# Patient Record
Sex: MALE | Race: WHITE | NOT HISPANIC OR LATINO | Employment: OTHER | URBAN - METROPOLITAN AREA
[De-identification: names, ages, dates, MRNs, and addresses within clinical notes are randomized per-mention and may not be internally consistent; named-entity substitution may affect disease eponyms.]

---

## 2017-12-18 ENCOUNTER — HOSPITAL ENCOUNTER (OUTPATIENT)
Dept: LAB | Facility: HOSPITAL | Age: 67
Discharge: HOME | End: 2017-12-18
Attending: INTERNAL MEDICINE | Admitting: INTERNAL MEDICINE

## 2017-12-18 LAB
ALBUMIN SERPL-MCNC: 3.9 G/DL (ref 3.5–4.8)
ALBUMIN/GLOB SERPL: 1.2 {RATIO} (ref 1–1.7)
ALP SERPL-CCNC: 63 IU/L (ref 32–91)
ALT SERPL-CCNC: 23 IU/L (ref 17–63)
ANION GAP SERPL CALC-SCNC: 10 MMOL/L (ref 10–20)
AST SERPL-CCNC: 20 IU/L (ref 15–41)
BASOPHILS # BLD AUTO: 0.1 10*3/UL (ref 0–0.2)
BASOPHILS NFR BLD AUTO: 1 % (ref 0–2)
BILIRUB SERPL-MCNC: 0.9 MG/DL (ref 0.3–1.2)
BUN SERPL-MCNC: 31 MG/DL (ref 8–20)
BUN/CREAT SERPL: 17.2 (ref 6.2–20.3)
CALCIUM SERPL-MCNC: 9.7 MG/DL (ref 8.9–10.3)
CHLORIDE SERPL-SCNC: 104 MMOL/L (ref 101–111)
CONV CO2: 29 MMOL/L (ref 22–32)
CONV TOTAL PROTEIN: 7.1 G/DL (ref 6.1–7.9)
CREAT UR-MCNC: 1.8 MG/DL (ref 0.7–1.2)
DIFFERENTIAL METHOD BLD: (no result)
EOSINOPHIL # BLD AUTO: 0.1 10*3/UL (ref 0–0.3)
EOSINOPHIL # BLD AUTO: 2 % (ref 0–3)
ERYTHROCYTE [DISTWIDTH] IN BLOOD BY AUTOMATED COUNT: 13.6 % (ref 11.5–14.5)
GLOBULIN UR ELPH-MCNC: 3.2 G/DL (ref 2.5–3.8)
GLUCOSE SERPL-MCNC: 161 MG/DL (ref 65–99)
HCT VFR BLD AUTO: 39.5 % (ref 40–54)
HGB BLD-MCNC: 13.6 G/DL (ref 14–18)
LYMPHOCYTES # BLD AUTO: 1.9 10*3/UL (ref 0.8–4.8)
LYMPHOCYTES NFR BLD AUTO: 31 % (ref 18–42)
MAGNESIUM SERPL-MCNC: 2.1 MG/DL (ref 1.8–2.5)
MCH RBC QN AUTO: 30.7 PG (ref 26–32)
MCHC RBC AUTO-ENTMCNC: 34.3 G/DL (ref 32–36)
MCV RBC AUTO: 89.4 FL (ref 80–94)
MONOCYTES # BLD AUTO: 0.5 10*3/UL (ref 0.1–1.3)
MONOCYTES NFR BLD AUTO: 8 % (ref 2–11)
NEUTROPHILS # BLD AUTO: 3.4 10*3/UL (ref 2.3–8.6)
NEUTROPHILS NFR BLD AUTO: 58 % (ref 50–75)
NRBC BLD AUTO-RTO: 0 /100{WBCS}
NRBC/RBC NFR BLD MANUAL: 0 10*3/UL
PLATELET # BLD AUTO: 156 10*3/UL (ref 150–450)
PMV BLD AUTO: 8 FL (ref 7.4–10.4)
POTASSIUM SERPL-SCNC: 4 MMOL/L (ref 3.6–5.1)
RBC # BLD AUTO: 4.42 10*6/UL (ref 4.6–6)
SODIUM SERPL-SCNC: 139 MMOL/L (ref 136–144)
WBC # BLD AUTO: 6 10*3/UL (ref 4.5–11.5)

## 2019-10-09 ENCOUNTER — TRANSCRIBE ORDERS (OUTPATIENT)
Dept: ADMINISTRATIVE | Facility: HOSPITAL | Age: 69
End: 2019-10-09

## 2019-10-09 DIAGNOSIS — R10.33 PERIUMBILICAL PAIN: Primary | ICD-10-CM

## 2019-10-15 ENCOUNTER — HOSPITAL ENCOUNTER (OUTPATIENT)
Dept: ULTRASOUND IMAGING | Facility: HOSPITAL | Age: 69
Discharge: HOME | End: 2019-10-15

## 2019-10-15 ENCOUNTER — HOSPITAL ENCOUNTER (OUTPATIENT)
Dept: ULTRASOUND IMAGING | Facility: HOSPITAL | Age: 69
Discharge: HOME | End: 2019-10-15
Admitting: INTERNAL MEDICINE

## 2019-10-15 DIAGNOSIS — N28.9 RENAL INSUFFICIENCY: ICD-10-CM

## 2019-10-15 DIAGNOSIS — R10.33 PERIUMBILICAL PAIN: ICD-10-CM

## 2019-10-15 PROCEDURE — 76775 US EXAM ABDO BACK WALL LIM: CPT

## 2019-10-15 PROCEDURE — 76705 ECHO EXAM OF ABDOMEN: CPT

## 2020-07-05 ENCOUNTER — APPOINTMENT (OUTPATIENT)
Dept: GENERAL RADIOLOGY | Facility: HOSPITAL | Age: 70
End: 2020-07-05

## 2020-07-05 ENCOUNTER — HOSPITAL ENCOUNTER (EMERGENCY)
Facility: HOSPITAL | Age: 70
Discharge: HOME | End: 2020-07-05
Attending: EMERGENCY MEDICINE | Admitting: EMERGENCY MEDICINE

## 2020-07-05 VITALS
TEMPERATURE: 98 F | HEART RATE: 79 BPM | RESPIRATION RATE: 18 BRPM | SYSTOLIC BLOOD PRESSURE: 154 MMHG | BODY MASS INDEX: 26.51 KG/M2 | WEIGHT: 185.19 LBS | DIASTOLIC BLOOD PRESSURE: 88 MMHG | HEIGHT: 70 IN | OXYGEN SATURATION: 99 %

## 2020-07-05 DIAGNOSIS — S92.501A CLOSED FRACTURE OF PHALANX OF RIGHT FIFTH TOE, INITIAL ENCOUNTER: Primary | ICD-10-CM

## 2020-07-05 PROCEDURE — 73660 X-RAY EXAM OF TOE(S): CPT

## 2020-07-05 PROCEDURE — 99283 EMERGENCY DEPT VISIT LOW MDM: CPT

## 2020-07-05 RX ORDER — LISINOPRIL 40 MG/1
40 TABLET ORAL DAILY
COMMUNITY

## 2020-07-05 RX ORDER — LANOLIN ALCOHOL/MO/W.PET/CERES
1000 CREAM (GRAM) TOPICAL DAILY
Status: ON HOLD | COMMUNITY
End: 2022-06-16

## 2020-07-05 RX ORDER — CLOPIDOGREL BISULFATE 75 MG/1
75 TABLET ORAL DAILY
COMMUNITY

## 2020-07-05 RX ORDER — INSULIN GLARGINE 100 [IU]/ML
30 INJECTION, SOLUTION SUBCUTANEOUS DAILY
COMMUNITY
End: 2022-06-25 | Stop reason: HOSPADM

## 2020-07-05 NOTE — ED PROVIDER NOTES
Subjective   Patient is a 69-year-old male who fell and injured his fifth toe on his right foot.  He states it was deformed initially but is straight at this time.  He complains of moderate pain.          Review of Systems  For numbness tingling or other complaint of injury  Past Medical History:   Diagnosis Date   • Diabetes mellitus (CMS/HCC)    • Hyperlipidemia    • Hypertension        Allergies   Allergen Reactions   • Contrast Dye Anaphylaxis   • Iodinated Diagnostic Agents Shortness Of Breath   • Iodine Shortness Of Breath   • Aspirin Nausea And Vomiting       Past Surgical History:   Procedure Laterality Date   • ADENOIDECTOMY     • TONSILLECTOMY         History reviewed. No pertinent family history.    Social History     Socioeconomic History   • Marital status:      Spouse name: Not on file   • Number of children: Not on file   • Years of education: Not on file   • Highest education level: Not on file   Tobacco Use   • Smoking status: Never Smoker   • Smokeless tobacco: Never Used   Substance and Sexual Activity   • Alcohol use: Never     Frequency: Never   • Drug use: Never   • Sexual activity: Defer           Objective   Physical Exam  Strohman exam shows pain to palpation of his right fifth toe.  There is no deformity noted.  Patient is neurovascularly intact with 2+ pulses.  Procedures           ED Course      Xr Toe 2+ View Right    Result Date: 7/5/2020  Nondisplaced fracture of the fifth toe proximal phalanx.  Electronically Signed By-Yahaira Arroyo On:7/5/2020 3:17 PM This report was finalized on [id number] by  Yahaira Arroyo, .                                         MDM  Number of Diagnoses or Management Options  Diagnosis management comments: Patient is findings consistent with fracture proximal phalanx right fifth toe.  Patient had the fourth and fifth toe magui taped and the patient was given a postop shoe.       Amount and/or Complexity of Data Reviewed  Tests in the radiology section  of CPT®: reviewed    Risk of Complications, Morbidity, and/or Mortality  Presenting problems: moderate  Diagnostic procedures: moderate  Management options: moderate    Patient Progress  Patient progress: stable      Final diagnoses:   Closed fracture of phalanx of right fifth toe, initial encounter            Vazqeuz Willson MD  07/05/20 0545

## 2021-03-25 ENCOUNTER — APPOINTMENT (OUTPATIENT)
Dept: CARDIOLOGY | Facility: HOSPITAL | Age: 71
End: 2021-03-25

## 2021-03-25 ENCOUNTER — HOSPITAL ENCOUNTER (EMERGENCY)
Facility: HOSPITAL | Age: 71
Discharge: HOME | End: 2021-03-25
Attending: EMERGENCY MEDICINE | Admitting: EMERGENCY MEDICINE

## 2021-03-25 VITALS
HEIGHT: 70 IN | OXYGEN SATURATION: 99 % | TEMPERATURE: 98 F | WEIGHT: 180 LBS | RESPIRATION RATE: 17 BRPM | HEART RATE: 99 BPM | DIASTOLIC BLOOD PRESSURE: 78 MMHG | SYSTOLIC BLOOD PRESSURE: 134 MMHG | BODY MASS INDEX: 25.77 KG/M2

## 2021-03-25 DIAGNOSIS — M79.604 RIGHT LEG PAIN: Primary | ICD-10-CM

## 2021-03-25 LAB
ANION GAP SERPL CALCULATED.3IONS-SCNC: 10 MMOL/L (ref 5–15)
BH CV LOWER VASCULAR LEFT COMMON FEMORAL AUGMENT: NORMAL
BH CV LOWER VASCULAR LEFT COMMON FEMORAL COMPETENT: NORMAL
BH CV LOWER VASCULAR LEFT COMMON FEMORAL COMPRESS: NORMAL
BH CV LOWER VASCULAR LEFT COMMON FEMORAL PHASIC: NORMAL
BH CV LOWER VASCULAR LEFT COMMON FEMORAL SPONT: NORMAL
BH CV LOWER VASCULAR RIGHT COMMON FEMORAL AUGMENT: NORMAL
BH CV LOWER VASCULAR RIGHT COMMON FEMORAL COMPETENT: NORMAL
BH CV LOWER VASCULAR RIGHT COMMON FEMORAL COMPRESS: NORMAL
BH CV LOWER VASCULAR RIGHT COMMON FEMORAL PHASIC: NORMAL
BH CV LOWER VASCULAR RIGHT COMMON FEMORAL SPONT: NORMAL
BH CV LOWER VASCULAR RIGHT DISTAL FEMORAL COMPRESS: NORMAL
BH CV LOWER VASCULAR RIGHT GASTRONEMIUS COMPRESS: NORMAL
BH CV LOWER VASCULAR RIGHT GREATER SAPH AK COMPRESS: NORMAL
BH CV LOWER VASCULAR RIGHT GREATER SAPH BK COMPRESS: NORMAL
BH CV LOWER VASCULAR RIGHT LESSER SAPH COMPRESS: NORMAL
BH CV LOWER VASCULAR RIGHT MID FEMORAL AUGMENT: NORMAL
BH CV LOWER VASCULAR RIGHT MID FEMORAL COMPETENT: NORMAL
BH CV LOWER VASCULAR RIGHT MID FEMORAL COMPRESS: NORMAL
BH CV LOWER VASCULAR RIGHT MID FEMORAL PHASIC: NORMAL
BH CV LOWER VASCULAR RIGHT MID FEMORAL SPONT: NORMAL
BH CV LOWER VASCULAR RIGHT PERONEAL COMPRESS: NORMAL
BH CV LOWER VASCULAR RIGHT POPLITEAL AUGMENT: NORMAL
BH CV LOWER VASCULAR RIGHT POPLITEAL COMPETENT: NORMAL
BH CV LOWER VASCULAR RIGHT POPLITEAL COMPRESS: NORMAL
BH CV LOWER VASCULAR RIGHT POPLITEAL PHASIC: NORMAL
BH CV LOWER VASCULAR RIGHT POPLITEAL SPONT: NORMAL
BH CV LOWER VASCULAR RIGHT POSTERIOR TIBIAL COMPRESS: NORMAL
BH CV LOWER VASCULAR RIGHT PROXIMAL FEMORAL COMPRESS: NORMAL
BH CV LOWER VASCULAR RIGHT SAPHENOFEMORAL JUNCTION COMPRESS: NORMAL
BUN SERPL-MCNC: 31 MG/DL (ref 8–23)
BUN/CREAT SERPL: 16.9 (ref 7–25)
CALCIUM SPEC-SCNC: 9.6 MG/DL (ref 8.6–10.5)
CHLORIDE SERPL-SCNC: 99 MMOL/L (ref 98–107)
CO2 SERPL-SCNC: 29 MMOL/L (ref 22–29)
CREAT SERPL-MCNC: 1.83 MG/DL (ref 0.76–1.27)
DEPRECATED RDW RBC AUTO: 38.5 FL (ref 37–54)
EOSINOPHIL # BLD MANUAL: 0.08 10*3/MM3 (ref 0–0.4)
EOSINOPHIL NFR BLD MANUAL: 1 % (ref 0.3–6.2)
ERYTHROCYTE [DISTWIDTH] IN BLOOD BY AUTOMATED COUNT: 12.7 % (ref 12.3–15.4)
GFR SERPL CREATININE-BSD FRML MDRD: 37 ML/MIN/1.73
GLUCOSE SERPL-MCNC: 288 MG/DL (ref 65–99)
HCT VFR BLD AUTO: 44.1 % (ref 37.5–51)
HGB BLD-MCNC: 15.5 G/DL (ref 13–17.7)
HOLD SPECIMEN: NORMAL
LYMPHOCYTES # BLD MANUAL: 1.07 10*3/MM3 (ref 0.7–3.1)
LYMPHOCYTES NFR BLD MANUAL: 13 % (ref 19.6–45.3)
LYMPHOCYTES NFR BLD MANUAL: 9 % (ref 5–12)
MCH RBC QN AUTO: 30.6 PG (ref 26.6–33)
MCHC RBC AUTO-ENTMCNC: 35.3 G/DL (ref 31.5–35.7)
MCV RBC AUTO: 86.9 FL (ref 79–97)
MONOCYTES # BLD AUTO: 0.74 10*3/MM3 (ref 0.1–0.9)
NEUTROPHILS # BLD AUTO: 5.66 10*3/MM3 (ref 1.7–7)
NEUTROPHILS NFR BLD MANUAL: 69 % (ref 42.7–76)
PLAT MORPH BLD: NORMAL
PLATELET # BLD AUTO: 189 10*3/MM3 (ref 140–450)
PMV BLD AUTO: 8.2 FL (ref 6–12)
POTASSIUM SERPL-SCNC: 3.5 MMOL/L (ref 3.5–5.2)
RBC # BLD AUTO: 5.07 10*6/MM3 (ref 4.14–5.8)
RBC MORPH BLD: NORMAL
SCAN SLIDE: NORMAL
SODIUM SERPL-SCNC: 138 MMOL/L (ref 136–145)
VARIANT LYMPHS NFR BLD MANUAL: 8 % (ref 0–5)
WBC # BLD AUTO: 8.2 10*3/MM3 (ref 3.4–10.8)
WBC MORPH BLD: NORMAL
WHOLE BLOOD HOLD SPECIMEN: NORMAL

## 2021-03-25 PROCEDURE — 99283 EMERGENCY DEPT VISIT LOW MDM: CPT

## 2021-03-25 PROCEDURE — 85007 BL SMEAR W/DIFF WBC COUNT: CPT | Performed by: EMERGENCY MEDICINE

## 2021-03-25 PROCEDURE — 85025 COMPLETE CBC W/AUTO DIFF WBC: CPT | Performed by: EMERGENCY MEDICINE

## 2021-03-25 PROCEDURE — 93971 EXTREMITY STUDY: CPT

## 2021-03-25 PROCEDURE — 80048 BASIC METABOLIC PNL TOTAL CA: CPT | Performed by: EMERGENCY MEDICINE

## 2021-03-25 RX ORDER — ACETAMINOPHEN AND CODEINE PHOSPHATE 300; 30 MG/1; MG/1
1 TABLET ORAL EVERY 6 HOURS PRN
Qty: 12 TABLET | Refills: 0 | Status: SHIPPED | OUTPATIENT
Start: 2021-03-25 | End: 2021-11-23

## 2021-03-25 RX ORDER — SODIUM CHLORIDE 0.9 % (FLUSH) 0.9 %
10 SYRINGE (ML) INJECTION AS NEEDED
Status: DISCONTINUED | OUTPATIENT
Start: 2021-03-25 | End: 2021-03-25 | Stop reason: HOSPADM

## 2021-03-25 NOTE — ED NOTES
Pt A&Ox 4 with lungs CTA bases. Pt ambulatory at this time.   Discharge instruction given to pt with verbal understanding received.   Pt discharged with education, where to  RX, and personal belongings.        Arnold Fine, RN  03/25/21 7722

## 2021-03-25 NOTE — ED PROVIDER NOTES
Subjective   Patient is a 70-year-old male complaining of right leg pain for the past several days.  He states the pain is mild in his thigh and his pretibial region.  He also planes of a rash that developed over the past few days.  Denies itching numbness tingling weakness or other complaint          Review of Systems  Negative for chest pain shortness of breath recent trauma numbness tingling weakness or other complaint  Past Medical History:   Diagnosis Date   • Diabetes mellitus (CMS/HCC)    • Hyperlipidemia    • Hypertension        Allergies   Allergen Reactions   • Contrast Dye Anaphylaxis   • Iodinated Diagnostic Agents Shortness Of Breath   • Iodine Shortness Of Breath   • Aspirin Nausea And Vomiting       Past Surgical History:   Procedure Laterality Date   • ADENOIDECTOMY     • TONSILLECTOMY         No family history on file.    Social History     Socioeconomic History   • Marital status:      Spouse name: Not on file   • Number of children: Not on file   • Years of education: Not on file   • Highest education level: Not on file   Tobacco Use   • Smoking status: Never Smoker   • Smokeless tobacco: Never Used   Substance and Sexual Activity   • Alcohol use: Never   • Drug use: Never   • Sexual activity: Defer           Objective   Physical Exam  Lungs are clear.  Heart is regular rate rhythm without murmur.  Extremity exam shows the patient mild pain to palpation over his right medial thigh as well as his right pretibial region.  He does have a small excoriated rash over his right pretibial region as well.  Has 2+ pulses and is neurovascularly intact.  Procedures           ED Course      Results for orders placed or performed during the hospital encounter of 03/25/21   Basic Metabolic Panel    Specimen: Arm, Left; Blood   Result Value Ref Range    Glucose 288 (H) 65 - 99 mg/dL    BUN 31 (H) 8 - 23 mg/dL    Creatinine 1.83 (H) 0.76 - 1.27 mg/dL    Sodium 138 136 - 145 mmol/L    Potassium 3.5 3.5 - 5.2  mmol/L    Chloride 99 98 - 107 mmol/L    CO2 29.0 22.0 - 29.0 mmol/L    Calcium 9.6 8.6 - 10.5 mg/dL    eGFR Non African Amer 37 (L) >60 mL/min/1.73    BUN/Creatinine Ratio 16.9 7.0 - 25.0    Anion Gap 10.0 5.0 - 15.0 mmol/L   CBC Auto Differential    Specimen: Arm, Left; Blood   Result Value Ref Range    WBC 8.20 3.40 - 10.80 10*3/mm3    RBC 5.07 4.14 - 5.80 10*6/mm3    Hemoglobin 15.5 13.0 - 17.7 g/dL    Hematocrit 44.1 37.5 - 51.0 %    MCV 86.9 79.0 - 97.0 fL    MCH 30.6 26.6 - 33.0 pg    MCHC 35.3 31.5 - 35.7 g/dL    RDW 12.7 12.3 - 15.4 %    RDW-SD 38.5 37.0 - 54.0 fl    MPV 8.2 6.0 - 12.0 fL    Platelets 189 140 - 450 10*3/mm3    nRBC 0.0 0.0 - 0.2 /100 WBC   Duplex Venous Lower Extremity - Right   Result Value Ref Range    Right Common Femoral Spont Y     Right Common Femoral Phasic Y     Right Common Femoral Augment Y     Right Common Femoral Competent Y     Right Common Femoral Compress C     Right Saphenofemoral Junction Compress C     Right Proximal Femoral Compress C     Right Mid Femoral Spont Y     Right Mid Femoral Phasic Y     Right Mid Femoral Augment Y     Right Mid Femoral Competent Y     Right Mid Femoral Compress C     Right Distal Femoral Compress C     Right Popliteal Spont Y     Right Popliteal Phasic Y     Right Popliteal Augment Y     Right Popliteal Competent Y     Right Popliteal Compress C     Right Posterior Tibial Compress C     Right Peroneal Compress C     Right GastronemiusSoleal Compress C     Right Greater Saph AK Compress C     Right Greater Saph BK Compress C     Right Lesser Saph Compress C     Left Common Femoral Spont Y     Left Common Femoral Phasic Y     Left Common Femoral Augment Y     Left Common Femoral Competent Y     Left Common Femoral Compress C                                           MDM  Number of Diagnoses or Management Options  Diagnosis management comments: Patient is findings consistent with right leg strain.  There is no evidence of DVT.  There is no  evidence of infectious process or metabolic abnormality.  Patient will be discharged.  Will be placed on Tylenol 3 and will see his MD for recheck.       Amount and/or Complexity of Data Reviewed  Clinical lab tests: reviewed    Risk of Complications, Morbidity, and/or Mortality  Presenting problems: moderate  Diagnostic procedures: moderate  Management options: moderate    Patient Progress  Patient progress: stable      Final diagnoses:   Right leg pain       ED Disposition  ED Disposition     ED Disposition Condition Comment    Discharge Stable           No follow-up provider specified.       Medication List      New Prescriptions    acetaminophen-codeine 300-30 MG per tablet  Commonly known as: TYLENOL #3  Take 1 tablet by mouth Every 6 (Six) Hours As Needed for Moderate Pain .           Where to Get Your Medications      These medications were sent to [address] [place] - [address] [facility] - [phone]  - [phone] FX  [address] [place] [address] [place] [address]    Phone: [phone]   · acetaminophen-codeine 300-30 MG per tablet          Vazquez Willson MD  03/25/21 6334

## 2021-11-01 ENCOUNTER — TRANSCRIBE ORDERS (OUTPATIENT)
Dept: ADMINISTRATIVE | Facility: HOSPITAL | Age: 71
End: 2021-11-01

## 2021-11-01 DIAGNOSIS — I69.30 SEQUELAE OF CEREBRAL INFARCTION: Primary | ICD-10-CM

## 2021-11-08 ENCOUNTER — HOSPITAL ENCOUNTER (OUTPATIENT)
Dept: CT IMAGING | Facility: HOSPITAL | Age: 71
Discharge: HOME | End: 2021-11-08
Admitting: INTERNAL MEDICINE

## 2021-11-08 DIAGNOSIS — I69.30 SEQUELAE OF CEREBRAL INFARCTION: ICD-10-CM

## 2021-11-08 PROCEDURE — 70450 CT HEAD/BRAIN W/O DYE: CPT

## 2021-11-17 ENCOUNTER — TRANSCRIBE ORDERS (OUTPATIENT)
Dept: ADMINISTRATIVE | Facility: HOSPITAL | Age: 71
End: 2021-11-17

## 2021-11-17 ENCOUNTER — TRANSCRIBE ORDERS (OUTPATIENT)
Dept: PHYSICAL THERAPY | Facility: CLINIC | Age: 71
End: 2021-11-17

## 2021-11-17 DIAGNOSIS — Z74.09 IMPAIRED FUNCTIONAL MOBILITY, BALANCE, GAIT, AND ENDURANCE: Primary | ICD-10-CM

## 2021-11-17 DIAGNOSIS — I69.30 UNSPECIFIED SEQUELAE OF CEREBRAL INFARCTION: ICD-10-CM

## 2021-11-22 ENCOUNTER — APPOINTMENT (OUTPATIENT)
Dept: GENERAL RADIOLOGY | Facility: HOSPITAL | Age: 71
End: 2021-11-22

## 2021-11-22 ENCOUNTER — HOSPITAL ENCOUNTER (OUTPATIENT)
Facility: HOSPITAL | Age: 71
Setting detail: OBSERVATION
Discharge: NURSING, REHAB OR LONG-TERM CARE | End: 2021-11-25
Attending: INTERNAL MEDICINE | Admitting: INTERNAL MEDICINE

## 2021-11-22 ENCOUNTER — APPOINTMENT (OUTPATIENT)
Dept: MRI IMAGING | Facility: HOSPITAL | Age: 71
End: 2021-11-22

## 2021-11-22 DIAGNOSIS — M25.462 EFFUSION OF LEFT KNEE: ICD-10-CM

## 2021-11-22 DIAGNOSIS — R53.1 WEAKNESS: ICD-10-CM

## 2021-11-22 DIAGNOSIS — M25.562 ACUTE PAIN OF LEFT KNEE: Primary | ICD-10-CM

## 2021-11-22 PROBLEM — Z85.828 HISTORY OF BASAL CELL CARCINOMA (BCC): Status: ACTIVE | Noted: 2021-11-22

## 2021-11-22 PROBLEM — N18.30 STAGE 3 CHRONIC KIDNEY DISEASE (HCC): Chronic | Status: ACTIVE | Noted: 2020-12-29

## 2021-11-22 PROBLEM — E78.5 DYSLIPIDEMIA: Chronic | Status: ACTIVE | Noted: 2021-11-22

## 2021-11-22 PROBLEM — G45.9 TIA (TRANSIENT ISCHEMIC ATTACK): Chronic | Status: ACTIVE | Noted: 2018-08-22

## 2021-11-22 PROBLEM — N18.30 STAGE 3 CHRONIC KIDNEY DISEASE: Status: ACTIVE | Noted: 2020-12-29

## 2021-11-22 PROBLEM — E87.6 HYPOKALEMIA: Status: ACTIVE | Noted: 2021-11-22

## 2021-11-22 PROBLEM — M19.90 OSTEOARTHRITIS: Chronic | Status: ACTIVE | Noted: 2021-11-22

## 2021-11-22 PROBLEM — I48.0 PAROXYSMAL ATRIAL FIBRILLATION: Chronic | Status: ACTIVE | Noted: 2018-01-08

## 2021-11-22 PROBLEM — E78.5 DYSLIPIDEMIA: Status: ACTIVE | Noted: 2021-11-22

## 2021-11-22 PROBLEM — G45.9 TIA (TRANSIENT ISCHEMIC ATTACK): Status: ACTIVE | Noted: 2018-08-22

## 2021-11-22 PROBLEM — M19.90 OSTEOARTHRITIS: Status: ACTIVE | Noted: 2021-11-22

## 2021-11-22 PROBLEM — I48.0 PAROXYSMAL ATRIAL FIBRILLATION (HCC): Status: ACTIVE | Noted: 2018-01-08

## 2021-11-22 LAB
ALBUMIN SERPL-MCNC: 4 G/DL (ref 3.5–5.2)
ALBUMIN/GLOB SERPL: 1.4 G/DL
ALP SERPL-CCNC: 89 U/L (ref 39–117)
ALT SERPL W P-5'-P-CCNC: 17 U/L (ref 1–41)
ANION GAP SERPL CALCULATED.3IONS-SCNC: 15 MMOL/L (ref 5–15)
AST SERPL-CCNC: 16 U/L (ref 1–40)
BASOPHILS # BLD AUTO: 0.1 10*3/MM3 (ref 0–0.2)
BASOPHILS NFR BLD AUTO: 1.1 % (ref 0–1.5)
BILIRUB SERPL-MCNC: 0.9 MG/DL (ref 0–1.2)
BILIRUB UR QL STRIP: NEGATIVE
BUN SERPL-MCNC: 23 MG/DL (ref 8–23)
BUN/CREAT SERPL: 23.7 (ref 7–25)
CALCIUM SPEC-SCNC: 9.5 MG/DL (ref 8.6–10.5)
CHLORIDE SERPL-SCNC: 101 MMOL/L (ref 98–107)
CHOLEST SERPL-MCNC: 216 MG/DL (ref 0–200)
CLARITY UR: CLEAR
CO2 SERPL-SCNC: 26 MMOL/L (ref 22–29)
COLOR UR: YELLOW
CREAT SERPL-MCNC: 0.97 MG/DL (ref 0.76–1.27)
DEPRECATED RDW RBC AUTO: 38.9 FL (ref 37–54)
EOSINOPHIL # BLD AUTO: 0 10*3/MM3 (ref 0–0.4)
EOSINOPHIL NFR BLD AUTO: 0.4 % (ref 0.3–6.2)
ERYTHROCYTE [DISTWIDTH] IN BLOOD BY AUTOMATED COUNT: 13 % (ref 12.3–15.4)
GFR SERPL CREATININE-BSD FRML MDRD: 76 ML/MIN/1.73
GLOBULIN UR ELPH-MCNC: 2.8 GM/DL
GLUCOSE BLDC GLUCOMTR-MCNC: 303 MG/DL (ref 70–105)
GLUCOSE BLDC GLUCOMTR-MCNC: 48 MG/DL (ref 70–105)
GLUCOSE BLDC GLUCOMTR-MCNC: 88 MG/DL (ref 70–105)
GLUCOSE BLDC GLUCOMTR-MCNC: 97 MG/DL (ref 70–105)
GLUCOSE SERPL-MCNC: 82 MG/DL (ref 65–99)
GLUCOSE UR STRIP-MCNC: ABNORMAL MG/DL
HBA1C MFR BLD: 7.6 % (ref 3.5–5.6)
HCT VFR BLD AUTO: 43.2 % (ref 37.5–51)
HDLC SERPL-MCNC: 41 MG/DL (ref 40–60)
HGB BLD-MCNC: 15.2 G/DL (ref 13–17.7)
HGB UR QL STRIP.AUTO: NEGATIVE
KETONES UR QL STRIP: NEGATIVE
LDLC SERPL CALC-MCNC: 153 MG/DL (ref 0–100)
LDLC/HDLC SERPL: 3.67 {RATIO}
LEUKOCYTE ESTERASE UR QL STRIP.AUTO: NEGATIVE
LYMPHOCYTES # BLD AUTO: 1.9 10*3/MM3 (ref 0.7–3.1)
LYMPHOCYTES NFR BLD AUTO: 21.1 % (ref 19.6–45.3)
MAGNESIUM SERPL-MCNC: 1.9 MG/DL (ref 1.6–2.4)
MAGNESIUM SERPL-MCNC: 2 MG/DL (ref 1.6–2.4)
MCH RBC QN AUTO: 30.3 PG (ref 26.6–33)
MCHC RBC AUTO-ENTMCNC: 35.2 G/DL (ref 31.5–35.7)
MCV RBC AUTO: 86 FL (ref 79–97)
MONOCYTES # BLD AUTO: 0.5 10*3/MM3 (ref 0.1–0.9)
MONOCYTES NFR BLD AUTO: 5.9 % (ref 5–12)
NEUTROPHILS NFR BLD AUTO: 6.4 10*3/MM3 (ref 1.7–7)
NEUTROPHILS NFR BLD AUTO: 71.5 % (ref 42.7–76)
NITRITE UR QL STRIP: NEGATIVE
NRBC BLD AUTO-RTO: 0.1 /100 WBC (ref 0–0.2)
PH UR STRIP.AUTO: <=5 [PH] (ref 5–8)
PLATELET # BLD AUTO: 176 10*3/MM3 (ref 140–450)
PMV BLD AUTO: 7.8 FL (ref 6–12)
POTASSIUM SERPL-SCNC: 3.1 MMOL/L (ref 3.5–5.2)
POTASSIUM SERPL-SCNC: 3.3 MMOL/L (ref 3.5–5.2)
PROT SERPL-MCNC: 6.8 G/DL (ref 6–8.5)
PROT UR QL STRIP: NEGATIVE
QT INTERVAL: 339 MS
RBC # BLD AUTO: 5.02 10*6/MM3 (ref 4.14–5.8)
SARS-COV-2 RNA PNL SPEC NAA+PROBE: NOT DETECTED
SODIUM SERPL-SCNC: 142 MMOL/L (ref 136–145)
SP GR UR STRIP: 1.02 (ref 1–1.03)
TRIGL SERPL-MCNC: 123 MG/DL (ref 0–150)
TROPONIN T SERPL-MCNC: <0.01 NG/ML (ref 0–0.03)
TSH SERPL DL<=0.05 MIU/L-ACNC: 2.84 UIU/ML (ref 0.27–4.2)
UROBILINOGEN UR QL STRIP: ABNORMAL
VLDLC SERPL-MCNC: 22 MG/DL (ref 5–40)
WBC NRBC COR # BLD: 9 10*3/MM3 (ref 3.4–10.8)

## 2021-11-22 PROCEDURE — 96372 THER/PROPH/DIAG INJ SC/IM: CPT

## 2021-11-22 PROCEDURE — 70551 MRI BRAIN STEM W/O DYE: CPT

## 2021-11-22 PROCEDURE — 84443 ASSAY THYROID STIM HORMONE: CPT | Performed by: PHYSICIAN ASSISTANT

## 2021-11-22 PROCEDURE — 83036 HEMOGLOBIN GLYCOSYLATED A1C: CPT | Performed by: NURSE PRACTITIONER

## 2021-11-22 PROCEDURE — 36415 COLL VENOUS BLD VENIPUNCTURE: CPT | Performed by: NURSE PRACTITIONER

## 2021-11-22 PROCEDURE — 83735 ASSAY OF MAGNESIUM: CPT | Performed by: NURSE PRACTITIONER

## 2021-11-22 PROCEDURE — 85025 COMPLETE CBC W/AUTO DIFF WBC: CPT | Performed by: PHYSICIAN ASSISTANT

## 2021-11-22 PROCEDURE — 25010000002 ENOXAPARIN PER 10 MG: Performed by: NURSE PRACTITIONER

## 2021-11-22 PROCEDURE — 73562 X-RAY EXAM OF KNEE 3: CPT

## 2021-11-22 PROCEDURE — 82962 GLUCOSE BLOOD TEST: CPT

## 2021-11-22 PROCEDURE — 71045 X-RAY EXAM CHEST 1 VIEW: CPT

## 2021-11-22 PROCEDURE — 83735 ASSAY OF MAGNESIUM: CPT | Performed by: PHYSICIAN ASSISTANT

## 2021-11-22 PROCEDURE — C9803 HOPD COVID-19 SPEC COLLECT: HCPCS

## 2021-11-22 PROCEDURE — 93005 ELECTROCARDIOGRAM TRACING: CPT | Performed by: PHYSICIAN ASSISTANT

## 2021-11-22 PROCEDURE — 80061 LIPID PANEL: CPT | Performed by: NURSE PRACTITIONER

## 2021-11-22 PROCEDURE — 84132 ASSAY OF SERUM POTASSIUM: CPT | Performed by: NURSE PRACTITIONER

## 2021-11-22 PROCEDURE — 99285 EMERGENCY DEPT VISIT HI MDM: CPT

## 2021-11-22 PROCEDURE — 80053 COMPREHEN METABOLIC PANEL: CPT | Performed by: PHYSICIAN ASSISTANT

## 2021-11-22 PROCEDURE — 87635 SARS-COV-2 COVID-19 AMP PRB: CPT | Performed by: INTERNAL MEDICINE

## 2021-11-22 PROCEDURE — G0378 HOSPITAL OBSERVATION PER HR: HCPCS

## 2021-11-22 PROCEDURE — 81003 URINALYSIS AUTO W/O SCOPE: CPT | Performed by: PHYSICIAN ASSISTANT

## 2021-11-22 PROCEDURE — 99219 PR INITIAL OBSERVATION CARE/DAY 50 MINUTES: CPT | Performed by: NURSE PRACTITIONER

## 2021-11-22 PROCEDURE — 84484 ASSAY OF TROPONIN QUANT: CPT | Performed by: PHYSICIAN ASSISTANT

## 2021-11-22 RX ORDER — ACETAMINOPHEN 325 MG/1
650 TABLET ORAL EVERY 4 HOURS PRN
Status: DISCONTINUED | OUTPATIENT
Start: 2021-11-22 | End: 2021-11-25 | Stop reason: HOSPADM

## 2021-11-22 RX ORDER — MAGNESIUM SULFATE HEPTAHYDRATE 40 MG/ML
2 INJECTION, SOLUTION INTRAVENOUS AS NEEDED
Status: DISCONTINUED | OUTPATIENT
Start: 2021-11-22 | End: 2021-11-25 | Stop reason: HOSPADM

## 2021-11-22 RX ORDER — DEXTROSE MONOHYDRATE 25 G/50ML
25 INJECTION, SOLUTION INTRAVENOUS
Status: DISCONTINUED | OUTPATIENT
Start: 2021-11-22 | End: 2021-11-25 | Stop reason: HOSPADM

## 2021-11-22 RX ORDER — POTASSIUM CHLORIDE 1.5 G/1.77G
40 POWDER, FOR SOLUTION ORAL AS NEEDED
Status: DISCONTINUED | OUTPATIENT
Start: 2021-11-22 | End: 2021-11-25 | Stop reason: HOSPADM

## 2021-11-22 RX ORDER — CHOLECALCIFEROL (VITAMIN D3) 125 MCG
5 CAPSULE ORAL NIGHTLY PRN
Status: DISCONTINUED | OUTPATIENT
Start: 2021-11-22 | End: 2021-11-25 | Stop reason: HOSPADM

## 2021-11-22 RX ORDER — SODIUM CHLORIDE 0.9 % (FLUSH) 0.9 %
10 SYRINGE (ML) INJECTION EVERY 12 HOURS SCHEDULED
Status: DISCONTINUED | OUTPATIENT
Start: 2021-11-22 | End: 2021-11-25 | Stop reason: HOSPADM

## 2021-11-22 RX ORDER — SODIUM CHLORIDE 0.9 % (FLUSH) 0.9 %
10 SYRINGE (ML) INJECTION AS NEEDED
Status: DISCONTINUED | OUTPATIENT
Start: 2021-11-22 | End: 2021-11-25 | Stop reason: HOSPADM

## 2021-11-22 RX ORDER — TRAMADOL HYDROCHLORIDE 50 MG/1
50 TABLET ORAL EVERY 6 HOURS PRN
Status: DISCONTINUED | OUTPATIENT
Start: 2021-11-22 | End: 2021-11-25 | Stop reason: HOSPADM

## 2021-11-22 RX ORDER — LABETALOL HYDROCHLORIDE 5 MG/ML
20 INJECTION, SOLUTION INTRAVENOUS EVERY 6 HOURS PRN
Status: DISCONTINUED | OUTPATIENT
Start: 2021-11-22 | End: 2021-11-25 | Stop reason: HOSPADM

## 2021-11-22 RX ORDER — NICOTINE POLACRILEX 4 MG
15 LOZENGE BUCCAL
Status: DISCONTINUED | OUTPATIENT
Start: 2021-11-22 | End: 2021-11-25 | Stop reason: HOSPADM

## 2021-11-22 RX ORDER — OLANZAPINE 10 MG/2ML
1 INJECTION, POWDER, LYOPHILIZED, FOR SOLUTION INTRAMUSCULAR AS NEEDED
Status: DISCONTINUED | OUTPATIENT
Start: 2021-11-22 | End: 2021-11-25 | Stop reason: HOSPADM

## 2021-11-22 RX ORDER — POTASSIUM CHLORIDE 20 MEQ/1
40 TABLET, EXTENDED RELEASE ORAL AS NEEDED
Status: DISCONTINUED | OUTPATIENT
Start: 2021-11-22 | End: 2021-11-25 | Stop reason: HOSPADM

## 2021-11-22 RX ORDER — ALUMINA, MAGNESIA, AND SIMETHICONE 2400; 2400; 240 MG/30ML; MG/30ML; MG/30ML
15 SUSPENSION ORAL EVERY 6 HOURS PRN
Status: DISCONTINUED | OUTPATIENT
Start: 2021-11-22 | End: 2021-11-25 | Stop reason: HOSPADM

## 2021-11-22 RX ORDER — INSULIN LISPRO 100 [IU]/ML
0-7 INJECTION, SOLUTION INTRAVENOUS; SUBCUTANEOUS
Status: DISCONTINUED | OUTPATIENT
Start: 2021-11-22 | End: 2021-11-25 | Stop reason: HOSPADM

## 2021-11-22 RX ORDER — MAGNESIUM SULFATE HEPTAHYDRATE 40 MG/ML
4 INJECTION, SOLUTION INTRAVENOUS AS NEEDED
Status: DISCONTINUED | OUTPATIENT
Start: 2021-11-22 | End: 2021-11-25 | Stop reason: HOSPADM

## 2021-11-22 RX ORDER — ACETAMINOPHEN 650 MG/1
650 SUPPOSITORY RECTAL EVERY 4 HOURS PRN
Status: DISCONTINUED | OUTPATIENT
Start: 2021-11-22 | End: 2021-11-25 | Stop reason: HOSPADM

## 2021-11-22 RX ORDER — ONDANSETRON 2 MG/ML
4 INJECTION INTRAMUSCULAR; INTRAVENOUS EVERY 6 HOURS PRN
Status: DISCONTINUED | OUTPATIENT
Start: 2021-11-22 | End: 2021-11-25 | Stop reason: HOSPADM

## 2021-11-22 RX ORDER — ACETAMINOPHEN 160 MG/5ML
650 SOLUTION ORAL EVERY 4 HOURS PRN
Status: DISCONTINUED | OUTPATIENT
Start: 2021-11-22 | End: 2021-11-25 | Stop reason: HOSPADM

## 2021-11-22 RX ORDER — POTASSIUM CHLORIDE 7.45 MG/ML
10 INJECTION INTRAVENOUS
Status: DISCONTINUED | OUTPATIENT
Start: 2021-11-22 | End: 2021-11-25 | Stop reason: HOSPADM

## 2021-11-22 RX ORDER — ONDANSETRON 4 MG/1
4 TABLET, FILM COATED ORAL EVERY 6 HOURS PRN
Status: DISCONTINUED | OUTPATIENT
Start: 2021-11-22 | End: 2021-11-25 | Stop reason: HOSPADM

## 2021-11-22 RX ORDER — POTASSIUM CHLORIDE 20 MEQ/1
20 TABLET, EXTENDED RELEASE ORAL DAILY
Status: DISCONTINUED | OUTPATIENT
Start: 2021-11-22 | End: 2021-11-25 | Stop reason: HOSPADM

## 2021-11-22 RX ORDER — INSULIN LISPRO 100 [IU]/ML
0-7 INJECTION, SOLUTION INTRAVENOUS; SUBCUTANEOUS AS NEEDED
Status: DISCONTINUED | OUTPATIENT
Start: 2021-11-22 | End: 2021-11-25 | Stop reason: HOSPADM

## 2021-11-22 RX ADMIN — ENOXAPARIN SODIUM 40 MG: 40 INJECTION SUBCUTANEOUS at 20:32

## 2021-11-22 RX ADMIN — SODIUM CHLORIDE, PRESERVATIVE FREE 10 ML: 5 INJECTION INTRAVENOUS at 20:32

## 2021-11-22 RX ADMIN — POTASSIUM CHLORIDE 20 MEQ: 20 TABLET, EXTENDED RELEASE ORAL at 12:55

## 2021-11-23 ENCOUNTER — APPOINTMENT (OUTPATIENT)
Dept: NEUROLOGY | Facility: HOSPITAL | Age: 71
End: 2021-11-23

## 2021-11-23 ENCOUNTER — APPOINTMENT (OUTPATIENT)
Dept: MRI IMAGING | Facility: HOSPITAL | Age: 71
End: 2021-11-23

## 2021-11-23 LAB
25(OH)D3 SERPL-MCNC: 29.4 NG/ML
ALBUMIN SERPL-MCNC: 3.5 G/DL (ref 3.5–5.2)
ALBUMIN/GLOB SERPL: 1.2 G/DL
ALP SERPL-CCNC: 85 U/L (ref 39–117)
ALT SERPL W P-5'-P-CCNC: 16 U/L (ref 1–41)
ANION GAP SERPL CALCULATED.3IONS-SCNC: 12 MMOL/L (ref 5–15)
AST SERPL-CCNC: 15 U/L (ref 1–40)
BASOPHILS # BLD AUTO: 0 10*3/MM3 (ref 0–0.2)
BASOPHILS NFR BLD AUTO: 0.6 % (ref 0–1.5)
BILIRUB SERPL-MCNC: 0.9 MG/DL (ref 0–1.2)
BUN SERPL-MCNC: 23 MG/DL (ref 8–23)
BUN/CREAT SERPL: 21.3 (ref 7–25)
CALCIUM SPEC-SCNC: 9 MG/DL (ref 8.6–10.5)
CHLORIDE SERPL-SCNC: 101 MMOL/L (ref 98–107)
CO2 SERPL-SCNC: 25 MMOL/L (ref 22–29)
CREAT SERPL-MCNC: 1.08 MG/DL (ref 0.76–1.27)
DEPRECATED RDW RBC AUTO: 39.8 FL (ref 37–54)
EOSINOPHIL # BLD AUTO: 0.1 10*3/MM3 (ref 0–0.4)
EOSINOPHIL NFR BLD AUTO: 0.7 % (ref 0.3–6.2)
ERYTHROCYTE [DISTWIDTH] IN BLOOD BY AUTOMATED COUNT: 13.1 % (ref 12.3–15.4)
FOLATE SERPL-MCNC: 12.7 NG/ML (ref 4.78–24.2)
GFR SERPL CREATININE-BSD FRML MDRD: 67 ML/MIN/1.73
GLOBULIN UR ELPH-MCNC: 3 GM/DL
GLUCOSE BLDC GLUCOMTR-MCNC: 249 MG/DL (ref 70–105)
GLUCOSE BLDC GLUCOMTR-MCNC: 290 MG/DL (ref 70–105)
GLUCOSE BLDC GLUCOMTR-MCNC: 352 MG/DL (ref 70–105)
GLUCOSE BLDC GLUCOMTR-MCNC: 360 MG/DL (ref 70–105)
GLUCOSE SERPL-MCNC: 254 MG/DL (ref 65–99)
HCT VFR BLD AUTO: 39.7 % (ref 37.5–51)
HGB BLD-MCNC: 14.1 G/DL (ref 13–17.7)
LYMPHOCYTES # BLD AUTO: 1.7 10*3/MM3 (ref 0.7–3.1)
LYMPHOCYTES NFR BLD AUTO: 22.3 % (ref 19.6–45.3)
MAGNESIUM SERPL-MCNC: 1.9 MG/DL (ref 1.6–2.4)
MCH RBC QN AUTO: 30.6 PG (ref 26.6–33)
MCHC RBC AUTO-ENTMCNC: 35.4 G/DL (ref 31.5–35.7)
MCV RBC AUTO: 86.5 FL (ref 79–97)
MONOCYTES # BLD AUTO: 0.8 10*3/MM3 (ref 0.1–0.9)
MONOCYTES NFR BLD AUTO: 10.1 % (ref 5–12)
NEUTROPHILS NFR BLD AUTO: 5.1 10*3/MM3 (ref 1.7–7)
NEUTROPHILS NFR BLD AUTO: 66.3 % (ref 42.7–76)
NRBC BLD AUTO-RTO: 0.1 /100 WBC (ref 0–0.2)
PLATELET # BLD AUTO: 163 10*3/MM3 (ref 140–450)
PMV BLD AUTO: 8.2 FL (ref 6–12)
POTASSIUM SERPL-SCNC: 3.5 MMOL/L (ref 3.5–5.2)
PROT SERPL-MCNC: 6.5 G/DL (ref 6–8.5)
RBC # BLD AUTO: 4.59 10*6/MM3 (ref 4.14–5.8)
SODIUM SERPL-SCNC: 138 MMOL/L (ref 136–145)
VIT B12 BLD-MCNC: 497 PG/ML (ref 211–946)
WBC NRBC COR # BLD: 7.7 10*3/MM3 (ref 3.4–10.8)

## 2021-11-23 PROCEDURE — 83735 ASSAY OF MAGNESIUM: CPT | Performed by: NURSE PRACTITIONER

## 2021-11-23 PROCEDURE — 25010000002 ENOXAPARIN PER 10 MG: Performed by: NURSE PRACTITIONER

## 2021-11-23 PROCEDURE — 84590 ASSAY OF VITAMIN A: CPT | Performed by: NURSE PRACTITIONER

## 2021-11-23 PROCEDURE — G0378 HOSPITAL OBSERVATION PER HR: HCPCS

## 2021-11-23 PROCEDURE — 82962 GLUCOSE BLOOD TEST: CPT

## 2021-11-23 PROCEDURE — 97162 PT EVAL MOD COMPLEX 30 MIN: CPT

## 2021-11-23 PROCEDURE — 72141 MRI NECK SPINE W/O DYE: CPT

## 2021-11-23 PROCEDURE — 99214 OFFICE O/P EST MOD 30 MIN: CPT | Performed by: NURSE PRACTITIONER

## 2021-11-23 PROCEDURE — 96372 THER/PROPH/DIAG INJ SC/IM: CPT

## 2021-11-23 PROCEDURE — 87102 FUNGUS ISOLATION CULTURE: CPT | Performed by: INTERNAL MEDICINE

## 2021-11-23 PROCEDURE — 72146 MRI CHEST SPINE W/O DYE: CPT

## 2021-11-23 PROCEDURE — 63710000001 INSULIN GLARGINE PER 5 UNITS: Performed by: INTERNAL MEDICINE

## 2021-11-23 PROCEDURE — 80053 COMPREHEN METABOLIC PANEL: CPT | Performed by: INTERNAL MEDICINE

## 2021-11-23 PROCEDURE — 84446 ASSAY OF VITAMIN E: CPT | Performed by: NURSE PRACTITIONER

## 2021-11-23 PROCEDURE — 82607 VITAMIN B-12: CPT | Performed by: NURSE PRACTITIONER

## 2021-11-23 PROCEDURE — 85025 COMPLETE CBC W/AUTO DIFF WBC: CPT | Performed by: INTERNAL MEDICINE

## 2021-11-23 PROCEDURE — 63710000001 INSULIN LISPRO (HUMAN) PER 5 UNITS: Performed by: NURSE PRACTITIONER

## 2021-11-23 PROCEDURE — 99225 PR SBSQ OBSERVATION CARE/DAY 25 MINUTES: CPT | Performed by: INTERNAL MEDICINE

## 2021-11-23 PROCEDURE — 82746 ASSAY OF FOLIC ACID SERUM: CPT | Performed by: NURSE PRACTITIONER

## 2021-11-23 PROCEDURE — 82306 VITAMIN D 25 HYDROXY: CPT | Performed by: NURSE PRACTITIONER

## 2021-11-23 RX ORDER — CLOPIDOGREL BISULFATE 75 MG/1
75 TABLET ORAL DAILY
Status: DISCONTINUED | OUTPATIENT
Start: 2021-11-23 | End: 2021-11-25 | Stop reason: HOSPADM

## 2021-11-23 RX ORDER — LISINOPRIL 20 MG/1
40 TABLET ORAL EVERY OTHER DAY
Status: DISCONTINUED | OUTPATIENT
Start: 2021-11-23 | End: 2021-11-25 | Stop reason: HOSPADM

## 2021-11-23 RX ORDER — TRIAMTERENE AND HYDROCHLOROTHIAZIDE 75; 50 MG/1; MG/1
1 TABLET ORAL DAILY
COMMUNITY
End: 2022-06-25 | Stop reason: HOSPADM

## 2021-11-23 RX ORDER — TRIAMTERENE AND HYDROCHLOROTHIAZIDE 75; 50 MG/1; MG/1
1 TABLET ORAL EVERY OTHER DAY
Status: DISCONTINUED | OUTPATIENT
Start: 2021-11-23 | End: 2021-11-25 | Stop reason: HOSPADM

## 2021-11-23 RX ORDER — LANOLIN ALCOHOL/MO/W.PET/CERES
1000 CREAM (GRAM) TOPICAL DAILY
Status: DISCONTINUED | OUTPATIENT
Start: 2021-11-23 | End: 2021-11-25 | Stop reason: HOSPADM

## 2021-11-23 RX ORDER — INSULIN GLARGINE 100 [IU]/ML
32 INJECTION, SOLUTION SUBCUTANEOUS DAILY
Status: DISCONTINUED | OUTPATIENT
Start: 2021-11-23 | End: 2021-11-25 | Stop reason: HOSPADM

## 2021-11-23 RX ADMIN — CYANOCOBALAMIN TAB 1000 MCG 1000 MCG: 1000 TAB at 09:44

## 2021-11-23 RX ADMIN — TRIAMTERENE AND HYDROCHLOROTHIAZIDE 1 TABLET: 75; 50 TABLET ORAL at 09:44

## 2021-11-23 RX ADMIN — INSULIN LISPRO 6 UNITS: 100 INJECTION, SOLUTION INTRAVENOUS; SUBCUTANEOUS at 17:50

## 2021-11-23 RX ADMIN — POTASSIUM CHLORIDE 20 MEQ: 20 TABLET, EXTENDED RELEASE ORAL at 08:45

## 2021-11-23 RX ADMIN — SODIUM CHLORIDE, PRESERVATIVE FREE 10 ML: 5 INJECTION INTRAVENOUS at 08:45

## 2021-11-23 RX ADMIN — INSULIN GLARGINE 32 UNITS: 100 INJECTION, SOLUTION SUBCUTANEOUS at 21:04

## 2021-11-23 RX ADMIN — SODIUM CHLORIDE, PRESERVATIVE FREE 10 ML: 5 INJECTION INTRAVENOUS at 21:34

## 2021-11-23 RX ADMIN — TRAMADOL HYDROCHLORIDE 50 MG: 50 TABLET, COATED ORAL at 04:36

## 2021-11-23 RX ADMIN — INSULIN LISPRO 6 UNITS: 100 INJECTION, SOLUTION INTRAVENOUS; SUBCUTANEOUS at 11:20

## 2021-11-23 RX ADMIN — ENOXAPARIN SODIUM 40 MG: 40 INJECTION SUBCUTANEOUS at 17:50

## 2021-11-23 RX ADMIN — CLOPIDOGREL BISULFATE 75 MG: 75 TABLET ORAL at 09:44

## 2021-11-24 ENCOUNTER — APPOINTMENT (OUTPATIENT)
Dept: MRI IMAGING | Facility: HOSPITAL | Age: 71
End: 2021-11-24

## 2021-11-24 LAB
ALBUMIN SERPL-MCNC: 3.5 G/DL (ref 3.5–5.2)
ALBUMIN/GLOB SERPL: 1.2 G/DL
ALP SERPL-CCNC: 83 U/L (ref 39–117)
ALT SERPL W P-5'-P-CCNC: 25 U/L (ref 1–41)
ANION GAP SERPL CALCULATED.3IONS-SCNC: 13 MMOL/L (ref 5–15)
AST SERPL-CCNC: 22 U/L (ref 1–40)
BASOPHILS # BLD AUTO: 0 10*3/MM3 (ref 0–0.2)
BASOPHILS NFR BLD AUTO: 0.6 % (ref 0–1.5)
BILIRUB SERPL-MCNC: 1.1 MG/DL (ref 0–1.2)
BUN SERPL-MCNC: 26 MG/DL (ref 8–23)
BUN/CREAT SERPL: 17 (ref 7–25)
CALCIUM SPEC-SCNC: 9.2 MG/DL (ref 8.6–10.5)
CHLORIDE SERPL-SCNC: 100 MMOL/L (ref 98–107)
CO2 SERPL-SCNC: 24 MMOL/L (ref 22–29)
CREAT SERPL-MCNC: 1.53 MG/DL (ref 0.76–1.27)
DEPRECATED RDW RBC AUTO: 38.9 FL (ref 37–54)
EOSINOPHIL # BLD AUTO: 0.1 10*3/MM3 (ref 0–0.4)
EOSINOPHIL NFR BLD AUTO: 1.3 % (ref 0.3–6.2)
ERYTHROCYTE [DISTWIDTH] IN BLOOD BY AUTOMATED COUNT: 12.8 % (ref 12.3–15.4)
GFR SERPL CREATININE-BSD FRML MDRD: 45 ML/MIN/1.73
GLOBULIN UR ELPH-MCNC: 2.9 GM/DL
GLUCOSE BLDC GLUCOMTR-MCNC: 207 MG/DL (ref 70–105)
GLUCOSE BLDC GLUCOMTR-MCNC: 238 MG/DL (ref 70–105)
GLUCOSE BLDC GLUCOMTR-MCNC: 337 MG/DL (ref 70–105)
GLUCOSE BLDC GLUCOMTR-MCNC: 356 MG/DL (ref 70–105)
GLUCOSE SERPL-MCNC: 246 MG/DL (ref 65–99)
HCT VFR BLD AUTO: 40.1 % (ref 37.5–51)
HGB BLD-MCNC: 14.1 G/DL (ref 13–17.7)
LYMPHOCYTES # BLD AUTO: 1.8 10*3/MM3 (ref 0.7–3.1)
LYMPHOCYTES NFR BLD AUTO: 26.1 % (ref 19.6–45.3)
MAGNESIUM SERPL-MCNC: 2 MG/DL (ref 1.6–2.4)
MCH RBC QN AUTO: 30.5 PG (ref 26.6–33)
MCHC RBC AUTO-ENTMCNC: 35.2 G/DL (ref 31.5–35.7)
MCV RBC AUTO: 86.7 FL (ref 79–97)
MONOCYTES # BLD AUTO: 0.6 10*3/MM3 (ref 0.1–0.9)
MONOCYTES NFR BLD AUTO: 8.6 % (ref 5–12)
NEUTROPHILS NFR BLD AUTO: 4.4 10*3/MM3 (ref 1.7–7)
NEUTROPHILS NFR BLD AUTO: 63.4 % (ref 42.7–76)
NRBC BLD AUTO-RTO: 0.1 /100 WBC (ref 0–0.2)
PLATELET # BLD AUTO: 141 10*3/MM3 (ref 140–450)
PMV BLD AUTO: 8.5 FL (ref 6–12)
POTASSIUM SERPL-SCNC: 3.6 MMOL/L (ref 3.5–5.2)
PROT SERPL-MCNC: 6.4 G/DL (ref 6–8.5)
RBC # BLD AUTO: 4.62 10*6/MM3 (ref 4.14–5.8)
SODIUM SERPL-SCNC: 137 MMOL/L (ref 136–145)
WBC NRBC COR # BLD: 7 10*3/MM3 (ref 3.4–10.8)

## 2021-11-24 PROCEDURE — 63710000001 INSULIN GLARGINE PER 5 UNITS: Performed by: INTERNAL MEDICINE

## 2021-11-24 PROCEDURE — G0378 HOSPITAL OBSERVATION PER HR: HCPCS

## 2021-11-24 PROCEDURE — 97165 OT EVAL LOW COMPLEX 30 MIN: CPT

## 2021-11-24 PROCEDURE — 25010000002 ENOXAPARIN PER 10 MG: Performed by: NURSE PRACTITIONER

## 2021-11-24 PROCEDURE — 83735 ASSAY OF MAGNESIUM: CPT | Performed by: NURSE PRACTITIONER

## 2021-11-24 PROCEDURE — 82962 GLUCOSE BLOOD TEST: CPT

## 2021-11-24 PROCEDURE — 99213 OFFICE O/P EST LOW 20 MIN: CPT | Performed by: NURSE PRACTITIONER

## 2021-11-24 PROCEDURE — 36415 COLL VENOUS BLD VENIPUNCTURE: CPT | Performed by: INTERNAL MEDICINE

## 2021-11-24 PROCEDURE — 80053 COMPREHEN METABOLIC PANEL: CPT | Performed by: INTERNAL MEDICINE

## 2021-11-24 PROCEDURE — 97530 THERAPEUTIC ACTIVITIES: CPT

## 2021-11-24 PROCEDURE — 99217 PR OBSERVATION CARE DISCHARGE MANAGEMENT: CPT | Performed by: INTERNAL MEDICINE

## 2021-11-24 PROCEDURE — 63710000001 INSULIN LISPRO (HUMAN) PER 5 UNITS: Performed by: NURSE PRACTITIONER

## 2021-11-24 PROCEDURE — 85025 COMPLETE CBC W/AUTO DIFF WBC: CPT | Performed by: INTERNAL MEDICINE

## 2021-11-24 PROCEDURE — 96372 THER/PROPH/DIAG INJ SC/IM: CPT

## 2021-11-24 PROCEDURE — 73721 MRI JNT OF LWR EXTRE W/O DYE: CPT

## 2021-11-24 PROCEDURE — 0 LIDOCAINE 1 % SOLUTION: Performed by: ORTHOPAEDIC SURGERY

## 2021-11-24 RX ORDER — HYDRALAZINE HYDROCHLORIDE 25 MG/1
25 TABLET, FILM COATED ORAL EVERY 8 HOURS SCHEDULED
Qty: 90 TABLET | Refills: 0 | Status: SHIPPED | OUTPATIENT
Start: 2021-11-24 | End: 2021-11-25 | Stop reason: HOSPADM

## 2021-11-24 RX ORDER — LIDOCAINE HYDROCHLORIDE 10 MG/ML
5 INJECTION, SOLUTION INFILTRATION; PERINEURAL ONCE
Status: COMPLETED | OUTPATIENT
Start: 2021-11-24 | End: 2021-11-24

## 2021-11-24 RX ORDER — HYDRALAZINE HYDROCHLORIDE 25 MG/1
25 TABLET, FILM COATED ORAL EVERY 8 HOURS SCHEDULED
Status: DISCONTINUED | OUTPATIENT
Start: 2021-11-24 | End: 2021-11-25 | Stop reason: HOSPADM

## 2021-11-24 RX ORDER — POTASSIUM CHLORIDE 20 MEQ/1
20 TABLET, EXTENDED RELEASE ORAL DAILY
Qty: 30 TABLET | Refills: 0 | Status: ON HOLD | OUTPATIENT
Start: 2021-11-25 | End: 2022-06-16

## 2021-11-24 RX ADMIN — SODIUM CHLORIDE, PRESERVATIVE FREE 10 ML: 5 INJECTION INTRAVENOUS at 23:07

## 2021-11-24 RX ADMIN — CYANOCOBALAMIN TAB 1000 MCG 1000 MCG: 1000 TAB at 08:27

## 2021-11-24 RX ADMIN — INSULIN GLARGINE 32 UNITS: 100 INJECTION, SOLUTION SUBCUTANEOUS at 21:30

## 2021-11-24 RX ADMIN — POTASSIUM CHLORIDE 20 MEQ: 20 TABLET, EXTENDED RELEASE ORAL at 08:25

## 2021-11-24 RX ADMIN — HYDRALAZINE HYDROCHLORIDE 25 MG: 25 TABLET, FILM COATED ORAL at 21:30

## 2021-11-24 RX ADMIN — INSULIN LISPRO 5 UNITS: 100 INJECTION, SOLUTION INTRAVENOUS; SUBCUTANEOUS at 11:55

## 2021-11-24 RX ADMIN — INSULIN LISPRO 4 UNITS: 100 INJECTION, SOLUTION INTRAVENOUS; SUBCUTANEOUS at 08:24

## 2021-11-24 RX ADMIN — SODIUM CHLORIDE, PRESERVATIVE FREE 10 ML: 5 INJECTION INTRAVENOUS at 08:24

## 2021-11-24 RX ADMIN — HYDRALAZINE HYDROCHLORIDE 25 MG: 25 TABLET, FILM COATED ORAL at 14:20

## 2021-11-24 RX ADMIN — INSULIN LISPRO 3 UNITS: 100 INJECTION, SOLUTION INTRAVENOUS; SUBCUTANEOUS at 17:21

## 2021-11-24 RX ADMIN — ENOXAPARIN SODIUM 40 MG: 40 INJECTION SUBCUTANEOUS at 15:08

## 2021-11-24 RX ADMIN — CLOPIDOGREL BISULFATE 75 MG: 75 TABLET ORAL at 08:25

## 2021-11-24 RX ADMIN — LIDOCAINE HYDROCHLORIDE 5 ML: 10 INJECTION, SOLUTION INFILTRATION; PERINEURAL at 16:01

## 2021-11-25 VITALS
RESPIRATION RATE: 16 BRPM | HEIGHT: 70 IN | DIASTOLIC BLOOD PRESSURE: 87 MMHG | OXYGEN SATURATION: 96 % | BODY MASS INDEX: 24.14 KG/M2 | WEIGHT: 168.65 LBS | TEMPERATURE: 97.9 F | HEART RATE: 88 BPM | SYSTOLIC BLOOD PRESSURE: 146 MMHG

## 2021-11-25 PROBLEM — E44.0 MODERATE MALNUTRITION (HCC): Status: ACTIVE | Noted: 2021-11-25

## 2021-11-25 LAB
ALBUMIN SERPL-MCNC: 3.5 G/DL (ref 3.5–5.2)
ALBUMIN/GLOB SERPL: 1.2 G/DL
ALP SERPL-CCNC: 87 U/L (ref 39–117)
ALT SERPL W P-5'-P-CCNC: 38 U/L (ref 1–41)
ANION GAP SERPL CALCULATED.3IONS-SCNC: 9 MMOL/L (ref 5–15)
AST SERPL-CCNC: 30 U/L (ref 1–40)
BASOPHILS # BLD AUTO: 0 10*3/MM3 (ref 0–0.2)
BASOPHILS NFR BLD AUTO: 0.5 % (ref 0–1.5)
BILIRUB SERPL-MCNC: 0.8 MG/DL (ref 0–1.2)
BUN SERPL-MCNC: 21 MG/DL (ref 8–23)
BUN/CREAT SERPL: 19.1 (ref 7–25)
CALCIUM SPEC-SCNC: 9.3 MG/DL (ref 8.6–10.5)
CHLORIDE SERPL-SCNC: 102 MMOL/L (ref 98–107)
CO2 SERPL-SCNC: 26 MMOL/L (ref 22–29)
CREAT SERPL-MCNC: 1.1 MG/DL (ref 0.76–1.27)
DEPRECATED RDW RBC AUTO: 39.4 FL (ref 37–54)
EOSINOPHIL # BLD AUTO: 0.1 10*3/MM3 (ref 0–0.4)
EOSINOPHIL NFR BLD AUTO: 2 % (ref 0.3–6.2)
ERYTHROCYTE [DISTWIDTH] IN BLOOD BY AUTOMATED COUNT: 13.1 % (ref 12.3–15.4)
GFR SERPL CREATININE-BSD FRML MDRD: 66 ML/MIN/1.73
GLOBULIN UR ELPH-MCNC: 3 GM/DL
GLUCOSE BLDC GLUCOMTR-MCNC: 243 MG/DL (ref 70–105)
GLUCOSE BLDC GLUCOMTR-MCNC: 272 MG/DL (ref 70–105)
GLUCOSE SERPL-MCNC: 315 MG/DL (ref 65–99)
HCT VFR BLD AUTO: 39 % (ref 37.5–51)
HGB BLD-MCNC: 13.8 G/DL (ref 13–17.7)
LYMPHOCYTES # BLD AUTO: 2.2 10*3/MM3 (ref 0.7–3.1)
LYMPHOCYTES NFR BLD AUTO: 31.6 % (ref 19.6–45.3)
MAGNESIUM SERPL-MCNC: 2 MG/DL (ref 1.6–2.4)
MCH RBC QN AUTO: 30.7 PG (ref 26.6–33)
MCHC RBC AUTO-ENTMCNC: 35.5 G/DL (ref 31.5–35.7)
MCV RBC AUTO: 86.5 FL (ref 79–97)
MONOCYTES # BLD AUTO: 0.8 10*3/MM3 (ref 0.1–0.9)
MONOCYTES NFR BLD AUTO: 11.7 % (ref 5–12)
NEUTROPHILS NFR BLD AUTO: 3.8 10*3/MM3 (ref 1.7–7)
NEUTROPHILS NFR BLD AUTO: 54.2 % (ref 42.7–76)
NRBC BLD AUTO-RTO: 0 /100 WBC (ref 0–0.2)
PLATELET # BLD AUTO: 178 10*3/MM3 (ref 140–450)
PMV BLD AUTO: 8.5 FL (ref 6–12)
POTASSIUM SERPL-SCNC: 4 MMOL/L (ref 3.5–5.2)
PROT SERPL-MCNC: 6.5 G/DL (ref 6–8.5)
RBC # BLD AUTO: 4.5 10*6/MM3 (ref 4.14–5.8)
SODIUM SERPL-SCNC: 137 MMOL/L (ref 136–145)
WBC NRBC COR # BLD: 6.9 10*3/MM3 (ref 3.4–10.8)

## 2021-11-25 PROCEDURE — G0378 HOSPITAL OBSERVATION PER HR: HCPCS

## 2021-11-25 PROCEDURE — 80053 COMPREHEN METABOLIC PANEL: CPT | Performed by: INTERNAL MEDICINE

## 2021-11-25 PROCEDURE — 85025 COMPLETE CBC W/AUTO DIFF WBC: CPT | Performed by: INTERNAL MEDICINE

## 2021-11-25 PROCEDURE — 36415 COLL VENOUS BLD VENIPUNCTURE: CPT | Performed by: INTERNAL MEDICINE

## 2021-11-25 PROCEDURE — 83735 ASSAY OF MAGNESIUM: CPT | Performed by: NURSE PRACTITIONER

## 2021-11-25 PROCEDURE — 63710000001 INSULIN LISPRO (HUMAN) PER 5 UNITS: Performed by: NURSE PRACTITIONER

## 2021-11-25 PROCEDURE — 82962 GLUCOSE BLOOD TEST: CPT

## 2021-11-25 RX ADMIN — INSULIN LISPRO 4 UNITS: 100 INJECTION, SOLUTION INTRAVENOUS; SUBCUTANEOUS at 12:18

## 2021-11-25 RX ADMIN — POTASSIUM CHLORIDE 20 MEQ: 20 TABLET, EXTENDED RELEASE ORAL at 07:41

## 2021-11-25 RX ADMIN — HYDRALAZINE HYDROCHLORIDE 25 MG: 25 TABLET, FILM COATED ORAL at 06:02

## 2021-11-25 RX ADMIN — CLOPIDOGREL BISULFATE 75 MG: 75 TABLET ORAL at 07:41

## 2021-11-25 RX ADMIN — TRIAMTERENE AND HYDROCHLOROTHIAZIDE 1 TABLET: 75; 50 TABLET ORAL at 07:41

## 2021-11-25 RX ADMIN — CYANOCOBALAMIN TAB 1000 MCG 1000 MCG: 1000 TAB at 07:41

## 2021-11-25 RX ADMIN — LISINOPRIL 40 MG: 20 TABLET ORAL at 07:41

## 2021-11-25 RX ADMIN — INSULIN LISPRO 3 UNITS: 100 INJECTION, SOLUTION INTRAVENOUS; SUBCUTANEOUS at 07:40

## 2021-11-25 RX ADMIN — SODIUM CHLORIDE, PRESERVATIVE FREE 10 ML: 5 INJECTION INTRAVENOUS at 07:41

## 2021-11-25 RX ADMIN — TRAMADOL HYDROCHLORIDE 50 MG: 50 TABLET, COATED ORAL at 02:09

## 2021-11-28 LAB — BACTERIA ISLT: NORMAL

## 2021-12-01 LAB
A-TOCOPHEROL VIT E SERPL-MCNC: 14.2 MG/L (ref 9–29)
GAMMA TOCOPHEROL SERPL-MCNC: 1.7 MG/L (ref 0.5–4.9)
VIT A SERPL-MCNC: 50.7 UG/DL (ref 22–69.5)

## 2021-12-10 ENCOUNTER — TELEPHONE (OUTPATIENT)
Dept: NEUROLOGY | Facility: OTHER | Age: 71
End: 2021-12-10

## 2021-12-10 NOTE — TELEPHONE ENCOUNTER
PT CALLING TO FIND OUT ABOUT GETTING APPT WITH NEURO. I WAS GOING THROUGH NOTES AND SAW  ERNESTINA GORMAN SAW PT AND MARLEN BUTLER SAW AND SAID F.U WITH [facility] OF CHOICE. TOLD THEM I CAN SEND ENCOUNTER TO OFFICE AND SEE WHO IF NEEDS A REF FIRST  WHILE TALKING THEY DECIDED TO CALL SOMEONE AT [facility] AND SEE IF THEY CAN SCHED TODAY?  IN BACKGROUND HEARD WIFE CALL SOMEONE AND THEY GAVE HER DR NAME AT [place]?

## 2022-02-28 ENCOUNTER — HOSPITAL ENCOUNTER (OUTPATIENT)
Facility: HOSPITAL | Age: 72
Setting detail: OBSERVATION
Discharge: HOME HEALTH | End: 2022-03-03
Attending: EMERGENCY MEDICINE | Admitting: HOSPITALIST

## 2022-02-28 DIAGNOSIS — E11.65 UNCONTROLLED TYPE 2 DIABETES MELLITUS WITH HYPERGLYCEMIA: ICD-10-CM

## 2022-02-28 DIAGNOSIS — R53.1 WEAKNESS: Primary | ICD-10-CM

## 2022-02-28 DIAGNOSIS — E44.0 MODERATE MALNUTRITION: ICD-10-CM

## 2022-02-28 DIAGNOSIS — R29.6 MULTIPLE FALLS: ICD-10-CM

## 2022-02-28 PROBLEM — E11.9 TYPE 2 DIABETES MELLITUS WITHOUT COMPLICATIONS: Status: ACTIVE | Noted: 2021-12-13

## 2022-02-28 PROBLEM — M19.91 PRIMARY OSTEOARTHRITIS: Status: ACTIVE | Noted: 2021-11-22

## 2022-02-28 LAB
ALBUMIN SERPL-MCNC: 3.9 G/DL (ref 3.5–5.2)
ALBUMIN/GLOB SERPL: 1.1 G/DL
ALP SERPL-CCNC: 112 U/L (ref 39–117)
ALT SERPL W P-5'-P-CCNC: 16 U/L (ref 1–41)
ANION GAP SERPL CALCULATED.3IONS-SCNC: 10 MMOL/L (ref 5–15)
AST SERPL-CCNC: 16 U/L (ref 1–40)
BASOPHILS # BLD AUTO: 0 10*3/MM3 (ref 0–0.2)
BASOPHILS NFR BLD AUTO: 0.7 % (ref 0–1.5)
BILIRUB SERPL-MCNC: 1 MG/DL (ref 0–1.2)
BILIRUB UR QL STRIP: NEGATIVE
BUN SERPL-MCNC: 23 MG/DL (ref 8–23)
BUN/CREAT SERPL: 20.5 (ref 7–25)
CALCIUM SPEC-SCNC: 10.3 MG/DL (ref 8.6–10.5)
CHLORIDE SERPL-SCNC: 97 MMOL/L (ref 98–107)
CLARITY UR: CLEAR
CO2 SERPL-SCNC: 30 MMOL/L (ref 22–29)
COLOR UR: YELLOW
CREAT SERPL-MCNC: 1.12 MG/DL (ref 0.76–1.27)
DEPRECATED RDW RBC AUTO: 40.7 FL (ref 37–54)
EGFRCR SERPLBLD CKD-EPI 2021: 69.8 ML/MIN/1.73
EOSINOPHIL # BLD AUTO: 0.1 10*3/MM3 (ref 0–0.4)
EOSINOPHIL NFR BLD AUTO: 1.5 % (ref 0.3–6.2)
ERYTHROCYTE [DISTWIDTH] IN BLOOD BY AUTOMATED COUNT: 13.4 % (ref 12.3–15.4)
ERYTHROCYTE [SEDIMENTATION RATE] IN BLOOD: 13 MM/HR (ref 0–20)
GLOBULIN UR ELPH-MCNC: 3.5 GM/DL
GLUCOSE BLDC GLUCOMTR-MCNC: 119 MG/DL (ref 70–105)
GLUCOSE BLDC GLUCOMTR-MCNC: 344 MG/DL (ref 70–105)
GLUCOSE BLDC GLUCOMTR-MCNC: 412 MG/DL (ref 70–105)
GLUCOSE SERPL-MCNC: 436 MG/DL (ref 65–99)
GLUCOSE UR STRIP-MCNC: ABNORMAL MG/DL
HCT VFR BLD AUTO: 43.5 % (ref 37.5–51)
HGB BLD-MCNC: 15.4 G/DL (ref 13–17.7)
HGB UR QL STRIP.AUTO: NEGATIVE
HOLD SPECIMEN: NORMAL
HOLD SPECIMEN: NORMAL
KETONES UR QL STRIP: ABNORMAL
LEUKOCYTE ESTERASE UR QL STRIP.AUTO: NEGATIVE
LYMPHOCYTES # BLD AUTO: 1.2 10*3/MM3 (ref 0.7–3.1)
LYMPHOCYTES NFR BLD AUTO: 21.8 % (ref 19.6–45.3)
MAGNESIUM SERPL-MCNC: 1.9 MG/DL (ref 1.6–2.4)
MCH RBC QN AUTO: 30.7 PG (ref 26.6–33)
MCHC RBC AUTO-ENTMCNC: 35.4 G/DL (ref 31.5–35.7)
MCV RBC AUTO: 86.7 FL (ref 79–97)
MONOCYTES # BLD AUTO: 0.3 10*3/MM3 (ref 0.1–0.9)
MONOCYTES NFR BLD AUTO: 6.2 % (ref 5–12)
NEUTROPHILS NFR BLD AUTO: 3.8 10*3/MM3 (ref 1.7–7)
NEUTROPHILS NFR BLD AUTO: 69.8 % (ref 42.7–76)
NITRITE UR QL STRIP: NEGATIVE
NRBC BLD AUTO-RTO: 0 /100 WBC (ref 0–0.2)
NT-PROBNP SERPL-MCNC: 188.5 PG/ML (ref 0–900)
PH UR STRIP.AUTO: 6.5 [PH] (ref 5–8)
PLATELET # BLD AUTO: 141 10*3/MM3 (ref 140–450)
PMV BLD AUTO: 8.3 FL (ref 6–12)
POTASSIUM SERPL-SCNC: 4.2 MMOL/L (ref 3.5–5.2)
PROT SERPL-MCNC: 7.4 G/DL (ref 6–8.5)
PROT UR QL STRIP: NEGATIVE
RBC # BLD AUTO: 5.02 10*6/MM3 (ref 4.14–5.8)
SARS-COV-2 RNA PNL SPEC NAA+PROBE: NOT DETECTED
SODIUM SERPL-SCNC: 137 MMOL/L (ref 136–145)
SP GR UR STRIP: 1.04 (ref 1–1.03)
TROPONIN T SERPL-MCNC: <0.01 NG/ML (ref 0–0.03)
UROBILINOGEN UR QL STRIP: ABNORMAL
WBC NRBC COR # BLD: 5.4 10*3/MM3 (ref 3.4–10.8)
WHOLE BLOOD HOLD SPECIMEN: NORMAL
WHOLE BLOOD HOLD SPECIMEN: NORMAL

## 2022-02-28 PROCEDURE — 81003 URINALYSIS AUTO W/O SCOPE: CPT | Performed by: EMERGENCY MEDICINE

## 2022-02-28 PROCEDURE — 63710000001 INSULIN LISPRO (HUMAN) PER 5 UNITS: Performed by: NURSE PRACTITIONER

## 2022-02-28 PROCEDURE — G0378 HOSPITAL OBSERVATION PER HR: HCPCS

## 2022-02-28 PROCEDURE — C9803 HOPD COVID-19 SPEC COLLECT: HCPCS

## 2022-02-28 PROCEDURE — 83880 ASSAY OF NATRIURETIC PEPTIDE: CPT | Performed by: NURSE PRACTITIONER

## 2022-02-28 PROCEDURE — 99219 PR INITIAL OBSERVATION CARE/DAY 50 MINUTES: CPT | Performed by: NURSE PRACTITIONER

## 2022-02-28 PROCEDURE — 85652 RBC SED RATE AUTOMATED: CPT | Performed by: EMERGENCY MEDICINE

## 2022-02-28 PROCEDURE — 82962 GLUCOSE BLOOD TEST: CPT

## 2022-02-28 PROCEDURE — 80053 COMPREHEN METABOLIC PANEL: CPT | Performed by: EMERGENCY MEDICINE

## 2022-02-28 PROCEDURE — 83735 ASSAY OF MAGNESIUM: CPT | Performed by: NURSE PRACTITIONER

## 2022-02-28 PROCEDURE — 63710000001 INSULIN LISPRO (HUMAN) PER 5 UNITS: Performed by: EMERGENCY MEDICINE

## 2022-02-28 PROCEDURE — 85025 COMPLETE CBC W/AUTO DIFF WBC: CPT | Performed by: EMERGENCY MEDICINE

## 2022-02-28 PROCEDURE — 83036 HEMOGLOBIN GLYCOSYLATED A1C: CPT | Performed by: EMERGENCY MEDICINE

## 2022-02-28 PROCEDURE — 87635 SARS-COV-2 COVID-19 AMP PRB: CPT | Performed by: EMERGENCY MEDICINE

## 2022-02-28 PROCEDURE — 99285 EMERGENCY DEPT VISIT HI MDM: CPT

## 2022-02-28 PROCEDURE — 84484 ASSAY OF TROPONIN QUANT: CPT | Performed by: NURSE PRACTITIONER

## 2022-02-28 RX ORDER — SODIUM CHLORIDE 0.9 % (FLUSH) 0.9 %
10 SYRINGE (ML) INJECTION AS NEEDED
Status: DISCONTINUED | OUTPATIENT
Start: 2022-02-28 | End: 2022-03-03 | Stop reason: HOSPADM

## 2022-02-28 RX ORDER — POTASSIUM CHLORIDE 20 MEQ/1
40 TABLET, EXTENDED RELEASE ORAL AS NEEDED
Status: DISCONTINUED | OUTPATIENT
Start: 2022-02-28 | End: 2022-03-03 | Stop reason: HOSPADM

## 2022-02-28 RX ORDER — DEXTROSE MONOHYDRATE 25 G/50ML
25 INJECTION, SOLUTION INTRAVENOUS
Status: DISCONTINUED | OUTPATIENT
Start: 2022-02-28 | End: 2022-03-03 | Stop reason: HOSPADM

## 2022-02-28 RX ORDER — CALCIUM CARBONATE 200(500)MG
2 TABLET,CHEWABLE ORAL 2 TIMES DAILY PRN
Status: DISCONTINUED | OUTPATIENT
Start: 2022-02-28 | End: 2022-03-03 | Stop reason: HOSPADM

## 2022-02-28 RX ORDER — OLANZAPINE 10 MG/2ML
1 INJECTION, POWDER, LYOPHILIZED, FOR SOLUTION INTRAMUSCULAR AS NEEDED
Status: DISCONTINUED | OUTPATIENT
Start: 2022-02-28 | End: 2022-03-03 | Stop reason: HOSPADM

## 2022-02-28 RX ORDER — ACETAMINOPHEN 650 MG/1
650 SUPPOSITORY RECTAL EVERY 4 HOURS PRN
Status: DISCONTINUED | OUTPATIENT
Start: 2022-02-28 | End: 2022-03-03 | Stop reason: HOSPADM

## 2022-02-28 RX ORDER — NITROGLYCERIN 0.4 MG/1
0.4 TABLET SUBLINGUAL
Status: DISCONTINUED | OUTPATIENT
Start: 2022-02-28 | End: 2022-03-03 | Stop reason: HOSPADM

## 2022-02-28 RX ORDER — MAGNESIUM SULFATE 1 G/100ML
1 INJECTION INTRAVENOUS AS NEEDED
Status: DISCONTINUED | OUTPATIENT
Start: 2022-02-28 | End: 2022-03-03 | Stop reason: HOSPADM

## 2022-02-28 RX ORDER — ACETAMINOPHEN 160 MG/5ML
650 SOLUTION ORAL EVERY 4 HOURS PRN
Status: DISCONTINUED | OUTPATIENT
Start: 2022-02-28 | End: 2022-03-03 | Stop reason: HOSPADM

## 2022-02-28 RX ORDER — MAGNESIUM SULFATE HEPTAHYDRATE 40 MG/ML
2 INJECTION, SOLUTION INTRAVENOUS AS NEEDED
Status: DISCONTINUED | OUTPATIENT
Start: 2022-02-28 | End: 2022-03-03 | Stop reason: HOSPADM

## 2022-02-28 RX ORDER — NICOTINE POLACRILEX 4 MG
15 LOZENGE BUCCAL
Status: DISCONTINUED | OUTPATIENT
Start: 2022-02-28 | End: 2022-03-03 | Stop reason: HOSPADM

## 2022-02-28 RX ORDER — SODIUM CHLORIDE 0.9 % (FLUSH) 0.9 %
10 SYRINGE (ML) INJECTION EVERY 12 HOURS SCHEDULED
Status: DISCONTINUED | OUTPATIENT
Start: 2022-02-28 | End: 2022-03-03 | Stop reason: HOSPADM

## 2022-02-28 RX ORDER — ACETAMINOPHEN 325 MG/1
650 TABLET ORAL EVERY 4 HOURS PRN
Status: DISCONTINUED | OUTPATIENT
Start: 2022-02-28 | End: 2022-03-03 | Stop reason: HOSPADM

## 2022-02-28 RX ORDER — INSULIN LISPRO 100 [IU]/ML
4 INJECTION, SOLUTION INTRAVENOUS; SUBCUTANEOUS ONCE
Status: COMPLETED | OUTPATIENT
Start: 2022-02-28 | End: 2022-02-28

## 2022-02-28 RX ORDER — ALUMINA, MAGNESIA, AND SIMETHICONE 2400; 2400; 240 MG/30ML; MG/30ML; MG/30ML
15 SUSPENSION ORAL EVERY 6 HOURS PRN
Status: DISCONTINUED | OUTPATIENT
Start: 2022-02-28 | End: 2022-03-03 | Stop reason: HOSPADM

## 2022-02-28 RX ORDER — INSULIN LISPRO 100 [IU]/ML
0-14 INJECTION, SOLUTION INTRAVENOUS; SUBCUTANEOUS AS NEEDED
Status: DISCONTINUED | OUTPATIENT
Start: 2022-02-28 | End: 2022-03-03 | Stop reason: HOSPADM

## 2022-02-28 RX ORDER — INSULIN LISPRO 100 [IU]/ML
0-14 INJECTION, SOLUTION INTRAVENOUS; SUBCUTANEOUS
Status: DISCONTINUED | OUTPATIENT
Start: 2022-03-01 | End: 2022-03-03 | Stop reason: HOSPADM

## 2022-02-28 RX ORDER — POTASSIUM CHLORIDE 1.5 G/1.77G
40 POWDER, FOR SOLUTION ORAL AS NEEDED
Status: DISCONTINUED | OUTPATIENT
Start: 2022-02-28 | End: 2022-03-03 | Stop reason: HOSPADM

## 2022-02-28 RX ORDER — CHOLECALCIFEROL (VITAMIN D3) 125 MCG
5 CAPSULE ORAL NIGHTLY PRN
Status: DISCONTINUED | OUTPATIENT
Start: 2022-02-28 | End: 2022-03-03 | Stop reason: HOSPADM

## 2022-02-28 RX ORDER — ONDANSETRON 4 MG/1
4 TABLET, FILM COATED ORAL EVERY 6 HOURS PRN
Status: DISCONTINUED | OUTPATIENT
Start: 2022-02-28 | End: 2022-03-03 | Stop reason: HOSPADM

## 2022-02-28 RX ORDER — ONDANSETRON 2 MG/ML
4 INJECTION INTRAMUSCULAR; INTRAVENOUS EVERY 6 HOURS PRN
Status: DISCONTINUED | OUTPATIENT
Start: 2022-02-28 | End: 2022-03-03 | Stop reason: HOSPADM

## 2022-02-28 RX ADMIN — INSULIN LISPRO 4 UNITS: 100 INJECTION, SOLUTION INTRAVENOUS; SUBCUTANEOUS at 19:07

## 2022-02-28 RX ADMIN — INSULIN LISPRO 10 UNITS: 100 INJECTION, SOLUTION INTRAVENOUS; SUBCUTANEOUS at 20:59

## 2022-02-28 RX ADMIN — SODIUM CHLORIDE, POTASSIUM CHLORIDE, SODIUM LACTATE AND CALCIUM CHLORIDE 500 ML: 600; 310; 30; 20 INJECTION, SOLUTION INTRAVENOUS at 17:19

## 2022-03-01 PROBLEM — I48.0 PAROXYSMAL ATRIAL FIBRILLATION: Chronic | Status: RESOLVED | Noted: 2018-01-08 | Resolved: 2022-03-01

## 2022-03-01 PROBLEM — R00.0 SINUS TACHYCARDIA: Status: ACTIVE | Noted: 2022-03-01

## 2022-03-01 LAB
ANION GAP SERPL CALCULATED.3IONS-SCNC: 9 MMOL/L (ref 5–15)
BASOPHILS # BLD AUTO: 0.1 10*3/MM3 (ref 0–0.2)
BASOPHILS NFR BLD AUTO: 0.9 % (ref 0–1.5)
BUN SERPL-MCNC: 18 MG/DL (ref 8–23)
BUN/CREAT SERPL: 18.8 (ref 7–25)
CALCIUM SPEC-SCNC: 9.9 MG/DL (ref 8.6–10.5)
CHLORIDE SERPL-SCNC: 102 MMOL/L (ref 98–107)
CO2 SERPL-SCNC: 28 MMOL/L (ref 22–29)
CREAT SERPL-MCNC: 0.96 MG/DL (ref 0.76–1.27)
DEPRECATED RDW RBC AUTO: 40.3 FL (ref 37–54)
EGFRCR SERPLBLD CKD-EPI 2021: 84 ML/MIN/1.73
EOSINOPHIL # BLD AUTO: 0.1 10*3/MM3 (ref 0–0.4)
EOSINOPHIL NFR BLD AUTO: 2.3 % (ref 0.3–6.2)
ERYTHROCYTE [DISTWIDTH] IN BLOOD BY AUTOMATED COUNT: 13.4 % (ref 12.3–15.4)
GLUCOSE BLDC GLUCOMTR-MCNC: 122 MG/DL (ref 70–105)
GLUCOSE BLDC GLUCOMTR-MCNC: 160 MG/DL (ref 70–105)
GLUCOSE BLDC GLUCOMTR-MCNC: 365 MG/DL (ref 70–105)
GLUCOSE SERPL-MCNC: 126 MG/DL (ref 65–99)
HBA1C MFR BLD: 9.2 % (ref 3.5–5.6)
HCT VFR BLD AUTO: 40.8 % (ref 37.5–51)
HGB BLD-MCNC: 14.4 G/DL (ref 13–17.7)
LYMPHOCYTES # BLD AUTO: 2 10*3/MM3 (ref 0.7–3.1)
LYMPHOCYTES NFR BLD AUTO: 32.5 % (ref 19.6–45.3)
MAGNESIUM SERPL-MCNC: 1.8 MG/DL (ref 1.6–2.4)
MCH RBC QN AUTO: 30.4 PG (ref 26.6–33)
MCHC RBC AUTO-ENTMCNC: 35.3 G/DL (ref 31.5–35.7)
MCV RBC AUTO: 86.2 FL (ref 79–97)
MONOCYTES # BLD AUTO: 0.5 10*3/MM3 (ref 0.1–0.9)
MONOCYTES NFR BLD AUTO: 7.7 % (ref 5–12)
NEUTROPHILS NFR BLD AUTO: 3.5 10*3/MM3 (ref 1.7–7)
NEUTROPHILS NFR BLD AUTO: 56.6 % (ref 42.7–76)
NRBC BLD AUTO-RTO: 0.1 /100 WBC (ref 0–0.2)
PLATELET # BLD AUTO: 136 10*3/MM3 (ref 140–450)
PMV BLD AUTO: 8.2 FL (ref 6–12)
POTASSIUM SERPL-SCNC: 3.5 MMOL/L (ref 3.5–5.2)
RBC # BLD AUTO: 4.73 10*6/MM3 (ref 4.14–5.8)
SODIUM SERPL-SCNC: 139 MMOL/L (ref 136–145)
TROPONIN T SERPL-MCNC: <0.01 NG/ML (ref 0–0.03)
WBC NRBC COR # BLD: 6.2 10*3/MM3 (ref 3.4–10.8)

## 2022-03-01 PROCEDURE — 84484 ASSAY OF TROPONIN QUANT: CPT | Performed by: NURSE PRACTITIONER

## 2022-03-01 PROCEDURE — 80048 BASIC METABOLIC PNL TOTAL CA: CPT | Performed by: NURSE PRACTITIONER

## 2022-03-01 PROCEDURE — 36415 COLL VENOUS BLD VENIPUNCTURE: CPT | Performed by: NURSE PRACTITIONER

## 2022-03-01 PROCEDURE — 99225 PR SBSQ OBSERVATION CARE/DAY 25 MINUTES: CPT | Performed by: HOSPITALIST

## 2022-03-01 PROCEDURE — 63710000001 INSULIN LISPRO (HUMAN) PER 5 UNITS: Performed by: NURSE PRACTITIONER

## 2022-03-01 PROCEDURE — 85025 COMPLETE CBC W/AUTO DIFF WBC: CPT | Performed by: NURSE PRACTITIONER

## 2022-03-01 PROCEDURE — 82962 GLUCOSE BLOOD TEST: CPT

## 2022-03-01 PROCEDURE — 83735 ASSAY OF MAGNESIUM: CPT | Performed by: NURSE PRACTITIONER

## 2022-03-01 PROCEDURE — 97116 GAIT TRAINING THERAPY: CPT

## 2022-03-01 PROCEDURE — 97162 PT EVAL MOD COMPLEX 30 MIN: CPT

## 2022-03-01 PROCEDURE — G0378 HOSPITAL OBSERVATION PER HR: HCPCS

## 2022-03-01 RX ADMIN — INSULIN LISPRO 12 UNITS: 100 INJECTION, SOLUTION INTRAVENOUS; SUBCUTANEOUS at 19:04

## 2022-03-01 NOTE — CASE MANAGEMENT/SOCIAL WORK
Discharge Planning Assessment  [facility]     Patient Name: Blas Mojica  MRN: [id number]  Today's Date: 3/1/2022    Admit Date: 2/28/2022     Discharge Needs Assessment     Row Name 03/01/22 1427       Living Environment    Lives With spouse    Name(s) of Who Lives With Patient Debra    Current Living Arrangements home/apartment/condo    Primary Care Provided by spouse/significant other    Provides Primary Care For no one, unable/limited ability to care for self    Family Caregiver if Needed spouse    Able to Return to Prior Arrangements yes    Living Arrangement Comments Lives in own home with spouse       Transition Planning    Patient/Family Anticipates Transition to home with family; other (see comments)  Current with [facility]    Patient/Family Anticipated Services at Transition home health care  Notifed of [facility] of admission    Transportation Anticipated family or friend will provide       Discharge Needs Assessment    Readmission Within the Last 30 Days no previous admission in last 30 days    Equipment Currently Used at Home walker, rolling; wheelchair    Concerns to be Addressed discharge planning    Provided Post Acute Provider List? N/A    N/A Provider List Comment Declines inpatient rehab, current with [facility]               Discharge Plan     Row Name 03/01/22 8236       Plan    Plan DC Plan:Return home with spouse and [facility].    Plan Comments Spoke with patient at bedside,discussed PT suggesting inpatient rehab. He has declined this option. Plans to return home with spouse and resume [facility]. Established PCP and Pharmacy.              Continued Care and Services - Admitted Since 2/28/2022     Home Medical Care     Service Provider Request Status Selected Services Address Phone Fax Patient Preferred    [facility]  Accepted N/A [address] [address] [phone] -- --                 Demographic Summary     Row Name 03/01/22 1426       General  Information    Admission Type observation    Arrived From emergency department    Referral Source emergency department    Reason for Consult discharge planning    Preferred Language English               Functional Status     Row Name 03/01/22 1426       Functional Status    Usual Activity Tolerance good    Current Activity Tolerance good       Functional Status, IADL    Medications independent    Meal Preparation assistive person    Housekeeping assistive person    Laundry assistive person    Shopping assistive person       Mental Status    General Appearance WDL WDL           Met with patient in room wearing PPE: mask, face shield/goggles, gloves, gown.      Maintained distance greater than six feet and spent less than 15 minutes in the room.        Natasha Barajas RN

## 2022-03-01 NOTE — DISCHARGE PLACEMENT REQUEST
"Blas Mojica (71 y.o. Male)             Date of Birth Social Security Number Address Home Phone MRN    1950  [address]  [place] [address] [phone] [id number]    Mandaen Marital Status             Holiness        Admission Date Admission Type Admitting Provider Attending Provider Department, Room/Bed    2/28/22 Emergency Linda Magaña MD Jaisinghani, Salgram, MD [facility] EMERGENCY DEPARTMENT, 09/09    Discharge Date Discharge Disposition Discharge Destination                         Attending Provider: Linda Magaña MD    Allergies: Contrast Dye, Iodinated Diagnostic Agents, Iodine, Aspirin    Isolation: None   Infection: None   Code Status: CPR   Advance Care Planning Activity    Ht: 177.8 cm (70\")   Wt: 72.6 kg (160 lb)    Admission Cmt: None   Principal Problem: Weakness [R53.1]                 Active Insurance as of 2/28/2022     Primary Coverage     Payor Plan Insurance Group Employer/Plan Group    MEDICARE MEDICARE A & B      Payor Plan Address Payor Plan Phone Number Payor Plan Fax Number Effective Dates    [address] [phone]  9/1/2014 - None Entered    [facility] [id number]       Subscriber Name Subscriber Birth Date Member ID       BLAS MOJICA 1950 [id number]           Secondary Coverage     Payor Plan Insurance Group Employer/Plan Group    HUMANA HUMANA [facility]              [id number]     Payor Plan Address Payor Plan Phone Number Payor Plan Fax Number Effective Dates    [address] [address]   9/1/2014 - None Entered    [facility] [id number]       Subscriber Name Subscriber Birth Date Member ID       BLAS MOJICA 1950 [id number]                 Emergency Contacts      (Rel.) Home Phone Work Phone Mobile Phone    NICK MOJICA (Spouse) [phone] -- --              "

## 2022-03-01 NOTE — THERAPY EVALUATION
Patient Name: Blas Mojica  : 1950    MRN: [id number]                              Today's Date: 3/1/2022       Admit Date: 2022    Visit Dx:     ICD-10-CM ICD-9-CM   1. Weakness  R53.1 780.79   2. Uncontrolled type 2 diabetes mellitus with hyperglycemia (HCC)  E11.65 250.02   3. Multiple falls  R29.6 V15.88     Patient Active Problem List   Diagnosis   • Essential (primary) hypertension   • TIA (transient ischemic attack)   • Stage 3 chronic kidney disease (HCC)   • Diabetes mellitus (HCC)   • Dyslipidemia   • History of basal cell carcinoma (BCC)   • Primary osteoarthritis   • Vitamin D deficiency   • Acute pain of left knee   • Generalized weakness   • Hypokalemia   • Moderate malnutrition (CMS/HCC)   • Weakness   • Type 2 diabetes mellitus without complications (HCC)   • Sinus tachycardia     Past Medical History:   Diagnosis Date   • Diabetes mellitus (HCC)    • Hyperlipidemia    • Hypertension      Past Surgical History:   Procedure Laterality Date   • ADENOIDECTOMY     • TONSILLECTOMY        General Information     Row Name 22 1155          Physical Therapy Time and Intention    Document Type evaluation  -CM     Mode of Treatment physical therapy  -CM     Row Name 22 0944          General Information    Patient Profile Reviewed yes  -CM     Prior Level of Function min assist:; all household mobility; gait  multiple falls at home on frequent basis; undergoing testing on OP basis for possible neuro d/o  -CM     Existing Precautions/Restrictions fall  -CM     Barriers to Rehab medically complex  -CM     Row Name 22 0592          Living Environment    Lives With spouse; other (see comments)  reports that wife does not walk well either; she has tried to assist w/ amb several times and he has still fallen  -CM     Row Name 22 1338          Home Main Entrance    Number of Stairs, Main Entrance none  -CM     Row Name 22 6242          Stairs Within Home, Primary    Number  of Stairs, Within Home, Primary none  -CM     Row Name 03/01/22 1334          Cognition    Orientation Status (Cognition) oriented x 4; other (see comments)  quite loquacious  -CM     Row Name 03/01/22 1334          Safety Issues, Functional Mobility    Safety Issues Affecting Function (Mobility) insight into deficits/self-awareness; awareness of need for assistance; safety precaution awareness; safety precautions follow-through/compliance  -CM     Impairments Affecting Function (Mobility) balance; endurance/activity tolerance; strength; postural/trunk control; range of motion (ROM)  -CM     Comment, Safety Issues/Impairments (Mobility) presence of significant thoracic scoliosis  -CM           User Key  (r) = Recorded By, (t) = Taken By, (c) = Cosigned By    Initials Name Provider Type    Edith Ramos, PT Physical Therapist               Mobility     Row Name 03/01/22 1337          Bed Mobility    Bed Mobility supine-sit; sit-supine  -CM     Supine-Sit [place] (Bed Mobility) moderate assist (50% patient effort); 1 person assist  -CM     Sit-Supine [place] (Bed Mobility) moderate assist (50% patient effort); 1 person assist  -CM     Assistive Device (Bed Mobility) bed rails; draw sheet; head of bed elevated; leg   -CM     Comment (Bed Mobility) in sitting at eob, unable to maintain balance w/o holding on to edge of stretcher w/ L hand and pushing w/ R hand to hold self up  -CM     Row Name 03/01/22 1337          Transfers    Comment (Transfers) very slow coming to stand at rw; posterior lean, but able to correct once in standing  -CM     Row Name 03/01/22 1337          Bed-Chair Transfer    Bed-Chair [place] (Transfers) not tested  -CM     Row Name 03/01/22 1337          Sit-Stand Transfer    Sit-Stand [place] (Transfers) moderate assist (50% patient effort); 1 person assist  -CM     Assistive Device (Sit-Stand Transfers) walker, front-wheeled  -CM     Row Name 03/01/22 5613           Gait/Stairs (Locomotion)    [place] Level (Gait) minimum assist (75% patient effort); contact guard  -CM     Assistive Device (Gait) walker, front-wheeled  -CM     Distance in Feet (Gait) 60 ft x 2; rested in sitting btwn these distances (amb to/from bathroom); poor knee extension at midstance; lateral sway; forward flexed posture; narrow base; festinating gait.  -CM           User Key  (r) = Recorded By, (t) = Taken By, (c) = Cosigned By    Initials Name Provider Type    Edith Ramos, PT Physical Therapist               Obj/Interventions     Row Name 03/01/22 1339          Range of Motion Comprehensive    General Range of Motion bilateral lower extremity ROM WFL; bilateral upper extremity ROM WFL  -CM     Row Name 03/01/22 1339          Strength Comprehensive (MMT)    General Manual Muscle Testing (MMT) Assessment upper extremity strength deficits identified; lower extremity strength deficits identified  -CM     Comment, General Manual Muscle Testing (MMT) Assessment BUEs 3+/5, BLEs 3/5  -CM     Row Name 03/01/22 1339          Balance    Balance Assessment sitting static balance; sitting dynamic balance; standing static balance; standing dynamic balance  -CM     Static Sitting Balance moderate impairment; severe impairment; supported; sitting, edge of bed  -CM     Dynamic Sitting Balance severe impairment; supported; sitting, edge of bed  -CM     Static Standing Balance moderate impairment; supported; standing; mild impairment  -CM     Dynamic Standing Balance moderate impairment; supported; standing  -CM     Row Name 03/01/22 1339          Sensory Assessment (Somatosensory)    Sensory Assessment (Somatosensory) sensation intact  -CM           User Key  (r) = Recorded By, (t) = Taken By, (c) = Cosigned By    Initials Name Provider Type    Edith Ramos, PT Physical Therapist               Goals/Plan     Row Name 03/01/22 1347          Bed Mobility Goal 1 (PT)    Activity/Assistive Device (Bed  Mobility Goal 1, PT) bed mobility activities, all  -CM     [place] Level/Cues Needed (Bed Mobility Goal 1, PT) contact guard assist; minimum assist (75% or more patient effort)  -CM     Time Frame (Bed Mobility Goal 1, PT) 2 weeks  -CM     Row Name 03/01/22 1347          Transfer Goal 1 (PT)    Activity/Assistive Device (Transfer Goal 1, PT) transfers, all  -CM     [place] Level/Cues Needed (Transfer Goal 1, PT) contact guard assist  -CM     Time Frame (Transfer Goal 1, PT) 2 weeks  -CM     Row Name 03/01/22 1347          Gait Training Goal 1 (PT)    Activity/Assistive Device (Gait Training Goal 1, PT) gait (walking locomotion)  -CM     [place] Level (Gait Training Goal 1, PT) modified independence  -CM     Distance (Gait Training Goal 1, PT) 75 ft w/ full knee extension at midstance; scores 19 or > on tinetti balance scale.  -CM     Time Frame (Gait Training Goal 1, PT) 2 weeks  -CM           User Key  (r) = Recorded By, (t) = Taken By, (c) = Cosigned By    Initials Name Provider Type    CM Edith Whittington, PT Physical Therapist               Clinical Impression     Row Name 03/01/22 1340          Pain    Additional Documentation Pain Scale: FACES Pre/Post-Treatment (Group)  -CM     Row Name 03/01/22 1340          Pain Scale: Numbers Pre/Post-Treatment    Pain Intervention(s) Repositioned; Emotional support; Ambulation/increased activity; Distraction; Therapeutic presence  -CM     Row Name 03/01/22 1340          Pain Scale: FACES Pre/Post-Treatment    Pain: FACES Scale, Pretreatment 2-->hurts little bit  -CM     Posttreatment Pain Rating 2-->hurts little bit  -CM     Pain Location - Orientation generalized  -CM     Row Name 03/01/22 1345 03/01/22 1340       Plan of Care Review    Plan of Care Reviewed With -- patient  -CM    Outcome Summary 72 yo male adm 2/28/22 for weakness and multiple falls at home. Pt's wife asked him to come to ER due to her inability to manage him at home. Pt has been amb w/  wife's help, whom he acknowledges does not walk well herself. Pt uses rw in home, uses rollator wx out of home. He does not drive. He does assist in grocery shopping at baseline. Pt has fallen > 4 times in past few weeks. He is falling both due to loss of balance and due to LEs giving out. Today, pt presents w/ weakness of 3/5 in BLEs, as well as UE weakness. He is unable to maintain sitting at eob w/o holding on w/ LUE and pushing to hold self up w/ RUE. He needs mod assist to safely come to standing. He was able to amb 60 ft x 2 (to/from bathroom) but needed min assist to ambulate w/ rw. Pt scores only 4 of 28 on the tinetti balance scale, indicating very high risk for falls at home. Pt needs IP rehab at d/c. Will follow.  -CM 70 yo male adm 2/28/22 for weakness and multiple falls at home. Pt's wife asked him to come to ER due to her inability to manage him at home. Pt has been amb w/ wife's help, whom he acknowledges does not walk well herself. Pt uses rw in home, uses rollator wx out of home. He does not drive. He does assist in grocery shopping at baseline. Pt has fallen > 4 times in past few weeks. He is falling both due to loss of balance and due to LEs giving out. Today, pt presents w/ weakness of 3/5 in BLEs, as well as UE weakness. He is unable to maintain sitting at eob w/o holding on w/ LUE and pushing to hold self up w/ RUE. He needs mod assist to safely come to standing. He was able to amb 60 ft x 2 (to/from bathroom) but needed  -CM    Row Name 03/01/22 1344          Therapy Assessment/Plan (PT)    Rehab Potential (PT) good, to achieve stated therapy goals  -CM     Criteria for Skilled Interventions Met (PT) yes; meets criteria; skilled treatment is necessary  -CM     Predicted Duration of Therapy Intervention (PT) until d/c  -CM     Row Name 03/01/22 1346          Positioning and Restraints    Pre-Treatment Position in bed  on stretcher in ER  -CM     Post Treatment Position bed  -CM     In Bed  "notified nsg; sitting; call light within reach; encouraged to call for assist; patient within staff view; with nsg  -CM           User Key  (r) = Recorded By, (t) = Taken By, (c) = Cosigned By    Initials Name Provider Type    Edith Ramos, PT Physical Therapist               Outcome Measures     Row Name 03/01/22 1348          How much help from another person do you currently need...    Turning from your back to your side while in flat bed without using bedrails? 3  -CM     Moving from lying on back to sitting on the side of a flat bed without bedrails? 2  -CM     Moving to and from a bed to a chair (including a wheelchair)? 2  -CM     Standing up from a chair using your arms (e.g., wheelchair, bedside chair)? 2  -CM     Climbing 3-5 steps with a railing? 1  -CM     To walk in hospital room? 3  -CM     AM-PAC 6 Clicks Score (PT) 13  -CM     Row Name 03/01/22 1344          Tinetti Assessment    Tinetti Assessment yes  -CM     Sitting Balance 0  -CM     Arises 0  -CM     Attempts to Rise 0  -CM     Immediate Standing Balance (first 5 sec) 0  -CM     Standing Balance 0  -CM     Sternal Nudge (feet close together) 0  -CM     Eyes Closed (feet close together) 0  -CM     Turning 360 Degrees- Steps 0  -CM     Turning 360 Degrees- Steadiness 0  -CM     Sitting Down 1  -CM     Tinetti Balance Score 1  -CM     Gait Initiation (immediate after told \"go\") 0  -CM     Step Length- Right Swing 0  -CM     Step Length- Left Swing 0  -CM     Foot Clearance- Right Foot 0  -CM     Foot Clearance- Left Foot 0  -CM     Step Symmetry 1  -CM     Step Continuity 0  -CM     Path (excursion) 1  -CM     Trunk 0  -CM     Base of Support 1  -CM     Gait Score 3  -CM     Tinetti Total Score 4  -CM     Row Name 03/01/22 1348 03/01/22 1344       Functional Assessment    Outcome Measure Options AM-PAC 6 Clicks Basic Mobility (PT)  -CM Tinetti  -CM          User Key  (r) = Recorded By, (t) = Taken By, (c) = Cosigned By    Initials Name " Provider Type    CM Edith Whittington, PT Physical Therapist                             Physical Therapy Education                 Title: PT OT SLP Therapies (Done)     Topic: Physical Therapy (Done)     Point: Mobility training (Done)     Learning Progress Summary           Patient Nonacceptance, E,TB, VU,NR by KRISTEL at 3/1/2022 1349                               User Key     Initials Effective Dates Name Provider Type Discipline    KRISTEL 06/16/21 -  Edith Whittington, PT Physical Therapist PT              PT Recommendation and Plan  Planned Therapy Interventions (PT): balance training, bed mobility training, gait training, patient/family education, neuromuscular re-education, motor coordination training, postural re-education, transfer training, ROM (range of motion), strengthening, stretching  Plan of Care Reviewed With: patient  Outcome Summary: 70 yo male adm 2/28/22 for weakness and multiple falls at home. Pt's wife asked him to come to ER due to her inability to manage him at home. Pt has been amb w/ wife's help, whom he acknowledges does not walk well herself. Pt uses rw in home, uses rollator wx out of home. He does not drive. He does assist in grocery shopping at baseline. Pt has fallen > 4 times in past few weeks. He is falling both due to loss of balance and due to LEs giving out. Today, pt presents w/ weakness of 3/5 in BLEs, as well as UE weakness. He is unable to maintain sitting at eob w/o holding on w/ LUE and pushing to hold self up w/ RUE. He needs mod assist to safely come to standing. He was able to amb 60 ft x 2 (to/from bathroom) but needed min assist to ambulate w/ rw. Pt scores only 4 of 28 on the tinetti balance scale, indicating very high risk for falls at home. Pt needs IP rehab at d/c. Will follow.     Time Calculation:    PT Charges     Row Name 03/01/22 1349             Time Calculation    Start Time 0927  -CM      Stop Time 1015  -CM      Time Calculation (min) 48 min  -CM      PT Received  On 03/01/22  -CM      PT - Next Appointment 03/02/22  -CM      PT Goal Re-Cert Due Date 03/15/22  -CM              Time Calculation- PT    Total Timed Code Minutes- PT 15 minute(s)  -CM            User Key  (r) = Recorded By, (t) = Taken By, (c) = Cosigned By    Initials Name Provider Type    CM Edith Whittington, PT Physical Therapist              Therapy Charges for Today     Code Description Service Date Service Provider Modifiers Qty    93227846828 HC PT EVAL MOD COMPLEXITY 4 3/1/2022 Edith Whittington, PT GP 1    [id number]  GAIT TRAINING EA 15 MIN 3/1/2022 Edith Whittington, PT GP 1          PT G-Codes  Outcome Measure Options: AM-PAC 6 Clicks Basic Mobility (PT)  AM-PAC 6 Clicks Score (PT): 13  Tinetti Total Score: 4    Edith Whittington PT  3/1/2022

## 2022-03-01 NOTE — PLAN OF CARE
Goal Outcome Evaluation:  Plan of Care Reviewed With: patient           Outcome Summary: 70 yo male adm 2/28/22 for weakness and multiple falls at home. Pt's wife asked him to come to ER due to her inability to manage him at home. Pt has been amb w/ wife's help, whom he acknowledges does not walk well herself. Pt uses rw in home, uses rollator wx out of home. He does not drive. He does assist in grocery shopping at baseline. Pt has fallen > 4 times in past few weeks. He is falling both due to loss of balance and due to LEs giving out. Today, pt presents w/ weakness of 3/5 in BLEs, as well as UE weakness. He is unable to maintain sitting at eob w/o holding on w/ LUE and pushing to hold self up w/ RUE. He needs mod assist to safely come to standing. He was able to amb 60 ft x 2 (to/from bathroom) but needed min assist to ambulate w/ rw. Pt scores only 4 of 28 on the tinetti balance scale, indicating very high risk for falls at home. Pt needs IP rehab at d/c. Will follow.

## 2022-03-01 NOTE — ED NOTES
Pt cleaned up and new brief applied.  Call light within reach.       Natalie Beth, PERCYN  03/01/22 1854

## 2022-03-01 NOTE — H&P
[facility] Medicine Services      Patient Name: Blas Mojica  : 1950  MRN: [id number]  Primary Care Physician:  Sahil Read MD  Date of admission: 2022      Subjective      Chief Complaint: Weakness    History of Present Illness: Blas Mojica is a 71 y.o. male with past medical history of diabetes mellitus, CKD, dyslipidemia, hypertension who presented to [facility] on 2022 complaining of weakness that started 2021.  He has had 3 falls last week.  Patient denies hitting his head or loss of consciousness. He had just recently gotten out of rehab 2 weeks ago.  He had had neurology evaluation for generalized weakness that has been gradual and progressive over the last several months.  He has had extensive evaluation including CT of head MRI of head cervical and thoracic spine.  No significant abnormality noted.  He was being worked up for peripheral neuropathy.  Wife reports he was not scheduled to be seen again until March.  He has physical therapy that is been working with him.  He has a wheelchair at home and ambulates with walker.   He complains of chills.  Patient denies chest pain, shortness of air, cough, nausea, fever.      In the ED, all labs unremarkable except glucose 436.  All vital signs unremarkable except /92.  Patient given insulin, IV fluid bolus in the ED.  Patient admitted to hospitalist service for further evaluation and treatment.    Review of Systems   Constitutional: Positive for chills. Negative for fever.   HENT: Negative.    Eyes: Negative.    Cardiovascular: Negative.  Negative for chest pain.   Respiratory: Negative.  Negative for cough and shortness of breath.    Endocrine: Negative.    Skin: Negative.    Musculoskeletal: Positive for falls.   Gastrointestinal: Negative.  Negative for nausea.   Genitourinary: Negative.    Neurological: Positive for weakness.   Psychiatric/Behavioral: Negative.     Allergic/Immunologic: Negative.         Personal History     Past Medical History:   Diagnosis Date   • Diabetes mellitus (HCC)    • Hyperlipidemia    • Hypertension        Past Surgical History:   Procedure Laterality Date   • ADENOIDECTOMY     • TONSILLECTOMY         Family History: family history includes No Known Problems in his father and mother. Otherwise pertinent FHx was reviewed and not pertinent to current issue.    Social History:  reports that he has never smoked. He has never used smokeless tobacco. He reports that he does not drink alcohol and does not use drugs.    Home Medications:  Prior to Admission Medications     Prescriptions Last Dose Informant Patient Reported? Taking?    clopidogrel (PLAVIX) 75 MG tablet   Yes No    Take 75 mg by mouth Daily.    insulin glargine (LANTUS) 100 UNIT/ML injection   Yes No    Inject 32 Units under the skin into the appropriate area as directed Every Night.    lisinopril (PRINIVIL,ZESTRIL) 10 MG tablet   Yes No    Take 40 mg by mouth Every Other Day.    potassium chloride (K-DUR,KLOR-CON) 20 MEQ CR tablet   No No    Take 1 tablet by mouth Daily.    triamterene-hydrochlorothiazide (MAXZIDE) 75-50 MG per tablet  Self Yes No    Take 1 tablet by mouth Every Other Day.    vitamin B-12 (CYANOCOBALAMIN) 1000 MCG tablet   Yes No    Take 1,000 mcg by mouth Daily.            Allergies:  Allergies   Allergen Reactions   • Contrast Dye Anaphylaxis   • Iodinated Diagnostic Agents Shortness Of Breath   • Iodine Shortness Of Breath   • Aspirin Nausea And Vomiting       Objective      Vitals:   Temp:  [98.1 °F (36.7 °C)] 98.1 °F (36.7 °C)  Heart Rate:  [] 93  Resp:  [18] 18  BP: (123-161)/(73-98) 126/73    Physical Exam  Vitals and nursing note reviewed.   Constitutional:       Appearance: Normal appearance.   HENT:      Head: Normocephalic.      Nose: Nose normal.      Mouth/Throat:      Pharynx: Oropharynx is clear.   Eyes:      Extraocular Movements: Extraocular  movements intact.   Cardiovascular:      Rate and Rhythm: Normal rate and regular rhythm.      Pulses: Normal pulses.      Heart sounds: Normal heart sounds.   Pulmonary:      Effort: Pulmonary effort is normal.      Breath sounds: Normal breath sounds.   Abdominal:      General: Bowel sounds are normal.      Palpations: Abdomen is soft.   Musculoskeletal:         General: Normal range of motion.      Cervical back: Normal range of motion.   Skin:     General: Skin is warm and dry.      Findings: Bruising present.      Comments: Small scattered bruising to LUE     Neurological:      Mental Status: He is alert and oriented to person, place, and time.   Psychiatric:         Mood and Affect: Mood normal.         Behavior: Behavior normal.          Result Review    Result Review:  I have personally reviewed the results from the time of this admission to 3/1/2022 01:10 EST and agree with these findings:  [x]  Laboratory  []  Microbiology  [x]  Radiology  []  EKG/Telemetry   []  Cardiology/Vascular   []  Pathology  [x]  Old records  []  Other:  Most notable findings include: As above    Assessment/Plan        Active Hospital Problems:  Active Hospital Problems    Diagnosis    • **Weakness    • Type 2 diabetes mellitus without complications (HCC)    • Dyslipidemia    • Stage 3 chronic kidney disease (HCC)    • Essential (primary) hypertension      Plan:     -----Home medication reconciliation not completed.  Will complete home med rec once nursing reviews.-----    Weakness  -Urinalysis reviewed  -IV fluid bolus given in the ED  -Fall precautions  -Neuro checks  -Serial troponins ordered  -BNP, magnesium ordered    Type 2 diabetes mellitus without complications   -Glucose 436  -Insulin given in the ED  -Hemoglobin A1c ordered  -Accu-Cheks before meals and at bedtime  -Sliding scale insulin ordered    Essential (primary) hypertension  Dyslipidemia  -/92  -Lipid profile on 11/22/2021 unremarkable except total  cholesterol 216,     Stage 3 chronic kidney disease  -Creatinine 1.12, baseline 1.97 -1.53  -GFR 66, baseline 45-76    DVT prophylaxis:  Mechanical DVT prophylaxis orders are present.    CODE STATUS:    Level Of Support Discussed With: Patient  Code Status (Patient has no pulse and is not breathing): CPR (Attempt to Resuscitate)  Medical Interventions (Patient has pulse or is breathing): Full Support    Admission Status:  I believe this patient meets observation status.    I discussed the patient's findings and my recommendations with patient.    This patient has been examined wearing appropriate Personal Protective Equipment 03/01/22      Signature: Electronically signed by PIPER Mora, 02/28/22, 10:14 PM EST.

## 2022-03-02 LAB
ANION GAP SERPL CALCULATED.3IONS-SCNC: 11 MMOL/L (ref 5–15)
BASOPHILS # BLD AUTO: 0.1 10*3/MM3 (ref 0–0.2)
BASOPHILS NFR BLD AUTO: 1.2 % (ref 0–1.5)
BUN SERPL-MCNC: 22 MG/DL (ref 8–23)
BUN/CREAT SERPL: 16.8 (ref 7–25)
CALCIUM SPEC-SCNC: 9.3 MG/DL (ref 8.6–10.5)
CHLORIDE SERPL-SCNC: 97 MMOL/L (ref 98–107)
CO2 SERPL-SCNC: 25 MMOL/L (ref 22–29)
CREAT SERPL-MCNC: 1.31 MG/DL (ref 0.76–1.27)
DEPRECATED RDW RBC AUTO: 40.3 FL (ref 37–54)
EGFRCR SERPLBLD CKD-EPI 2021: 57.8 ML/MIN/1.73
EOSINOPHIL # BLD AUTO: 0.1 10*3/MM3 (ref 0–0.4)
EOSINOPHIL NFR BLD AUTO: 2.1 % (ref 0.3–6.2)
ERYTHROCYTE [DISTWIDTH] IN BLOOD BY AUTOMATED COUNT: 13.4 % (ref 12.3–15.4)
GLUCOSE BLDC GLUCOMTR-MCNC: 230 MG/DL (ref 70–105)
GLUCOSE BLDC GLUCOMTR-MCNC: 232 MG/DL (ref 70–105)
GLUCOSE BLDC GLUCOMTR-MCNC: 236 MG/DL (ref 70–105)
GLUCOSE BLDC GLUCOMTR-MCNC: 273 MG/DL (ref 70–105)
GLUCOSE BLDC GLUCOMTR-MCNC: 285 MG/DL (ref 70–105)
GLUCOSE SERPL-MCNC: 258 MG/DL (ref 65–99)
HCT VFR BLD AUTO: 40.9 % (ref 37.5–51)
HGB BLD-MCNC: 14.3 G/DL (ref 13–17.7)
LYMPHOCYTES # BLD AUTO: 2.2 10*3/MM3 (ref 0.7–3.1)
LYMPHOCYTES NFR BLD AUTO: 35.2 % (ref 19.6–45.3)
MAGNESIUM SERPL-MCNC: 1.7 MG/DL (ref 1.6–2.4)
MCH RBC QN AUTO: 29.6 PG (ref 26.6–33)
MCHC RBC AUTO-ENTMCNC: 34.9 G/DL (ref 31.5–35.7)
MCV RBC AUTO: 85 FL (ref 79–97)
MONOCYTES # BLD AUTO: 0.5 10*3/MM3 (ref 0.1–0.9)
MONOCYTES NFR BLD AUTO: 7.8 % (ref 5–12)
NEUTROPHILS NFR BLD AUTO: 3.3 10*3/MM3 (ref 1.7–7)
NEUTROPHILS NFR BLD AUTO: 53.7 % (ref 42.7–76)
NRBC BLD AUTO-RTO: 0.2 /100 WBC (ref 0–0.2)
PLATELET # BLD AUTO: 128 10*3/MM3 (ref 140–450)
PMV BLD AUTO: 8.1 FL (ref 6–12)
POTASSIUM SERPL-SCNC: 3.6 MMOL/L (ref 3.5–5.2)
RBC # BLD AUTO: 4.81 10*6/MM3 (ref 4.14–5.8)
SODIUM SERPL-SCNC: 133 MMOL/L (ref 136–145)
WBC NRBC COR # BLD: 6.1 10*3/MM3 (ref 3.4–10.8)

## 2022-03-02 PROCEDURE — 25010000002 HYDRALAZINE PER 20 MG: Performed by: INTERNAL MEDICINE

## 2022-03-02 PROCEDURE — 83735 ASSAY OF MAGNESIUM: CPT | Performed by: NURSE PRACTITIONER

## 2022-03-02 PROCEDURE — 85025 COMPLETE CBC W/AUTO DIFF WBC: CPT | Performed by: NURSE PRACTITIONER

## 2022-03-02 PROCEDURE — 99225 PR SBSQ OBSERVATION CARE/DAY 25 MINUTES: CPT | Performed by: HOSPITALIST

## 2022-03-02 PROCEDURE — 80048 BASIC METABOLIC PNL TOTAL CA: CPT | Performed by: NURSE PRACTITIONER

## 2022-03-02 PROCEDURE — 36415 COLL VENOUS BLD VENIPUNCTURE: CPT | Performed by: NURSE PRACTITIONER

## 2022-03-02 PROCEDURE — 63710000001 INSULIN LISPRO (HUMAN) PER 5 UNITS: Performed by: NURSE PRACTITIONER

## 2022-03-02 PROCEDURE — 96374 THER/PROPH/DIAG INJ IV PUSH: CPT

## 2022-03-02 PROCEDURE — 63710000001 INSULIN GLARGINE PER 5 UNITS: Performed by: HOSPITALIST

## 2022-03-02 PROCEDURE — 97166 OT EVAL MOD COMPLEX 45 MIN: CPT

## 2022-03-02 PROCEDURE — 82962 GLUCOSE BLOOD TEST: CPT

## 2022-03-02 PROCEDURE — G0378 HOSPITAL OBSERVATION PER HR: HCPCS

## 2022-03-02 PROCEDURE — G0108 DIAB MANAGE TRN  PER INDIV: HCPCS

## 2022-03-02 PROCEDURE — 99214 OFFICE O/P EST MOD 30 MIN: CPT | Performed by: PSYCHIATRY & NEUROLOGY

## 2022-03-02 RX ORDER — LISINOPRIL 5 MG/1
10 TABLET ORAL EVERY OTHER DAY
Status: DISCONTINUED | OUTPATIENT
Start: 2022-03-03 | End: 2022-03-03 | Stop reason: HOSPADM

## 2022-03-02 RX ORDER — LANCETS
1 EACH MISCELLANEOUS
Qty: 100 EACH | Refills: 2 | Status: ON HOLD | OUTPATIENT
Start: 2022-03-02 | End: 2022-06-16

## 2022-03-02 RX ORDER — TRIAMTERENE AND HYDROCHLOROTHIAZIDE 75; 50 MG/1; MG/1
1 TABLET ORAL EVERY OTHER DAY
Status: DISCONTINUED | OUTPATIENT
Start: 2022-03-03 | End: 2022-03-02

## 2022-03-02 RX ORDER — POTASSIUM CHLORIDE 20 MEQ/1
20 TABLET, EXTENDED RELEASE ORAL DAILY
Status: DISCONTINUED | OUTPATIENT
Start: 2022-03-02 | End: 2022-03-03 | Stop reason: HOSPADM

## 2022-03-02 RX ORDER — TRIAMTERENE AND HYDROCHLOROTHIAZIDE 75; 50 MG/1; MG/1
1 TABLET ORAL EVERY OTHER DAY
Status: DISCONTINUED | OUTPATIENT
Start: 2022-03-02 | End: 2022-03-03 | Stop reason: HOSPADM

## 2022-03-02 RX ORDER — BLOOD SUGAR DIAGNOSTIC
1 STRIP MISCELLANEOUS
Qty: 100 EACH | Refills: 2 | Status: ON HOLD | OUTPATIENT
Start: 2022-03-02 | End: 2022-06-16

## 2022-03-02 RX ORDER — ISOPROPYL ALCOHOL 700 MG/ML
1 CLOTH TOPICAL
Qty: 100 EACH | Refills: 2 | Status: ON HOLD | OUTPATIENT
Start: 2022-03-02 | End: 2022-06-16

## 2022-03-02 RX ORDER — HYDRALAZINE HYDROCHLORIDE 20 MG/ML
10 INJECTION INTRAMUSCULAR; INTRAVENOUS EVERY 6 HOURS PRN
Status: DISCONTINUED | OUTPATIENT
Start: 2022-03-02 | End: 2022-03-03 | Stop reason: HOSPADM

## 2022-03-02 RX ORDER — MULTIPLE VITAMINS W/ MINERALS TAB 9MG-400MCG
1 TAB ORAL DAILY
COMMUNITY

## 2022-03-02 RX ORDER — LANOLIN ALCOHOL/MO/W.PET/CERES
1000 CREAM (GRAM) TOPICAL DAILY
Status: DISCONTINUED | OUTPATIENT
Start: 2022-03-02 | End: 2022-03-03 | Stop reason: HOSPADM

## 2022-03-02 RX ORDER — CLOPIDOGREL BISULFATE 75 MG/1
75 TABLET ORAL DAILY
Status: DISCONTINUED | OUTPATIENT
Start: 2022-03-02 | End: 2022-03-03 | Stop reason: HOSPADM

## 2022-03-02 RX ADMIN — Medication 10 ML: at 21:25

## 2022-03-02 RX ADMIN — INSULIN LISPRO 8 UNITS: 100 INJECTION, SOLUTION INTRAVENOUS; SUBCUTANEOUS at 17:51

## 2022-03-02 RX ADMIN — POTASSIUM CHLORIDE 20 MEQ: 1500 TABLET, EXTENDED RELEASE ORAL at 12:18

## 2022-03-02 RX ADMIN — HYDRALAZINE HYDROCHLORIDE 10 MG: 20 INJECTION INTRAMUSCULAR; INTRAVENOUS at 04:09

## 2022-03-02 RX ADMIN — INSULIN GLARGINE 16 UNITS: 100 INJECTION, SOLUTION SUBCUTANEOUS at 12:34

## 2022-03-02 RX ADMIN — Medication 10 ML: at 09:02

## 2022-03-02 RX ADMIN — INSULIN LISPRO 5 UNITS: 100 INJECTION, SOLUTION INTRAVENOUS; SUBCUTANEOUS at 12:18

## 2022-03-02 RX ADMIN — CYANOCOBALAMIN TAB 1000 MCG 1000 MCG: 1000 TAB at 12:18

## 2022-03-02 RX ADMIN — TRIAMTERENE AND HYDROCHLOROTHIAZIDE 1 TABLET: 75; 50 TABLET ORAL at 17:51

## 2022-03-02 RX ADMIN — INSULIN LISPRO 5 UNITS: 100 INJECTION, SOLUTION INTRAVENOUS; SUBCUTANEOUS at 08:52

## 2022-03-02 RX ADMIN — INSULIN GLARGINE 33 UNITS: 100 INJECTION, SOLUTION SUBCUTANEOUS at 20:41

## 2022-03-02 RX ADMIN — CLOPIDOGREL BISULFATE 75 MG: 75 TABLET ORAL at 12:18

## 2022-03-02 NOTE — THERAPY EVALUATION
Patient Name: Blas Mojica  : 1950    MRN: [id number]                              Today's Date: 3/2/2022       Admit Date: 2022    Visit Dx:     ICD-10-CM ICD-9-CM   1. Weakness  R53.1 780.79   2. Uncontrolled type 2 diabetes mellitus with hyperglycemia (HCC)  E11.65 250.02   3. Multiple falls  R29.6 V15.88   4. Moderate malnutrition (CMS/HCC)  E44.0 263.0     Patient Active Problem List   Diagnosis   • Essential (primary) hypertension   • TIA (transient ischemic attack)   • Stage 3 chronic kidney disease (HCC)   • Diabetes mellitus (HCC)   • Dyslipidemia   • History of basal cell carcinoma (BCC)   • Primary osteoarthritis   • Vitamin D deficiency   • Acute pain of left knee   • Generalized weakness   • Hypokalemia   • Moderate malnutrition (CMS/HCC)   • Weakness   • Type 2 diabetes mellitus without complications (HCC)   • Sinus tachycardia     Past Medical History:   Diagnosis Date   • Diabetes mellitus (HCC)    • Hyperlipidemia    • Hypertension      Past Surgical History:   Procedure Laterality Date   • ADENOIDECTOMY     • TONSILLECTOMY        General Information     Row Name 22 1434          General Information    Patient Profile Reviewed yes  -LS     Prior Level of Function independent:; feeding; grooming; dressing; bathing; bed mobility; min assist:; gait; all household mobility; home management; cooking; cleaning  -LS     Existing Precautions/Restrictions fall  -LS     Row Name 22 1434          Living Environment    Lives With spouse  -LS     Row Name 22 1434          Home Main Entrance    Number of Stairs, Main Entrance none  -LS     Row Name 22 1434          Stairs Within Home, Primary    Number of Stairs, Within Home, Primary none  -LS     Row Name 22 1434          Cognition    Orientation Status (Cognition) oriented x 4  -LS     Row Name 22 1434          Safety Issues, Functional Mobility    Impairments Affecting Function (Mobility) balance;  endurance/activity tolerance; strength  -           User Key  (r) = Recorded By, (t) = Taken By, (c) = Cosigned By    Initials Name Provider Type    Gilberto Carr OT Occupational Therapist                 Mobility/ADL's     Row Name 03/02/22 1534 03/02/22 1442       Bed Mobility    Bed Mobility -- supine-sit; sit-supine  -LS    Supine-Sit [place] (Bed Mobility) moderate assist (50% patient effort); 1 person assist; verbal cues  -LS moderate assist (50% patient effort); 1 person assist  -LS    Sit-Supine [place] (Bed Mobility) minimum assist (75% patient effort); 1 person assist; verbal cues  -LS minimum assist (75% patient effort); 1 person assist  -LS    Bed Mobility, Safety Issues impaired trunk control for bed mobility; decreased use of legs for bridging/pushing  - --    Row Name 03/02/22 1442          Activities of Daily Living    BADL Assessment/Intervention bathing; upper body dressing; lower body dressing; grooming; feeding; toileting  -     Row Name 03/02/22 1442          Bathing Assessment/Intervention    Comment (Bathing) Aniticipate patient to be moderate assist based on UE ROM and ability to sit EOB  -     Row Name 03/02/22 1534 03/02/22 1442       Upper Body Dressing Assessment/Training    [place] Level (Upper Body Dressing) -- doff; don; pajama/robe; modified independence  -LS    Position (Upper Body Dressing) sitting up in bed; supported sitting  - --    Row Name 03/02/22 1442          Lower Body Dressing Assessment/Training    [place] Level (Lower Body Dressing) doff; socks; independent  -LS     Position (Lower Body Dressing) sitting up in bed  -     Comment (Lower Body Dressing) Anticipate moderate assistance donning and doffing pants in bed due to LE weakness and lack of activity tolerance  -     Row Name 03/02/22 1442          Grooming Assessment/Training    [place] Level (Grooming) independent  -LS     Position (Grooming) sitting up in bed  -     Row  Name 03/02/22 1442          Self-Feeding Assessment/Training    [place] Level (Feeding) independent  -     Row Name 03/02/22 1442          Toileting Assessment/Training    Assistive Devices (Toileting) commode, bedside without drop arms  -     Comment (Toileting) Anticipate mod assist based on physical theapy reports of assistance with mobility and sit to stand  -           User Key  (r) = Recorded By, (t) = Taken By, (c) = Cosigned By    Initials Name Provider Type     Gilberto Lundberg OT Occupational Therapist               Obj/Interventions     Row Name 03/02/22 1452          Range of Motion Comprehensive    General Range of Motion bilateral upper extremity ROM WFL  -     Row Name 03/02/22 1452          Strength Comprehensive (MMT)    General Manual Muscle Testing (MMT) Assessment upper extremity strength deficits identified; lower extremity strength deficits identified  -     Comment, General Manual Muscle Testing (MMT) Assessment BUEs 3+/5  -     Row Name 03/02/22 1452          Upper Extremity (Manual Muscle Testing)    Upper Extremity: Manual Muscle Testing (MMT) left shoulder strength deficit; right shoulder strength deficit  -           User Key  (r) = Recorded By, (t) = Taken By, (c) = Cosigned By    Initials Name Provider Type     Gilberto Lundberg OT Occupational Therapist               Goals/Plan     Row Name 03/02/22 1513          Bed Mobility Goal 1 (OT)    Activity/Assistive Device (Bed Mobility Goal 1, OT) supine to sit; bed rails  -LS     [place] Level/Cues Needed (Bed Mobility Goal 1, OT) minimum assist (75% or more patient effort)  -LS     Time Frame (Bed Mobility Goal 1, OT) 2 weeks  -     Row Name 03/02/22 1513          Transfer Goal 1 (OT)    Activity/Assistive Device (Transfer Goal 1, OT) sit-to-stand/stand-to-sit; walker, rolling  -LS     [place] Level/Cues Needed (Transfer Goal 1, OT) minimum assist (75% or more patient effort)  -LS     Time Frame (Transfer Goal  1, OT) 2 weeks  -LS     Row Name 03/02/22 1513          Dressing Goal 1 (OT)    Activity/Device (Dressing Goal 1, OT) lower body dressing  -LS     [place]/Cues Needed (Dressing Goal 1, OT) minimum assist (75% or more patient effort)  -LS     Time Frame (Dressing Goal 1, OT) 2 weeks  -LS     Row Name 03/02/22 1519          Therapy Assessment/Plan (OT)    Planned Therapy Interventions (OT) activity tolerance training; BADL retraining; ROM/therapeutic exercise; strengthening exercise; transfer/mobility retraining  -LS           User Key  (r) = Recorded By, (t) = Taken By, (c) = Cosigned By    Initials Name Provider Type    LS Gilberto Lundberg OT Occupational Therapist               Clinical Impression     Row Name 03/02/22 3107          Pain Assessment    Additional Documentation Pain Scale: Numbers Pre/Post-Treatment (Group)  -     Row Name 03/02/22 3240          Pain Scale: Numbers Pre/Post-Treatment    Pretreatment Pain Rating 0/10 - no pain  -LS     Posttreatment Pain Rating 0/10 - no pain  -     Row Name 03/02/22 1503 03/02/22 0041       Plan of Care Review    Plan of Care Reviewed With -- patient  -LS    Outcome Summary 72 yo male adm 2/28/22 for weakness and multiple falls at home. Pt's wife asked him to come to ER due to her inability to manage him at home. Pt has been amb w/ wife's help, whom he acknowledges does not walk well herself. Pt uses rw in home, uses rollator wx out of home. He does not drive. He does assist in grocery shopping at baseline. Pt has fallen > 4 times in past few weeks. He is falling both due to loss of balance and due to LEs giving out. Today, pt presents w/ weakness of 3/5 in BLEs, as well as UE weakness. He is unable to maintain sitting at eob w/o holding on w/ LUE and pushing to hold self up w/ RUE. Patient required mod assist and excess time to transition from supine to sit EOB. He was able to remove UE support enough to don his hospital gown, then assisted back to supine.  Able to doff socks from seated position in bed, however unable to don. Overall he presents with general weakness and lack of activity tolerance to support himself and complete functional ADLs in the home environment. Recommend inpatient rehab, however patient refusing. He will require home health and 24/7 care in the home environment. Will follow fo OT.  -LS --    Row Name 03/02/22 150          Therapy Assessment/Plan (OT)    Criteria for Skilled Therapeutic Interventions Met (OT) skilled treatment is necessary; yes  -LS     Therapy Frequency (OT) 3 times/wk  -LS     Predicted Duration of Therapy Intervention (OT) 2 weeks  -LS     Row Name 03/02/22 1503          Therapy Plan Review/Discharge Plan (OT)    Anticipated Discharge Disposition (OT) inpatient rehabilitation facility  -     Row Name 03/02/22 1506          Positioning and Restraints    Pre-Treatment Position in bed  -LS     Post Treatment Position bed  -LS     In Bed notified nsg; fowlers; call light within reach; encouraged to call for assist; exit alarm on  -LS           User Key  (r) = Recorded By, (t) = Taken By, (c) = Cosigned By    Initials Name Provider Type    LS Gilberto Lundberg, JAS Occupational Therapist               Outcome Measures     Row Name 03/02/22 4387          How much help from another is currently needed...    Putting on and taking off regular lower body clothing? 2  -LS     Bathing (including washing, rinsing, and drying) 2  -LS     Toileting (which includes using toilet bed pan or urinal) 2  -LS     Putting on and taking off regular upper body clothing 2  -LS     Taking care of personal grooming (such as brushing teeth) 4  -LS     Eating meals 4  -LS     AM-PAC 6 Clicks Score (OT) 16  -LS     Row Name 03/02/22 151          How much help from another person do you currently need...    Turning from your back to your side while in flat bed without using bedrails? 3  -LS     Moving from lying on back to sitting on the side of a flat bed  without bedrails? 2  -LS     Moving to and from a bed to a chair (including a wheelchair)? 2  -LS     Standing up from a chair using your arms (e.g., wheelchair, bedside chair)? 2  -LS     Climbing 3-5 steps with a railing? 1  -LS     To walk in hospital room? 3  -LS     AM-PAC 6 Clicks Score (PT) 13  -LS     Row Name 03/02/22 1189          Functional Assessment    Outcome Measure Options AM-PAC 6 Clicks Basic Mobility (PT); AM-PAC 6 Clicks Daily Activity (OT)  -LS           User Key  (r) = Recorded By, (t) = Taken By, (c) = Cosigned By    Initials Name Provider Type    Gilberto Carr OT Occupational Therapist                  OT Recommendation and Plan  Planned Therapy Interventions (OT): activity tolerance training, BADL retraining, ROM/therapeutic exercise, strengthening exercise, transfer/mobility retraining  Therapy Frequency (OT): 3 times/wk  Plan of Care Review  Plan of Care Reviewed With: patient  Outcome Summary: 70 yo male adm 2/28/22 for weakness and multiple falls at home. Pt's wife asked him to come to ER due to her inability to manage him at home. Pt has been amb w/ wife's help, whom he acknowledges does not walk well herself. Pt uses rw in home, uses rollator wx out of home. He does not drive. He does assist in grocery shopping at baseline. Pt has fallen > 4 times in past few weeks. He is falling both due to loss of balance and due to LEs giving out. Today, pt presents w/ weakness of 3/5 in BLEs, as well as UE weakness. He is unable to maintain sitting at eob w/o holding on w/ LUE and pushing to hold self up w/ RUE. Patient required mod assist and excess time to transition from supine to sit EOB. He was able to remove UE support enough to don his hospital gown, then assisted back to supine. Able to doff socks from seated position in bed, however unable to don. Overall he presents with general weakness and lack of activity tolerance to support himself and complete functional ADLs in the home  environment. Recommend inpatient rehab, however patient refusing. He will require home health and 24/7 care in the home environment. Will follow fo OT.     Time Calculation:    Time Calculation- OT     Row Name 03/02/22 1541 03/02/22 1540 03/02/22 1528       Time Calculation- OT    OT Start Time -- -- 1246  -LS    OT Stop Time -- -- 1327  -LS    OT Time Calculation (min) -- -- 41 min  -    Total Timed Code Minutes- OT -- 0 minute(s)  - --    OT Received On -- 03/02/22 - --    OT - Next Appointment 03/03/22 - -- --    OT Goal Re-Cert Due Date 03/16/22 - -- --          User Key  (r) = Recorded By, (t) = Taken By, (c) = Cosigned By    Initials Name Provider Type    Rios Carr OT Occupational Therapist              Therapy Charges for Today     Code Description Service Date Service Provider Modifiers Qty    [id number] HC OT EVAL MOD COMPLEXITY 4 3/2/2022 Rios Lundberg OT GO 1               RIOS LUNDBERG OT  3/2/2022

## 2022-03-02 NOTE — PLAN OF CARE
Goal Outcome Evaluation:  Plan of Care Reviewed With: patient        Progress: no change  Outcome Summary: Patient came in for increasking weakness. PT saw patient and is recommending inpatient rehab. Patient was not agreeable in the ER. Needs placement.

## 2022-03-02 NOTE — PROGRESS NOTES
[facility] Daily Progress Note    Patient Name: Blas Mojica  : 1950  MRN: [id number]  Primary Care Physician:  Sahil Read MD  Date of admission: 2022      Subjective      Chief Complaint: Generalized weakness    Subjective   Patient denies for any new complaint, wife worried that he may be having slurred speech, wanted neuro to see the patient, she also requiring diabetic educator consult.     Review of Systems   Eyes: Positive for blurred vision.        Objective      Vitals:   Temp:  [97.9 °F (36.6 °C)-98.2 °F (36.8 °C)] 97.9 °F (36.6 °C)  Heart Rate:  [] 98  Resp:  [17-18] 17  BP: (145-178)/(88-95) 171/95    Physical Exam  Vitals and nursing note reviewed.   Constitutional:       General: He is not in acute distress.     Appearance: Normal appearance. He is well-developed. He is not ill-appearing, toxic-appearing or diaphoretic.   HENT:      Head: Normocephalic and atraumatic.      Right Ear: Ear canal and external ear normal.      Left Ear: Ear canal and external ear normal.      Nose: Nose normal. No congestion or rhinorrhea.      Mouth/Throat:      Mouth: Mucous membranes are moist.      Pharynx: No oropharyngeal exudate.   Eyes:      General: No scleral icterus.        Right eye: No discharge.         Left eye: No discharge.      Extraocular Movements: Extraocular movements intact.      Conjunctiva/sclera: Conjunctivae normal.      Pupils: Pupils are equal, round, and reactive to light.   Neck:      Thyroid: No thyromegaly.      Vascular: No carotid bruit or JVD.      Trachea: No tracheal deviation.   Cardiovascular:      Rate and Rhythm: Normal rate and regular rhythm.      Pulses: Normal pulses.      Heart sounds: Normal heart sounds. No murmur heard.  No friction rub. No gallop.    Pulmonary:      Effort: Pulmonary effort is normal. No respiratory distress.      Breath sounds: Normal breath sounds. No stridor. No wheezing, rhonchi or  rales.   Chest:      Chest wall: No tenderness.   Abdominal:      General: Bowel sounds are normal. There is no distension.      Palpations: Abdomen is soft. There is no mass.      Tenderness: There is no abdominal tenderness. There is no guarding or rebound.      Hernia: No hernia is present.   Musculoskeletal:         General: No swelling, tenderness, deformity or signs of injury. Normal range of motion.      Cervical back: Normal range of motion and neck supple. No rigidity. No muscular tenderness.      Right lower leg: No edema.      Left lower leg: No edema.   Lymphadenopathy:      Cervical: No cervical adenopathy.   Skin:     General: Skin is warm and dry.      Coloration: Skin is not jaundiced or pale.      Findings: No bruising, erythema or rash.   Neurological:      General: No focal deficit present.      Mental Status: He is alert and oriented to person, place, and time. Mental status is at baseline.      Cranial Nerves: No cranial nerve deficit.      Sensory: No sensory deficit.      Motor: No weakness or abnormal muscle tone.      Coordination: Coordination normal.   Psychiatric:         Mood and Affect: Mood normal.         Behavior: Behavior normal.         Thought Content: Thought content normal.         Judgment: Judgment normal.               Result Review    Result Review:  I have personally reviewed the results from the time of this admission to 3/2/2022 14:24 EST and agree with these findings:  [x]  Laboratory  [x]  Microbiology  [x]  Radiology  []  EKG/Telemetry   []  Cardiology/Vascular   []  Pathology  []  Old records  []  Other:  Most notable findings include:        Assessment/Plan      Brief Patient Summary:  Blas Mojica is a 71 y.o. male who     clopidogrel, 75 mg, Oral, Daily  insulin glargine, 33 Units, Subcutaneous, Nightly  insulin lispro, 0-14 Units, Subcutaneous, TID AC  [START ON 3/3/2022] lisinopril, 10 mg, Oral, Every Other Day  potassium chloride, 20 mEq, Oral, Daily  sodium  chloride, 10 mL, Intravenous, Q12H  triamterene-hydrochlorothiazide, 1 tablet, Oral, Every Other Day  vitamin B-12, 1,000 mcg, Oral, Daily             Active Hospital Problems:  Active Hospital Problems    Diagnosis    • **Weakness    • Type 2 diabetes mellitus without complications (HCC)    • Dyslipidemia    • Stage 3 chronic kidney disease (HCC)    • Essential (primary) hypertension      Plan:   Weakness / physical deconditioning. Slurred speech subjective as per wife, pt himself denies,   -Urinalysis reviewed  -IV fluid bolus given in the ED  -Fall precautions  PTOT evaluate and treat,  for safe discharge planning.  - consult neuro     Type 2 diabetes mellitus with hyperglycemia  -Glucose 436  -Insulin given in the ED  -Hemoglobin A1c 9.2  -Accu-Cheks before meals and at bedtime  -Sliding scale insulin ordered  - Diabetic educator consult.     Essential (primary) hypertension  Dyslipidemia  -/92  -Lipid profile on 11/22/2021 unremarkable except total cholesterol 216,      Stage 3 chronic kidney disease  -Creatinine 1.12, baseline 1.97 -1.53  -GFR 66, baseline 45-76     DVT prophylaxis:  Mechanical DVT prophylaxis orders are present.     CODE STATUS:    Level Of Support Discussed With: Patient  Code Status (Patient has no pulse and is not breathing): CPR (Attempt to Resuscitate)  Medical Interventions (Patient has pulse or is breathing): Full Support      DVT prophylaxis:  Mechanical DVT prophylaxis orders are present.    CODE STATUS:    Level Of Support Discussed With: Patient  Code Status (Patient has no pulse and is not breathing): CPR (Attempt to Resuscitate)  Medical Interventions (Patient has pulse or is breathing): Full Support      Disposition:  I expect patient to be discharged discharge        Electronically signed by Linda Magaña MD, 03/02/22, 14:24 EST.  [facility]

## 2022-03-02 NOTE — CONSULTS
Primary Care Provider: No ref. provider found     Consult requested by:  Dr Magaña  Reason for Consultation: Neurological evaluation/evaluation for acting slow off and on over the last 3 months    History taken from: patient chart family    Chief complaint: Weakness       SUBJECTIVE:    History of present illness: Background per H&P: Blas Mojica is a 71 y.o. year old male who was evaluated in room 361 at [facility]    Source of information is the patient and his wife who is essentially the one who is doing all the talking and giving all the information and she is at bedside    The patient was admitted for weakness and he was found to have significant hyperglycemia  Today he is hypertensive also  He has no active neurologic issues but his wife was concerned that he at times act slow.  Its not mental status changes rather slow in responses and slow in activities.  There is nothing suggesting seizures or passing out  Nothing suggesting focal weakness  He has been through rehab several times and he does improve and other times get worse    His wife acknowledges that his blood glucose is variable, sometimes very high and sometimes very low  She has not found any correlation with his symptoms though or at least not that has been reported    He was evaluated in this institution in November of last year and he improved and his work-up was otherwise unremarkable    He has been to movement disorder/Parkinson's disease specialist and was told that he does not have any such thing    He is now awake and alert and his wife is proud of his mentation  No speech swallowing or breathing problems    As per admitting, Blas Mojica is a 71 y.o. male with past medical history of diabetes mellitus, CKD, dyslipidemia, hypertension who presented to [facility] on 2/28/2022 complaining of weakness that started November 2021.  He has had 3 falls last week.  Patient denies hitting his head or loss of  consciousness. He had just recently gotten out of rehab 2 weeks ago.  He had had neurology evaluation for generalized weakness that has been gradual and progressive over the last several months.  He has had extensive evaluation including CT of head MRI of head cervical and thoracic spine.  No significant abnormality noted.  He was being worked up for peripheral neuropathy.  Wife reports he was not scheduled to be seen again until March.  He has physical therapy that is been working with him.  He has a wheelchair at home and ambulates with walker.   He complains of chills.  Patient denies chest pain, shortness of air, cough, nausea, fever.       In the ED, all labs unremarkable except glucose 436.  All vital signs unremarkable except /92.  Patient given insulin, IV fluid bolus in the ED.  Patient admitted to hospitalist service for further evaluation and treatment.  - Portions of the above HPI were copied from previous encounters and edited as appropriate. PMH as detailed below.     Review of Systems   No fever chills rigors or sweats  No weight issues  No sleep problems  HEENT:  No speech problem, vision changes, facial asymmetry or pain, or neck problem  Chest: No chest pain, clubbing, cyanosis, orthopnea palpitations  Pulmonary:  No shortness of air, cough or expectoration  Abdomen:  No swelling/tension, constipation,diarrhea or pain  No genitourinary symptoms  Extremity problems as discussed  No back problem  No psychotic issues  Neurologic issues as discussed  No hematologic, dermatologic or endocrine problems except he is diabetic        PATIENT HISTORY:  Past Medical History:   Diagnosis Date   • Diabetes mellitus (HCC)    • Hyperlipidemia    • Hypertension    ,   Past Surgical History:   Procedure Laterality Date   • ADENOIDECTOMY     • TONSILLECTOMY     ,   Family History   Problem Relation Age of Onset   • No Known Problems Mother    • No Known Problems Father    ,   Social History     Tobacco Use   •  Smoking status: Never Smoker   • Smokeless tobacco: Never Used   Vaping Use   • Vaping Use: Never used   Substance Use Topics   • Alcohol use: Never   • Drug use: Never   ,   Prior to Admission medications    Medication Sig Start Date End Date Taking? Authorizing Provider   clopidogrel (PLAVIX) 75 MG tablet Take 75 mg by mouth Daily.   Yes Isai Canales MD   insulin glargine (LANTUS) 100 UNIT/ML injection Inject 33 Units under the skin into the appropriate area as directed Every Night.   Yes Isai Canales MD   lisinopril (PRINIVIL,ZESTRIL) 10 MG tablet Take 50 mg by mouth Every Other Day.   Yes Isai Canales MD   multivitamin with minerals tablet tablet Take 1 tablet by mouth Daily.   Yes Isai Canales MD   triamterene-hydrochlorothiazide (MAXZIDE) 75-50 MG per tablet Take 1 tablet by mouth Every Other Day.   Yes Isai Canales MD   Accu-Chek Softclix Lancets lancets 1 each by Other route 3 (Three) Times a Day Before Meals. Use as instructed 3/2/22   Linda Magaña MD   glucose blood (Accu-Chek Guide) test strip 1 each by Other route 3 (Three) Times a Day Before Meals. Use as instructed 3/2/22   Linda Magaña MD   Isopropyl Alcohol (Alcohol Wipes) 70 % misc Apply 1 each topically 3 (Three) Times a Day Before Meals. 3/2/22   Linda Magaña MD   potassium chloride (K-DUR,KLOR-CON) 20 MEQ CR tablet Take 1 tablet by mouth Daily. 11/25/21   Carrie Cheek MD   vitamin B-12 (CYANOCOBALAMIN) 1000 MCG tablet Take 1,000 mcg by mouth Daily.    ProviderIsai MD    Allergies:  Contrast dye, Iodinated diagnostic agents, Iodine, and Aspirin    Current Facility-Administered Medications   Medication Dose Route Frequency Provider Last Rate Last Admin   • acetaminophen (TYLENOL) tablet 650 mg  650 mg Oral Q4H PRN Alsop, Agnieszka L, APRN        Or   • acetaminophen (TYLENOL) 160 MG/5ML solution 650 mg  650 mg Oral Q4H PRN Alsop, Agnieszka L, APRN        Or   •  acetaminophen (TYLENOL) suppository 650 mg  650 mg Rectal Q4H PRN AlsoAgneiszka santiago L, APRN       • aluminum-magnesium hydroxide-simethicone (MAALOX MAX) 400-400-40 MG/5ML suspension 15 mL  15 mL Oral Q6H PRN AlsopJosiei L, APRN       • calcium carbonate (TUMS) chewable tablet 500 mg (200 mg elemental)  2 tablet Oral BID PRN AlsoAgnieszka santiago L, APRN       • clopidogrel (PLAVIX) tablet 75 mg  75 mg Oral Daily Linda Magaña MD   75 mg at 03/02/22 1218   • dextrose (D50W) (25 g/50 mL) IV injection 25 g  25 g Intravenous Q15 Min PRN AlsoAgnieszka santiago APRN       • dextrose (GLUTOSE) oral gel 15 g  15 g Oral Q15 Min PRN AlsopAgnieszka L, APRN       • glucagon (human recombinant) (GLUCAGEN DIAGNOSTIC) 1 mg in sterile water (preservative free) 1 mL injection  1 mg Subcutaneous PRN AlsoAgnieszka santiago APRN       • hydrALAZINE (APRESOLINE) injection 10 mg  10 mg Intravenous Q6H PRN Pito Gray MD   10 mg at 03/02/22 0409   • insulin glargine (LANTUS, SEMGLEE) injection 33 Units  33 Units Subcutaneous Nightly Linda Magaña MD       • insulin lispro (ADMELOG) injection 0-14 Units  0-14 Units Subcutaneous TID AC AlsoAgnieszka santiago L, APRN   5 Units at 03/02/22 1218    And   • insulin lispro (ADMELOG) injection 0-14 Units  0-14 Units Subcutaneous PRN Agnieszka Neely APRN   10 Units at 02/28/22 2059   • [START ON 3/3/2022] lisinopril (PRINIVIL,ZESTRIL) tablet 10 mg  10 mg Oral Every Other Day Linda Magaña MD       • Magnesium Sulfate 2 gram infusion - Mg less than or equal to 1.5 mg/dL  2 g Intravenous PRN AlsoAgnieszka santiago APRN        Or   • Magnesium Sulfate 1 gram infusion - Mg 1.6-1.9 mg/dL  1 g Intravenous PRN Agnieszka Neely APROFELIA       • melatonin tablet 5 mg  5 mg Oral Nightly PRN Alsop, Agnieszka L, APRN       • nitroglycerin (NITROSTAT) SL tablet 0.4 mg  0.4 mg Sublingual Q5 Min PRN Alsop, Agnieszka L, APRN       • ondansetron (ZOFRAN) tablet 4 mg  4 mg Oral Q6H PRN Alsop, Agnieszka L, APRN        Or   • ondansetron (ZOFRAN)  injection 4 mg  4 mg Intravenous Q6H PRN Alsop, Agnieszka L, APRN       • potassium chloride (K-DUR,KLOR-CON) CR tablet 20 mEq  20 mEq Oral Daily Linda Magaña MD   20 mEq at 03/02/22 1218   • potassium chloride (K-DUR,KLOR-CON) CR tablet 40 mEq  40 mEq Oral PRN Alsop, Agnieszka L, APRN       • potassium chloride (KLOR-CON) packet 40 mEq  40 mEq Oral PRN Alsop, Agnieszka L, APRN       • sodium chloride 0.9 % flush 10 mL  10 mL Intravenous PRN Ray Roberson MD       • sodium chloride 0.9 % flush 10 mL  10 mL Intravenous Q12H Alsop, Agnieszka L, APRN   10 mL at 03/02/22 0902   • sodium chloride 0.9 % flush 10 mL  10 mL Intravenous PRN Alsop, Agnieszka L, APRN       • triamterene-hydrochlorothiazide (MAXZIDE) 75-50 MG per tablet 1 tablet  1 tablet Oral Every Other Day Linda Magaña MD       • vitamin B-12 (CYANOCOBALAMIN) tablet 1,000 mcg  1,000 mcg Oral Daily Linda Magñaa MD   1,000 mcg at 03/02/22 1218        ________________________________________________________        OBJECTIVE:  Upon today's exam, gentleman is resting comfortably in bed in no acute distress  Neurologic Exam    The patient is awake and alert and oriented x3     Cranial nerve examination demonstrate:  Full fields of vision to confrontation  Pupils are round, reactive to light and accommodation and size of about 3 mm  No ptosis or nystagmus  Funduscopic examination was not successful  Eye movements are conjugate     Sensation on the face and scalp are normal  Muscles of mastication are normal and symmetric  Muscles of  facial expression are normal and symmetric, somewhat slow though  Hearing is intact bilaterally  Head turning and shoulder shrugs were unremarkable  Tongue was midline  I could not visualize her oropharynx or uvula     Motor examination:  Normal bulk, tone and strength was 5-/5  No fasciculations     Sensory examination:  Intact for soft touch, pain and position sensation  Romberg was not evaluated     Reflexes:  0/4      Coordination:  Normal finger-to-nose to finger, rapid alternating movements and toe to finger, may be a bit slow     Gait:  Deferred     Toe signs:  M[place]    ________________________________________________________   RESULTS REVIEW:    VITAL SIGNS:   Temp:  [97.9 °F (36.6 °C)-98.2 °F (36.8 °C)] 97.9 °F (36.6 °C)  Heart Rate:  [] 98  Resp:  [17-18] 17  BP: (145-178)/(88-95) 171/95     LABS:      Lab 03/02/22  0355 03/01/22  0913 02/28/22  1651   WBC 6.10 6.20 5.40   HEMOGLOBIN 14.3 14.4 15.4   HEMATOCRIT 40.9 40.8 43.5   PLATELETS 128* 136* 141   NEUTROS ABS 3.30 3.50 3.80   LYMPHS ABS 2.20 2.00 1.20   MONOS ABS 0.50 0.50 0.30   EOS ABS 0.10 0.10 0.10   MCV 85.0 86.2 86.7   SED RATE  --   --  13         Lab 03/02/22  0355 03/01/22  0912 02/28/22  1842 02/28/22  1651   SODIUM 133* 139  --  137   POTASSIUM 3.6 3.5  --  4.2   CHLORIDE 97* 102  --  97*   CO2 25.0 28.0  --  30.0*   ANION GAP 11.0 9.0  --  10.0   BUN 22 18  --  23   CREATININE 1.31* 0.96  --  1.12   EGFR 57.8* 84.0  --  69.8   GLUCOSE 258* 126*  --  436*   CALCIUM 9.3 9.9  --  10.3   MAGNESIUM 1.7 1.8  --  1.9   HEMOGLOBIN A1C  --   --  9.2*  --          Lab 02/28/22  1651   TOTAL PROTEIN 7.4   ALBUMIN 3.90   GLOBULIN 3.5   ALT (SGPT) 16   AST (SGOT) 16   BILIRUBIN 1.0   ALK PHOS 112         Lab 03/01/22  0912 02/28/22  1651   PROBNP  --  188.5   TROPONIN T <0.010 <0.010                 UA    Urinalysis 11/22/21 2/28/22   Specific [place], UA 1.017 1.036 (A)   Ketones, UA Negative Trace (A)   Blood, UA Negative Negative   Leukocytes, UA Negative Negative   Nitrite, UA Negative Negative   (A) Abnormal value              Lab Results   Component Value Date    TSH 2.840 11/22/2021     (H) 11/22/2021    HGBA1C 9.2 (H) 02/28/2022    [id number] 497 11/23/2021       IMAGING STUDIES:  No radiology results for the last day    I reviewed the patient's new clinical results.    ________________________________________________________     PROBLEM LIST:     Weakness    Essential (primary) hypertension    Stage 3 chronic kidney disease (HCC)    Dyslipidemia    Type 2 diabetes mellitus without complications (HCC)          Assessment/Plan   ASSESSMENT/PLAN:  This is very interesting 71-year-old gentleman who is actually known to our service  His wife wanted to know if we can do anything for him for his episodic slow activities  Slow responses but no passing out or seizures  Nonfocal we do not know if they are associated with  His blood glucose being too high or too low    Nothing suggesting focal changes or seizures or migraines    I told her that his work-up has been done and he has seen neurologist elsewhere also    If he is having gait abnormalities amantadine 100 mg p.o. twice daily may be tried but there is no particular treatment for prevention of decline of his nature that I can tell    Nothing else to offer at this time, specifically inpatient    His prior work-up about 5 months ago did not show anything critical      I discussed the patient's findings and my recommendations with patient and family    Pastora Cotto MD  03/02/22  14:37 EST

## 2022-03-02 NOTE — PLAN OF CARE
Goal Outcome Evaluation:  Plan of Care Reviewed With: patient        Progress: no change  Outcome Summary: Patient refusing inpatient rehab; will d/c home today with Home Health.

## 2022-03-02 NOTE — PROGRESS NOTES
[facility] Medicine Services Daily Progress Note    Patient Name: Blas Mojica  : 1950  MRN: [id number]  Primary Care Physician:  Sahil Read MD  Date of admission: 2022      Subjective      Chief Complaint: Generalized weakness    Subjective   Patient feels better with weakness, denies for any nausea or vomiting, no chest pain.    Review of Systems   Eyes: Positive for blurred vision.        Objective      Vitals:   Heart Rate:  [] 97  BP: (123-172)/(73-95) 145/92    Physical Exam  Vitals and nursing note reviewed.   Constitutional:       General: He is not in acute distress.     Appearance: Normal appearance. He is well-developed. He is not ill-appearing, toxic-appearing or diaphoretic.   HENT:      Head: Normocephalic and atraumatic.      Right Ear: Ear canal and external ear normal.      Left Ear: Ear canal and external ear normal.      Nose: Nose normal. No congestion or rhinorrhea.      Mouth/Throat:      Mouth: Mucous membranes are moist.      Pharynx: No oropharyngeal exudate.   Eyes:      General: No scleral icterus.        Right eye: No discharge.         Left eye: No discharge.      Extraocular Movements: Extraocular movements intact.      Conjunctiva/sclera: Conjunctivae normal.      Pupils: Pupils are equal, round, and reactive to light.   Neck:      Thyroid: No thyromegaly.      Vascular: No carotid bruit or JVD.      Trachea: No tracheal deviation.   Cardiovascular:      Rate and Rhythm: Normal rate and regular rhythm.      Pulses: Normal pulses.      Heart sounds: Normal heart sounds. No murmur heard.  No friction rub. No gallop.    Pulmonary:      Effort: Pulmonary effort is normal. No respiratory distress.      Breath sounds: Normal breath sounds. No stridor. No wheezing, rhonchi or rales.   Chest:      Chest wall: No tenderness.   Abdominal:      General: Bowel sounds are normal. There is no distension.      Palpations: Abdomen is soft. There is  no mass.      Tenderness: There is no abdominal tenderness. There is no guarding or rebound.      Hernia: No hernia is present.   Musculoskeletal:         General: No swelling, tenderness, deformity or signs of injury. Normal range of motion.      Cervical back: Normal range of motion and neck supple. No rigidity. No muscular tenderness.      Right lower leg: No edema.      Left lower leg: No edema.   Lymphadenopathy:      Cervical: No cervical adenopathy.   Skin:     General: Skin is warm and dry.      Coloration: Skin is not jaundiced or pale.      Findings: No bruising, erythema or rash.   Neurological:      General: No focal deficit present.      Mental Status: He is alert and oriented to person, place, and time. Mental status is at baseline.      Cranial Nerves: No cranial nerve deficit.      Sensory: No sensory deficit.      Motor: No weakness or abnormal muscle tone.      Coordination: Coordination normal.   Psychiatric:         Mood and Affect: Mood normal.         Behavior: Behavior normal.         Thought Content: Thought content normal.         Judgment: Judgment normal.               Result Review    Result Review:  I have personally reviewed the results from the time of this admission to 3/1/2022 20:24 EST and agree with these findings:  [x]  Laboratory  [x]  Microbiology  [x]  Radiology  []  EKG/Telemetry   []  Cardiology/Vascular   []  Pathology  []  Old records  []  Other:  Most notable findings include:        Assessment/Plan      Brief Patient Summary:  Blas Mojica is a 71 y.o. male who     insulin lispro, 0-14 Units, Subcutaneous, TID AC  sodium chloride, 10 mL, Intravenous, Q12H             Active Hospital Problems:  Active Hospital Problems    Diagnosis    • **Weakness    • Type 2 diabetes mellitus without complications (HCC)    • Dyslipidemia    • Stage 3 chronic kidney disease (HCC)    • Essential (primary) hypertension      Plan:   Weakness / physical deconditioning.  -Urinalysis  reviewed  -IV fluid bolus given in the ED  -Fall precautions  PTOT evaluate and treat,  for safe discharge planning.     Type 2 diabetes mellitus with hyperglycemia  -Glucose 436  -Insulin given in the ED  -Hemoglobin A1c 9.2  -Accu-Cheks before meals and at bedtime  -Sliding scale insulin ordered     Essential (primary) hypertension  Dyslipidemia  -/92  -Lipid profile on 11/22/2021 unremarkable except total cholesterol 216,      Stage 3 chronic kidney disease  -Creatinine 1.12, baseline 1.97 -1.53  -GFR 66, baseline 45-76     DVT prophylaxis:  Mechanical DVT prophylaxis orders are present.     CODE STATUS:    Level Of Support Discussed With: Patient  Code Status (Patient has no pulse and is not breathing): CPR (Attempt to Resuscitate)  Medical Interventions (Patient has pulse or is breathing): Full Support      DVT prophylaxis:  Mechanical DVT prophylaxis orders are present.    CODE STATUS:    Level Of Support Discussed With: Patient  Code Status (Patient has no pulse and is not breathing): CPR (Attempt to Resuscitate)  Medical Interventions (Patient has pulse or is breathing): Full Support      Disposition:  I expect patient to be discharged discharge        Electronically signed by Linda Magñaa MD, 03/01/22, 20:24 EST.  [facility] Hospitalist Team

## 2022-03-02 NOTE — CONSULTS
"Diabetes Education  Assessment/Teaching    Patient Name:  Blas Mojica  YOB: 1950  MRN: [id number]  Admit Date:  2/28/2022      Assessment Date:  3/2/2022    Most Recent Value   General Information     Referral From: A1c,  Blood glucose  [On 2/28/2022 A1c was 9.2% and admission blood sugar was 412.]   Height 177.8 cm (70\")   Height Method Stated   Weight 75.6 kg (166 lb 10.7 oz)   Weight Method Bed scale   Pregnancy Assessment    Diabetes History    What type of diabetes do you have? Type 2   Length of Diabetes Diagnosis 10 + years  [Patient stated he was diagnosed over 30 years ago.]   Current DM knowledge fair   Do you test your blood sugar at home? yes   Frequency of checks every morning   Meter type Accu-chek 10-15 years old   Who performs the test? patient   Typical readings    Have you had low blood sugar? (<70mg/dl) no   Have you had high blood sugar? (>140mg/dl) yes   How often do you have high blood sugar? unknown   When was your last high blood sugar? Admission blood sugar 412.   Education Preferences    What areas of diabetes would you like to learn about? avoiding high blood sugar,  diabetes complications,  testing my blood sugar at home   Nutrition Information    Assessment Topics    Problem Solving - Assessment Needs education   Reducing Risk - Assessment Needs education   Monitoring - Assessment Needs education   DM Goals    Problem Solving - Goal Today   Reducing Risk - Goal Today   Monitoring - Goal Today            Most Recent Value   DM Education Needs    Meter Meter provided   Meter Type Accuchek  [Accu-chek Guide Me glucometer given to patient with patient doing return demonstration using the lancing device, inserting the test strip into the meter, and applying blood to the test strip. Blood sugar was 264.]   Frequency of Testing AC meals  [Log book given to patient with discussion on checking blood sugars 3X a day before meals.]   Medication Insulin,  Pen  [At home " patient stated that he takes Lantus 33 units at bedtime.]   Problem Solving Hyperglycemia,  Signs,  Symptoms,  Treatment   Reducing Risks A1C testing  [On 2/28/2022 A1c was 9.2%.]   Discharge Plan Home   Motivation Moderate   Teaching Method Explanation,  Discussion,  Demonstration,  Handouts,  Teach back   Patient Response Verbalized understanding,  Needs reinforcement            Other Comments:  A1c info sheet given with discussion on A1c target and healthy blood sugar range. Patient very weak and unable to remove cap from lancing device so educator removed cap for patient. Patient was unable to press button on lancing device to prick finger so educator had to help patient press button. Patient alert and oriented and stated he could have his wife help him at home.  Prescriptions started in discharge orders for lancets, test strips, and alcohol wipes. Patient stated he gives his insulin shots in his thighs and didn't know he could give them in his stomach. Handouts given with discussion on injection sites, rotation of sites, and how to use an insulin pen. Patient did not know about priming the insulin pen or holding the pen for 10 seconds after administration. Patient has no further questions or concerns related to diabetes at this time.          Electronically signed by:  Rosanna Abdi RN  03/02/22 11:56 EST

## 2022-03-02 NOTE — CASE MANAGEMENT/SOCIAL WORK
"Continued Stay Note   [facility]     Patient Name: Blas Mojica  MRN: [id number]  Today's Date: 3/2/2022    Admit Date: 2/28/2022     Discharge Plan     Row Name 03/02/22 1446       Plan    Plan [facility]    Plan Comments Met with patient at bedside to discuss inpatient rehab. Pt continues to refuse and says he will do \"rehab\" at home.              Met with patient in room wearing PPE: mask, face shield/goggles.      Maintained distance greater than six feet and spent less than 15 minutes in the room.      Megan Naegele, RN      Office Phone: [phone]  Office Cell: [phone]     "

## 2022-03-02 NOTE — PLAN OF CARE
Goal Outcome Evaluation:  Plan of Care Reviewed With: patient           Outcome Summary: 70 yo male adm 2/28/22 for weakness and multiple falls at home. Neuro consulted for poss seizures. Pt's wife asked him to come to ER due to her inability to manage him at home. Pt has been amb w/ wife's help, whom he acknowledges does not walk well herself. Pt uses rw in home, uses rollator wx out of home. He does not drive. He does assist in grocery shopping at baseline. Pt has fallen > 4 times in past few weeks. He is falling both due to loss of balance and due to LEs giving out. Today, pt presents w/ weakness of 3/5 in BLEs, as well as UE weakness. He is unable to maintain sitting at eob w/o holding on w/ LUE and pushing to hold self up w/ RUE. Patient required mod assist and excess time to transition from supine to sit EOB. He was able to remove UE support enough to don his hospital gown, then assisted back to supine. Able to doff socks from seated position in bed, however unable to don. Overall he presents with general weakness and lack of activity tolerance to support himself and complete functional ADLs in the home environment. pt will benefit from skilled OT during hospital stay to address above needs. Recommend inpatient rehab, however patient refusing. He will require home health and 24/7 care in the home environment. Will follow fo OT.

## 2022-03-02 NOTE — CONSULTS
Diabetes Education    Patient Name:  Blas Mojica  YOB: 1950  MRN: [id number]  Admit Date:  2/28/2022        MD consult to teach blood glucose monitoring and follow-up with wife on questions related to diabetes. Patient was seen this morning and given a new meter. Wife stated she would like patient to have a continuous glucose monitor. Explained to wife that the doctor that handles the patient's diabetes care would need to fill out the paper work to get a CGM. Wife stated the doctor is already working on that. Reviewed with wife how to use the Accu-chek glucometer and wife able to remove cap off lancing device. Wife asking for diet information. Handouts given with discussion on 1 serving of carbs being = 15 grams, being allowed 4 servings  of carbs per meal, counting carbs, reading food labels, low carbohydrate snacks, and meal planning. Wife concerned saying that patient has fallen 3X in the last week and is very weak. Neurologist came in while educator with patient. Wife stated patient has lost almost 90 pounds in the last few months and trying to find things for patient to eat to help maintain weight. Wife asking about Glucerna and discussed with wife options of any protein drink that is low in carbohydrates is acceptable and that [employer] has an Equate brand. Wife stated she didn't know that and that Glucerna was expensive. Patient and wife have no further questions or concerns related to diabetes at this time.      Electronically signed by:  Rosanna Abdi RN  03/02/22 15:35 EST

## 2022-03-03 ENCOUNTER — READMISSION MANAGEMENT (OUTPATIENT)
Dept: CALL CENTER | Facility: HOSPITAL | Age: 72
End: 2022-03-03

## 2022-03-03 VITALS
RESPIRATION RATE: 19 BRPM | HEART RATE: 90 BPM | WEIGHT: 164.24 LBS | DIASTOLIC BLOOD PRESSURE: 95 MMHG | HEIGHT: 70 IN | TEMPERATURE: 98.1 F | SYSTOLIC BLOOD PRESSURE: 174 MMHG | BODY MASS INDEX: 23.51 KG/M2 | OXYGEN SATURATION: 95 %

## 2022-03-03 LAB
ANION GAP SERPL CALCULATED.3IONS-SCNC: 10 MMOL/L (ref 5–15)
BASOPHILS # BLD AUTO: 0 10*3/MM3 (ref 0–0.2)
BASOPHILS NFR BLD AUTO: 0.4 % (ref 0–1.5)
BUN SERPL-MCNC: 14 MG/DL (ref 8–23)
BUN/CREAT SERPL: 13.3 (ref 7–25)
CALCIUM SPEC-SCNC: 9.6 MG/DL (ref 8.6–10.5)
CHLORIDE SERPL-SCNC: 100 MMOL/L (ref 98–107)
CO2 SERPL-SCNC: 26 MMOL/L (ref 22–29)
CREAT SERPL-MCNC: 1.05 MG/DL (ref 0.76–1.27)
DEPRECATED RDW RBC AUTO: 40.3 FL (ref 37–54)
EGFRCR SERPLBLD CKD-EPI 2021: 75.4 ML/MIN/1.73
EOSINOPHIL # BLD AUTO: 0.1 10*3/MM3 (ref 0–0.4)
EOSINOPHIL NFR BLD AUTO: 1.5 % (ref 0.3–6.2)
ERYTHROCYTE [DISTWIDTH] IN BLOOD BY AUTOMATED COUNT: 13.5 % (ref 12.3–15.4)
GLUCOSE BLDC GLUCOMTR-MCNC: 204 MG/DL (ref 70–105)
GLUCOSE SERPL-MCNC: 242 MG/DL (ref 65–99)
HCT VFR BLD AUTO: 42.7 % (ref 37.5–51)
HGB BLD-MCNC: 14.9 G/DL (ref 13–17.7)
LYMPHOCYTES # BLD AUTO: 1.9 10*3/MM3 (ref 0.7–3.1)
LYMPHOCYTES NFR BLD AUTO: 28.7 % (ref 19.6–45.3)
MAGNESIUM SERPL-MCNC: 1.8 MG/DL (ref 1.6–2.4)
MCH RBC QN AUTO: 29.6 PG (ref 26.6–33)
MCHC RBC AUTO-ENTMCNC: 34.8 G/DL (ref 31.5–35.7)
MCV RBC AUTO: 85 FL (ref 79–97)
MONOCYTES # BLD AUTO: 0.6 10*3/MM3 (ref 0.1–0.9)
MONOCYTES NFR BLD AUTO: 8.5 % (ref 5–12)
NEUTROPHILS NFR BLD AUTO: 4 10*3/MM3 (ref 1.7–7)
NEUTROPHILS NFR BLD AUTO: 60.9 % (ref 42.7–76)
NRBC BLD AUTO-RTO: 0 /100 WBC (ref 0–0.2)
PLATELET # BLD AUTO: 151 10*3/MM3 (ref 140–450)
PMV BLD AUTO: 8.2 FL (ref 6–12)
POTASSIUM SERPL-SCNC: 3.4 MMOL/L (ref 3.5–5.2)
RBC # BLD AUTO: 5.03 10*6/MM3 (ref 4.14–5.8)
SODIUM SERPL-SCNC: 136 MMOL/L (ref 136–145)
WBC NRBC COR # BLD: 6.6 10*3/MM3 (ref 3.4–10.8)

## 2022-03-03 PROCEDURE — 83735 ASSAY OF MAGNESIUM: CPT | Performed by: NURSE PRACTITIONER

## 2022-03-03 PROCEDURE — G0378 HOSPITAL OBSERVATION PER HR: HCPCS

## 2022-03-03 PROCEDURE — 85025 COMPLETE CBC W/AUTO DIFF WBC: CPT | Performed by: NURSE PRACTITIONER

## 2022-03-03 PROCEDURE — 99217 PR OBSERVATION CARE DISCHARGE MANAGEMENT: CPT | Performed by: HOSPITALIST

## 2022-03-03 PROCEDURE — 36415 COLL VENOUS BLD VENIPUNCTURE: CPT | Performed by: NURSE PRACTITIONER

## 2022-03-03 PROCEDURE — 80048 BASIC METABOLIC PNL TOTAL CA: CPT | Performed by: NURSE PRACTITIONER

## 2022-03-03 PROCEDURE — 82962 GLUCOSE BLOOD TEST: CPT

## 2022-03-03 PROCEDURE — 63710000001 INSULIN LISPRO (HUMAN) PER 5 UNITS: Performed by: NURSE PRACTITIONER

## 2022-03-03 RX ADMIN — CLOPIDOGREL BISULFATE 75 MG: 75 TABLET ORAL at 09:03

## 2022-03-03 RX ADMIN — INSULIN LISPRO 5 UNITS: 100 INJECTION, SOLUTION INTRAVENOUS; SUBCUTANEOUS at 08:52

## 2022-03-03 RX ADMIN — CYANOCOBALAMIN TAB 1000 MCG 1000 MCG: 1000 TAB at 09:03

## 2022-03-03 RX ADMIN — Medication 10 ML: at 10:11

## 2022-03-03 RX ADMIN — LISINOPRIL 10 MG: 5 TABLET ORAL at 09:03

## 2022-03-03 RX ADMIN — POTASSIUM CHLORIDE 40 MEQ: 1500 TABLET, EXTENDED RELEASE ORAL at 09:03

## 2022-03-03 NOTE — DISCHARGE SUMMARY
[facility] Medicine Services  DISCHARGE SUMMARY    Patient Name: Blas Mojica  : 1950  MRN: [id number]    Date of Admission: 2022  Discharge Diagnosis:   Date of Discharge:  3/3/2022  Primary Care Physician: Sahil Read MD      Presenting Problem:   Weakness [R53.1]  Multiple falls [R29.6]  Uncontrolled type 2 diabetes mellitus with hyperglycemia (HCC) [E11.65]    Active and Resolved Hospital Problems:  Active Hospital Problems    Diagnosis POA   • **Weakness [R53.1] Yes   • Type 2 diabetes mellitus without complications (HCC) [E11.9] Yes   • Dyslipidemia [E78.5] Yes   • Stage 3 chronic kidney disease (HCC) [N18.30] Yes   • Essential (primary) hypertension [I10] Yes      Resolved Hospital Problems   No resolved problems to display.         Hospital Course     Hospital Course by problem list.    Weakness / physical deconditioning.   -Urinalysis reviewed  -IV fluid bolus given in the ED  -Fall precautions  PTOT evaluate and treat,  for safe discharge planning.  - consult neuro and advised conservative management.      Type 2 diabetes mellitus with hyperglycemia  -Glucose 436  -Insulin given in the ED  -Hemoglobin A1c 9.2  -Accu-Cheks before meals and at bedtime  -Sliding scale insulin ordered  - Diabetic educator consult.     Essential (primary) hypertension  Dyslipidemia  -/92  -Lipid profile on 2021 unremarkable except total cholesterol 216,      Stage 3 chronic kidney disease  -Creatinine 1.12, baseline 1.97 -1.53  -GFR 66, baseline 45-76    Patient has refused to go for rehab, pt wanted to go home with home health care. Arrangement are in place.    DISCHARGE Follow Up with PCP in a week time.      Reasons For Change In Medications and Indications for New Medications:      Day of Discharge     Vital Signs:  Temp:  [96.5 °F (35.8 °C)-98.4 °F (36.9 °C)] 98.1 °F (36.7 °C)  Heart Rate:  [] 90  Resp:  [17-19] 19  BP:  (174-180)/() 174/95    Physical Exam:  Physical Exam  Vitals and nursing note reviewed.   Constitutional:       General: He is not in acute distress.     Appearance: Normal appearance. He is well-developed. He is not ill-appearing, toxic-appearing or diaphoretic.   HENT:      Head: Normocephalic and atraumatic.      Right Ear: Ear canal and external ear normal.      Left Ear: Ear canal and external ear normal.      Nose: Nose normal. No congestion or rhinorrhea.      Mouth/Throat:      Mouth: Mucous membranes are moist.      Pharynx: No oropharyngeal exudate.   Eyes:      General: No scleral icterus.        Right eye: No discharge.         Left eye: No discharge.      Extraocular Movements: Extraocular movements intact.      Conjunctiva/sclera: Conjunctivae normal.      Pupils: Pupils are equal, round, and reactive to light.   Neck:      Thyroid: No thyromegaly.      Vascular: No carotid bruit or JVD.      Trachea: No tracheal deviation.   Cardiovascular:      Rate and Rhythm: Normal rate and regular rhythm.      Pulses: Normal pulses.      Heart sounds: Normal heart sounds. No murmur heard.  No friction rub. No gallop.    Pulmonary:      Effort: Pulmonary effort is normal. No respiratory distress.      Breath sounds: Normal breath sounds. No stridor. No wheezing, rhonchi or rales.   Chest:      Chest wall: No tenderness.   Abdominal:      General: Bowel sounds are normal. There is no distension.      Palpations: Abdomen is soft. There is no mass.      Tenderness: There is no abdominal tenderness. There is no guarding or rebound.      Hernia: No hernia is present.   Musculoskeletal:         General: No swelling, tenderness, deformity or signs of injury. Normal range of motion.      Cervical back: Normal range of motion and neck supple. No rigidity. No muscular tenderness.      Right lower leg: No edema.      Left lower leg: No edema.   Lymphadenopathy:      Cervical: No cervical adenopathy.   Skin:      General: Skin is warm and dry.      Coloration: Skin is not jaundiced or pale.      Findings: No bruising, erythema or rash.   Neurological:      General: No focal deficit present.      Mental Status: He is alert and oriented to person, place, and time. Mental status is at baseline.      Cranial Nerves: No cranial nerve deficit.      Sensory: No sensory deficit.      Motor: No weakness or abnormal muscle tone.      Coordination: Coordination normal.   Psychiatric:         Mood and Affect: Mood normal.         Behavior: Behavior normal.         Thought Content: Thought content normal.         Judgment: Judgment normal.                Pertinent  and/or Most Recent Results     LAB RESULTS:      Lab 03/03/22  0521 03/02/22  0355 03/01/22  0913 02/28/22  1651   WBC 6.60 6.10 6.20 5.40   HEMOGLOBIN 14.9 14.3 14.4 15.4   HEMATOCRIT 42.7 40.9 40.8 43.5   PLATELETS 151 128* 136* 141   NEUTROS ABS 4.00 3.30 3.50 3.80   LYMPHS ABS 1.90 2.20 2.00 1.20   MONOS ABS 0.60 0.50 0.50 0.30   EOS ABS 0.10 0.10 0.10 0.10   MCV 85.0 85.0 86.2 86.7   SED RATE  --   --   --  13         Lab 03/03/22  0521 03/02/22  0355 03/01/22  0912 02/28/22  1842 02/28/22  1651   SODIUM 136 133* 139  --  137   POTASSIUM 3.4* 3.6 3.5  --  4.2   CHLORIDE 100 97* 102  --  97*   CO2 26.0 25.0 28.0  --  30.0*   ANION GAP 10.0 11.0 9.0  --  10.0   BUN 14 22 18  --  23   CREATININE 1.05 1.31* 0.96  --  1.12   EGFR 75.4 57.8* 84.0  --  69.8   GLUCOSE 242* 258* 126*  --  436*   CALCIUM 9.6 9.3 9.9  --  10.3   MAGNESIUM 1.8 1.7 1.8  --  1.9   HEMOGLOBIN A1C  --   --   --  9.2*  --          Lab 02/28/22  1651   TOTAL PROTEIN 7.4   ALBUMIN 3.90   GLOBULIN 3.5   ALT (SGPT) 16   AST (SGOT) 16   BILIRUBIN 1.0   ALK PHOS 112         Lab 03/01/22  0912 02/28/22  1651   PROBNP  --  188.5   TROPONIN T <0.010 <0.010                 Brief Urine Lab Results  (Last result in the past 365 days)      Color   Clarity   Blood   Leuk Est   Nitrite   Protein   CREAT   Urine HCG         02/28/22 1817 Yellow   Clear   Negative   Negative   Negative   Negative               Microbiology Results (last 10 days)     Procedure Component Value - Date/Time    COVID-19,CEPHEID/MAT,COR/SLOAN/PAD/MILES IN-HOUSE(OR EMERGENT/ADD-ON),NP SWAB IN TRANSPORT MEDIA 3-4 HR TAT, RT-PCR - Swab, Nasopharynx [[id number]]  (Normal) Collected: 02/28/22 2005    Lab Status: Final result Specimen: Swab from Nasopharynx Updated: 02/28/22 2033     COVID19 Not Detected    Narrative:      Fact sheet for providers: https://www.fda.gov/media/822382/download     Fact sheet for patients: https://www.fda.gov/media/466242/download  Fact sheet for providers: https://www.fda.gov/media/283501/download    Fact sheet for patients: https://www.fda.gov/media/490150/download    Test performed by PCR.               Results for orders placed during the hospital encounter of 03/25/21    Duplex Venous Lower Extremity - Right    Interpretation Summary  · Normal right lower extremity venous duplex scan.      Results for orders placed during the hospital encounter of 03/25/21    Duplex Venous Lower Extremity - Right    Interpretation Summary  · Normal right lower extremity venous duplex scan.          Labs Pending at Discharge:      Procedures Performed           Consults:   Consults     Date and Time Order Name Status Description    3/2/2022  2:03 PM Inpatient Neurology Consult General Completed     2/28/2022  7:01 PM Hospitalist (on-call MD unless specified)              Discharge Details        Discharge Medications      New Medications      Instructions Start Date   Accu-Chek Guide test strip  Generic drug: glucose blood   1 each, Other, 3 Times Daily Before Meals, Use as instructed      Accu-Chek Softclix Lancets lancets   1 each, Other, 3 Times Daily Before Meals, Use as instructed      Alcohol Wipes 70 % misc   1 each, Apply externally, 3 Times Daily Before Meals         Continue These Medications      Instructions Start Date   clopidogrel 75 MG  tablet  Commonly known as: PLAVIX   75 mg, Oral, Daily      insulin glargine 100 UNIT/ML injection  Commonly known as: LANTUS, SEMGLEE   33 Units, Subcutaneous, Nightly      lisinopril 10 MG tablet  Commonly known as: PRINIVIL,ZESTRIL   50 mg, Oral, Every Other Day      multivitamin with minerals tablet tablet   1 tablet, Oral, Daily      potassium chloride 20 MEQ CR tablet  Commonly known as: K-DUR,KLOR-CON   20 mEq, Oral, Daily      triamterene-hydrochlorothiazide 75-50 MG per tablet  Commonly known as: MAXZIDE   1 tablet, Oral, Every Other Day      vitamin B-12 1000 MCG tablet  Commonly known as: CYANOCOBALAMIN   1,000 mcg, Oral, Daily             Allergies   Allergen Reactions   • Contrast Dye Anaphylaxis   • Iodinated Diagnostic Agents Shortness Of Breath   • Iodine Shortness Of Breath   • Aspirin Nausea And Vomiting         Discharge Disposition:   Home-Health Care [facility]    Diet:  Hospital:  Diet Order   Procedures   • Diet Cardiac, Diabetic/Consistent Carbs; Healthy Heart; Diabetic - Consistent Carb         Discharge Activity:         CODE STATUS:  Code Status and Medical Interventions:   Ordered at: 03/01/22 0058     Level Of Support Discussed With:    Patient     Code Status (Patient has no pulse and is not breathing):    CPR (Attempt to Resuscitate)     Medical Interventions (Patient has pulse or is breathing):    Full Support         No future appointments.    Additional Instructions for the Follow-ups that You Need to Schedule     Ambulatory Referral to Home Health ([facility])   As directed      Face to Face Visit Date: 3/2/2022    Follow-up provider for Plan of Care?: I treated the patient in an acute care facility and will not continue treatment after discharge.    Follow-up provider: AMRIK HERCULES [[id number]]    Reason/Clinical Findings: Physical deconditioning.    Describe mobility limitations that make leaving home difficult: physical deconditioning.    Nursing/Therapeutic Services Requested: Physical  Therapy Skilled Nursing    Skilled nursing orders: Medication education Telehealth    Frequency: 1 Week 1               Time spent on Discharge including face to face service 33 minutes    This patient has been examined wearing appropriate Personal Protective Equipment and discussed with hospital infection control department. 03/03/22      Signature:

## 2022-03-04 NOTE — CASE MANAGEMENT/SOCIAL WORK
Case Management Discharge Note             Selected Continued Care - Discharged on 3/3/2022 Admission date: 2/28/2022 - Discharge disposition: Home-Health Care Svc       Home Medical Care Coordination complete.    Service Provider Selected Services Address Phone Fax Patient Preferred    [facility] [address] [place] [id number] [phone] --                Final Discharge Disposition Code: 06 - home with home health care

## 2022-03-04 NOTE — OUTREACH NOTE
Prep Survey      Responses   [facility] patient discharged from? [facility]   Is LACE score < 7 ? No   Emergency Room discharge w/ pulse ox? No   Eligibility Readm Mgmt   Discharge diagnosis **Weakness    Does the patient have one of the following disease processes/diagnoses(primary or secondary)? Other   Does the patient have Home health ordered? Yes   What is the Home health agency?  [facility]   Is there a DME ordered? No   Prep survey completed? Yes          Cande Desir RN

## 2022-03-08 ENCOUNTER — READMISSION MANAGEMENT (OUTPATIENT)
Dept: CALL CENTER | Facility: HOSPITAL | Age: 72
End: 2022-03-08

## 2022-03-08 NOTE — OUTREACH NOTE
Medical Week 1 Survey    Flowsheet Row Responses   [facility] patient discharged from? [facility]   Does the patient have one of the following disease processes/diagnoses(primary or secondary)? Other   Week 1 attempt successful? Yes   Call start time 1511   Call end time 1517   Discharge diagnosis **Weakness    Person spoke with today (if not patient) and relationship Wife   Meds reviewed with patient/caregiver? Yes   Does the patient have all medications ordered at discharge? N/A   Is the patient taking all medications as directed (includes completed medication regime)? Yes   Does the patient have a primary care provider?  Yes   Does the patient have an appointment with their PCP within 7 days of discharge? Yes   Has the patient kept scheduled appointments due by today? N/A   Comments Has appt urs with Dr. Read but she is unsure if she will be able to transport him.  If not, she will see if a televisit is possible.   What is the Home health agency?  [facility]   Has home health visited the patient within 72 hours of discharge? Call prior to 72 hours   Home health comments States she heard from someone and they are to come out tomorrow   Psychosocial issues? No   Comments States she has contact [facility] regarding getting transportation but she has paperwork that needs to be filled out by the dr and they will have to call in advance to make arrangements.   Did the patient receive a copy of their discharge instructions? Yes   Nursing interventions Reviewed instructions with patient   What is the patient's perception of their health status since discharge? Same   Is the patient/caregiver able to teach back signs and symptoms related to disease process for when to call PCP? Yes   Is the patient/caregiver able to teach back signs and symptoms related to disease process for when to call 911? Yes   Is the patient/caregiver able to teach back the hierarchy of who to call/visit for symptoms/problems? PCP, Specialist, Home  health nurse, Urgent Care, ED, 911 Yes   Additional teach back comments States he is still very weak and today was the first time he got in the wheelchair.  She is concern with televist PCP will need he to do lab work.  Advised to discuss if home health could do this.    Week 1 call completed? Yes   Wrap up additional comments No questions or needs at this time.          PRIYANK GAMBOA - Licensed Nurse

## 2022-03-17 ENCOUNTER — READMISSION MANAGEMENT (OUTPATIENT)
Dept: CALL CENTER | Facility: HOSPITAL | Age: 72
End: 2022-03-17

## 2022-03-17 NOTE — OUTREACH NOTE
Medical Week 2 Survey    Flowsheet Row Responses   [facility] patient discharged from? [facility]   Does the patient have one of the following disease processes/diagnoses(primary or secondary)? Other   Week 2 attempt successful? Yes   Call start time 1345   Discharge diagnosis **Weakness    Call end time 1347   List who call center can speak with wife   Person spoke with today (if not patient) and relationship Wife   Is the patient taking all medications as directed (includes completed medication regime)? Yes   Medication comments checking BS and doing well, Taking insulin in AM   Has the patient kept scheduled appointments due by today? Yes   Home health comments PT through [facility] and doing well   Psychosocial issues? No   What is the patient's perception of their health status since discharge? Improving   Additional teach back comments Pt doing better and wife has restricted his diet.    Week 2 Call Completed? Yes          KEVON KENT - Registered Nurse

## 2022-06-12 ENCOUNTER — APPOINTMENT (OUTPATIENT)
Dept: CT IMAGING | Facility: HOSPITAL | Age: 72
End: 2022-06-12

## 2022-06-12 ENCOUNTER — HOSPITAL ENCOUNTER (EMERGENCY)
Facility: HOSPITAL | Age: 72
Discharge: HOME | End: 2022-06-12
Attending: EMERGENCY MEDICINE | Admitting: EMERGENCY MEDICINE

## 2022-06-12 VITALS
DIASTOLIC BLOOD PRESSURE: 76 MMHG | OXYGEN SATURATION: 98 % | BODY MASS INDEX: 23.62 KG/M2 | HEIGHT: 70 IN | HEART RATE: 94 BPM | TEMPERATURE: 98.1 F | RESPIRATION RATE: 21 BRPM | WEIGHT: 165 LBS | SYSTOLIC BLOOD PRESSURE: 126 MMHG

## 2022-06-12 DIAGNOSIS — R51.9 NONINTRACTABLE HEADACHE, UNSPECIFIED CHRONICITY PATTERN, UNSPECIFIED HEADACHE TYPE: Primary | ICD-10-CM

## 2022-06-12 DIAGNOSIS — R53.1 WEAKNESS: ICD-10-CM

## 2022-06-12 LAB
ANION GAP SERPL CALCULATED.3IONS-SCNC: 12 MMOL/L (ref 5–15)
BASOPHILS # BLD AUTO: 0 10*3/MM3 (ref 0–0.2)
BASOPHILS NFR BLD AUTO: 0.6 % (ref 0–1.5)
BUN SERPL-MCNC: 20 MG/DL (ref 8–23)
BUN/CREAT SERPL: 20.8 (ref 7–25)
CALCIUM SPEC-SCNC: 9 MG/DL (ref 8.6–10.5)
CHLORIDE SERPL-SCNC: 106 MMOL/L (ref 98–107)
CO2 SERPL-SCNC: 24 MMOL/L (ref 22–29)
CREAT SERPL-MCNC: 0.96 MG/DL (ref 0.76–1.27)
DEPRECATED RDW RBC AUTO: 41.1 FL (ref 37–54)
EGFRCR SERPLBLD CKD-EPI 2021: 84 ML/MIN/1.73
EOSINOPHIL # BLD AUTO: 0.1 10*3/MM3 (ref 0–0.4)
EOSINOPHIL NFR BLD AUTO: 1 % (ref 0.3–6.2)
ERYTHROCYTE [DISTWIDTH] IN BLOOD BY AUTOMATED COUNT: 13.4 % (ref 12.3–15.4)
GLUCOSE SERPL-MCNC: 134 MG/DL (ref 65–99)
HCT VFR BLD AUTO: 38.2 % (ref 37.5–51)
HGB BLD-MCNC: 12.7 G/DL (ref 13–17.7)
LYMPHOCYTES # BLD AUTO: 0.6 10*3/MM3 (ref 0.7–3.1)
LYMPHOCYTES NFR BLD AUTO: 11.9 % (ref 19.6–45.3)
MCH RBC QN AUTO: 29.4 PG (ref 26.6–33)
MCHC RBC AUTO-ENTMCNC: 33.3 G/DL (ref 31.5–35.7)
MCV RBC AUTO: 88.2 FL (ref 79–97)
MONOCYTES # BLD AUTO: 0.7 10*3/MM3 (ref 0.1–0.9)
MONOCYTES NFR BLD AUTO: 12.3 % (ref 5–12)
NEUTROPHILS NFR BLD AUTO: 4 10*3/MM3 (ref 1.7–7)
NEUTROPHILS NFR BLD AUTO: 74.2 % (ref 42.7–76)
NRBC BLD AUTO-RTO: 0.1 /100 WBC (ref 0–0.2)
PLATELET # BLD AUTO: 157 10*3/MM3 (ref 140–450)
PMV BLD AUTO: 8.1 FL (ref 6–12)
POTASSIUM SERPL-SCNC: 3.2 MMOL/L (ref 3.5–5.2)
RBC # BLD AUTO: 4.33 10*6/MM3 (ref 4.14–5.8)
SODIUM SERPL-SCNC: 142 MMOL/L (ref 136–145)
WBC NRBC COR # BLD: 5.4 10*3/MM3 (ref 3.4–10.8)

## 2022-06-12 PROCEDURE — 93005 ELECTROCARDIOGRAM TRACING: CPT | Performed by: EMERGENCY MEDICINE

## 2022-06-12 PROCEDURE — 85025 COMPLETE CBC W/AUTO DIFF WBC: CPT | Performed by: EMERGENCY MEDICINE

## 2022-06-12 PROCEDURE — 99283 EMERGENCY DEPT VISIT LOW MDM: CPT

## 2022-06-12 PROCEDURE — 36415 COLL VENOUS BLD VENIPUNCTURE: CPT

## 2022-06-12 PROCEDURE — 96374 THER/PROPH/DIAG INJ IV PUSH: CPT

## 2022-06-12 PROCEDURE — 80048 BASIC METABOLIC PNL TOTAL CA: CPT | Performed by: EMERGENCY MEDICINE

## 2022-06-12 PROCEDURE — 70450 CT HEAD/BRAIN W/O DYE: CPT

## 2022-06-12 PROCEDURE — 25010000002 KETOROLAC TROMETHAMINE PER 15 MG: Performed by: EMERGENCY MEDICINE

## 2022-06-12 RX ORDER — KETOROLAC TROMETHAMINE 15 MG/ML
15 INJECTION, SOLUTION INTRAMUSCULAR; INTRAVENOUS ONCE
Status: COMPLETED | OUTPATIENT
Start: 2022-06-12 | End: 2022-06-12

## 2022-06-12 RX ORDER — SODIUM CHLORIDE 0.9 % (FLUSH) 0.9 %
10 SYRINGE (ML) INJECTION AS NEEDED
Status: DISCONTINUED | OUTPATIENT
Start: 2022-06-12 | End: 2022-06-12 | Stop reason: HOSPADM

## 2022-06-12 RX ADMIN — KETOROLAC TROMETHAMINE 15 MG: 15 INJECTION, SOLUTION INTRAMUSCULAR; INTRAVENOUS at 14:22

## 2022-06-12 NOTE — ED NOTES
Pt A&Ox 4 with lungs CTA bases. Pt wheeled out to personal car in wheelchair at this time. Discharge instruction given to pt with verbal understanding received.   Pt discharged with education, follow up instruction,  and personal belongings.

## 2022-06-12 NOTE — ED PROVIDER NOTES
Subjective   Patient is a 71-year-old male complaining of 12-hour history of generalized weakness and headache.  The headache is moderate and constant.  Patient has other associated complaints          Review of Systems   Constitutional: Negative for chills and fever.   HENT: Negative for congestion.    Eyes: Negative for photophobia and visual disturbance.   Respiratory: Negative for cough and shortness of breath.    Cardiovascular: Negative for chest pain and palpitations.   Gastrointestinal: Negative for abdominal pain, diarrhea and vomiting.   Endocrine: Negative for polyuria.   Genitourinary: Negative for dysuria and flank pain.   Musculoskeletal: Negative for back pain and myalgias.   Neurological: Positive for weakness and headaches. Negative for dizziness.   Psychiatric/Behavioral: Negative for confusion.       Past Medical History:   Diagnosis Date   • Diabetes mellitus (HCC)    • Hyperlipidemia    • Hypertension        Allergies   Allergen Reactions   • Contrast Dye Anaphylaxis   • Iodinated Diagnostic Agents Shortness Of Breath   • Iodine Shortness Of Breath   • Aspirin Nausea And Vomiting       Past Surgical History:   Procedure Laterality Date   • ADENOIDECTOMY     • TONSILLECTOMY         Family History   Problem Relation Age of Onset   • No Known Problems Mother    • No Known Problems Father        Social History     Socioeconomic History   • Marital status:    Tobacco Use   • Smoking status: Never Smoker   • Smokeless tobacco: Never Used   Vaping Use   • Vaping Use: Never used   Substance and Sexual Activity   • Alcohol use: Never   • Drug use: Never   • Sexual activity: Defer           Objective   Physical Exam  Neurologic exam is nonfocal.  HEENT exam shows TMs to be clear.  Oropharynx comers but sclera is nonicteric.  Neck has no adenopathy JVD or bruits.  Lungs are clear.  Heart has a regular rate and rhythm without murmur rub or gallop.  Chest is nontender.  Abdomen is soft nontender.   Extremity exam is no cyanosis or edema.  Back has no CVA tenderness.  Procedures     EKG interpretation shows normal sinus rhythm at a rate of 105 with no acute ST change.  This is unchanged in previous tracing on 11/22/2021.      ED Course      Results for orders placed or performed during the hospital encounter of 06/12/22   Basic Metabolic Panel    Specimen: Blood   Result Value Ref Range    Glucose 134 (H) 65 - 99 mg/dL    BUN 20 8 - 23 mg/dL    Creatinine 0.96 0.76 - 1.27 mg/dL    Sodium 142 136 - 145 mmol/L    Potassium 3.2 (L) 3.5 - 5.2 mmol/L    Chloride 106 98 - 107 mmol/L    CO2 24.0 22.0 - 29.0 mmol/L    Calcium 9.0 8.6 - 10.5 mg/dL    BUN/Creatinine Ratio 20.8 7.0 - 25.0    Anion Gap 12.0 5.0 - 15.0 mmol/L    eGFR 84.0 >60.0 mL/min/1.73   CBC Auto Differential    Specimen: Blood   Result Value Ref Range    WBC 5.40 3.40 - 10.80 10*3/mm3    RBC 4.33 4.14 - 5.80 10*6/mm3    Hemoglobin 12.7 (L) 13.0 - 17.7 g/dL    Hematocrit 38.2 37.5 - 51.0 %    MCV 88.2 79.0 - 97.0 fL    MCH 29.4 26.6 - 33.0 pg    MCHC 33.3 31.5 - 35.7 g/dL    RDW 13.4 12.3 - 15.4 %    RDW-SD 41.1 37.0 - 54.0 fl    MPV 8.1 6.0 - 12.0 fL    Platelets 157 140 - 450 10*3/mm3    Neutrophil % 74.2 42.7 - 76.0 %    Lymphocyte % 11.9 (L) 19.6 - 45.3 %    Monocyte % 12.3 (H) 5.0 - 12.0 %    Eosinophil % 1.0 0.3 - 6.2 %    Basophil % 0.6 0.0 - 1.5 %    Neutrophils, Absolute 4.00 1.70 - 7.00 10*3/mm3    Lymphocytes, Absolute 0.60 (L) 0.70 - 3.10 10*3/mm3    Monocytes, Absolute 0.70 0.10 - 0.90 10*3/mm3    Eosinophils, Absolute 0.10 0.00 - 0.40 10*3/mm3    Basophils, Absolute 0.00 0.00 - 0.20 10*3/mm3    nRBC 0.1 0.0 - 0.2 /100 WBC   ECG 12 Lead   Result Value Ref Range    QT Interval 332 ms     CT Head Without Contrast    Result Date: 6/12/2022  1. No acute intracranial abnormality. Specifically, no evidence of hemorrhage, mass effect or midline shift 2. Trace right mastoid effusion  Electronically Signed By-Negro Lopez MD On:6/12/2022 1:01 PM  This report was finalized on [id number] by  Negro Lopez MD.                                               MDM  Number of Diagnoses or Management Options  Diagnosis management comments: Patient has a nonfocal neurologic exam.  My CT scan of his head without contrast interpretation shows no acute intracranial abnormality.  Metabolic panel is at baseline without evidence of renal insufficiency or electrolyte abnormality per there is no evidence infectious process.  Patient was given Toradol for his headache.  On reexamination he is pain-free.  He feels much improved.  He was able to ambulate at his baseline.  Will be discharged to follow with his MD for further outpatient evaluation as needed.       Amount and/or Complexity of Data Reviewed  Clinical lab tests: reviewed  Tests in the radiology section of CPT®: reviewed  Tests in the medicine section of CPT®: reviewed    Risk of Complications, Morbidity, and/or Mortality  Presenting problems: high  Diagnostic procedures: high  Management options: high    Patient Progress  Patient progress: stable      Final diagnoses:   Nonintractable headache, unspecified chronicity pattern, unspecified headache type   Weakness       ED Disposition  ED Disposition     ED Disposition   Discharge    Condition   Stable    Comment   --             Sahil Read MD  [address] [address]  [phone]      As needed         Medication List      No changes were made to your prescriptions during this visit.          Vazquez Willson MD  06/12/22 1532       Vazquez Willson MD  06/12/22 1530

## 2022-06-13 LAB — QT INTERVAL: 332 MS

## 2022-06-15 ENCOUNTER — APPOINTMENT (OUTPATIENT)
Dept: GENERAL RADIOLOGY | Facility: HOSPITAL | Age: 72
End: 2022-06-15

## 2022-06-15 ENCOUNTER — HOSPITAL ENCOUNTER (INPATIENT)
Facility: HOSPITAL | Age: 72
LOS: 9 days | Discharge: NURSING, REHAB OR LONG-TERM CARE | End: 2022-06-25
Attending: HOSPITALIST | Admitting: INTERNAL MEDICINE

## 2022-06-15 DIAGNOSIS — R09.02 HYPOXIC: ICD-10-CM

## 2022-06-15 DIAGNOSIS — E87.6 HYPOKALEMIA: ICD-10-CM

## 2022-06-15 DIAGNOSIS — U07.1 COVID-19: Primary | ICD-10-CM

## 2022-06-15 LAB
ALBUMIN SERPL-MCNC: 3.2 G/DL (ref 3.5–5.2)
ALBUMIN/GLOB SERPL: 1.1 G/DL
ALP SERPL-CCNC: 83 U/L (ref 39–117)
ALT SERPL W P-5'-P-CCNC: 21 U/L (ref 1–41)
ANION GAP SERPL CALCULATED.3IONS-SCNC: 14 MMOL/L (ref 5–15)
ANION GAP SERPL CALCULATED.3IONS-SCNC: 14 MMOL/L (ref 5–15)
ARTERIAL PATENCY WRIST A: POSITIVE
AST SERPL-CCNC: 27 U/L (ref 1–40)
ATMOSPHERIC PRESS: ABNORMAL MM[HG]
BASE EXCESS BLDA CALC-SCNC: 3.1 MMOL/L (ref 0–3)
BASOPHILS # BLD AUTO: 0 10*3/MM3 (ref 0–0.2)
BASOPHILS # BLD AUTO: 0.1 10*3/MM3 (ref 0–0.2)
BASOPHILS NFR BLD AUTO: 0.4 % (ref 0–1.5)
BASOPHILS NFR BLD AUTO: 0.8 % (ref 0–1.5)
BDY SITE: ABNORMAL
BILIRUB SERPL-MCNC: 1.4 MG/DL (ref 0–1.2)
BILIRUB UR QL STRIP: NEGATIVE
BUN SERPL-MCNC: 19 MG/DL (ref 8–23)
BUN SERPL-MCNC: 20 MG/DL (ref 8–23)
BUN/CREAT SERPL: 18.4 (ref 7–25)
BUN/CREAT SERPL: 21.3 (ref 7–25)
CALCIUM SPEC-SCNC: 8.3 MG/DL (ref 8.6–10.5)
CALCIUM SPEC-SCNC: 8.5 MG/DL (ref 8.6–10.5)
CHLORIDE SERPL-SCNC: 101 MMOL/L (ref 98–107)
CHLORIDE SERPL-SCNC: 98 MMOL/L (ref 98–107)
CLARITY UR: CLEAR
CO2 BLDA-SCNC: 28.4 MMOL/L (ref 22–29)
CO2 SERPL-SCNC: 22 MMOL/L (ref 22–29)
CO2 SERPL-SCNC: 25 MMOL/L (ref 22–29)
COLOR UR: ABNORMAL
CREAT SERPL-MCNC: 0.94 MG/DL (ref 0.76–1.27)
CREAT SERPL-MCNC: 1.03 MG/DL (ref 0.76–1.27)
D-LACTATE SERPL-SCNC: 0.4 MMOL/L (ref 0.5–2)
DEPRECATED RDW RBC AUTO: 40.3 FL (ref 37–54)
DEPRECATED RDW RBC AUTO: 41.1 FL (ref 37–54)
EGFRCR SERPLBLD CKD-EPI 2021: 77.2 ML/MIN/1.73
EGFRCR SERPLBLD CKD-EPI 2021: 86.1 ML/MIN/1.73
EOSINOPHIL # BLD AUTO: 0 10*3/MM3 (ref 0–0.4)
EOSINOPHIL # BLD AUTO: 0 10*3/MM3 (ref 0–0.4)
EOSINOPHIL NFR BLD AUTO: 0 % (ref 0.3–6.2)
EOSINOPHIL NFR BLD AUTO: 0 % (ref 0.3–6.2)
ERYTHROCYTE [DISTWIDTH] IN BLOOD BY AUTOMATED COUNT: 13.2 % (ref 12.3–15.4)
ERYTHROCYTE [DISTWIDTH] IN BLOOD BY AUTOMATED COUNT: 13.4 % (ref 12.3–15.4)
GLOBULIN UR ELPH-MCNC: 3 GM/DL
GLUCOSE SERPL-MCNC: 82 MG/DL (ref 65–99)
GLUCOSE SERPL-MCNC: 94 MG/DL (ref 65–99)
GLUCOSE UR STRIP-MCNC: NEGATIVE MG/DL
HCO3 BLDA-SCNC: 27.2 MMOL/L (ref 21–28)
HCT VFR BLD AUTO: 38.3 % (ref 37.5–51)
HCT VFR BLD AUTO: 38.5 % (ref 37.5–51)
HEMODILUTION: NO
HGB BLD-MCNC: 13.3 G/DL (ref 13–17.7)
HGB BLD-MCNC: 13.3 G/DL (ref 13–17.7)
HGB UR QL STRIP.AUTO: NEGATIVE
INHALED O2 CONCENTRATION: <21 %
KETONES UR QL STRIP: NEGATIVE
LEUKOCYTE ESTERASE UR QL STRIP.AUTO: NEGATIVE
LYMPHOCYTES # BLD AUTO: 0.9 10*3/MM3 (ref 0.7–3.1)
LYMPHOCYTES # BLD AUTO: 1.5 10*3/MM3 (ref 0.7–3.1)
LYMPHOCYTES NFR BLD AUTO: 10 % (ref 19.6–45.3)
LYMPHOCYTES NFR BLD AUTO: 15.8 % (ref 19.6–45.3)
MAGNESIUM SERPL-MCNC: 1.8 MG/DL (ref 1.6–2.4)
MCH RBC QN AUTO: 29.6 PG (ref 26.6–33)
MCH RBC QN AUTO: 30.1 PG (ref 26.6–33)
MCHC RBC AUTO-ENTMCNC: 34.6 G/DL (ref 31.5–35.7)
MCHC RBC AUTO-ENTMCNC: 34.7 G/DL (ref 31.5–35.7)
MCV RBC AUTO: 85.5 FL (ref 79–97)
MCV RBC AUTO: 87.1 FL (ref 79–97)
MODALITY: ABNORMAL
MONOCYTES # BLD AUTO: 0.2 10*3/MM3 (ref 0.1–0.9)
MONOCYTES # BLD AUTO: 0.5 10*3/MM3 (ref 0.1–0.9)
MONOCYTES NFR BLD AUTO: 2.8 % (ref 5–12)
MONOCYTES NFR BLD AUTO: 5.2 % (ref 5–12)
NEUTROPHILS NFR BLD AUTO: 7.3 10*3/MM3 (ref 1.7–7)
NEUTROPHILS NFR BLD AUTO: 7.6 10*3/MM3 (ref 1.7–7)
NEUTROPHILS NFR BLD AUTO: 78.2 % (ref 42.7–76)
NEUTROPHILS NFR BLD AUTO: 86.8 % (ref 42.7–76)
NITRITE UR QL STRIP: NEGATIVE
NRBC BLD AUTO-RTO: 0 /100 WBC (ref 0–0.2)
NRBC BLD AUTO-RTO: 0 /100 WBC (ref 0–0.2)
PCO2 BLDA: 39.1 MM HG (ref 35–48)
PH BLDA: 7.45 PH UNITS (ref 7.35–7.45)
PH UR STRIP.AUTO: 5.5 [PH] (ref 5–8)
PLATELET # BLD AUTO: 103 10*3/MM3 (ref 140–450)
PLATELET # BLD AUTO: 115 10*3/MM3 (ref 140–450)
PMV BLD AUTO: 8.3 FL (ref 6–12)
PMV BLD AUTO: 8.4 FL (ref 6–12)
PO2 BLDA: 58.7 MM HG (ref 83–108)
POTASSIUM SERPL-SCNC: 3 MMOL/L (ref 3.5–5.2)
POTASSIUM SERPL-SCNC: 3.6 MMOL/L (ref 3.5–5.2)
PROT SERPL-MCNC: 6.2 G/DL (ref 6–8.5)
PROT UR QL STRIP: ABNORMAL
RBC # BLD AUTO: 4.42 10*6/MM3 (ref 4.14–5.8)
RBC # BLD AUTO: 4.48 10*6/MM3 (ref 4.14–5.8)
SAO2 % BLDCOA: 91.2 % (ref 94–98)
SARS-COV-2 RNA PNL SPEC NAA+PROBE: DETECTED
SODIUM SERPL-SCNC: 137 MMOL/L (ref 136–145)
SODIUM SERPL-SCNC: 137 MMOL/L (ref 136–145)
SP GR UR STRIP: 1.02 (ref 1–1.03)
UROBILINOGEN UR QL STRIP: ABNORMAL
WBC NRBC COR # BLD: 8.8 10*3/MM3 (ref 3.4–10.8)
WBC NRBC COR # BLD: 9.3 10*3/MM3 (ref 3.4–10.8)

## 2022-06-15 PROCEDURE — 83735 ASSAY OF MAGNESIUM: CPT | Performed by: PHYSICIAN ASSISTANT

## 2022-06-15 PROCEDURE — 85025 COMPLETE CBC W/AUTO DIFF WBC: CPT | Performed by: PHYSICIAN ASSISTANT

## 2022-06-15 PROCEDURE — 36415 COLL VENOUS BLD VENIPUNCTURE: CPT | Performed by: HOSPITALIST

## 2022-06-15 PROCEDURE — 81003 URINALYSIS AUTO W/O SCOPE: CPT | Performed by: PHYSICIAN ASSISTANT

## 2022-06-15 PROCEDURE — 3E0333Z INTRODUCTION OF ANTI-INFLAMMATORY INTO PERIPHERAL VEIN, PERCUTANEOUS APPROACH: ICD-10-PCS | Performed by: STUDENT IN AN ORGANIZED HEALTH CARE EDUCATION/TRAINING PROGRAM

## 2022-06-15 PROCEDURE — 25010000002 DEXAMETHASONE PER 1 MG: Performed by: PHYSICIAN ASSISTANT

## 2022-06-15 PROCEDURE — XW033E5 INTRODUCTION OF REMDESIVIR ANTI-INFECTIVE INTO PERIPHERAL VEIN, PERCUTANEOUS APPROACH, NEW TECHNOLOGY GROUP 5: ICD-10-PCS | Performed by: STUDENT IN AN ORGANIZED HEALTH CARE EDUCATION/TRAINING PROGRAM

## 2022-06-15 PROCEDURE — 83605 ASSAY OF LACTIC ACID: CPT

## 2022-06-15 PROCEDURE — 25010000002 HEPARIN (PORCINE) PER 1000 UNITS: Performed by: HOSPITALIST

## 2022-06-15 PROCEDURE — 87150 DNA/RNA AMPLIFIED PROBE: CPT | Performed by: PHYSICIAN ASSISTANT

## 2022-06-15 PROCEDURE — 82803 BLOOD GASES ANY COMBINATION: CPT

## 2022-06-15 PROCEDURE — 80053 COMPREHEN METABOLIC PANEL: CPT | Performed by: PHYSICIAN ASSISTANT

## 2022-06-15 PROCEDURE — 99223 1ST HOSP IP/OBS HIGH 75: CPT | Performed by: HOSPITALIST

## 2022-06-15 PROCEDURE — 85025 COMPLETE CBC W/AUTO DIFF WBC: CPT | Performed by: HOSPITALIST

## 2022-06-15 PROCEDURE — 36600 WITHDRAWAL OF ARTERIAL BLOOD: CPT

## 2022-06-15 PROCEDURE — 99285 EMERGENCY DEPT VISIT HI MDM: CPT

## 2022-06-15 PROCEDURE — 87635 SARS-COV-2 COVID-19 AMP PRB: CPT | Performed by: PHYSICIAN ASSISTANT

## 2022-06-15 PROCEDURE — 25010000002 REMDESIVIR 100 MG RECONSTITUTED SOLUTION: Performed by: HOSPITALIST

## 2022-06-15 PROCEDURE — 87147 CULTURE TYPE IMMUNOLOGIC: CPT | Performed by: PHYSICIAN ASSISTANT

## 2022-06-15 PROCEDURE — 71045 X-RAY EXAM CHEST 1 VIEW: CPT

## 2022-06-15 PROCEDURE — G0378 HOSPITAL OBSERVATION PER HR: HCPCS

## 2022-06-15 PROCEDURE — 87040 BLOOD CULTURE FOR BACTERIA: CPT | Performed by: PHYSICIAN ASSISTANT

## 2022-06-15 RX ORDER — ACETAMINOPHEN 500 MG
1000 TABLET ORAL ONCE
Status: COMPLETED | OUTPATIENT
Start: 2022-06-15 | End: 2022-06-15

## 2022-06-15 RX ORDER — SODIUM CHLORIDE 9 MG/ML
100 INJECTION, SOLUTION INTRAVENOUS CONTINUOUS
Status: DISCONTINUED | OUTPATIENT
Start: 2022-06-15 | End: 2022-06-22

## 2022-06-15 RX ORDER — DEXAMETHASONE SODIUM PHOSPHATE 4 MG/ML
10 INJECTION, SOLUTION INTRA-ARTICULAR; INTRALESIONAL; INTRAMUSCULAR; INTRAVENOUS; SOFT TISSUE ONCE
Status: COMPLETED | OUTPATIENT
Start: 2022-06-15 | End: 2022-06-15

## 2022-06-15 RX ORDER — SODIUM CHLORIDE 0.9 % (FLUSH) 0.9 %
10 SYRINGE (ML) INJECTION AS NEEDED
Status: DISCONTINUED | OUTPATIENT
Start: 2022-06-15 | End: 2022-06-25 | Stop reason: HOSPADM

## 2022-06-15 RX ORDER — SODIUM CHLORIDE 0.9 % (FLUSH) 0.9 %
10 SYRINGE (ML) INJECTION EVERY 12 HOURS SCHEDULED
Status: DISCONTINUED | OUTPATIENT
Start: 2022-06-15 | End: 2022-06-25 | Stop reason: HOSPADM

## 2022-06-15 RX ORDER — POTASSIUM CHLORIDE 20 MEQ/1
20 TABLET, EXTENDED RELEASE ORAL DAILY
Status: DISCONTINUED | OUTPATIENT
Start: 2022-06-15 | End: 2022-06-25 | Stop reason: HOSPADM

## 2022-06-15 RX ORDER — HEPARIN SODIUM 5000 [USP'U]/ML
5000 INJECTION, SOLUTION INTRAVENOUS; SUBCUTANEOUS EVERY 12 HOURS SCHEDULED
Status: DISCONTINUED | OUTPATIENT
Start: 2022-06-15 | End: 2022-06-25 | Stop reason: HOSPADM

## 2022-06-15 RX ORDER — DEXAMETHASONE 6 MG/1
6 TABLET ORAL
Status: DISCONTINUED | OUTPATIENT
Start: 2022-06-16 | End: 2022-06-21

## 2022-06-15 RX ADMIN — DEXAMETHASONE SODIUM PHOSPHATE 10 MG: 4 INJECTION, SOLUTION INTRAMUSCULAR; INTRAVENOUS at 15:08

## 2022-06-15 RX ADMIN — ACETAMINOPHEN 1000 MG: 500 TABLET, FILM COATED ORAL at 12:19

## 2022-06-15 RX ADMIN — SODIUM CHLORIDE 1000 ML: 9 INJECTION, SOLUTION INTRAVENOUS at 15:12

## 2022-06-15 RX ADMIN — SODIUM CHLORIDE 100 ML/HR: 9 INJECTION, SOLUTION INTRAVENOUS at 18:38

## 2022-06-15 RX ADMIN — POTASSIUM CHLORIDE 20 MEQ: 1500 TABLET, EXTENDED RELEASE ORAL at 15:11

## 2022-06-15 RX ADMIN — REMDESIVIR 200 MG: 100 INJECTION, POWDER, LYOPHILIZED, FOR SOLUTION INTRAVENOUS at 21:14

## 2022-06-15 RX ADMIN — SODIUM CHLORIDE 500 ML: 9 INJECTION, SOLUTION INTRAVENOUS at 12:19

## 2022-06-15 RX ADMIN — Medication 10 ML: at 21:16

## 2022-06-15 RX ADMIN — HEPARIN SODIUM 5000 UNITS: 5000 INJECTION INTRAVENOUS; SUBCUTANEOUS at 21:16

## 2022-06-16 LAB
ALBUMIN SERPL-MCNC: 2.8 G/DL (ref 3.5–5.2)
ALP SERPL-CCNC: 76 U/L (ref 39–117)
ALT SERPL W P-5'-P-CCNC: 17 U/L (ref 1–41)
ANION GAP SERPL CALCULATED.3IONS-SCNC: 15 MMOL/L (ref 5–15)
AST SERPL-CCNC: 22 U/L (ref 1–40)
BACTERIA BLD CULT: ABNORMAL
BASOPHILS # BLD AUTO: 0 10*3/MM3 (ref 0–0.2)
BASOPHILS NFR BLD AUTO: 0.3 % (ref 0–1.5)
BILIRUB CONJ SERPL-MCNC: 0.4 MG/DL (ref 0–0.3)
BILIRUB INDIRECT SERPL-MCNC: 0.4 MG/DL
BILIRUB SERPL-MCNC: 0.8 MG/DL (ref 0–1.2)
BOTTLE TYPE: ABNORMAL
BUN SERPL-MCNC: 25 MG/DL (ref 8–23)
BUN/CREAT SERPL: 27.2 (ref 7–25)
CALCIUM SPEC-SCNC: 8.4 MG/DL (ref 8.6–10.5)
CHLORIDE SERPL-SCNC: 102 MMOL/L (ref 98–107)
CO2 SERPL-SCNC: 21 MMOL/L (ref 22–29)
CREAT SERPL-MCNC: 0.92 MG/DL (ref 0.76–1.27)
DEPRECATED RDW RBC AUTO: 41.6 FL (ref 37–54)
EGFRCR SERPLBLD CKD-EPI 2021: 88.4 ML/MIN/1.73
EOSINOPHIL # BLD AUTO: 0 10*3/MM3 (ref 0–0.4)
EOSINOPHIL NFR BLD AUTO: 0.1 % (ref 0.3–6.2)
ERYTHROCYTE [DISTWIDTH] IN BLOOD BY AUTOMATED COUNT: 13.5 % (ref 12.3–15.4)
GLUCOSE SERPL-MCNC: 195 MG/DL (ref 65–99)
HCT VFR BLD AUTO: 36.6 % (ref 37.5–51)
HGB BLD-MCNC: 12.6 G/DL (ref 13–17.7)
LYMPHOCYTES # BLD AUTO: 0.9 10*3/MM3 (ref 0.7–3.1)
LYMPHOCYTES NFR BLD AUTO: 13.6 % (ref 19.6–45.3)
MCH RBC QN AUTO: 29.8 PG (ref 26.6–33)
MCHC RBC AUTO-ENTMCNC: 34.4 G/DL (ref 31.5–35.7)
MCV RBC AUTO: 86.5 FL (ref 79–97)
MONOCYTES # BLD AUTO: 0.3 10*3/MM3 (ref 0.1–0.9)
MONOCYTES NFR BLD AUTO: 4.2 % (ref 5–12)
NEUTROPHILS NFR BLD AUTO: 5.5 10*3/MM3 (ref 1.7–7)
NEUTROPHILS NFR BLD AUTO: 81.8 % (ref 42.7–76)
NRBC BLD AUTO-RTO: 0.1 /100 WBC (ref 0–0.2)
PLATELET # BLD AUTO: 100 10*3/MM3 (ref 140–450)
PMV BLD AUTO: 8.4 FL (ref 6–12)
POTASSIUM SERPL-SCNC: 3.6 MMOL/L (ref 3.5–5.2)
PROT SERPL-MCNC: 5.7 G/DL (ref 6–8.5)
RBC # BLD AUTO: 4.23 10*6/MM3 (ref 4.14–5.8)
SODIUM SERPL-SCNC: 138 MMOL/L (ref 136–145)
WBC NRBC COR # BLD: 6.7 10*3/MM3 (ref 3.4–10.8)

## 2022-06-16 PROCEDURE — 80076 HEPATIC FUNCTION PANEL: CPT | Performed by: HOSPITALIST

## 2022-06-16 PROCEDURE — 63710000001 INSULIN GLARGINE PER 5 UNITS: Performed by: HOSPITALIST

## 2022-06-16 PROCEDURE — 85025 COMPLETE CBC W/AUTO DIFF WBC: CPT | Performed by: HOSPITALIST

## 2022-06-16 PROCEDURE — 25010000002 VANCOMYCIN 10 G RECONSTITUTED SOLUTION: Performed by: HOSPITALIST

## 2022-06-16 PROCEDURE — 80048 BASIC METABOLIC PNL TOTAL CA: CPT | Performed by: HOSPITALIST

## 2022-06-16 PROCEDURE — 99232 SBSQ HOSP IP/OBS MODERATE 35: CPT | Performed by: HOSPITALIST

## 2022-06-16 PROCEDURE — 25010000002 HEPARIN (PORCINE) PER 1000 UNITS: Performed by: HOSPITALIST

## 2022-06-16 PROCEDURE — 25010000002 REMDESIVIR 100 MG/20ML SOLUTION 1 EACH VIAL: Performed by: HOSPITALIST

## 2022-06-16 RX ORDER — LISINOPRIL 5 MG/1
2.5 TABLET ORAL EVERY OTHER DAY
Status: DISCONTINUED | OUTPATIENT
Start: 2022-06-16 | End: 2022-06-16

## 2022-06-16 RX ORDER — CLOPIDOGREL BISULFATE 75 MG/1
75 TABLET ORAL DAILY
Status: DISCONTINUED | OUTPATIENT
Start: 2022-06-16 | End: 2022-06-25 | Stop reason: HOSPADM

## 2022-06-16 RX ORDER — LISINOPRIL 5 MG/1
2.5 TABLET ORAL DAILY
Status: DISCONTINUED | OUTPATIENT
Start: 2022-06-17 | End: 2022-06-25 | Stop reason: HOSPADM

## 2022-06-16 RX ORDER — TRIAMTERENE AND HYDROCHLOROTHIAZIDE 75; 50 MG/1; MG/1
1 TABLET ORAL EVERY OTHER DAY
Status: DISCONTINUED | OUTPATIENT
Start: 2022-06-16 | End: 2022-06-16

## 2022-06-16 RX ORDER — LANOLIN ALCOHOL/MO/W.PET/CERES
1000 CREAM (GRAM) TOPICAL DAILY
Status: DISCONTINUED | OUTPATIENT
Start: 2022-06-16 | End: 2022-06-25 | Stop reason: HOSPADM

## 2022-06-16 RX ORDER — INSULIN GLARGINE 100 [IU]/ML
33 INJECTION, SOLUTION SUBCUTANEOUS NIGHTLY
Status: DISCONTINUED | OUTPATIENT
Start: 2022-06-16 | End: 2022-06-16

## 2022-06-16 RX ORDER — INSULIN GLARGINE 100 [IU]/ML
30 INJECTION, SOLUTION SUBCUTANEOUS NIGHTLY
Status: DISCONTINUED | OUTPATIENT
Start: 2022-06-16 | End: 2022-06-20

## 2022-06-16 RX ORDER — TRIAMTERENE AND HYDROCHLOROTHIAZIDE 75; 50 MG/1; MG/1
1 TABLET ORAL DAILY
Status: DISCONTINUED | OUTPATIENT
Start: 2022-06-17 | End: 2022-06-24

## 2022-06-16 RX ORDER — POTASSIUM CHLORIDE 20 MEQ/1
20 TABLET, EXTENDED RELEASE ORAL DAILY
Status: DISCONTINUED | OUTPATIENT
Start: 2022-06-16 | End: 2022-06-16 | Stop reason: SDUPTHER

## 2022-06-16 RX ADMIN — TRIAMTERENE AND HYDROCHLOROTHIAZIDE 1 TABLET: 75; 50 TABLET ORAL at 10:34

## 2022-06-16 RX ADMIN — POTASSIUM CHLORIDE 20 MEQ: 1500 TABLET, EXTENDED RELEASE ORAL at 10:33

## 2022-06-16 RX ADMIN — INSULIN GLARGINE 30 UNITS: 100 INJECTION, SOLUTION SUBCUTANEOUS at 21:52

## 2022-06-16 RX ADMIN — Medication 10 ML: at 10:34

## 2022-06-16 RX ADMIN — VANCOMYCIN HYDROCHLORIDE 1500 MG: 10 INJECTION, POWDER, LYOPHILIZED, FOR SOLUTION INTRAVENOUS at 16:03

## 2022-06-16 RX ADMIN — HEPARIN SODIUM 5000 UNITS: 5000 INJECTION INTRAVENOUS; SUBCUTANEOUS at 10:33

## 2022-06-16 RX ADMIN — Medication 10 ML: at 21:49

## 2022-06-16 RX ADMIN — DEXAMETHASONE 6 MG: 6 TABLET ORAL at 10:34

## 2022-06-16 RX ADMIN — CYANOCOBALAMIN TAB 1000 MCG 1000 MCG: 1000 TAB at 10:33

## 2022-06-16 RX ADMIN — LISINOPRIL 2.5 MG: 5 TABLET ORAL at 10:33

## 2022-06-16 RX ADMIN — REMDESIVIR 100 MG: 100 INJECTION, POWDER, LYOPHILIZED, FOR SOLUTION INTRAVENOUS at 21:50

## 2022-06-16 RX ADMIN — CLOPIDOGREL BISULFATE 75 MG: 75 TABLET ORAL at 10:33

## 2022-06-17 LAB
ALBUMIN SERPL-MCNC: 2.7 G/DL (ref 3.5–5.2)
ALP SERPL-CCNC: 70 U/L (ref 39–117)
ALT SERPL W P-5'-P-CCNC: 14 U/L (ref 1–41)
ANION GAP SERPL CALCULATED.3IONS-SCNC: 10 MMOL/L (ref 5–15)
AST SERPL-CCNC: 19 U/L (ref 1–40)
BACTERIA SPEC AEROBE CULT: ABNORMAL
BASOPHILS # BLD AUTO: 0 10*3/MM3 (ref 0–0.2)
BASOPHILS NFR BLD AUTO: 0.2 % (ref 0–1.5)
BILIRUB CONJ SERPL-MCNC: 0.2 MG/DL (ref 0–0.3)
BILIRUB INDIRECT SERPL-MCNC: 0.3 MG/DL
BILIRUB SERPL-MCNC: 0.5 MG/DL (ref 0–1.2)
BUN SERPL-MCNC: 27 MG/DL (ref 8–23)
BUN/CREAT SERPL: 29.3 (ref 7–25)
CALCIUM SPEC-SCNC: 8.1 MG/DL (ref 8.6–10.5)
CHLORIDE SERPL-SCNC: 100 MMOL/L (ref 98–107)
CO2 SERPL-SCNC: 26 MMOL/L (ref 22–29)
CREAT SERPL-MCNC: 0.92 MG/DL (ref 0.76–1.27)
DEPRECATED RDW RBC AUTO: 39.8 FL (ref 37–54)
EGFRCR SERPLBLD CKD-EPI 2021: 88.4 ML/MIN/1.73
EOSINOPHIL # BLD AUTO: 0 10*3/MM3 (ref 0–0.4)
EOSINOPHIL NFR BLD AUTO: 0.1 % (ref 0.3–6.2)
ERYTHROCYTE [DISTWIDTH] IN BLOOD BY AUTOMATED COUNT: 13.2 % (ref 12.3–15.4)
GLUCOSE BLDC GLUCOMTR-MCNC: 353 MG/DL (ref 70–105)
GLUCOSE SERPL-MCNC: 176 MG/DL (ref 65–99)
GRAM STN SPEC: ABNORMAL
HCT VFR BLD AUTO: 37.3 % (ref 37.5–51)
HGB BLD-MCNC: 12.8 G/DL (ref 13–17.7)
ISOLATED FROM: ABNORMAL
LYMPHOCYTES # BLD AUTO: 1.7 10*3/MM3 (ref 0.7–3.1)
LYMPHOCYTES NFR BLD AUTO: 19.5 % (ref 19.6–45.3)
MCH RBC QN AUTO: 29.4 PG (ref 26.6–33)
MCHC RBC AUTO-ENTMCNC: 34.2 G/DL (ref 31.5–35.7)
MCV RBC AUTO: 85.9 FL (ref 79–97)
MONOCYTES # BLD AUTO: 0.3 10*3/MM3 (ref 0.1–0.9)
MONOCYTES NFR BLD AUTO: 3.3 % (ref 5–12)
NEUTROPHILS NFR BLD AUTO: 6.6 10*3/MM3 (ref 1.7–7)
NEUTROPHILS NFR BLD AUTO: 76.9 % (ref 42.7–76)
NRBC BLD AUTO-RTO: 0 /100 WBC (ref 0–0.2)
PLATELET # BLD AUTO: 123 10*3/MM3 (ref 140–450)
PMV BLD AUTO: 8.7 FL (ref 6–12)
POTASSIUM SERPL-SCNC: 3.4 MMOL/L (ref 3.5–5.2)
PROT SERPL-MCNC: 5.5 G/DL (ref 6–8.5)
RBC # BLD AUTO: 4.35 10*6/MM3 (ref 4.14–5.8)
SODIUM SERPL-SCNC: 136 MMOL/L (ref 136–145)
WBC NRBC COR # BLD: 8.6 10*3/MM3 (ref 3.4–10.8)

## 2022-06-17 PROCEDURE — 25010000002 REMDESIVIR 100 MG/20ML SOLUTION 1 EACH VIAL: Performed by: HOSPITALIST

## 2022-06-17 PROCEDURE — 25010000002 HEPARIN (PORCINE) PER 1000 UNITS: Performed by: HOSPITALIST

## 2022-06-17 PROCEDURE — 63710000001 INSULIN GLARGINE PER 5 UNITS: Performed by: HOSPITALIST

## 2022-06-17 PROCEDURE — 36415 COLL VENOUS BLD VENIPUNCTURE: CPT | Performed by: HOSPITALIST

## 2022-06-17 PROCEDURE — 80048 BASIC METABOLIC PNL TOTAL CA: CPT | Performed by: HOSPITALIST

## 2022-06-17 PROCEDURE — 80076 HEPATIC FUNCTION PANEL: CPT | Performed by: HOSPITALIST

## 2022-06-17 PROCEDURE — 82962 GLUCOSE BLOOD TEST: CPT

## 2022-06-17 PROCEDURE — 97530 THERAPEUTIC ACTIVITIES: CPT

## 2022-06-17 PROCEDURE — 97162 PT EVAL MOD COMPLEX 30 MIN: CPT

## 2022-06-17 PROCEDURE — 85025 COMPLETE CBC W/AUTO DIFF WBC: CPT | Performed by: HOSPITALIST

## 2022-06-17 PROCEDURE — 99232 SBSQ HOSP IP/OBS MODERATE 35: CPT | Performed by: HOSPITALIST

## 2022-06-17 RX ADMIN — REMDESIVIR 100 MG: 100 INJECTION, POWDER, LYOPHILIZED, FOR SOLUTION INTRAVENOUS at 20:16

## 2022-06-17 RX ADMIN — POTASSIUM CHLORIDE 20 MEQ: 1500 TABLET, EXTENDED RELEASE ORAL at 09:00

## 2022-06-17 RX ADMIN — HEPARIN SODIUM 5000 UNITS: 5000 INJECTION INTRAVENOUS; SUBCUTANEOUS at 20:15

## 2022-06-17 RX ADMIN — CYANOCOBALAMIN TAB 1000 MCG 1000 MCG: 1000 TAB at 09:00

## 2022-06-17 RX ADMIN — LISINOPRIL 2.5 MG: 5 TABLET ORAL at 09:00

## 2022-06-17 RX ADMIN — Medication 10 ML: at 20:16

## 2022-06-17 RX ADMIN — Medication 10 ML: at 10:33

## 2022-06-17 RX ADMIN — DEXAMETHASONE 6 MG: 6 TABLET ORAL at 09:00

## 2022-06-17 RX ADMIN — HEPARIN SODIUM 5000 UNITS: 5000 INJECTION INTRAVENOUS; SUBCUTANEOUS at 09:00

## 2022-06-17 RX ADMIN — TRIAMTERENE AND HYDROCHLOROTHIAZIDE 1 TABLET: 75; 50 TABLET ORAL at 09:00

## 2022-06-17 RX ADMIN — INSULIN GLARGINE 30 UNITS: 100 INJECTION, SOLUTION SUBCUTANEOUS at 20:17

## 2022-06-17 RX ADMIN — CLOPIDOGREL BISULFATE 75 MG: 75 TABLET ORAL at 09:00

## 2022-06-18 LAB
ALBUMIN SERPL-MCNC: 3 G/DL (ref 3.5–5.2)
ALP SERPL-CCNC: 75 U/L (ref 39–117)
ALT SERPL W P-5'-P-CCNC: 17 U/L (ref 1–41)
AST SERPL-CCNC: 18 U/L (ref 1–40)
BILIRUB CONJ SERPL-MCNC: 0.2 MG/DL (ref 0–0.3)
BILIRUB INDIRECT SERPL-MCNC: 0.2 MG/DL
BILIRUB SERPL-MCNC: 0.4 MG/DL (ref 0–1.2)
CREAT SERPL-MCNC: 0.87 MG/DL (ref 0.76–1.27)
EGFRCR SERPLBLD CKD-EPI 2021: 91.7 ML/MIN/1.73
GLUCOSE BLDC GLUCOMTR-MCNC: 272 MG/DL (ref 70–105)
GLUCOSE BLDC GLUCOMTR-MCNC: 280 MG/DL (ref 70–105)
GLUCOSE BLDC GLUCOMTR-MCNC: 284 MG/DL (ref 70–105)
PROT SERPL-MCNC: 5.7 G/DL (ref 6–8.5)

## 2022-06-18 PROCEDURE — 82565 ASSAY OF CREATININE: CPT | Performed by: HOSPITALIST

## 2022-06-18 PROCEDURE — 82962 GLUCOSE BLOOD TEST: CPT

## 2022-06-18 PROCEDURE — 25010000002 HEPARIN (PORCINE) PER 1000 UNITS: Performed by: HOSPITALIST

## 2022-06-18 PROCEDURE — 63710000001 INSULIN LISPRO (HUMAN) PER 5 UNITS: Performed by: HOSPITALIST

## 2022-06-18 PROCEDURE — 80076 HEPATIC FUNCTION PANEL: CPT | Performed by: HOSPITALIST

## 2022-06-18 PROCEDURE — 25010000002 REMDESIVIR 100 MG/20ML SOLUTION 1 EACH VIAL: Performed by: HOSPITALIST

## 2022-06-18 PROCEDURE — 99232 SBSQ HOSP IP/OBS MODERATE 35: CPT | Performed by: HOSPITALIST

## 2022-06-18 PROCEDURE — 63710000001 INSULIN GLARGINE PER 5 UNITS: Performed by: HOSPITALIST

## 2022-06-18 RX ORDER — INSULIN LISPRO 100 [IU]/ML
0-14 INJECTION, SOLUTION INTRAVENOUS; SUBCUTANEOUS AS NEEDED
Status: DISCONTINUED | OUTPATIENT
Start: 2022-06-18 | End: 2022-06-20

## 2022-06-18 RX ORDER — INSULIN LISPRO 100 [IU]/ML
0-14 INJECTION, SOLUTION INTRAVENOUS; SUBCUTANEOUS
Status: DISCONTINUED | OUTPATIENT
Start: 2022-06-18 | End: 2022-06-25 | Stop reason: HOSPADM

## 2022-06-18 RX ORDER — NICOTINE POLACRILEX 4 MG
15 LOZENGE BUCCAL
Status: DISCONTINUED | OUTPATIENT
Start: 2022-06-18 | End: 2022-06-25 | Stop reason: HOSPADM

## 2022-06-18 RX ORDER — DEXTROSE MONOHYDRATE 25 G/50ML
25 INJECTION, SOLUTION INTRAVENOUS
Status: DISCONTINUED | OUTPATIENT
Start: 2022-06-18 | End: 2022-06-25 | Stop reason: HOSPADM

## 2022-06-18 RX ORDER — OLANZAPINE 10 MG/2ML
1 INJECTION, POWDER, LYOPHILIZED, FOR SOLUTION INTRAMUSCULAR
Status: DISCONTINUED | OUTPATIENT
Start: 2022-06-18 | End: 2022-06-25 | Stop reason: HOSPADM

## 2022-06-18 RX ADMIN — INSULIN LISPRO 5 UNITS: 100 INJECTION, SOLUTION INTRAVENOUS; SUBCUTANEOUS at 12:15

## 2022-06-18 RX ADMIN — REMDESIVIR 100 MG: 100 INJECTION, POWDER, LYOPHILIZED, FOR SOLUTION INTRAVENOUS at 21:51

## 2022-06-18 RX ADMIN — SODIUM CHLORIDE 100 ML/HR: 9 INJECTION, SOLUTION INTRAVENOUS at 09:19

## 2022-06-18 RX ADMIN — DEXAMETHASONE 6 MG: 6 TABLET ORAL at 09:19

## 2022-06-18 RX ADMIN — LISINOPRIL 2.5 MG: 5 TABLET ORAL at 09:19

## 2022-06-18 RX ADMIN — POTASSIUM CHLORIDE 20 MEQ: 1500 TABLET, EXTENDED RELEASE ORAL at 09:19

## 2022-06-18 RX ADMIN — CLOPIDOGREL BISULFATE 75 MG: 75 TABLET ORAL at 09:20

## 2022-06-18 RX ADMIN — HEPARIN SODIUM 5000 UNITS: 5000 INJECTION INTRAVENOUS; SUBCUTANEOUS at 09:20

## 2022-06-18 RX ADMIN — TRIAMTERENE AND HYDROCHLOROTHIAZIDE 1 TABLET: 75; 50 TABLET ORAL at 09:19

## 2022-06-18 RX ADMIN — INSULIN GLARGINE 30 UNITS: 100 INJECTION, SOLUTION SUBCUTANEOUS at 21:55

## 2022-06-18 RX ADMIN — Medication 10 ML: at 21:51

## 2022-06-18 RX ADMIN — INSULIN LISPRO 8 UNITS: 100 INJECTION, SOLUTION INTRAVENOUS; SUBCUTANEOUS at 17:13

## 2022-06-18 RX ADMIN — Medication 10 ML: at 09:20

## 2022-06-18 RX ADMIN — CYANOCOBALAMIN TAB 1000 MCG 1000 MCG: 1000 TAB at 09:19

## 2022-06-18 RX ADMIN — HEPARIN SODIUM 5000 UNITS: 5000 INJECTION INTRAVENOUS; SUBCUTANEOUS at 21:51

## 2022-06-19 LAB
ALBUMIN SERPL-MCNC: 3.1 G/DL (ref 3.5–5.2)
ALP SERPL-CCNC: 70 U/L (ref 39–117)
ALT SERPL W P-5'-P-CCNC: 21 U/L (ref 1–41)
AST SERPL-CCNC: 24 U/L (ref 1–40)
BILIRUB CONJ SERPL-MCNC: 0.2 MG/DL (ref 0–0.3)
BILIRUB INDIRECT SERPL-MCNC: 0.1 MG/DL
BILIRUB SERPL-MCNC: 0.3 MG/DL (ref 0–1.2)
CREAT SERPL-MCNC: 0.89 MG/DL (ref 0.76–1.27)
EGFRCR SERPLBLD CKD-EPI 2021: 91.1 ML/MIN/1.73
GLUCOSE BLDC GLUCOMTR-MCNC: 158 MG/DL (ref 70–105)
GLUCOSE BLDC GLUCOMTR-MCNC: 212 MG/DL (ref 70–105)
GLUCOSE BLDC GLUCOMTR-MCNC: 283 MG/DL (ref 70–105)
GLUCOSE BLDC GLUCOMTR-MCNC: 367 MG/DL (ref 70–105)
PROT SERPL-MCNC: 5.6 G/DL (ref 6–8.5)

## 2022-06-19 PROCEDURE — 36415 COLL VENOUS BLD VENIPUNCTURE: CPT | Performed by: HOSPITALIST

## 2022-06-19 PROCEDURE — 25010000002 HEPARIN (PORCINE) PER 1000 UNITS: Performed by: HOSPITALIST

## 2022-06-19 PROCEDURE — 63710000001 INSULIN GLARGINE PER 5 UNITS: Performed by: HOSPITALIST

## 2022-06-19 PROCEDURE — 99232 SBSQ HOSP IP/OBS MODERATE 35: CPT | Performed by: HOSPITALIST

## 2022-06-19 PROCEDURE — 25010000002 REMDESIVIR 100 MG/20ML SOLUTION 1 EACH VIAL: Performed by: HOSPITALIST

## 2022-06-19 PROCEDURE — 63710000001 INSULIN LISPRO (HUMAN) PER 5 UNITS: Performed by: HOSPITALIST

## 2022-06-19 PROCEDURE — 80076 HEPATIC FUNCTION PANEL: CPT | Performed by: HOSPITALIST

## 2022-06-19 PROCEDURE — 82565 ASSAY OF CREATININE: CPT | Performed by: HOSPITALIST

## 2022-06-19 PROCEDURE — 82962 GLUCOSE BLOOD TEST: CPT

## 2022-06-19 RX ADMIN — REMDESIVIR 100 MG: 100 INJECTION, POWDER, LYOPHILIZED, FOR SOLUTION INTRAVENOUS at 19:49

## 2022-06-19 RX ADMIN — INSULIN GLARGINE 30 UNITS: 100 INJECTION, SOLUTION SUBCUTANEOUS at 22:41

## 2022-06-19 RX ADMIN — CYANOCOBALAMIN TAB 1000 MCG 1000 MCG: 1000 TAB at 10:04

## 2022-06-19 RX ADMIN — INSULIN LISPRO 3 UNITS: 100 INJECTION, SOLUTION INTRAVENOUS; SUBCUTANEOUS at 12:50

## 2022-06-19 RX ADMIN — POTASSIUM CHLORIDE 20 MEQ: 1500 TABLET, EXTENDED RELEASE ORAL at 10:04

## 2022-06-19 RX ADMIN — LISINOPRIL 2.5 MG: 5 TABLET ORAL at 10:03

## 2022-06-19 RX ADMIN — SODIUM CHLORIDE 100 ML/HR: 9 INJECTION, SOLUTION INTRAVENOUS at 19:48

## 2022-06-19 RX ADMIN — CLOPIDOGREL BISULFATE 75 MG: 75 TABLET ORAL at 10:04

## 2022-06-19 RX ADMIN — Medication 10 ML: at 10:03

## 2022-06-19 RX ADMIN — DEXAMETHASONE 6 MG: 6 TABLET ORAL at 10:04

## 2022-06-19 RX ADMIN — HEPARIN SODIUM 5000 UNITS: 5000 INJECTION INTRAVENOUS; SUBCUTANEOUS at 22:41

## 2022-06-19 RX ADMIN — TRIAMTERENE AND HYDROCHLOROTHIAZIDE 1 TABLET: 75; 50 TABLET ORAL at 10:04

## 2022-06-19 RX ADMIN — INSULIN LISPRO 5 UNITS: 100 INJECTION, SOLUTION INTRAVENOUS; SUBCUTANEOUS at 17:38

## 2022-06-19 RX ADMIN — HEPARIN SODIUM 5000 UNITS: 5000 INJECTION INTRAVENOUS; SUBCUTANEOUS at 10:04

## 2022-06-19 RX ADMIN — INSULIN LISPRO 8 UNITS: 100 INJECTION, SOLUTION INTRAVENOUS; SUBCUTANEOUS at 10:03

## 2022-06-20 PROBLEM — R53.81 PHYSICAL DEBILITY: Status: ACTIVE | Noted: 2022-02-28

## 2022-06-20 PROBLEM — J12.82 PNEUMONIA DUE TO COVID-19 VIRUS: Status: ACTIVE | Noted: 2022-06-20

## 2022-06-20 PROBLEM — U07.1 COVID-19 VIRUS INFECTION: Status: ACTIVE | Noted: 2022-06-20

## 2022-06-20 PROBLEM — J96.01 ACUTE HYPOXEMIC RESPIRATORY FAILURE DUE TO COVID-19 (HCC): Status: ACTIVE | Noted: 2022-06-15

## 2022-06-20 PROBLEM — U07.1 PNEUMONIA DUE TO COVID-19 VIRUS: Status: ACTIVE | Noted: 2022-06-20

## 2022-06-20 PROBLEM — E78.2 MIXED HYPERLIPIDEMIA: Status: ACTIVE | Noted: 2022-06-20

## 2022-06-20 LAB
BACTERIA SPEC AEROBE CULT: NORMAL
GLUCOSE BLDC GLUCOMTR-MCNC: 242 MG/DL (ref 70–105)
GLUCOSE BLDC GLUCOMTR-MCNC: 259 MG/DL (ref 70–105)
GLUCOSE BLDC GLUCOMTR-MCNC: 287 MG/DL (ref 70–105)
GLUCOSE BLDC GLUCOMTR-MCNC: 359 MG/DL (ref 70–105)

## 2022-06-20 PROCEDURE — 97110 THERAPEUTIC EXERCISES: CPT

## 2022-06-20 PROCEDURE — 63710000001 INSULIN LISPRO (HUMAN) PER 5 UNITS: Performed by: INTERNAL MEDICINE

## 2022-06-20 PROCEDURE — 99222 1ST HOSP IP/OBS MODERATE 55: CPT | Performed by: INTERNAL MEDICINE

## 2022-06-20 PROCEDURE — 82962 GLUCOSE BLOOD TEST: CPT

## 2022-06-20 PROCEDURE — 63710000001 INSULIN LISPRO (HUMAN) PER 5 UNITS: Performed by: HOSPITALIST

## 2022-06-20 PROCEDURE — 25010000002 HEPARIN (PORCINE) PER 1000 UNITS: Performed by: HOSPITALIST

## 2022-06-20 PROCEDURE — 63710000001 INSULIN GLARGINE PER 5 UNITS: Performed by: INTERNAL MEDICINE

## 2022-06-20 PROCEDURE — 99233 SBSQ HOSP IP/OBS HIGH 50: CPT | Performed by: INTERNAL MEDICINE

## 2022-06-20 RX ORDER — INSULIN LISPRO 100 [IU]/ML
8 INJECTION, SOLUTION INTRAVENOUS; SUBCUTANEOUS
Status: DISCONTINUED | OUTPATIENT
Start: 2022-06-20 | End: 2022-06-25 | Stop reason: HOSPADM

## 2022-06-20 RX ADMIN — INSULIN LISPRO 8 UNITS: 100 INJECTION, SOLUTION INTRAVENOUS; SUBCUTANEOUS at 17:51

## 2022-06-20 RX ADMIN — HEPARIN SODIUM 5000 UNITS: 5000 INJECTION INTRAVENOUS; SUBCUTANEOUS at 08:21

## 2022-06-20 RX ADMIN — INSULIN LISPRO 12 UNITS: 100 INJECTION, SOLUTION INTRAVENOUS; SUBCUTANEOUS at 08:28

## 2022-06-20 RX ADMIN — CLOPIDOGREL BISULFATE 75 MG: 75 TABLET ORAL at 08:20

## 2022-06-20 RX ADMIN — CYANOCOBALAMIN TAB 1000 MCG 1000 MCG: 1000 TAB at 08:21

## 2022-06-20 RX ADMIN — LISINOPRIL 2.5 MG: 5 TABLET ORAL at 08:21

## 2022-06-20 RX ADMIN — TRIAMTERENE AND HYDROCHLOROTHIAZIDE 1 TABLET: 75; 50 TABLET ORAL at 08:20

## 2022-06-20 RX ADMIN — POTASSIUM CHLORIDE 20 MEQ: 1500 TABLET, EXTENDED RELEASE ORAL at 08:21

## 2022-06-20 RX ADMIN — Medication 10 ML: at 08:22

## 2022-06-20 RX ADMIN — HEPARIN SODIUM 5000 UNITS: 5000 INJECTION INTRAVENOUS; SUBCUTANEOUS at 21:12

## 2022-06-20 RX ADMIN — DEXAMETHASONE 6 MG: 6 TABLET ORAL at 08:21

## 2022-06-20 RX ADMIN — Medication 10 ML: at 21:12

## 2022-06-20 RX ADMIN — INSULIN LISPRO 8 UNITS: 100 INJECTION, SOLUTION INTRAVENOUS; SUBCUTANEOUS at 17:52

## 2022-06-20 RX ADMIN — INSULIN LISPRO 8 UNITS: 100 INJECTION, SOLUTION INTRAVENOUS; SUBCUTANEOUS at 12:58

## 2022-06-20 RX ADMIN — INSULIN GLARGINE 25 UNITS: 100 INJECTION, SOLUTION SUBCUTANEOUS at 21:59

## 2022-06-21 LAB
ALBUMIN SERPL-MCNC: 2.8 G/DL (ref 3.5–5.2)
ALBUMIN/GLOB SERPL: 0.9 G/DL
ALP SERPL-CCNC: 66 U/L (ref 39–117)
ALT SERPL W P-5'-P-CCNC: 19 U/L (ref 1–41)
ANION GAP SERPL CALCULATED.3IONS-SCNC: 10 MMOL/L (ref 5–15)
AST SERPL-CCNC: 19 U/L (ref 1–40)
BILIRUB SERPL-MCNC: 0.4 MG/DL (ref 0–1.2)
BUN SERPL-MCNC: 22 MG/DL (ref 8–23)
BUN/CREAT SERPL: 25 (ref 7–25)
CALCIUM SPEC-SCNC: 8.5 MG/DL (ref 8.6–10.5)
CHLORIDE SERPL-SCNC: 104 MMOL/L (ref 98–107)
CO2 SERPL-SCNC: 20 MMOL/L (ref 22–29)
CREAT SERPL-MCNC: 0.88 MG/DL (ref 0.76–1.27)
EGFRCR SERPLBLD CKD-EPI 2021: 91.4 ML/MIN/1.73
GLOBULIN UR ELPH-MCNC: 3 GM/DL
GLUCOSE BLDC GLUCOMTR-MCNC: 128 MG/DL (ref 70–105)
GLUCOSE BLDC GLUCOMTR-MCNC: 95 MG/DL (ref 70–105)
GLUCOSE BLDC GLUCOMTR-MCNC: 96 MG/DL (ref 70–105)
GLUCOSE SERPL-MCNC: 156 MG/DL (ref 65–99)
POTASSIUM SERPL-SCNC: 4.3 MMOL/L (ref 3.5–5.2)
PROT SERPL-MCNC: 5.8 G/DL (ref 6–8.5)
SODIUM SERPL-SCNC: 134 MMOL/L (ref 136–145)

## 2022-06-21 PROCEDURE — 25010000002 HEPARIN (PORCINE) PER 1000 UNITS: Performed by: HOSPITALIST

## 2022-06-21 PROCEDURE — 97116 GAIT TRAINING THERAPY: CPT

## 2022-06-21 PROCEDURE — 80053 COMPREHEN METABOLIC PANEL: CPT | Performed by: HOSPITALIST

## 2022-06-21 PROCEDURE — 99232 SBSQ HOSP IP/OBS MODERATE 35: CPT | Performed by: INTERNAL MEDICINE

## 2022-06-21 PROCEDURE — 99233 SBSQ HOSP IP/OBS HIGH 50: CPT | Performed by: INTERNAL MEDICINE

## 2022-06-21 PROCEDURE — 97110 THERAPEUTIC EXERCISES: CPT

## 2022-06-21 PROCEDURE — 63710000001 INSULIN LISPRO (HUMAN) PER 5 UNITS: Performed by: INTERNAL MEDICINE

## 2022-06-21 PROCEDURE — 63710000001 INSULIN GLARGINE PER 5 UNITS: Performed by: INTERNAL MEDICINE

## 2022-06-21 PROCEDURE — 82962 GLUCOSE BLOOD TEST: CPT

## 2022-06-21 RX ADMIN — HEPARIN SODIUM 5000 UNITS: 5000 INJECTION INTRAVENOUS; SUBCUTANEOUS at 09:00

## 2022-06-21 RX ADMIN — LISINOPRIL 2.5 MG: 5 TABLET ORAL at 09:00

## 2022-06-21 RX ADMIN — TRIAMTERENE AND HYDROCHLOROTHIAZIDE 1 TABLET: 75; 50 TABLET ORAL at 09:00

## 2022-06-21 RX ADMIN — POTASSIUM CHLORIDE 20 MEQ: 1500 TABLET, EXTENDED RELEASE ORAL at 09:00

## 2022-06-21 RX ADMIN — CYANOCOBALAMIN TAB 1000 MCG 1000 MCG: 1000 TAB at 09:00

## 2022-06-21 RX ADMIN — HEPARIN SODIUM 5000 UNITS: 5000 INJECTION INTRAVENOUS; SUBCUTANEOUS at 21:08

## 2022-06-21 RX ADMIN — DEXAMETHASONE 6 MG: 6 TABLET ORAL at 09:00

## 2022-06-21 RX ADMIN — INSULIN GLARGINE 25 UNITS: 100 INJECTION, SOLUTION SUBCUTANEOUS at 21:09

## 2022-06-21 RX ADMIN — CLOPIDOGREL BISULFATE 75 MG: 75 TABLET ORAL at 09:00

## 2022-06-21 RX ADMIN — INSULIN LISPRO 8 UNITS: 100 INJECTION, SOLUTION INTRAVENOUS; SUBCUTANEOUS at 13:20

## 2022-06-22 LAB
ANION GAP SERPL CALCULATED.3IONS-SCNC: 13 MMOL/L (ref 5–15)
BUN SERPL-MCNC: 26 MG/DL (ref 8–23)
BUN/CREAT SERPL: 28 (ref 7–25)
CALCIUM SPEC-SCNC: 8.9 MG/DL (ref 8.6–10.5)
CHLORIDE SERPL-SCNC: 101 MMOL/L (ref 98–107)
CO2 SERPL-SCNC: 22 MMOL/L (ref 22–29)
CREAT SERPL-MCNC: 0.93 MG/DL (ref 0.76–1.27)
DEPRECATED RDW RBC AUTO: 40.3 FL (ref 37–54)
EGFRCR SERPLBLD CKD-EPI 2021: 87.2 ML/MIN/1.73
ERYTHROCYTE [DISTWIDTH] IN BLOOD BY AUTOMATED COUNT: 13.5 % (ref 12.3–15.4)
GLUCOSE BLDC GLUCOMTR-MCNC: 140 MG/DL (ref 70–105)
GLUCOSE BLDC GLUCOMTR-MCNC: 149 MG/DL (ref 70–105)
GLUCOSE BLDC GLUCOMTR-MCNC: 159 MG/DL (ref 70–105)
GLUCOSE BLDC GLUCOMTR-MCNC: 206 MG/DL (ref 70–105)
GLUCOSE SERPL-MCNC: 138 MG/DL (ref 65–99)
HCT VFR BLD AUTO: 40.5 % (ref 37.5–51)
HGB BLD-MCNC: 14.3 G/DL (ref 13–17.7)
MCH RBC QN AUTO: 30.3 PG (ref 26.6–33)
MCHC RBC AUTO-ENTMCNC: 35.2 G/DL (ref 31.5–35.7)
MCV RBC AUTO: 86.1 FL (ref 79–97)
PLATELET # BLD AUTO: 226 10*3/MM3 (ref 140–450)
PMV BLD AUTO: 8.3 FL (ref 6–12)
POTASSIUM SERPL-SCNC: 4.2 MMOL/L (ref 3.5–5.2)
RBC # BLD AUTO: 4.71 10*6/MM3 (ref 4.14–5.8)
SODIUM SERPL-SCNC: 136 MMOL/L (ref 136–145)
WBC NRBC COR # BLD: 7.9 10*3/MM3 (ref 3.4–10.8)

## 2022-06-22 PROCEDURE — 85027 COMPLETE CBC AUTOMATED: CPT | Performed by: STUDENT IN AN ORGANIZED HEALTH CARE EDUCATION/TRAINING PROGRAM

## 2022-06-22 PROCEDURE — 63710000001 INSULIN GLARGINE PER 5 UNITS: Performed by: INTERNAL MEDICINE

## 2022-06-22 PROCEDURE — 63710000001 INSULIN LISPRO (HUMAN) PER 5 UNITS: Performed by: INTERNAL MEDICINE

## 2022-06-22 PROCEDURE — 80048 BASIC METABOLIC PNL TOTAL CA: CPT | Performed by: STUDENT IN AN ORGANIZED HEALTH CARE EDUCATION/TRAINING PROGRAM

## 2022-06-22 PROCEDURE — 82962 GLUCOSE BLOOD TEST: CPT

## 2022-06-22 PROCEDURE — 99231 SBSQ HOSP IP/OBS SF/LOW 25: CPT | Performed by: INTERNAL MEDICINE

## 2022-06-22 PROCEDURE — 99232 SBSQ HOSP IP/OBS MODERATE 35: CPT | Performed by: STUDENT IN AN ORGANIZED HEALTH CARE EDUCATION/TRAINING PROGRAM

## 2022-06-22 PROCEDURE — 25010000002 HEPARIN (PORCINE) PER 1000 UNITS: Performed by: HOSPITALIST

## 2022-06-22 RX ADMIN — INSULIN LISPRO 8 UNITS: 100 INJECTION, SOLUTION INTRAVENOUS; SUBCUTANEOUS at 11:44

## 2022-06-22 RX ADMIN — POTASSIUM CHLORIDE 20 MEQ: 1500 TABLET, EXTENDED RELEASE ORAL at 11:06

## 2022-06-22 RX ADMIN — CYANOCOBALAMIN TAB 1000 MCG 1000 MCG: 1000 TAB at 11:06

## 2022-06-22 RX ADMIN — INSULIN LISPRO 2 UNITS: 100 INJECTION, SOLUTION INTRAVENOUS; SUBCUTANEOUS at 18:28

## 2022-06-22 RX ADMIN — INSULIN GLARGINE 25 UNITS: 100 INJECTION, SOLUTION SUBCUTANEOUS at 21:55

## 2022-06-22 RX ADMIN — CLOPIDOGREL BISULFATE 75 MG: 75 TABLET ORAL at 11:06

## 2022-06-22 RX ADMIN — INSULIN LISPRO 5 UNITS: 100 INJECTION, SOLUTION INTRAVENOUS; SUBCUTANEOUS at 11:44

## 2022-06-22 RX ADMIN — TRIAMTERENE AND HYDROCHLOROTHIAZIDE 1 TABLET: 75; 50 TABLET ORAL at 11:06

## 2022-06-22 RX ADMIN — HEPARIN SODIUM 5000 UNITS: 5000 INJECTION INTRAVENOUS; SUBCUTANEOUS at 11:05

## 2022-06-22 RX ADMIN — INSULIN LISPRO 8 UNITS: 100 INJECTION, SOLUTION INTRAVENOUS; SUBCUTANEOUS at 18:29

## 2022-06-22 RX ADMIN — HEPARIN SODIUM 5000 UNITS: 5000 INJECTION INTRAVENOUS; SUBCUTANEOUS at 20:34

## 2022-06-22 RX ADMIN — LISINOPRIL 2.5 MG: 5 TABLET ORAL at 11:06

## 2022-06-23 LAB
ANION GAP SERPL CALCULATED.3IONS-SCNC: 15 MMOL/L (ref 5–15)
BUN SERPL-MCNC: 36 MG/DL (ref 8–23)
BUN/CREAT SERPL: 28.1 (ref 7–25)
CALCIUM SPEC-SCNC: 8.9 MG/DL (ref 8.6–10.5)
CHLORIDE SERPL-SCNC: 99 MMOL/L (ref 98–107)
CO2 SERPL-SCNC: 22 MMOL/L (ref 22–29)
CREAT SERPL-MCNC: 1.28 MG/DL (ref 0.76–1.27)
DEPRECATED RDW RBC AUTO: 41.6 FL (ref 37–54)
EGFRCR SERPLBLD CKD-EPI 2021: 59.5 ML/MIN/1.73
ERYTHROCYTE [DISTWIDTH] IN BLOOD BY AUTOMATED COUNT: 13.7 % (ref 12.3–15.4)
GLUCOSE BLDC GLUCOMTR-MCNC: 121 MG/DL (ref 70–105)
GLUCOSE BLDC GLUCOMTR-MCNC: 126 MG/DL (ref 70–105)
GLUCOSE BLDC GLUCOMTR-MCNC: 137 MG/DL (ref 70–105)
GLUCOSE BLDC GLUCOMTR-MCNC: 193 MG/DL (ref 70–105)
GLUCOSE SERPL-MCNC: 164 MG/DL (ref 65–99)
HCT VFR BLD AUTO: 40.5 % (ref 37.5–51)
HGB BLD-MCNC: 14 G/DL (ref 13–17.7)
MCH RBC QN AUTO: 29.9 PG (ref 26.6–33)
MCHC RBC AUTO-ENTMCNC: 34.6 G/DL (ref 31.5–35.7)
MCV RBC AUTO: 86.6 FL (ref 79–97)
PLATELET # BLD AUTO: 222 10*3/MM3 (ref 140–450)
PMV BLD AUTO: 8.5 FL (ref 6–12)
POTASSIUM SERPL-SCNC: 3.9 MMOL/L (ref 3.5–5.2)
RBC # BLD AUTO: 4.68 10*6/MM3 (ref 4.14–5.8)
SODIUM SERPL-SCNC: 136 MMOL/L (ref 136–145)
WBC NRBC COR # BLD: 8.3 10*3/MM3 (ref 3.4–10.8)

## 2022-06-23 PROCEDURE — 97110 THERAPEUTIC EXERCISES: CPT

## 2022-06-23 PROCEDURE — 25010000002 HEPARIN (PORCINE) PER 1000 UNITS: Performed by: HOSPITALIST

## 2022-06-23 PROCEDURE — 63710000001 INSULIN LISPRO (HUMAN) PER 5 UNITS: Performed by: INTERNAL MEDICINE

## 2022-06-23 PROCEDURE — 99232 SBSQ HOSP IP/OBS MODERATE 35: CPT | Performed by: STUDENT IN AN ORGANIZED HEALTH CARE EDUCATION/TRAINING PROGRAM

## 2022-06-23 PROCEDURE — 85027 COMPLETE CBC AUTOMATED: CPT | Performed by: STUDENT IN AN ORGANIZED HEALTH CARE EDUCATION/TRAINING PROGRAM

## 2022-06-23 PROCEDURE — 97116 GAIT TRAINING THERAPY: CPT

## 2022-06-23 PROCEDURE — 63710000001 INSULIN GLARGINE PER 5 UNITS: Performed by: INTERNAL MEDICINE

## 2022-06-23 PROCEDURE — 82962 GLUCOSE BLOOD TEST: CPT

## 2022-06-23 PROCEDURE — 80048 BASIC METABOLIC PNL TOTAL CA: CPT | Performed by: STUDENT IN AN ORGANIZED HEALTH CARE EDUCATION/TRAINING PROGRAM

## 2022-06-23 RX ORDER — SODIUM CHLORIDE, SODIUM LACTATE, POTASSIUM CHLORIDE, CALCIUM CHLORIDE 600; 310; 30; 20 MG/100ML; MG/100ML; MG/100ML; MG/100ML
75 INJECTION, SOLUTION INTRAVENOUS CONTINUOUS
Status: DISCONTINUED | OUTPATIENT
Start: 2022-06-23 | End: 2022-06-24

## 2022-06-23 RX ADMIN — INSULIN LISPRO 8 UNITS: 100 INJECTION, SOLUTION INTRAVENOUS; SUBCUTANEOUS at 17:35

## 2022-06-23 RX ADMIN — CYANOCOBALAMIN TAB 1000 MCG 1000 MCG: 1000 TAB at 08:32

## 2022-06-23 RX ADMIN — TRIAMTERENE AND HYDROCHLOROTHIAZIDE 1 TABLET: 75; 50 TABLET ORAL at 08:31

## 2022-06-23 RX ADMIN — INSULIN LISPRO 8 UNITS: 100 INJECTION, SOLUTION INTRAVENOUS; SUBCUTANEOUS at 12:26

## 2022-06-23 RX ADMIN — HEPARIN SODIUM 5000 UNITS: 5000 INJECTION INTRAVENOUS; SUBCUTANEOUS at 20:26

## 2022-06-23 RX ADMIN — INSULIN LISPRO 3 UNITS: 100 INJECTION, SOLUTION INTRAVENOUS; SUBCUTANEOUS at 08:30

## 2022-06-23 RX ADMIN — SODIUM CHLORIDE, POTASSIUM CHLORIDE, SODIUM LACTATE AND CALCIUM CHLORIDE 75 ML/HR: 600; 310; 30; 20 INJECTION, SOLUTION INTRAVENOUS at 20:27

## 2022-06-23 RX ADMIN — Medication 10 ML: at 20:26

## 2022-06-23 RX ADMIN — CLOPIDOGREL BISULFATE 75 MG: 75 TABLET ORAL at 08:32

## 2022-06-23 RX ADMIN — INSULIN LISPRO 8 UNITS: 100 INJECTION, SOLUTION INTRAVENOUS; SUBCUTANEOUS at 08:30

## 2022-06-23 RX ADMIN — HEPARIN SODIUM 5000 UNITS: 5000 INJECTION INTRAVENOUS; SUBCUTANEOUS at 08:32

## 2022-06-23 RX ADMIN — POTASSIUM CHLORIDE 20 MEQ: 1500 TABLET, EXTENDED RELEASE ORAL at 08:32

## 2022-06-23 RX ADMIN — Medication 10 ML: at 09:12

## 2022-06-23 RX ADMIN — INSULIN GLARGINE 25 UNITS: 100 INJECTION, SOLUTION SUBCUTANEOUS at 20:27

## 2022-06-23 RX ADMIN — SODIUM CHLORIDE, POTASSIUM CHLORIDE, SODIUM LACTATE AND CALCIUM CHLORIDE 75 ML/HR: 600; 310; 30; 20 INJECTION, SOLUTION INTRAVENOUS at 09:10

## 2022-06-23 RX ADMIN — LISINOPRIL 2.5 MG: 5 TABLET ORAL at 08:31

## 2022-06-24 LAB
ANION GAP SERPL CALCULATED.3IONS-SCNC: 12 MMOL/L (ref 5–15)
BUN SERPL-MCNC: 32 MG/DL (ref 8–23)
BUN/CREAT SERPL: 29.1 (ref 7–25)
CALCIUM SPEC-SCNC: 8.8 MG/DL (ref 8.6–10.5)
CHLORIDE SERPL-SCNC: 105 MMOL/L (ref 98–107)
CO2 SERPL-SCNC: 21 MMOL/L (ref 22–29)
CREAT SERPL-MCNC: 1.1 MG/DL (ref 0.76–1.27)
DEPRECATED RDW RBC AUTO: 42 FL (ref 37–54)
EGFRCR SERPLBLD CKD-EPI 2021: 71.3 ML/MIN/1.73
ERYTHROCYTE [DISTWIDTH] IN BLOOD BY AUTOMATED COUNT: 13.8 % (ref 12.3–15.4)
GLUCOSE BLDC GLUCOMTR-MCNC: 119 MG/DL (ref 70–105)
GLUCOSE BLDC GLUCOMTR-MCNC: 183 MG/DL (ref 70–105)
GLUCOSE BLDC GLUCOMTR-MCNC: 54 MG/DL (ref 70–105)
GLUCOSE BLDC GLUCOMTR-MCNC: 73 MG/DL (ref 70–105)
GLUCOSE SERPL-MCNC: 77 MG/DL (ref 65–99)
HCT VFR BLD AUTO: 40.8 % (ref 37.5–51)
HGB BLD-MCNC: 14.2 G/DL (ref 13–17.7)
MCH RBC QN AUTO: 30.1 PG (ref 26.6–33)
MCHC RBC AUTO-ENTMCNC: 34.7 G/DL (ref 31.5–35.7)
MCV RBC AUTO: 86.7 FL (ref 79–97)
PLATELET # BLD AUTO: 225 10*3/MM3 (ref 140–450)
PMV BLD AUTO: 8.2 FL (ref 6–12)
POTASSIUM SERPL-SCNC: 4.1 MMOL/L (ref 3.5–5.2)
RBC # BLD AUTO: 4.71 10*6/MM3 (ref 4.14–5.8)
SODIUM SERPL-SCNC: 138 MMOL/L (ref 136–145)
WBC NRBC COR # BLD: 7.8 10*3/MM3 (ref 3.4–10.8)

## 2022-06-24 PROCEDURE — 99232 SBSQ HOSP IP/OBS MODERATE 35: CPT | Performed by: STUDENT IN AN ORGANIZED HEALTH CARE EDUCATION/TRAINING PROGRAM

## 2022-06-24 PROCEDURE — 99231 SBSQ HOSP IP/OBS SF/LOW 25: CPT | Performed by: INTERNAL MEDICINE

## 2022-06-24 PROCEDURE — 80048 BASIC METABOLIC PNL TOTAL CA: CPT | Performed by: STUDENT IN AN ORGANIZED HEALTH CARE EDUCATION/TRAINING PROGRAM

## 2022-06-24 PROCEDURE — 63710000001 INSULIN LISPRO (HUMAN) PER 5 UNITS: Performed by: INTERNAL MEDICINE

## 2022-06-24 PROCEDURE — 82962 GLUCOSE BLOOD TEST: CPT

## 2022-06-24 PROCEDURE — 63710000001 INSULIN GLARGINE PER 5 UNITS: Performed by: STUDENT IN AN ORGANIZED HEALTH CARE EDUCATION/TRAINING PROGRAM

## 2022-06-24 PROCEDURE — 85027 COMPLETE CBC AUTOMATED: CPT | Performed by: STUDENT IN AN ORGANIZED HEALTH CARE EDUCATION/TRAINING PROGRAM

## 2022-06-24 PROCEDURE — 25010000002 HEPARIN (PORCINE) PER 1000 UNITS: Performed by: HOSPITALIST

## 2022-06-24 RX ADMIN — INSULIN LISPRO 3 UNITS: 100 INJECTION, SOLUTION INTRAVENOUS; SUBCUTANEOUS at 17:27

## 2022-06-24 RX ADMIN — POTASSIUM CHLORIDE 20 MEQ: 1500 TABLET, EXTENDED RELEASE ORAL at 09:30

## 2022-06-24 RX ADMIN — HEPARIN SODIUM 5000 UNITS: 5000 INJECTION INTRAVENOUS; SUBCUTANEOUS at 22:56

## 2022-06-24 RX ADMIN — SODIUM CHLORIDE, POTASSIUM CHLORIDE, SODIUM LACTATE AND CALCIUM CHLORIDE 75 ML/HR: 600; 310; 30; 20 INJECTION, SOLUTION INTRAVENOUS at 09:37

## 2022-06-24 RX ADMIN — CYANOCOBALAMIN TAB 1000 MCG 1000 MCG: 1000 TAB at 09:30

## 2022-06-24 RX ADMIN — CLOPIDOGREL BISULFATE 75 MG: 75 TABLET ORAL at 09:30

## 2022-06-24 RX ADMIN — LISINOPRIL 2.5 MG: 5 TABLET ORAL at 09:30

## 2022-06-24 RX ADMIN — Medication 10 ML: at 21:06

## 2022-06-24 RX ADMIN — INSULIN LISPRO 8 UNITS: 100 INJECTION, SOLUTION INTRAVENOUS; SUBCUTANEOUS at 17:27

## 2022-06-24 RX ADMIN — HEPARIN SODIUM 5000 UNITS: 5000 INJECTION INTRAVENOUS; SUBCUTANEOUS at 09:30

## 2022-06-24 RX ADMIN — INSULIN GLARGINE 15 UNITS: 100 INJECTION, SOLUTION SUBCUTANEOUS at 22:56

## 2022-06-25 VITALS
RESPIRATION RATE: 18 BRPM | HEIGHT: 70 IN | DIASTOLIC BLOOD PRESSURE: 59 MMHG | OXYGEN SATURATION: 94 % | SYSTOLIC BLOOD PRESSURE: 119 MMHG | WEIGHT: 159.61 LBS | TEMPERATURE: 98.4 F | BODY MASS INDEX: 22.85 KG/M2 | HEART RATE: 84 BPM

## 2022-06-25 LAB
ANION GAP SERPL CALCULATED.3IONS-SCNC: 12 MMOL/L (ref 5–15)
BUN SERPL-MCNC: 23 MG/DL (ref 8–23)
BUN/CREAT SERPL: 20 (ref 7–25)
CALCIUM SPEC-SCNC: 8.9 MG/DL (ref 8.6–10.5)
CHLORIDE SERPL-SCNC: 101 MMOL/L (ref 98–107)
CO2 SERPL-SCNC: 23 MMOL/L (ref 22–29)
CREAT SERPL-MCNC: 1.15 MG/DL (ref 0.76–1.27)
DEPRECATED RDW RBC AUTO: 42 FL (ref 37–54)
EGFRCR SERPLBLD CKD-EPI 2021: 67.6 ML/MIN/1.73
ERYTHROCYTE [DISTWIDTH] IN BLOOD BY AUTOMATED COUNT: 13.8 % (ref 12.3–15.4)
GLUCOSE BLDC GLUCOMTR-MCNC: 107 MG/DL (ref 70–105)
GLUCOSE BLDC GLUCOMTR-MCNC: 96 MG/DL (ref 70–105)
GLUCOSE SERPL-MCNC: 137 MG/DL (ref 65–99)
HCT VFR BLD AUTO: 40.5 % (ref 37.5–51)
HGB BLD-MCNC: 14.1 G/DL (ref 13–17.7)
MCH RBC QN AUTO: 30.3 PG (ref 26.6–33)
MCHC RBC AUTO-ENTMCNC: 34.8 G/DL (ref 31.5–35.7)
MCV RBC AUTO: 87.1 FL (ref 79–97)
PLATELET # BLD AUTO: 215 10*3/MM3 (ref 140–450)
PMV BLD AUTO: 8 FL (ref 6–12)
POTASSIUM SERPL-SCNC: 4.1 MMOL/L (ref 3.5–5.2)
RBC # BLD AUTO: 4.65 10*6/MM3 (ref 4.14–5.8)
SODIUM SERPL-SCNC: 136 MMOL/L (ref 136–145)
WBC NRBC COR # BLD: 9.5 10*3/MM3 (ref 3.4–10.8)

## 2022-06-25 PROCEDURE — 80048 BASIC METABOLIC PNL TOTAL CA: CPT | Performed by: STUDENT IN AN ORGANIZED HEALTH CARE EDUCATION/TRAINING PROGRAM

## 2022-06-25 PROCEDURE — 63710000001 INSULIN LISPRO (HUMAN) PER 5 UNITS: Performed by: INTERNAL MEDICINE

## 2022-06-25 PROCEDURE — 25010000002 HEPARIN (PORCINE) PER 1000 UNITS: Performed by: HOSPITALIST

## 2022-06-25 PROCEDURE — 99239 HOSP IP/OBS DSCHRG MGMT >30: CPT | Performed by: STUDENT IN AN ORGANIZED HEALTH CARE EDUCATION/TRAINING PROGRAM

## 2022-06-25 PROCEDURE — 82962 GLUCOSE BLOOD TEST: CPT

## 2022-06-25 PROCEDURE — 85027 COMPLETE CBC AUTOMATED: CPT | Performed by: STUDENT IN AN ORGANIZED HEALTH CARE EDUCATION/TRAINING PROGRAM

## 2022-06-25 RX ORDER — INSULIN LISPRO 100 [IU]/ML
8 INJECTION, SOLUTION INTRAVENOUS; SUBCUTANEOUS
Qty: 6 ML | Refills: 2
Start: 2022-06-25 | End: 2022-10-19

## 2022-06-25 RX ADMIN — CLOPIDOGREL BISULFATE 75 MG: 75 TABLET ORAL at 10:03

## 2022-06-25 RX ADMIN — LISINOPRIL 2.5 MG: 5 TABLET ORAL at 10:03

## 2022-06-25 RX ADMIN — HEPARIN SODIUM 5000 UNITS: 5000 INJECTION INTRAVENOUS; SUBCUTANEOUS at 10:03

## 2022-06-25 RX ADMIN — POTASSIUM CHLORIDE 20 MEQ: 1500 TABLET, EXTENDED RELEASE ORAL at 10:03

## 2022-06-25 RX ADMIN — Medication 10 ML: at 10:03

## 2022-06-25 RX ADMIN — CYANOCOBALAMIN TAB 1000 MCG 1000 MCG: 1000 TAB at 10:03

## 2022-06-25 RX ADMIN — INSULIN LISPRO 8 UNITS: 100 INJECTION, SOLUTION INTRAVENOUS; SUBCUTANEOUS at 10:03

## 2022-10-19 ENCOUNTER — APPOINTMENT (OUTPATIENT)
Dept: CT IMAGING | Facility: HOSPITAL | Age: 72
End: 2022-10-19

## 2022-10-19 ENCOUNTER — APPOINTMENT (OUTPATIENT)
Dept: GENERAL RADIOLOGY | Facility: HOSPITAL | Age: 72
End: 2022-10-19

## 2022-10-19 ENCOUNTER — HOSPITAL ENCOUNTER (OUTPATIENT)
Facility: HOSPITAL | Age: 72
Setting detail: OBSERVATION
Discharge: HOME | End: 2022-10-22
Attending: EMERGENCY MEDICINE | Admitting: HOSPITALIST

## 2022-10-19 DIAGNOSIS — R53.1 WEAKNESS: Primary | ICD-10-CM

## 2022-10-19 LAB
ALBUMIN SERPL-MCNC: 4.4 G/DL (ref 3.5–5.2)
ALBUMIN/GLOB SERPL: 1.5 G/DL
ALP SERPL-CCNC: 102 U/L (ref 39–117)
ALT SERPL W P-5'-P-CCNC: 20 U/L (ref 1–41)
ANION GAP SERPL CALCULATED.3IONS-SCNC: 11 MMOL/L (ref 5–15)
APTT PPP: 27 SECONDS (ref 24–31)
AST SERPL-CCNC: 20 U/L (ref 1–40)
BASOPHILS # BLD AUTO: 0.1 10*3/MM3 (ref 0–0.2)
BASOPHILS NFR BLD AUTO: 1.3 % (ref 0–1.5)
BILIRUB SERPL-MCNC: 0.5 MG/DL (ref 0–1.2)
BUN SERPL-MCNC: 25 MG/DL (ref 8–23)
BUN/CREAT SERPL: 20.8 (ref 7–25)
CALCIUM SPEC-SCNC: 9.6 MG/DL (ref 8.6–10.5)
CHLORIDE SERPL-SCNC: 100 MMOL/L (ref 98–107)
CO2 SERPL-SCNC: 30 MMOL/L (ref 22–29)
CREAT SERPL-MCNC: 1.2 MG/DL (ref 0.76–1.27)
DEPRECATED RDW RBC AUTO: 41.1 FL (ref 37–54)
EGFRCR SERPLBLD CKD-EPI 2021: 63.9 ML/MIN/1.73
EOSINOPHIL # BLD AUTO: 0.2 10*3/MM3 (ref 0–0.4)
EOSINOPHIL NFR BLD AUTO: 2.4 % (ref 0.3–6.2)
ERYTHROCYTE [DISTWIDTH] IN BLOOD BY AUTOMATED COUNT: 13.3 % (ref 12.3–15.4)
GLOBULIN UR ELPH-MCNC: 3 GM/DL
GLUCOSE BLDC GLUCOMTR-MCNC: 213 MG/DL (ref 70–105)
GLUCOSE SERPL-MCNC: 209 MG/DL (ref 65–99)
HCT VFR BLD AUTO: 40.7 % (ref 37.5–51)
HGB BLD-MCNC: 14.5 G/DL (ref 13–17.7)
INR PPP: 0.96 (ref 0.93–1.1)
LYMPHOCYTES # BLD AUTO: 1.8 10*3/MM3 (ref 0.7–3.1)
LYMPHOCYTES NFR BLD AUTO: 25.3 % (ref 19.6–45.3)
MCH RBC QN AUTO: 31.4 PG (ref 26.6–33)
MCHC RBC AUTO-ENTMCNC: 35.6 G/DL (ref 31.5–35.7)
MCV RBC AUTO: 88.2 FL (ref 79–97)
MONOCYTES # BLD AUTO: 0.6 10*3/MM3 (ref 0.1–0.9)
MONOCYTES NFR BLD AUTO: 8.8 % (ref 5–12)
NEUTROPHILS NFR BLD AUTO: 4.5 10*3/MM3 (ref 1.7–7)
NEUTROPHILS NFR BLD AUTO: 62.2 % (ref 42.7–76)
NRBC BLD AUTO-RTO: 0 /100 WBC (ref 0–0.2)
PLATELET # BLD AUTO: 145 10*3/MM3 (ref 140–450)
PMV BLD AUTO: 7.8 FL (ref 6–12)
POTASSIUM SERPL-SCNC: 3.9 MMOL/L (ref 3.5–5.2)
PROT SERPL-MCNC: 7.4 G/DL (ref 6–8.5)
PROTHROMBIN TIME: 9.9 SECONDS (ref 9.6–11.7)
RBC # BLD AUTO: 4.61 10*6/MM3 (ref 4.14–5.8)
SODIUM SERPL-SCNC: 141 MMOL/L (ref 136–145)
TROPONIN T SERPL-MCNC: <0.01 NG/ML (ref 0–0.03)
WBC NRBC COR # BLD: 7.3 10*3/MM3 (ref 3.4–10.8)

## 2022-10-19 PROCEDURE — 93005 ELECTROCARDIOGRAM TRACING: CPT | Performed by: EMERGENCY MEDICINE

## 2022-10-19 PROCEDURE — 86900 BLOOD TYPING SEROLOGIC ABO: CPT

## 2022-10-19 PROCEDURE — 99285 EMERGENCY DEPT VISIT HI MDM: CPT

## 2022-10-19 PROCEDURE — 86850 RBC ANTIBODY SCREEN: CPT | Performed by: EMERGENCY MEDICINE

## 2022-10-19 PROCEDURE — 86900 BLOOD TYPING SEROLOGIC ABO: CPT | Performed by: EMERGENCY MEDICINE

## 2022-10-19 PROCEDURE — 84443 ASSAY THYROID STIM HORMONE: CPT | Performed by: NURSE PRACTITIONER

## 2022-10-19 PROCEDURE — 86901 BLOOD TYPING SEROLOGIC RH(D): CPT | Performed by: EMERGENCY MEDICINE

## 2022-10-19 PROCEDURE — 71045 X-RAY EXAM CHEST 1 VIEW: CPT

## 2022-10-19 PROCEDURE — 82962 GLUCOSE BLOOD TEST: CPT

## 2022-10-19 PROCEDURE — 82607 VITAMIN B-12: CPT | Performed by: NURSE PRACTITIONER

## 2022-10-19 PROCEDURE — 85730 THROMBOPLASTIN TIME PARTIAL: CPT | Performed by: EMERGENCY MEDICINE

## 2022-10-19 PROCEDURE — 80053 COMPREHEN METABOLIC PANEL: CPT | Performed by: EMERGENCY MEDICINE

## 2022-10-19 PROCEDURE — 85610 PROTHROMBIN TIME: CPT | Performed by: EMERGENCY MEDICINE

## 2022-10-19 PROCEDURE — 84484 ASSAY OF TROPONIN QUANT: CPT | Performed by: EMERGENCY MEDICINE

## 2022-10-19 PROCEDURE — 85025 COMPLETE CBC W/AUTO DIFF WBC: CPT | Performed by: EMERGENCY MEDICINE

## 2022-10-19 PROCEDURE — 80061 LIPID PANEL: CPT | Performed by: NURSE PRACTITIONER

## 2022-10-19 PROCEDURE — 70450 CT HEAD/BRAIN W/O DYE: CPT

## 2022-10-19 PROCEDURE — 86901 BLOOD TYPING SEROLOGIC RH(D): CPT

## 2022-10-19 RX ORDER — SODIUM CHLORIDE 0.9 % (FLUSH) 0.9 %
10 SYRINGE (ML) INJECTION AS NEEDED
Status: DISCONTINUED | OUTPATIENT
Start: 2022-10-19 | End: 2022-10-22 | Stop reason: HOSPADM

## 2022-10-19 RX ORDER — METHYLPREDNISOLONE SODIUM SUCCINATE 125 MG/2ML
INJECTION, POWDER, LYOPHILIZED, FOR SOLUTION INTRAMUSCULAR; INTRAVENOUS
Status: DISPENSED
Start: 2022-10-19 | End: 2022-10-20

## 2022-10-19 RX ORDER — TRIAMTERENE AND HYDROCHLOROTHIAZIDE 75; 50 MG/1; MG/1
1 TABLET ORAL DAILY
COMMUNITY

## 2022-10-19 RX ORDER — DIPHENHYDRAMINE HYDROCHLORIDE 50 MG/ML
INJECTION INTRAMUSCULAR; INTRAVENOUS
Status: DISPENSED
Start: 2022-10-19 | End: 2022-10-20

## 2022-10-20 ENCOUNTER — APPOINTMENT (OUTPATIENT)
Dept: MRI IMAGING | Facility: HOSPITAL | Age: 72
End: 2022-10-20

## 2022-10-20 ENCOUNTER — APPOINTMENT (OUTPATIENT)
Dept: CARDIOLOGY | Facility: HOSPITAL | Age: 72
End: 2022-10-20

## 2022-10-20 PROBLEM — Z79.4 TYPE 2 DIABETES MELLITUS WITH STAGE 3 CHRONIC KIDNEY DISEASE, WITH LONG-TERM CURRENT USE OF INSULIN: Chronic | Status: ACTIVE | Noted: 2021-12-13

## 2022-10-20 PROBLEM — N18.30 TYPE 2 DIABETES MELLITUS WITH STAGE 3 CHRONIC KIDNEY DISEASE, WITH LONG-TERM CURRENT USE OF INSULIN (HCC): Chronic | Status: ACTIVE | Noted: 2021-12-13

## 2022-10-20 PROBLEM — E11.22 TYPE 2 DIABETES MELLITUS WITH STAGE 3 CHRONIC KIDNEY DISEASE, WITH LONG-TERM CURRENT USE OF INSULIN: Chronic | Status: ACTIVE | Noted: 2021-12-13

## 2022-10-20 PROBLEM — G45.9 TRANSIENT ISCHEMIC ATTACK (TIA): Status: ACTIVE | Noted: 2022-10-20

## 2022-10-20 PROBLEM — G45.9 TRANSIENT ISCHEMIC ATTACK (TIA): Status: RESOLVED | Noted: 2022-10-20 | Resolved: 2022-10-20

## 2022-10-20 LAB
ABO GROUP BLD: NORMAL
ALBUMIN SERPL-MCNC: 3.6 G/DL (ref 3.5–5.2)
ALBUMIN/GLOB SERPL: 1.3 G/DL
ALP SERPL-CCNC: 86 U/L (ref 39–117)
ALT SERPL W P-5'-P-CCNC: 16 U/L (ref 1–41)
ANION GAP SERPL CALCULATED.3IONS-SCNC: 10 MMOL/L (ref 5–15)
AST SERPL-CCNC: 16 U/L (ref 1–40)
BH CV XLRA MEAS LEFT DIST CCA EDV: -19.3 CM/SEC
BH CV XLRA MEAS LEFT DIST CCA PSV: -87 CM/SEC
BH CV XLRA MEAS LEFT DIST ICA EDV: -17.4 CM/SEC
BH CV XLRA MEAS LEFT DIST ICA PSV: -66.5 CM/SEC
BH CV XLRA MEAS LEFT ICA/CCA RATIO: -0.62
BH CV XLRA MEAS LEFT PROX CCA EDV: 14.9 CM/SEC
BH CV XLRA MEAS LEFT PROX CCA PSV: 111 CM/SEC
BH CV XLRA MEAS LEFT PROX ECA PSV: -85.1 CM/SEC
BH CV XLRA MEAS LEFT PROX ICA EDV: -21.1 CM/SEC
BH CV XLRA MEAS LEFT PROX ICA PSV: -68.3 CM/SEC
BH CV XLRA MEAS LEFT PROX SCLA PSV: 101 CM/SEC
BH CV XLRA MEAS LEFT VERTEBRAL A EDV: -13 CM/SEC
BH CV XLRA MEAS LEFT VERTEBRAL A PSV: -49.7 CM/SEC
BH CV XLRA MEAS RIGHT DIST CCA EDV: 11.3 CM/SEC
BH CV XLRA MEAS RIGHT DIST CCA PSV: 69.3 CM/SEC
BH CV XLRA MEAS RIGHT DIST ICA EDV: -21.7 CM/SEC
BH CV XLRA MEAS RIGHT DIST ICA PSV: -70.9 CM/SEC
BH CV XLRA MEAS RIGHT ICA/CCA RATIO: 0.8
BH CV XLRA MEAS RIGHT PROX CCA EDV: -15.5 CM/SEC
BH CV XLRA MEAS RIGHT PROX CCA PSV: -88.8 CM/SEC
BH CV XLRA MEAS RIGHT PROX ECA PSV: -80.1 CM/SEC
BH CV XLRA MEAS RIGHT PROX ICA EDV: -16.2 CM/SEC
BH CV XLRA MEAS RIGHT PROX ICA PSV: -63.4 CM/SEC
BH CV XLRA MEAS RIGHT PROX SCLA PSV: 83.2 CM/SEC
BH CV XLRA MEAS RIGHT VERTEBRAL A EDV: 11.8 CM/SEC
BH CV XLRA MEAS RIGHT VERTEBRAL A PSV: 46.2 CM/SEC
BILIRUB SERPL-MCNC: 0.3 MG/DL (ref 0–1.2)
BILIRUB UR QL STRIP: NEGATIVE
BLD GP AB SCN SERPL QL: NEGATIVE
BLOOD BANK ARMBAND CHECK: NORMAL
BUN SERPL-MCNC: 24 MG/DL (ref 8–23)
BUN/CREAT SERPL: 21.6 (ref 7–25)
CALCIUM SPEC-SCNC: 9.4 MG/DL (ref 8.6–10.5)
CHLORIDE SERPL-SCNC: 102 MMOL/L (ref 98–107)
CHOLEST SERPL-MCNC: 235 MG/DL (ref 0–200)
CLARITY UR: CLEAR
CO2 SERPL-SCNC: 29 MMOL/L (ref 22–29)
COLOR UR: YELLOW
CREAT SERPL-MCNC: 1.11 MG/DL (ref 0.76–1.27)
DEPRECATED RDW RBC AUTO: 41.6 FL (ref 37–54)
EGFRCR SERPLBLD CKD-EPI 2021: 70.1 ML/MIN/1.73
ERYTHROCYTE [DISTWIDTH] IN BLOOD BY AUTOMATED COUNT: 13.4 % (ref 12.3–15.4)
GLOBULIN UR ELPH-MCNC: 2.7 GM/DL
GLUCOSE BLDC GLUCOMTR-MCNC: 146 MG/DL (ref 70–105)
GLUCOSE BLDC GLUCOMTR-MCNC: 165 MG/DL (ref 70–105)
GLUCOSE BLDC GLUCOMTR-MCNC: 200 MG/DL (ref 70–105)
GLUCOSE BLDC GLUCOMTR-MCNC: 246 MG/DL (ref 70–105)
GLUCOSE SERPL-MCNC: 175 MG/DL (ref 65–99)
GLUCOSE UR STRIP-MCNC: ABNORMAL MG/DL
HBA1C MFR BLD: 8 % (ref 3.5–5.6)
HCT VFR BLD AUTO: 36.4 % (ref 37.5–51)
HDLC SERPL-MCNC: 50 MG/DL (ref 40–60)
HGB BLD-MCNC: 13.4 G/DL (ref 13–17.7)
HGB UR QL STRIP.AUTO: NEGATIVE
HOLD SPECIMEN: NORMAL
HOLD SPECIMEN: NORMAL
KETONES UR QL STRIP: NEGATIVE
LDLC SERPL CALC-MCNC: 141 MG/DL (ref 0–100)
LDLC/HDLC SERPL: 2.72 {RATIO}
LEUKOCYTE ESTERASE UR QL STRIP.AUTO: NEGATIVE
MAXIMAL PREDICTED HEART RATE: 147 BPM
MCH RBC QN AUTO: 32.6 PG (ref 26.6–33)
MCHC RBC AUTO-ENTMCNC: 36.7 G/DL (ref 31.5–35.7)
MCV RBC AUTO: 88.8 FL (ref 79–97)
NITRITE UR QL STRIP: NEGATIVE
PH UR STRIP.AUTO: 5.5 [PH] (ref 5–8)
PLATELET # BLD AUTO: 117 10*3/MM3 (ref 140–450)
PMV BLD AUTO: 8.1 FL (ref 6–12)
POTASSIUM SERPL-SCNC: 4.1 MMOL/L (ref 3.5–5.2)
PROT SERPL-MCNC: 6.3 G/DL (ref 6–8.5)
PROT UR QL STRIP: NEGATIVE
RBC # BLD AUTO: 4.1 10*6/MM3 (ref 4.14–5.8)
RH BLD: NEGATIVE
SARS-COV-2 RNA RESP QL NAA+PROBE: NOT DETECTED
SODIUM SERPL-SCNC: 141 MMOL/L (ref 136–145)
SP GR UR STRIP: 1.02 (ref 1–1.03)
STRESS TARGET HR: 125 BPM
T&S EXPIRATION DATE: NORMAL
TRIGL SERPL-MCNC: 246 MG/DL (ref 0–150)
TSH SERPL DL<=0.05 MIU/L-ACNC: 3.64 UIU/ML (ref 0.27–4.2)
UROBILINOGEN UR QL STRIP: ABNORMAL
VIT B12 BLD-MCNC: 542 PG/ML (ref 211–946)
VLDLC SERPL-MCNC: 44 MG/DL (ref 5–40)
WBC NRBC COR # BLD: 6 10*3/MM3 (ref 3.4–10.8)
WHOLE BLOOD HOLD COAG: NORMAL

## 2022-10-20 PROCEDURE — 83036 HEMOGLOBIN GLYCOSYLATED A1C: CPT | Performed by: NURSE PRACTITIONER

## 2022-10-20 PROCEDURE — 97166 OT EVAL MOD COMPLEX 45 MIN: CPT

## 2022-10-20 PROCEDURE — 80053 COMPREHEN METABOLIC PANEL: CPT | Performed by: NURSE PRACTITIONER

## 2022-10-20 PROCEDURE — 36415 COLL VENOUS BLD VENIPUNCTURE: CPT | Performed by: EMERGENCY MEDICINE

## 2022-10-20 PROCEDURE — G0378 HOSPITAL OBSERVATION PER HR: HCPCS

## 2022-10-20 PROCEDURE — 99214 OFFICE O/P EST MOD 30 MIN: CPT | Performed by: PSYCHIATRY & NEUROLOGY

## 2022-10-20 PROCEDURE — 97162 PT EVAL MOD COMPLEX 30 MIN: CPT

## 2022-10-20 PROCEDURE — 82962 GLUCOSE BLOOD TEST: CPT

## 2022-10-20 PROCEDURE — U0003 INFECTIOUS AGENT DETECTION BY NUCLEIC ACID (DNA OR RNA); SEVERE ACUTE RESPIRATORY SYNDROME CORONAVIRUS 2 (SARS-COV-2) (CORONAVIRUS DISEASE [COVID-19]), AMPLIFIED PROBE TECHNIQUE, MAKING USE OF HIGH THROUGHPUT TECHNOLOGIES AS DESCRIBED BY CMS-2020-01-R: HCPCS | Performed by: NURSE PRACTITIONER

## 2022-10-20 PROCEDURE — 93880 EXTRACRANIAL BILAT STUDY: CPT

## 2022-10-20 PROCEDURE — 70551 MRI BRAIN STEM W/O DYE: CPT

## 2022-10-20 PROCEDURE — 81003 URINALYSIS AUTO W/O SCOPE: CPT | Performed by: EMERGENCY MEDICINE

## 2022-10-20 PROCEDURE — 70544 MR ANGIOGRAPHY HEAD W/O DYE: CPT

## 2022-10-20 PROCEDURE — 85027 COMPLETE CBC AUTOMATED: CPT | Performed by: NURSE PRACTITIONER

## 2022-10-20 PROCEDURE — 63710000001 INSULIN GLARGINE PER 5 UNITS: Performed by: INTERNAL MEDICINE

## 2022-10-20 PROCEDURE — U0005 INFEC AGEN DETEC AMPLI PROBE: HCPCS | Performed by: NURSE PRACTITIONER

## 2022-10-20 RX ORDER — DEXTROSE MONOHYDRATE 25 G/50ML
25 INJECTION, SOLUTION INTRAVENOUS
Status: DISCONTINUED | OUTPATIENT
Start: 2022-10-20 | End: 2022-10-22 | Stop reason: HOSPADM

## 2022-10-20 RX ORDER — OLANZAPINE 10 MG/2ML
1 INJECTION, POWDER, LYOPHILIZED, FOR SOLUTION INTRAMUSCULAR
Status: DISCONTINUED | OUTPATIENT
Start: 2022-10-20 | End: 2022-10-22 | Stop reason: HOSPADM

## 2022-10-20 RX ORDER — CLOPIDOGREL BISULFATE 75 MG/1
75 TABLET ORAL DAILY
Status: DISCONTINUED | OUTPATIENT
Start: 2022-10-20 | End: 2022-10-22 | Stop reason: HOSPADM

## 2022-10-20 RX ORDER — ALUMINA, MAGNESIA, AND SIMETHICONE 2400; 2400; 240 MG/30ML; MG/30ML; MG/30ML
7.5 SUSPENSION ORAL EVERY 4 HOURS PRN
Status: DISCONTINUED | OUTPATIENT
Start: 2022-10-20 | End: 2022-10-22 | Stop reason: HOSPADM

## 2022-10-20 RX ORDER — TRIAMTERENE AND HYDROCHLOROTHIAZIDE 75; 50 MG/1; MG/1
1 TABLET ORAL DAILY
Status: DISCONTINUED | OUTPATIENT
Start: 2022-10-20 | End: 2022-10-22 | Stop reason: HOSPADM

## 2022-10-20 RX ORDER — ONDANSETRON 2 MG/ML
4 INJECTION INTRAMUSCULAR; INTRAVENOUS EVERY 6 HOURS PRN
Status: DISCONTINUED | OUTPATIENT
Start: 2022-10-20 | End: 2022-10-22 | Stop reason: HOSPADM

## 2022-10-20 RX ORDER — INSULIN LISPRO 100 [IU]/ML
3-14 INJECTION, SOLUTION INTRAVENOUS; SUBCUTANEOUS
Status: DISCONTINUED | OUTPATIENT
Start: 2022-10-20 | End: 2022-10-20

## 2022-10-20 RX ORDER — DOCUSATE SODIUM 100 MG/1
100 CAPSULE, LIQUID FILLED ORAL 2 TIMES DAILY PRN
Status: DISCONTINUED | OUTPATIENT
Start: 2022-10-20 | End: 2022-10-22 | Stop reason: HOSPADM

## 2022-10-20 RX ORDER — NICOTINE POLACRILEX 4 MG
15 LOZENGE BUCCAL
Status: DISCONTINUED | OUTPATIENT
Start: 2022-10-20 | End: 2022-10-22 | Stop reason: HOSPADM

## 2022-10-20 RX ORDER — LISINOPRIL 20 MG/1
40 TABLET ORAL DAILY
Status: DISCONTINUED | OUTPATIENT
Start: 2022-10-20 | End: 2022-10-22 | Stop reason: HOSPADM

## 2022-10-20 RX ORDER — ATORVASTATIN CALCIUM 40 MG/1
80 TABLET, FILM COATED ORAL NIGHTLY
Status: DISCONTINUED | OUTPATIENT
Start: 2022-10-20 | End: 2022-10-20

## 2022-10-20 RX ADMIN — CLOPIDOGREL BISULFATE 75 MG: 75 TABLET ORAL at 12:15

## 2022-10-20 RX ADMIN — INSULIN GLARGINE 33 UNITS: 100 INJECTION, SOLUTION SUBCUTANEOUS at 16:19

## 2022-10-20 RX ADMIN — TRIAMTERENE AND HYDROCHLOROTHIAZIDE 1 TABLET: 75; 50 TABLET ORAL at 12:15

## 2022-10-20 RX ADMIN — LINAGLIPTIN 5 MG: 5 TABLET, FILM COATED ORAL at 12:15

## 2022-10-20 RX ADMIN — LISINOPRIL 40 MG: 20 TABLET ORAL at 12:15

## 2022-10-20 NOTE — PLAN OF CARE
Goal Outcome Evaluation:               Routine stroke order received. Pt passed swallow screen and has been placed on a regular diet. Imaging (CT & MRI) negative for acute findings. Will sign off per protocol, please reconsult if indicated.

## 2022-10-20 NOTE — PLAN OF CARE
Goal Outcome Evaluation:  Pt arrived from the ED, Pt is A&Ox3, v/s stable, call light in reach, spouse at bedside, RN will monitor.

## 2022-10-20 NOTE — H&P
[facility] Medicine Services      Patient Name: Blas Mojica  : 1950  MRN: [id number]  Primary Care Physician:  Sahil Read MD  Date of admission: 10/19/2022      Subjective      Chief Complaint: Stroke like symptoms     History of Present Illness: Blas Mojica is a 72 y.o. male w/PMH of HTN, HLD, CKD,type II DM, TIA, and recent COVID-19 pneumonia and who presented to [facility] on 10/19/2022 complaining of left sided weakness,from his left shoulder to his left leg that started at 8pm last night.He states that he was in bed and suddenly felt very weak, and was unable to hold himself up, or get himself dressed. He does report the weakness has resolved since. He has left facial droop that he states he believes is baseline for him. Patient does report confirm he uses a walker at baseline. Patient also endorse he had TIA in 2016, he was at that time started on ASA, Plavix and Lipitor, he has since been taken off the ASA due to GI intolerance and the Lipitor for an allergic reaction, but he can not recall what the reaction was. He confirms he takes 75mg of plavix daily. Patient denies any additional weakness, numbness, tingling, confusion, lightheadedness, dizziness, headache, vision changes, or any other neurological symptoms.  He also denies any other acute distress chest pain, palpitations, shortness of breath, fever, chills, cough, abdominal pain, nausea, constipation or  complaints.  Patient does report several episodes of diarrhea over the last 24 hours.    Initial evaluation in the emergency department revealed elevated glucose 209, CO2 30, BUN 25, all other labs were unremarkable.  X-ray shows no acute cardiopulmonary processes pending radiology review.  CT scan without contrast   Showed no acute intracranial processes, there was noted age commensurate  Parenchymal volume loss, and mild small vessel ischemic changes, and a chronic mastoid effusion. EKG read  as sinus tachycardia heart rate 101 with no obvious signs of ischemia, or ectopy.    Patient will be admitted to the hospitalist group for further observation and evaluation of left-sided weakness, and left facial droop.  With MRI ordered for a.m. and neurology consulted per policy.      Review of Systems   HENT: Negative.    Eyes: Negative.    Respiratory: Negative.    Endocrine: Negative.    Hematologic/Lymphatic: Bruises/bleeds easily.   Skin: Positive for dry skin and poor wound healing.   Musculoskeletal: Negative.    Gastrointestinal: Negative.    Genitourinary: Negative.    Neurological: Positive for focal weakness.        Left sided weakness      Psychiatric/Behavioral: Negative.    Allergic/Immunologic: Negative.    All other systems reviewed and are negative.       Personal History     Past Medical History:   Diagnosis Date   • Diabetes mellitus (HCC)    • Hyperlipidemia    • Hypertension    • Kidney stone    • TIA (transient ischemic attack) 2016   • Type 2 diabetes mellitus with stage 3 chronic kidney disease, with long-term current use of insulin ([facility]) 12/13/2021       Past Surgical History:   Procedure Laterality Date   • ADENOIDECTOMY     • EXTRACORPOREAL SHOCK WAVE LITHOTRIPSY (ESWL)     • HERNIA REPAIR     • KNEE ACL RECONSTRUCTION Left    • TONSILLECTOMY         Family History: family history includes No Known Problems in his father and mother.   Social History:  reports that he has never smoked. He has never used smokeless tobacco. He reports that he does not drink alcohol and does not use drugs.    Home Medications:  Prior to Admission Medications     Prescriptions Last Dose Informant Patient Reported? Taking?    clopidogrel (PLAVIX) 75 MG tablet 10/19/2022  Yes Yes    Take 75 mg by mouth Daily.    insulin glargine (LANTUS, SEMGLEE) 100 UNIT/ML injection 10/19/2022  No Yes    Inject 15 Units under the skin into the appropriate area as directed Every Night.    linagliptin (TRADJENTA) 5 MG tablet  tablet 10/19/2022  Yes Yes    Take 5 mg by mouth Daily.    lisinopril (PRINIVIL,ZESTRIL) 40 MG tablet 10/19/2022 Self Yes Yes    Take 40 mg by mouth Daily.    multivitamin with minerals tablet tablet 10/19/2022  Yes Yes    Take 1 tablet by mouth Daily.    triamterene-hydrochlorothiazide (MAXZIDE) 75-50 MG per tablet 10/19/2022 Self Yes Yes    Take 1 tablet by mouth Daily.            Allergies:  Allergies   Allergen Reactions   • Contrast Dye Anaphylaxis   • Iodinated Diagnostic Agents Shortness Of Breath   • Iodine Shortness Of Breath   • Aspirin Nausea And Vomiting       Objective      Vitals:   Temp:  [98.6 °F (37 °C)] 98.6 °F (37 °C)  Heart Rate:  [] 102  Resp:  [17-18] 17  BP: (108-160)/(79-89) 108/89    Physical Exam  Vitals and nursing note reviewed.   Constitutional:       Appearance: Normal appearance. He is well-developed.   HENT:      Head: Normocephalic and atraumatic.      Mouth/Throat:      Mouth: Mucous membranes are dry.   Eyes:      Pupils: Pupils are equal, round, and reactive to light.   Cardiovascular:      Rate and Rhythm: Normal rate and regular rhythm.      Pulses: Normal pulses.      Heart sounds: Normal heart sounds.   Pulmonary:      Effort: Pulmonary effort is normal.      Breath sounds: Normal breath sounds.   Abdominal:      General: Bowel sounds are normal.      Palpations: Abdomen is soft.   Musculoskeletal:         General: Normal range of motion.   Skin:     General: Skin is warm and dry.      Capillary Refill: Capillary refill takes less than 2 seconds.      Findings: Bruising present.   Neurological:      Mental Status: He is alert, oriented to person, place, and time and easily aroused. Mental status is at baseline.      Cranial Nerves: Facial asymmetry present.      Sensory: Sensation is intact.      Motor: Weakness present.   Psychiatric:         Behavior: Behavior is cooperative.          Result Review    Result Review:  I have personally reviewed the results from the time  of this admission to 10/20/2022 02:17 EDT and agree with these findings:  [x]  Laboratory  []  Microbiology  [x]  Radiology  [x]  EKG/Telemetry   [x]  Cardiology/Vascular   []  Pathology  [x]  Old records  []  Other:  Most notable findings include:     Assessment & Plan        Active Hospital Problems:  Active Hospital Problems    Diagnosis    • Mixed hyperlipidemia    • Type 2 diabetes mellitus with stage 3 chronic kidney disease, with long-term current use of insulin (HCC)    • Stage 3 chronic kidney disease (HCC)    • TIA (transient ischemic attack)    • Primary hypertension      Plan:   TIA (transient ischemic attack)  Left sided facial droop   Left side weakness-unchanged from baseline   Ct of head WO contrast reviewed- No acute intracranial abnormalities noted   MRI ordered for a.m.  Neurochecks per policy  NIHSS per policy  Continue home Plavix   Check lipid panel, TSH, hemoglobin A1c  Monitor CBC, CMP,  Neurology consulted per policy  Stroke coordinator consulted per policy  PT OT ordered  Speech consulted  N.p.o. pending bedside dysphagia screening  Precautions, safety precautions  Continuous cardiac monitoring and pulse oximetry  Supplemental oxygen as needed for hypoxia/respiratory distress to maintain oxygen saturation greater than 90%.  Vital signs per policy  Statin ordered, but discontinued when patient reported severe allergic reaction.    Stage III chronic kidney disease  Chronic/stable  Creatinine 1.20, BUN 25, GFR 63.9  Daily weights  Monitor I's and O's  Avoid nephrotoxic medications, hypovolemia, hypotension    Primary hypertension  Chronic  Vital Signs  Per policy   Continue home lisinopril, Maxide      Mixed Hyperlipidemia   Chronic   Lipid panel pending   Statin ordered    Type 2 diabetes mellitus with stage 3 chronic kidney disease, with long-term current use of insulin   Chronic/Poorly controlled   Glucose 209 at time of admission   Accu-Cheks ACHS  SSI ordered for  hyperglycemia  Hypoglycemia protocol ordered  Continue home Lantus 15 units  Continue home Tradjenta    DVT prophylaxis:  Mechanical DVT prophylaxis orders are present.    CODE STATUS:    Code Status (Patient has no pulse and is not breathing): CPR (Attempt to Resuscitate)  Medical Interventions (Patient has pulse or is breathing): Full Support  Release to patient: Routine Release    Admission Status:  I believe this patient meets observation status.    I discussed the patient's findings and my recommendations with patient and family.    This patient has been examined wearing appropriate Personal Protective Equipment . 10/20/22      Signature: Electronically signed by PIPER Bejarano, 10/20/22, 2:17 AM EDT.

## 2022-10-20 NOTE — PLAN OF CARE
Goal Outcome Evaluation:  Plan of Care Reviewed With: patient, spouse        Progress: no change  Outcome Evaluation: Pt is a 71 y/o male who presented to [facility] 10/19/22 with c/o L sided weakness. PMH includes TIA, CKD, and DM II. CT head (-). XR chest (+) query small R effusion. Pt agreeable to OT eval with wife at bedside. Pt reports living in 2nd floor apartment with elevator access. Pt reports independence with ADLs at baseline. Pt reports use of RW at baseline for mobility. Pt reports residual L-sided weakness, secondary to previous TIA. Pt demo increased difficulty with donning socks this date, requiring mod A and extended time. Pt also required mod A for threading briefs. Pt required SBA for static sitting balance, however required min A for dynamic sitting balance with one LOB. Pt completes STS and functional ambulation this date with RW with CGA. Pt is mildly below baseline, requiring extended time for LB ADLs and increased assistance. OT recommend home with home health when medically appropriate for d/c. OT to follow while at [facility].

## 2022-10-20 NOTE — PLAN OF CARE
Goal Outcome Evaluation:  Plan of Care Reviewed With: patient            73 y/o M who presented with transient L UE and LE weakness. MRI brain (-). Per neuro likely right hemispheric TIA. Patient lives in independent living apartment with his spouse. He uses a RW at baseline. He has a history of L sided weakness per report. He demonstrated functional weakness of LE with difficulty performing bed mobility to L side. Pt with one profound loss of balance to the L in sitting due to LOB with inability to correct with LUE due to weakness. He performed bed mobility and transfers with CGA to SBA. He ambulated 15' with RW and SBA. Anticipate pt will be safe to return home with HHPT. He appears to have slight decline from his baseline. Patient agreeable to HHPT.

## 2022-10-20 NOTE — ED PROVIDER NOTES
Subjective   History of Present Illness  72-year-old male with history of TIA and IV dye allergy causing respiratory distress complains of left-sided weakness onset at 8 PM at home.  Patient states his left arm and left leg feel weak.  Patient ambulates with a walker at baseline.  Wife states patient was having trouble getting his clothes on.  Patient denies any numbness or difficulty speaking or other symptoms.  Patient was rapidly evaluated after triage called a team b.  Patient had a very mild left facial droop but otherwise had exam consistent with his baseline.  Case discussed with on-call neurologist, Dr. Bower, agrees with CT head without, admission for further evaluation.  Given patient's only mild left facial droop, will not give tPA unless deficit worsens.        Review of Systems   Constitutional: Positive for fatigue.   Neurological:        As per HPI   All other systems reviewed and are negative.      Past Medical History:   Diagnosis Date   • Diabetes mellitus (HCC)    • Hyperlipidemia    • Hypertension    • Kidney stone    • TIA (transient ischemic attack) 2016       Allergies   Allergen Reactions   • Contrast Dye Anaphylaxis   • Iodinated Diagnostic Agents Shortness Of Breath   • Iodine Shortness Of Breath   • Aspirin Nausea And Vomiting       Past Surgical History:   Procedure Laterality Date   • ADENOIDECTOMY     • EXTRACORPOREAL SHOCK WAVE LITHOTRIPSY (ESWL)     • HERNIA REPAIR     • KNEE ACL RECONSTRUCTION Left    • TONSILLECTOMY         Family History   Problem Relation Age of Onset   • No Known Problems Mother    • No Known Problems Father        Social History     Socioeconomic History   • Marital status:    Tobacco Use   • Smoking status: Never   • Smokeless tobacco: Never   Vaping Use   • Vaping Use: Never used   Substance and Sexual Activity   • Alcohol use: Never   • Drug use: Never   • Sexual activity: Defer           Objective   Physical Exam  Constitutional:       Appearance:  Normal appearance.   Neurological:      Mental Status: He is alert.      Comments: Mild left facial droop, otherwise cranial nerves intact, symmetric , no pronator drift, sensation intact all 4 extremities, bilateral lower extremity weakness.  Patient ambulates at his baseline with assistance.   Psychiatric:         Mood and Affect: Mood normal.         Behavior: Behavior normal.         Procedures           ED Course  ED Course as of 10/20/22 0131   Wed Oct 19, 2022   1488 EKG interpretation: Sinus tachycardia with occasional PAC, no acute ST change [JR]      ED Course User Index  [JR] Pito Linares MD                                           MDM  Number of Diagnoses or Management Options  Weakness  Diagnosis management comments: Patient appears to be at his baseline per patient and wife.  It is difficult to differentiate true focal weakness that is new versus fatigue.  Patient has chronic left leg weakness that he states is he thinks is at his baseline.  Patient does state he feels better than he did previously at home.  Wife noted that patient had difficulty sitting up in bed and putting on his close.  Patient has had several TIAs in the past.  Will place in neuro unit, a.m. MRI of the brain.  No other explanation for fatigue at this time.    Results for orders placed or performed during the hospital encounter of 10/19/22  -Comprehensive Metabolic Panel:   Specimen: Blood       Result                      Value             Ref Range           Glucose                     209 (H)           65 - 99 mg/dL       BUN                         25 (H)            8 - 23 mg/dL        Creatinine                  1.20              0.76 - 1.27 *       Sodium                      141               136 - 145 mm*       Potassium                   3.9               3.5 - 5.2 mm*       Chloride                    100               98 - 107 mmo*       CO2                         30.0 (H)          22.0 - 29.0 *       Calcium                      9.6               8.6 - 10.5 m*       Total Protein               7.4               6.0 - 8.5 g/*       Albumin                     4.40              3.50 - 5.20 *       ALT (SGPT)                  20                1 - 41 U/L          AST (SGOT)                  20                1 - 40 U/L          Alkaline Phosphatase        102               39 - 117 U/L        Total Bilirubin             0.5               0.0 - 1.2 mg*       Globulin                    3.0               gm/dL               A/G Ratio                   1.5               g/dL                BUN/Creatinine Ratio        20.8              7.0 - 25.0          Anion Gap                   11.0              5.0 - 15.0 m*       eGFR                        63.9              >60.0 mL/min*  -Protime-INR:   Specimen: Blood       Result                      Value             Ref Range           Protime                     9.9               9.6 - 11.7 S*       INR                         0.96              0.93 - 1.10    -aPTT:   Specimen: Blood       Result                      Value             Ref Range           PTT                         27.0              24.0 - 31.0 *  -Troponin:   Specimen: Blood       Result                      Value             Ref Range           Troponin T                  <0.010            0.000 - 0.03*  -CBC Auto Differential:   Specimen: Blood       Result                      Value             Ref Range           WBC                         7.30              3.40 - 10.80*       RBC                         4.61              4.14 - 5.80 *       Hemoglobin                  14.5              13.0 - 17.7 *       Hematocrit                  40.7              37.5 - 51.0 %       MCV                         88.2              79.0 - 97.0 *       MCH                         31.4              26.6 - 33.0 *       MCHC                        35.6              31.5 - 35.7 *       RDW                         13.3               12.3 - 15.4 %       RDW-SD                      41.1              37.0 - 54.0 *       MPV                         7.8               6.0 - 12.0 fL       Platelets                   145               140 - 450 10*       Neutrophil %                62.2              42.7 - 76.0 %       Lymphocyte %                25.3              19.6 - 45.3 %       Monocyte %                  8.8               5.0 - 12.0 %        Eosinophil %                2.4               0.3 - 6.2 %         Basophil %                  1.3               0.0 - 1.5 %         Neutrophils, Absolute       4.50              1.70 - 7.00 *       Lymphocytes, Absolute       1.80              0.70 - 3.10 *       Monocytes, Absolute         0.60              0.10 - 0.90 *       Eosinophils, Absolute       0.20              0.00 - 0.40 *       Basophils, Absolute         0.10              0.00 - 0.20 *       nRBC                        0.0               0.0 - 0.2 /1*  -Urinalysis With Culture If Indicated - Urine, Clean Catch:   Specimen: Urine, Clean Catch       Result                      Value             Ref Range           Color, UA                   Yellow            Yellow, Straw       Appearance, UA              Clear             Clear               pH, UA                      5.5               5.0 - 8.0           Specific Gravity, UA        1.020             1.005 - 1.030       Glucose, UA                                   Negative        500 mg/dL (2+) (A)       Ketones, UA                 Negative          Negative            Bilirubin, UA               Negative          Negative            Blood, UA                   Negative          Negative            Protein, UA                 Negative          Negative            Leuk Esterase, UA           Negative          Negative            Nitrite, UA                 Negative          Negative            Urobilinogen, UA            0.2 E.U./dL       0.2 - 1.0 E.*  -POC Glucose Once:   Specimen: Blood        Result                      Value             Ref Range           Glucose                     213 (H)           70 - 105 mg/*  -ECG 12 Lead:        Result                      Value             Ref Range           QT Interval                 331               ms             -Type & Screen:   Specimen: Blood       Result                      Value             Ref Range           ABO Type                    A                                     RH type                     Negative                              Antibody Screen             Negative                              T&S Expiration Date                                           10/22/2022 11:59:59 PM  -Green Top (Gel):        Result                      Value             Ref Range           Extra Tube                  hold                             -Gold Top - SST:        Result                      Value             Ref Range           Extra Tube                                                    Hold for add-ons.  -Light Blue Top:        Result                      Value             Ref Range           Extra Tube                                                    Hold for add-ons.  CT Head Without Contrast Stroke Protocol   Final Result        1.  No acute intracranial abnormality.        2.  Age commensurate parenchymal volume loss. Mild chronic small vessel ischemic changes. Atherosclerosis.        3.  Chronic right mastoid effusion. Consider mastoiditis or eustachian tube obstruction.            Results were discussed with Dr. GORMAN via phone at 10/19/2022 9:34 PM by Dr. Hood.  A read back of two patient identifiers and impression occurred.        Electronically signed by:  Dave Hood M.D.      10/19/2022 9:37 PM     XR Chest 1 View    (Results Pending)  MRI Brain Without Contrast    (Results Pending)           Amount and/or Complexity of Data Reviewed  Clinical lab tests: reviewed and ordered  Tests in the radiology section of CPT®:  ordered and reviewed  Discuss the patient with other providers: yes        Final diagnoses:   Weakness       ED Disposition  ED Disposition     ED Disposition   Decision to Admit    Condition   --    Comment   --             No follow-up provider specified.       Medication List      No changes were made to your prescriptions during this visit.          Pito Linares MD  10/20/22 0131

## 2022-10-20 NOTE — ED NOTES
Spoke with provider on insulin orders. Pt reports taking 33 units of lantus in the afternoon. Provider made aware. Nurse changed order on providers request.

## 2022-10-20 NOTE — CASE MANAGEMENT/SOCIAL WORK
Discharge Planning Assessment  [facility]     Patient Name: Blas Mojica  MRN: [id number]  Today's Date: 10/20/2022    Admit Date: 10/19/2022    Plan: Return home with spouse at [facility]. PT/OT callie. Pending   Discharge Needs Assessment     Row Name 10/20/22 0801       Living Environment    People in Home spouse    [facility] Ar[facility]ements [facility]    Primary Care Provided by self;spouse/significant other    Provides Primary Care For no one    Family Caregiver if Needed spouse    Living Arrangement Comments Debra-Spouse       Resource/Environmental Concerns    Resource/Environmental Concerns none    Transportation Concerns none       Transition Planning    Patient/Family Anticipates Transition to home with family  Used [facility] in past    Transportation Anticipated family or friend will provide       Discharge Needs Assessment    Readmission Within the Last 30 Days no previous admission in last 30 days    Equipment Currently Used at Home walker, rolling;wheelchair    Concerns to be Addressed discharge planning               Discharge Plan     Row Name 10/20/22 0803       Plan    Plan Return home with spouse at [facility]. PT/OT eval. Pending    Plan Comments Spoke with patient at bedside, currently lives at [facility] with spouse. Confimred PCP/Pharmacy. Denies transportation issues. [facility] plans to return home. PT/OT pending/(used [facility] in past)            Demographic Summary     Row Name 10/20/22 0800       General Information    Admission Type observation    Arrived From emergency department    Required Notices Provided Observation Status Notice    Referral Source emergency department    Reason for Consult discharge planning    Preferred Language English               Functional Status     Row Name 10/20/22 0801       Functional Status    Usual Activity Tolerance moderate    Current  Activity Tolerance moderate       Functional Status, IADL    Medications independent    Meal Preparation assistive person    Housekeeping assistive person    Laundry assistive person    Shopping assistive person       Mental Status    General Appearance WDL WDL                 Patient Forms     Row Name 10/20/22 0807       Patient Forms    Patient Observation Letter Delivered  10/20 Registration         Met with patient in room wearing PPE: mask, face shield/goggles, gloves, gown.      Maintained distance greater than six feet and spent less than 15 minutes in the room.        Natasha Barajas RN

## 2022-10-20 NOTE — CONSULTS
Primary Care Provider: Sahil Read MD     Consult requested by: Mariposa Calvin NP    Reason for Consultation: Neurological evaluation, stroke    History taken from: patient chart RN    Chief complaint: left side weakness        SUBJECTIVE:    History of present illness: Background per H&P:  Blas Mojica is a 72 y.o. male w/PMH of HTN, HLD, CKD,type II DM, TIA, and recent COVID-19 pneumonia and who presented to [facility] on 10/19/2022 complaining of left sided weakness,from his left shoulder to his left leg that started at 8pm last night.He states that he was in bed and suddenly felt very weak, and was unable to hold himself up, or get himself dressed. He does report the weakness has resolved since. He has left facial droop that he states he believes is baseline for him. Patient does report confirm he uses a walker at baseline. Patient also endorse he had TIA in 2016, he was at that time started on ASA, Plavix and Lipitor, he has since been taken off the ASA due to GI intolerance and the Lipitor for an allergic reaction, but he can not recall what the reaction was. He confirms he takes 75mg of plavix daily. Patient denies any additional weakness, numbness, tingling, confusion, lightheadedness, dizziness, headache, vision changes, or any other neurological symptoms.  He also denies any other acute distress chest pain, palpitations, shortness of breath, fever, chills, cough, abdominal pain, nausea, constipation or  complaints.  Patient does report several episodes of diarrhea over the last 24 hours.    - Portions of the above HPI were copied from previous encounters and edited as appropriate. PMH as detailed below.     Patient states his left-sided weakness resolved in about 1 and half to 2 hours.  Weakness included both the arm and the leg, no obvious facial droop.  There is no other symptoms including speech difficulty, vision changes, or numbness tingling.  Patient does take Plavix every day  and unable to take aspirin due to allergy.  He also is unable to take statins due to an allergy.     Review of Systems   HENT: Negative.    Eyes: Negative for visual disturbance.   Respiratory: Negative.    Cardiovascular: Negative.    Gastrointestinal: Negative for diarrhea, nausea and vomiting.   Endocrine: Negative.    Genitourinary: Negative.    Musculoskeletal: Negative.    Skin: Negative.    Allergic/Immunologic: Negative.    Neurological: Positive for weakness. Negative for dizziness, tremors, seizures, syncope, facial asymmetry, speech difficulty, light-headedness, numbness and headaches.   Hematological: Negative.    Psychiatric/Behavioral: Negative for confusion.          PATIENT HISTORY:  Past Medical History:   Diagnosis Date   • Diabetes mellitus (HCC)    • Hyperlipidemia    • Hypertension    • Kidney stone    • TIA (transient ischemic attack) 2016   • Type 2 diabetes mellitus with stage 3 chronic kidney disease, with long-term current use of insulin ([facility]) 12/13/2021   ,   Past Surgical History:   Procedure Laterality Date   • ADENOIDECTOMY     • EXTRACORPOREAL SHOCK WAVE LITHOTRIPSY (ESWL)     • HERNIA REPAIR     • KNEE ACL RECONSTRUCTION Left    • TONSILLECTOMY     ,   Family History   Problem Relation Age of Onset   • No Known Problems Mother    • No Known Problems Father    ,   Social History     Tobacco Use   • Smoking status: Never   • Smokeless tobacco: Never   Vaping Use   • Vaping Use: Never used   Substance Use Topics   • Alcohol use: Never   • Drug use: Never   ,   Prior to Admission medications    Medication Sig Start Date End Date Taking? Authorizing Provider   clopidogrel (PLAVIX) 75 MG tablet Take 75 mg by mouth Daily.   Yes Provider, MD Isai   insulin glargine (LANTUS, SEMGLEE) 100 UNIT/ML injection Inject 15 Units under the skin into the appropriate area as directed Every Night. 6/25/22  Yes Luis Angel Calderon DO   linagliptin (TRADJENTA) 5 MG tablet tablet Take 5 mg by mouth  Daily.   Yes ProviderIsai MD   lisinopril (PRINIVIL,ZESTRIL) 40 MG tablet Take 40 mg by mouth Daily.   Yes Provider, MD Isai   multivitamin with minerals tablet tablet Take 1 tablet by mouth Daily.   Yes ProviderIsai MD   triamterene-hydrochlorothiazide (MAXZIDE) 75-50 MG per tablet Take 1 tablet by mouth Daily.   Yes Provider, MD Isai    Allergies:  Contrast dye, Iodinated diagnostic agents, Iodine, Aspirin, and Lipitor [atorvastatin]    Current Facility-Administered Medications   Medication Dose Route Frequency Provider Last Rate Last Admin   • aluminum-magnesium hydroxide-simethicone (MAALOX MAX) 400-400-40 MG/5ML suspension 7.5 mL  7.5 mL Oral Q4H PRN Lorie Calvin APRN       • clopidogrel (PLAVIX) tablet 75 mg  75 mg Oral Daily Lorie Calvin, PIPER       • dextrose (D50W) (25 g/50 mL) IV injection 25 g  25 g Intravenous Q15 Min PRN Lorie Calvin APRN       • dextrose (GLUTOSE) oral gel 15 g  15 g Oral Q15 Min PRN Lorie Calvin, PIPER       • diphenhydrAMINE (BENADRYL) 50 MG/ML injection  - ADS Override Pull            • docusate sodium (COLACE) capsule 100 mg  100 mg Oral BID PRN Lorie Calvin, PIPER       • glucagon (human recombinant) (GLUCAGEN DIAGNOSTIC) 1 mg in sterile water (preservative free) 1 mL injection  1 mg Intramuscular Q15 Min PRN Lorie Calvin APRN       • insulin glargine (LANTUS, SEMGLEE) injection 15 Units  15 Units Subcutaneous Nightly Lorie Calvin APRN       • insulin lispro (ADMELOG) injection 3-14 Units  3-14 Units Subcutaneous TID With Meals Lorie Calvin APRN       • linagliptin (TRADJENTA) tablet 5 mg  5 mg Oral Daily Lorie Calvin APRN       • lisinopril (PRINIVIL,ZESTRIL) tablet 40 mg  40 mg Oral Daily Lorie Calvin APRN       • methylPREDNISolone sodium succinate (SOLU-Medrol) 125 MG injection  - ADS Override Pull            • niCARdipine (CARDENE) 25mg in  250mL NS infusion  5-15 mg/hr Intravenous Titrated Lorie Calvin APRN       • ondansetron (ZOFRAN) injection 4 mg  4 mg Intravenous Q6H PRN Lorie Calvin APRN       • sodium chloride 0.9 % flush 10 mL  10 mL Intravenous PRN Lorie Calvin APRN       • triamterene-hydrochlorothiazide (MAXZIDE) 75-50 MG per tablet 1 tablet  1 tablet Oral Daily Lorie Calvin APRN         Current Outpatient Medications   Medication Sig Dispense Refill   • clopidogrel (PLAVIX) 75 MG tablet Take 75 mg by mouth Daily.     • insulin glargine (LANTUS, SEMGLEE) 100 UNIT/ML injection Inject 15 Units under the skin into the appropriate area as directed Every Night. 4 mL 2   • linagliptin (TRADJENTA) 5 MG tablet tablet Take 5 mg by mouth Daily.     • lisinopril (PRINIVIL,ZESTRIL) 40 MG tablet Take 40 mg by mouth Daily.     • multivitamin with minerals tablet tablet Take 1 tablet by mouth Daily.     • triamterene-hydrochlorothiazide (MAXZIDE) 75-50 MG per tablet Take 1 tablet by mouth Daily.          ________________________________________________________        OBJECTIVE:    PHYSICAL EXAM:    Constitutional: The patient is in no apparent distress, bright awake and alert. There is no shortness of breath. Pt is hard of hearing.     PSYCHIATRIC: Mood/affect normal, judgement normal, appropriate    HEENT:  Normocephalic, atraumatic.     Chest: Breathing unlabored    Cardiac: Regular rate and rhythm.     Extremities:  No clubbing, cyanosis or edema.    NEUROLOGICAL:    Cognition:   Fully oriented.  Fund of knowledge excellent.  Concentration and attention normal.   Language normal with normal comprehension, fluent speech, intact repetition and naming.   Short and long term memory appears intact    Cranial nerves;    II - pupils bilaterally equal reacting to light,  No new Visual field deficits;  Fundoscopic exam- Not able to be done, non-dilated exam  III,IV,VI: EOMI with no diplopia  V: Normal facial  sensations  VII: No facial asymmetry,  VIII: No New hearing abnormality  IX, X, XI: normal gag and shoulder shrug;  XII: tongue is in the midline.    Sensory:  Intact to light touch in all extremities.     Motor: Strength 5/5 bilaterally upper and lower extremities. No involuntary movements present. Normal tone and bulk.    Cerebellar: Finger to nose and mirror movements normal bilaterally.    Gait and balance: Deferred.     Physical exam performed by DEEPAK Contreras.  ________________________________________________________   RESULTS REVIEW:    VITAL SIGNS:   Temp:  [97.8 °F (36.6 °C)-98.6 °F (37 °C)] 98.3 °F (36.8 °C)  Heart Rate:  [] 78  Resp:  [17-19] 19  BP: (108-166)/(70-89) 118/79     LABS:      Lab 10/20/22  0530 10/19/22  2325   WBC 6.00 7.30   HEMOGLOBIN 13.4 14.5   HEMATOCRIT 36.4* 40.7   PLATELETS 117* 145   NEUTROS ABS  --  4.50   LYMPHS ABS  --  1.80   MONOS ABS  --  0.60   EOS ABS  --  0.20   MCV 88.8 88.2   PROTIME  --  9.9   APTT  --  27.0         Lab 10/20/22  0530 10/19/22  2325   SODIUM 141 141   POTASSIUM 4.1 3.9   CHLORIDE 102 100   CO2 29.0 30.0*   ANION GAP 10.0 11.0   BUN 24* 25*   CREATININE 1.11 1.20   EGFR 70.1 63.9   GLUCOSE 175* 209*   CALCIUM 9.4 9.6   TSH  --  3.640         Lab 10/20/22  0530 10/19/22  2325   TOTAL PROTEIN 6.3 7.4   ALBUMIN 3.60 4.40   GLOBULIN 2.7 3.0   ALT (SGPT) 16 20   AST (SGOT) 16 20   BILIRUBIN 0.3 0.5   ALK PHOS 86 102         Lab 10/19/22  2325   TROPONIN T <0.010   PROTIME 9.9   INR 0.96         Lab 10/19/22  2325   CHOLESTEROL 235*   LDL CHOL 141*   HDL CHOL 50   TRIGLYCERIDES 246*         Lab 10/19/22  3325   ABO TYPING A   RH TYPING Negative   ANTIBODY SCREEN Negative         UA    Urinalysis 2/28/22 6/15/22 10/20/22   Specific [place], UA 1.036 (A) 1.016 1.020   Ketones, UA Trace (A) Negative Negative   Blood, UA Negative Negative Negative   Leukocytes, UA Negative Negative Negative   Nitrite, UA Negative Negative Negative   (A) Abnormal value               Lab Results   Component Value Date    TSH 3.640 10/19/2022     (H) 10/19/2022    HGBA1C 9.2 (H) 02/28/2022    WQSKHQCO20 497 11/23/2021       IMAGING STUDIES:  XR Chest 1 View    Result Date: 10/20/2022   1. Query small right effusion.  Electronically Signed By-Jorge Mcgill MD On:10/20/2022 7:16 AM This report was finalized on [id number] by  Jorge Mcgill MD.    CT Head Without Contrast Stroke Protocol    Result Date: 10/19/2022  1.  No acute intracranial abnormality. 2.  Age commensurate parenchymal volume loss. Mild chronic small vessel ischemic changes. Atherosclerosis. 3.  Chronic right mastoid effusion. Consider mastoiditis or eustachian tube obstruction. Results were discussed with Dr. GORMAN via phone at 10/19/2022 9:34 PM by Dr. Hood.  A read back of two patient identifiers and impression occurred. Electronically signed by:  Dave Hood M.D.  10/19/2022 9:37 PM      I reviewed the patient's new clinical results.    ________________________________________________________     PROBLEM LIST:    TIA (transient ischemic attack)    Primary hypertension    Stage 3 chronic kidney disease (HCC)    Type 2 diabetes mellitus with stage 3 chronic kidney disease, with long-term current use of insulin ([facility])    Mixed hyperlipidemia            ASSESSMENT/PLAN:  1. Transient left arm and leg weakness, resolved.  MRI brain negative.  Likely right hemispheric TIA.   - MRI brain reviewed without any obvious acute findings, there is atrophy and chronic microvascular ischemic disease.   - MRA head without contrast pending   - Carotids: Bilateral plaque without significant stenosis  - Echo: pending   - EKG: Sinus tachycardia, rate 101  - Labs: A1C: 8.0, B12: P, LDL: 141, TSH: 3.640  - Check P2Y12  - Antithrombotics: Continue Plavix 75 mg for now, Pt has allergy to ASA.  Brilinta is another option but family wants to continue Plavix.   - Statin: Allergy to statin  - PT/OT/ST as appropriate, Neuro  checks per protocol, DVT prophylaxis, Stroke education    2. Hyperlipidemia  - Statin & dietary modifications    3. Hypertension  - Continue home medications  - BP goal 130/90, avoid hypotension    4. Type 2 Diabetes Mellitus  - A1C: 8.0  - Strict glycemic control, SSI, diabetic diet, diabetes educator    5. History of TIA  - Continue Plavix    - Blood pressure should be less than 130/80 outpatient, HbA1c less than 6.5, LDL less than 70; b12>500 and smoking cessation if applicable. We would be grateful if the primary team / primary care physician would keep a close watch on the above targets.  - Stroke education  - Follow up with neurologist of choice.     Will follow up on MRA head without. Please follow up with Stroke clinic.   I discussed the patient's findings and my recommendations with patient and family    PIPER Valentin  10/20/22  10:10 EDT

## 2022-10-20 NOTE — THERAPY EVALUATION
Patient Name: Blas Mojica  : 1950    MRN: [id number]                              Today's Date: 10/20/2022       Admit Date: 10/19/2022    Visit Dx:     ICD-10-CM ICD-9-CM   1. Weakness  R53.1 780.79     Patient Active Problem List   Diagnosis   • Primary hypertension   • TIA (transient ischemic attack)   • Stage 3 chronic kidney disease (HCC)   • Dyslipidemia   • History of basal cell carcinoma (BCC)   • Primary osteoarthritis   • Vitamin D deficiency   • Acute pain of left knee   • Generalized weakness   • Hypokalemia   • Moderate malnutrition (CMS/HCC)   • Physical debility   • Type 2 diabetes mellitus with stage 3 chronic kidney disease, with long-term current use of insulin (HCC)   • Sinus tachycardia   • Acute hypoxemic respiratory failure due to COVID-19 (HCC)   • Pneumonia due to COVID-19 virus   • Mixed hyperlipidemia   • COVID-19 virus infection     Past Medical History:   Diagnosis Date   • Diabetes mellitus (HCC)    • Hyperlipidemia    • Hypertension    • Kidney stone    • TIA (transient ischemic attack)    • Type 2 diabetes mellitus with stage 3 chronic kidney disease, with long-term current use of insulin (HCC) 2021     Past Surgical History:   Procedure Laterality Date   • ADENOIDECTOMY     • EXTRACORPOREAL SHOCK WAVE LITHOTRIPSY (ESWL)     • HERNIA REPAIR     • KNEE ACL RECONSTRUCTION Left    • TONSILLECTOMY        General Information     Row Name 10/20/22 1434          OT Time and Intention    Document Type evaluation  -MS     Mode of Treatment co-treatment;occupational therapy  -MS     Row Name 10/20/22 1436          General Information    Patient Profile Reviewed yes  -MS     Prior Level of Function independent:;ADL's  -MS     Existing Precautions/Restrictions fall  -MS     Barriers to Rehab previous functional deficit  -MS     Row Name 10/20/22 1431          Occupational Profile    Reason for Services/Referral (Occupational Profile) Pt is a 71 y/o male who presented to [facility]  10/19/22 with c/o L sided weakness. PMH includes TIA, CKD, and DM II. CT head (-). XR chest (+) query small R effusion.  -MS     Successful Occupations (Occupational Profile) former teacher  -MS     Environmental Supports and Barriers (Occupational Profile) supportive family  -MS     Patient Goals (Occupational Profile) return home  -MS     Row Name 10/20/22 1434          Living Environment    People in Home spouse  -MS     Row Name 10/20/22 1434          Home Main Entrance    Number of Stairs, Main Entrance none  Elevator 2nd floor  -MS     Stair Railings, Main Entrance none  -MS     Row Name 10/20/22 1434          Stairs Within Home, Primary    Number of Stairs, Within Home, Primary none  -MS     Stair Railings, Within Home, Primary none  -MS     Row Name 10/20/22 1434          Cognition    Orientation Status (Cognition) oriented x 4  -MS     Row Name 10/20/22 1434          Safety Issues, Functional Mobility    Safety Issues Affecting Function (Mobility) insight into deficits/self-awareness;awareness of need for assistance  -MS     Impairments Affecting Function (Mobility) balance;endurance/activity tolerance  -MS           User Key  (r) = Recorded By, (t) = Taken By, (c) = Cosigned By    Initials Name Provider Type    MS Chey Hernandez, OT Occupational Therapist                 Mobility/ADL's     Row Name 10/20/22 1436          Bed Mobility    Bed Mobility bed mobility (all) activities  -MS     All Activities, [place] (Bed Mobility) standby assist  -MS     Row Name 10/20/22 1436          Transfers    Transfers sit-stand transfer  -MS     Row Name 10/20/22 1436          Sit-Stand Transfer    Sit-Stand [place] (Transfers) contact guard  -MS     Assistive Device (Sit-Stand Transfers) walker, 4-wheeled  -MS     Row Name 10/20/22 1436          Functional Mobility    Functional Mobility- Ind. Level contact guard assist  -MS     Functional Mobility- Device walker, front-wheeled  -MS     Row Name 10/20/22 1436           Activities of Daily Living    BADL Assessment/Intervention lower body dressing;toileting  -MS     Row Name 10/20/22 1436          Lower Body Dressing Assessment/Training    [place] Level (Lower Body Dressing) doff;don;socks;pants/bottoms;moderate assist (50% patient effort)  donning socks mod A, donning brief mod A  -MS     Position (Lower Body Dressing) edge of bed sitting  -MS     Row Name 10/20/22 1436          Toileting Assessment/Training    [place] Level (Toileting) change pad/brief;moderate assist (50% patient effort)  -MS     Assistive Devices (Toileting) urinal  -MS     Position (Toileting) supported standing  -MS           User Key  (r) = Recorded By, (t) = Taken By, (c) = Cosigned By    Initials Name Provider Type    Chey Loving OT Occupational Therapist               Obj/Interventions     Row Name 10/20/22 1437          Vision Assessment/Intervention    Visual Impairment/Limitations WFL  -MS     Row Name 10/20/22 1437          Range of Motion Comprehensive    General Range of Motion bilateral upper extremity ROM WFL  -MS     Comment, General Range of Motion BUE WFL  -MS     Row Name 10/20/22 1437          Strength Comprehensive (MMT)    Comment, General Manual Muscle Testing (MMT) Assessment RUE grossly 4+/5 LUE 4/5  -MS     Row Name 10/20/22 1437          Balance    Balance Assessment sitting static balance;sitting dynamic balance  -MS     Static Sitting Balance standby assist  -MS     Dynamic Sitting Balance minimal assist  one LOB  -MS           User Key  (r) = Recorded By, (t) = Taken By, (c) = Cosigned By    Initials Name Provider Type    Chey Loving OT Occupational Therapist               Goals/Plan     Row Name 10/20/22 1444          Bathing Goal 1 (OT)    Activity/Device (Bathing Goal 1, OT) bathing skills, all  -MS     [place] Level/Cues Needed (Bathing Goal 1, OT) standby assist  -MS     Time Frame (Bathing Goal 1, OT) long term goal (LTG);2 weeks  -MS      Progress/Outcomes (Bathing Goal 1, OT) new goal  -MS     Row Name 10/20/22 1444          Dressing Goal 1 (OT)    Activity/Device (Dressing Goal 1, OT) dressing skills, all  -MS     [place]/Cues Needed (Dressing Goal 1, OT) standby assist  -MS     Time Frame (Dressing Goal 1, OT) long term goal (LTG);2 weeks  -MS     Progress/Outcome (Dressing Goal 1, OT) new goal  -MS     Row Name 10/20/22 1444          Toileting Goal 1 (OT)    Activity/Device (Toileting Goal 1, OT) toileting skills, all  -MS     [place] Level/Cues Needed (Toileting Goal 1, OT) modified independence  -MS     Time Frame (Toileting Goal 1, OT) long term goal (LTG);2 weeks  -MS     Progress/Outcome (Toileting Goal 1, OT) new goal  -MS     Row Name 10/20/22 1444          Therapy Assessment/Plan (OT)    Planned Therapy Interventions (OT) activity tolerance training;adaptive equipment training;BADL retraining;patient/caregiver education/training;occupation/activity based interventions  -MS           User Key  (r) = Recorded By, (t) = Taken By, (c) = Cosigned By    Initials Name Provider Type    MS Chey Hernandez, OT Occupational Therapist               Clinical Impression     Row Name 10/20/22 1439          Pain Assessment    Pretreatment Pain Rating 0/10 - no pain  -MS     Posttreatment Pain Rating 0/10 - no pain  -MS     Row Name 10/20/22 1439          Plan of Care Review    Plan of Care Reviewed With patient;spouse  -MS     Progress no change  -MS     Outcome Evaluation Pt is a 71 y/o male who presented to [facility] 10/19/22 with c/o L sided weakness. PMH includes TIA, CKD, and DM II. CT head (-). XR chest (+) query small R effusion. Pt agreeable to OT eval with wife at bedside. Pt reports living in 2nd floor apartment with elevator access. Pt reports independence with ADLs at baseline. Pt reports use of RW at baseline for mobility. Pt reports residual L-sided weakness, secondary to previous TIA. Pt demo increased difficulty with donning socks this  date, requiring mod A and extended time. Pt also required mod A for threading briefs. Pt required SBA for static sitting balance, however required min A for dynamic sitting balance with one LOB. Pt completes STS and functional ambulation this date with RW with CGA. Pt is mildly below baseline, requiring extended time for LB ADLs and increased assistance. OT recommend home with home health when medically appropriate for d/c. OT to follow while at [facility].  -MS     Row Name 10/20/22 1439          Therapy Assessment/Plan (OT)    Patient/Family Therapy Goal Statement (OT) return home  -MS     Rehab Potential (OT) good, to achieve stated therapy goals  -MS     Criteria for Skilled Therapeutic Interventions Met (OT) yes;skilled treatment is necessary;meets criteria  -MS     Therapy Frequency (OT) 3 times/wk  -MS     Predicted Duration of Therapy Intervention (OT) until d/c  -MS     Row Name 10/20/22 1439          Vital Signs    Pre Patient Position Supine  -MS     Intra Patient Position Sitting  -MS     Post Patient Position Supine  -MS     Row Name 10/20/22 1439          Positioning and Restraints    Pre-Treatment Position in bed  -MS     Post Treatment Position bed  -MS     In Bed notified nsg;supine;call light within reach;encouraged to call for assist;exit alarm on;with family/caregiver  -MS           User Key  (r) = Recorded By, (t) = Taken By, (c) = Cosigned By    Initials Name Provider Type    Chey Loving, OT Occupational Therapist               Outcome Measures     Row Name 10/20/22 1449          How much help from another is currently needed...    Putting on and taking off regular lower body clothing? 2  -MS     Bathing (including washing, rinsing, and drying) 3  -MS     Toileting (which includes using toilet bed pan or urinal) 3  -MS     Putting on and taking off regular upper body clothing 4  -MS     Taking care of personal grooming (such as brushing teeth) 4  -MS     Eating meals 4  -MS     AM-PAC 6 Clicks  Score (OT) 20  -MS     Row Name 10/20/22 1444          Functional Assessment    Outcome Measure Options AM-PAC 6 Clicks Daily Activity (OT)  -MS           User Key  (r) = Recorded By, (t) = Taken By, (c) = Cosigned By    Initials Name Provider Type    MS Chey Hernandez OT Occupational Therapist                Occupational Therapy Education     Title: PT OT SLP Therapies (In Progress)     Topic: Occupational Therapy (Done)     Point: ADL training (Done)     Description:   Instruct learner(s) on proper safety adaptation and remediation techniques during self care or transfers.   Instruct in proper use of assistive devices.              Learning Progress Summary           Patient Acceptance, E,TB, VU by MS at 10/20/2022 1445                   Point: Body mechanics (Done)     Description:   Instruct learner(s) on proper positioning and spine alignment during self-care, functional mobility activities and/or exercises.              Learning Progress Summary           Patient Acceptance, E,TB, VU by MS at 10/20/2022 1445                               User Key     Initials Effective Dates Name Provider Type Discipline    MS 07/13/22 -  Chey Hernandez OT Occupational Therapist OT              OT Recommendation and Plan  Planned Therapy Interventions (OT): activity tolerance training, adaptive equipment training, BADL retraining, patient/caregiver education/training, occupation/activity based interventions  Therapy Frequency (OT): 3 times/wk  Plan of Care Review  Plan of Care Reviewed With: patient, spouse  Progress: no change  Outcome Evaluation: Pt is a 71 y/o male who presented to [facility] 10/19/22 with c/o L sided weakness. PMH includes TIA, CKD, and DM II. CT head (-). XR chest (+) query small R effusion. Pt agreeable to OT eval with wife at bedside. Pt reports living in 2nd floor apartment with elevator access. Pt reports independence with ADLs at baseline. Pt reports use of RW at baseline for mobility. Pt reports residual  L-sided weakness, secondary to previous TIA. Pt demo increased difficulty with donning socks this date, requiring mod A and extended time. Pt also required mod A for threading briefs. Pt required SBA for static sitting balance, however required min A for dynamic sitting balance with one LOB. Pt completes STS and functional ambulation this date with RW with CGA. Pt is mildly below baseline, requiring extended time for LB ADLs and increased assistance. OT recommend home with home health when medically appropriate for d/c. OT to follow while at [facility].     Time Calculation:    Time Calculation- OT     Row Name 10/20/22 1445             Time Calculation- OT    OT Start Time 1019  -MS      OT Stop Time 1054  -MS      OT Time Calculation (min) 35 min  -MS      Total Timed Code Minutes- OT 0 minute(s)  -MS      OT Received On 10/20/22  -MS      OT - Next Appointment 10/22/22  -MS      OT Goal Re-Cert Due Date 11/03/22  -MS            User Key  (r) = Recorded By, (t) = Taken By, (c) = Cosigned By    Initials Name Provider Type    Chey Loving OT Occupational Therapist              Therapy Charges for Today     Code Description Service Date Service Provider Modifiers Qty    22442851812 HC OT EVAL MOD COMPLEXITY 4 10/20/2022 Chey Hernandez OT GO 1               Chey Hernandez OT  10/20/2022

## 2022-10-20 NOTE — THERAPY EVALUATION
Patient Name: Blas Mojica  : 1950    MRN: [id number]                              Today's Date: 10/20/2022       Admit Date: 10/19/2022    Visit Dx:     ICD-10-CM ICD-9-CM   1. Weakness  R53.1 780.79     Patient Active Problem List   Diagnosis   • Primary hypertension   • TIA (transient ischemic attack)   • Stage 3 chronic kidney disease (HCC)   • Dyslipidemia   • History of basal cell carcinoma (BCC)   • Primary osteoarthritis   • Vitamin D deficiency   • Acute pain of left knee   • Generalized weakness   • Hypokalemia   • Moderate malnutrition (CMS/HCC)   • Physical debility   • Type 2 diabetes mellitus with stage 3 chronic kidney disease, with long-term current use of insulin (HCC)   • Sinus tachycardia   • Acute hypoxemic respiratory failure due to COVID-19 (HCC)   • Pneumonia due to COVID-19 virus   • Mixed hyperlipidemia   • COVID-19 virus infection     Past Medical History:   Diagnosis Date   • Diabetes mellitus (HCC)    • Hyperlipidemia    • Hypertension    • Kidney stone    • TIA (transient ischemic attack)    • Type 2 diabetes mellitus with stage 3 chronic kidney disease, with long-term current use of insulin (HCC) 2021     Past Surgical History:   Procedure Laterality Date   • ADENOIDECTOMY     • EXTRACORPOREAL SHOCK WAVE LITHOTRIPSY (ESWL)     • HERNIA REPAIR     • KNEE ACL RECONSTRUCTION Left    • TONSILLECTOMY        General Information     Row Name 10/20/22 1658          Physical Therapy Time and Intention    Document Type evaluation  -SS     Mode of Treatment physical therapy  -SS     Row Name 10/20/22 165          General Information    Patient Profile Reviewed yes  -SS     Prior Level of Function independent:;ADL's;all household mobility  uses RW for mobility  -SS     Existing Precautions/Restrictions fall  -SS     Row Name 10/20/22 1658          Living Environment    People in Home spouse  -SS     Row Name 10/20/22 1658          Cognition    Orientation Status (Cognition)  oriented x 4  -SS     Row Name 10/20/22 1658          Safety Issues, Functional Mobility    Impairments Affecting Function (Mobility) balance;endurance/activity tolerance  -SS           User Key  (r) = Recorded By, (t) = Taken By, (c) = Cosigned By    Initials Name Provider Type    SS Lucinda Mcguire PT Physical Therapist               Mobility     Row Name 10/20/22 1659          Bed Mobility    All Activities, [place] (Bed Mobility) standby assist  -SS     Row Name 10/20/22 1659          Sit-Stand Transfer    Sit-Stand [place] (Transfers) contact guard  -     Assistive Device (Sit-Stand Transfers) walker, front-wheeled  -SS     Row Name 10/20/22 1659          Gait/Stairs (Locomotion)    [place] Level (Gait) supervision  -     Assistive Device (Gait) walker, front-wheeled  -SS     Distance in Feet (Gait) 15  -SS     Deviations/Abnormal Patterns (Gait) gait speed decreased  -           User Key  (r) = Recorded By, (t) = Taken By, (c) = Cosigned By    Initials Name Provider Type     Lucinda Mcguire PT Physical Therapist               Obj/Interventions     Row Name 10/20/22 1659          Range of Motion Comprehensive    Comment, General Range of Motion B LE WFL  -SS     Row Name 10/20/22 1659          Strength Comprehensive (MMT)    Comment, General Manual Muscle Testing (MMT) Assessment B LE 4/5  -SS     Row Name 10/20/22 1659          Balance    Static Sitting Balance standby assist  -     Row Name 10/20/22 1659          Sensory Assessment (Somatosensory)    Sensory Assessment (Somatosensory) sensation intact  -           User Key  (r) = Recorded By, (t) = Taken By, (c) = Cosigned By    Initials Name Provider Type     Lucinda Mcguire PT Physical Therapist               Goals/Plan     Row Name 10/20/22 1701          Bed Mobility Goal 1 (PT)    Activity/Assistive Device (Bed Mobility Goal 1, PT) bed mobility activities, all  -SS     [place] Level/Cues Needed (Bed  Mobility Goal 1, PT) modified independence  -SS     Time Frame (Bed Mobility Goal 1, PT) long term goal (LTG);2 weeks  -SS     Row Name 10/20/22 1701          Transfer Goal 1 (PT)    Activity/Assistive Device (Transfer Goal 1, PT) transfers, all  -SS     [place] Level/Cues Needed (Transfer Goal 1, PT) modified independence  -SS     Time Frame (Transfer Goal 1, PT) long term goal (LTG);2 weeks  -SS     Row Name 10/20/22 1701          Gait Training Goal 1 (PT)    Activity/Assistive Device (Gait Training Goal 1, PT) gait (walking locomotion);walker, rolling  -SS     [place] Level (Gait Training Goal 1, PT) modified independence  -SS     Distance (Gait Training Goal 1, PT) 75  -SS     Time Frame (Gait Training Goal 1, PT) long term goal (LTG);2 weeks  -SS     Row Name 10/20/22 1701          Therapy Assessment/Plan (PT)    Planned Therapy Interventions (PT) balance training;bed mobility training;gait training;home exercise program;transfer training;strengthening;patient/family education;neuromuscular re-education  -           User Key  (r) = Recorded By, (t) = Taken By, (c) = Cosigned By    Initials Name Provider Type     Lucinda Mcguire, PT Physical Therapist               Clinical Impression     Row Name 10/20/22 1700          Pain    Pretreatment Pain Rating 0/10 - no pain  -     Posttreatment Pain Rating 0/10 - no pain  -     Row Name 10/20/22 1709 10/20/22 1700       Plan of Care Review    Plan of Care Reviewed With -- patient  -    Outcome Evaluation 73 y/o M who presented with transient L UE and LE weakness. MRI brain (-). Per neuro likely right hemispheric TIA. Patient lives in independent living apartment with his spouse. He uses a RW at baseline. He has a history of L sided weakness per report. He demonstrated functional weakness of LE with difficulty performing bed mobility to L side. Pt with one profound loss of balance to the L in sitting due to LOB with inability to correct with LUE  due to weakness. He performed bed mobility and transfers with CGA to SBA. He ambulated 15' with RW and SBA. Anticipate pt will be safe to return home with HHPT. He appears to have slight decline from his baseline. Patient agreeable to HHPT.  - --    Row Name 10/20/22 1700          Therapy Assessment/Plan (PT)    Criteria for Skilled Interventions Met (PT) yes;meets criteria  -SS     Therapy Frequency (PT) 3 times/wk  -SS     Predicted Duration of Therapy Intervention (PT) until d/c  -     Row Name 10/20/22 1700          Positioning and Restraints    Pre-Treatment Position in bed  -SS     Post Treatment Position bed  -           User Key  (r) = Recorded By, (t) = Taken By, (c) = Cosigned By    Initials Name Provider Type    Lucinda Ledbetter PT Physical Therapist               Outcome Measures     Row Name 10/20/22 1702          Modified [place] Scale    Pre-Stroke Modified [place] Scale 1 - No significant disability despite symptoms.  Able to carry out all usual duties and activities.  -     Modified [place] Scale 3 - Moderate disability.  Requiring some help, but able to walk without assistance.  -     Row Name 10/20/22 1702 10/20/22 1444       Functional Assessment    Outcome Measure Options Modified [place]  - AM-PAC 6 Clicks Daily Activity (OT)  -MS          User Key  (r) = Recorded By, (t) = Taken By, (c) = Cosigned By    Initials Name Provider Type    Lucinda Ledbetter, PT Physical Therapist    Chey Loving, OT Occupational Therapist                             Physical Therapy Education     Title: PT OT SLP Therapies (Done)     Topic: Physical Therapy (Done)     Point: Mobility training (Done)     Learning Progress Summary           Patient Acceptance, E, VU by SS at 10/20/2022 1703                   Point: Home exercise program (Done)     Learning Progress Summary           Patient Acceptance, E, VU by  at 10/20/2022 1703                   Point: Body mechanics (Done)     Learning  Progress Summary           Patient Acceptance, E, VU by  at 10/20/2022 1703                   Point: Precautions (Done)     Learning Progress Summary           Patient Acceptance, E, VU by  at 10/20/2022 1703                               User Key     Initials Effective Dates Name Provider Type Discipline     06/16/21 -  Lucinda Mcguire PT Physical Therapist PT              PT Recommendation and Plan  Planned Therapy Interventions (PT): balance training, bed mobility training, gait training, home exercise program, transfer training, strengthening, patient/family education, neuromuscular re-education  Plan of Care Reviewed With: patient  Outcome Evaluation: 71 y/o M who presented with transient L UE and LE weakness. MRI brain (-). Per neuro likely right hemispheric TIA. Patient lives in independent living apartment with his spouse. He uses a RW at baseline. He has a history of L sided weakness per report. He demonstrated functional weakness of LE with difficulty performing bed mobility to L side. Pt with one profound loss of balance to the L in sitting due to LOB with inability to correct with LUE due to weakness. He performed bed mobility and transfers with CGA to SBA. He ambulated 15' with RW and SBA. Anticipate pt will be safe to return home with HHPT. He appears to have slight decline from his baseline. Patient agreeable to HHPT.     Time Calculation:    PT Charges     Row Name 10/20/22 1703             Time Calculation    Start Time 1020  -      Stop Time 1052  -      Time Calculation (min) 32 min  -      PT Received On 10/20/22  -      PT - Next Appointment 10/21/22  -      PT Goal Re-Cert Due Date 11/03/22  -         Time Calculation- PT    Total Timed Code Minutes- PT 0 minute(s)  -            User Key  (r) = Recorded By, (t) = Taken By, (c) = Cosigned By    Initials Name Provider Type     Lucinda Mcguire, PT Physical Therapist              Therapy Charges for Today     Code  Description Service Date Service Provider Modifiers Qty    [id number]  PT EVAL MOD COMPLEXITY 4 10/20/2022 Lucinda Mcguire, PT GP 1          PT G-Codes  Outcome Measure Options: Modified [place]  AM-PAC 6 Clicks Score (OT): 20  Modified [place] Scale: 3 - Moderate disability.  Requiring some help, but able to walk without assistance.    Lucinda Mcguire, PT  10/20/2022

## 2022-10-21 ENCOUNTER — APPOINTMENT (OUTPATIENT)
Dept: CARDIOLOGY | Facility: HOSPITAL | Age: 72
End: 2022-10-21

## 2022-10-21 LAB
BH CV ECHO MEAS - ACS: 1.75 CM
BH CV ECHO MEAS - AO MAX PG: 3.3 MMHG
BH CV ECHO MEAS - AO MEAN PG: 2.18 MMHG
BH CV ECHO MEAS - AO ROOT DIAM: 3 CM
BH CV ECHO MEAS - AO V2 MAX: 90.3 CM/SEC
BH CV ECHO MEAS - AO V2 VTI: 19 CM
BH CV ECHO MEAS - AVA(I,D): 2.7 CM2
BH CV ECHO MEAS - EDV(CUBED): 34.7 ML
BH CV ECHO MEAS - EDV(MOD-SP4): 40.1 ML
BH CV ECHO MEAS - EF(MOD-BP): 59 %
BH CV ECHO MEAS - EF(MOD-SP4): 58.8 %
BH CV ECHO MEAS - ESV(CUBED): 13.8 ML
BH CV ECHO MEAS - ESV(MOD-SP4): 16.5 ML
BH CV ECHO MEAS - FS: 26.5 %
BH CV ECHO MEAS - IVS/LVPW: 1.34 CM
BH CV ECHO MEAS - IVSD: 1.2 CM
BH CV ECHO MEAS - LA DIMENSION: 4.1 CM
BH CV ECHO MEAS - LV DIASTOLIC VOL/BSA (35-75): 20.3 CM2
BH CV ECHO MEAS - LV MASS(C)D: 100 GRAMS
BH CV ECHO MEAS - LV MAX PG: 2.9 MMHG
BH CV ECHO MEAS - LV MEAN PG: 1.62 MMHG
BH CV ECHO MEAS - LV SYSTOLIC VOL/BSA (12-30): 8.4 CM2
BH CV ECHO MEAS - LV V1 MAX: 84.7 CM/SEC
BH CV ECHO MEAS - LV V1 VTI: 16.4 CM
BH CV ECHO MEAS - LVIDD: 3.3 CM
BH CV ECHO MEAS - LVIDS: 2.4 CM
BH CV ECHO MEAS - LVOT AREA: 3.1 CM2
BH CV ECHO MEAS - LVOT DIAM: 2 CM
BH CV ECHO MEAS - LVPWD: 0.9 CM
BH CV ECHO MEAS - MV A MAX VEL: 91 CM/SEC
BH CV ECHO MEAS - MV DEC SLOPE: 316.5 CM/SEC2
BH CV ECHO MEAS - MV DEC TIME: 0.23 MSEC
BH CV ECHO MEAS - MV E MAX VEL: 71.5 CM/SEC
BH CV ECHO MEAS - MV E/A: 0.79
BH CV ECHO MEAS - MV MAX PG: 4.2 MMHG
BH CV ECHO MEAS - MV MEAN PG: 1.79 MMHG
BH CV ECHO MEAS - MV V2 VTI: 19.3 CM
BH CV ECHO MEAS - MVA(VTI): 2.7 CM2
BH CV ECHO MEAS - PA ACC TIME: 0.1 SEC
BH CV ECHO MEAS - PA PR(ACCEL): 32.8 MMHG
BH CV ECHO MEAS - PA V2 MAX: 96.2 CM/SEC
BH CV ECHO MEAS - PULM A REVS DUR: 0.11 SEC
BH CV ECHO MEAS - PULM A REVS VEL: 29.8 CM/SEC
BH CV ECHO MEAS - PULM DIAS VEL: 36.6 CM/SEC
BH CV ECHO MEAS - PULM S/D: 1.8
BH CV ECHO MEAS - PULM SYS VEL: 65.9 CM/SEC
BH CV ECHO MEAS - RAP SYSTOLE: 3 MMHG
BH CV ECHO MEAS - RV MAX PG: 2.34 MMHG
BH CV ECHO MEAS - RV V1 MAX: 76.6 CM/SEC
BH CV ECHO MEAS - RV V1 VTI: 15.9 CM
BH CV ECHO MEAS - RVDD: 3.2 CM
BH CV ECHO MEAS - SI(MOD-SP4): 12 ML/M2
BH CV ECHO MEAS - SV(LVOT): 51.4 ML
BH CV ECHO MEAS - SV(MOD-SP4): 23.6 ML
GLUCOSE BLDC GLUCOMTR-MCNC: 135 MG/DL (ref 70–105)
GLUCOSE BLDC GLUCOMTR-MCNC: 136 MG/DL (ref 70–105)
GLUCOSE BLDC GLUCOMTR-MCNC: 226 MG/DL (ref 70–105)
LV EF 2D ECHO EST: 59 %
MAXIMAL PREDICTED HEART RATE: 147 BPM
PA ADP PRP-ACNC: 145 PRU (ref 194–418)
QT INTERVAL: 331 MS
STRESS TARGET HR: 125 BPM
WHOLE BLOOD HOLD COAG: NORMAL

## 2022-10-21 PROCEDURE — G0378 HOSPITAL OBSERVATION PER HR: HCPCS

## 2022-10-21 PROCEDURE — 92610 EVALUATE SWALLOWING FUNCTION: CPT

## 2022-10-21 PROCEDURE — 36415 COLL VENOUS BLD VENIPUNCTURE: CPT | Performed by: NURSE PRACTITIONER

## 2022-10-21 PROCEDURE — 82962 GLUCOSE BLOOD TEST: CPT

## 2022-10-21 PROCEDURE — 97535 SELF CARE MNGMENT TRAINING: CPT

## 2022-10-21 PROCEDURE — 93306 TTE W/DOPPLER COMPLETE: CPT | Performed by: INTERNAL MEDICINE

## 2022-10-21 PROCEDURE — 97530 THERAPEUTIC ACTIVITIES: CPT

## 2022-10-21 PROCEDURE — 93306 TTE W/DOPPLER COMPLETE: CPT

## 2022-10-21 PROCEDURE — 63710000001 INSULIN GLARGINE PER 5 UNITS: Performed by: INTERNAL MEDICINE

## 2022-10-21 PROCEDURE — 85576 BLOOD PLATELET AGGREGATION: CPT | Performed by: NURSE PRACTITIONER

## 2022-10-21 RX ADMIN — CLOPIDOGREL BISULFATE 75 MG: 75 TABLET ORAL at 11:15

## 2022-10-21 RX ADMIN — TRIAMTERENE AND HYDROCHLOROTHIAZIDE 1 TABLET: 75; 50 TABLET ORAL at 16:05

## 2022-10-21 RX ADMIN — INSULIN GLARGINE 33 UNITS: 100 INJECTION, SOLUTION SUBCUTANEOUS at 11:20

## 2022-10-21 RX ADMIN — LISINOPRIL 40 MG: 20 TABLET ORAL at 11:15

## 2022-10-21 RX ADMIN — Medication 10 ML: at 11:15

## 2022-10-21 RX ADMIN — ALUMINUM HYDROXIDE, MAGNESIUM HYDROXIDE, AND DIMETHICONE 7.5 ML: 400; 400; 40 SUSPENSION ORAL at 16:07

## 2022-10-21 RX ADMIN — LINAGLIPTIN 5 MG: 5 TABLET, FILM COATED ORAL at 11:15

## 2022-10-21 NOTE — PROGRESS NOTES
[facility] Medicine Services Daily Progress Note    Patient Name: Blas Mojica  : 1950  MRN: [id number]  Primary Care Physician:  Sahil Read MD  Date of admission: 10/19/2022      Subjective      Chief Complaint: Left-sided weakness      Patient Reports     10/21/22: Left-sided weakness that resolved.  Claims to be compliant on Plavix.  Allergy to Lipitor.  Afebrile.    Review of Systems   All other systems reviewed and are negative.         Objective      Vitals:   Temp:  [97.5 °F (36.4 °C)-99.3 °F (37.4 °C)] 97.7 °F (36.5 °C)  Heart Rate:  [81-93] 92  Resp:  [16-20] 17  BP: (109-149)/(63-87) 149/87    Physical Exam  HENT:      Head: Normocephalic.      Nose: Nose normal.   Eyes:      Extraocular Movements: Extraocular movements intact.      Pupils: Pupils are equal, round, and reactive to light.   Cardiovascular:      Rate and Rhythm: Normal rate and regular rhythm.   Pulmonary:      Effort: Pulmonary effort is normal.   Abdominal:      General: Bowel sounds are normal.   Musculoskeletal:         General: Normal range of motion.      Cervical back: Normal range of motion.   Skin:     General: Skin is warm.   Neurological:      Mental Status: He is alert. Mental status is at baseline.   Psychiatric:         Mood and Affect: Mood normal.             Result Review    Result Review:  I have personally reviewed the results from the time of this admission to 10/21/2022 14:06 EDT and agree with these findings:  [x]  Laboratory  []  Microbiology  [x]  Radiology  []  EKG/Telemetry   []  Cardiology/Vascular   []  Pathology  [x]  Old records  []  Other:            Assessment & Plan      Brief Patient Summary:      clopidogrel, 75 mg, Oral, Daily  insulin glargine, 33 Units, Subcutaneous, Daily  linagliptin, 5 mg, Oral, Daily  lisinopril, 40 mg, Oral, Daily  triamterene-hydrochlorothiazide, 1 tablet, Oral, Daily       niCARdipine, 5-15 mg/hr         Active Hospital Problems:  Active  Hospital Problems    Diagnosis    • **TIA (transient ischemic attack)    • Mixed hyperlipidemia    • Type 2 diabetes mellitus with stage 3 chronic kidney disease, with long-term current use of insulin (HCC)    • Stage 3 chronic kidney disease (HCC)    • Primary hypertension      TIA:  -s/p CT head   -s/p MRI of the brain  -s/p carotid duplex  -Neurology consulted    Insulin-dependent diabetes mellitus:  -Continue ISS  -On Tradjenta at home    History of TIA:  -On Plavix at home    Hypertension:  -On lisinopril and Maxide at home    Hyperlipidemia:  -Continue statin        DVT prophylaxis:  Mechanical DVT prophylaxis orders are present.    CODE STATUS:    Code Status (Patient has no pulse and is not breathing): CPR (Attempt to Resuscitate)  Medical Interventions (Patient has pulse or is breathing): Full Support  Release to patient: Routine Release      Disposition:  I expect patient to be discharged home    This patient has been examined wearing appropriate Personal Protective Equipment and discussed with nursing. 10/21/22      Electronically signed by Julio Echols DO, 10/21/22, 14:06 EDT.  [facility] Hospitalist Team

## 2022-10-21 NOTE — PLAN OF CARE
Goal Outcome Evaluation:           Problem: Fall Injury Risk  Goal: Absence of Fall and Fall-Related Injury  Outcome: Ongoing, Progressing  Intervention: Identify and Manage Contributors  Recent Flowsheet Documentation  Taken 10/21/2022 1800 by Dewayne Everett LPN  Medication Review/Management: medications reviewed  Taken 10/21/2022 1600 by Dewayne Everett LPN  Medication Review/Management: medications reviewed  Self-Care Promotion: independence encouraged  Taken 10/21/2022 1400 by Dewayne Everett LPN  Medication Review/Management: medications reviewed  Taken 10/21/2022 1200 by Dewayne Everett LPN  Medication Review/Management: medications reviewed  Self-Care Promotion: independence encouraged  Taken 10/21/2022 1000 by Dewayne Everett LPN  Medication Review/Management: medications reviewed  Taken 10/21/2022 0800 by Dewayne Everett LPN  Medication Review/Management: medications reviewed  Self-Care Promotion: independence encouraged  Intervention: Promote Injury-Free Environment  Recent Flowsheet Documentation  Taken 10/21/2022 1800 by Dewayne Everett LPN  Safety Promotion/Fall Prevention:   activity supervised   assistive device/personal items within reach   clutter free environment maintained   elopement precautions   fall prevention program maintained   gait belt   nonskid shoes/slippers when out of bed   safety round/check completed  Taken 10/21/2022 1600 by Dewayne Everett LPN  Safety Promotion/Fall Prevention:   activity supervised   assistive device/personal items within reach   clutter free environment maintained   elopement precautions   fall prevention program maintained   gait belt   nonskid shoes/slippers when out of bed   safety round/check completed  Taken 10/21/2022 1400 by Dewayne Everett LPN  Safety Promotion/Fall Prevention:   activity supervised   assistive device/personal items within reach   clutter free environment maintained   elopement precautions   fall prevention program maintained   gait  belt   nonskid shoes/slippers when out of bed   safety round/check completed  Taken 10/21/2022 1200 by Dewayne Everett LPN  Safety Promotion/Fall Prevention:   activity supervised   assistive device/personal items within reach   clutter free environment maintained   elopement precautions   fall prevention program maintained   gait belt   nonskid shoes/slippers when out of bed   safety round/check completed  Taken 10/21/2022 1000 by Dewayne Everett LPN  Safety Promotion/Fall Prevention:   activity supervised   assistive device/personal items within reach   clutter free environment maintained   elopement precautions   fall prevention program maintained   gait belt   nonskid shoes/slippers when out of bed   safety round/check completed  Taken 10/21/2022 0800 by Dewayne Everett LPN  Safety Promotion/Fall Prevention:   activity supervised   assistive device/personal items within reach   clutter free environment maintained   elopement precautions   fall prevention program maintained   gait belt   nonskid shoes/slippers when out of bed   safety round/check completed     Problem: Adult Inpatient Plan of Care  Goal: Plan of Care Review  Outcome: Ongoing, Progressing  Goal: Patient-Specific Goal (Individualized)  Outcome: Ongoing, Progressing  Goal: Absence of Hospital-Acquired Illness or Injury  Outcome: Ongoing, Progressing  Intervention: Identify and Manage Fall Risk  Recent Flowsheet Documentation  Taken 10/21/2022 1800 by Dewayne Everett LPN  Safety Promotion/Fall Prevention:   activity supervised   assistive device/personal items within reach   clutter free environment maintained   elopement precautions   fall prevention program maintained   gait belt   nonskid shoes/slippers when out of bed   safety round/check completed  Taken 10/21/2022 1600 by Dewayne Everett LPN  Safety Promotion/Fall Prevention:   activity supervised   assistive device/personal items within reach   clutter free environment maintained   elopement  precautions   fall prevention program maintained   gait belt   nonskid shoes/slippers when out of bed   safety round/check completed  Taken 10/21/2022 1400 by Dewayne Everett LPN  Safety Promotion/Fall Prevention:   activity supervised   assistive device/personal items within reach   clutter free environment maintained   elopement precautions   fall prevention program maintained   gait belt   nonskid shoes/slippers when out of bed   safety round/check completed  Taken 10/21/2022 1200 by Dewayne Everett LPN  Safety Promotion/Fall Prevention:   activity supervised   assistive device/personal items within reach   clutter free environment maintained   elopement precautions   fall prevention program maintained   gait belt   nonskid shoes/slippers when out of bed   safety round/check completed  Taken 10/21/2022 1000 by Dewayne Everett LPN  Safety Promotion/Fall Prevention:   activity supervised   assistive device/personal items within reach   clutter free environment maintained   elopement precautions   fall prevention program maintained   gait belt   nonskid shoes/slippers when out of bed   safety round/check completed  Taken 10/21/2022 0800 by Dewayne Everett LPN  Safety Promotion/Fall Prevention:   activity supervised   assistive device/personal items within reach   clutter free environment maintained   elopement precautions   fall prevention program maintained   gait belt   nonskid shoes/slippers when out of bed   safety round/check completed  Intervention: Prevent Skin Injury  Recent Flowsheet Documentation  Taken 10/21/2022 1600 by Dewayne Everett LPN  Skin Protection:   adhesive use limited   incontinence pads utilized   skin sealant/moisture barrier applied   skin-to-device areas padded  Taken 10/21/2022 1200 by Dewayne Everett LPN  Skin Protection:   adhesive use limited   incontinence pads utilized   skin sealant/moisture barrier applied   skin-to-device areas padded  Taken 10/21/2022 0800 by Dewayne Everett  LPN  Skin Protection:   adhesive use limited   incontinence pads utilized   skin sealant/moisture barrier applied   skin-to-device areas padded  Intervention: Prevent and Manage VTE (Venous Thromboembolism) Risk  Recent Flowsheet Documentation  Taken 10/21/2022 1600 by Dewayne Everett LPN  VTE Prevention/Management:   sequential compression devices off   bilateral   patient refused intervention  Range of Motion: active ROM (range of motion) encouraged  Taken 10/21/2022 1200 by Dewayne Everett LPN  VTE Prevention/Management:   sequential compression devices on   bilateral  Range of Motion: ROM (range of motion) performed  Taken 10/21/2022 0800 by Dewayne Everett LPN  Activity Management: activity adjusted per tolerance  VTE Prevention/Management:   sequential compression devices on   bilateral  Range of Motion: active ROM (range of motion) encouraged  Intervention: Prevent Infection  Recent Flowsheet Documentation  Taken 10/21/2022 1800 by Dewayne Everett LPN  Infection Prevention:   environmental surveillance performed   equipment surfaces disinfected   hand hygiene promoted   single patient room provided  Taken 10/21/2022 1600 by Dewayne Everett LPN  Infection Prevention:   environmental surveillance performed   equipment surfaces disinfected   hand hygiene promoted   single patient room provided  Taken 10/21/2022 1400 by Dewayne Everett LPN  Infection Prevention:   environmental surveillance performed   equipment surfaces disinfected   hand hygiene promoted   single patient room provided  Taken 10/21/2022 1200 by Dewayne Everett LPN  Infection Prevention:   environmental surveillance performed   equipment surfaces disinfected   hand hygiene promoted   single patient room provided  Taken 10/21/2022 1000 by Dewayne Everett LPN  Infection Prevention:   environmental surveillance performed   equipment surfaces disinfected   hand hygiene promoted   single patient room provided  Taken 10/21/2022 0800 by Dewayne Everett  LPN  Infection Prevention:   environmental surveillance performed   equipment surfaces disinfected   hand hygiene promoted   single patient room provided  Goal: Optimal Comfort and Wellbeing  Outcome: Ongoing, Progressing  Intervention: Provide Person-Centered Care  Recent Flowsheet Documentation  Taken 10/21/2022 1600 by Dewayne Everett LPN  Trust Relationship/Rapport:   questions answered   questions encouraged   thoughts/feelings acknowledged  Taken 10/21/2022 1200 by Dewayne Everett LPN  Trust Relationship/Rapport:   questions answered   questions encouraged   thoughts/feelings acknowledged  Taken 10/21/2022 0800 by Dewayne Everett LPN  Trust Relationship/Rapport:   questions answered   questions encouraged   thoughts/feelings acknowledged  Goal: Readiness for Transition of Care  Outcome: Ongoing, Progressing     Problem: Skin Injury Risk Increased  Goal: Skin Health and Integrity  Outcome: Ongoing, Progressing  Intervention: Promote and Optimize Oral Intake  Recent Flowsheet Documentation  Taken 10/21/2022 1600 by Dewayne Everett LPN  Oral Nutrition Promotion: medicated  Taken 10/21/2022 1200 by Dewayne Everett LPN  Oral Nutrition Promotion: medicated  Taken 10/21/2022 0800 by Dewayne Everett LPN  Oral Nutrition Promotion: medicated  Intervention: Optimize Skin Protection  Recent Flowsheet Documentation  Taken 10/21/2022 1600 by Dewayne Everett LPN  Pressure Reduction Techniques:   frequent weight shift encouraged   weight shift assistance provided  Pressure Reduction Devices:   positioning supports utilized   pressure-redistributing mattress utilized  Skin Protection:   adhesive use limited   incontinence pads utilized   skin sealant/moisture barrier applied   skin-to-device areas padded  Taken 10/21/2022 1200 by Dewayne Everett LPN  Pressure Reduction Techniques:   frequent weight shift encouraged   weight shift assistance provided  Pressure Reduction Devices:   positioning supports utilized    pressure-redistributing mattress utilized  Skin Protection:   adhesive use limited   incontinence pads utilized   skin sealant/moisture barrier applied   skin-to-device areas padded  Taken 10/21/2022 0800 by Dewayne Everett LPN  Pressure Reduction Techniques:   frequent weight shift encouraged   weight shift assistance provided  Pressure Reduction Devices:   positioning supports utilized   pressure-redistributing mattress utilized  Skin Protection:   adhesive use limited   incontinence pads utilized   skin sealant/moisture barrier applied   skin-to-device areas padded

## 2022-10-21 NOTE — DISCHARGE PLACEMENT REQUEST
"Blas Mojica (72 y.o. Male)     Date of Birth   1950    Social Security Number       Address   [address] [place] [address]    Home Phone   [phone]    MRN   [id number]       [employer]    Marital Status                               Admission Date   10/19/22    Admission Type   Emergency    Admitting Provider       Attending Provider   Julio Echols DO    Department, Room/Bed   [facility] NEURO HEART, 271/1       Discharge Date       Discharge Disposition       Discharge Destination                               Attending Provider: Julio Echols DO    Allergies: Contrast Dye, Iodinated Diagnostic Agents, Iodine, Aspirin, Lipitor [Atorvastatin]    Isolation: None   Infection: None   Code Status: CPR    Ht: 177.8 cm (70\")   Wt: 79.5 kg (175 lb 4.3 oz)    Admission Cmt: None   Principal Problem: TIA (transient ischemic attack) [G45.9]                 Active Insurance as of 10/19/2022     Primary Coverage     Payor Plan Insurance Group Employer/Plan Group    MEDICARE MEDICARE A & B      Payor Plan Address Payor Plan Phone Number Payor Plan Fax Number Effective Dates    [address] [phone]  9/1/2014 - None Entered    [facility] [id number]       Subscriber Name Subscriber Birth Date Member ID       BLAS MOJICA 1950 [id number]           Secondary Coverage     Payor Plan Insurance Group Employer/Plan Group    HUMANA HUMANA [facility]              [id number]     Payor Plan Address Payor Plan Phone Number Payor Plan Fax Number Effective Dates    [address] [address]   9/1/2014 - None Entered    [facility] [id number]       Subscriber Name Subscriber Birth Date Member ID       BLAS MOJICA 1950 [id number]                 Emergency Contacts      (Rel.) Home Phone Work Phone Mobile Phone    NICK MOJICA (Spouse) [phone] -- [phone]              "

## 2022-10-21 NOTE — CONSULTS
"Diabetes Education  Assessment/Teaching    Patient Name:  Blas Mojica  YOB: 1950  MRN: [id number]  Admit Date:  10/19/2022      Assessment Date:  10/21/2022  Flowsheet Row Most Recent Value   General Information     Referral From: A1c, Blood glucose  [On 10/20/2022 A1c was 8.0% and admission blood sugar was 213.]   Height 177.8 cm (70\")   Weight 79.5 kg (175 lb 4.3 oz)   Weight Method Standing scale   Pregnancy Assessment    Diabetes History    What type of diabetes do you have? Type 2   Length of Diabetes Diagnosis 10 + years  [Diagnosed over 30 years ago.]   Current DM knowledge fair   Have you had diabetes education/teaching in the past? yes   When and where was your diabetes education? Inpatient hospitalizations at [facility] with last one being 3/2/2022   Do you test your blood sugar at home? yes   Frequency of checks as often as needs   Meter type Dexcom   Who performs the test? patient   Typical readings 150-200   Have you had high blood sugar? (>140mg/dl) yes   How often do you have high blood sugar? frequently   When was your last high blood sugar? Admission blood sugar 213   Education Preferences    What areas of diabetes would you like to learn about? avoiding high blood sugar, diabetes complications   Nutrition Information    Assessment Topics    Problem Solving - Assessment Needs education   Reducing Risk - Assessment Needs education   DM Goals    Problem Solving - Goal Today   Reducing Risk - Goal Today          Flowsheet Row Most Recent Value   DM Education Needs    Meter Has own   Meter Type Other (comment)  [Dexcom]   Medication Insulin, Oral  [At home patient stated that he takes Lantus 33 units in the late afternoon/evening and Tradjenta 5 mg every morning.]   Problem Solving Hyperglycemia, Signs, Symptoms, Treatment   Reducing Risks A1C testing  [On 10/20/2022 A1c was 8.0%]   Discharge Plan Home   Motivation Moderate   Teaching Method Discussion, Handouts   Patient Response " Verbalized understanding            Other Comments:  A1c info sheet given with discussion on A1c target and healthy blood sugar range. Patient stated he does walking and stretching [facility]. Discussed with patient that drinking water and exercise are 2 natural ways to lower blood sugar. Patient has no further questions or concerns related to diabetes at this time.        Electronically signed by:  Rosanna Abdi RN  10/21/22 16:07 EDT

## 2022-10-21 NOTE — THERAPY EVALUATION
Acute Care - Speech Language Pathology   Swallow Initial Evaluation  Farhan     Patient Name: Blas Mojica  : 1950  MRN: [id number]  Today's Date: 10/21/2022               Admit Date: 10/19/2022    Visit Dx:     ICD-10-CM ICD-9-CM   1. Weakness  R53.1 780.79     Patient Active Problem List   Diagnosis   • Primary hypertension   • TIA (transient ischemic attack)   • Stage 3 chronic kidney disease (HCC)   • Dyslipidemia   • History of basal cell carcinoma (BCC)   • Primary osteoarthritis   • Vitamin D deficiency   • Acute pain of left knee   • Generalized weakness   • Hypokalemia   • Moderate malnutrition (CMS/HCC)   • Physical debility   • Type 2 diabetes mellitus with stage 3 chronic kidney disease, with long-term current use of insulin (HCC)   • Sinus tachycardia   • Acute hypoxemic respiratory failure due to COVID-19 (HCC)   • Pneumonia due to COVID-19 virus   • Mixed hyperlipidemia   • COVID-19 virus infection     Past Medical History:   Diagnosis Date   • Diabetes mellitus (HCC)    • Hyperlipidemia    • Hypertension    • Kidney stone    • TIA (transient ischemic attack)    • Type 2 diabetes mellitus with stage 3 chronic kidney disease, with long-term current use of insulin (HCC) 2021     Past Surgical History:   Procedure Laterality Date   • ADENOIDECTOMY     • EXTRACORPOREAL SHOCK WAVE LITHOTRIPSY (ESWL)     • HERNIA REPAIR     • KNEE ACL RECONSTRUCTION Left    • TONSILLECTOMY         SLP Recommendation and Plan  SLP Swallowing Diagnosis: swallow WFL/no suspected pharyngeal impairment (10/21/22 1100)  SLP Diet Recommendation: regular textures, thin liquids (10/21/22 1100)  Recommended Precautions and Strategies: upright posture during/after eating, small bites of food and sips of liquid (10/21/22 1100)  SLP Rec. for Method of Medication Administration: meds whole, meds crushed, as tolerated (10/21/22 1100)     Monitor for Signs of Aspiration: yes, notify SLP if any concerns (10/21/22  1100)     Swallow Criteria for Skilled Therapeutic Interventions Met: demonstrates skilled criteria (10/21/22 1100)     Rehab Potential/Prognosis, Swallowing: good, to achieve stated therapy goals (10/21/22 1100)  Therapy Frequency (Swallow): PRN (10/21/22 1100)  Predicted Duration Therapy Intervention (Days): until discharge (10/21/22 1100)         SWALLOW EVALUATION (last 72 hours)     SLP Adult Swallow Evaluation     Row Name 10/21/22 1100       Rehab Evaluation    Document Type evaluation  -LF    Subjective Information no complaints  -LF    Patient Observations alert;cooperative;agree to therapy  -LF    Patient Effort excellent  -LF    Symptoms Noted During/After Treatment none  -LF       General Information    Patient Profile Reviewed yes  -LF    Pertinent History Of Current Problem Pt is a 73 y/o male who presented to [facility] c/o left sided weakness. MRI negative for acute CVA. Per neuro likely right hemispheric TIA. ST orders placed for swallowing per stroke protocol.  -LF    Current Method of Nutrition NPO  -LF    Precautions/Limitations, Vision WFL;for purposes of eval  -LF    Precautions/Limitations, Hearing WFL;for purposes of eval  -LF    Prior Level of Function-Swallowing no diet consistency restrictions  -LF    Plans/Goals Discussed with patient;agreed upon  -LF    Barriers to Rehab none identified  -LF       Pain Scale: Numbers Pre/Post-Treatment    Pretreatment Pain Rating 0/10 - no pain  -LF    Posttreatment Pain Rating 0/10 - no pain  -LF       Oral Motor Structure and Function    Dentition Assessment upper dentures/partial in place;lower dentures/partial in place  -LF    Secretion Management WNL/WFL  -LF    Mucosal Quality moist, healthy  -LF       Oral Musculature and Cranial Nerve Assessment    Oral Motor General Assessment WFL  -LF       General Eating/Swallowing Observations    Respiratory Support Currently in Use room air  -LF    Eating/Swallowing Skills self-fed;appropriate self-feeding skills  observed  -    Positioning During Eating upright 90 degree;upright in bed  -LF    Utensils Used spoon;straw  -LF    Consistencies Trialed regular textures;mixed consistency;pureed;thin liquids  -       Clinical Swallow Eval    Oral Prep Phase WFL  -LF    Oral Transit WFL  -LF    Oral Residue WFL  -LF    Pharyngeal Phase no overt signs/symptoms of pharyngeal impairment  -    Clinical Swallow Evaluation Summary Pt observed with trials of water by all presentations, puree, mechanical ground, and regular solids. Pt self fed all trials and independently took small bites/sips. Oral phase characterized by adequate mastication of both soft and regular solids. Adequate labial seal with no anterior loss. Timely oral transit. Pharyngeal phase characterized by a suspected timely swallow with no overt s/s of aspiration observed at any time. No oral pocketing/residue noted. Recommend pt initiate a regular and thin liquid diet. ST will continue to follow to ensure diet tolerance  -       SLP Evaluation Clinical Impression    SLP Swallowing Diagnosis swallow WFL/no suspected pharyngeal impairment  -    Functional Impact no impact on function  -    Rehab Potential/Prognosis, Swallowing good, to achieve stated therapy goals  -    Swallow Criteria for Skilled Therapeutic Interventions Met demonstrates skilled criteria  -       SLP Treatment Clinical Impressions    Care Plan Review evaluation/treatment results reviewed;care plan/treatment goals reviewed;risks/benefits reviewed;current/potential barriers reviewed;patient/other agree to care plan  -       Recommendations    Therapy Frequency (Swallow) PRN  -LF    Predicted Duration Therapy Intervention (Days) until discharge  -    SLP Diet Recommendation regular textures;thin liquids  -    Recommended Precautions and Strategies upright posture during/after eating;small bites of food and sips of liquid  -    Oral Care Recommendations Oral Care BID/PRN;Oral Care  before breakfast, after meals and PRN  -LF    SLP Rec. for Method of Medication Administration meds whole;meds crushed;as tolerated  -LF    Monitor for Signs of Aspiration yes;notify SLP if any concerns  -LF       Swallow Goals (SLP)    Swallow LTGs Swallow Long Term Goal (free text)  -LF    Swallow STGs diet tolerance goal selection (SLP)  -LF    Diet Tolerance Goal Selection (SLP) Swallow Short Term Goal 1  -LF       (LTG) Swallow    (LTG) Swallow The patient will maximize swallow function for least restrictive po diet, exhibiting no complications associated with dysphagia, adequate po intake, and demonstrating independent use of safe swallow compensations.  -LF    Time Frame (Swallow Long Term Goal) by discharge  -LF       (STG) Swallow 1    (STG) Swallow 1 The patient will participate in a full meal assessment to determine safety and adequacy of recommended diet, independent use of safe swallow compensations, and additional goals/recommendations to follow  -LF    Time Frame (Swallow Short Term Goal 1) 1 week  -LF          User Key  (r) = Recorded By, (t) = Taken By, (c) = Cosigned By    Initials Name Effective Dates    LF Emili Falk SLP 06/16/21 -                 EDUCATION  The patient has been educated in the following areas:   Dysphagia (Swallowing Impairment).       Patient was not wearing a face mask during this therapy encounter. Therapist used appropriate personal protective equipment including mask, eye protection and gloves.  Mask used was standard procedure mask. Appropriate PPE was worn during the entire therapy session. Hand hygiene was completed before and after therapy session. Patient is not in enhanced droplet precautions.                  Time Calculation:                JENIFER Watt  10/21/2022

## 2022-10-21 NOTE — THERAPY TREATMENT NOTE
"Subjective: Pt agreeable to therapeutic plan of care.  Cognition: oriented to Person, Place, Time and Situation    Objective:     Bed Mobility: CGA and excess time  Functional Transfers: CGA and with rolling walker  Functional Ambulation: CGA and with rolling walker 25 feet x2    Grooming: SBA  ADL Position: supported standing and at sink  ADL Comments: Patient completed hair brushing and oral care regimen, excess time required    Toileting: Mod-A  ADL Position: supported standing, sitting on commode  ADL Comments: While performing g/h at sink, patient became incontinent of bladder and bowels. Patient carefully transitioned to commode to finish toileting. He was assisted with toilet hygiene, then returned to bed where he was assisted with changing his socks after feet were cleansed.    Vitals: WNL    Pain: 0 VAS  Location: na   Interventions for pain: N/A  Education: Provided education on the importance of mobility in the acute care setting, Verbal/Tactile Cues, ADL training and Transfer/Gait training    Assessment: Blas Mojica presents with ADL impairments below baseline abilities which indicate the need for continued skilled intervention while inpatient. Patient moves very slowly with all tasks, but requires just CGA for mobility using RW. He became incontinent of bowels and bladder while performing g/h standing at sink, will wear a brief in future treatments.  Tolerating session today without incident. Will continue to follow and progress as tolerated.     Plan/Recommendations:   Low Intensity Therapy recommended post-acute care - This is recommended as therapy feels this patient would require 2-3 visits per week. OP or HH would be the best option depending on patient's home bound status. Consider, if the patient has other  \"skilled\" needs such as wounds, IV antibiotics, etc. Combined with \"low intensity\" could also equate to a SNF. If patient is medically complex, consider LTAC.. Pt requires no DME at " discharge.     Pt desires Home with Home Health at discharge. Pt cooperative; agreeable to therapeutic recommendations and plan of care.     Modified [place]: 3 = Moderate disability (Requires some help, but able to walk unassisted)    Post-Tx Position: Supine with HOB Elevated, Alarms activated and Call light and personal items within reach  PPE: mask, gloves and eye protection

## 2022-10-21 NOTE — NURSING NOTE
Patient to become NPO per stroke protocol. Patient presents with a left sided facial droop. Speech consult placed.

## 2022-10-21 NOTE — PLAN OF CARE
"Assessment: Blas Mojica presents with ADL impairments below baseline abilities which indicate the need for continued skilled intervention while inpatient. Patient moves very slowly with all tasks, but requires just CGA for mobility using RW. He became incontinent of bowels and bladder while performing g/h standing at sink, will wear a brief in future treatments.  Tolerating session today without incident. Will continue to follow and progress as tolerated.      Plan/Recommendations:   Low Intensity Therapy recommended post-acute care - This is recommended as therapy feels this patient would require 2-3 visits per week. OP or HH would be the best option depending on patient's home bound status. Consider, if the patient has other  \"skilled\" needs such as wounds, IV antibiotics, etc. Combined with \"low intensity\" could also equate to a SNF. If patient is medically complex, consider LTAC.. Pt requires no DME at discharge.      Pt desires Home with Home Health at discharge. Pt cooperative; agreeable to therapeutic recommendations and plan of care.   "

## 2022-10-21 NOTE — PLAN OF CARE
Goal Outcome Evaluation:  Pt observed with trials of water by all presentations, puree, mechanical ground, and regular solids. Pt self fed all trials and independently took small bites/sips. Oral phase characterized by adequate mastication of both soft and regular solids. Adequate labial seal with no anterior loss. Timely oral transit. Pharyngeal phase characterized by a suspected timely swallow with no overt s/s of aspiration observed at any time. No oral pocketing/residue noted. Recommend pt initiate a regular and thin liquid diet. ST will continue to follow to ensure diet tolerance

## 2022-10-21 NOTE — CASE MANAGEMENT/SOCIAL WORK
Continued Stay Note  MARIELY Real     Patient Name: Blas Mojica  MRN: [id number]  Today's Date: 10/21/2022    Admit Date: 10/19/2022    Plan: Return home to [place], [place] with spouse. [facility] accepted, order placed.   Discharge Plan     Row Name 10/21/22 0958       Plan    Plan Return home to [place], [place] with spouse. [facility] accepted, order placed.    Patient/Family in Agreement with Plan yes    Plan Comments Patient will return to [place], [place] with spouse. Per PT/OT, HH recommended. CM called patient, patient would like [facility] . CM sent referral, message sent to liaaretha Mock, patient accepted, order placed. D/C barriers: cardene gtt, pending ECHO.              Phone communication or documentation only - no physical contact with patient or family.      Heather Perez RN

## 2022-10-22 ENCOUNTER — READMISSION MANAGEMENT (OUTPATIENT)
Dept: CALL CENTER | Facility: HOSPITAL | Age: 72
End: 2022-10-22

## 2022-10-22 VITALS
TEMPERATURE: 98.3 F | DIASTOLIC BLOOD PRESSURE: 68 MMHG | RESPIRATION RATE: 12 BRPM | HEIGHT: 70 IN | SYSTOLIC BLOOD PRESSURE: 121 MMHG | HEART RATE: 98 BPM | BODY MASS INDEX: 25.05 KG/M2 | WEIGHT: 175 LBS | OXYGEN SATURATION: 96 %

## 2022-10-22 LAB
GLUCOSE BLDC GLUCOMTR-MCNC: 160 MG/DL (ref 70–105)
GLUCOSE BLDC GLUCOMTR-MCNC: 203 MG/DL (ref 70–105)

## 2022-10-22 PROCEDURE — 63710000001 INSULIN GLARGINE PER 5 UNITS: Performed by: INTERNAL MEDICINE

## 2022-10-22 PROCEDURE — 82962 GLUCOSE BLOOD TEST: CPT

## 2022-10-22 PROCEDURE — G0378 HOSPITAL OBSERVATION PER HR: HCPCS

## 2022-10-22 RX ORDER — ROSUVASTATIN CALCIUM 20 MG/1
20 TABLET, COATED ORAL NIGHTLY
Qty: 30 TABLET | Refills: 0 | Status: SHIPPED | OUTPATIENT
Start: 2022-10-22

## 2022-10-22 RX ADMIN — LINAGLIPTIN 5 MG: 5 TABLET, FILM COATED ORAL at 08:29

## 2022-10-22 RX ADMIN — CLOPIDOGREL BISULFATE 75 MG: 75 TABLET ORAL at 08:29

## 2022-10-22 RX ADMIN — TRIAMTERENE AND HYDROCHLOROTHIAZIDE 1 TABLET: 75; 50 TABLET ORAL at 08:37

## 2022-10-22 RX ADMIN — LISINOPRIL 40 MG: 20 TABLET ORAL at 08:30

## 2022-10-22 RX ADMIN — INSULIN GLARGINE 33 UNITS: 100 INJECTION, SOLUTION SUBCUTANEOUS at 08:31

## 2022-10-22 NOTE — DISCHARGE SUMMARY
[facility] Medicine Services  DISCHARGE SUMMARY    Patient Name: Blas Mojica  : 1950  MRN: [id number]    Date of Admission: 10/19/2022  Date of Discharge:  10/22/2022  Primary Care Physician: Sahil Read MD      Presenting Problem:   Transient ischemic attack (TIA) [G45.9]  Weakness [R53.1]    Active and Resolved Hospital Problems:  Active Hospital Problems    Diagnosis POA   • **TIA (transient ischemic attack) [G45.9] Yes   • Mixed hyperlipidemia [E78.2] Yes   • Type 2 diabetes mellitus with stage 3 chronic kidney disease, with long-term current use of insulin (HCC) [E11.22, N18.30, Z79.4] Not Applicable   • Stage 3 chronic kidney disease (HCC) [N18.30] Yes   • Primary hypertension [I10] Yes      Resolved Hospital Problems    Diagnosis POA   • Transient ischemic attack (TIA) [G45.9] Yes         Hospital Course     Hospital Course:    The patient is a very pleasant 72-year-old male with history of TIA in 2016 on Plavix and intolerance with Lipitor and aspirin that presented to the emergency room on 10/19/2022 complaining of left-sided weakness.  Symptoms started around 8 PM in the evening of 10/18/2022 while he was in bed and was unable to get dressed because of it.  He also noticed left facial droop and uses a walker at baseline.    Symptoms had resolved when he arrived at the ED.  CT of the head ruled out acute intracranial pathology.    The patient was placed on observation inj the BENEDICT.  MRI of the brain and MRA of the head were done due to history of contrast allergy.  It showed moderate irregularity of the right A1 and M1 segments.  There was no evidence of infarct.  The patient likely had TIA and will be started on Crestor and will continue taking Plavix.  He does not want aspirin since it caused GI intolerance in the past.  He will need to follow-up with his PCP within 2 weeks to see if he tolerates Crestor.  He has been monitoring his blood glucose at home  and was told to monitor for hypoglycemic episodes. In addition, the patient has long history of slowed speech/reaction time and has been using a walker for a while.  His wife claims that Parkinson's disease was ruled out in the past and the patient had followed up with Dr. Jurado, cardiologist and had a event monitor.  He will need to follow-up with outpatient neurologist, Dr. Seipel for further work-up.        DISCHARGE Follow Up Recommendations for labs and diagnostics:     -- Follow-up with outpatient neurology for work-up of delayed speech reaction.      Reasons For Change In Medications and Indications for New Medications:      Day of Discharge     Vital Signs:  Temp:  [97.5 °F (36.4 °C)-98.3 °F (36.8 °C)] 98.3 °F (36.8 °C)  Heart Rate:  [83-98] 98  Resp:  [12-17] 12  BP: ()/(60-84) 121/68    Physical Exam:  Physical Exam  HENT:      Head: Normocephalic.      Nose: Nose normal.   Eyes:      Extraocular Movements: Extraocular movements intact.      Pupils: Pupils are equal, round, and reactive to light.   Cardiovascular:      Rate and Rhythm: Normal rate and regular rhythm.   Pulmonary:      Effort: Pulmonary effort is normal.   Abdominal:      General: Bowel sounds are normal.   Musculoskeletal:         General: Normal range of motion.      Cervical back: Normal range of motion.   Skin:     General: Skin is warm.   Neurological:      General: No focal deficit present.      Mental Status: He is alert.   Psychiatric:         Mood and Affect: Mood normal.            Pertinent  and/or Most Recent Results     LAB RESULTS:      Lab 10/20/22  0530 10/19/22  2325   WBC 6.00 7.30   HEMOGLOBIN 13.4 14.5   HEMATOCRIT 36.4* 40.7   PLATELETS 117* 145   NEUTROS ABS  --  4.50   LYMPHS ABS  --  1.80   MONOS ABS  --  0.60   EOS ABS  --  0.20   MCV 88.8 88.2   PROTIME  --  9.9   APTT  --  27.0         Lab 10/20/22  0530 10/19/22  2325   SODIUM 141 141   POTASSIUM 4.1 3.9   CHLORIDE 102 100   CO2 29.0 30.0*   ANION GAP 10.0  11.0   BUN 24* 25*   CREATININE 1.11 1.20   EGFR 70.1 63.9   GLUCOSE 175* 209*   CALCIUM 9.4 9.6   HEMOGLOBIN A1C 8.0*  --    TSH  --  3.640         Lab 10/20/22  0530 10/19/22  2325   TOTAL PROTEIN 6.3 7.4   ALBUMIN 3.60 4.40   GLOBULIN 2.7 3.0   ALT (SGPT) 16 20   AST (SGOT) 16 20   BILIRUBIN 0.3 0.5   ALK PHOS 86 102         Lab 10/19/22  2325   TROPONIN T <0.010   PROTIME 9.9   INR 0.96         Lab 10/19/22  2325   CHOLESTEROL 235*   LDL CHOL 141*   HDL CHOL 50   TRIGLYCERIDES 246*         Lab 10/19/22  2325   VITAMIN B 12 542   ABO TYPING A   RH TYPING Negative   ANTIBODY SCREEN Negative         Brief Urine Lab Results  (Last result in the past 365 days)      Color   Clarity   Blood   Leuk Est   Nitrite   Protein   CREAT   Urine HCG        10/20/22 0015 Yellow   Clear   Negative   Negative   Negative   Negative               Microbiology Results (last 10 days)     Procedure Component Value - Date/Time    COVID-19,CEPHEID/MAT,COR/SLOAN/PAD/MILES/MAD IN-HOUSE(OR EMERGENT/ADD-ON),NP SWAB IN TRANSPORT MEDIA 3-4 HR TAT, RT-PCR - Swab, Nasopharynx [[id number]]  (Normal) Collected: 10/20/22 0327    Lab Status: Final result Specimen: Swab from Nasopharynx Updated: 10/20/22 0411     COVID19 Not Detected    Narrative:      Fact sheet for providers: https://www.fda.gov/media/013646/download     Fact sheet for patients: https://www.fda.gov/media/114810/download  Fact sheet for providers: https://www.fda.gov/media/921811/download     Fact sheet for patients: https://www.fda.gov/media/985083/download          MRI Angiogram Head Without Contrast    Result Date: 10/20/2022  Impression: IMPRESSION :  1. Moderate irregularity of the right A1 and M1 segments without hemodynamically significant stenosis. 2. Intracranial MRA appears otherwise unremarkable.  Electronically Signed By-Pito Shelby MD On:10/20/2022 1:49 PM This report was finalized on [id number] by  Pito Shelby MD.    MRI Brain Without Contrast    Result Date:  10/20/2022  Impression:  1. No evidence of acute stroke. 2. No acute intracranial pathology 3. Changes of atrophy and chronic microvascular ischemic disease 3. Right mastoid effusion.   Electronically Signed By-Jorge Mcgill MD On:10/20/2022 10:21 AM This report was finalized on [id number] by  Jorge Mcgill MD.    XR Chest 1 View    Result Date: 10/20/2022  Impression:  1. Query small right effusion.  Electronically Signed By-Jorge Mcgill MD On:10/20/2022 7:16 AM This report was finalized on [id number] by  Jorge Mcgill MD.    CT Head Without Contrast Stroke Protocol    Result Date: 10/19/2022  Impression: 1.  No acute intracranial abnormality. 2.  Age commensurate parenchymal volume loss. Mild chronic small vessel ischemic changes. Atherosclerosis. 3.  Chronic right mastoid effusion. Consider mastoiditis or eustachian tube obstruction. Results were discussed with Dr. GORMAN via phone at 10/19/2022 9:34 PM by Dr. Hood.  A read back of two patient identifiers and impression occurred. Electronically signed by:  Dave Hood M.D.  10/19/2022 9:37 PM      Results for orders placed during the hospital encounter of 10/19/22    Duplex Carotid Ultrasound CAR    Interpretation Summary  •  Proximal right internal carotid artery plaque without significant stenosis.  •  Proximal left internal carotid artery plaque without significant stenosis.      Results for orders placed during the hospital encounter of 10/19/22    Duplex Carotid Ultrasound CAR    Interpretation Summary  •  Proximal right internal carotid artery plaque without significant stenosis.  •  Proximal left internal carotid artery plaque without significant stenosis.      Results for orders placed during the hospital encounter of 10/19/22    Adult Transthoracic Echo Complete W/ Cont if Necessary Per Protocol    Interpretation Summary  •  Estimated left ventricular EF = 59% Estimated left ventricular EF was in agreement with the calculated left  ventricular EF. Left ventricular systolic function is normal.  •  Left ventricular wall thickness is consistent with mild concentric hypertrophy.  •  There is calcification of the aortic valve.    Trans- thoracic echocardiography reveals mild LVH with EF of 59%  Mild left atrial enlargement  No effusion    Electronically signed by Mike Tomlinson MD, 10/21/22, 6:13 PM EDT.      Labs Pending at Discharge:      Procedures Performed           Consults:   Consults     Date and Time Order Name Status Description    10/19/2022 11:10 PM Inpatient Neurology Consult Stroke Completed             Discharge Details        Discharge Medications      New Medications      Instructions Start Date   rosuvastatin 20 MG tablet  Commonly known as: Crestor   20 mg, Oral, Nightly         Continue These Medications      Instructions Start Date   clopidogrel 75 MG tablet  Commonly known as: PLAVIX   75 mg, Oral, Daily      insulin glargine 100 UNIT/ML injection  Commonly known as: LANTUS, SEMGLEE   15 Units, Subcutaneous, Nightly      linagliptin 5 MG tablet tablet  Commonly known as: TRADJENTA   5 mg, Oral, Daily      lisinopril 40 MG tablet  Commonly known as: PRINIVIL,ZESTRIL   40 mg, Oral, Daily      multivitamin with minerals tablet tablet   1 tablet, Oral, Daily      triamterene-hydrochlorothiazide 75-50 MG per tablet  Commonly known as: MAXZIDE   1 tablet, Oral, Daily             Allergies   Allergen Reactions   • Contrast Dye Anaphylaxis   • Iodinated Diagnostic Agents Shortness Of Breath   • Iodine Shortness Of Breath   • Aspirin Nausea And Vomiting   • Lipitor [Atorvastatin] Unknown - High Severity         Discharge Disposition:   Home or Self Care    Diet:  Hospital:No active diet order        Discharge Activity: As tolerated      Discharge Condition: Hemodynamically stable      CODE STATUS:  Code Status and Medical Interventions:   Ordered at: 10/20/22 0217     Code Status (Patient has no pulse and is not breathing):    CPR  (Attempt to Resuscitate)     Medical Interventions (Patient has pulse or is breathing):    Full Support     Release to patient:    Routine Release         No future appointments.    Additional Instructions for the Follow-ups that You Need to Schedule     Ambulatory Referral to Home Health (Hospital)   As directed      Face to Face Visit Date: 10/21/2022    Follow-up provider for Plan of Care?: I treated the patient in an acute care facility and will not continue treatment after discharge.    Follow-up provider: SAHIL HERCULES [[id number]]    Reason/Clinical Findings: TIA    Describe mobility limitations that make leaving home difficult: unable to drive    Nursing/Therapeutic Services Requested: Physical Therapy Occupational Therapy    PT orders: Strengthening (Eval/treat post hospitalization)    Occupational orders: Strengthening (Eval/treat post hospitalization)    Frequency: 1 Week 1         Discharge Follow-up with PCP   As directed       Currently Documented PCP:    Sahil Hercules MD    PCP Phone Number:    [phone]     Follow Up Details: 2 weeks         Discharge Follow-up with Specified Provider: neurology, Dr. Seipel; 2 Weeks   As directed      To: neurology, Dr. Seipel    Follow Up: 2 Weeks               Time spent on Discharge including face to face service:  15 minutes    This patient has been examined wearing appropriate Personal Protective Equipment and discussed with nursing. 10/22/22      Signature: Electronically signed by Julio Echols DO, 10/22/22, 3:28 PM EDT.

## 2022-10-22 NOTE — PLAN OF CARE
Goal Outcome Evaluation: pt discharge back юлия,they live in their own apartment and there is no one to call report to.  wife to drive pt and [facility] home health to follow pt

## 2022-10-23 NOTE — OUTREACH NOTE
Prep Survey    Flowsheet Row Responses   [facility] facility patient discharged from? [facility]   Is LACE score < 7 ? No   Emergency Room discharge w/ pulse ox? No   Eligibility Readm Mgmt   Discharge diagnosis Transient ischemic attack    Does the patient have one of the following disease processes/diagnoses(primary or secondary)? Stroke   Does the patient have Home health ordered? Yes   What is the Home health agency?   [facility]     Is there a DME ordered? No   Prep survey completed? Yes          MARIANA ACOSTA - Registered Nurse

## 2022-10-24 NOTE — CASE MANAGEMENT/SOCIAL WORK
Case Management Discharge Note      Final Note: [facility]         Home Medical Care Coordination complete.    Service Provider Selected Services Address Phone Fax Patient Preferred    [facility] [address] [address] [phone] --         Transportation Services  Private: Car    Final Discharge Disposition Code: 06 - home with home health care

## 2022-10-28 ENCOUNTER — READMISSION MANAGEMENT (OUTPATIENT)
Dept: CALL CENTER | Facility: HOSPITAL | Age: 72
End: 2022-10-28

## 2022-10-28 NOTE — OUTREACH NOTE
Stroke Week 1 Survey    Flowsheet Row Responses   [facility] patient discharged from? [facility]   Does the patient have one of the following disease processes/diagnoses(primary or secondary)? Stroke   Week 1 attempt successful? Yes   Call start time 1346   Call end time 1349   Discharge diagnosis Transient ischemic attack    Does the patient have all medications ordered at discharge? Yes   Is the patient taking all medications as directed (includes completed medication regime)? Yes   Does the patient have a primary care provider?  Yes   Does the patient have an appointment with their PCP within 7 days of discharge? Greater than 7 days   Comments regarding PCP f/u with PCP on 11/18- says that was first available appt   Nursing Interventions Verified appointment date/time/provider   Has the patient kept scheduled appointments due by today? N/A   What is the Home health agency?   [facility]     Has home health visited the patient within 72 hours of discharge? Yes   Psychosocial issues? No   Does the patient require any assistance with activities of daily living such as eating, bathing, dressing, walking, etc.? No   Does the patient have any residual symptoms from stroke/TIA? No   Does the patient understand the diet ordered at discharge? Yes   Did the patient receive a copy of their discharge instructions? Yes   Nursing interventions Reviewed instructions with patient   What is the patient's perception of their health status since discharge? Improving   Nursing interventions Nurse provided patient education   Is the patient/caregiver able to teach back the risk factors for a stroke? History of TIAs, High Cholesterol, High blood pressure-goal below 120/80   Is the patient/caregiver able to teach back signs and symptoms related to disease process for when to call 911? Yes   Is the patient/caregiver able to teach back the hierarchy of who to call/visit for symptoms/problems? PCP, Specialist, Home health nurse, Urgent Care,  ED, 911 Yes   Is the patient able to teach back FAST for Stroke? B alance: Watch for sudden loss of balance, E yes: Check for vision loss, F ace: Look for an uneven smile, A rm: Check if one arm is weak, S peech: Listen for slurred speech, T ying: Call 9-1-1 right away   Week 1 call completed? Yes   Wrap up additional comments Doing well, home health has been seeing him, no questions.          MILEY CASTILLO - Registered Nurse

## 2022-11-08 ENCOUNTER — READMISSION MANAGEMENT (OUTPATIENT)
Dept: CALL CENTER | Facility: HOSPITAL | Age: 72
End: 2022-11-08

## 2022-11-08 NOTE — OUTREACH NOTE
Stroke Week 2 Survey    Flowsheet Row Responses   [facility] facility patient discharged from? [facility]   Does the patient have one of the following disease processes/diagnoses(primary or secondary)? Stroke   Week 2 attempt successful? No   Unsuccessful attempts Attempt 1          MILEY Stratton Registered Nurse

## 2022-11-11 ENCOUNTER — READMISSION MANAGEMENT (OUTPATIENT)
Dept: CALL CENTER | Facility: HOSPITAL | Age: 72
End: 2022-11-11

## 2022-11-11 NOTE — OUTREACH NOTE
Stroke Week 2 Survey    Flowsheet Row Responses   [facility] facility patient discharged from? [facility]   Does the patient have one of the following disease processes/diagnoses(primary or secondary)? Stroke   Week 2 attempt successful? No   Unsuccessful attempts Attempt 2   Discharge diagnosis Transient ischemic attack           JOSE CUMMINGS - Registered Nurse

## 2023-04-05 ENCOUNTER — HOSPITAL ENCOUNTER (EMERGENCY)
Facility: HOSPITAL | Age: 73
Discharge: HOME | End: 2023-04-05
Attending: EMERGENCY MEDICINE | Admitting: EMERGENCY MEDICINE
Payer: MEDICARE

## 2023-04-05 VITALS
DIASTOLIC BLOOD PRESSURE: 77 MMHG | HEART RATE: 82 BPM | BODY MASS INDEX: 25.77 KG/M2 | TEMPERATURE: 97.5 F | HEIGHT: 70 IN | OXYGEN SATURATION: 96 % | WEIGHT: 180 LBS | RESPIRATION RATE: 18 BRPM | SYSTOLIC BLOOD PRESSURE: 137 MMHG

## 2023-04-05 DIAGNOSIS — E16.2 HYPOGLYCEMIA: ICD-10-CM

## 2023-04-05 DIAGNOSIS — N17.9 ACUTE KIDNEY INJURY: Primary | ICD-10-CM

## 2023-04-05 LAB
ALBUMIN SERPL-MCNC: 4.3 G/DL (ref 3.5–5.2)
ALBUMIN/GLOB SERPL: 1.4 G/DL
ALP SERPL-CCNC: 74 U/L (ref 39–117)
ALT SERPL W P-5'-P-CCNC: 18 U/L (ref 1–41)
ANION GAP SERPL CALCULATED.3IONS-SCNC: 13 MMOL/L (ref 5–15)
AST SERPL-CCNC: 27 U/L (ref 1–40)
BASOPHILS # BLD AUTO: 0 10*3/MM3 (ref 0–0.2)
BASOPHILS NFR BLD AUTO: 0.3 % (ref 0–1.5)
BILIRUB SERPL-MCNC: 0.6 MG/DL (ref 0–1.2)
BUN SERPL-MCNC: 40 MG/DL (ref 8–23)
BUN/CREAT SERPL: 22.1 (ref 7–25)
CALCIUM SPEC-SCNC: 10 MG/DL (ref 8.6–10.5)
CHLORIDE SERPL-SCNC: 103 MMOL/L (ref 98–107)
CO2 SERPL-SCNC: 25 MMOL/L (ref 22–29)
CREAT SERPL-MCNC: 1.81 MG/DL (ref 0.76–1.27)
DEPRECATED RDW RBC AUTO: 46.8 FL (ref 37–54)
EGFRCR SERPLBLD CKD-EPI 2021: 39 ML/MIN/1.73
EOSINOPHIL # BLD AUTO: 0 10*3/MM3 (ref 0–0.4)
EOSINOPHIL NFR BLD AUTO: 0.1 % (ref 0.3–6.2)
ERYTHROCYTE [DISTWIDTH] IN BLOOD BY AUTOMATED COUNT: 14 % (ref 12.3–15.4)
GLOBULIN UR ELPH-MCNC: 3.1 GM/DL
GLUCOSE BLDC GLUCOMTR-MCNC: 130 MG/DL (ref 70–105)
GLUCOSE BLDC GLUCOMTR-MCNC: 86 MG/DL (ref 70–105)
GLUCOSE SERPL-MCNC: 138 MG/DL (ref 65–99)
HCT VFR BLD AUTO: 37.8 % (ref 37.5–51)
HGB BLD-MCNC: 12.8 G/DL (ref 13–17.7)
LYMPHOCYTES # BLD AUTO: 0.4 10*3/MM3 (ref 0.7–3.1)
LYMPHOCYTES NFR BLD AUTO: 4.5 % (ref 19.6–45.3)
MCH RBC QN AUTO: 30.5 PG (ref 26.6–33)
MCHC RBC AUTO-ENTMCNC: 33.8 G/DL (ref 31.5–35.7)
MCV RBC AUTO: 90.4 FL (ref 79–97)
MONOCYTES # BLD AUTO: 0.5 10*3/MM3 (ref 0.1–0.9)
MONOCYTES NFR BLD AUTO: 5.1 % (ref 5–12)
NEUTROPHILS NFR BLD AUTO: 8.9 10*3/MM3 (ref 1.7–7)
NEUTROPHILS NFR BLD AUTO: 90 % (ref 42.7–76)
NRBC BLD AUTO-RTO: 0 /100 WBC (ref 0–0.2)
NT-PROBNP SERPL-MCNC: 149.7 PG/ML (ref 0–900)
PLATELET # BLD AUTO: 137 10*3/MM3 (ref 140–450)
PMV BLD AUTO: 8.6 FL (ref 6–12)
POTASSIUM SERPL-SCNC: 4.4 MMOL/L (ref 3.5–5.2)
PROT SERPL-MCNC: 7.4 G/DL (ref 6–8.5)
RBC # BLD AUTO: 4.18 10*6/MM3 (ref 4.14–5.8)
SODIUM SERPL-SCNC: 141 MMOL/L (ref 136–145)
T4 FREE SERPL-MCNC: 1.04 NG/DL (ref 0.93–1.7)
TROPONIN T SERPL HS-MCNC: 40 NG/L
TROPONIN T SERPL HS-MCNC: 49 NG/L
TSH SERPL DL<=0.05 MIU/L-ACNC: 3.75 UIU/ML (ref 0.27–4.2)
WBC NRBC COR # BLD: 9.9 10*3/MM3 (ref 3.4–10.8)

## 2023-04-05 PROCEDURE — 82962 GLUCOSE BLOOD TEST: CPT

## 2023-04-05 PROCEDURE — 36415 COLL VENOUS BLD VENIPUNCTURE: CPT

## 2023-04-05 PROCEDURE — 80053 COMPREHEN METABOLIC PANEL: CPT | Performed by: EMERGENCY MEDICINE

## 2023-04-05 PROCEDURE — 93005 ELECTROCARDIOGRAM TRACING: CPT | Performed by: EMERGENCY MEDICINE

## 2023-04-05 PROCEDURE — 83880 ASSAY OF NATRIURETIC PEPTIDE: CPT | Performed by: EMERGENCY MEDICINE

## 2023-04-05 PROCEDURE — 85025 COMPLETE CBC W/AUTO DIFF WBC: CPT | Performed by: EMERGENCY MEDICINE

## 2023-04-05 PROCEDURE — 84439 ASSAY OF FREE THYROXINE: CPT | Performed by: EMERGENCY MEDICINE

## 2023-04-05 PROCEDURE — 84484 ASSAY OF TROPONIN QUANT: CPT | Performed by: EMERGENCY MEDICINE

## 2023-04-05 PROCEDURE — 84443 ASSAY THYROID STIM HORMONE: CPT | Performed by: EMERGENCY MEDICINE

## 2023-04-05 PROCEDURE — 99284 EMERGENCY DEPT VISIT MOD MDM: CPT

## 2023-04-05 RX ORDER — SODIUM CHLORIDE 0.9 % (FLUSH) 0.9 %
10 SYRINGE (ML) INJECTION AS NEEDED
Status: DISCONTINUED | OUTPATIENT
Start: 2023-04-05 | End: 2023-04-06 | Stop reason: HOSPADM

## 2023-04-05 RX ADMIN — SODIUM CHLORIDE, POTASSIUM CHLORIDE, SODIUM LACTATE AND CALCIUM CHLORIDE 1000 ML: 600; 310; 30; 20 INJECTION, SOLUTION INTRAVENOUS at 20:58

## 2023-04-06 LAB — QT INTERVAL: 370 MS

## 2023-04-06 NOTE — ED NOTES
Patient discharged at this time, discharge instructions, medications, and follow up care reviewed with patient and spouse, patient and spouse verbalized understanding. Patient A&Ox4, VS WNL, NAD noted at this time, patient ambulatory to wheelchair for discharge.

## 2023-04-06 NOTE — DISCHARGE INSTRUCTIONS
Hold your pioglitazone until you see your primary care physician.  Drink plenty of fluids over the next several days.

## 2023-04-06 NOTE — ED PROVIDER NOTES
Subjective   History of Present Illness  72-year-old male history of type 2 diabetes stage III kidney disease presents for generalized weakness, hypoglycemia.  States he has not had any of his medicines today.  Including his insulin when or Pioglitazone.  States he took a 2-hour long hot shower and after he got out of the shower he dumped over some Tide pods.  Went over to pick them up and he felt very weak could not get up.  Had a blood sugar of 50 when checked.  Fed him at home and took a while but started to come up.  Patient did not get up until 2 PM but this is not abnormal for him.      Review of Systems  Patient denies chest pain, shortness of breath, nausea, vomiting, diarrhea.  Past Medical History:   Diagnosis Date   • Diabetes mellitus    • Hyperlipidemia    • Hypertension    • Kidney stone    • TIA (transient ischemic attack) 2016   • Type 2 diabetes mellitus with stage 3 chronic kidney disease, with long-term current use of insulin 12/13/2021       Allergies   Allergen Reactions   • Contrast Dye (Echo Or Unknown Ct/Mr) Anaphylaxis   • Iodinated Contrast Media Shortness Of Breath   • Iodine Shortness Of Breath   • Aspirin Nausea And Vomiting   • Lipitor [Atorvastatin] Unknown - High Severity       Past Surgical History:   Procedure Laterality Date   • ADENOIDECTOMY     • EXTRACORPOREAL SHOCK WAVE LITHOTRIPSY (ESWL)     • HERNIA REPAIR     • KNEE ACL RECONSTRUCTION Left    • TONSILLECTOMY         Family History   Problem Relation Age of Onset   • No Known Problems Mother    • No Known Problems Father        Social History     Socioeconomic History   • Marital status:    Tobacco Use   • Smoking status: Never   • Smokeless tobacco: Never   Vaping Use   • Vaping Use: Never used   Substance and Sexual Activity   • Alcohol use: Never   • Drug use: Never   • Sexual activity: Defer           Objective   Physical Exam  Constitutional:  No acute distress.  Head:  Atraumatic.  Normocephalic.   Eyes:  No  "scleral icterus. Normal conjunctivae  ENT:  Moist mucosa.  No nasal discharge present.  Cardiovascular:  Well perfused.  Equal pulses.  Regular rhythm and normal rate.  Normal capillary refill.    Pulmonary/Chest:  No respiratory distress.  Airway patent.  No tachypnea.  No accessory muscle usage.  Clear to auscultation bilaterally  Abdominal:  Nondistended. Nontender.   Extremities:  No peripheral edema.  No Deformity  Skin:  Warm, dry  Neurological:  Alert, awake, and appropriate.  Normal speech.  Normal strength, normal sensation all 4 extremities.  Cranor 2 through 12 intact.    Procedures           ED Course      /77 (Patient Position: Lying)   Pulse 82   Temp 97.5 °F (36.4 °C) (Oral)   Resp 18   Ht 177.8 cm (70\")   Wt 81.6 kg (180 lb)   SpO2 96%   BMI 25.83 kg/m²   Labs Reviewed   COMPREHENSIVE METABOLIC PANEL - Abnormal; Notable for the following components:       Result Value    Glucose 138 (*)     BUN 40 (*)     Creatinine 1.81 (*)     eGFR 39.0 (*)     All other components within normal limits    Narrative:     GFR Normal >60  Chronic Kidney Disease <60  Kidney Failure <15    The GFR formula is only valid for adults with stable renal function between ages 18 and 70.   SINGLE HSTROPONIN T - Abnormal; Notable for the following components:    HS Troponin T 49 (*)     All other components within normal limits    Narrative:     High Sensitive Troponin T Reference Range:  <10.0 ng/L- Negative Female for AMI  <15.0 ng/L- Negative Male for AMI  >=10 - Abnormal Female indicating possible myocardial injury.  >=15 - Abnormal Male indicating possible myocardial injury.   Clinicians would have to utilize clinical acumen, EKG, Troponin, and serial changes to determine if it is an Acute Myocardial Infarction or myocardial injury due to an underlying chronic condition.        CBC WITH AUTO DIFFERENTIAL - Abnormal; Notable for the following components:    Hemoglobin 12.8 (*)     Platelets 137 (*)     " Neutrophil % 90.0 (*)     Lymphocyte % 4.5 (*)     Eosinophil % 0.1 (*)     Neutrophils, Absolute 8.90 (*)     Lymphocytes, Absolute 0.40 (*)     All other components within normal limits   SINGLE HSTROPONIN T - Abnormal; Notable for the following components:    HS Troponin T 40 (*)     All other components within normal limits    Narrative:     High Sensitive Troponin T Reference Range:  <10.0 ng/L- Negative Female for AMI  <15.0 ng/L- Negative Male for AMI  >=10 - Abnormal Female indicating possible myocardial injury.  >=15 - Abnormal Male indicating possible myocardial injury.   Clinicians would have to utilize clinical acumen, EKG, Troponin, and serial changes to determine if it is an Acute Myocardial Infarction or myocardial injury due to an underlying chronic condition.        POCT GLUCOSE FINGERSTICK - Abnormal; Notable for the following components:    Glucose 130 (*)     All other components within normal limits   TSH - Normal   T4, FREE - Normal    Narrative:     Results may be falsely increased if patient taking Biotin.     BNP (IN-HOUSE) - Normal    Narrative:     Among patients with dyspnea, NT-proBNP is highly sensitive for the detection of acute congestive heart failure. In addition NT-proBNP of <300 pg/ml effectively rules out acute congestive heart failure with 99% negative predictive value.    Results may be falsely decreased if patient taking Biotin.     POCT GLUCOSE FINGERSTICK - Normal   CBC AND DIFFERENTIAL    Narrative:     The following orders were created for panel order CBC & Differential.  Procedure                               Abnormality         Status                     ---------                               -----------         ------                     CBC Auto Differential[[id number]]        Abnormal            Final result                 Please view results for these tests on the individual orders.     Medications   sodium chloride 0.9 % flush 10 mL (has no administration in time  range)   lactated ringers bolus 1,000 mL (0 mL Intravenous Stopped 4/5/23 2128)     No radiology results for the last day                                       [facility]  EKG # 1  Signed and interpreted by the EP.  Time Interpreted: 7:34 PM  Rate: 91  Rhythm: Normal sinus rhythm  Axis: Normal axis  Intervals: Normal intervals  ST Segments: No acute ischemic changes     Comparison: No significant change from October 19, 2022 EKG.    Reviewed October 22, 2022 note from Dr. Brito patient was admitted for transient ischemic attack and weakness.  Patient with some irregularity in A1 and M1 segments but no evidence of infarct.  Was continued on Plavix and started on Crestor and given follow-up with outpatient neurology.    Blood sugar stable here.  Patient nontoxic-appearing.  Mild BRIGIDO.  Creatinine 1.81 today.  Creatinine 1.26 on November 20, 2022.  Troponin reassuring.  BNP reassuring.  CBC with some shift on differential but normal white count, very mild anemia.  Patient feeling better after fluids.  Discussed holding pioglitazone still seeing PCP and given patient current BRIGIDO.  Has not had dose today so do not think this was because of hypoglycemia.  Had not had dose since yesterday afternoon.  Will DC home.  Patient and family agreeable with plan.    Final diagnoses:   Acute kidney injury   Hypoglycemia       ED Disposition  ED Disposition     ED Disposition   Discharge    Condition   Stable    Comment   --             Sahil Read MD  [address] [id number] [phone]    In 3 days           Medication List      No changes were made to your prescriptions during this visit.          Rony Engle MD  04/05/23 1913

## 2023-07-25 ENCOUNTER — HOSPITAL ENCOUNTER (EMERGENCY)
Facility: HOSPITAL | Age: 73
Discharge: HOME | End: 2023-07-25
Attending: EMERGENCY MEDICINE | Admitting: EMERGENCY MEDICINE
Payer: MEDICARE

## 2023-07-25 ENCOUNTER — APPOINTMENT (OUTPATIENT)
Dept: GENERAL RADIOLOGY | Facility: HOSPITAL | Age: 73
End: 2023-07-25
Payer: MEDICARE

## 2023-07-25 VITALS
WEIGHT: 180.78 LBS | SYSTOLIC BLOOD PRESSURE: 126 MMHG | TEMPERATURE: 98.5 F | RESPIRATION RATE: 16 BRPM | HEART RATE: 109 BPM | HEIGHT: 70 IN | DIASTOLIC BLOOD PRESSURE: 71 MMHG | OXYGEN SATURATION: 100 % | BODY MASS INDEX: 25.88 KG/M2

## 2023-07-25 DIAGNOSIS — R73.9 HYPERGLYCEMIA: ICD-10-CM

## 2023-07-25 DIAGNOSIS — S92.355A CLOSED NONDISPLACED FRACTURE OF FIFTH METATARSAL BONE OF LEFT FOOT, INITIAL ENCOUNTER: ICD-10-CM

## 2023-07-25 DIAGNOSIS — M79.672 LEFT FOOT PAIN: Primary | ICD-10-CM

## 2023-07-25 LAB
ANION GAP SERPL CALCULATED.3IONS-SCNC: 10 MMOL/L (ref 5–15)
BASOPHILS # BLD AUTO: 0.1 10*3/MM3 (ref 0–0.2)
BASOPHILS NFR BLD AUTO: 0.9 % (ref 0–1.5)
BUN SERPL-MCNC: 31 MG/DL (ref 8–23)
BUN/CREAT SERPL: 19.6 (ref 7–25)
CALCIUM SPEC-SCNC: 9.5 MG/DL (ref 8.6–10.5)
CHLORIDE SERPL-SCNC: 101 MMOL/L (ref 98–107)
CO2 SERPL-SCNC: 27 MMOL/L (ref 22–29)
CREAT SERPL-MCNC: 1.58 MG/DL (ref 0.76–1.27)
CRP SERPL-MCNC: <0.3 MG/DL (ref 0–0.5)
DEPRECATED RDW RBC AUTO: 42.9 FL (ref 37–54)
EGFRCR SERPLBLD CKD-EPI 2021: 45.9 ML/MIN/1.73
EOSINOPHIL # BLD AUTO: 0.1 10*3/MM3 (ref 0–0.4)
EOSINOPHIL NFR BLD AUTO: 1.1 % (ref 0.3–6.2)
ERYTHROCYTE [DISTWIDTH] IN BLOOD BY AUTOMATED COUNT: 13 % (ref 12.3–15.4)
ERYTHROCYTE [SEDIMENTATION RATE] IN BLOOD: 12 MM/HR (ref 0–20)
GLUCOSE BLDC GLUCOMTR-MCNC: 331 MG/DL (ref 70–105)
GLUCOSE SERPL-MCNC: 415 MG/DL (ref 65–99)
HCT VFR BLD AUTO: 40.5 % (ref 37.5–51)
HGB BLD-MCNC: 13.4 G/DL (ref 13–17.7)
LYMPHOCYTES # BLD AUTO: 1.3 10*3/MM3 (ref 0.7–3.1)
LYMPHOCYTES NFR BLD AUTO: 18.5 % (ref 19.6–45.3)
MCH RBC QN AUTO: 29.8 PG (ref 26.6–33)
MCHC RBC AUTO-ENTMCNC: 33 G/DL (ref 31.5–35.7)
MCV RBC AUTO: 90.2 FL (ref 79–97)
MONOCYTES # BLD AUTO: 0.5 10*3/MM3 (ref 0.1–0.9)
MONOCYTES NFR BLD AUTO: 7 % (ref 5–12)
NEUTROPHILS NFR BLD AUTO: 5.3 10*3/MM3 (ref 1.7–7)
NEUTROPHILS NFR BLD AUTO: 72.5 % (ref 42.7–76)
NRBC BLD AUTO-RTO: 0.2 /100 WBC (ref 0–0.2)
PLATELET # BLD AUTO: 167 10*3/MM3 (ref 140–450)
PMV BLD AUTO: 8.6 FL (ref 6–12)
POTASSIUM SERPL-SCNC: 4.6 MMOL/L (ref 3.5–5.2)
RBC # BLD AUTO: 4.49 10*6/MM3 (ref 4.14–5.8)
SODIUM SERPL-SCNC: 138 MMOL/L (ref 136–145)
WBC NRBC COR # BLD: 7.3 10*3/MM3 (ref 3.4–10.8)

## 2023-07-25 PROCEDURE — 82948 REAGENT STRIP/BLOOD GLUCOSE: CPT

## 2023-07-25 PROCEDURE — 85025 COMPLETE CBC W/AUTO DIFF WBC: CPT | Performed by: PHYSICIAN ASSISTANT

## 2023-07-25 PROCEDURE — 85652 RBC SED RATE AUTOMATED: CPT | Performed by: PHYSICIAN ASSISTANT

## 2023-07-25 PROCEDURE — 86140 C-REACTIVE PROTEIN: CPT | Performed by: PHYSICIAN ASSISTANT

## 2023-07-25 PROCEDURE — 99283 EMERGENCY DEPT VISIT LOW MDM: CPT

## 2023-07-25 PROCEDURE — 80048 BASIC METABOLIC PNL TOTAL CA: CPT | Performed by: PHYSICIAN ASSISTANT

## 2023-07-25 PROCEDURE — 73630 X-RAY EXAM OF FOOT: CPT

## 2023-07-25 RX ORDER — SODIUM CHLORIDE 0.9 % (FLUSH) 0.9 %
10 SYRINGE (ML) INJECTION AS NEEDED
Status: DISCONTINUED | OUTPATIENT
Start: 2023-07-25 | End: 2023-07-26 | Stop reason: HOSPADM

## 2023-07-25 RX ORDER — ACETAMINOPHEN 500 MG
1000 TABLET ORAL ONCE
Status: COMPLETED | OUTPATIENT
Start: 2023-07-25 | End: 2023-07-25

## 2023-07-25 RX ORDER — DOXYCYCLINE 100 MG/1
100 CAPSULE ORAL 2 TIMES DAILY
Qty: 14 CAPSULE | Refills: 0 | Status: SHIPPED | OUTPATIENT
Start: 2023-07-25 | End: 2023-08-01

## 2023-07-25 RX ORDER — DOXYCYCLINE 100 MG/1
100 CAPSULE ORAL ONCE
Status: COMPLETED | OUTPATIENT
Start: 2023-07-25 | End: 2023-07-25

## 2023-07-25 RX ADMIN — ACETAMINOPHEN 1000 MG: 500 TABLET, FILM COATED ORAL at 22:03

## 2023-07-25 RX ADMIN — DOXYCYCLINE 100 MG: 100 CAPSULE ORAL at 22:03

## 2023-07-25 RX ADMIN — SODIUM CHLORIDE 1000 ML: 9 INJECTION, SOLUTION INTRAVENOUS at 21:01

## 2023-07-25 NOTE — ED PROVIDER NOTES
Subjective   History of Present Illness      Patient is 72-year-old male comes in complaining of left foot pain for the past 2 weeks.  Patient states that 2 weeks ago he was walking near a cement road block a barrier where he got his foot stuck underneath and stepped forward.  Patient states he has had increasing pain since that time.  Patient states it hurts to weight-bear on the left foot.  Patient states he has history of diabetes and is sugars have been over 400 last night.  Patient family at bedside states that there is an abrasion on the plantar aspect of the foot and wondering if patient is having an infection.  Patient states at rest his pain is next to none however upon ambulating is severe.  Patient denies any fever, chills, nausea, vomiting or any other injury.  Patient states that his tetanus status is up-to-date within the last 5 years.      Review of Systems   Constitutional:  Negative for chills, fatigue and fever.   HENT:  Negative for congestion, sore throat, tinnitus and trouble swallowing.    Eyes:  Negative for photophobia, discharge and visual disturbance.   Respiratory:  Negative for cough and shortness of breath.    Cardiovascular:  Negative for chest pain and leg swelling.   Gastrointestinal:  Negative for abdominal pain, diarrhea, nausea and vomiting.   Genitourinary:  Negative for dysuria, flank pain and urgency.   Musculoskeletal:  Negative for arthralgias and myalgias.        Left foot pain.   Skin:  Negative for rash.   Neurological:  Negative for dizziness and headaches.   Psychiatric/Behavioral:  Negative for confusion.      Past Medical History:   Diagnosis Date    Diabetes mellitus     Hyperlipidemia     Hypertension     Kidney stone     TIA (transient ischemic attack) 2016    Type 2 diabetes mellitus with stage 3 chronic kidney disease, with long-term current use of insulin 12/13/2021       Allergies   Allergen Reactions    Contrast Dye (Echo Or Unknown Ct/Mr) Anaphylaxis     Iodinated Contrast Media Shortness Of Breath    Iodine Shortness Of Breath    Aspirin Nausea And Vomiting    Lipitor [Atorvastatin] Unknown - High Severity       Past Surgical History:   Procedure Laterality Date    ADENOIDECTOMY      EXTRACORPOREAL SHOCK WAVE LITHOTRIPSY (ESWL)      HERNIA REPAIR      KNEE ACL RECONSTRUCTION Left     TONSILLECTOMY         Family History   Problem Relation Age of Onset    No Known Problems Mother     No Known Problems Father        Social History     Socioeconomic History    Marital status:    Tobacco Use    Smoking status: Never    Smokeless tobacco: Never   Vaping Use    Vaping Use: Never used   Substance and Sexual Activity    Alcohol use: Never    Drug use: Never    Sexual activity: Defer           Objective   Physical Exam  Vitals and nursing note reviewed.   Constitutional:       General: He is not in acute distress.     Appearance: Normal appearance. He is well-developed. He is not diaphoretic.   HENT:      Head: Normocephalic and atraumatic.      Right Ear: Ear canal and external ear normal.      Left Ear: Ear canal and external ear normal.      Nose: Nose normal.      Mouth/Throat:      Mouth: Mucous membranes are moist.   Eyes:      Extraocular Movements: Extraocular movements intact.      Conjunctiva/sclera: Conjunctivae normal.      Pupils: Pupils are equal, round, and reactive to light.   Cardiovascular:      Rate and Rhythm: Normal rate and regular rhythm.      Pulses: Normal pulses.      Heart sounds: Normal heart sounds.      Comments: S1, S2 audible.  Pulmonary:      Effort: Pulmonary effort is normal. No respiratory distress.      Breath sounds: Normal breath sounds. No wheezing or rales.      Comments: On room air.  Abdominal:      General: Bowel sounds are normal. There is no distension.      Palpations: Abdomen is soft.      Tenderness: There is no abdominal tenderness. There is no guarding or rebound.   Musculoskeletal:         General: No tenderness or  deformity. Normal range of motion.      Cervical back: Normal range of motion.      Right lower leg: No edema.      Left lower leg: No edema.   Skin:     General: Skin is warm.      Capillary Refill: Capillary refill takes less than 2 seconds.      Findings: No erythema or rash.      Comments: Patient has very mild amount of erythema on the plantar aspect of the left foot medial aspect that measures about 2 cm in length and only a few millimeters wide.  No open wounds or ulcerations at this area, no area of fluctuance or drainage apparent.  Good pedal pulse of the left left lower extremity and gross sensation intact in the toes of the left lower extremity appropriately.  No swelling of bilateral lower extremities apparent.   Neurological:      General: No focal deficit present.      Mental Status: He is alert and oriented to person, place, and time.      Cranial Nerves: No cranial nerve deficit.   Psychiatric:         Mood and Affect: Mood normal.         Behavior: Behavior normal.       Procedures           ED Course      Labs Reviewed   BASIC METABOLIC PANEL - Abnormal; Notable for the following components:       Result Value    Glucose 415 (*)     BUN 31 (*)     Creatinine 1.58 (*)     eGFR 45.9 (*)     All other components within normal limits    Narrative:     GFR Normal >60  Chronic Kidney Disease <60  Kidney Failure <15    The GFR formula is only valid for adults with stable renal function between ages 18 and 70.   CBC WITH AUTO DIFFERENTIAL - Abnormal; Notable for the following components:    Lymphocyte % 18.5 (*)     All other components within normal limits   POCT GLUCOSE FINGERSTICK - Abnormal; Notable for the following components:    Glucose 331 (*)     All other components within normal limits   SEDIMENTATION RATE - Normal   C-REACTIVE PROTEIN - Normal   POCT GLUCOSE FINGERSTICK   POCT GLUCOSE FINGERSTICK   CBC AND DIFFERENTIAL    Narrative:     The following orders were created for panel order CBC &  Differential.  Procedure                               Abnormality         Status                     ---------                               -----------         ------                     CBC Auto Differential[[id number]]        Abnormal            Final result                 Please view results for these tests on the individual orders.     Labs Reviewed   BASIC METABOLIC PANEL - Abnormal; Notable for the following components:       Result Value    Glucose 415 (*)     BUN 31 (*)     Creatinine 1.58 (*)     eGFR 45.9 (*)     All other components within normal limits    Narrative:     GFR Normal >60  Chronic Kidney Disease <60  Kidney Failure <15    The GFR formula is only valid for adults with stable renal function between ages 18 and 70.   CBC WITH AUTO DIFFERENTIAL - Abnormal; Notable for the following components:    Lymphocyte % 18.5 (*)     All other components within normal limits   POCT GLUCOSE FINGERSTICK - Abnormal; Notable for the following components:    Glucose 331 (*)     All other components within normal limits   SEDIMENTATION RATE - Normal   C-REACTIVE PROTEIN - Normal   POCT GLUCOSE FINGERSTICK   POCT GLUCOSE FINGERSTICK   CBC AND DIFFERENTIAL    Narrative:     The following orders were created for panel order CBC & Differential.  Procedure                               Abnormality         Status                     ---------                               -----------         ------                     CBC Auto Differential[[id number]]        Abnormal            Final result                 Please view results for these tests on the individual orders.                                          Medical Decision Making  Problems Addressed:  Closed nondisplaced fracture of fifth metatarsal bone of left foot, initial encounter: complicated acute illness or injury  Left foot pain: complicated acute illness or injury    Amount and/or Complexity of Data Reviewed  Labs: ordered.  Radiology: ordered.    Risk  OTC  "drugs.  Prescription drug management.      Differential diagnosis: left foot strain, left foot contusion, left foot fracture, not meant to be an all-inclusive list.    Chart review: Last discharge summary on 10/22/2022 by Dr. Guaman for TIA. Patient on aspirin and Plavix.    EKG:  not indicated    Imaging:    XR Foot 3+ View Left   Final Result   Oblique fracture of the fifth metatarsal with soft tissue swelling.            Electronically Signed: Reginaldo Cifuentes     7/25/2023 9:04 PM EDT     Workstation ID: [id number]        Labs: Lab work independently interpreted by myself shows CBC largely unremarkable for acute findings, CRP and ESR normal.  Glucose elevated at 415, serum creatinine of 1.5 about at baseline for patient GFR 45.  Anion gap normal and CO2 normal and does not have signs of DKA    Vitals:  /71 (BP Location: Left arm, Patient Position: Sitting)   Pulse 109   Temp 98.5 °F (36.9 °C) (Oral)   Resp 16   Ht 177.8 cm (70\")   Wt 82 kg (180 lb 12.4 oz)   SpO2 100%   BMI 25.94 kg/m²    Medications given:    Medications   sodium chloride 0.9 % flush 10 mL (has no administration in time range)   sodium chloride 0.9 % bolus 1,000 mL (0 mL Intravenous Stopped 7/25/23 2131)   acetaminophen (TYLENOL) tablet 1,000 mg (1,000 mg Oral Given 7/25/23 2203)   doxycycline (MONODOX) capsule 100 mg (100 mg Oral Given 7/25/23 2203)       Procedures:  not indicated  MDM: Patient is a 72-year-old male comes in complaining of left foot pain.  Patient suffered injury as described above.  X-ray of left foot shows oblique fracture of fifth metatarsal with soft tissue swelling.  Patient above lab work-up and white blood cell count normal and inflammatory markers normal as well as patient being afebrile here and I do not suspect patient have osteo at this time.  Patient has no ulceration or cellulitic changes of the foot.  Patient does have minor wounds of the left shin that are scabbed over and appears to be healing, no " erythema or signs of drainage or infection at that site.  Given patient is diabetic will cover with doxycycline from a wound standpoint and was given this and sent home on a weeks worth of doxycycline.  Patient was given 1 L normal saline bolus and glucose improved to 331.  Patient was given Tylenol for pain control.  Patient states he is very sensitive to pain medicine will make him sleep and does not want any Norco for his fracture.  Patient was fit with a cam boot and walks with a walker at baseline and instructed follow-up with podiatry in 3 days and patient voiced understanding. See full discharge instructions for further details.  Plan discussed with patient and is agreeable with plan.  Case discussed with attending provider above.      Final diagnoses:   Left foot pain   Closed nondisplaced fracture of fifth metatarsal bone of left foot, initial encounter   Hyperglycemia       ED Disposition  ED Disposition       ED Disposition   Discharge    Condition   Stable    Comment   --               [facility] EMERGENCY DEPARTMENT  [address] [address] [phone]  Go in 1 day  As needed, If symptoms worsen    Sahil Read MD  [address] [address]  [phone]    Call in 1 week  As needed    SHERYL Brown DPM  [address] [place] [address] [phone]    Schedule an appointment as soon as possible for a visit in 1 week  for fifth metatarsal fracture follow up         Medication List        New Prescriptions      doxycycline 100 MG capsule  Commonly known as: MONODOX  Take 1 capsule by mouth 2 (Two) Times a Day for 7 days.               Where to Get Your Medications        These medications were sent to [facility] [id number] - [place], [place] - [address] AT [facility] - [phone]  - [phone] FX  [address] [facility] [place] [address]      Phone: [phone]   doxycycline 100 MG capsule            All Miller  PA  07/25/23 7068

## 2023-07-25 NOTE — Clinical Note
[facility] EMERGENCY DEPARTMENT  [address] [facility] [place] [address]  Phone: [phone]    Blas Mojica was seen and treated in our emergency department on 7/25/2023.  He may return to work on 07/26/2023.         Thank you for choosing [facility].    All Miller PA

## 2023-07-26 NOTE — DISCHARGE INSTRUCTIONS
Please take antibiotic to completion even if feeling better.  Please take Tylenol as needed for pain control.  Please ice 20 minutes on 20 minutes off of foot as needed for further pain control and swelling control.  Please follow-up with podiatry, can follow-up with Dr. Brown above for fracture follow-up within 3 days with podiatry.  Please wear cam boot until that time.  Please refrain from weightbearing is much as possible until follow-up with podiatry.  Please use your walker to help with this

## 2023-07-26 NOTE — ED NOTES
"Pt and spouse reports hurt left foot more than one week ago, reports \"has not been bothering him\" denies pain now, spouse reports that it has been swelling off and on for past 2 days wanted to make sure it wasn't infected. Small scratch noted to bottom of left foot, no redness or swelling noted.  "

## 2023-09-12 ENCOUNTER — HOSPITAL ENCOUNTER (EMERGENCY)
Facility: HOSPITAL | Age: 73
Discharge: HOME | End: 2023-09-12
Attending: EMERGENCY MEDICINE
Payer: MEDICARE

## 2023-09-12 ENCOUNTER — APPOINTMENT (OUTPATIENT)
Dept: GENERAL RADIOLOGY | Facility: HOSPITAL | Age: 73
End: 2023-09-12
Payer: MEDICARE

## 2023-09-12 VITALS
DIASTOLIC BLOOD PRESSURE: 67 MMHG | SYSTOLIC BLOOD PRESSURE: 118 MMHG | RESPIRATION RATE: 20 BRPM | TEMPERATURE: 98.5 F | BODY MASS INDEX: 26.83 KG/M2 | HEART RATE: 71 BPM | HEIGHT: 70 IN | OXYGEN SATURATION: 100 % | WEIGHT: 187.39 LBS

## 2023-09-12 DIAGNOSIS — U07.1 COVID: Primary | ICD-10-CM

## 2023-09-12 DIAGNOSIS — R53.1 WEAKNESS: ICD-10-CM

## 2023-09-12 LAB
ALBUMIN SERPL-MCNC: 3.9 G/DL (ref 3.5–5.2)
ALBUMIN/GLOB SERPL: 1.4 G/DL
ALP SERPL-CCNC: 83 U/L (ref 39–117)
ALT SERPL W P-5'-P-CCNC: 19 U/L (ref 1–41)
ANION GAP SERPL CALCULATED.3IONS-SCNC: 7 MMOL/L (ref 5–15)
AST SERPL-CCNC: 20 U/L (ref 1–40)
BASOPHILS # BLD AUTO: 0 10*3/MM3 (ref 0–0.2)
BASOPHILS NFR BLD AUTO: 0.7 % (ref 0–1.5)
BILIRUB SERPL-MCNC: 0.5 MG/DL (ref 0–1.2)
BILIRUB UR QL STRIP: NEGATIVE
BUN SERPL-MCNC: 41 MG/DL (ref 8–23)
BUN/CREAT SERPL: 23.4 (ref 7–25)
CALCIUM SPEC-SCNC: 9.8 MG/DL (ref 8.6–10.5)
CHLORIDE SERPL-SCNC: 106 MMOL/L (ref 98–107)
CLARITY UR: CLEAR
CO2 SERPL-SCNC: 30 MMOL/L (ref 22–29)
COLOR UR: YELLOW
CREAT SERPL-MCNC: 1.75 MG/DL (ref 0.76–1.27)
DEPRECATED RDW RBC AUTO: 46.4 FL (ref 37–54)
EGFRCR SERPLBLD CKD-EPI 2021: 40.6 ML/MIN/1.73
EOSINOPHIL # BLD AUTO: 0.2 10*3/MM3 (ref 0–0.4)
EOSINOPHIL NFR BLD AUTO: 3.3 % (ref 0.3–6.2)
ERYTHROCYTE [DISTWIDTH] IN BLOOD BY AUTOMATED COUNT: 14.3 % (ref 12.3–15.4)
GLOBULIN UR ELPH-MCNC: 2.8 GM/DL
GLUCOSE SERPL-MCNC: 177 MG/DL (ref 65–99)
GLUCOSE UR STRIP-MCNC: NEGATIVE MG/DL
HCT VFR BLD AUTO: 41 % (ref 37.5–51)
HGB BLD-MCNC: 13.6 G/DL (ref 13–17.7)
HGB UR QL STRIP.AUTO: NEGATIVE
KETONES UR QL STRIP: NEGATIVE
LEUKOCYTE ESTERASE UR QL STRIP.AUTO: NEGATIVE
LYMPHOCYTES # BLD AUTO: 1.4 10*3/MM3 (ref 0.7–3.1)
LYMPHOCYTES NFR BLD AUTO: 26.1 % (ref 19.6–45.3)
MAGNESIUM SERPL-MCNC: 2.4 MG/DL (ref 1.6–2.4)
MCH RBC QN AUTO: 30.7 PG (ref 26.6–33)
MCHC RBC AUTO-ENTMCNC: 33.1 G/DL (ref 31.5–35.7)
MCV RBC AUTO: 92.7 FL (ref 79–97)
MONOCYTES # BLD AUTO: 0.4 10*3/MM3 (ref 0.1–0.9)
MONOCYTES NFR BLD AUTO: 7.6 % (ref 5–12)
NEUTROPHILS NFR BLD AUTO: 3.4 10*3/MM3 (ref 1.7–7)
NEUTROPHILS NFR BLD AUTO: 62.3 % (ref 42.7–76)
NITRITE UR QL STRIP: NEGATIVE
NRBC BLD AUTO-RTO: 0 /100 WBC (ref 0–0.2)
PH UR STRIP.AUTO: <=5 [PH] (ref 5–8)
PLATELET # BLD AUTO: 141 10*3/MM3 (ref 140–450)
PMV BLD AUTO: 8.8 FL (ref 6–12)
POTASSIUM SERPL-SCNC: 4.4 MMOL/L (ref 3.5–5.2)
PROT SERPL-MCNC: 6.7 G/DL (ref 6–8.5)
PROT UR QL STRIP: NEGATIVE
RBC # BLD AUTO: 4.42 10*6/MM3 (ref 4.14–5.8)
SODIUM SERPL-SCNC: 143 MMOL/L (ref 136–145)
SP GR UR STRIP: 1.02 (ref 1–1.03)
UROBILINOGEN UR QL STRIP: NORMAL
WBC NRBC COR # BLD: 5.5 10*3/MM3 (ref 3.4–10.8)

## 2023-09-12 PROCEDURE — 99283 EMERGENCY DEPT VISIT LOW MDM: CPT

## 2023-09-12 PROCEDURE — 81003 URINALYSIS AUTO W/O SCOPE: CPT | Performed by: PHYSICIAN ASSISTANT

## 2023-09-12 PROCEDURE — 80053 COMPREHEN METABOLIC PANEL: CPT | Performed by: PHYSICIAN ASSISTANT

## 2023-09-12 PROCEDURE — 83735 ASSAY OF MAGNESIUM: CPT | Performed by: PHYSICIAN ASSISTANT

## 2023-09-12 PROCEDURE — 85025 COMPLETE CBC W/AUTO DIFF WBC: CPT | Performed by: PHYSICIAN ASSISTANT

## 2023-09-12 PROCEDURE — 71045 X-RAY EXAM CHEST 1 VIEW: CPT

## 2023-09-12 RX ORDER — SODIUM CHLORIDE 0.9 % (FLUSH) 0.9 %
10 SYRINGE (ML) INJECTION AS NEEDED
Status: DISCONTINUED | OUTPATIENT
Start: 2023-09-12 | End: 2023-09-13 | Stop reason: HOSPADM

## 2023-09-12 NOTE — ED NOTES
Patient evaluated by provider and will return to waiting room with Intravenous line in place.  Patient has been instructed not to inject anything into IV, or leave premises with line in place. Patient pending further evaluation, treatment, testing / monitoring.

## 2023-09-12 NOTE — ED PROVIDER NOTES
Subjective   Provider in Triage Note  Patient is a 72-year-old male PMH significant for diabetes, hypertension, hyperlipidemia, and chronic kidney disease presenting to the ED with reports of generalized weakness progressing over the past several days.  Patient was found to be positive for COVID-19 about 8 days ago.  Patient's wife is concerned that he may be dehydrated.  Patient denies any significant pain including chest pain or abdominal pain.  No reports of fever, cough, or congestion.  Patient is incontinent at baseline without significant urinary changes.  No reports of diarrhea nausea, or vomiting.  Patient also denies any dyspnea    History of Present Illness  I interviewed the patient HPI and agree with the nurse practitioner providing triage note as noted above  Review of Systems    Past Medical History:   Diagnosis Date    Diabetes mellitus     Hyperlipidemia     Hypertension     Kidney stone     TIA (transient ischemic attack) 2016    Type 2 diabetes mellitus with stage 3 chronic kidney disease, with long-term current use of insulin 12/13/2021       Allergies   Allergen Reactions    Contrast Dye (Echo Or Unknown Ct/Mr) Anaphylaxis    Iodinated Contrast Media Shortness Of Breath    Iodine Shortness Of Breath    Aspirin Nausea And Vomiting    Lipitor [Atorvastatin] Unknown - High Severity       Past Surgical History:   Procedure Laterality Date    ADENOIDECTOMY      EXTRACORPOREAL SHOCK WAVE LITHOTRIPSY (ESWL)      HERNIA REPAIR      KNEE ACL RECONSTRUCTION Left     TONSILLECTOMY         Family History   Problem Relation Age of Onset    No Known Problems Mother     No Known Problems Father        Social History     Socioeconomic History    Marital status:    Tobacco Use    Smoking status: Never    Smokeless tobacco: Never   Vaping Use    Vaping Use: Never used   Substance and Sexual Activity    Alcohol use: Never    Drug use: Never    Sexual activity: Defer           Objective   Physical Exam  Neck  has no adenopathy JVD or bruits.  Lungs are clear.  Heart has regular rate rhythm without murmur rub or gallop.  Chest is nontender.  Abdomen soft nontender.  Extremities M unremarkable.  Procedures           ED Course      Results for orders placed or performed during the hospital encounter of 09/12/23   Comprehensive Metabolic Panel    Specimen: Blood   Result Value Ref Range    Glucose 177 (H) 65 - 99 mg/dL    BUN 41 (H) 8 - 23 mg/dL    Creatinine 1.75 (H) 0.76 - 1.27 mg/dL    Sodium 143 136 - 145 mmol/L    Potassium 4.4 3.5 - 5.2 mmol/L    Chloride 106 98 - 107 mmol/L    CO2 30.0 (H) 22.0 - 29.0 mmol/L    Calcium 9.8 8.6 - 10.5 mg/dL    Total Protein 6.7 6.0 - 8.5 g/dL    Albumin 3.9 3.5 - 5.2 g/dL    ALT (SGPT) 19 1 - 41 U/L    AST (SGOT) 20 1 - 40 U/L    Alkaline Phosphatase 83 39 - 117 U/L    Total Bilirubin 0.5 0.0 - 1.2 mg/dL    Globulin 2.8 gm/dL    A/G Ratio 1.4 g/dL    BUN/Creatinine Ratio 23.4 7.0 - 25.0    Anion Gap 7.0 5.0 - 15.0 mmol/L    eGFR 40.6 (L) >60.0 mL/min/1.73   Urinalysis With Culture If Indicated - Urine, Clean Catch    Specimen: Urine, Clean Catch   Result Value Ref Range    Color, UA Yellow Yellow, Straw    Appearance, UA Clear Clear    pH, UA <=5.0 5.0 - 8.0    Specific Gravity, UA 1.017 1.005 - 1.030    Glucose, UA Negative Negative    Ketones, UA Negative Negative    Bilirubin, UA Negative Negative    Blood, UA Negative Negative    Protein, UA Negative Negative    Leuk Esterase, UA Negative Negative    Nitrite, UA Negative Negative    Urobilinogen, UA 0.2 E.U./dL 0.2 - 1.0 E.U./dL   Magnesium    Specimen: Blood   Result Value Ref Range    Magnesium 2.4 1.6 - 2.4 mg/dL   CBC Auto Differential    Specimen: Blood   Result Value Ref Range    WBC 5.50 3.40 - 10.80 10*3/mm3    RBC 4.42 4.14 - 5.80 10*6/mm3    Hemoglobin 13.6 13.0 - 17.7 g/dL    Hematocrit 41.0 37.5 - 51.0 %    MCV 92.7 79.0 - 97.0 fL    MCH 30.7 26.6 - 33.0 pg    MCHC 33.1 31.5 - 35.7 g/dL    RDW 14.3 12.3 - 15.4 %     RDW-SD 46.4 37.0 - 54.0 fl    MPV 8.8 6.0 - 12.0 fL    Platelets 141 140 - 450 10*3/mm3    Neutrophil % 62.3 42.7 - 76.0 %    Lymphocyte % 26.1 19.6 - 45.3 %    Monocyte % 7.6 5.0 - 12.0 %    Eosinophil % 3.3 0.3 - 6.2 %    Basophil % 0.7 0.0 - 1.5 %    Neutrophils, Absolute 3.40 1.70 - 7.00 10*3/mm3    Lymphocytes, Absolute 1.40 0.70 - 3.10 10*3/mm3    Monocytes, Absolute 0.40 0.10 - 0.90 10*3/mm3    Eosinophils, Absolute 0.20 0.00 - 0.40 10*3/mm3    Basophils, Absolute 0.00 0.00 - 0.20 10*3/mm3    nRBC 0.0 0.0 - 0.2 /100 WBC     XR Chest 1 View    Result Date: 9/12/2023  Impression: No acute pulmonary process identified on conventional radiography. Electronically Signed: Jorje Velazquez MD  9/12/2023 7:34 PM CDT  Workstation ID: [id number]                                        Medical Decision Making  My chest x-ray interpretation shows no cardiomegaly effusion or infiltrate.  Patient has no leukocytosis and no left shift.  UA is normal.  BMP shows no renal insufficiency or electrolyte abnormality.  Patient did test positive for COVID at home today.  Will be discharged.  Will drink plenty of fluids and follow with his MD for recheck as needed.    Amount and/or Complexity of Data Reviewed  Labs: ordered. Decision-making details documented in ED Course.  Radiology: ordered and independent interpretation performed.    Risk  Prescription drug management.        Final diagnoses:   COVID   Weakness       ED Disposition  ED Disposition       ED Disposition   Discharge    Condition   Stable    Comment   --               No follow-up provider specified.       Medication List      No changes were made to your prescriptions during this visit.            Vazquez Willson MD  09/12/23 9421

## 2023-09-13 NOTE — DISCHARGE INSTRUCTIONS
Plenty of fluids and rest.  See your MD for recheck as needed.  Tylenol or ibuprofen for fever or achiness.

## 2023-11-04 ENCOUNTER — HOSPITAL ENCOUNTER (OUTPATIENT)
Facility: HOSPITAL | Age: 73
Setting detail: OBSERVATION
Discharge: HOME | End: 2023-11-05
Attending: EMERGENCY MEDICINE | Admitting: EMERGENCY MEDICINE
Payer: MEDICARE

## 2023-11-04 ENCOUNTER — APPOINTMENT (OUTPATIENT)
Dept: GENERAL RADIOLOGY | Facility: HOSPITAL | Age: 73
End: 2023-11-04
Payer: MEDICARE

## 2023-11-04 ENCOUNTER — APPOINTMENT (OUTPATIENT)
Dept: CT IMAGING | Facility: HOSPITAL | Age: 73
End: 2023-11-04
Payer: MEDICARE

## 2023-11-04 DIAGNOSIS — R53.1 WEAKNESS: Primary | ICD-10-CM

## 2023-11-04 DIAGNOSIS — R79.89 ELEVATED TROPONIN: ICD-10-CM

## 2023-11-04 LAB
ALBUMIN SERPL-MCNC: 3.7 G/DL (ref 3.5–5.2)
ALBUMIN/GLOB SERPL: 1.2 G/DL
ALP SERPL-CCNC: 87 U/L (ref 39–117)
ALT SERPL W P-5'-P-CCNC: 24 U/L (ref 1–41)
ANION GAP SERPL CALCULATED.3IONS-SCNC: 8 MMOL/L (ref 5–15)
AST SERPL-CCNC: 25 U/L (ref 1–40)
BASOPHILS # BLD AUTO: 0 10*3/MM3 (ref 0–0.2)
BASOPHILS NFR BLD AUTO: 0.7 % (ref 0–1.5)
BILIRUB SERPL-MCNC: 0.5 MG/DL (ref 0–1.2)
BILIRUB UR QL STRIP: NEGATIVE
BUN SERPL-MCNC: 26 MG/DL (ref 8–23)
BUN/CREAT SERPL: 20.2 (ref 7–25)
CALCIUM SPEC-SCNC: 9.7 MG/DL (ref 8.6–10.5)
CHLORIDE SERPL-SCNC: 104 MMOL/L (ref 98–107)
CLARITY UR: CLEAR
CO2 SERPL-SCNC: 30 MMOL/L (ref 22–29)
COLOR UR: YELLOW
CREAT SERPL-MCNC: 1.29 MG/DL (ref 0.76–1.27)
DEPRECATED RDW RBC AUTO: 47.3 FL (ref 37–54)
EGFRCR SERPLBLD CKD-EPI 2021: 58.2 ML/MIN/1.73
EOSINOPHIL # BLD AUTO: 0.1 10*3/MM3 (ref 0–0.4)
EOSINOPHIL NFR BLD AUTO: 2.9 % (ref 0.3–6.2)
ERYTHROCYTE [DISTWIDTH] IN BLOOD BY AUTOMATED COUNT: 14.3 % (ref 12.3–15.4)
GEN 5 2HR TROPONIN T REFLEX: 29 NG/L
GLOBULIN UR ELPH-MCNC: 3 GM/DL
GLUCOSE SERPL-MCNC: 156 MG/DL (ref 65–99)
GLUCOSE UR STRIP-MCNC: NEGATIVE MG/DL
HCT VFR BLD AUTO: 36.4 % (ref 37.5–51)
HGB BLD-MCNC: 11.9 G/DL (ref 13–17.7)
HGB UR QL STRIP.AUTO: NEGATIVE
KETONES UR QL STRIP: NEGATIVE
LEUKOCYTE ESTERASE UR QL STRIP.AUTO: NEGATIVE
LIPASE SERPL-CCNC: 26 U/L (ref 13–60)
LYMPHOCYTES # BLD AUTO: 1 10*3/MM3 (ref 0.7–3.1)
LYMPHOCYTES NFR BLD AUTO: 25.8 % (ref 19.6–45.3)
MCH RBC QN AUTO: 31 PG (ref 26.6–33)
MCHC RBC AUTO-ENTMCNC: 32.8 G/DL (ref 31.5–35.7)
MCV RBC AUTO: 94.4 FL (ref 79–97)
MONOCYTES # BLD AUTO: 0.4 10*3/MM3 (ref 0.1–0.9)
MONOCYTES NFR BLD AUTO: 9.8 % (ref 5–12)
NEUTROPHILS NFR BLD AUTO: 2.4 10*3/MM3 (ref 1.7–7)
NEUTROPHILS NFR BLD AUTO: 60.8 % (ref 42.7–76)
NITRITE UR QL STRIP: NEGATIVE
NRBC BLD AUTO-RTO: 0.1 /100 WBC (ref 0–0.2)
NT-PROBNP SERPL-MCNC: 337.3 PG/ML (ref 0–900)
PH UR STRIP.AUTO: 6.5 [PH] (ref 5–8)
PLATELET # BLD AUTO: 117 10*3/MM3 (ref 140–450)
PMV BLD AUTO: 7.9 FL (ref 6–12)
POTASSIUM SERPL-SCNC: 4.3 MMOL/L (ref 3.5–5.2)
PROT SERPL-MCNC: 6.7 G/DL (ref 6–8.5)
PROT UR QL STRIP: NEGATIVE
RBC # BLD AUTO: 3.86 10*6/MM3 (ref 4.14–5.8)
SODIUM SERPL-SCNC: 142 MMOL/L (ref 136–145)
SP GR UR STRIP: 1.02 (ref 1–1.03)
TROPONIN T DELTA: -7 NG/L
TROPONIN T SERPL HS-MCNC: 36 NG/L
UROBILINOGEN UR QL STRIP: NORMAL
WBC NRBC COR # BLD: 4 10*3/MM3 (ref 3.4–10.8)

## 2023-11-04 PROCEDURE — 36415 COLL VENOUS BLD VENIPUNCTURE: CPT | Performed by: PHYSICIAN ASSISTANT

## 2023-11-04 PROCEDURE — 71045 X-RAY EXAM CHEST 1 VIEW: CPT

## 2023-11-04 PROCEDURE — 80053 COMPREHEN METABOLIC PANEL: CPT | Performed by: PHYSICIAN ASSISTANT

## 2023-11-04 PROCEDURE — 85025 COMPLETE CBC W/AUTO DIFF WBC: CPT | Performed by: PHYSICIAN ASSISTANT

## 2023-11-04 PROCEDURE — 93005 ELECTROCARDIOGRAM TRACING: CPT | Performed by: PHYSICIAN ASSISTANT

## 2023-11-04 PROCEDURE — 84484 ASSAY OF TROPONIN QUANT: CPT | Performed by: PHYSICIAN ASSISTANT

## 2023-11-04 PROCEDURE — G0378 HOSPITAL OBSERVATION PER HR: HCPCS

## 2023-11-04 PROCEDURE — 83880 ASSAY OF NATRIURETIC PEPTIDE: CPT | Performed by: PHYSICIAN ASSISTANT

## 2023-11-04 PROCEDURE — 81003 URINALYSIS AUTO W/O SCOPE: CPT | Performed by: PHYSICIAN ASSISTANT

## 2023-11-04 PROCEDURE — 99284 EMERGENCY DEPT VISIT MOD MDM: CPT

## 2023-11-04 PROCEDURE — 74176 CT ABD & PELVIS W/O CONTRAST: CPT

## 2023-11-04 PROCEDURE — 83690 ASSAY OF LIPASE: CPT | Performed by: PHYSICIAN ASSISTANT

## 2023-11-04 RX ORDER — POLYETHYLENE GLYCOL 3350 17 G/17G
17 POWDER, FOR SOLUTION ORAL DAILY PRN
Status: DISCONTINUED | OUTPATIENT
Start: 2023-11-04 | End: 2023-11-05 | Stop reason: HOSPADM

## 2023-11-04 RX ORDER — BISACODYL 10 MG
10 SUPPOSITORY, RECTAL RECTAL DAILY PRN
Status: DISCONTINUED | OUTPATIENT
Start: 2023-11-04 | End: 2023-11-05 | Stop reason: HOSPADM

## 2023-11-04 RX ORDER — SODIUM CHLORIDE 0.9 % (FLUSH) 0.9 %
10 SYRINGE (ML) INJECTION EVERY 12 HOURS SCHEDULED
Status: DISCONTINUED | OUTPATIENT
Start: 2023-11-04 | End: 2023-11-05 | Stop reason: HOSPADM

## 2023-11-04 RX ORDER — BISACODYL 5 MG/1
5 TABLET, DELAYED RELEASE ORAL DAILY PRN
Status: DISCONTINUED | OUTPATIENT
Start: 2023-11-04 | End: 2023-11-05 | Stop reason: HOSPADM

## 2023-11-04 RX ORDER — SODIUM CHLORIDE 0.9 % (FLUSH) 0.9 %
10 SYRINGE (ML) INJECTION AS NEEDED
Status: DISCONTINUED | OUTPATIENT
Start: 2023-11-04 | End: 2023-11-05 | Stop reason: HOSPADM

## 2023-11-04 RX ORDER — SODIUM CHLORIDE 9 MG/ML
40 INJECTION, SOLUTION INTRAVENOUS AS NEEDED
Status: DISCONTINUED | OUTPATIENT
Start: 2023-11-04 | End: 2023-11-05 | Stop reason: HOSPADM

## 2023-11-04 RX ORDER — AMOXICILLIN 250 MG
2 CAPSULE ORAL 2 TIMES DAILY
Status: DISCONTINUED | OUTPATIENT
Start: 2023-11-04 | End: 2023-11-05 | Stop reason: HOSPADM

## 2023-11-04 RX ADMIN — Medication 10 ML: at 21:00

## 2023-11-04 NOTE — ED PROVIDER NOTES
Subjective   History of Present Illness  Patient is a 73-year-old male who presents today with complaints of generalized weakness.  He reports that he is also had a 20 pound gain over the last month.  His wife reports that today he was unable to get out of bed by himself.  He denies any focal weakness no upper or lower extremity weakness no weakness this is generalized and nonfocal.  He reports that this has been worsening since he had COVID in September.        Review of Systems   Constitutional:  Negative for chills, diaphoresis, fatigue and fever.   HENT:  Negative for congestion, postnasal drip, rhinorrhea, sinus pressure and voice change.    Respiratory:  Negative for cough, choking, chest tightness, shortness of breath, wheezing and stridor.    Cardiovascular:  Negative for chest pain and palpitations.   Gastrointestinal:  Positive for abdominal distention. Negative for abdominal pain, nausea and vomiting.   Musculoskeletal: Negative.    Neurological:  Positive for weakness.   Psychiatric/Behavioral: Negative.         Past Medical History:   Diagnosis Date    Diabetes mellitus     Hyperlipidemia     Hypertension     Kidney stone     TIA (transient ischemic attack) 2016    Type 2 diabetes mellitus with stage 3 chronic kidney disease, with long-term current use of insulin 12/13/2021       Allergies   Allergen Reactions    Contrast Dye (Echo Or Unknown Ct/Mr) Anaphylaxis    Iodinated Contrast Media Shortness Of Breath    Iodine Shortness Of Breath    Aspirin Nausea And Vomiting    Lipitor [Atorvastatin] Unknown - High Severity    Prednisone Hives       Past Surgical History:   Procedure Laterality Date    ADENOIDECTOMY      EXTRACORPOREAL SHOCK WAVE LITHOTRIPSY (ESWL)      HERNIA REPAIR      KNEE ACL RECONSTRUCTION Left     TONSILLECTOMY         Family History   Problem Relation Age of Onset    No Known Problems Mother     No Known Problems Father        Social History     Socioeconomic History    Marital status:     Tobacco Use    Smoking status: Never    Smokeless tobacco: Never   Vaping Use    Vaping Use: Never used   Substance and Sexual Activity    Alcohol use: Never    Drug use: Never    Sexual activity: Defer           Objective   Physical Exam  Vitals and nursing note reviewed.   Constitutional:       General: He is not in acute distress.     Appearance: Normal appearance. He is well-developed. He is not ill-appearing, toxic-appearing or diaphoretic.   HENT:      Head: Normocephalic and atraumatic.      Nose: Nose normal.   Eyes:      Conjunctiva/sclera: Conjunctivae normal.   Cardiovascular:      Rate and Rhythm: Normal rate and regular rhythm.   Pulmonary:      Effort: Pulmonary effort is normal. No respiratory distress.      Breath sounds: Normal breath sounds. No stridor. No wheezing, rhonchi or rales.   Abdominal:      General: Bowel sounds are normal. There is distension.      Palpations: There is no mass.      Tenderness: There is no abdominal tenderness. There is no guarding or rebound.      Hernia: No hernia is present.   Musculoskeletal:         General: Normal range of motion.      Cervical back: Normal range of motion and neck supple.   Skin:     General: Skin is warm and dry.   Neurological:      General: No focal deficit present.      Mental Status: He is alert and oriented to person, place, and time. Mental status is at baseline.   Psychiatric:         Mood and Affect: Mood normal.         Behavior: Behavior normal.         Thought Content: Thought content normal.         Judgment: Judgment normal.         Procedures           ED Course  ED Course as of 11/04/23 1755   Sat Nov 04, 2023   1754 EKG interpreted by ER physician reviewed myself.  Sinus rhythm rate of 72 PAC present [MG]      ED Course User Index  [MG] Rosanna Hicks PA-C                                           Medical Decision Making  Appropriate PPE worn during exam.    /81   Pulse 83   Temp 97.6 °F (36.4 °C) (Oral)    "Resp 18   Ht 177.8 cm (70\")   Wt 93.8 kg (206 lb 12.7 oz)   SpO2 100%   BMI 29.67 kg/m²      Co-morbidities --  has a past medical history of Diabetes mellitus, Hyperlipidemia, Hypertension, Kidney stone, TIA (transient ischemic attack) (2016), and Type 2 diabetes mellitus with stage 3 chronic kidney disease, with long-term current use of insulin (12/13/2021).  Radiology interpretation --  X-rays reviewed by me and interpreted by radiologist:  CT Abdomen Pelvis Without Contrast    Result Date: 11/4/2023  Impression: 1.No acute process identified within abdomen/pelvis 2.Small pericardial effusion. Electronically Signed: Beck De Jesus MD  11/4/2023 5:29 PM EDT  Workstation ID: [id number]    XR Chest 1 View    Result Date: 11/4/2023  Impression: No acute cardiopulmonary abnormality is identified. Electronically Signed: Yahaira Arroyo  11/4/2023 4:31 PM EDT  Workstation ID: [id number]    Patient is a 73-year-old male who presents with generalized weakness.  He did have mild elevation in his heart enzymes.  His EKG did not show any acute abnormalities.  He admitted for observation.  He reports that he has felt is significantly weaker over the last week but he has had weakness since his COVID diagnosis 2 months ago.      Discussion with provider --discussed this case with Dr. Hairston who agrees  I discussed the findings and recommendations with the patient who voices understanding. Stable while in the ER.     Note Disclaimer: At [facility], we believe that sharing information builds trust and better relationships. You are receiving this note because you are receiving care at [facility] or recently visited. It is possible you will see health information before a provider has talked with you about it. This kind of information can be easy to misunderstand. To help you fully understand what it means for your health, we urge you to discuss this note with your provider.        Problems Addressed:  Elevated troponin: " complicated acute illness or injury  Weakness: complicated acute illness or injury    Amount and/or Complexity of Data Reviewed  Labs: ordered.  Radiology: ordered.  ECG/medicine tests: ordered.    Risk  Decision regarding hospitalization.        Final diagnoses:   Weakness   Elevated troponin       ED Disposition  ED Disposition       ED Disposition   Decision to Admit    Condition   --    Comment   --               No follow-up provider specified.       Medication List      No changes were made to your prescriptions during this visit.            Rosanna Hicks PA-C  11/04/23 8319

## 2023-11-04 NOTE — ED NOTES
Nursing report ED to floor  Blas Mojica  73 y.o.  male    HPI:   Chief Complaint   Patient presents with    Weakness - Generalized     Pt has general weakness, having trouble getting up and down, has swelling to extremities. Recent covid 3 weeks ago.  Pt also state low blood sugar in the mornings.         Admitting doctor:   iNcolas Hairston MD    Admitting diagnosis:   The primary encounter diagnosis was Weakness. A diagnosis of Elevated troponin was also pertinent to this visit.    Code status:   Current Code Status       Date Active Code Status Order ID Comments User Context       Prior            Allergies:   Contrast dye (echo or unknown ct/mr), Iodinated contrast media, Iodine, Aspirin, Lipitor [atorvastatin], and Prednisone    Isolation:  No active isolations     Fall Risk:  Fall Risk Assessment was completed, and patient is at moderate risk for falls.   Predictive Model Details         7 (Low) Factor Value    Calculated 11/4/2023 18:20 Age 73    Risk of Fall Model Musculoskeletal Assessment WDL     Active Peripheral IV Present     Imaging order in this encounter Present     Respiratory Rate 18     Skin Assessment WDL     Magnesium not on file     Financial Class Medicare     Drug Use No     Efren Scale not on file     Diastolic BP 74     Peripheral Vascular Assessment WDL     Albumin 3.7 g/dL     Clinically Relevant Sex Not Female     Gastrointestinal Assessment WDL     Creatinine 1.29 mg/dL     Cardiac Assessment WDL     Total Bilirubin 0.5 mg/dL     ALT 24 U/L     Days after Admission 0.118     Potassium 4.3 mmol/L     Chloride 104 mmol/L     Calcium 9.7 mg/dL         Weight:       11/04/23  1510   Weight: 93.8 kg (206 lb 12.7 oz)       Intake and Output  No intake or output data in the 24 hours ending 11/04/23 1820    Diet:        Most recent vitals:   Vitals:    11/04/23 1616 11/04/23 1728 11/04/23 1732 11/04/23 1801   BP: 130/71 179/98 154/81 138/74   BP Location:       Patient Position:        Pulse: 77 96 83 80   Resp:       Temp:       TempSrc:       SpO2: 96% 100% 100% 98%   Weight:       Height:           Active LDAs/IV Access:   Lines, Drains & Airways       Active LDAs       Name Placement date Placement time Site Days    Peripheral IV 11/04/23 1819 Left Antecubital 11/04/23 1819  Antecubital  less than 1                    Skin Condition:   Skin Assessments (last day)       None             Labs (abnormal labs have a star):   Labs Reviewed   COMPREHENSIVE METABOLIC PANEL - Abnormal; Notable for the following components:       Result Value    Glucose 156 (*)     BUN 26 (*)     Creatinine 1.29 (*)     CO2 30.0 (*)     eGFR 58.2 (*)     All other components within normal limits    Narrative:     GFR Normal >60  Chronic Kidney Disease <60  Kidney Failure <15    The GFR formula is only valid for adults with stable renal function between ages 18 and 70.   TROPONIN - Abnormal; Notable for the following components:    HS Troponin T 36 (*)     All other components within normal limits    Narrative:     High Sensitive Troponin T Reference Range:  <10.0 ng/L- Negative Female for AMI  <15.0 ng/L- Negative Male for AMI  >=10 - Abnormal Female indicating possible myocardial injury.  >=15 - Abnormal Male indicating possible myocardial injury.   Clinicians would have to utilize clinical acumen, EKG, Troponin, and serial changes to determine if it is an Acute Myocardial Infarction or myocardial injury due to an underlying chronic condition.        CBC WITH AUTO DIFFERENTIAL - Abnormal; Notable for the following components:    RBC 3.86 (*)     Hemoglobin 11.9 (*)     Hematocrit 36.4 (*)     Platelets 117 (*)     All other components within normal limits   LIPASE - Normal   URINALYSIS W/ CULTURE IF INDICATED - Normal    Narrative:     In absence of clinical symptoms, the presence of pyuria, bacteria, and/or nitrites on the urinalysis result does not correlate with infection.  Urine microscopic not indicated.   BNP  (IN-HOUSE) - Normal    Narrative:     This assay is used as an aid in the diagnosis of individuals suspected of having heart failure. It can be used as an aid in the diagnosis of acute decompensated heart failure (ADHF) in patients presenting with signs and symptoms of ADHF to the emergency department (ED). In addition, NT-proBNP of <300 pg/mL indicates ADHF is not likely.    Age Range Result Interpretation  NT-proBNP Concentration (pg/mL:      <50             Positive            >450                   Gray                 300-450                    Negative             <300    50-75           Positive            >900                  Gray                300-900                  Negative            <300      >75             Positive            >1800                  Gray                300-1800                  Negative            <300   HIGH SENSITIVITIY TROPONIN T 2HR   CBC AND DIFFERENTIAL    Narrative:     The following orders were created for panel order CBC & Differential.  Procedure                               Abnormality         Status                     ---------                               -----------         ------                     CBC Auto Differential[[id number]]        Abnormal            Final result                 Please view results for these tests on the individual orders.       LOC: Person, Place, Time, and Situation    Telemetry:  Telemetry    Cardiac Monitoring Ordered:     EKG:   ECG 12 Lead Other; weakness   Preliminary Result   HEART RATE= 72  bpm   RR Interval= 844  ms   FL Interval= 183  ms   P Horizontal Axis= 18  deg   P Front Axis= 38  deg   QRSD Interval= 68  ms   QT Interval= 381  ms   QTcB= 415  ms   QRS Axis= 15  deg   T Wave Axis= 52  deg   - OTHERWISE NORMAL ECG -   Sinus rhythm   Atrial premature complex   Low voltage, precordial leads   When compared with ECG of 05-Apr-2023 19:34:13,   No significant change   Electronically Signed By:    Date and Time of Study: 2023-11-04  16:07:19          Medications Given in the ED:   Medications - No data to display    Imaging results:  CT Abdomen Pelvis Without Contrast    Result Date: 11/4/2023  Impression: 1.No acute process identified within abdomen/pelvis 2.Small pericardial effusion. Electronically Signed: Beck De Jesus MD  11/4/2023 5:29 PM EDT  Workstation ID: [id number]    XR Chest 1 View    Result Date: 11/4/2023  Impression: No acute cardiopulmonary abnormality is identified. Electronically Signed: Yahaira Arroyo  11/4/2023 4:31 PM EDT  Workstation ID: [id number]     Social issues:   Social History     Socioeconomic History    Marital status:    Tobacco Use    Smoking status: Never    Smokeless tobacco: Never   Vaping Use    Vaping Use: Never used   Substance and Sexual Activity    Alcohol use: Never    Drug use: Never    Sexual activity: Defer       NIH Stroke Scale:  Interval: (not recorded)  1a. Level of Consciousness: (not recorded)  1b. LOC Questions: (not recorded)  1c. LOC Commands: (not recorded)  2. Best Gaze: (not recorded)  3. Visual: (not recorded)  4. Facial Palsy: (not recorded)  5a. Motor Arm, Left: (not recorded)  5b. Motor Arm, Right: (not recorded)  6a. Motor Leg, Left: (not recorded)  6b. Motor Leg, Right: (not recorded)  7. Limb Ataxia: (not recorded)  8. Sensory: (not recorded)  9. Best Language: (not recorded)  10. Dysarthria: (not recorded)  11. Extinction and Inattention (formerly Neglect): (not recorded)    Total (NIH Stroke Scale): (not recorded)     Additional notable assessment information:     Nursing report ED to floor:  RIMMA Shaffer RN   11/04/23 18:20 EDT

## 2023-11-05 ENCOUNTER — READMISSION MANAGEMENT (OUTPATIENT)
Dept: CALL CENTER | Facility: HOSPITAL | Age: 73
End: 2023-11-05
Payer: MEDICARE

## 2023-11-05 VITALS
WEIGHT: 206.79 LBS | BODY MASS INDEX: 29.6 KG/M2 | HEIGHT: 70 IN | SYSTOLIC BLOOD PRESSURE: 140 MMHG | RESPIRATION RATE: 15 BRPM | OXYGEN SATURATION: 97 % | DIASTOLIC BLOOD PRESSURE: 78 MMHG | HEART RATE: 85 BPM | TEMPERATURE: 98 F

## 2023-11-05 LAB
ALBUMIN SERPL-MCNC: 3.3 G/DL (ref 3.5–5.2)
ALBUMIN/GLOB SERPL: 1.5 G/DL
ALP SERPL-CCNC: 73 U/L (ref 39–117)
ALT SERPL W P-5'-P-CCNC: 17 U/L (ref 1–41)
ANION GAP SERPL CALCULATED.3IONS-SCNC: 6 MMOL/L (ref 5–15)
AST SERPL-CCNC: 17 U/L (ref 1–40)
BASOPHILS # BLD AUTO: 0 10*3/MM3 (ref 0–0.2)
BASOPHILS NFR BLD AUTO: 0.5 % (ref 0–1.5)
BILIRUB SERPL-MCNC: 0.4 MG/DL (ref 0–1.2)
BUN SERPL-MCNC: 29 MG/DL (ref 8–23)
BUN/CREAT SERPL: 20.9 (ref 7–25)
CALCIUM SPEC-SCNC: 8.9 MG/DL (ref 8.6–10.5)
CHLORIDE SERPL-SCNC: 105 MMOL/L (ref 98–107)
CO2 SERPL-SCNC: 29 MMOL/L (ref 22–29)
CREAT SERPL-MCNC: 1.39 MG/DL (ref 0.76–1.27)
DEPRECATED RDW RBC AUTO: 45.9 FL (ref 37–54)
EGFRCR SERPLBLD CKD-EPI 2021: 53.2 ML/MIN/1.73
EOSINOPHIL # BLD AUTO: 0.1 10*3/MM3 (ref 0–0.4)
EOSINOPHIL NFR BLD AUTO: 3 % (ref 0.3–6.2)
ERYTHROCYTE [DISTWIDTH] IN BLOOD BY AUTOMATED COUNT: 14 % (ref 12.3–15.4)
GLOBULIN UR ELPH-MCNC: 2.2 GM/DL
GLUCOSE BLDC GLUCOMTR-MCNC: 102 MG/DL (ref 70–105)
GLUCOSE SERPL-MCNC: 154 MG/DL (ref 65–99)
HCT VFR BLD AUTO: 33.3 % (ref 37.5–51)
HGB BLD-MCNC: 11.1 G/DL (ref 13–17.7)
LYMPHOCYTES # BLD AUTO: 1.3 10*3/MM3 (ref 0.7–3.1)
LYMPHOCYTES NFR BLD AUTO: 33.1 % (ref 19.6–45.3)
MCH RBC QN AUTO: 31.2 PG (ref 26.6–33)
MCHC RBC AUTO-ENTMCNC: 33.3 G/DL (ref 31.5–35.7)
MCV RBC AUTO: 93.6 FL (ref 79–97)
MONOCYTES # BLD AUTO: 0.4 10*3/MM3 (ref 0.1–0.9)
MONOCYTES NFR BLD AUTO: 9.6 % (ref 5–12)
NEUTROPHILS NFR BLD AUTO: 2.2 10*3/MM3 (ref 1.7–7)
NEUTROPHILS NFR BLD AUTO: 53.8 % (ref 42.7–76)
NRBC BLD AUTO-RTO: 0 /100 WBC (ref 0–0.2)
PLATELET # BLD AUTO: 107 10*3/MM3 (ref 140–450)
PMV BLD AUTO: 8.7 FL (ref 6–12)
POTASSIUM SERPL-SCNC: 3.4 MMOL/L (ref 3.5–5.2)
PROT SERPL-MCNC: 5.5 G/DL (ref 6–8.5)
QT INTERVAL: 381 MS
QTC INTERVAL: 415 MS
RBC # BLD AUTO: 3.56 10*6/MM3 (ref 4.14–5.8)
SODIUM SERPL-SCNC: 140 MMOL/L (ref 136–145)
WBC NRBC COR # BLD: 4 10*3/MM3 (ref 3.4–10.8)

## 2023-11-05 PROCEDURE — G0378 HOSPITAL OBSERVATION PER HR: HCPCS

## 2023-11-05 PROCEDURE — 82948 REAGENT STRIP/BLOOD GLUCOSE: CPT

## 2023-11-05 PROCEDURE — 97162 PT EVAL MOD COMPLEX 30 MIN: CPT

## 2023-11-05 PROCEDURE — 80053 COMPREHEN METABOLIC PANEL: CPT | Performed by: EMERGENCY MEDICINE

## 2023-11-05 PROCEDURE — 85025 COMPLETE CBC W/AUTO DIFF WBC: CPT | Performed by: EMERGENCY MEDICINE

## 2023-11-05 RX ORDER — DEXTROSE MONOHYDRATE 25 G/50ML
25 INJECTION, SOLUTION INTRAVENOUS
Status: DISCONTINUED | OUTPATIENT
Start: 2023-11-05 | End: 2023-11-05 | Stop reason: HOSPADM

## 2023-11-05 RX ORDER — ROSUVASTATIN CALCIUM 10 MG/1
20 TABLET, COATED ORAL NIGHTLY
Status: DISCONTINUED | OUTPATIENT
Start: 2023-11-05 | End: 2023-11-05 | Stop reason: HOSPADM

## 2023-11-05 RX ORDER — TRIAMTERENE AND HYDROCHLOROTHIAZIDE 75; 50 MG/1; MG/1
1 TABLET ORAL DAILY
Status: DISCONTINUED | OUTPATIENT
Start: 2023-11-05 | End: 2023-11-05 | Stop reason: HOSPADM

## 2023-11-05 RX ORDER — INSULIN LISPRO 100 [IU]/ML
2-7 INJECTION, SOLUTION INTRAVENOUS; SUBCUTANEOUS
Status: DISCONTINUED | OUTPATIENT
Start: 2023-11-05 | End: 2023-11-05 | Stop reason: HOSPADM

## 2023-11-05 RX ORDER — IBUPROFEN 600 MG/1
1 TABLET ORAL
Status: DISCONTINUED | OUTPATIENT
Start: 2023-11-05 | End: 2023-11-05 | Stop reason: HOSPADM

## 2023-11-05 RX ORDER — NICOTINE POLACRILEX 4 MG
15 LOZENGE BUCCAL
Status: DISCONTINUED | OUTPATIENT
Start: 2023-11-05 | End: 2023-11-05 | Stop reason: HOSPADM

## 2023-11-05 RX ORDER — SODIUM CHLORIDE 0.9 % (FLUSH) 0.9 %
10 SYRINGE (ML) INJECTION EVERY 12 HOURS SCHEDULED
Status: DISCONTINUED | OUTPATIENT
Start: 2023-11-05 | End: 2023-11-05 | Stop reason: HOSPADM

## 2023-11-05 RX ORDER — SODIUM CHLORIDE 0.9 % (FLUSH) 0.9 %
10 SYRINGE (ML) INJECTION AS NEEDED
Status: DISCONTINUED | OUTPATIENT
Start: 2023-11-05 | End: 2023-11-05 | Stop reason: HOSPADM

## 2023-11-05 RX ORDER — LISINOPRIL 20 MG/1
40 TABLET ORAL DAILY
Status: DISCONTINUED | OUTPATIENT
Start: 2023-11-05 | End: 2023-11-05 | Stop reason: HOSPADM

## 2023-11-05 RX ORDER — SODIUM CHLORIDE 9 MG/ML
40 INJECTION, SOLUTION INTRAVENOUS AS NEEDED
Status: DISCONTINUED | OUTPATIENT
Start: 2023-11-05 | End: 2023-11-05 | Stop reason: HOSPADM

## 2023-11-05 RX ORDER — CLOPIDOGREL BISULFATE 75 MG/1
75 TABLET ORAL DAILY
Status: DISCONTINUED | OUTPATIENT
Start: 2023-11-05 | End: 2023-11-05 | Stop reason: HOSPADM

## 2023-11-05 RX ADMIN — CLOPIDOGREL BISULFATE 75 MG: 75 TABLET ORAL at 12:19

## 2023-11-05 RX ADMIN — Medication 10 ML: at 10:55

## 2023-11-05 RX ADMIN — TRIAMTERENE AND HYDROCHLOROTHIAZIDE 1 TABLET: 50; 75 TABLET ORAL at 12:19

## 2023-11-05 RX ADMIN — LISINOPRIL 40 MG: 20 TABLET ORAL at 12:19

## 2023-11-05 RX ADMIN — Medication 10 ML: at 09:10

## 2023-11-05 NOTE — PLAN OF CARE
Goal Outcome Evaluation:  Plan of Care Reviewed With: patient           Outcome Evaluation: Pt is a 74 YO M admitted with generalized weakness, reports 20 lb weight gain. [facility] [place], but has an elevator to use if needed. Pt reports he generally is independent with ADLs and ambulation using RWx. Pt reports no recent falls, and states that both he and spouse still drive. Pt this date requires CGA for transfers and ambulation with RWx. Pt appears near functional baseline and recommendation is return home with family assist at d/c.      Anticipated Discharge Disposition (PT): home with assist

## 2023-11-05 NOTE — THERAPY EVALUATION
Patient Name: Blas Mojica  : 1950    MRN: [id number]                              Today's Date: 2023       Admit Date: 2023    Visit Dx:     ICD-10-CM ICD-9-CM   1. Weakness  R53.1 780.79   2. Elevated troponin  R79.89 790.6     Patient Active Problem List   Diagnosis    Primary hypertension    TIA (transient ischemic attack)    Stage 3 chronic kidney disease    Dyslipidemia    History of basal cell carcinoma (BCC)    Primary osteoarthritis    Vitamin D deficiency    Acute pain of left knee    Generalized weakness    Hypokalemia    Moderate malnutrition    Physical debility    Type 2 diabetes mellitus with stage 3 chronic kidney disease, with long-term current use of insulin    Sinus tachycardia    Acute hypoxemic respiratory failure due to COVID-19    Pneumonia due to COVID-19 virus    Mixed hyperlipidemia    COVID-19 virus infection    Weakness     Past Medical History:   Diagnosis Date    Diabetes mellitus     Hyperlipidemia     Hypertension     Kidney stone     TIA (transient ischemic attack)     Type 2 diabetes mellitus with stage 3 chronic kidney disease, with long-term current use of insulin 2021     Past Surgical History:   Procedure Laterality Date    ADENOIDECTOMY      EXTRACORPOREAL SHOCK WAVE LITHOTRIPSY (ESWL)      HERNIA REPAIR      KNEE ACL RECONSTRUCTION Left     TONSILLECTOMY        General Information       Row Name 23 1520          Physical Therapy Time and Intention    Document Type evaluation  -EL     Mode of Treatment individual therapy;physical therapy  -       Row Name 23 1520          General Information    Prior Level of Function independent:;all household mobility;ADL's;using stairs  Spouse brings Rwx up and down steps in front of apartment if he uses steps instead of elevator  -EL       Row Name 23 1520          Living Environment    People in Home spouse  -EL       Row Name 23 1520          Home Main Entrance    Number of Stairs,  Main Entrance twelve;other (see comments)  second floor apartment  -EL       Row Name 11/05/23 1520          Stairs Within Home, Primary    Number of Stairs, Within Home, Primary none  -EL       Row Name 11/05/23 1520          Cognition    Orientation Status (Cognition) oriented x 4  -EL       Row Name 11/05/23 1520          Safety Issues, Functional Mobility    Impairments Affecting Function (Mobility) balance  Requires AD for balance stability  -EL               User Key  (r) = Recorded By, (t) = Taken By, (c) = Cosigned By      Initials Name Provider Type    Terrell Keys, PT Physical Therapist                   Mobility       Row Name 11/05/23 1528          Bed Mobility    Bed Mobility bed mobility (all) activities  -EL     All Activities, [place] (Bed Mobility) modified independence  -EL       Row Name 11/05/23 1528          Sit-Stand Transfer    Sit-Stand [place] (Transfers) contact guard  -EL     Assistive Device (Sit-Stand Transfers) walker, front-wheeled  -EL       Row Name 11/05/23 1528          Gait/Stairs (Locomotion)    [place] Level (Gait) contact guard  -EL     Assistive Device (Gait) walker, front-wheeled  -EL     Distance in Feet (Gait) 115  -EL     Deviations/Abnormal Patterns (Gait) gait speed decreased  -EL     Comment, (Gait/Stairs) No significant balance deficit, appears near functional baseline  -EL               User Key  (r) = Recorded By, (t) = Taken By, (c) = Cosigned By      Initials Name Provider Type    Terrell Keys, PT Physical Therapist                   Obj/Interventions       Row Name 11/05/23 1531          Range of Motion Comprehensive    General Range of Motion bilateral lower extremity ROM WFL  -EL       Row Name 11/05/23 1531          Strength Comprehensive (MMT)    General Manual Muscle Testing (MMT) Assessment lower extremity strength deficits identified  -EL     Comment, General Manual Muscle Testing (MMT) Assessment BLE 4-/5 gross  -EL       Row Name 11/05/23  1531          Balance    Balance Assessment sitting static balance;standing static balance;standing dynamic balance  -EL     Static Sitting Balance independent  -EL     Static Standing Balance contact guard  -EL     Dynamic Standing Balance contact guard  -EL     Position/Device Used, Standing Balance walker, front-wheeled  -EL       Row Name 11/05/23 1531          Sensory Assessment (Somatosensory)    Sensory Assessment (Somatosensory) sensation intact  -EL               User Key  (r) = Recorded By, (t) = Taken By, (c) = Cosigned By      Initials Name Provider Type    Terrell Keys, PT Physical Therapist                   Goals/Plan    No documentation.                  Clinical Impression       Row Name 11/05/23 1532          Pain    Pretreatment Pain Rating 0/10 - no pain  -EL     Posttreatment Pain Rating 0/10 - no pain  -EL       Row Name 11/05/23 1532          Plan of Care Review    Plan of Care Reviewed With patient  -EL     Outcome Evaluation Pt is a 72 YO M admitted with generalized weakness, reports 20 lb weight gain. Pt reports living in [place] apartments on the second floor, but has an elevator to use if needed. Pt reports he generally is independent with ADLs and ambulation using RWx. Pt reports no recent falls, and states that both he and spouse still drive. Pt this date requires CGA for transfers and ambulation with RWx. Pt appears near functional baseline and recommendation is return home with family assist at d/c.  -EL       Row Name 11/05/23 1532          Therapy Assessment/Plan (PT)    Rehab Potential (PT) good, to achieve stated therapy goals  -EL     Criteria for Skilled Interventions Met (PT) yes  -EL     Therapy Frequency (PT) 3 times/wk  -EL     Predicted Duration of Therapy Intervention (PT) Until d/c  -EL       Row Name 11/05/23 1532          Vital Signs    O2 Delivery Pre Treatment room air  -EL     O2 Delivery Intra Treatment room air  -EL     O2 Delivery Post Treatment room air  -EL      Pre Patient Position Supine  -EL     Intra Patient Position Standing  -EL     Post Patient Position Supine  -EL       Row Name 11/05/23 1532          Positioning and Restraints    Pre-Treatment Position in bed  -EL     Post Treatment Position bed  -EL     In Bed notified nsg;supine;call light within reach;encouraged to call for assist  -EL               User Key  (r) = Recorded By, (t) = Taken By, (c) = Cosigned By      Initials Name Provider Type    Terrell Keys PT Physical Therapist                   Outcome Measures       Row Name 11/05/23 1537 11/05/23 0800       How much help from another person do you currently need...    Turning from your back to your side while in flat bed without using bedrails? 4  -EL 4  -AH    Moving from lying on back to sitting on the side of a flat bed without bedrails? 4  -EL 4  -AH    Moving to and from a bed to a chair (including a wheelchair)? 4  -EL 4  -AH    Standing up from a chair using your arms (e.g., wheelchair, bedside chair)? 4  -EL 4  -AH    Climbing 3-5 steps with a railing? 3  -EL 3  -AH    To walk in hospital room? 4  -EL 3  -AH    AM-PAC 6 Clicks Score (PT) 23  -EL 22  -AH    Highest level of mobility 7 --> Walked 25 feet or more  -EL 7 --> Walked 25 feet or more  -AH      Row Name 11/05/23 1537          Functional Assessment    Outcome Measure Options AM-PAC 6 Clicks Basic Mobility (PT)  -EL               User Key  (r) = Recorded By, (t) = Taken By, (c) = Cosigned By      Initials Name Provider Type    Terrell Keys, FELI Physical Therapist    Ricardo Lynne, RN Registered Nurse                                 Physical Therapy Education       Title: PT OT SLP Therapies (Resolved)       Topic: Physical Therapy (Resolved)       Point: Mobility training (Resolved)       Learning Progress Summary             Patient Acceptance, E,TB, VU by EKTA at 11/5/2023 1538                                         User Key       Initials Effective Dates Name Provider Type  Discipline    EL 06/23/20 -  Terrell Lomax PT Physical Therapist PT                  PT Recommendation and Plan     Plan of Care Reviewed With: patient  Outcome Evaluation: Pt is a 72 YO M admitted with generalized weakness, reports 20 lb weight gain. Pt reports living in [place] apartments on the second floor, but has an elevator to use if needed. Pt reports he generally is independent with ADLs and ambulation using RWx. Pt reports no recent falls, and states that both he and spouse still drive. Pt this date requires CGA for transfers and ambulation with RWx. Pt appears near functional baseline and recommendation is return home with family assist at d/c.     Time Calculation:   PT Evaluation Complexity  History, PT Evaluation Complexity: 1-2 personal factors and/or comorbidities  Examination of Body Systems (PT Eval Complexity): total of 3 or more elements  Clinical Presentation (PT Evaluation Complexity): evolving  Clinical Decision Making (PT Evaluation Complexity): moderate complexity  Overall Complexity (PT Evaluation Complexity): moderate complexity     PT Charges       Row Name 11/05/23 1539             Time Calculation    Start Time 1428  -EL      Stop Time 1445  -EL      Time Calculation (min) 17 min  -EL      PT Received On 11/05/23  -EL                User Key  (r) = Recorded By, (t) = Taken By, (c) = Cosigned By      Initials Name Provider Type    EL Terrell Lomax PT Physical Therapist                  Therapy Charges for Today       Code Description Service Date Service Provider Modifiers Qty    [id number]  PT EVAL MOD COMPLEXITY 3 11/5/2023 Terrell Lomax PT GP 1            PT G-Codes  Outcome Measure Options: AM-PAC 6 Clicks Basic Mobility (PT)  AM-PAC 6 Clicks Score (PT): 23  PT Discharge Summary  Anticipated Discharge Disposition (PT): home with assist    Terrell Lomax PT  11/5/2023

## 2023-11-05 NOTE — PLAN OF CARE
Problem: Adult Inpatient Plan of Care  Goal: Plan of Care Review  Outcome: Ongoing, Progressing  Flowsheets (Taken 11/5/2023 0654)  Progress: no change  Plan of Care Reviewed With: patient  Outcome Evaluation: Patient slept well throughout night, has PT consult this AM. Will continue plan of care.  Goal: Patient-Specific Goal (Individualized)  Outcome: Ongoing, Progressing  Goal: Absence of Hospital-Acquired Illness or Injury  Outcome: Ongoing, Progressing  Intervention: Identify and Manage Fall Risk  Recent Flowsheet Documentation  Taken 11/5/2023 0600 by Deena Barajas RN  Safety Promotion/Fall Prevention: safety round/check completed  Taken 11/5/2023 0400 by Deena Barajas RN  Safety Promotion/Fall Prevention: safety round/check completed  Taken 11/5/2023 0200 by Deena Barajas RN  Safety Promotion/Fall Prevention: safety round/check completed  Taken 11/5/2023 0000 by Deena Barajas RN  Safety Promotion/Fall Prevention: safety round/check completed  Taken 11/4/2023 2200 by Deena Barajas RN  Safety Promotion/Fall Prevention: safety round/check completed  Taken 11/4/2023 2000 by Deena Barajas RN  Safety Promotion/Fall Prevention: activity supervised  Intervention: Prevent Skin Injury  Recent Flowsheet Documentation  Taken 11/4/2023 2000 by Deena Barajas RN  Skin Protection:   adhesive use limited   incontinence pads utilized  Intervention: Prevent and Manage VTE (Venous Thromboembolism) Risk  Recent Flowsheet Documentation  Taken 11/4/2023 2000 by Deena Barajas RN  Range of Motion: active ROM (range of motion) encouraged  Intervention: Prevent Infection  Recent Flowsheet Documentation  Taken 11/5/2023 0600 by Deena Barajas RN  Infection Prevention:   hand hygiene promoted   rest/sleep promoted   single patient room provided  Taken 11/5/2023 0400 by Deena Barajas RN  Infection Prevention:   hand hygiene promoted   rest/sleep promoted   single patient room  provided  Taken 11/5/2023 0200 by Deena Barajas RN  Infection Prevention:   hand hygiene promoted   single patient room provided   rest/sleep promoted  Taken 11/5/2023 0000 by Deena Barajas RN  Infection Prevention:   hand hygiene promoted   single patient room provided   rest/sleep promoted  Taken 11/4/2023 2200 by Deena Barajas RN  Infection Prevention:   hand hygiene promoted   rest/sleep promoted   single patient room provided  Taken 11/4/2023 2000 by Deena Barajas RN  Infection Prevention:   hand hygiene promoted   rest/sleep promoted   single patient room provided  Goal: Optimal Comfort and Wellbeing  Outcome: Ongoing, Progressing  Intervention: Provide Person-Centered Care  Recent Flowsheet Documentation  Taken 11/4/2023 2000 by Deena Barajas RN  Trust Relationship/Rapport:   care explained   choices provided   emotional support provided   questions answered   empathic listening provided   questions encouraged   reassurance provided   thoughts/feelings acknowledged  Goal: Readiness for Transition of Care  Outcome: Ongoing, Progressing  Intervention: Mutually Develop Transition Plan  Recent Flowsheet Documentation  Taken 11/4/2023 1950 by Deena Barajas RN  Transportation Anticipated: family or friend will provide  Patient/Family Anticipated Services at Transition: (Possible Physical therapy) rehabilitation services  Patient/Family Anticipates Transition to: home with family  Taken 11/4/2023 1934 by Deena Barajas RN  Equipment Currently Used at Home:   walker, standard   glucometer   grab bar   wheelchair   shower chair   hospital bed     Problem: Diabetes Comorbidity  Goal: Blood Glucose Level Within Targeted Range  Outcome: Ongoing, Progressing  Intervention: Monitor and Manage Glycemia  Recent Flowsheet Documentation  Taken 11/4/2023 2000 by Deena Barajas RN  Glycemic Management: (Patient uses Dexcom meter) blood glucose monitored   Goal Outcome Evaluation:  Plan of  Care Reviewed With: patient        Progress: no change  Outcome Evaluation: Patient slept well throughout night, has PT consult this AM. Will continue plan of care.

## 2023-11-05 NOTE — CASE MANAGEMENT/SOCIAL WORK
Case Management Discharge Note      Final Note: home with family         Transportation Services  Private: Car    Final Discharge Disposition Code: 01 - home or self-care

## 2023-11-05 NOTE — OUTREACH NOTE
Prep Survey      Flowsheet Row Responses   [facility] patient discharged from? [facility]   Is LACE score < 7 ? No   Eligibility Readm Mgmt   Discharge diagnosis Weakness   Does the patient have one of the following disease processes/diagnoses(primary or secondary)? Other   Does the patient have Home health ordered? No   Is there a DME ordered? No   Prep survey completed? Yes            Jenifer LUONG - Registered Nurse

## 2023-11-05 NOTE — PLAN OF CARE
Goal Outcome Evaluation:  Plan of Care Reviewed With: patient        Progress: improving  Outcome Evaluation: Patient has had no c/o. PT evaluated and stated that patient was at his baseline and that he was ok to go home. Patient is agreeable and anxious to go home.

## 2023-11-05 NOTE — DISCHARGE SUMMARY
[facility]    Sahil Read MD    CHIEF COMPLAINT:     weakness    HISTORY OF PRESENT ILLNESS:    Weakness - Generalized  Associated symptoms include weakness.     73-year-old male who presents today with complaints of generalized weakness.  He reports that he is also had a 20 pound gain over the last month.  His wife reports that today he was unable to get out of bed by himself.  He denies any focal weakness no upper or lower extremity weakness no weakness this is generalized and nonfocal.  He reports that this has been worsening since he had COVID in September.     Past Medical History:   Diagnosis Date    Diabetes mellitus     Hyperlipidemia     Hypertension     Kidney stone     TIA (transient ischemic attack) 2016    Type 2 diabetes mellitus with stage 3 chronic kidney disease, with long-term current use of insulin 12/13/2021     Past Surgical History:   Procedure Laterality Date    ADENOIDECTOMY      EXTRACORPOREAL SHOCK WAVE LITHOTRIPSY (ESWL)      HERNIA REPAIR      KNEE ACL RECONSTRUCTION Left     TONSILLECTOMY       Family History   Problem Relation Age of Onset    No Known Problems Mother     No Known Problems Father      Social History     Tobacco Use    Smoking status: Never    Smokeless tobacco: Never   Vaping Use    Vaping Use: Never used   Substance Use Topics    Alcohol use: Never    Drug use: Never     Medications Prior to Admission   Medication Sig Dispense Refill Last Dose    clopidogrel (PLAVIX) 75 MG tablet Take 1 tablet by mouth Daily.   11/3/2023 at 1300    insulin glargine (LANTUS, SEMGLEE) 100 UNIT/ML injection Inject 15 Units under the skin into the appropriate area as directed Every Night. 4 mL 2 11/3/2023 at 0900    lisinopril (PRINIVIL,ZESTRIL) 40 MG tablet Take 1 tablet by mouth Daily.   11/3/2023 at 1200    rosuvastatin (Crestor) 20 MG tablet Take 1 tablet by mouth Every Night. 30 tablet 0 11/3/2023 at 1300    triamterene-hydrochlorothiazide (MAXZIDE) 75-50  MG per tablet Take 1 tablet by mouth Daily.   11/3/2023 at 1300    linagliptin (TRADJENTA) 5 MG tablet tablet Take 1 tablet by mouth Daily.        Allergies:  Contrast dye (echo or unknown ct/mr), Iodinated contrast media, Iodine, Aspirin, Lipitor [atorvastatin], and Prednisone    Immunization History   Administered Date(s) Administered    COVID-19 (PFIZER) Purple Cap Monovalent 03/08/2021, 03/29/2021    Flu Vaccine Intradermal Quad 18-64YR 11/22/2017    Fluad Quad 65+ 10/27/2021    Fluzone High Dose =>65 Years ([employer] ONLY) 10/09/2018    Hepatitis A 04/26/2018, 10/09/2018    PPD Test 12/13/2021    Pneumococcal Polysaccharide (PPSV23) 11/23/2015    Td (TDVAX) 02/26/2018           REVIEW OF SYSTEMS:    Review of Systems   Neurological:  Positive for weakness.         Vital Signs  Temp:  [97.5 °F (36.4 °C)-98.1 °F (36.7 °C)] 98.1 °F (36.7 °C)  Heart Rate:  [75-96] 75  Resp:  [12-18] 16  BP: (130-179)/(71-98) 130/75          Physical Exam:  Physical Exam  Constitutional:       Appearance: Normal appearance.   HENT:      Mouth/Throat:      Mouth: Mucous membranes are moist.   Cardiovascular:      Rate and Rhythm: Normal rate and regular rhythm.      Pulses: Normal pulses.      Heart sounds: Normal heart sounds.   Pulmonary:      Effort: Pulmonary effort is normal.      Breath sounds: Normal breath sounds.   Abdominal:      Palpations: Abdomen is soft.   Skin:     General: Skin is warm and dry.   Neurological:      General: No focal deficit present.      Mental Status: He is alert and oriented to person, place, and time.   Psychiatric:         Mood and Affect: Mood normal.         Behavior: Behavior normal.           Emotional Behavior:    wnl   Debilities:   None      Results Review:    I reviewed the patient's new clinical results.  Lab Results (most recent)       Procedure Component Value Units Date/Time    Comprehensive Metabolic Panel [[id number]]  (Abnormal) Collected: 11/05/23 0349    Specimen: Blood from Hand,  Right Updated: 11/05/23 0551     Glucose 154 mg/dL      BUN 29 mg/dL      Creatinine 1.39 mg/dL      Sodium 140 mmol/L      Potassium 3.4 mmol/L      Chloride 105 mmol/L      CO2 29.0 mmol/L      Calcium 8.9 mg/dL      Total Protein 5.5 g/dL      Albumin 3.3 g/dL      ALT (SGPT) 17 U/L      AST (SGOT) 17 U/L      Alkaline Phosphatase 73 U/L      Total Bilirubin 0.4 mg/dL      Globulin 2.2 gm/dL      A/G Ratio 1.5 g/dL      BUN/Creatinine Ratio 20.9     Anion Gap 6.0 mmol/L      eGFR 53.2 mL/min/1.73     Narrative:      GFR Normal >60  Chronic Kidney Disease <60  Kidney Failure <15    The GFR formula is only valid for adults with stable renal function between ages 18 and 70.    CBC & Differential [[id number]]  (Abnormal) Collected: 11/05/23 0349    Specimen: Blood from Hand, Right Updated: 11/05/23 0532    Narrative:      The following orders were created for panel order CBC & Differential.  Procedure                               Abnormality         Status                     ---------                               -----------         ------                     CBC Auto Differential[[id number]]        Abnormal            Final result                 Please view results for these tests on the individual orders.    CBC Auto Differential [[id number]]  (Abnormal) Collected: 11/05/23 0349    Specimen: Blood from Hand, Right Updated: 11/05/23 0532     WBC 4.00 10*3/mm3      RBC 3.56 10*6/mm3      Hemoglobin 11.1 g/dL      Hematocrit 33.3 %      MCV 93.6 fL      MCH 31.2 pg      MCHC 33.3 g/dL      RDW 14.0 %      RDW-SD 45.9 fl      MPV 8.7 fL      Platelets 107 10*3/mm3      Neutrophil % 53.8 %      Lymphocyte % 33.1 %      Monocyte % 9.6 %      Eosinophil % 3.0 %      Basophil % 0.5 %      Neutrophils, Absolute 2.20 10*3/mm3      Lymphocytes, Absolute 1.30 10*3/mm3      Monocytes, Absolute 0.40 10*3/mm3      Eosinophils, Absolute 0.10 10*3/mm3      Basophils, Absolute 0.00 10*3/mm3      nRBC 0.0 /100 WBC     High  Sensitivity Troponin T 2Hr [[id number]]  (Abnormal) Collected: 11/04/23 2256    Specimen: Blood from Arm, Right Updated: 11/04/23 2322     HS Troponin T 29 ng/L      Troponin T Delta -7 ng/L     Narrative:      High Sensitive Troponin T Reference Range:  <10.0 ng/L- Negative Female for AMI  <15.0 ng/L- Negative Male for AMI  >=10 - Abnormal Female indicating possible myocardial injury.  >=15 - Abnormal Male indicating possible myocardial injury.   Clinicians would have to utilize clinical acumen, EKG, Troponin, and serial changes to determine if it is an Acute Myocardial Infarction or myocardial injury due to an underlying chronic condition.         Urinalysis With Culture If Indicated - Urine, Clean Catch [[id number]]  (Normal) Collected: 11/04/23 1730    Specimen: Urine, Clean Catch Updated: 11/04/23 1736     Color, UA Yellow     Appearance, UA Clear     pH, UA 6.5     Specific Gravity, UA 1.016     Glucose, UA Negative     Ketones, UA Negative     Bilirubin, UA Negative     Blood, UA Negative     Protein, UA Negative     Leuk Esterase, UA Negative     Nitrite, UA Negative     Urobilinogen, UA 0.2 E.U./dL    Narrative:      In absence of clinical symptoms, the presence of pyuria, bacteria, and/or nitrites on the urinalysis result does not correlate with infection.  Urine microscopic not indicated.    Comprehensive Metabolic Panel [[id number]]  (Abnormal) Collected: 11/04/23 1637    Specimen: Blood Updated: 11/04/23 1703     Glucose 156 mg/dL      BUN 26 mg/dL      Creatinine 1.29 mg/dL      Sodium 142 mmol/L      Potassium 4.3 mmol/L      Chloride 104 mmol/L      CO2 30.0 mmol/L      Calcium 9.7 mg/dL      Total Protein 6.7 g/dL      Albumin 3.7 g/dL      ALT (SGPT) 24 U/L      AST (SGOT) 25 U/L      Alkaline Phosphatase 87 U/L      Total Bilirubin 0.5 mg/dL      Globulin 3.0 gm/dL      A/G Ratio 1.2 g/dL      BUN/Creatinine Ratio 20.2     Anion Gap 8.0 mmol/L      eGFR 58.2 mL/min/1.73     Narrative:      GFR Normal  >60  Chronic Kidney Disease <60  Kidney Failure <15    The GFR formula is only valid for adults with stable renal function between ages 18 and 70.    Lipase [[id number]]  (Normal) Collected: 11/04/23 1637    Specimen: Blood Updated: 11/04/23 1703     Lipase 26 U/L     BNP [[id number]]  (Normal) Collected: 11/04/23 1637    Specimen: Blood Updated: 11/04/23 1703     proBNP 337.3 pg/mL     Narrative:      This assay is used as an aid in the diagnosis of individuals suspected of having heart failure. It can be used as an aid in the diagnosis of acute decompensated heart failure (ADHF) in patients presenting with signs and symptoms of ADHF to the emergency department (ED). In addition, NT-proBNP of <300 pg/mL indicates ADHF is not likely.    Age Range Result Interpretation  NT-proBNP Concentration (pg/mL:      <50             Positive            >450                   Gray                 300-450                    Negative             <300    50-75           Positive            >900                  Gray                300-900                  Negative            <300      >75             Positive            >1800                  Gray                300-1800                  Negative            <300    High Sensitivity Troponin T [[id number]]  (Abnormal) Collected: 11/04/23 1637    Specimen: Blood Updated: 11/04/23 1703     HS Troponin T 36 ng/L     Narrative:      High Sensitive Troponin T Reference Range:  <10.0 ng/L- Negative Female for AMI  <15.0 ng/L- Negative Male for AMI  >=10 - Abnormal Female indicating possible myocardial injury.  >=15 - Abnormal Male indicating possible myocardial injury.   Clinicians would have to utilize clinical acumen, EKG, Troponin, and serial changes to determine if it is an Acute Myocardial Infarction or myocardial injury due to an underlying chronic condition.         CBC & Differential [[id number]]  (Abnormal) Collected: 11/04/23 1637    Specimen: Blood Updated: 11/04/23 1642     Narrative:      The following orders were created for panel order CBC & Differential.  Procedure                               Abnormality         Status                     ---------                               -----------         ------                     CBC Auto Differential[[id number]]        Abnormal            Final result                 Please view results for these tests on the individual orders.    CBC Auto Differential [[id number]]  (Abnormal) Collected: 11/04/23 1637    Specimen: Blood Updated: 11/04/23 1642     WBC 4.00 10*3/mm3      RBC 3.86 10*6/mm3      Hemoglobin 11.9 g/dL      Hematocrit 36.4 %      MCV 94.4 fL      MCH 31.0 pg      MCHC 32.8 g/dL      RDW 14.3 %      RDW-SD 47.3 fl      MPV 7.9 fL      Platelets 117 10*3/mm3      Neutrophil % 60.8 %      Lymphocyte % 25.8 %      Monocyte % 9.8 %      Eosinophil % 2.9 %      Basophil % 0.7 %      Neutrophils, Absolute 2.40 10*3/mm3      Lymphocytes, Absolute 1.00 10*3/mm3      Monocytes, Absolute 0.40 10*3/mm3      Eosinophils, Absolute 0.10 10*3/mm3      Basophils, Absolute 0.00 10*3/mm3      nRBC 0.1 /100 WBC             Imaging Results (Most Recent)       Procedure Component Value Units Date/Time    CT Abdomen Pelvis Without Contrast [[id number]] Collected: 11/04/23 1711     Updated: 11/04/23 1731    Narrative:      CT ABDOMEN PELVIS WO CONTRAST    Date of Exam: 11/4/2023 4:50 PM EDT    Indication: Generalized abdominal distention.    Comparison: November 13, 2015    Technique: Axial CT images were obtained of the abdomen and pelvis without the administration of contrast. Sagittal and coronal reconstructions were performed.  Automated exposure control and iterative reconstruction methods were used.      Findings:  Within the lung bases is minimal bibasilar atelectasis. There is a small pericardial effusion.    The enhanced liver, gallbladder, adrenal glands, spleen, and pancreas are unremarkable. There are small bilateral renal cysts.    The  stomach appears normal. The small bowel appears normal in caliber and configuration. The colon appears normal. The appendix is not well visualized. There is no ascites or loculated collection. No abnormally enlarged lymph nodes are identified.    The rectum, prostate, and urinary bladder are unremarkable.    No aggressive osseous lesions are identified.      Impression:      Impression:  1.No acute process identified within abdomen/pelvis  2.Small pericardial effusion.            Electronically Signed: Beck De Jesus MD    11/4/2023 5:29 PM EDT    Workstation ID: [id number]    XR Chest 1 View [[id number]] Collected: 11/04/23 1630     Updated: 11/04/23 1633    Narrative:      XR CHEST 1 VW    Date of Exam: 11/4/2023 4:24 PM EDT    Indication: Weakness.    Comparison: AP chest x-ray 9/12/2023.    Findings:  Lungs are adequately expanded and appear clear. No pneumothorax or large pleural effusion is seen. Cardiac silhouette appears enlarged, but is exaggerated by portable AP technique.      Impression:      Impression:  No acute cardiopulmonary abnormality is identified.      Electronically Signed: Yahaira Arroyo    11/4/2023 4:31 PM EDT    Workstation ID: [id number]          reviewed    ECG/EMG Results (most recent)       Procedure Component Value Units Date/Time    ECG 12 Lead Other; weakness [[id number]] Collected: 11/04/23 1607     Updated: 11/04/23 1608     QT Interval 381 ms      QTC Interval 415 ms     Narrative:      HEART RATE= 72  bpm  RR Interval= 844  ms  AL Interval= 183  ms  P Horizontal Axis= 18  deg  P Front Axis= 38  deg  QRSD Interval= 68  ms  QT Interval= 381  ms  QTcB= 415  ms  QRS Axis= 15  deg  T Wave Axis= 52  deg  - OTHERWISE NORMAL ECG -  Sinus rhythm  Atrial premature complex  Low voltage, precordial leads  When compared with ECG of 05-Apr-2023 19:34:13,  No significant change  Electronically Signed By:   Date and Time of Study: 2023-11-04 16:07:19          reviewed    Results for orders placed  during the hospital encounter of 10/19/22    Duplex Carotid Ultrasound CAR    Interpretation Summary    Proximal right internal carotid artery plaque without significant stenosis.    Proximal left internal carotid artery plaque without significant stenosis.      Results for orders placed during the hospital encounter of 10/19/22    Adult Transthoracic Echo Complete W/ Cont if Necessary Per Protocol    Interpretation Summary    Estimated left ventricular EF = 59% Estimated left ventricular EF was in agreement with the calculated left ventricular EF. Left ventricular systolic function is normal.    Left ventricular wall thickness is consistent with mild concentric hypertrophy.    There is calcification of the aortic valve.    Trans- thoracic echocardiography reveals mild LVH with EF of 59%  Mild left atrial enlargement  No effusion    Electronically signed by Mike Tomlinson MD, 10/21/22, 6:13 PM EDT.      Microbiology Results (last 10 days)       ** No results found for the last 240 hours. **            Assessment & Plan     Weakness     Weakness  - hbg 11.9 baseline is 13  - no c/o of gi bleed  - chest xray reviewed and showing no acute cardiopulmonary disease  - EKG rate 72 sinus, some pacs  - PT consulted and has no further recommendations    Ckd  - bun cr 26, 1.29 and repeat is 29, 1.39- baseline 31, 1.5  - pt sees dr bullock for nephrology    Diabetes mellitus  -moderately controlled   Lab Results   Component Value Date    GLUCOSE 154 (H) 11/05/2023    GLUCOSE 156 (H) 11/04/2023    GLUCOSE 177 (H) 09/12/2023    GLUCOSE 415 (C) 07/25/2023     -Hold tradjenta  -ssi and lantus  -Diabetic diet  -Monitor tid and hs    Hypertension  -well Controlled   BP Readings from Last 1 Encounters:   11/05/23 130/75     - Continue lisinopril, maxzide  - Monitor while admitted     HPL  - cont statin therapy      I discussed the patients findings and my recommendations with patient and nursing staff.     Discharge  Diagnosis:      Weakness      Hospital Course  Patient is a 73 y.o. male presented with generalized weakness. Er evaluated pt and admitted to observation. Labs are unremarkable near baseline for pt. Chest xray is normal. EKG sinus rate of 72. Pt follows outpt with nephrology. Pt consulted and recommends discharge home. Discharge discussed with pt and he is agreeable to plan. Instructed pt to return to er if symptoms reoccur or worsen.      Past Medical History:     Past Medical History:   Diagnosis Date    Diabetes mellitus     Hyperlipidemia     Hypertension     Kidney stone     TIA (transient ischemic attack) 2016    Type 2 diabetes mellitus with stage 3 chronic kidney disease, with long-term current use of insulin 12/13/2021       Past Surgical History:     Past Surgical History:   Procedure Laterality Date    ADENOIDECTOMY      EXTRACORPOREAL SHOCK WAVE LITHOTRIPSY (ESWL)      HERNIA REPAIR      KNEE ACL RECONSTRUCTION Left     TONSILLECTOMY         Social History:   Social History     Socioeconomic History    Marital status:    Tobacco Use    Smoking status: Never    Smokeless tobacco: Never   Vaping Use    Vaping Use: Never used   Substance and Sexual Activity    Alcohol use: Never    Drug use: Never    Sexual activity: Defer       Procedures Performed         Consults:   Consults       No orders found for last 30 day(s).            Condition on Discharge:     Stable    Discharge Disposition      Discharge Medications     Discharge Medications        Continue These Medications        Instructions Start Date   clopidogrel 75 MG tablet  Commonly known as: PLAVIX   75 mg, Oral, Daily      insulin glargine 100 UNIT/ML injection  Commonly known as: LANTUS, SEMGLEE   15 Units, Subcutaneous, Nightly      linagliptin 5 MG tablet tablet  Commonly known as: TRADJENTA   5 mg, Oral, Daily      lisinopril 40 MG tablet  Commonly known as: PRINIVIL,ZESTRIL   40 mg, Oral, Daily      rosuvastatin 20 MG tablet  Commonly  known as: Crestor   20 mg, Oral, Nightly      triamterene-hydrochlorothiazide 75-50 MG per tablet  Commonly known as: MAXZIDE   1 tablet, Oral, Daily               Discharge Diet:     Activity at Discharge:     Follow-up Appointments  No future appointments.      Test Results Pending at Discharge       Risk for Readmission (LACE) Score: 9 (11/5/2023  6:00 AM)      Less Than 30 minutes spent in discharge activities for this patient    Ines Salazar PA-C  11/05/23  10:41 EST

## 2023-11-08 ENCOUNTER — READMISSION MANAGEMENT (OUTPATIENT)
Dept: CALL CENTER | Facility: HOSPITAL | Age: 73
End: 2023-11-08
Payer: MEDICARE

## 2023-11-08 NOTE — OUTREACH NOTE
Medical Week 1 Survey      Flowsheet Row Responses   [facility] facility patient discharged from? [facility]   Does the patient have one of the following disease processes/diagnoses(primary or secondary)? Other   Week 1 attempt successful? No   Unsuccessful attempts Attempt 1            Katt SAUCEDO - Registered Nurse

## 2023-11-15 ENCOUNTER — READMISSION MANAGEMENT (OUTPATIENT)
Dept: CALL CENTER | Facility: HOSPITAL | Age: 73
End: 2023-11-15
Payer: MEDICARE

## 2023-11-15 NOTE — OUTREACH NOTE
Medical Week 2 Survey      Flowsheet Row Responses   [facility] facility patient discharged from? [facility]   Does the patient have one of the following disease processes/diagnoses(primary or secondary)? Other   Week 2 attempt successful? No   Unsuccessful attempts Attempt 1            Mariposa GAMBOA - Licensed Nurse

## 2023-11-20 ENCOUNTER — READMISSION MANAGEMENT (OUTPATIENT)
Dept: CALL CENTER | Facility: HOSPITAL | Age: 73
End: 2023-11-20
Payer: MEDICARE

## 2023-11-20 NOTE — OUTREACH NOTE
Medical Week 2 Survey      Flowsheet Row Responses   [facility] facility patient discharged from? [facility]   Does the patient have one of the following disease processes/diagnoses(primary or secondary)? Other   Week 2 attempt successful? No   Unsuccessful attempts Attempt 2            CELY SAUCEDO - Registered Nurse

## 2023-11-29 ENCOUNTER — READMISSION MANAGEMENT (OUTPATIENT)
Dept: CALL CENTER | Facility: HOSPITAL | Age: 73
End: 2023-11-29
Payer: MEDICARE

## 2023-11-29 NOTE — OUTREACH NOTE
Medical Week 3 Survey      Flowsheet Row Responses   [facility] facility patient discharged from? [facility]   Does the patient have one of the following disease processes/diagnoses(primary or secondary)? Other   Week 3 attempt successful? No   Unsuccessful attempts Attempt 1            Mia ACOSTA - Licensed Nurse

## 2023-12-12 ENCOUNTER — HOSPITAL ENCOUNTER (INPATIENT)
Facility: HOSPITAL | Age: 73
LOS: 2 days | Discharge: NURSING, REHAB OR LONG-TERM CARE | End: 2023-12-14
Attending: EMERGENCY MEDICINE | Admitting: INTERNAL MEDICINE
Payer: MEDICARE

## 2023-12-12 ENCOUNTER — APPOINTMENT (OUTPATIENT)
Dept: GENERAL RADIOLOGY | Facility: HOSPITAL | Age: 73
End: 2023-12-12
Payer: MEDICARE

## 2023-12-12 ENCOUNTER — ANESTHESIA EVENT (OUTPATIENT)
Dept: GASTROENTEROLOGY | Facility: HOSPITAL | Age: 73
End: 2023-12-12
Payer: MEDICARE

## 2023-12-12 ENCOUNTER — APPOINTMENT (OUTPATIENT)
Dept: CT IMAGING | Facility: HOSPITAL | Age: 73
End: 2023-12-12
Payer: MEDICARE

## 2023-12-12 ENCOUNTER — ANESTHESIA (OUTPATIENT)
Dept: GASTROENTEROLOGY | Facility: HOSPITAL | Age: 73
End: 2023-12-12
Payer: MEDICARE

## 2023-12-12 ENCOUNTER — INPATIENT HOSPITAL (OUTPATIENT)
Dept: URBAN - METROPOLITAN AREA HOSPITAL 84 | Facility: HOSPITAL | Age: 73
End: 2023-12-12

## 2023-12-12 DIAGNOSIS — K31.89 GASTRIC DISTENTION: ICD-10-CM

## 2023-12-12 DIAGNOSIS — R53.1 WEAKNESS: ICD-10-CM

## 2023-12-12 DIAGNOSIS — R11.2 NAUSEA WITH VOMITING, UNSPECIFIED: ICD-10-CM

## 2023-12-12 DIAGNOSIS — K92.0 COFFEE GROUND EMESIS: Primary | ICD-10-CM

## 2023-12-12 PROBLEM — K92.2 GI BLEED: Status: ACTIVE | Noted: 2023-12-12

## 2023-12-12 LAB
ABO GROUP BLD: NORMAL
ALBUMIN SERPL-MCNC: 4 G/DL (ref 3.5–5.2)
ALBUMIN/GLOB SERPL: 1 G/DL
ALP SERPL-CCNC: 130 U/L (ref 39–117)
ALT SERPL W P-5'-P-CCNC: 17 U/L (ref 1–41)
ANION GAP SERPL CALCULATED.3IONS-SCNC: 15 MMOL/L (ref 5–15)
AST SERPL-CCNC: 21 U/L (ref 1–40)
BASOPHILS # BLD AUTO: 0.1 10*3/MM3 (ref 0–0.2)
BASOPHILS NFR BLD AUTO: 0.7 % (ref 0–1.5)
BILIRUB SERPL-MCNC: 0.9 MG/DL (ref 0–1.2)
BILIRUB UR QL STRIP: NEGATIVE
BLD GP AB SCN SERPL QL: NEGATIVE
BUN SERPL-MCNC: 38 MG/DL (ref 8–23)
BUN/CREAT SERPL: 24.8 (ref 7–25)
CALCIUM SPEC-SCNC: 10 MG/DL (ref 8.6–10.5)
CHLORIDE SERPL-SCNC: 98 MMOL/L (ref 98–107)
CLARITY UR: CLEAR
CO2 SERPL-SCNC: 30 MMOL/L (ref 22–29)
COLOR UR: YELLOW
CREAT SERPL-MCNC: 1.53 MG/DL (ref 0.76–1.27)
DEPRECATED RDW RBC AUTO: 43.8 FL (ref 37–54)
EGFRCR SERPLBLD CKD-EPI 2021: 47.4 ML/MIN/1.73
EOSINOPHIL # BLD AUTO: 0 10*3/MM3 (ref 0–0.4)
EOSINOPHIL NFR BLD AUTO: 0.1 % (ref 0.3–6.2)
ERYTHROCYTE [DISTWIDTH] IN BLOOD BY AUTOMATED COUNT: 13.2 % (ref 12.3–15.4)
GLOBULIN UR ELPH-MCNC: 3.9 GM/DL
GLUCOSE BLDC GLUCOMTR-MCNC: 213 MG/DL (ref 70–105)
GLUCOSE BLDC GLUCOMTR-MCNC: 334 MG/DL (ref 70–105)
GLUCOSE BLDC GLUCOMTR-MCNC: 357 MG/DL (ref 70–105)
GLUCOSE BLDC GLUCOMTR-MCNC: 453 MG/DL (ref 70–105)
GLUCOSE BLDC GLUCOMTR-MCNC: 506 MG/DL (ref 70–105)
GLUCOSE SERPL-MCNC: 393 MG/DL (ref 65–99)
GLUCOSE UR STRIP-MCNC: ABNORMAL MG/DL
HBA1C MFR BLD: 7.7 % (ref 4.8–5.6)
HCT VFR BLD AUTO: 38.9 % (ref 37.5–51)
HCT VFR BLD AUTO: 42.2 % (ref 37.5–51)
HGB BLD-MCNC: 13.1 G/DL (ref 13–17.7)
HGB BLD-MCNC: 14.2 G/DL (ref 13–17.7)
HGB UR QL STRIP.AUTO: NEGATIVE
HOLD SPECIMEN: NORMAL
HOLD SPECIMEN: NORMAL
INR PPP: 0.95 (ref 0.93–1.1)
KETONES UR QL STRIP: ABNORMAL
LEUKOCYTE ESTERASE UR QL STRIP.AUTO: NEGATIVE
LIPASE SERPL-CCNC: 34 U/L (ref 13–60)
LYMPHOCYTES # BLD AUTO: 0.8 10*3/MM3 (ref 0.7–3.1)
LYMPHOCYTES NFR BLD AUTO: 8.2 % (ref 19.6–45.3)
MCH RBC QN AUTO: 30.4 PG (ref 26.6–33)
MCHC RBC AUTO-ENTMCNC: 33.6 G/DL (ref 31.5–35.7)
MCV RBC AUTO: 90.5 FL (ref 79–97)
MONOCYTES # BLD AUTO: 0.4 10*3/MM3 (ref 0.1–0.9)
MONOCYTES NFR BLD AUTO: 4.6 % (ref 5–12)
NEUTROPHILS NFR BLD AUTO: 8.1 10*3/MM3 (ref 1.7–7)
NEUTROPHILS NFR BLD AUTO: 86.4 % (ref 42.7–76)
NITRITE UR QL STRIP: NEGATIVE
NRBC BLD AUTO-RTO: 0 /100 WBC (ref 0–0.2)
PH UR STRIP.AUTO: <=5 [PH] (ref 5–8)
PLATELET # BLD AUTO: 171 10*3/MM3 (ref 140–450)
PMV BLD AUTO: 8.7 FL (ref 6–12)
POTASSIUM SERPL-SCNC: 4 MMOL/L (ref 3.5–5.2)
PROT SERPL-MCNC: 7.9 G/DL (ref 6–8.5)
PROT UR QL STRIP: NEGATIVE
PROTHROMBIN TIME: 10.4 SECONDS (ref 9.6–11.7)
RBC # BLD AUTO: 4.67 10*6/MM3 (ref 4.14–5.8)
RH BLD: NEGATIVE
SODIUM SERPL-SCNC: 143 MMOL/L (ref 136–145)
SP GR UR STRIP: 1.02 (ref 1–1.03)
T&S EXPIRATION DATE: NORMAL
UROBILINOGEN UR QL STRIP: ABNORMAL
WBC NRBC COR # BLD AUTO: 9.4 10*3/MM3 (ref 3.4–10.8)
WHOLE BLOOD HOLD COAG: NORMAL
WHOLE BLOOD HOLD SPECIMEN: NORMAL

## 2023-12-12 PROCEDURE — 99221 1ST HOSP IP/OBS SF/LOW 40: CPT | Mod: FS | Performed by: NURSE PRACTITIONER

## 2023-12-12 PROCEDURE — 74018 RADEX ABDOMEN 1 VIEW: CPT

## 2023-12-12 PROCEDURE — 86900 BLOOD TYPING SEROLOGIC ABO: CPT | Performed by: PHYSICIAN ASSISTANT

## 2023-12-12 PROCEDURE — 82948 REAGENT STRIP/BLOOD GLUCOSE: CPT

## 2023-12-12 PROCEDURE — 86850 RBC ANTIBODY SCREEN: CPT | Performed by: PHYSICIAN ASSISTANT

## 2023-12-12 PROCEDURE — 85610 PROTHROMBIN TIME: CPT | Performed by: PHYSICIAN ASSISTANT

## 2023-12-12 PROCEDURE — 99285 EMERGENCY DEPT VISIT HI MDM: CPT

## 2023-12-12 PROCEDURE — 83036 HEMOGLOBIN GLYCOSYLATED A1C: CPT | Performed by: NURSE PRACTITIONER

## 2023-12-12 PROCEDURE — 63710000001 INSULIN LISPRO (HUMAN) PER 5 UNITS: Performed by: INTERNAL MEDICINE

## 2023-12-12 PROCEDURE — 36415 COLL VENOUS BLD VENIPUNCTURE: CPT

## 2023-12-12 PROCEDURE — 85025 COMPLETE CBC W/AUTO DIFF WBC: CPT | Performed by: EMERGENCY MEDICINE

## 2023-12-12 PROCEDURE — 80053 COMPREHEN METABOLIC PANEL: CPT | Performed by: EMERGENCY MEDICINE

## 2023-12-12 PROCEDURE — 25810000003 SODIUM CHLORIDE 0.9 % SOLUTION: Performed by: INTERNAL MEDICINE

## 2023-12-12 PROCEDURE — 74176 CT ABD & PELVIS W/O CONTRAST: CPT

## 2023-12-12 PROCEDURE — 25010000002 ONDANSETRON PER 1 MG

## 2023-12-12 PROCEDURE — 63710000001 INSULIN REGULAR HUMAN PER 5 UNITS: Performed by: INTERNAL MEDICINE

## 2023-12-12 PROCEDURE — 81003 URINALYSIS AUTO W/O SCOPE: CPT | Performed by: EMERGENCY MEDICINE

## 2023-12-12 PROCEDURE — 85018 HEMOGLOBIN: CPT | Performed by: INTERNAL MEDICINE

## 2023-12-12 PROCEDURE — 83690 ASSAY OF LIPASE: CPT | Performed by: PHYSICIAN ASSISTANT

## 2023-12-12 PROCEDURE — 86901 BLOOD TYPING SEROLOGIC RH(D): CPT | Performed by: PHYSICIAN ASSISTANT

## 2023-12-12 PROCEDURE — 85014 HEMATOCRIT: CPT | Performed by: INTERNAL MEDICINE

## 2023-12-12 RX ORDER — POLYETHYLENE GLYCOL 3350 17 G/17G
17 POWDER, FOR SOLUTION ORAL DAILY PRN
Status: DISCONTINUED | OUTPATIENT
Start: 2023-12-12 | End: 2023-12-14 | Stop reason: HOSPADM

## 2023-12-12 RX ORDER — INSULIN GLARGINE 100 [IU]/ML
20 INJECTION, SOLUTION SUBCUTANEOUS NIGHTLY
COMMUNITY

## 2023-12-12 RX ORDER — INSULIN LISPRO 100 [IU]/ML
3-14 INJECTION, SOLUTION INTRAVENOUS; SUBCUTANEOUS EVERY 6 HOURS SCHEDULED
Status: DISCONTINUED | OUTPATIENT
Start: 2023-12-12 | End: 2023-12-14 | Stop reason: HOSPADM

## 2023-12-12 RX ORDER — PANTOPRAZOLE SODIUM 40 MG/1
40 TABLET, DELAYED RELEASE ORAL EVERY 12 HOURS SCHEDULED
Status: DISCONTINUED | OUTPATIENT
Start: 2023-12-12 | End: 2023-12-12

## 2023-12-12 RX ORDER — ONDANSETRON 2 MG/ML
4 INJECTION INTRAMUSCULAR; INTRAVENOUS ONCE
Status: COMPLETED | OUTPATIENT
Start: 2023-12-12 | End: 2023-12-12

## 2023-12-12 RX ORDER — DEXTROSE MONOHYDRATE 25 G/50ML
25 INJECTION, SOLUTION INTRAVENOUS
Status: DISCONTINUED | OUTPATIENT
Start: 2023-12-12 | End: 2023-12-14 | Stop reason: HOSPADM

## 2023-12-12 RX ORDER — PANTOPRAZOLE SODIUM 40 MG/10ML
80 INJECTION, POWDER, LYOPHILIZED, FOR SOLUTION INTRAVENOUS ONCE
Status: COMPLETED | OUTPATIENT
Start: 2023-12-12 | End: 2023-12-12

## 2023-12-12 RX ORDER — PIOGLITAZONEHYDROCHLORIDE 30 MG/1
30 TABLET ORAL DAILY
COMMUNITY

## 2023-12-12 RX ORDER — ONDANSETRON 2 MG/ML
INJECTION INTRAMUSCULAR; INTRAVENOUS
Status: COMPLETED
Start: 2023-12-12 | End: 2023-12-12

## 2023-12-12 RX ORDER — AMOXICILLIN 250 MG
2 CAPSULE ORAL 2 TIMES DAILY
Status: DISCONTINUED | OUTPATIENT
Start: 2023-12-12 | End: 2023-12-14 | Stop reason: HOSPADM

## 2023-12-12 RX ORDER — NICOTINE POLACRILEX 4 MG
15 LOZENGE BUCCAL
Status: DISCONTINUED | OUTPATIENT
Start: 2023-12-12 | End: 2023-12-14 | Stop reason: HOSPADM

## 2023-12-12 RX ORDER — SODIUM CHLORIDE 0.9 % (FLUSH) 0.9 %
10 SYRINGE (ML) INJECTION AS NEEDED
Status: DISCONTINUED | OUTPATIENT
Start: 2023-12-12 | End: 2023-12-14 | Stop reason: HOSPADM

## 2023-12-12 RX ORDER — SODIUM CHLORIDE 0.9 % (FLUSH) 0.9 %
10 SYRINGE (ML) INJECTION EVERY 12 HOURS SCHEDULED
Status: DISCONTINUED | OUTPATIENT
Start: 2023-12-12 | End: 2023-12-14 | Stop reason: HOSPADM

## 2023-12-12 RX ORDER — PANTOPRAZOLE SODIUM 40 MG/10ML
40 INJECTION, POWDER, LYOPHILIZED, FOR SOLUTION INTRAVENOUS EVERY 12 HOURS SCHEDULED
Status: DISCONTINUED | OUTPATIENT
Start: 2023-12-12 | End: 2023-12-12

## 2023-12-12 RX ORDER — BISACODYL 10 MG
10 SUPPOSITORY, RECTAL RECTAL DAILY PRN
Status: DISCONTINUED | OUTPATIENT
Start: 2023-12-12 | End: 2023-12-14 | Stop reason: HOSPADM

## 2023-12-12 RX ORDER — BISACODYL 5 MG/1
5 TABLET, DELAYED RELEASE ORAL DAILY PRN
Status: DISCONTINUED | OUTPATIENT
Start: 2023-12-12 | End: 2023-12-14 | Stop reason: HOSPADM

## 2023-12-12 RX ORDER — SODIUM CHLORIDE 9 MG/ML
40 INJECTION, SOLUTION INTRAVENOUS AS NEEDED
Status: DISCONTINUED | OUTPATIENT
Start: 2023-12-12 | End: 2023-12-14 | Stop reason: HOSPADM

## 2023-12-12 RX ORDER — PANTOPRAZOLE SODIUM 40 MG/10ML
40 INJECTION, POWDER, LYOPHILIZED, FOR SOLUTION INTRAVENOUS EVERY 12 HOURS SCHEDULED
Status: DISCONTINUED | OUTPATIENT
Start: 2023-12-12 | End: 2023-12-13

## 2023-12-12 RX ORDER — SODIUM CHLORIDE 9 MG/ML
100 INJECTION, SOLUTION INTRAVENOUS CONTINUOUS
Status: DISCONTINUED | OUTPATIENT
Start: 2023-12-12 | End: 2023-12-14 | Stop reason: HOSPADM

## 2023-12-12 RX ORDER — IBUPROFEN 600 MG/1
1 TABLET ORAL
Status: DISCONTINUED | OUTPATIENT
Start: 2023-12-12 | End: 2023-12-14 | Stop reason: HOSPADM

## 2023-12-12 RX ADMIN — SODIUM CHLORIDE 100 ML/HR: 9 INJECTION, SOLUTION INTRAVENOUS at 11:45

## 2023-12-12 RX ADMIN — PHENOL 1 SPRAY: 1.4 SPRAY ORAL at 13:00

## 2023-12-12 RX ADMIN — INSULIN HUMAN 10 UNITS: 100 INJECTION, SOLUTION PARENTERAL at 14:08

## 2023-12-12 RX ADMIN — INSULIN LISPRO 10 UNITS: 100 INJECTION, SOLUTION INTRAVENOUS; SUBCUTANEOUS at 18:27

## 2023-12-12 RX ADMIN — INSULIN LISPRO 14 UNITS: 100 INJECTION, SOLUTION INTRAVENOUS; SUBCUTANEOUS at 12:07

## 2023-12-12 RX ADMIN — PANTOPRAZOLE SODIUM 80 MG: 40 INJECTION, POWDER, FOR SOLUTION INTRAVENOUS at 07:37

## 2023-12-12 RX ADMIN — ONDANSETRON 4 MG: 2 INJECTION INTRAMUSCULAR; INTRAVENOUS at 07:37

## 2023-12-12 RX ADMIN — Medication 10 ML: at 20:46

## 2023-12-12 RX ADMIN — PANTOPRAZOLE SODIUM 40 MG: 40 INJECTION, POWDER, FOR SOLUTION INTRAVENOUS at 20:46

## 2023-12-12 NOTE — CASE MANAGEMENT/SOCIAL WORK
Discharge Planning Assessment   [facility]     Patient Name: Blas Mojica  MRN: [id number]  Today's Date: 12/12/2023    Admit Date: 12/12/2023    Plan: Home, Will need PT eval   Discharge Needs Assessment       Row Name 12/12/23 1143       Living Environment    People in Home spouse    Current Living Arrangements home    Potentially Unsafe Housing Conditions none    In the past 12 months has the electric, gas, oil, or water company threatened to shut off services in your home? No    Primary Care Provided by self    Provides Primary Care For no one    Able to Return to Prior Arrangements yes       Resource/Environmental Concerns    Resource/Environmental Concerns none    Transportation Concerns none       Food Insecurity    Within the past 12 months, you worried that your food would run out before you got the money to buy more. Never true    Within the past 12 months, the food you bought just didn't last and you didn't have money to get more. Never true       Transition Planning    Patient/Family Anticipates Transition to home with family    Patient/Family Anticipated Services at Transition none    Transportation Anticipated family or friend will provide       Discharge Needs Assessment    Equipment Currently Used at Home walker, rolling;cane, straight    Concerns to be Addressed denies needs/concerns at this time    Anticipated Changes Related to Illness none    Equipment Needed After Discharge none                   Discharge Plan       Row Name 12/12/23 1144       Plan    Plan Home, Will need PT eval    Plan Comments Met with Patient and wife at bedside. Patient is hard of hearing. Lives at home with wife who is currently assisting patient with some ADL's as right arm in sling since 11/30 and has been having some difficulty getting around. PCP and PHarmacy verified, able to afford medications. DC barriers: NG tube, GI  consult                  Continued Care and Services - Admitted Since 12/12/2023     Coordination has not been started for this encounter.       Expected Discharge Date and Time       Expected Discharge Date Expected Discharge Time    Dec 15, 2023            Demographic Summary       Row Name 12/12/23 1142       General Information    Admission Type inpatient    Arrived From emergency department    Required Notices Provided Important Message from Medicare    Referral Source admission list    Reason for Consult discharge planning    Preferred Language English                   Functional Status       Row Name 12/12/23 1143       Functional Status    Usual Activity Tolerance moderate    Current Activity Tolerance moderate       Functional Status, IADL    Medications independent    Meal Preparation assistive person    Housekeeping assistive person    Laundry assistive person    Shopping assistive person       Mental Status    General Appearance WDL WDL       Mental Status Summary    Recent Changes in Mental Status/Cognitive Functioning no changes                               Nancy Byers, RN

## 2023-12-12 NOTE — CONSULTS
"GI CONSULT  NOTE:    Referring Provider:  Dr. Willson    Chief complaint: Coffee-ground emesis    Subjective . \"I started vomiting at 10 PM last night\"    History of present illness: Blas Mojica is a 73 y.o. male who has a history of diabetes, TIA on Plavix and hypertension.  He presents with acute onset nausea and vomiting that started last night without obvious trigger.  He reports coffee-ground emesis on initial episode of vomiting.  He does not have much nausea but reports regurgitation.  Typically without heartburn and denies dysphagia.  No history of similar complaints.  No unintentional weight loss.  No NSAID use but does take Plavix daily.  No abdominal pain but did have some tenderness on exam in the ER.  No constipation, diarrhea, melena or rectal bleeding.  He did have a recent fall in November 28 with a broken humerus but has not been taking any medication for pain.    Endo History:  No previous EGD but reports a colonoscopy that was unremarkable less than 10 years ago    Past Medical History:  Past Medical History:   Diagnosis Date    Diabetes mellitus     Hyperlipidemia     Hypertension     Kidney stone     TIA (transient ischemic attack) 2016    Type 2 diabetes mellitus with stage 3 chronic kidney disease, with long-term current use of insulin 12/13/2021       Past Surgical History:  Past Surgical History:   Procedure Laterality Date    ADENOIDECTOMY      EXTRACORPOREAL SHOCK WAVE LITHOTRIPSY (ESWL)      HERNIA REPAIR      KNEE ACL RECONSTRUCTION Left     TONSILLECTOMY         Social History:  Social History     Tobacco Use    Smoking status: Never    Smokeless tobacco: Never   Vaping Use    Vaping Use: Never used   Substance Use Topics    Alcohol use: Never    Drug use: Never       Family History:  Family History   Problem Relation Age of Onset    No Known Problems Mother     No Known Problems Father        Medications:  (Not in a hospital admission)      Scheduled Meds:pantoprazole, 40 mg, " Intravenous, Q12H  senna-docusate sodium, 2 tablet, Oral, BID  sodium chloride, 10 mL, Intravenous, Q12H      Continuous Infusions:sodium chloride, 100 mL/hr      PRN Meds:.  senna-docusate sodium **AND** polyethylene glycol **AND** bisacodyl **AND** bisacodyl    Calcium Replacement - Follow Nurse / BPA Driven Protocol    Magnesium Standard Dose Replacement - Follow Nurse / BPA Driven Protocol    Phosphorus Replacement - Follow Nurse / BPA Driven Protocol    Potassium Replacement - Follow Nurse / BPA Driven Protocol    sodium chloride    sodium chloride    sodium chloride    sodium chloride    ALLERGIES:  Contrast dye (echo or unknown ct/mr), Iodinated contrast media, Iodine, Aspirin, Lipitor [atorvastatin], and Prednisone    ROS:  The following systems were reviewed   Constitution:  No fevers, chills, no unintentional weight loss  Skin: no rash, no jaundice  Eyes:  No blurry vision, no eye pain  HENT:  No change in hearing or smell  Resp:  No dyspnea or cough  CV:  No chest pain or palpitations  :  No dysuria, hematuria  Musculoskeletal:  No leg cramps or arthralgias, +recent fall   Neuro:  No tremor, no numbness  Psych:  No depression or confusion    Objective resting in bed, no acute distress, family at bedside    Vital Signs:   Vitals:    12/12/23 1002 12/12/23 1017 12/12/23 1031 12/12/23 1047   BP: 122/65 127/73 127/69 108/56   Pulse: 116 118 112 (!) 121   Resp:       Temp:       SpO2: 94% 94% 94% 92%   Weight:       Height:           Physical Exam:     General Appearance:    Awake and alert, in no acute distress   Head:    Normocephalic, without obvious abnormality, atraumatic   Throat:   No oral lesions, no thrush, oral mucosa moist, NG to low intermittent wall suction with small amount of coffee-ground output   Lungs:     Respirations regular, even and unlabored   Chest Wall:    No abnormalities observed   Abdomen:     Soft, non-tender, nondistended   Rectal:     Deferred   Extremities:   Moves all  "extremities, no edema, no cyanosis, right arm in sling       Skin:   No rash, no jaundice, normal palpation       Neurologic:   Cranial nerves 2 - 12 grossly intact       Results Review:   I reviewed the patient's labs and imaging.  CBC    Results from last 7 days   Lab Units 12/12/23  0722   WBC 10*3/mm3 9.40   HEMOGLOBIN g/dL 14.2   PLATELETS 10*3/mm3 171     CMP   Results from last 7 days   Lab Units 12/12/23  0722   SODIUM mmol/L 143   POTASSIUM mmol/L 4.0   CHLORIDE mmol/L 98   CO2 mmol/L 30.0*   BUN mg/dL 38*   CREATININE mg/dL 1.53*   GLUCOSE mg/dL 393*   ALBUMIN g/dL 4.0   BILIRUBIN mg/dL 0.9   ALK PHOS U/L 130*   AST (SGOT) U/L 21   ALT (SGPT) U/L 17   LIPASE U/L 34     Cr Clearance Estimated Creatinine Clearance: 51.6 mL/min (A) (by C-G formula based on SCr of 1.53 mg/dL (H)).  Coag   Results from last 7 days   Lab Units 12/12/23  0722   INR  0.95     HbA1C   Lab Results   Component Value Date    HGBA1C 8.0 (H) 10/20/2022    HGBA1C 9.2 (H) 02/28/2022    HGBA1C 7.6 (H) 11/22/2021     Blood Glucose No results found for: \"POCGLU\"  Infection     UA      Radiology(recent) CT Abdomen Pelvis Without Contrast    Result Date: 12/12/2023  Impression: Moderately severe gastric distention. Continued small pericardial effusion. Electronically Signed: Jennyfer Liu MD  12/12/2023 8:58 AM EST  Workstation ID: [id number]        ASSESSMENT:  Vomiting with coffee-ground emesis  Abnormal CT with gastric distention  BRIGIDO  History of TIA -clopidogrel  DMII    PLAN:  Patient is a 73-year-old male with a history of TIA on Plavix and diabetes.  He had a recent fall with a broken humerus.  He presents with acute onset vomiting that started last night with multiple episodes of coffee-ground emesis.  Denies NSAID use.  CT suggest moderately severe gastric distention and NG was placed in the ER with only small amount of output.  Continue NG to low intermittent wall suction and monitor hemoglobin.  Hemoglobin currently 14.2.  We will " proceed with EGD evaluation today, differential diagnoses include erosive esophagitis vs ulcer disease vs gastric outlet obstruction vs other.  Continue twice daily PPI with antiemetics as needed.  NPO.  Further recommendations to follow EGD findings.    I discussed the patients findings and my recommendations with the patient.    We appreciate the referral    Electronically signed by PIPER Mayo, 12/12/23, 11:40 AM EST.

## 2023-12-12 NOTE — ED NOTES
Nursing report ED to floor  Blas Mojica  73 y.o.  male    HPI:   Chief Complaint   Patient presents with    Abdominal Pain       Admitting doctor:   Pablito Vasquez MD    Admitting diagnosis:   The primary encounter diagnosis was Coffee ground emesis. A diagnosis of Gastric distention was also pertinent to this visit.    Code status:   Current Code Status       Date Active Code Status Order ID Comments User Context       Prior            Allergies:   Contrast dye (echo or unknown ct/mr), Iodinated contrast media, Iodine, Aspirin, Lipitor [atorvastatin], and Prednisone    Isolation:  No active isolations     Fall Risk:  Fall Risk Assessment was completed, and patient is at high risk for falls.   Predictive Model Details         12 (Low) Factor Value    Calculated 12/12/2023 12:45 Age 73    Risk of Fall Model Musculoskeletal Assessment WDL     Active Peripheral IV Present     Imaging order in this encounter Present     Skin Assessment WDL     Magnesium not on file     Number of Distinct Medication Classes administered 4     Financial Class Medicare     Respiratory Rate 17     Drug Use No     Diastolic BP 68     Efren Scale not on file     Chloride 98 mmol/L     Peripheral Vascular Assessment WDL     Creatinine 1.53 mg/dL     Total Bilirubin 0.9 mg/dL     Clinically Relevant Sex Not Female     Number of administrations of Ulcer Drugs 1     Albumin 4 g/dL     Cardiac Assessment WDL     Days after Admission 0.242     Gastrointestinal Assessment X     Potassium 4 mmol/L     Calcium 10 mg/dL     ALT 17 U/L         Weight:       12/12/23  0634   Weight: 86.2 kg (190 lb)       Intake and Output  No intake or output data in the 24 hours ending 12/12/23 1245    Diet:   Dietary Orders (From admission, onward)       Start     Ordered    12/12/23 1043  NPO Diet NPO Type: Strict NPO  Diet Effective Now        Question:  NPO Type  Answer:  Strict NPO    12/12/23 1042                     Most recent vitals:   Vitals:    12/12/23  1149 12/12/23 1201 12/12/23 1216 12/12/23 1231   BP:  119/65 123/70 124/68   Pulse: 118 116 114 114   Resp:  17     Temp:       SpO2: 92% 91% 92% 91%   Weight:       Height:           Active LDAs/IV Access:   Lines, Drains & Airways       Active LDAs       Name Placement date Placement time Site Days    Peripheral IV 12/12/23 0727 Anterior;Left Forearm 12/12/23  0727  Forearm  less than 1    NG/OG Tube Nasogastric 16 Fr Right nostril 12/12/23  0952  Right nostril  less than 1                    Skin Condition:   Skin Assessments (last day)       None             Labs (abnormal labs have a star):   Labs Reviewed   COMPREHENSIVE METABOLIC PANEL - Abnormal; Notable for the following components:       Result Value    Glucose 393 (*)     BUN 38 (*)     Creatinine 1.53 (*)     CO2 30.0 (*)     Alkaline Phosphatase 130 (*)     eGFR 47.4 (*)     All other components within normal limits    Narrative:     GFR Normal >60  Chronic Kidney Disease <60  Kidney Failure <15    The GFR formula is only valid for adults with stable renal function between ages 18 and 70.   CBC WITH AUTO DIFFERENTIAL - Abnormal; Notable for the following components:    Neutrophil % 86.4 (*)     Lymphocyte % 8.2 (*)     Monocyte % 4.6 (*)     Eosinophil % 0.1 (*)     Neutrophils, Absolute 8.10 (*)     All other components within normal limits   POCT GLUCOSE FINGERSTICK - Abnormal; Notable for the following components:    Glucose 506 (*)     All other components within normal limits   PROTIME-INR - Normal   LIPASE - Normal   RAINBOW DRAW    Narrative:     The following orders were created for panel order [place] Draw.  Procedure                               Abnormality         Status                     ---------                               -----------         ------                     Green Top (Gel)[[id number]]                                  Final result               Lavender Top[[id number]]                                     Final result                Gold Top - SST[[id number]]                                                              Light Blue Top[[id number]]                                                                Please view results for these tests on the individual orders.   RAINBOW DRAW    Narrative:     The following orders were created for panel order [place] Draw.  Procedure                               Abnormality         Status                     ---------                               -----------         ------                     Green Top (Gel)[[id number]]                                                             Lavender Top[[id number]]                                                                Gold Top - SST[[id number]]                                   Final result               Light Blue Top[[id number]]                                   Final result                 Please view results for these tests on the individual orders.   URINALYSIS W/ MICROSCOPIC IF INDICATED (NO CULTURE)   HEMOGLOBIN AND HEMATOCRIT, BLOOD   POCT GLUCOSE FINGERSTICK   POCT GLUCOSE FINGERSTICK   POCT GLUCOSE FINGERSTICK   POCT GLUCOSE FINGERSTICK   TYPE AND SCREEN   CBC AND DIFFERENTIAL    Narrative:     The following orders were created for panel order CBC & Differential.  Procedure                               Abnormality         Status                     ---------                               -----------         ------                     CBC Auto Differential[[id number]]        Abnormal            Final result                 Please view results for these tests on the individual orders.   GREEN TOP   LAVENDER TOP   GOLD TOP - SST   LIGHT BLUE TOP       LOC: Person, Place, Time, and Situation    Telemetry:  Med/Surg    Cardiac Monitoring Ordered: yes    EKG:   No orders to display       Medications Given in the ED:   Medications   sodium chloride 0.9 % flush 10 mL (has no administration in time range)   sodium chloride 0.9 % flush 10 mL (has no  administration in time range)   sodium chloride 0.9 % flush 10 mL (10 mL Intravenous Not Given 12/12/23 1214)   sodium chloride 0.9 % flush 10 mL (has no administration in time range)   sodium chloride 0.9 % infusion 40 mL (has no administration in time range)   sennosides-docusate (PERICOLACE) 8.6-50 MG per tablet 2 tablet (2 tablets Oral Not Given 12/12/23 1054)     And   polyethylene glycol (MIRALAX) packet 17 g (has no administration in time range)     And   bisacodyl (DULCOLAX) EC tablet 5 mg (has no administration in time range)     And   bisacodyl (DULCOLAX) suppository 10 mg (has no administration in time range)   sodium chloride 0.9 % infusion (100 mL/hr Intravenous New Bag 12/12/23 1145)   Potassium Replacement - Follow Nurse / BPA Driven Protocol (has no administration in time range)   Magnesium Standard Dose Replacement - Follow Nurse / BPA Driven Protocol (has no administration in time range)   Phosphorus Replacement - Follow Nurse / BPA Driven Protocol (has no administration in time range)   Calcium Replacement - Follow Nurse / BPA Driven Protocol (has no administration in time range)   pantoprazole (PROTONIX) injection 40 mg (has no administration in time range)   dextrose (GLUTOSE) oral gel 15 g (has no administration in time range)   dextrose (D50W) (25 g/50 mL) IV injection 25 g (has no administration in time range)   glucagon (GLUCAGEN) injection 1 mg (has no administration in time range)   insulin lispro (HUMALOG/ADMELOG) injection 3-14 Units (14 Units Subcutaneous Given 12/12/23 1207)   phenol (CHLORASEPTIC) 1.4 % liquid 1 spray (has no administration in time range)   pantoprazole (PROTONIX) injection 80 mg (80 mg Intravenous Given 12/12/23 0737)   ondansetron (ZOFRAN) injection 4 mg (4 mg Intravenous Given 12/12/23 0737)       Imaging results:  CT Abdomen Pelvis Without Contrast    Result Date: 12/12/2023  Impression: Moderately severe gastric distention. Continued small pericardial effusion.  Electronically Signed: Jennyfer Liu MD  12/12/2023 8:58 AM EST  Workstation ID: [id number]     Social issues:   Social History     Socioeconomic History    Marital status:    Tobacco Use    Smoking status: Never    Smokeless tobacco: Never   Vaping Use    Vaping Use: Never used   Substance and Sexual Activity    Alcohol use: Never    Drug use: Never    Sexual activity: Defer       NIH Stroke Scale:  Interval: (not recorded)  1a. Level of Consciousness: (not recorded)  1b. LOC Questions: (not recorded)  1c. LOC Commands: (not recorded)  2. Best Gaze: (not recorded)  3. Visual: (not recorded)  4. Facial Palsy: (not recorded)  5a. Motor Arm, Left: (not recorded)  5b. Motor Arm, Right: (not recorded)  6a. Motor Leg, Left: (not recorded)  6b. Motor Leg, Right: (not recorded)  7. Limb Ataxia: (not recorded)  8. Sensory: (not recorded)  9. Best Language: (not recorded)  10. Dysarthria: (not recorded)  11. Extinction and Inattention (formerly Neglect): (not recorded)    Total (NIH Stroke Scale): (not recorded)     Additional notable assessment information:     Nursing report ED to floor:  Report given to Lashon Layne RN   12/12/23 12:45 EST

## 2023-12-12 NOTE — ED PROVIDER NOTES
Subjective   History of Present Illness  Chief Complaint: Vomiting blood    Patient is a 73-year-old male with past medical history of hypertension and diabetes mellitus who presents to the emergency department via EMS after reportedly vomiting coffee ground emesis.  Patient states the vomiting began around 10 PM last night until 2 AM, stopped and then started again at 5 AM.  He denies abdominal pain, but does have pain on exam and palpation.  No fevers.  Denies diarrhea, constipation. Patient's wife states she has not seen any melena or hematochezia.  Patient denies ibuprofen and alcohol use.  Reports taking blood thinners, Plavix.  Patient had a  right humerus fracture on November 28. He took 3 days of pain medication but he denies taking any pain medications currently.    PCP: Sahil Read    History provided by:  Patient      Review of Systems   Constitutional:  Negative for fever.   HENT: Negative.     Eyes: Negative.    Respiratory: Negative.     Gastrointestinal:  Positive for abdominal pain and vomiting (Black emesis). Negative for anal bleeding, blood in stool, constipation and diarrhea.   Endocrine: Negative.    Musculoskeletal:  Negative for myalgias.   Allergic/Immunologic: Negative.    Neurological:  Negative for headaches.   All other systems reviewed and are negative.      Past Medical History:   Diagnosis Date    Diabetes mellitus     Hyperlipidemia     Hypertension     Kidney stone     TIA (transient ischemic attack) 2016    Type 2 diabetes mellitus with stage 3 chronic kidney disease, with long-term current use of insulin 12/13/2021       Allergies   Allergen Reactions    Contrast Dye (Echo Or Unknown Ct/Mr) Anaphylaxis    Iodinated Contrast Media Shortness Of Breath    Iodine Shortness Of Breath    Aspirin Nausea And Vomiting    Lipitor [Atorvastatin] Unknown - High Severity    Prednisone Hives       Past Surgical History:   Procedure Laterality Date    ADENOIDECTOMY      EXTRACORPOREAL SHOCK  WAVE LITHOTRIPSY (ESWL)      HERNIA REPAIR      KNEE ACL RECONSTRUCTION Left     TONSILLECTOMY         Family History   Problem Relation Age of Onset    No Known Problems Mother     No Known Problems Father        Social History     Socioeconomic History    Marital status:    Tobacco Use    Smoking status: Never    Smokeless tobacco: Never   Vaping Use    Vaping Use: Never used   Substance and Sexual Activity    Alcohol use: Never    Drug use: Never    Sexual activity: Defer           Objective   Physical Exam  Vitals and nursing note reviewed.   Constitutional:       Appearance: He is well-developed and normal weight.   HENT:      Head: Normocephalic and atraumatic.      Mouth/Throat:      Mouth: Mucous membranes are moist.   Eyes:      Extraocular Movements: Extraocular movements intact.   Cardiovascular:      Rate and Rhythm: Normal rate and regular rhythm.      Heart sounds: Normal heart sounds. No murmur heard.  Pulmonary:      Effort: Pulmonary effort is normal.      Breath sounds: Normal breath sounds.   Abdominal:      General: Abdomen is flat. Bowel sounds are normal.      Palpations: Abdomen is soft.      Tenderness: There is abdominal tenderness in the right lower quadrant and epigastric area. There is no right CVA tenderness, left CVA tenderness or guarding.   Skin:     General: Skin is warm.   Neurological:      General: No focal deficit present.      Mental Status: He is alert and oriented to person, place, and time.   Psychiatric:         Mood and Affect: Mood normal.         Behavior: Behavior normal.         Procedures           ED Course  ED Course as of 12/12/23 1841 Tue Dec 12, 2023   1100 I spoke with Dr. Vasquez, hospitalist regarding admission []   1130 I spoke with BLANK Astudillo who agreed to consult on patient during admission.  Recommended NG tube to low intermittent suction [MC]      ED Course User Index  [] Rosanna Cuevas PA    /62 (BP Location: Left arm, Patient  "Position: Lying)   Pulse 96   Temp 98.1 °F (36.7 °C) (Oral)   Resp 17   Ht 177.8 cm (70\")   Wt 88.6 kg (195 lb 5.2 oz)   SpO2 93%   BMI 28.03 kg/m²   Labs Reviewed   COMPREHENSIVE METABOLIC PANEL - Abnormal; Notable for the following components:       Result Value    Glucose 393 (*)     BUN 38 (*)     Creatinine 1.53 (*)     CO2 30.0 (*)     Alkaline Phosphatase 130 (*)     eGFR 47.4 (*)     All other components within normal limits    Narrative:     GFR Normal >60  Chronic Kidney Disease <60  Kidney Failure <15    The GFR formula is only valid for adults with stable renal function between ages 18 and 70.   CBC WITH AUTO DIFFERENTIAL - Abnormal; Notable for the following components:    Neutrophil % 86.4 (*)     Lymphocyte % 8.2 (*)     Monocyte % 4.6 (*)     Eosinophil % 0.1 (*)     Neutrophils, Absolute 8.10 (*)     All other components within normal limits   HEMOGLOBIN A1C - Abnormal; Notable for the following components:    Hemoglobin A1C 7.70 (*)     All other components within normal limits   POCT GLUCOSE FINGERSTICK - Abnormal; Notable for the following components:    Glucose 506 (*)     All other components within normal limits   POCT GLUCOSE FINGERSTICK - Abnormal; Notable for the following components:    Glucose 453 (*)     All other components within normal limits   POCT GLUCOSE FINGERSTICK - Abnormal; Notable for the following components:    Glucose 357 (*)     All other components within normal limits   POCT GLUCOSE FINGERSTICK - Abnormal; Notable for the following components:    Glucose 334 (*)     All other components within normal limits   PROTIME-INR - Normal   LIPASE - Normal   RAINBOW DRAW    Narrative:     The following orders were created for panel order [place] Draw.  Procedure                               Abnormality         Status                     ---------                               -----------         ------                     Green Top (Gel)[[id number]]                         "          Final result               Lavender Top[[id number]]                                     Final result               Gold Top - SST[[id number]]                                                              Light Blue Top[[id number]]                                                                Please view results for these tests on the individual orders.   RAINBOW DRAW    Narrative:     The following orders were created for panel order [place] Draw.  Procedure                               Abnormality         Status                     ---------                               -----------         ------                     Green Top (Gel)[[id number]]                                                             Lavender Top[[id number]]                                                                Gold Top - SST[[id number]]                                   Final result               Light Blue Top[[id number]]                                   Final result                 Please view results for these tests on the individual orders.   URINALYSIS W/ MICROSCOPIC IF INDICATED (NO CULTURE)   HEMOGLOBIN AND HEMATOCRIT, BLOOD   HEMOGLOBIN AND HEMATOCRIT, BLOOD   POCT GLUCOSE FINGERSTICK   POCT GLUCOSE FINGERSTICK   POCT GLUCOSE FINGERSTICK   POCT GLUCOSE FINGERSTICK   POCT GLUCOSE FINGERSTICK   TYPE AND SCREEN   CBC AND DIFFERENTIAL    Narrative:     The following orders were created for panel order CBC & Differential.  Procedure                               Abnormality         Status                     ---------                               -----------         ------                     CBC Auto Differential[[id number]]        Abnormal            Final result                 Please view results for these tests on the individual orders.   GREEN TOP   LAVENDER TOP   GOLD TOP - SST   LIGHT BLUE TOP     Medications   sodium chloride 0.9 % flush 10 mL (has no administration in time range)   sodium chloride 0.9 % flush 10 mL  (has no administration in time range)   sodium chloride 0.9 % flush 10 mL (10 mL Intravenous Not Given 12/12/23 1214)   sodium chloride 0.9 % flush 10 mL (has no administration in time range)   sodium chloride 0.9 % infusion 40 mL (has no administration in time range)   sennosides-docusate (PERICOLACE) 8.6-50 MG per tablet 2 tablet (2 tablets Oral Not Given 12/12/23 1054)     And   polyethylene glycol (MIRALAX) packet 17 g (has no administration in time range)     And   bisacodyl (DULCOLAX) EC tablet 5 mg (has no administration in time range)     And   bisacodyl (DULCOLAX) suppository 10 mg (has no administration in time range)   sodium chloride 0.9 % infusion (100 mL/hr Intravenous New Bag 12/12/23 1145)   Potassium Replacement - Follow Nurse / BPA Driven Protocol (has no administration in time range)   Magnesium Standard Dose Replacement - Follow Nurse / BPA Driven Protocol (has no administration in time range)   Phosphorus Replacement - Follow Nurse / BPA Driven Protocol (has no administration in time range)   Calcium Replacement - Follow Nurse / BPA Driven Protocol (has no administration in time range)   pantoprazole (PROTONIX) injection 40 mg (has no administration in time range)   dextrose (GLUTOSE) oral gel 15 g (has no administration in time range)   dextrose (D50W) (25 g/50 mL) IV injection 25 g (has no administration in time range)   glucagon (GLUCAGEN) injection 1 mg (has no administration in time range)   insulin lispro (HUMALOG/ADMELOG) injection 3-14 Units (10 Units Subcutaneous Given 12/12/23 1827)   phenol (CHLORASEPTIC) 1.4 % liquid 1 spray (1 spray Mouth/Throat Given 12/12/23 1300)   pantoprazole (PROTONIX) injection 80 mg (80 mg Intravenous Given 12/12/23 0737)   ondansetron (ZOFRAN) injection 4 mg (4 mg Intravenous Given 12/12/23 0737)   insulin regular (humuLIN R,novoLIN R) injection 10 Units (10 Units Intravenous Given 12/12/23 1408)     CT Abdomen Pelvis Without Contrast    Result Date:  12/12/2023  Impression: Moderately severe gastric distention. Continued small pericardial effusion. Electronically Signed: Jennyfer Liu MD  12/12/2023 8:58 AM EST  Workstation ID: [id number]                                            Medical Decision Making  Differential Dx (Includes but not limited to): GI bleed obstruction pancreatitis gastric outlet obstruction peptic ulcer disease  Medical Records Reviewed: No pertinent records to review  Labs:, Interpretation, CBC no leukocytosis.  CMP glucose 393 BUN 38 creatinine 1.53.  Imaging: On my interpretation, moderately distended stomach, no evidence of obstruction.  Telemetry: N/A  Testing considered but not ordered: CT head patient denies headache or head injury  Nature of Complaint: Acute  Admission vs Discharge: Admission  Discussion: While in the ED IV was placed and labs were obtained appropriate PPE was worn during exam and throughout all encounters with the patient.  Patient had above evaluation.  Patient had 1 episode of coffee-ground emesis in the emergency department.  He was given Zofran and Protonix.  Lab work otherwise unremarkable.  Hemoglobin stable.  CT scan significant for moderately to severe gastric distention.  This was reviewed with ER attending, Dr. Willson, and on-call GI.  NG tube recommended.  This was placed.  Patient will be admitted to hospitalist care for further treatment and for GI consultation.    Based on the clinical findings at this time I anticipate that the patient will require 2 midnight stay.    I spoke with Dr. Vasquez regarding hospitalist admission.    Problems Addressed:  Coffee ground emesis: acute illness or injury  Gastric distention: acute illness or injury    Amount and/or Complexity of Data Reviewed  Labs: ordered. Decision-making details documented in ED Course.  Radiology: ordered. Decision-making details documented in ED Course.    Risk  Prescription drug management.  Decision regarding hospitalization.        Final  diagnoses:   Coffee ground emesis   Gastric distention       ED Disposition  ED Disposition       ED Disposition   Decision to Admit    Condition   --    Comment   Level of Care: Med/Surg [1]   Diagnosis: GI bleed [401705]   Admitting Physician: JUAN A GILLETTE [[id number]]   Attending Physician: JUAN A GILLETTE [[id number]]   Isolate for COVID?: No [0]   Certification: I Certify That Inpatient Hospital Services Are Medically Necessary For Greater Than 2 Midnights                 No follow-up provider specified.       Medication List      No changes were made to your prescriptions during this visit.            Rosanna Cuevas PA  12/12/23 4669

## 2023-12-12 NOTE — Clinical Note
Level of Care: Telemetry [5]   Admitting Physician: JUAN A GILLETTE [[id number]]   Attending Physician: JUAN A GILLETTE [[id number]]

## 2023-12-12 NOTE — ED NOTES
Patient brought in by EMS from home with complaints of coffee ground emesis. Patient states that it started last night and had a couple of episode but then stopped. He states that he started vomiting again around 0500. Patient states that he does take blood thinners and denies bloody stool

## 2023-12-12 NOTE — ED NOTES
Spoke to Dr. Vasquez about patient glucose and Endo process. Dr. Vasquez states that he will place orders.

## 2023-12-12 NOTE — ED NOTES
Nursing report ED to floor  Blas Mojica  73 y.o.  male    HPI:   Chief Complaint   Patient presents with    Abdominal Pain       Admitting doctor:   Pablito Vasquez MD    Admitting diagnosis:   The primary encounter diagnosis was Coffee ground emesis. A diagnosis of Gastric distention was also pertinent to this visit.    Code status:   Current Code Status       Date Active Code Status Order ID Comments User Context       Prior            Allergies:   Contrast dye (echo or unknown ct/mr), Iodinated contrast media, Iodine, Aspirin, Lipitor [atorvastatin], and Prednisone    Isolation:  No active isolations     Fall Risk:  Fall Risk Assessment was completed, and patient is at high risk for falls.   Predictive Model Details         17 (Low) Factor Value    Calculated 12/12/2023 10:54 Age 73    Risk of Fall Model Musculoskeletal Assessment WDL     Active Peripheral IV Present     Imaging order in this encounter Present     Respiratory Rate 21     Skin Assessment WDL     Diastolic BP 56     Magnesium not on file     Financial Class Medicare     Drug Use No     Efren Scale not on file     Number of Distinct Medication Classes administered 2     Chloride 98 mmol/L     Peripheral Vascular Assessment WDL     Creatinine 1.53 mg/dL     Total Bilirubin 0.9 mg/dL     Clinically Relevant Sex Not Female     Number of administrations of Ulcer Drugs 1     Albumin 4 g/dL     Cardiac Assessment WDL     Gastrointestinal Assessment X     Days after Admission 0.165     Potassium 4 mmol/L     Calcium 10 mg/dL     ALT 17 U/L         Weight:       12/12/23  0634   Weight: 86.2 kg (190 lb)       Intake and Output  No intake or output data in the 24 hours ending 12/12/23 1054    Diet:   Dietary Orders (From admission, onward)       Start     Ordered    12/12/23 1043  NPO Diet NPO Type: Strict NPO  Diet Effective Now        Question:  NPO Type  Answer:  Strict NPO    12/12/23 1042                     Most recent vitals:   Vitals:    12/12/23  1002 12/12/23 1017 12/12/23 1031 12/12/23 1047   BP: 122/65 127/73 127/69 108/56   Pulse: 116 118 112 (!) 121   Resp:       Temp:       SpO2: 94% 94% 94% 92%   Weight:       Height:           Active LDAs/IV Access:   Lines, Drains & Airways       Active LDAs       Name Placement date Placement time Site Days    Peripheral IV 12/12/23 0727 Anterior;Left Forearm 12/12/23 0727  Forearm  less than 1    NG/OG Tube Nasogastric 16 Fr Right nostril 12/12/23  0952  Right nostril  less than 1                    Skin Condition:   Skin Assessments (last day)       None             Labs (abnormal labs have a star):   Labs Reviewed   COMPREHENSIVE METABOLIC PANEL - Abnormal; Notable for the following components:       Result Value    Glucose 393 (*)     BUN 38 (*)     Creatinine 1.53 (*)     CO2 30.0 (*)     Alkaline Phosphatase 130 (*)     eGFR 47.4 (*)     All other components within normal limits    Narrative:     GFR Normal >60  Chronic Kidney Disease <60  Kidney Failure <15    The GFR formula is only valid for adults with stable renal function between ages 18 and 70.   CBC WITH AUTO DIFFERENTIAL - Abnormal; Notable for the following components:    Neutrophil % 86.4 (*)     Lymphocyte % 8.2 (*)     Monocyte % 4.6 (*)     Eosinophil % 0.1 (*)     Neutrophils, Absolute 8.10 (*)     All other components within normal limits   PROTIME-INR - Normal   LIPASE - Normal   RAINBOW DRAW    Narrative:     The following orders were created for panel order [place] Draw.  Procedure                               Abnormality         Status                     ---------                               -----------         ------                     Green Top (Gel)[[id number]]                                  Final result               Lavender Top[[id number]]                                     Final result               Gold Top - SST[[id number]]                                                              Light Blue Top[[id number]]                                                                 Please view results for these tests on the individual orders.   RAINBOW DRAW    Narrative:     The following orders were created for panel order [facility] Draw.  Procedure                               Abnormality         Status                     ---------                               -----------         ------                     Green Top (Gel)[[id number]]                                                             Lavender Top[[id number]]                                                                Gold Top - SST[[id number]]                                   Final result               Light Blue Top[[id number]]                                   Final result                 Please view results for these tests on the individual orders.   URINALYSIS W/ MICROSCOPIC IF INDICATED (NO CULTURE)   HEMOGLOBIN AND HEMATOCRIT, BLOOD   TYPE AND SCREEN   CBC AND DIFFERENTIAL    Narrative:     The following orders were created for panel order CBC & Differential.  Procedure                               Abnormality         Status                     ---------                               -----------         ------                     CBC Auto Differential[[id number]]        Abnormal            Final result                 Please view results for these tests on the individual orders.   GREEN TOP   LAVENDER TOP   GOLD TOP - SST   LIGHT BLUE TOP       LOC: Person, Place, Time, and Situation    Telemetry:  Med/Surg    Cardiac Monitoring Ordered: yes    EKG:   No orders to display       Medications Given in the ED:   Medications   sodium chloride 0.9 % flush 10 mL (has no administration in time range)   sodium chloride 0.9 % flush 10 mL (has no administration in time range)   sodium chloride 0.9 % flush 10 mL (has no administration in time range)   sodium chloride 0.9 % flush 10 mL (has no administration in time range)   sodium chloride 0.9 % infusion 40 mL (has no administration in time  range)   sennosides-docusate (PERICOLACE) 8.6-50 MG per tablet 2 tablet (has no administration in time range)     And   polyethylene glycol (MIRALAX) packet 17 g (has no administration in time range)     And   bisacodyl (DULCOLAX) EC tablet 5 mg (has no administration in time range)     And   bisacodyl (DULCOLAX) suppository 10 mg (has no administration in time range)   sodium chloride 0.9 % infusion (has no administration in time range)   Potassium Replacement - Follow Nurse / BPA Driven Protocol (has no administration in time range)   Magnesium Standard Dose Replacement - Follow Nurse / BPA Driven Protocol (has no administration in time range)   Phosphorus Replacement - Follow Nurse / BPA Driven Protocol (has no administration in time range)   Calcium Replacement - Follow Nurse / BPA Driven Protocol (has no administration in time range)   pantoprazole (PROTONIX) injection 40 mg (has no administration in time range)   pantoprazole (PROTONIX) injection 80 mg (80 mg Intravenous Given 12/12/23 0737)   ondansetron (ZOFRAN) injection 4 mg (4 mg Intravenous Given 12/12/23 0737)       Imaging results:  CT Abdomen Pelvis Without Contrast    Result Date: 12/12/2023  Impression: Moderately severe gastric distention. Continued small pericardial effusion. Electronically Signed: Jennyfer Liu MD  12/12/2023 8:58 AM EST  Workstation ID: [id number]     Social issues:   Social History     Socioeconomic History    Marital status:    Tobacco Use    Smoking status: Never    Smokeless tobacco: Never   Vaping Use    Vaping Use: Never used   Substance and Sexual Activity    Alcohol use: Never    Drug use: Never    Sexual activity: Defer       NIH Stroke Scale:  Interval: (not recorded)  1a. Level of Consciousness: (not recorded)  1b. LOC Questions: (not recorded)  1c. LOC Commands: (not recorded)  2. Best Gaze: (not recorded)  3. Visual: (not recorded)  4. Facial Palsy: (not recorded)  5a. Motor Arm, Left: (not recorded)  5b.  Motor Arm, Right: (not recorded)  6a. Motor Leg, Left: (not recorded)  6b. Motor Leg, Right: (not recorded)  7. Limb Ataxia: (not recorded)  8. Sensory: (not recorded)  9. Best Language: (not recorded)  10. Dysarthria: (not recorded)  11. Extinction and Inattention (formerly Neglect): (not recorded)    Total (NIH Stroke Scale): (not recorded)     Additional notable assessment information:     Nursing report ED to floor:  Report given to Cam Layne RN   12/12/23 10:54 EST

## 2023-12-12 NOTE — PLAN OF CARE
Goal Outcome Evaluation:  Patient came to this floor from the ED. No complaints of N/V. NG tube placed in the right nostril and on low wall intermittent suction. Very little output. Blood sugar monitored and insulin given as scheduled. VSS. Wife at bedside. Call light within reach.

## 2023-12-12 NOTE — ED NOTES
Patient requesting something to numb his throat. He states that the NG tube is really bothering him. Nurse messaged dr. Vasquez about getting patient something for comfort. See new orders

## 2023-12-12 NOTE — H&P
Patient Care Team:  Sahil Read MD as PCP - General    Chief complaint nausea vomiting with hematemesis acute onset of hematemesis and melena    Subjective     Patient is a 73 y.o. male presents with acute onset of hematemesis and melena  . Onset of symptoms was sudden.  Symptoms are associated with abdominal discomfort.    No specific improvement or alleviation of symptoms  Hemoglobin currently stable  Seen and examined laying in bed has NG tube in place  Blood sugars were uncontrolled he is on Lantus and sliding scale at home.  Denies chest pain shortness of breath  He did have a fall at a hotel recently and has a fracture in his right upper extremity in a sling  On Plavix at home  Denies any current aspirin or NSAID use.  Brought in via EMS    Review of Systems   Pertinent items are noted in HPI, all other systems reviewed and negative    History  Past Medical History:   Diagnosis Date    Diabetes mellitus     Hyperlipidemia     Hypertension     Kidney stone     TIA (transient ischemic attack) 2016    Type 2 diabetes mellitus with stage 3 chronic kidney disease, with long-term current use of insulin 12/13/2021     Past Surgical History:   Procedure Laterality Date    ADENOIDECTOMY      EXTRACORPOREAL SHOCK WAVE LITHOTRIPSY (ESWL)      HERNIA REPAIR      KNEE ACL RECONSTRUCTION Left     TONSILLECTOMY       Family History   Problem Relation Age of Onset    No Known Problems Mother     No Known Problems Father      Social History     Tobacco Use    Smoking status: Never    Smokeless tobacco: Never   Vaping Use    Vaping Use: Never used   Substance Use Topics    Alcohol use: Never    Drug use: Never       Objective     Vital Signs  Temp:  [98.4 °F (36.9 °C)] 98.4 °F (36.9 °C)  Heart Rate:  [] 114  Resp:  [18-21] 21  BP: (109-170)/() 109/59    Physical Exam:      General Appearance:  Alert, cooperative, in no acute distress   Head:  Normocephalic, without obvious abnormality, atraumatic   Eyes:   Lids and lashes normal, conjunctivae and sclerae normal, no icterus, no pallor, corneas clear, PERRLA   Ears:  Ears appear intact with no abnormalities noted   Throat:  No oral lesions, no thrush, oral mucosa moist   Neck:  No adenopathy, supple, trachea midline, no thyromegaly, no carotid bruit, no JVD   Back:  No kyphosis present, no scoliosis present, no skin lesions, erythema or scars, no tenderness to percussion or palpation, range of motion normal   Lungs:  Clear to auscultation, respirations regular, even and unlabored    Heart:  Regular rhythm and normal rate, normal S1 and S2, no murmur, no gallop, no rub, no click   Chest Wall:  No abnormalities observed   Abdomen:  Normal bowel sounds, no masses, no organomegaly, soft non-tender, non-distended, no guarding, no rebound tenderness   Rectal:  Deferred   Extremities:  Moves all extremities well, no edema, no cyanosis, no redness   Pulses:  Pulses palpable and equal bilaterally   Skin:  No bleeding, bruising or rash   Lymph nodes:  No palpable adenopathy   Neurologic:  Cranial nerves 2 - 12 grossly intact, sensation intact, DTR present and equal bilaterally                                                                               Results Review:    I reviewed the patient's new clinical results.    Assessment & Plan       GI bleed    acute coffee-ground emesis with possible upper GI bleed  Gastric distention with abnormal CT abdomen  History of TIA on Plavix  Uncontrolled diabetes  Mild BRIGIDO/history of CKD  Hypertension  Dyslipidemia      Plan  N.p.o.  NG tube in place  IV PPI  GI consulted endoscopy pending today or tomorrow  H&H every 6  Home medication reviewed ordered as appropriate  IV fluids  I's and O's  Pain management  Fall precaution  CT interpreted  Labs interpreted      Discussed with patient and his wife in person at bedside    Pablito Vasquez MD  12/12/23  10:33 EST

## 2023-12-13 ENCOUNTER — INPATIENT HOSPITAL (OUTPATIENT)
Dept: URBAN - METROPOLITAN AREA HOSPITAL 84 | Facility: HOSPITAL | Age: 73
End: 2023-12-13

## 2023-12-13 DIAGNOSIS — Z97.8 PRESENCE OF OTHER SPECIFIED DEVICES: ICD-10-CM

## 2023-12-13 DIAGNOSIS — K31.89 OTHER DISEASES OF STOMACH AND DUODENUM: ICD-10-CM

## 2023-12-13 DIAGNOSIS — K92.0 HEMATEMESIS: ICD-10-CM

## 2023-12-13 DIAGNOSIS — K20.80 OTHER ESOPHAGITIS WITHOUT BLEEDING: ICD-10-CM

## 2023-12-13 DIAGNOSIS — K25.9 GASTRIC ULCER, UNSPECIFIED AS ACUTE OR CHRONIC, WITHOUT HEMO: ICD-10-CM

## 2023-12-13 LAB
ALBUMIN SERPL-MCNC: 3.3 G/DL (ref 3.5–5.2)
ALBUMIN/GLOB SERPL: 1.2 G/DL
ALP SERPL-CCNC: 97 U/L (ref 39–117)
ALT SERPL W P-5'-P-CCNC: 9 U/L (ref 1–41)
ANION GAP SERPL CALCULATED.3IONS-SCNC: 8 MMOL/L (ref 5–15)
AST SERPL-CCNC: 13 U/L (ref 1–40)
BASOPHILS # BLD AUTO: 0 10*3/MM3 (ref 0–0.2)
BASOPHILS # BLD AUTO: 0 10*3/MM3 (ref 0–0.2)
BASOPHILS NFR BLD AUTO: 0.3 % (ref 0–1.5)
BASOPHILS NFR BLD AUTO: 0.3 % (ref 0–1.5)
BILIRUB SERPL-MCNC: 0.7 MG/DL (ref 0–1.2)
BUN SERPL-MCNC: 34 MG/DL (ref 8–23)
BUN/CREAT SERPL: 23.1 (ref 7–25)
CALCIUM SPEC-SCNC: 8.7 MG/DL (ref 8.6–10.5)
CHLORIDE SERPL-SCNC: 106 MMOL/L (ref 98–107)
CO2 SERPL-SCNC: 28 MMOL/L (ref 22–29)
CREAT SERPL-MCNC: 1.47 MG/DL (ref 0.76–1.27)
DEPRECATED RDW RBC AUTO: 43.3 FL (ref 37–54)
DEPRECATED RDW RBC AUTO: 47.3 FL (ref 37–54)
EGFRCR SERPLBLD CKD-EPI 2021: 49.7 ML/MIN/1.73
EOSINOPHIL # BLD AUTO: 0.1 10*3/MM3 (ref 0–0.4)
EOSINOPHIL # BLD AUTO: 0.1 10*3/MM3 (ref 0–0.4)
EOSINOPHIL NFR BLD AUTO: 1 % (ref 0.3–6.2)
EOSINOPHIL NFR BLD AUTO: 1 % (ref 0.3–6.2)
ERYTHROCYTE [DISTWIDTH] IN BLOOD BY AUTOMATED COUNT: 13.4 % (ref 12.3–15.4)
ERYTHROCYTE [DISTWIDTH] IN BLOOD BY AUTOMATED COUNT: 13.8 % (ref 12.3–15.4)
GLOBULIN UR ELPH-MCNC: 2.8 GM/DL
GLUCOSE BLDC GLUCOMTR-MCNC: 175 MG/DL (ref 70–105)
GLUCOSE BLDC GLUCOMTR-MCNC: 176 MG/DL (ref 70–105)
GLUCOSE BLDC GLUCOMTR-MCNC: 177 MG/DL (ref 70–105)
GLUCOSE BLDC GLUCOMTR-MCNC: 179 MG/DL (ref 70–105)
GLUCOSE BLDC GLUCOMTR-MCNC: 281 MG/DL (ref 70–105)
GLUCOSE BLDC GLUCOMTR-MCNC: 300 MG/DL (ref 70–105)
GLUCOSE SERPL-MCNC: 341 MG/DL (ref 65–99)
HCT VFR BLD AUTO: 33.9 % (ref 37.5–51)
HCT VFR BLD AUTO: 38 % (ref 37.5–51)
HCT VFR BLD AUTO: 38.4 % (ref 37.5–51)
HCT VFR BLD AUTO: 39.4 % (ref 37.5–51)
HGB BLD-MCNC: 11.3 G/DL (ref 13–17.7)
HGB BLD-MCNC: 12.5 G/DL (ref 13–17.7)
HGB BLD-MCNC: 12.7 G/DL (ref 13–17.7)
HGB BLD-MCNC: 12.9 G/DL (ref 13–17.7)
LYMPHOCYTES # BLD AUTO: 1.2 10*3/MM3 (ref 0.7–3.1)
LYMPHOCYTES # BLD AUTO: 1.4 10*3/MM3 (ref 0.7–3.1)
LYMPHOCYTES NFR BLD AUTO: 15.7 % (ref 19.6–45.3)
LYMPHOCYTES NFR BLD AUTO: 17.2 % (ref 19.6–45.3)
MCH RBC QN AUTO: 30.5 PG (ref 26.6–33)
MCH RBC QN AUTO: 30.9 PG (ref 26.6–33)
MCHC RBC AUTO-ENTMCNC: 33.3 G/DL (ref 31.5–35.7)
MCHC RBC AUTO-ENTMCNC: 33.5 G/DL (ref 31.5–35.7)
MCV RBC AUTO: 91.5 FL (ref 79–97)
MCV RBC AUTO: 92.1 FL (ref 79–97)
MONOCYTES # BLD AUTO: 0.5 10*3/MM3 (ref 0.1–0.9)
MONOCYTES # BLD AUTO: 0.7 10*3/MM3 (ref 0.1–0.9)
MONOCYTES NFR BLD AUTO: 6.8 % (ref 5–12)
MONOCYTES NFR BLD AUTO: 9.3 % (ref 5–12)
NEUTROPHILS NFR BLD AUTO: 5.8 10*3/MM3 (ref 1.7–7)
NEUTROPHILS NFR BLD AUTO: 6 10*3/MM3 (ref 1.7–7)
NEUTROPHILS NFR BLD AUTO: 72.2 % (ref 42.7–76)
NEUTROPHILS NFR BLD AUTO: 76.2 % (ref 42.7–76)
NRBC BLD AUTO-RTO: 0 /100 WBC (ref 0–0.2)
NRBC BLD AUTO-RTO: 0.1 /100 WBC (ref 0–0.2)
PLATELET # BLD AUTO: 140 10*3/MM3 (ref 140–450)
PLATELET # BLD AUTO: 170 10*3/MM3 (ref 140–450)
PMV BLD AUTO: 8.9 FL (ref 6–12)
PMV BLD AUTO: 9.1 FL (ref 6–12)
POTASSIUM SERPL-SCNC: 3.8 MMOL/L (ref 3.5–5.2)
PROT SERPL-MCNC: 6.1 G/DL (ref 6–8.5)
RBC # BLD AUTO: 3.7 10*6/MM3 (ref 4.14–5.8)
RBC # BLD AUTO: 4.17 10*6/MM3 (ref 4.14–5.8)
SODIUM SERPL-SCNC: 142 MMOL/L (ref 136–145)
WBC NRBC COR # BLD AUTO: 7.9 10*3/MM3 (ref 3.4–10.8)
WBC NRBC COR # BLD AUTO: 8 10*3/MM3 (ref 3.4–10.8)

## 2023-12-13 PROCEDURE — 25010000002 MORPHINE PER 10 MG: Performed by: NURSE PRACTITIONER

## 2023-12-13 PROCEDURE — 85014 HEMATOCRIT: CPT | Performed by: INTERNAL MEDICINE

## 2023-12-13 PROCEDURE — 0DJ08ZZ INSPECTION OF UPPER INTESTINAL TRACT, VIA NATURAL OR ARTIFICIAL OPENING ENDOSCOPIC: ICD-10-PCS | Performed by: INTERNAL MEDICINE

## 2023-12-13 PROCEDURE — 80053 COMPREHEN METABOLIC PANEL: CPT | Performed by: NURSE PRACTITIONER

## 2023-12-13 PROCEDURE — 25810000003 SODIUM CHLORIDE 0.9 % SOLUTION: Performed by: NURSE ANESTHETIST, CERTIFIED REGISTERED

## 2023-12-13 PROCEDURE — 25810000003 SODIUM CHLORIDE 0.9 % SOLUTION: Performed by: INTERNAL MEDICINE

## 2023-12-13 PROCEDURE — 85025 COMPLETE CBC W/AUTO DIFF WBC: CPT | Performed by: NURSE PRACTITIONER

## 2023-12-13 PROCEDURE — 25010000002 PROPOFOL 200 MG/20ML EMULSION: Performed by: NURSE ANESTHETIST, CERTIFIED REGISTERED

## 2023-12-13 PROCEDURE — 82948 REAGENT STRIP/BLOOD GLUCOSE: CPT

## 2023-12-13 PROCEDURE — 63710000001 INSULIN LISPRO (HUMAN) PER 5 UNITS: Performed by: INTERNAL MEDICINE

## 2023-12-13 PROCEDURE — 43247 EGD REMOVE FOREIGN BODY: CPT | Performed by: INTERNAL MEDICINE

## 2023-12-13 PROCEDURE — 85018 HEMOGLOBIN: CPT | Performed by: INTERNAL MEDICINE

## 2023-12-13 RX ORDER — PROPOFOL 10 MG/ML
INJECTION, EMULSION INTRAVENOUS AS NEEDED
Status: DISCONTINUED | OUTPATIENT
Start: 2023-12-13 | End: 2023-12-13 | Stop reason: SURG

## 2023-12-13 RX ORDER — PANTOPRAZOLE SODIUM 40 MG/1
40 TABLET, DELAYED RELEASE ORAL
Status: CANCELLED | OUTPATIENT
Start: 2023-12-13

## 2023-12-13 RX ORDER — SODIUM CHLORIDE 9 MG/ML
INJECTION, SOLUTION INTRAVENOUS CONTINUOUS PRN
Status: DISCONTINUED | OUTPATIENT
Start: 2023-12-13 | End: 2023-12-13 | Stop reason: SURG

## 2023-12-13 RX ORDER — LIDOCAINE HYDROCHLORIDE 20 MG/ML
INJECTION, SOLUTION EPIDURAL; INFILTRATION; INTRACAUDAL; PERINEURAL AS NEEDED
Status: DISCONTINUED | OUTPATIENT
Start: 2023-12-13 | End: 2023-12-13 | Stop reason: SURG

## 2023-12-13 RX ORDER — MORPHINE SULFATE 2 MG/ML
2 INJECTION, SOLUTION INTRAMUSCULAR; INTRAVENOUS EVERY 4 HOURS PRN
Status: DISCONTINUED | OUTPATIENT
Start: 2023-12-13 | End: 2023-12-14 | Stop reason: HOSPADM

## 2023-12-13 RX ORDER — PANTOPRAZOLE SODIUM 40 MG/1
40 TABLET, DELAYED RELEASE ORAL
Status: DISCONTINUED | OUTPATIENT
Start: 2023-12-13 | End: 2023-12-14 | Stop reason: HOSPADM

## 2023-12-13 RX ADMIN — SODIUM CHLORIDE 100 ML/HR: 9 INJECTION, SOLUTION INTRAVENOUS at 04:23

## 2023-12-13 RX ADMIN — PROPOFOL 30 MG: 10 INJECTION, EMULSION INTRAVENOUS at 12:49

## 2023-12-13 RX ADMIN — INSULIN LISPRO 10 UNITS: 100 INJECTION, SOLUTION INTRAVENOUS; SUBCUTANEOUS at 17:20

## 2023-12-13 RX ADMIN — MORPHINE SULFATE 2 MG: 2 INJECTION, SOLUTION INTRAMUSCULAR; INTRAVENOUS at 02:16

## 2023-12-13 RX ADMIN — PROPOFOL 80 MG: 10 INJECTION, EMULSION INTRAVENOUS at 12:48

## 2023-12-13 RX ADMIN — Medication 10 ML: at 20:55

## 2023-12-13 RX ADMIN — INSULIN LISPRO 3 UNITS: 100 INJECTION, SOLUTION INTRAVENOUS; SUBCUTANEOUS at 06:22

## 2023-12-13 RX ADMIN — PANTOPRAZOLE SODIUM 40 MG: 40 INJECTION, POWDER, FOR SOLUTION INTRAVENOUS at 09:02

## 2023-12-13 RX ADMIN — Medication 10 ML: at 09:02

## 2023-12-13 RX ADMIN — PANTOPRAZOLE SODIUM 40 MG: 40 TABLET, DELAYED RELEASE ORAL at 17:20

## 2023-12-13 RX ADMIN — INSULIN LISPRO 3 UNITS: 100 INJECTION, SOLUTION INTRAVENOUS; SUBCUTANEOUS at 00:57

## 2023-12-13 RX ADMIN — SODIUM CHLORIDE: 9 INJECTION, SOLUTION INTRAVENOUS at 12:45

## 2023-12-13 RX ADMIN — INSULIN LISPRO 8 UNITS: 100 INJECTION, SOLUTION INTRAVENOUS; SUBCUTANEOUS at 14:31

## 2023-12-13 RX ADMIN — LIDOCAINE HYDROCHLORIDE 80 MG: 20 INJECTION, SOLUTION EPIDURAL; INFILTRATION; INTRACAUDAL; PERINEURAL at 12:48

## 2023-12-13 RX ADMIN — PROPOFOL 20 MG: 10 INJECTION, EMULSION INTRAVENOUS at 12:53

## 2023-12-13 NOTE — OP NOTE
ESOPHAGOGASTRODUODENOSCOPY Procedure Report    Patient Name:  Blas Mojica  YOB: 1950    Date of Surgery:  12/13/2023     Pre-Op Diagnosis:  Coffee ground emesis [K92.0]  Gastric distention [K31.89]    Post-Op Diagnosis:  1.  Erosive esophagitis  2.  Gastric ulcers       Procedure/CPT® Codes:      Procedure(s):  ESOPHAGOGASTRODUODENOSCOPY with removal of NJ tube from right nare    Staff:  Surgeon(s):  Jaret Valadez MD      Anesthesia: Monitored Anesthesia Care    Description of Procedure:  A description of the procedure as well as risks, benefits and alternative methods were explained to the patient who voiced understanding and signed the corresponding consent form. A physical exam was performed and vital signs were monitored throughout the procedure.    After informed consent was obtained, the patient was placed in the left lateral decubitus position and sedated as above.  The Olympus video gastroscope was inserted into the oropharynx and the esophagus was intubated without difficulty.  Examination of the esophagus, stomach, pylorus, duodenal bulb, and second portion of the duodenum was performed without difficulty. The scope was then retroflexed and the fundus was visualized. The procedure was not difficult and there were no immediate complications.  There was no blood loss.    Findings:  Esophagus: The previously placed nasojejunal tube was removed prior to passage of the endoscope.  Small erosions consistent with grade a erosive esophagitis  Stomach: Linear erosions and ulcers in the gastric cardia of unclear etiology.  They did not seem to be Dimitrios's erosions. Possible ischemic due to volvulus and also possibly related to retching.  No active bleeding identified.  No blood in the stomach.  Duodenum: Normal    Impression:  1.  Erosive esophagitis  2.  Gastric ulcers  3.  Successful nasojejunal tube removal    Recommendations:  1.  Pantoprazole 40 mg p.o. twice daily for 1 month,  then once daily  2.  Regular diet  3.  Okay for discharge home  4.  I would recommend repeat EGD in 8 to 12 weeks to confirm healing of the gastric ulcers  5.  We will see as needed  6.  No need for replacement of the nasogastric tube      Jaret Valadez MD     Date: 12/13/2023    Time: 12:58 EST      .

## 2023-12-13 NOTE — PROGRESS NOTES
[facility] Medicine Services   Daily Progress Note    Patient Name: Blas Mojica  : 1950  MRN: [id number]  Primary Care Physician:  Sahil Read MD  Date of admission: 2023  Date of Service:       Subjective      Patient    Seen and examined  NG tube in place  No current bleeding  No nausea  Feels uncomfortable  No new pain  Remains n.p.o.          Objective      Vitals:   Temp:  [97.9 °F (36.6 °C)-98.9 °F (37.2 °C)] 98.9 °F (37.2 °C)  Heart Rate:  [] 91  Resp:  [15-18] 17  BP: (106-130)/(56-77) 123/72    Physical Exam       General:  Alert and oriented , no  distress  Eyes:  Pupils are equal, round and reactive to light. Extraocular movements are intact. Normal conjunctivae, vision unchanged    HENT:  Normocephalic, atraumatic   Respiratory: Decrease in breathing sounds anteriorly bilaterally. No wheezing  Cardiovascular: Regular rate, Regular, S1 auscultated. S2 auscultated , No edema   Gastrointestinal: Soft. Nontender, nondistended. Normal bowel sounds  Musculoskeletal: No tenderness   Neurologic: Alert, oriented. No focal defect   Psychiatric: Cooperative , appropriate mood and affect, nonsuicidal           Result Review    Result Review:  I have personally reviewed the results from the time of this admission to 2023 10:46 EST and agree with these findings:  [x]  Laboratory  [x]  Microbiology  [x]  Radiology  []  EKG/Telemetry   []  Cardiology/Vascular   []  Pathology  []  Old records  []  Other:  Most notable findings include: Discomfort    Wounds (last 24 hours)       LDA Wound       Row Name 23 0850             Wound 23 Bilateral upper coccyx Pressure Injury    Wound - Properties Group Placement Date: 23  -KW Placement Time:   -KW Present on Original Admission: Y  -KW Side: Bilateral  -KW Orientation: upper  -KW Location: coccyx  -KW Primary Wound Type: Pressure inj  -KW Additional Comments: Patient states that it's an old wound  and now healing.  -KW    Dressing Appearance open to air  -ES      Base moist  -ES      Periwound pink  -ES      Periwound Temperature warm  -ES      Periwound Skin Turgor soft  -ES      Retired Wound - Properties Group Placement Date: 11/04/23  -KW Placement Time: 2000 -KW Present on Original Admission: Y  -KW Side: Bilateral  -KW Orientation: upper  -KW Location: coccyx  -KW Primary Wound Type: Pressure inj  -KW Additional Comments: Patient states that it's an old wound and now healing.  -KW    Retired Wound - Properties Group Date first assessed: 11/04/23  -KW Time first assessed: 2000  -KW Present on Original Admission: Y  -KW Side: Bilateral  -KW Location: coccyx  -KW Primary Wound Type: Pressure inj  -KW Additional Comments: Patient states that it's an old wound and now healing.  -KW              User Key  (r) = Recorded By, (t) = Taken By, (c) = Cosigned By      Initials Name Provider Type    Fabiola Cardenas RN Registered Nurse    Deena Rust RN Registered Nurse                      Assessment & Plan          insulin lispro, 3-14 Units, Subcutaneous, Q6H  pantoprazole, 40 mg, Intravenous, Q12H  senna-docusate sodium, 2 tablet, Oral, BID  sodium chloride, 10 mL, Intravenous, Q12H       sodium chloride, 100 mL/hr, Last Rate: 100 mL/hr (12/13/23 9119)         Active Hospital Problems:  Active Hospital Problems    Diagnosis     **GI bleed     Coffee ground emesis     Gastric distention        GI bleed on admission, hemoglobin stable     acute coffee-ground emesis with possible upper GI bleed  No coffee-ground emesis at this time    Gastric distention with abnormal CT abdomen  History of TIA on Plavix  Uncontrolled diabetes  Mild BRIGIDO/history of CKD  Hypertension  Dyslipidemia        Plan  Remains n.p.o.  NG tube in place  IV PPI  GI consulted endoscopy pending today   Can likely DC NG tube after procedure  H&H every 6  Home medication reviewed ordered as appropriate  Continue current glycemic  control    Long acting insulin on hold  Patient n.p.o.  Blood sugar currently better controlled  IV fluids  I's and O's  Pain management  Fall precaution  CT interpreted  Labs interpreted        Discussed with patient and RN  Pending going to EGD now      DVT prophylaxis:  Mechanical DVT prophylaxis orders are present.    CODE STATUS:    Code Status (Patient has no pulse and is not breathing): CPR (Attempt to Resuscitate)  Medical Interventions (Patient has pulse or is breathing): Full Support      Disposition:  I expect patient to be discharged once stable.      Electronically signed by Pablito Vasquez MD, 12/13/23, 10:46 EST.  [facility] Hospitalist Team

## 2023-12-13 NOTE — PLAN OF CARE
Goal Outcome Evaluation:      PT has NG tube to low wall suction. Through the night, Pt did need oxygen applied due to dropping levels when sleeping. Pt remains NPO and is having an EGD today. VSS. Call light is within reach.

## 2023-12-13 NOTE — SIGNIFICANT NOTE
12/13/23 1020   OTHER   Discipline occupational therapist   Rehab Time/Intention   Session Not Performed patient unavailable for evaluation;patient/family declined evaluation  (pt adamantly refused OOB activity this date d/t pain. Pt request to come back tomorrow, EGD also moved up and was about to arrival for transport. OT will follow up)   Recommendation   OT - Next Appointment 12/14/23

## 2023-12-13 NOTE — ANESTHESIA PREPROCEDURE EVALUATION
Anesthesia Evaluation     Patient summary reviewed and Nursing notes reviewed   NPO Solid Status: > 8 hours             Airway   Mallampati: II  TM distance: >3 FB  Neck ROM: full  No difficulty expected  Dental - normal exam     Pulmonary - negative pulmonary ROS and normal exam   (-) not a smoker  Cardiovascular - normal exam    ECG reviewed    (+) hypertension  (-) angina, BARCLAY      Neuro/Psych  (+) TIA  GI/Hepatic/Renal/Endo    (+) GI bleeding upper , renal disease- CRI, diabetes mellitus type 2 using insulin    Musculoskeletal (-) negative ROS    Abdominal  - normal exam    Bowel sounds: normal.   Substance History - negative use     OB/GYN negative ob/gyn ROS         Other                    Anesthesia Plan    ASA 3     general       Anesthetic plan, risks, benefits, and alternatives have been provided, discussed and informed consent has been obtained with: patient.    CODE STATUS:    Code Status (Patient has no pulse and is not breathing): CPR (Attempt to Resuscitate)  Medical Interventions (Patient has pulse or is breathing): Full Support

## 2023-12-13 NOTE — CONSULTS
Visited with patient at bedside.    Patient life reviewed about his 53 years of marriage, family dynamics, and how he believes his spirituality has affected others in his life. Patient reports that he is Yazidi.    Will inform Father Jermaine about  request.     Jaret Puga

## 2023-12-13 NOTE — SIGNIFICANT NOTE
12/13/23 1015   OTHER   Discipline physical therapist   Rehab Time/Intention   Session Not Performed patient/family declined evaluation;patient unavailable for evaluation  (pt refused eval, stating he is too tired and painful this date; RN notes pt is now to go for EGD in next few minutes, as test time has been moved up.)   Recommendation   PT - Next Appointment 12/14/23

## 2023-12-13 NOTE — PROGRESS NOTES
EGD report noted.  Protonix POQ 12 ordered  Diet ordered.  If tolerates diet will plan on discharging home later today.

## 2023-12-13 NOTE — PLAN OF CARE
Goal Outcome Evaluation:                 Patient NPO this morning for EGD. NGT removed. Patient refused to work with PT and OT this morning, they will retry again tomorrow.

## 2023-12-13 NOTE — NURSING NOTE
WOCN note:    73 yr old male admitted 12/12/23 with GI bleed. WOCN consult received for pressure injury noted upon admission. Patient presents with skin maceration at the top of the gluteal fold consistent with moisture associated dermatitis. He currently has a bowden catheter in place and Calazime zinc barrier at the bedside. Will also order a  Low air loss pump for the bed surface. Initiate pressure injury prevention measures and provide skin care as needed. We will continue to follow as needed.

## 2023-12-14 ENCOUNTER — HOME HEALTH ADMISSION (OUTPATIENT)
Dept: HOME HEALTH SERVICES | Facility: HOME HEALTHCARE | Age: 73
End: 2023-12-14
Payer: MEDICARE

## 2023-12-14 VITALS
RESPIRATION RATE: 16 BRPM | OXYGEN SATURATION: 91 % | TEMPERATURE: 98.6 F | DIASTOLIC BLOOD PRESSURE: 74 MMHG | BODY MASS INDEX: 27.96 KG/M2 | SYSTOLIC BLOOD PRESSURE: 146 MMHG | HEART RATE: 98 BPM | HEIGHT: 70 IN | WEIGHT: 195.33 LBS

## 2023-12-14 PROBLEM — K92.2 GI BLEED: Status: RESOLVED | Noted: 2023-12-12 | Resolved: 2023-12-14

## 2023-12-14 PROBLEM — K92.0 COFFEE GROUND EMESIS: Status: RESOLVED | Noted: 2023-12-12 | Resolved: 2023-12-14

## 2023-12-14 PROBLEM — K31.89 GASTRIC DISTENTION: Status: RESOLVED | Noted: 2023-12-12 | Resolved: 2023-12-14

## 2023-12-14 LAB
GLUCOSE BLDC GLUCOMTR-MCNC: 158 MG/DL (ref 70–105)
GLUCOSE BLDC GLUCOMTR-MCNC: 302 MG/DL (ref 70–105)
GLUCOSE BLDC GLUCOMTR-MCNC: 318 MG/DL (ref 70–105)

## 2023-12-14 PROCEDURE — 82948 REAGENT STRIP/BLOOD GLUCOSE: CPT

## 2023-12-14 PROCEDURE — 97112 NEUROMUSCULAR REEDUCATION: CPT | Performed by: PHYSICAL THERAPIST

## 2023-12-14 PROCEDURE — 97530 THERAPEUTIC ACTIVITIES: CPT | Performed by: PHYSICAL THERAPIST

## 2023-12-14 PROCEDURE — 63710000001 INSULIN LISPRO (HUMAN) PER 5 UNITS: Performed by: INTERNAL MEDICINE

## 2023-12-14 PROCEDURE — 97162 PT EVAL MOD COMPLEX 30 MIN: CPT | Performed by: PHYSICAL THERAPIST

## 2023-12-14 PROCEDURE — 97166 OT EVAL MOD COMPLEX 45 MIN: CPT

## 2023-12-14 RX ORDER — PANTOPRAZOLE SODIUM 40 MG/1
40 TABLET, DELAYED RELEASE ORAL
Qty: 60 TABLET | Refills: 0 | Status: SHIPPED | OUTPATIENT
Start: 2023-12-14

## 2023-12-14 RX ADMIN — Medication 10 ML: at 09:33

## 2023-12-14 RX ADMIN — INSULIN LISPRO 10 UNITS: 100 INJECTION, SOLUTION INTRAVENOUS; SUBCUTANEOUS at 00:26

## 2023-12-14 RX ADMIN — INSULIN LISPRO 10 UNITS: 100 INJECTION, SOLUTION INTRAVENOUS; SUBCUTANEOUS at 11:39

## 2023-12-14 RX ADMIN — PANTOPRAZOLE SODIUM 40 MG: 40 TABLET, DELAYED RELEASE ORAL at 09:33

## 2023-12-14 NOTE — PLAN OF CARE
Goal Outcome Evaluation:  Plan of Care Reviewed With: patient    Outcome Evaluation: Pt is a 72 y/o M admitted to [facility] 12/12/23 with c/o vomiting coffee ground emesis. Hospital dx GI bleed. EGD pending. CT A/P indicates moderately severe gastric distention. XR abdomen Antegrade oriented gastric tube terminates over proximal stomach, with sideport near the GE junction. Consider further advancement. Pt known to have a R humerus fx on Nov 28, 2023. PMHx significant for HTN, hx TIA, CKDIII, and DMII. At baseline pt resides with spouse in independent living, second floor and utilizes elevator to access apartment. Pt typically utilizes straight cane for mobility, however recently has had functional decline, only mobilizing within home and wife has increased assist with ADLs. Pt reports he uses hospital bed to sleep in and incontinent at night. Pt has w/c, walker and cane at home, walk-in shower with grab bars in bathroom, and BSC he places over toilet for hand bars. During evaluation, pt required mod a with sup to sit. Once sitting EOB pt observed with posterior lean, requiring VCs and mod a to correct for weight-shifting. Pt STS with mod a x2 and HHA LUE. Pt fearful of falling during bed to chair transfer. Pt rigid and required max a x2 to successfully complete transfer. Pt is below baseline and is not safe to d/c home at this time. OT recommends SNF when medically appropriate for d/c. If pt refuses SNF, OT recommends  OT. OT will follow while admitted for continued skilled OT services.      Anticipated Discharge Disposition (OT): skilled nursing facility

## 2023-12-14 NOTE — CASE MANAGEMENT/SOCIAL WORK
Continued Stay Note  [facility]     Patient Name: Blas Mojica  MRN: [id number]  Today's Date: 12/14/2023    Admit Date: 12/12/2023    DC PLAN: [facility] accepted. No precert. No PASRR. [facility] to transport.  Received message from Nellie with [facility] who states can transport patient at [phone]. Bedside nurse updated.        Discharge Plan       Row Name 12/14/23 1312       Plan    Plan DC PLAN: [facility] accepted. No Precert, No PASRR. [facility]    Plan Comments Recieved message from Nellie, [facility] to accept patient  today. MD and bedside nurse made aware. Called [phone] and spoke with Mirtha with [facility] who will add patient to the list. Running behind schedule. Bedside nurse updated. [facility] uses own pharmacy      Row Name 12/14/23 1155       Plan    Plan DC PLAN: Referral to [facility] vs Home with [facility].    Plan Comments Recieved message that patient now agreeable to INPT rehab and requesting [facility]. DCP report sent and message sent to silvia Ballard,   awaiting response. Bedside nurse and MD updated.    Called and spoke with Mirtha to notify [facility] will be transporting and can removed from [facility] list.           Row Name 12/14/23 1027       Plan    Plan DC PLAN: Return home. Referral to [facility]. Declines SNF.    Plan Comments Discharge orders in, recieved message from PT that states patient could benefit from SNF but patient declines. Would be safe to return home with home health. Patient agreeable. DCP report sent to [facility]. message sent to silvia Bolivar, awaiting response.  Plans to discharge today.                    Mary Gay RN

## 2023-12-14 NOTE — PLAN OF CARE
Goal Outcome Evaluation:                 Patient has discharge orders. IV removed. Discharge instructions provided.

## 2023-12-14 NOTE — DISCHARGE SUMMARY
Date of Discharge:  12/14/2023    Discharge Diagnosis: GI bleed erosive gastritis esophagitis    Presenting Problem/History of Present Illness  Active Hospital Problems   No active problems to display.      Resolved Hospital Problems    Diagnosis Date Resolved POA    **GI bleed [K92.2] 12/14/2023 Yes    Coffee ground emesis [K92.0] 12/14/2023 Unknown    Gastric distention [K31.89] 12/14/2023 Unknown      Seen and examined  Feels much better today  Hospital Course  Patient is a 73 y.o. male presented with coffee-ground emesis which has resolved status post EGD  Tolerating Protonix and diet          GI bleed on admission, hemoglobin stable   acute coffee-ground emesis with possible upper GI bleed  No coffee-ground emesis at this time    Resolved  Erosive gastritis with gastric ulcers      Gastric distention with abnormal CT abdomen, clinically improved  History of TIA on Plavix  Uncontrolled diabetes  Mild BRIGIDO/history of CKD  Hypertension  Dyslipidemia        Plan  Tolerating diet  NG tube remains out  Every 12 PPI  Follow-up GI outpatient     Long acting insulin   Blood sugar monitoring    I's and O's  Pain management  Fall precaution  CT interpreted  Labs interpreted.        Discharge today to rehab facility   patient accepted    Procedures Performed    Procedure(s):  ESOPHAGOGASTRODUODENOSCOPY with removal of NJ tube from right nare  -------------------  12/13 1245 UPPER GI ENDOSCOPY    Consults:   Consults       Date and Time Order Name Status Description    12/12/2023  9:28 AM Gastroenterology (on-call MD unless specified) Completed     12/12/2023  9:06 AM Hospitalist (on-call MD unless specified)              Pertinent Test Results: Reviewed    Condition on Discharge: Stable    Vital Signs  Temp:  [96.7 °F (35.9 °C)-98.6 °F (37 °C)] 98.6 °F (37 °C)  Heart Rate:  [] 102  Resp:  [14-22] 16  BP: (112-152)/(58-77) 146/74    Physical Exam:     General Appearance:  Alert, cooperative, in no acute distress    Head:  Normocephalic, without obvious abnormality, atraumatic   Eyes:  Lids and lashes normal, conjunctivae and sclerae normal, no icterus, no pallor, corneas clear, PERRLA   Ears:  Ears appear intact with no abnormalities noted   Throat:  No oral lesions, no thrush, oral mucosa moist   Neck:  No adenopathy, supple, trachea midline, no thyromegaly, no carotid bruit, no JVD   Back:  No kyphosis present, no scoliosis present, no skin lesions, erythema or scars, no tenderness to percussion or palpation, range of motion normal   Lungs:  Clear to auscultation, respirations regular, even and unlabored    Heart:  Regular rhythm and normal rate, normal S1 and S2, no murmur, no gallop, no rub, no click   Chest Wall:  No abnormalities observed   Abdomen:  Normal bowel sounds, no masses, no organomegaly, soft non-tender, non-distended, no guarding, no rebound tenderness   Rectal:  Deferred   Extremities:  Moves all extremities well, no edema, no cyanosis, no redness   Pulses:  Pulses palpable and equal bilaterally   Skin:  No bleeding, bruising or rash   Lymph nodes:  No palpable adenopathy   Neurologic:  Cranial nerves 2 - 12 grossly intact, sensation intact, DTR present and equal bilaterally                                                                               Discharge Disposition  Rehab Facility or Unit (DC - External)    Discharge Medications     Discharge Medications        New Medications        Instructions Start Date   pantoprazole 40 MG EC tablet  Commonly known as: PROTONIX   40 mg, Oral, 2 Times Daily Before Meals             Continue These Medications        Instructions Start Date   clopidogrel 75 MG tablet  Commonly known as: PLAVIX   75 mg, Oral, Daily      insulin glargine 100 UNIT/ML injection  Commonly known as: LANTUS, SEMGLEE   20 Units, Subcutaneous, Nightly      lisinopril 40 MG tablet  Commonly known as: PRINIVIL,ZESTRIL   40 mg, Oral, Daily      pioglitazone 30 MG tablet  Commonly known as:  ACTOS   30 mg, Oral, Daily      triamterene-hydrochlorothiazide 75-50 MG per tablet  Commonly known as: MAXZIDE   1 tablet, Oral, Daily               Discharge Diet:     Activity at Discharge:     Follow-up Appointments  No future appointments.  Additional Instructions for the Follow-ups that You Need to Schedule       Ambulatory Referral to Home Health (Hospital)   As directed      Face to Face Visit Date: 12/14/2023   Follow-up provider for Plan of Care?: I treated the patient in an acute care facility and will not continue treatment after discharge.   Follow-up provider: AMRIK HERCULES [[id number]]   Reason/Clinical Findings: Evaluate and treat   Describe mobility limitations that make leaving home difficult: Below baseline   Nursing/Therapeutic Services Requested: Physical Therapy   Frequency: 1 Week 1                Test Results Pending at Discharge       Pablito Vasquez MD  12/14/23  11:58 EST    Time: Less than 30 minutes

## 2023-12-14 NOTE — THERAPY EVALUATION
Patient Name: Blas Mojica  : 1950    MRN: [id number]                              Today's Date: 2023       Admit Date: 2023    Visit Dx:     ICD-10-CM ICD-9-CM   1. Coffee ground emesis  K92.0 578.0   2. Gastric distention  K31.89 536.8     Patient Active Problem List   Diagnosis    Primary hypertension    TIA (transient ischemic attack)    Stage 3 chronic kidney disease    Dyslipidemia    History of basal cell carcinoma (BCC)    Primary osteoarthritis    Vitamin D deficiency    Acute pain of left knee    Generalized weakness    Hypokalemia    Moderate malnutrition    Physical debility    Type 2 diabetes mellitus with stage 3 chronic kidney disease, with long-term current use of insulin    Sinus tachycardia    Acute hypoxemic respiratory failure due to COVID-19    Pneumonia due to COVID-19 virus    Mixed hyperlipidemia    COVID-19 virus infection    Weakness     Past Medical History:   Diagnosis Date    Diabetes mellitus     Hyperlipidemia     Hypertension     Kidney stone     TIA (transient ischemic attack)     Type 2 diabetes mellitus with stage 3 chronic kidney disease, with long-term current use of insulin 2021     Past Surgical History:   Procedure Laterality Date    ADENOIDECTOMY      EXTRACORPOREAL SHOCK WAVE LITHOTRIPSY (ESWL)      HERNIA REPAIR      KNEE ACL RECONSTRUCTION Left     TONSILLECTOMY        General Information       Row Name 23 0952          Physical Therapy Time and Intention    Document Type evaluation  -EJ     Mode of Treatment physical therapy  -EJ       Row Name 23 0952          General Information    Patient Profile Reviewed yes  -EJ     Prior Level of Function independent:;ADL's;gait;transfer;home management;bathing;dressing;community mobility;bed mobility;driving  Per pt, prior to humerus fx in Nov, he was independent and used SPC during gait.  Since fx he has required assist from spouse and has not been ambulating.  She has been providing  assist with bed mobility and transfers.  -EJ     Existing Precautions/Restrictions fall;shoulder  RUE humerul fx in abd sling from fall/fx Nov. 28 this year.  Per pt he is able to perform elbow/wrist/hand AROM.  -EJ     Barriers to Rehab medically complex;previous functional deficit;physical barrier  -       Row Name 12/14/23 0952          Living Environment    People in Home spouse  -     Name(s) of People in Home Debra (wife)  -       Row Name 12/14/23 0952          Home Main Entrance    Number of Stairs, Main Entrance none  Pt lives in 2nd floor apartment with elevator to apartment.  -       Row Name 12/14/23 0952          Stairs Within Home, Primary    Number of Stairs, Within Home, Primary none  -       Row Name 12/14/23 0952          Cognition    Orientation Status (Cognition) oriented x 4  -       Row Name 12/14/23 0952          Safety Issues, Functional Mobility    Safety Issues Affecting Function (Mobility) impulsivity;problem-solving;sequencing abilities  -     Impairments Affecting Function (Mobility) balance;endurance/activity tolerance;strength;postural/trunk control  -               User Key  (r) = Recorded By, (t) = Taken By, (c) = Cosigned By      Initials Name Provider Type     Nati Phillips, PT Physical Therapist                   Mobility       Row Name 12/14/23 0958          Bed Mobility    Bed Mobility bed mobility (all) activities  -     All Activities, [place] (Bed Mobility) moderate assist (50% patient effort)  -     Assistive Device (Bed Mobility) bed rails;head of bed elevated  -       Row Name 12/14/23 0958          Bed-Chair Transfer    Bed-Chair [place] (Transfers) maximum assist (25% patient effort);2 person assist  -     Assistive Device (Bed-Chair Transfers) other (see comments)  HHA on L  -       Row Name 12/14/23 0958          Sit-Stand Transfer    Sit-Stand [place] (Transfers) moderate assist (50% patient effort);2 person assist  -EJ      Assistive Device (Sit-Stand Transfers) --  HHA LUE  -EJ       Row Name 12/14/23 0958          Gait/Stairs (Locomotion)    [place] Level (Gait) not tested  -EJ               User Key  (r) = Recorded By, (t) = Taken By, (c) = Cosigned By      Initials Name Provider Type     Nati Phillips, PT Physical Therapist                   Obj/Interventions       Row Name 12/14/23 1004          Range of Motion Comprehensive    General Range of Motion bilateral lower extremity ROM WFL  -EJ       [facility] Name 12/14/23 1004          Strength Comprehensive (MMT)    Comment, General Manual Muscle Testing (MMT) Assessment BLE strength grossly 4-/5.  -EJ       Row Name 12/14/23 1004          Motor Skills    Motor Skills functional endurance  -EJ     Functional Endurance Fair-  -[facility] Name 12/14/23 1004          Balance    Balance Assessment sitting static balance;sitting dynamic balance;sit to stand dynamic balance;standing static balance;standing dynamic balance  -EJ     Static Sitting Balance minimal assist  -EJ     Dynamic Sitting Balance moderate assist  -EJ     Position, Sitting Balance supported;sitting edge of bed  -EJ     Sit to Stand Dynamic Balance moderate assist;2-person assist  -EJ     Static Standing Balance moderate assist;2-person assist  -EJ     Dynamic Standing Balance maximum assist;2-person assist  -EJ     Position/Device Used, Standing Balance supported  -EJ     Balance Interventions sitting;standing;sit to stand;supported;static;dynamic;minimal challenge  -EJ               User Key  (r) = Recorded By, (t) = Taken By, (c) = Cosigned By      Initials Name Provider Type     Nati Phillips, PT Physical Therapist                   Goals/Plan       [facility] Name 12/14/23 1018          Bed Mobility Goal 1 (PT)    Activity/Assistive Device (Bed Mobility Goal 1, PT) bed mobility activities, all  -EJ     [place] Level/Cues Needed (Bed Mobility Goal 1, PT) contact guard required  -EJ     Time Frame (Bed  Mobility Goal 1, PT) long term goal (LTG);2 weeks  -EJ       Row Name 12/14/23 1018          Transfer Goal 1 (PT)    Activity/Assistive Device (Transfer Goal 1, PT) transfers, all;other (see comments)  with least restrictive AD  -EJ     [place] Level/Cues Needed (Transfer Goal 1, PT) minimum assist (75% or more patient effort)  -EJ     Time Frame (Transfer Goal 1, PT) long term goal (LTG);2 weeks  -EJ       Row Name 12/14/23 1018          Gait Training Goal 1 (PT)    Activity/Assistive Device (Gait Training Goal 1, PT) gait (walking locomotion);other (see comments)  with least restrictive AD.  -EJ     [place] Level (Gait Training Goal 1, PT) minimum assist (75% or more patient effort)  -EJ     Distance (Gait Training Goal 1, PT) 10 feet  -EJ     Time Frame (Gait Training Goal 1, PT) long term goal (LTG);2 weeks  -       Row Name 12/14/23 1018          Therapy Assessment/Plan (PT)    Planned Therapy Interventions (PT) balance training;bed mobility training;gait training;home exercise program;postural re-education;patient/family education;neuromuscular re-education;strengthening;transfer training;ROM (range of motion)  -EJ               User Key  (r) = Recorded By, (t) = Taken By, (c) = Cosigned By      Initials Name Provider Type    EJ Nati Phillips, PT Physical Therapist                   Clinical Impression       Row Name 12/14/23 1006          Pain    Pretreatment Pain Rating 7/10  -EJ     Posttreatment Pain Rating 7/10  -EJ     Pain Location - Side/Orientation Right  -EJ     Pain Location - shoulder  -EJ     Pain Intervention(s) Emotional support;Therapeutic presence;Repositioned;Ambulation/increased activity  -EJ       Row Name 12/14/23 1006          Plan of Care Review    Plan of Care Reviewed With patient  -EJ     Outcome Evaluation Patient is a 72 y/o M who was admitted [facility] on 12/12/23 due to vomiting up coffee ground emesis.  Pt was also found to have gastric distension.  NG tube was placed,  and has since been removed.  Pt also with R humerus fx that occurred on Nov. 28.  Pt is still in abduction sling at this time.  Per pt he is able to perform elbow/wrist/hand ROM only at this time.  PMHx includes HTN, DM, and TIA.  At baseline pt reports living at home with his wife.  Prior to fall and humerus fx, pt was independent with mobility and was able to drive.  He was using a SPC at ambulate.  Since the fall, he has required assist from his spouse with all mobility and has not been ambulating.  Pt currently has a hospital bed, w/c, RW, SPC, grab bars, walk-shower, and BSC that he uses over his toilet.  During today's evaluation pt was mod A with supine to sit.  Once sitting pt tended to have slight posterior lean requiring mod A for correction and cues for forward weight shift.  Pt had difficulty scooting towards EOB and Hemant provided as well as cues.  Pt was able to perform STS with modA x2 and HHA LUE.  He was fearful of falling, and during bed to chair transfer, pt was afriad to let therapist assist.  He became rigid at times and required max Ax2 for successfull bed to chair transfer.  At this time, PT recommends pt d/c to SNF.  Due to his weakness, balance deficits, and mobility deficits he would benefit from rehab.  He is significantly below his baseline, and to reduce caregiver burden rehab would be safe option.  Pt does not want to go to rehab, but was agreeable to  PT.  At this time PT will continue to follow.  -       Row Name 12/14/23 1006          Therapy Assessment/Plan (PT)    Criteria for Skilled Interventions Met (PT) yes;skilled treatment is necessary  -EJ     Therapy Frequency (PT) 5 times/wk  -EJ       Row Name 12/14/23 1006          Positioning and Restraints    Pre-Treatment Position in bed  -EJ     Post Treatment Position chair  -EJ     In Chair reclined;call light within reach;encouraged to call for assist;exit alarm on;notified nsg  -EJ               User Key  (r) = Recorded By,  (t) = Taken By, (c) = Cosigned By      Initials Name Provider Type    Nati Linares, PT Physical Therapist                   Outcome Measures       Row Name 12/14/23 1019 12/14/23 0850       How much help from another person do you currently need...    Turning from your back to your side while in flat bed without using bedrails? 2  -EJ 2  -ES    Moving from lying on back to sitting on the side of a flat bed without bedrails? 2  -EJ 2  -ES    Moving to and from a bed to a chair (including a wheelchair)? 2  -EJ 2  -ES    Standing up from a chair using your arms (e.g., wheelchair, bedside chair)? 2  -EJ 1  -ES    Climbing 3-5 steps with a railing? 1  -EJ 1  -ES    To walk in hospital room? 1  -EJ 1  -ES    AM-PAC 6 Clicks Score (PT) 10  -EJ 9  -ES    Highest Level of Mobility Goal 4 --> Transfer to chair/commode  -EJ 3 --> Sit at edge of bed  -ES      Row Name 12/14/23 1019          Functional Assessment    Outcome Measure Options AM-PAC 6 Clicks Basic Mobility (PT)  -EJ               User Key  (r) = Recorded By, (t) = Taken By, (c) = Cosigned By      Initials Name Provider Type    Nati Linares, PT Physical Therapist    Fabiola Cardenas RN Registered Nurse                                 Physical Therapy Education       Title: PT OT SLP Therapies (Done)       Topic: Physical Therapy (Done)       Point: Mobility training (Done)       Learning Progress Summary             Patient Acceptance, E, VU,NR by RAZIA at 12/14/2023 1020                         Point: Home exercise program (Done)       Learning Progress Summary             Patient Acceptance, E, VU,NR by RAZIA at 12/14/2023 1020                         Point: Body mechanics (Done)       Learning Progress Summary             Patient Acceptance, E, VU,NR by RAZIA at 12/14/2023 1020                         Point: Precautions (Done)       Learning Progress Summary             Patient Acceptance, E, VU,NR by RAZIA at 12/14/2023 1020                                          User Key       Initials Effective Dates Name Provider Type Discipline     07/11/23 -  Nati Phillips, PT Physical Therapist PT                  PT Recommendation and Plan  Planned Therapy Interventions (PT): balance training, bed mobility training, gait training, home exercise program, postural re-education, patient/family education, neuromuscular re-education, strengthening, transfer training, ROM (range of motion)  Plan of Care Reviewed With: patient  Outcome Evaluation: Patient is a 74 y/o M who was admitted [facility] on 12/12/23 due to vomiting up coffee ground emesis.  Pt was also found to have gastric distension.  NG tube was placed, and has since been removed.  Pt also with R humerus fx that occurred on Nov. 28.  Pt is still in abduction sling at this time.  Per pt he is able to perform elbow/wrist/hand ROM only at this time.  PMHx includes HTN, DM, and TIA.  At baseline pt reports living at home with his wife.  Prior to fall and humerus fx, pt was independent with mobility and was able to drive.  He was using a SPC at ambulate.  Since the fall, he has required assist from his spouse with all mobility and has not been ambulating.  Pt currently has a hospital bed, w/c, RW, SPC, grab bars, walk-shower, and BSC that he uses over his toilet.  During today's evaluation pt was mod A with supine to sit.  Once sitting pt tended to have slight posterior lean requiring mod A for correction and cues for forward weight shift.  Pt had difficulty scooting towards EOB and Hemant provided as well as cues.  Pt was able to perform STS with modA x2 and HHA LUE.  He was fearful of falling, and during bed to chair transfer, pt was afriad to let therapist assist.  He became rigid at times and required max Ax2 for successfull bed to chair transfer.  At this time, PT recommends pt d/c to SNF.  Due to his weakness, balance deficits, and mobility deficits he would benefit from rehab.  He is significantly below his baseline, and to  reduce caregiver burden rehab would be safe option.  Pt does not want to go to rehab, but was agreeable to  PT.  At this time PT will continue to follow.     Time Calculation:         PT Charges       Row Name 12/14/23 1020             Time Calculation    Start Time 0839  -EJ      Stop Time 0924  -EJ      Time Calculation (min) 45 min  -EJ      PT Received On 12/14/23  -EJ      PT - Next Appointment 12/15/23  -EJ      PT Goal Re-Cert Due Date 12/28/23  -EJ         Time Calculation- PT    Total Timed Code Minutes- PT 20 minute(s)  -EJ                User Key  (r) = Recorded By, (t) = Taken By, (c) = Cosigned By      Initials Name Provider Type     Nati Phillips, PT Physical Therapist                  Therapy Charges for Today       Code Description Service Date Service Provider Modifiers Qty    08314380316 HC PT THERAPEUTIC ACT EA 15 MIN 12/14/2023 Nati Phillips, PT GP 1    95975838991 HC PT NEUROMUSC RE EDUCATION EA 15 MIN 12/14/2023 Nati Phillips, PT GP 1    07658132036 HC PT EVAL MOD COMPLEXITY 3 12/14/2023 Nati Phillips, PT GP 1            PT G-Codes  Outcome Measure Options: AM-PAC 6 Clicks Basic Mobility (PT)  AM-PAC 6 Clicks Score (PT): 10  PT Discharge Summary  Anticipated Discharge Disposition (PT): skilled nursing facility    Nati Phillips, PT  12/14/2023

## 2023-12-14 NOTE — DISCHARGE PLACEMENT REQUEST
"Blas Mojica (73 y.o. Male)       Date of Birth   1950    Social Security Number       Address   [address] [facility] [address] [place] [address]    Home Phone   [phone]    MRN   [id number]       Adventism   Islam    Marital Status                               Admission Date   12/12/23    Admission Type   Emergency    Admitting Provider   Pablito Vasquez MD    Attending Provider   Pablito Vasquez MD    Department, Room/Bed   [facility] 3A MEDICAL INPATIENT, 311/1       Discharge Date       Discharge Disposition   Home or Self Care    Discharge Destination                                 Attending Provider: Pablito Vasquez MD    Allergies: Contrast Dye (Echo Or Unknown Ct/mr), Iodinated Contrast Media, Iodine, Aspirin, Lipitor [Atorvastatin], Prednisone    Isolation: None   Infection: None   Code Status: CPR    Ht: 177.8 cm (70\")   Wt: 88.6 kg (195 lb 5.2 oz)    Admission Cmt: None   Principal Problem: GI bleed [K92.2]                   Active Insurance as of 12/12/2023       Primary Coverage       Payor Plan Insurance Group Employer/Plan Group    MEDICARE MEDICARE A & B        Payor Plan Address Payor Plan Phone Number Payor Plan Fax Number Effective Dates    [address] [phone]  9/1/2014 - None Entered    [facility] [id number]         Subscriber Name Subscriber Birth Date Member ID       BLAS MOJICA 1950 [id number]               Secondary Coverage       Payor Plan Insurance Group Employer/Plan Group    HUMANA HUMANA [facility]              [id number]       Payor Plan Address Payor Plan Phone Number Payor Plan Fax Number Effective Dates    [address] [address]   9/1/2014 - None Entered    [facility] [id number]         Subscriber Name Subscriber Birth Date Member ID       BLAS MOJICA 1950 [id number]                     Emergency Contacts        (Rel.) Home Phone Work Phone Mobile Phone    NICK MOJICA (Spouse) [phone] -- [phone]              "

## 2023-12-14 NOTE — PLAN OF CARE
Goal Outcome Evaluation:      Pt resting in bed throughout shift. Sling to right arm in place. Call light within reach. Vital signs stable. Plan of care ongoing.                    INTERNAL MEDICINE HOSPITALIST H&P    Chief Complaint: Fatigue and confusio    HPI:   Tano Hatch is a 70 year old male with PMH notable for extensive SCLC, paroxysmal Afib on eliquis  who presents to the ED on 2/7 with c/o increased fatigue and confusion.      Per son, patient demonstrated increased fatigue, incontinence and dysarthria of several days duration. He has been more confused and increasingly weak. Poor appetite. Reports \"raspy\" cough. No fevers or diarrhea reported    Most recent chemo with Dr Anaya was on 2/2.     ED course:   - Vitals: Normal BP. HR 120s (Afib).RR 20. SpO2 96-98% on 4L . Afebrile   - Labs: No leukocytosis. Baseline anemia. Normal renal function. UA negative. Procal negative . Blood Cx pending. Lactate 2.1.    - Imaging: CXR concerning for vascular congestion versus retrocardiac opacity    - Interventions: Cefepime, vancomycin, 1L NS    During my interaction, he was only oriented to self.     Review of Systems: 10 point ROS performed and pertinent positives as stated in HPI. All other systems reviewed and negative.     Past Medical and Surgical History:   Past Medical History:   Diagnosis Date   • Colon polyps    • Constipation    • COPD (chronic obstructive pulmonary disease) (CMS/HCC)    • Fracture    • HTN (hypertension)    • Hypogonadism male    • Malignant neoplasm (CMS/HCC)    • Sinusitis, chronic    • Tobacco abuse    • Type 2 DM (diabetes mellitus)     dx 1997     Past Surgical History:   Procedure Laterality Date   • Colonoscopy w/ polypectomy  11/18/2010     Home Medications:   Prior to Admission medications    Medication Sig Start Date End Date Taking? Authorizing Provider   prochlorperazine (COMPAZINE) 10 MG tablet Take 1 tablet by mouth every 6 hours as needed for Nausea or Vomiting. 2/2/23   JJ Bond   ondansetron (ZOFRAN) 8 MG tablet Take 1 tablet by mouth in the morning and 1 tablet in the evening. Take for 2 days post chemotherapy. 2/2/23   Toshia Metzger  APNP   midodrine (PROAMATINE) 2.5 MG tablet Take 3 tablets by mouth 3 times daily. 1/30/23   Sophie Flores MD   LORazepam (ATIVAN) 0.5 MG tablet 1 tablet every 6 hours as needed for anxiety 1/26/23   Jael Leone MD   memantine (NAMENDA) 5 MG tablet Take 1 tablet by mouth as directed. Take 1 tab in the AM x7 days, THEN 1 tab in AM & 1 in the PM x7 days, THEN 2 tabs in the AM and 1 tab in the PM x7 days. 12/5/22   Karime Contreras PA-C   memantine (NAMENDA) 10 MG tablet Take 1 tablet by mouth in the morning and 1 tablet in the evening. Do not start before December 26, 2022. 12/26/22   Karime Contreras PA-C   budesonide-formoterol (Symbicort) 160-4.5 MCG/ACT inhaler Inhale 2 puffs into the lungs in the morning and 2 puffs in the evening. 11/18/22   Donovan Montoya MD   blood glucose test strip Test blood sugar 1 times daily as directed. Diagnosis: DM. Meter: Per insurance. 9/6/22   Donovan Montoya MD   metformin (GLUCOPHAGE) 1000 MG tablet Take 1 tablet by mouth in the morning and 1 tablet in the evening. 9/6/22   Donovan Montoya MD   apixaBAN (ELIQUIS) 5 MG Tab Take 1 tablet by mouth every 12 hours. 9/1/22   Donovan Montoya MD   digoxin (LANOXIN) 0.125 MG tablet Take 1 tablet by mouth daily. 9/1/22 1/5/23  Donovan Montoya MD   pravastatin (PRAVACHOL) 40 MG tablet Take 1 tablet by mouth daily. 9/1/22   Donovan Montoya MD   magnesium oxide (MAG-OX) 400 (240 Mg) MG tablet Take 1 tablet by mouth daily. Take 2 hours after digoxin 9/1/22   Donovan Montoya MD   dapagliflozin (Farxiga) 10 MG tablet Take 1 tablet by mouth daily. 8/30/22   Donovan Montoya MD   pioglitazone (ACTOS) 45 MG tablet Take daily for 3 days after chemo when you are taking dexamethasone.  Do not take Actos 30 on days when you are taking Actos 45. 8/30/22   Donovan Montoya MD   Cholecalciferol (Vitamin D3) 125 mcg (5,000 units) tablet Take 125 mcg by mouth daily.    Outside Provider   pioglitazone (ACTOS) 30 MG tablet TAKE 1 TABLET BY MOUTH DAILY 8/17/22    Donovan Montoya MD   melatonin 3 MG Take 1 tablet by mouth nightly as needed (Insomnia). 8/15/22   Joseph Albarran MD   acetaminophen (TYLENOL) 325 MG tablet Take 2 tablets by mouth every 4 hours as needed for Pain. 8/15/22   Joseph Albarrna MD   metoPROLOL succinate (TOPROL-XL) 25 MG 24 hr tablet Take 2 tablets by mouth in the morning and 2 tablets in the evening.  Patient taking differently: Take 25 mg by mouth in the morning and 25 mg in the evening. 8/15/22 1/5/23  Joseph Albarran MD   furosemide (LASIX) 40 MG tablet Take 1 tablet by mouth daily. Do not start before August 16, 2022. 8/16/22 8/22/22  Joseph Albarran MD   cannabidiol (CBD) oil Apply 1 application topically as needed (severe neck pain). Do not start before August 23, 2022. 8/23/22   Outside Provider   Ascorbic Acid (VITAMIN C PO) Take 500 mg by mouth 2 (two) times a day. Do not start before August 23, 2022. 8/23/22   Outside Provider   albuterol 108 (90 Base) MCG/ACT inhaler Inhale 2 puffs into the lungs every 4 hours as needed for Shortness of Breath or Wheezing. 7/6/22   Donovan Montoya MD   insulin glargine 100 UNIT/ML pen-injector Inject 15 Units into the skin nightly. Prime 2 units before each dose.  Patient not taking: Reported on 8/4/2022 5/27/22 8/15/22  Donovan Montoya MD   lisinopril (ZESTRIL) 20 MG tablet Take 1 tablet by mouth daily. 4/18/22 7/29/22  Jazmin Argueta MD   glipiZIDE (GLUCOTROL) 10 MG tablet TAKE 1 TABLET BY MOUTH TWICE DAILY BEFORE MEALS 1/10/22 8/22/22  Hossein Tai DO   exenatide ER (BYDUREON) 2 MG/0.85ML auto injector Inject 0.85 mLs into the skin every 7 days.  Patient not taking: Reported on 8/4/2022 8/2/21 8/15/22  Hossein Tai DO       Allergies:   ALLERGIES:  No Known Allergies   Family History:   Family History   Problem Relation Age of Onset   • Diabetes Mother    • Patient is unaware of any medical problems Maternal Aunt    • Patient is unaware of any medical problems Maternal Uncle    • Patient  is unaware of any medical problems Paternal Aunt    • Patient is unaware of any medical problems Paternal Uncle      Social History:   Social History     Tobacco Use   • Smoking status: Former     Packs/day: 1.00     Years: 51.00     Pack years: 51.00     Types: Cigarettes     Quit date: 2022     Years since quittin.5   • Smokeless tobacco: Never   • Tobacco comments:     per pt one pack a day    Substance Use Topics   • Alcohol use: Not Currently     Comment: couple of drinks once a week during  games     Physical Exam:   Vitals:    23 1433 23 1500 23 1531   Temp:      Pulse: (!) 118 (!) 113 (!) 115   Resp: 20 20 (!) 21   SpO2: 99% 98% 98%   BP: 138/75 122/79 126/88      Wt Readings from Last 3 Encounters:   23 88.5 kg (195 lb 1.7 oz)   23 87 kg (191 lb 12.8 oz)   23 87 kg (191 lb 11 oz)        General: Lethargic. Mumbling words   Skin: warm and well perfused, no rashes, no jaundice, no pallor  HEENT: NCAT, pupils appear equal, pink conjunctiva, sclera anicteric, oral mucosa moist without lesions, trachea midline  CV: normal S1S2, regular rate and rhythm, no MRG appreciated, pedal pulses 2+, no JVD  Resp: normal respiratory effort, no increased work of breathing, CTAB  Abd: soft, non-tender, non-distended, normoactive bowel sounds  Ext: no edema, no cyanosis, no clubbing  Neuro: Non-focal   Psych: alert and oriented to self only   Access: R chest port    Labs & Imaging:   Recent Labs   Lab 23  1406 23  1359 23  1304   WBC  --  5.2 7.1   HCT  --  35.6* 37.8*   HGB  --  11.0* 12.1*   PLT  --  202 244   INR  --  1.0  --    PTT  --  29  --    SODIUM  --  136 135   POTASSIUM  --  4.5 4.5   CHLORIDE  --  104 99   CO2  --  27 26   CALCIUM  --  9.3 8.8   GLUCOSE  --  182* 211*   BUN  --  27* 17   CREATININE 0.50* 0.55* 0.47*   AST  --  16 15   GPT  --  45 15   ALKPT  --  59 72   BILIRUBIN  --  0.4 0.4   ALBUMIN  --  3.2* 3.2*       XR Chest AP or  PA    Result Date: 2/7/2023  EXAM: XR CHEST AP OR PA EXAM TIME: 1451 hours. TECHNIQUE: Portable upright. INDICATION: Fever. COMPARISON: X-ray 8/20/2022. CT chest 1/20/2023. FINDINGS:  Support Devices: Right chest port. Cardiac Silhouette/Mediastinum/Marta:  Heart remains borderline enlarged. Pulmonary vasculature: The interstitial markings are coarsened and diffusely prominent, progressed since the prior study. Lungs/Pleural Spaces:  Retrocardiac opacification in the setting of elevated left hemidiaphragm. Emphysematous changes. No pneumothorax. Chest Wall/Diaphragm/Upper Abdomen:  The upper abdomen is unremarkable.     IMPRESSION: 1.  Interval progression in diffuse prominence of the coarsened interstitial markings. 2.  Retrocardiac opacification partially obscures left hemidiaphragm and sulcus.      =============================================================  Assessment   1. Acute encephalopathy, multifactorial    2. Chronic hypoxemic respiratory failure  3. Extensive stage SCLC s/p Lurbinectedin on 1/26/23)-Follows with [facility]   4. SIRS (tachycardia and tachypnea)  5. Afib with RVR, unclear etiology   6. History of paroxysmal Afib on Apixaban   7. Chronic anemia     Plan  -Presented with c/o fatigue and increased confusion.   -CT head pending at time of this writing.    -CXR suggestive of pulmonary vascular congestion versus retrocardiac opacity ?   -Received cefepime and vancomycin in the ED and 1L NS in the ED .I will continue for now given toxic appearance while we await blood cultures  -NT-proBNP 1500 (similar to 8/2022). Will defer diuretics for now.  -Cardiology consulted. I will resume home digoxin and metoprolol (25 mg Q 6 hrs for now, pending cards recs). Telemetry.   -Medical oncology consulted.  -I will consider MR brain to rule out brain mets given increased confusion.     Discussed with son and ex-wife, patient to remain FULL CODE for now, including, ICU transfers and pressors, if indicated.        VTE ppx: Eliquis  Code status: Full Resuscitation (usual medical care including resuscitation, intubation, vasopressors, and transfer to the ICU if/when indicated)    Vinicio Boyce MD  [facility] Hospitalist    Please contact the above hospitalist from 7am until 7pm via Epic Secure Chat (preferred) or pager.  From 7pm to 7am please contact the hospitalist on call at [phone].

## 2023-12-14 NOTE — PLAN OF CARE
Goal Outcome Evaluation:  Plan of Care Reviewed With: patient           Outcome Evaluation: Patient is a 74 y/o M who was admitted [facility] on 12/12/23 due to vomiting up coffee ground emesis.  Pt was also found to have gastric distension.  NG tube was placed, and has since been removed.  Pt also with R humerus fx that occurred on Nov. 28.  Pt is still in abduction sling at this time.  Per pt he is able to perform elbow/wrist/hand ROM only at this time.  PMHx includes HTN, DM, and TIA.  At baseline pt reports living at home with his wife.  Prior to fall and humerus fx, pt was independent with mobility and was able to drive.  He was using a SPC at ambulate.  Since the fall, he has required assist from his spouse with all mobility and has not been ambulating.  Pt currently has a hospital bed, w/c, RW, SPC, grab bars, walk-shower, and BSC that he uses over his toilet.  During today's evaluation pt was mod A with supine to sit.  Once sitting pt tended to have slight posterior lean requiring mod A for correction and cues for forward weight shift.  Pt had difficulty scooting towards EOB and Hemant provided as well as cues.  Pt was able to perform STS with modA x2 and HHA LUE.  He was fearful of falling, and during bed to chair transfer, pt was afriad to let therapist assist.  He became rigid at times and required max Ax2 for successfull bed to chair transfer.  At this time, PT recommends pt d/c to SNF.  Due to his weakness, balance deficits, and mobility deficits he would benefit from rehab.  He is significantly below his baseline, and to reduce caregiver burden rehab would be safe option.  Pt does not want to go to rehab, but was agreeable to  PT.  At this time PT will continue to follow.      Anticipated Discharge Disposition (PT): skilled nursing facility

## 2023-12-15 NOTE — CASE MANAGEMENT/SOCIAL WORK
Case Management Discharge Note                Selected Continued Care - Discharged on 12/14/2023 Admission date: 12/12/2023 - Discharge disposition: Rehab Facility or Unit (DC - External)      Destination Coordination complete.      Service Provider Selected Services Address Phone Fax Patient Preferred    [facility] [address] [place] [address] [place] [address] [phone] --              Durable Medical Equipment    No services have been selected for the patient.                Dialysis/Infusion    No services have been selected for the patient.                Home Medical Care    No services have been selected for the patient.                Therapy    No services have been selected for the patient.                Community Resources    No services have been selected for the patient.                Community & DME    No services have been selected for the patient.                    Transportation Services  W/C Van: Other (STEPHANIE jones)

## 2023-12-28 ENCOUNTER — HOSPITAL ENCOUNTER (INPATIENT)
Facility: HOSPITAL | Age: 73
LOS: 6 days | Discharge: NURSING, REHAB OR LONG-TERM CARE | End: 2024-01-04
Attending: EMERGENCY MEDICINE | Admitting: INTERNAL MEDICINE
Payer: MEDICARE

## 2023-12-28 ENCOUNTER — APPOINTMENT (OUTPATIENT)
Dept: GENERAL RADIOLOGY | Facility: HOSPITAL | Age: 73
End: 2023-12-28
Payer: MEDICARE

## 2023-12-28 ENCOUNTER — APPOINTMENT (OUTPATIENT)
Dept: CT IMAGING | Facility: HOSPITAL | Age: 73
End: 2023-12-28
Payer: MEDICARE

## 2023-12-28 DIAGNOSIS — R53.1 WEAKNESS: Primary | ICD-10-CM

## 2023-12-28 DIAGNOSIS — U07.1 COVID-19: ICD-10-CM

## 2023-12-28 DIAGNOSIS — R79.89 ELEVATED TROPONIN: ICD-10-CM

## 2023-12-28 LAB
ALBUMIN SERPL-MCNC: 3.9 G/DL (ref 3.5–5.2)
ALBUMIN/GLOB SERPL: 1.1 G/DL
ALP SERPL-CCNC: 118 U/L (ref 39–117)
ALT SERPL W P-5'-P-CCNC: 18 U/L (ref 1–41)
ANION GAP SERPL CALCULATED.3IONS-SCNC: 10 MMOL/L (ref 5–15)
AST SERPL-CCNC: 23 U/L (ref 1–40)
BASOPHILS # BLD AUTO: 0 10*3/MM3 (ref 0–0.2)
BASOPHILS NFR BLD AUTO: 0.6 % (ref 0–1.5)
BILIRUB SERPL-MCNC: 0.8 MG/DL (ref 0–1.2)
BUN SERPL-MCNC: 24 MG/DL (ref 8–23)
BUN/CREAT SERPL: 17.1 (ref 7–25)
CALCIUM SPEC-SCNC: 9.8 MG/DL (ref 8.6–10.5)
CHLORIDE SERPL-SCNC: 100 MMOL/L (ref 98–107)
CO2 SERPL-SCNC: 30 MMOL/L (ref 22–29)
CREAT SERPL-MCNC: 1.4 MG/DL (ref 0.76–1.27)
DEPRECATED RDW RBC AUTO: 42.4 FL (ref 37–54)
EGFRCR SERPLBLD CKD-EPI 2021: 52.7 ML/MIN/1.73
EOSINOPHIL # BLD AUTO: 0 10*3/MM3 (ref 0–0.4)
EOSINOPHIL NFR BLD AUTO: 0.1 % (ref 0.3–6.2)
ERYTHROCYTE [DISTWIDTH] IN BLOOD BY AUTOMATED COUNT: 13.2 % (ref 12.3–15.4)
FLUAV RNA RESP QL NAA+PROBE: NOT DETECTED
FLUBV RNA RESP QL NAA+PROBE: NOT DETECTED
GEN 5 2HR TROPONIN T REFLEX: 47 NG/L
GLOBULIN UR ELPH-MCNC: 3.6 GM/DL
GLUCOSE BLDC GLUCOMTR-MCNC: 179 MG/DL (ref 70–105)
GLUCOSE SERPL-MCNC: 268 MG/DL (ref 65–99)
HCT VFR BLD AUTO: 41.9 % (ref 37.5–51)
HGB BLD-MCNC: 14.1 G/DL (ref 13–17.7)
LIPASE SERPL-CCNC: 30 U/L (ref 13–60)
LYMPHOCYTES # BLD AUTO: 0.6 10*3/MM3 (ref 0.7–3.1)
LYMPHOCYTES NFR BLD AUTO: 9.8 % (ref 19.6–45.3)
MAGNESIUM SERPL-MCNC: 1.9 MG/DL (ref 1.6–2.4)
MCH RBC QN AUTO: 30.8 PG (ref 26.6–33)
MCHC RBC AUTO-ENTMCNC: 33.5 G/DL (ref 31.5–35.7)
MCV RBC AUTO: 91.9 FL (ref 79–97)
MONOCYTES # BLD AUTO: 0.5 10*3/MM3 (ref 0.1–0.9)
MONOCYTES NFR BLD AUTO: 7 % (ref 5–12)
NEUTROPHILS NFR BLD AUTO: 5.3 10*3/MM3 (ref 1.7–7)
NEUTROPHILS NFR BLD AUTO: 82.5 % (ref 42.7–76)
NRBC BLD AUTO-RTO: 0 /100 WBC (ref 0–0.2)
NT-PROBNP SERPL-MCNC: 581.4 PG/ML (ref 0–900)
PLATELET # BLD AUTO: 159 10*3/MM3 (ref 140–450)
PMV BLD AUTO: 7.9 FL (ref 6–12)
POTASSIUM SERPL-SCNC: 4.1 MMOL/L (ref 3.5–5.2)
PROT SERPL-MCNC: 7.5 G/DL (ref 6–8.5)
RBC # BLD AUTO: 4.56 10*6/MM3 (ref 4.14–5.8)
RSV RNA NPH QL NAA+NON-PROBE: NOT DETECTED
SARS-COV-2 RNA RESP QL NAA+PROBE: DETECTED
SODIUM SERPL-SCNC: 140 MMOL/L (ref 136–145)
TROPONIN T DELTA: -6 NG/L
TROPONIN T SERPL HS-MCNC: 53 NG/L
WBC NRBC COR # BLD AUTO: 6.5 10*3/MM3 (ref 3.4–10.8)

## 2023-12-28 PROCEDURE — 25810000003 SODIUM CHLORIDE 0.9 % SOLUTION: Performed by: INTERNAL MEDICINE

## 2023-12-28 PROCEDURE — 83735 ASSAY OF MAGNESIUM: CPT | Performed by: EMERGENCY MEDICINE

## 2023-12-28 PROCEDURE — 80053 COMPREHEN METABOLIC PANEL: CPT | Performed by: EMERGENCY MEDICINE

## 2023-12-28 PROCEDURE — 85025 COMPLETE CBC W/AUTO DIFF WBC: CPT | Performed by: EMERGENCY MEDICINE

## 2023-12-28 PROCEDURE — 74176 CT ABD & PELVIS W/O CONTRAST: CPT

## 2023-12-28 PROCEDURE — 84484 ASSAY OF TROPONIN QUANT: CPT | Performed by: EMERGENCY MEDICINE

## 2023-12-28 PROCEDURE — G0378 HOSPITAL OBSERVATION PER HR: HCPCS

## 2023-12-28 PROCEDURE — 25010000002 ENOXAPARIN PER 10 MG: Performed by: NURSE PRACTITIONER

## 2023-12-28 PROCEDURE — 36415 COLL VENOUS BLD VENIPUNCTURE: CPT

## 2023-12-28 PROCEDURE — 87637 SARSCOV2&INF A&B&RSV AMP PRB: CPT | Performed by: EMERGENCY MEDICINE

## 2023-12-28 PROCEDURE — 83690 ASSAY OF LIPASE: CPT | Performed by: EMERGENCY MEDICINE

## 2023-12-28 PROCEDURE — 82948 REAGENT STRIP/BLOOD GLUCOSE: CPT

## 2023-12-28 PROCEDURE — 87102 FUNGUS ISOLATION CULTURE: CPT | Performed by: NURSE PRACTITIONER

## 2023-12-28 PROCEDURE — 93005 ELECTROCARDIOGRAM TRACING: CPT | Performed by: EMERGENCY MEDICINE

## 2023-12-28 PROCEDURE — 25810000003 SODIUM CHLORIDE 0.9 % SOLUTION: Performed by: EMERGENCY MEDICINE

## 2023-12-28 PROCEDURE — 83880 ASSAY OF NATRIURETIC PEPTIDE: CPT | Performed by: EMERGENCY MEDICINE

## 2023-12-28 PROCEDURE — 71045 X-RAY EXAM CHEST 1 VIEW: CPT

## 2023-12-28 PROCEDURE — 63710000001 INSULIN LISPRO (HUMAN) PER 5 UNITS: Performed by: NURSE PRACTITIONER

## 2023-12-28 PROCEDURE — 63710000001 INSULIN GLARGINE PER 5 UNITS: Performed by: NURSE PRACTITIONER

## 2023-12-28 PROCEDURE — 99285 EMERGENCY DEPT VISIT HI MDM: CPT

## 2023-12-28 RX ORDER — NITROGLYCERIN 0.4 MG/1
0.4 TABLET SUBLINGUAL
Status: DISCONTINUED | OUTPATIENT
Start: 2023-12-28 | End: 2024-01-04 | Stop reason: HOSPADM

## 2023-12-28 RX ORDER — ACETAMINOPHEN 160 MG/5ML
650 SOLUTION ORAL EVERY 4 HOURS PRN
Status: DISCONTINUED | OUTPATIENT
Start: 2023-12-28 | End: 2024-01-04 | Stop reason: HOSPADM

## 2023-12-28 RX ORDER — INSULIN LISPRO 100 [IU]/ML
2-9 INJECTION, SOLUTION INTRAVENOUS; SUBCUTANEOUS EVERY 6 HOURS SCHEDULED
Status: DISCONTINUED | OUTPATIENT
Start: 2023-12-29 | End: 2023-12-30

## 2023-12-28 RX ORDER — ACETAMINOPHEN 650 MG/1
650 SUPPOSITORY RECTAL EVERY 4 HOURS PRN
Status: DISCONTINUED | OUTPATIENT
Start: 2023-12-28 | End: 2024-01-04 | Stop reason: HOSPADM

## 2023-12-28 RX ORDER — DEXTROSE MONOHYDRATE 25 G/50ML
25 INJECTION, SOLUTION INTRAVENOUS
Status: DISCONTINUED | OUTPATIENT
Start: 2023-12-28 | End: 2024-01-04 | Stop reason: HOSPADM

## 2023-12-28 RX ORDER — SODIUM CHLORIDE 0.9 % (FLUSH) 0.9 %
10 SYRINGE (ML) INJECTION AS NEEDED
Status: DISCONTINUED | OUTPATIENT
Start: 2023-12-28 | End: 2024-01-04 | Stop reason: HOSPADM

## 2023-12-28 RX ORDER — SODIUM CHLORIDE 9 MG/ML
40 INJECTION, SOLUTION INTRAVENOUS AS NEEDED
Status: DISCONTINUED | OUTPATIENT
Start: 2023-12-28 | End: 2024-01-04 | Stop reason: HOSPADM

## 2023-12-28 RX ORDER — BENZONATATE 100 MG/1
200 CAPSULE ORAL 3 TIMES DAILY PRN
Status: DISCONTINUED | OUTPATIENT
Start: 2023-12-28 | End: 2024-01-04 | Stop reason: HOSPADM

## 2023-12-28 RX ORDER — BISACODYL 5 MG/1
5 TABLET, DELAYED RELEASE ORAL DAILY PRN
Status: DISCONTINUED | OUTPATIENT
Start: 2023-12-28 | End: 2024-01-04 | Stop reason: HOSPADM

## 2023-12-28 RX ORDER — AMOXICILLIN 250 MG
2 CAPSULE ORAL 2 TIMES DAILY
Status: DISCONTINUED | OUTPATIENT
Start: 2023-12-28 | End: 2024-01-04 | Stop reason: HOSPADM

## 2023-12-28 RX ORDER — IBUPROFEN 600 MG/1
1 TABLET ORAL
Status: DISCONTINUED | OUTPATIENT
Start: 2023-12-28 | End: 2024-01-04 | Stop reason: HOSPADM

## 2023-12-28 RX ORDER — SODIUM CHLORIDE 9 MG/ML
50 INJECTION, SOLUTION INTRAVENOUS CONTINUOUS
Status: DISCONTINUED | OUTPATIENT
Start: 2023-12-28 | End: 2023-12-30

## 2023-12-28 RX ORDER — GUAIFENESIN 200 MG/10ML
200 LIQUID ORAL EVERY 4 HOURS PRN
Status: DISCONTINUED | OUTPATIENT
Start: 2023-12-28 | End: 2024-01-04 | Stop reason: HOSPADM

## 2023-12-28 RX ORDER — PANTOPRAZOLE SODIUM 40 MG/10ML
40 INJECTION, POWDER, LYOPHILIZED, FOR SOLUTION INTRAVENOUS EVERY 12 HOURS SCHEDULED
Status: DISCONTINUED | OUTPATIENT
Start: 2023-12-28 | End: 2024-01-01

## 2023-12-28 RX ORDER — LISINOPRIL 20 MG/1
40 TABLET ORAL DAILY
Status: DISCONTINUED | OUTPATIENT
Start: 2023-12-28 | End: 2024-01-03

## 2023-12-28 RX ORDER — ASPIRIN 81 MG/1
324 TABLET, CHEWABLE ORAL ONCE
Status: COMPLETED | OUTPATIENT
Start: 2023-12-28 | End: 2023-12-28

## 2023-12-28 RX ORDER — SODIUM CHLORIDE 0.9 % (FLUSH) 0.9 %
10 SYRINGE (ML) INJECTION EVERY 12 HOURS SCHEDULED
Status: DISCONTINUED | OUTPATIENT
Start: 2023-12-28 | End: 2024-01-04 | Stop reason: HOSPADM

## 2023-12-28 RX ORDER — NICOTINE POLACRILEX 4 MG
15 LOZENGE BUCCAL
Status: DISCONTINUED | OUTPATIENT
Start: 2023-12-28 | End: 2024-01-04 | Stop reason: HOSPADM

## 2023-12-28 RX ORDER — ONDANSETRON 2 MG/ML
4 INJECTION INTRAMUSCULAR; INTRAVENOUS EVERY 6 HOURS PRN
Status: DISCONTINUED | OUTPATIENT
Start: 2023-12-28 | End: 2024-01-04 | Stop reason: HOSPADM

## 2023-12-28 RX ORDER — ONDANSETRON 4 MG/1
4 TABLET, FILM COATED ORAL EVERY 6 HOURS PRN
Status: DISCONTINUED | OUTPATIENT
Start: 2023-12-28 | End: 2024-01-04 | Stop reason: HOSPADM

## 2023-12-28 RX ORDER — BISACODYL 10 MG
10 SUPPOSITORY, RECTAL RECTAL DAILY PRN
Status: DISCONTINUED | OUTPATIENT
Start: 2023-12-28 | End: 2024-01-04 | Stop reason: HOSPADM

## 2023-12-28 RX ORDER — POLYETHYLENE GLYCOL 3350 17 G/17G
17 POWDER, FOR SOLUTION ORAL DAILY PRN
Status: DISCONTINUED | OUTPATIENT
Start: 2023-12-28 | End: 2024-01-04 | Stop reason: HOSPADM

## 2023-12-28 RX ORDER — INSULIN LISPRO 100 [IU]/ML
2-9 INJECTION, SOLUTION INTRAVENOUS; SUBCUTANEOUS
Status: DISCONTINUED | OUTPATIENT
Start: 2023-12-28 | End: 2023-12-28

## 2023-12-28 RX ORDER — ENOXAPARIN SODIUM 100 MG/ML
40 INJECTION SUBCUTANEOUS DAILY
Status: DISCONTINUED | OUTPATIENT
Start: 2023-12-28 | End: 2023-12-30

## 2023-12-28 RX ORDER — CLOPIDOGREL BISULFATE 75 MG/1
75 TABLET ORAL DAILY
Status: DISCONTINUED | OUTPATIENT
Start: 2023-12-29 | End: 2024-01-01

## 2023-12-28 RX ORDER — ACETAMINOPHEN 325 MG/1
650 TABLET ORAL EVERY 4 HOURS PRN
Status: DISCONTINUED | OUTPATIENT
Start: 2023-12-28 | End: 2024-01-04 | Stop reason: HOSPADM

## 2023-12-28 RX ORDER — CETIRIZINE HYDROCHLORIDE 10 MG/1
10 TABLET ORAL DAILY
Status: DISCONTINUED | OUTPATIENT
Start: 2023-12-28 | End: 2024-01-04 | Stop reason: HOSPADM

## 2023-12-28 RX ADMIN — INSULIN LISPRO 2 UNITS: 100 INJECTION, SOLUTION INTRAVENOUS; SUBCUTANEOUS at 18:55

## 2023-12-28 RX ADMIN — PANTOPRAZOLE SODIUM 40 MG: 40 INJECTION, POWDER, FOR SOLUTION INTRAVENOUS at 20:20

## 2023-12-28 RX ADMIN — ASPIRIN 81 MG CHEWABLE TABLET 324 MG: 81 TABLET CHEWABLE at 17:35

## 2023-12-28 RX ADMIN — CETIRIZINE HYDROCHLORIDE 10 MG: 10 TABLET, FILM COATED ORAL at 17:35

## 2023-12-28 RX ADMIN — SODIUM CHLORIDE 75 ML/HR: 9 INJECTION, SOLUTION INTRAVENOUS at 20:21

## 2023-12-28 RX ADMIN — INSULIN GLARGINE 10 UNITS: 100 INJECTION, SOLUTION SUBCUTANEOUS at 20:20

## 2023-12-28 RX ADMIN — SODIUM CHLORIDE 500 ML: 9 INJECTION, SOLUTION INTRAVENOUS at 13:36

## 2023-12-28 RX ADMIN — ENOXAPARIN SODIUM 40 MG: 100 INJECTION SUBCUTANEOUS at 17:35

## 2023-12-28 NOTE — H&P
Hospitalist History and Physical     Blas Mojica : 1950 MRN:[id number] LOS:0 ROOM:      Reason for admission: Generalized weakness     Assessment / Plan     Generalized lower abdominal pain  Recent EGD.  CT Abdomen/Pelvis: no acute abnormalities.  Further workup pending clinical progress.    COVID-19 positive  Has a persistent cough, unchanged from previous admission, unlikely COVID-related, asymptomatic, does not need treatment.  COVID-19 in ED.  Keep in isolation per hospital protocols.  Tessalon Perles and Zyrtec ordered.    Generalized weakness  Generalized deconditioning from back to back hospitalizations.  PT Eval and treat, seeking recommendations for discharge placement, currently from [facility] in independent living.  Case management assistance appreciated, may require GEOVANNI.    Sinus tachycardia, possible atrial fibrillation with RVR  EKG independently reviewed: Sinus tachycardia, heart rate 111, .  Without ST or T wave abnormalities.  Of note, there is a lot of artifact on the first half of the EKG.  EKG stated as atrial fibrillation, may be in/out of a. Fib.  Cardiology consulted, sees Dr. Vasquez.  No history of atrial fibrillation on PMH, on no rate controlling medications as outpatient.   [id number] Score of at least 5. Currently on prophylactic Lovenox, due to recent GIB, will hold off on full anticoagulation.    Diabetes mellitus Type 2, not insulin-dependent : well controlled.   Previous hemoglobin A1c on 2023, 7.70.  Continue Lantus as reduced dose.  Hold oral agents.  Accu checks ACHS and c/w humalog coverage as needed.     Essential Hypertension: well controlled.   BP well controlled, hold Maxzide and lisinopril, resume when clinically appropriate.  Titrate medications as needed.    Chronic kidney disease  Remains nonoliguric.   Monitor Input/Output very closely.   Starting IVF to prevent BRIGIDO.  Avoids NSAIDs, nephrotoxic medications, and hypotension.  Renally adjust  medications for Estimated Creatinine Clearance: 51.9 mL/min (A) (by C-G formula based on SCr of 1.4 mg/dL (H)).    History of recent suspected GIB with erosive esophagitis & gastric ulcers  Hemoglobin normal on admission, continue to monitor and trend labs.  Underwent EGD on 12/13/2023 with findings of erosive esophagitis and gastric ulcers, recommend BID Protonix x 1 month, then once daily.  Recommended repeat EGD in 8-12 weeks.  Did develop some post-nasal drainage with dry cough that has been persistent since placement of EGD during that admission.    Dysphagia  Keep NPO.  SLP evaluation.    Hyperdense lesion of the upper pole of right kidney & mid left kidney  Subsequent find on CT, no hydronephrosis or obstructive uropathy.  Continue follow up as outpatient.    Recent fracture of proximal humeral surgical neck  Seen and evaluated in [place], [place], with XR showing fracture of the proximal humeral surgical neck, with 3.5 cm sclerotic lesion within the proximal humerus.  CT of the upper extremity was completed with impression of nondisplaced minimally comminuted fracture of the greater tuberosity of the humerus.  Patient presents in sling, states that he is unsure of what the plan is, as orthopedic surgery has yet to determine what to do.  Consider orthopedic surgery referral as outpatient.  Continue with splint in place.    History of TIA: Continue Plavix, intolerant of statin therapy.    Level Of Support Discussed With: Patient  Code Status (Patient has no pulse and is not breathing): CPR (Attempt to Resuscitate)  Medical Interventions (Patient has pulse or is breathing): Full Support       Nutrition:   Diet: Cardiac Diets, Diabetic Diets; Healthy Heart (2-3 Na+); Consistent Carbohydrate; Texture: Regular Texture (IDDSI 7); Fluid Consistency: Thin (IDDSI 0)     DVT prophylaxis:  Medical and mechanical DVT prophylaxis orders are present.     History of Present illness     Blas Mojica is a 73 y.o.  male with PMH of TIA, HLD, DM, HTN, CKD, and presented to the hospital for generalized weakness and generalized lower abdominal pain, and was admitted with a principal diagnosis of Generalized weakness.  Patient was recently hospitalized for a GI Bleed which he underwent and EGD with NGT placement, and EGD findings were consistent with erosive gastritis and gastric ulcers.  Patient was discharged home on BID protonix.  Patient was noted to have a post-nasal drip after difficulty with placing NGT.  This has caused a persistent dry cough since his discharge.  Patient was [facility] to [facility].      Patient endorsed lower abdominal discomfort, thinks that maybe it is from his excessive coughing.  Patient denied chest pain, shortness of breath, palpitations, nausea without vomiting, constipation, diarrhea.  He denied any change in his diet or appetite.  He additionally denied any problems with eating or drinking.  Patient did endorse that it is hard for him to stand up and ambulate, as he had a fall in November, and was seen and evaluated at a hospital in [place], and found to have a proximal humeral surgical neck fracture, and has since remained in the splint.  Patient states that he has just became generally weak and fatigued.  He denies fever or chills.  In the ED, labs were obtained with the following abnormalities: Troponin 53 with reflex of 47, CO2 30, BUN 24, creatinine 1.4, glucose 268, alk phos 118, lipase 30, without elevated WBC or bandemia.  Patient was tested for COVID-19 and discovered to be positive.  He denies any known recent contacts with ill individuals.  Chest x-ray was without acute cardiopulmonary process.  CT of the abdomen and pelvis was without acute abnormality in the abdomen or pelvis.  Patient received IV fluids in the ED, with planned urinalysis.  Patient additionally was discovered to have trouble with swallowing his pills, he was placed NPO with SLP  evaluation.  He was also found to have elevated HR with EKG suggestive of atrial fibrillation though he seems to be in and out of atrial fibrillation with no documented history.  Cardiology will be consulted.  Hospitalist service was consulted to admit patient for further treatment and evaluation.  It is suspected that patient's hospitalization will require less than 2 midnights, therefore patient will be placed in observation status.      Patient was seen and examined on 12/28/23 at 16:55 EST .    Subjective / Review of systems     Review of Systems   Constitutional:  Positive for activity change and fatigue. Negative for appetite change, chills and fever.   HENT:  Positive for congestion, postnasal drip and rhinorrhea. Negative for sore throat.    Respiratory:  Positive for cough and choking. Negative for shortness of breath.    Cardiovascular:  Negative for chest pain, palpitations and leg swelling.   Gastrointestinal:  Positive for abdominal pain. Negative for abdominal distention, constipation, diarrhea, nausea and vomiting.   Endocrine: Negative for cold intolerance and heat intolerance.   Genitourinary:  Negative for difficulty urinating and dysuria.   Musculoskeletal:         Shoulder pain in splint since 11/29/2023.   Skin:  Negative for wound.   Neurological:  Negative for dizziness and light-headedness.   Hematological:  Negative for adenopathy. Does not bruise/bleed easily.   Psychiatric/Behavioral:  Negative for confusion. The patient is not nervous/anxious.         Past Medical/Surgical/Social/Family History & Allergies     Past Medical History:   Diagnosis Date    Diabetes mellitus     Hyperlipidemia     Hypertension     Kidney stone     TIA (transient ischemic attack) 2016    Type 2 diabetes mellitus with stage 3 chronic kidney disease, with long-term current use of insulin 12/13/2021      Past Surgical History:   Procedure Laterality Date    ADENOIDECTOMY      ENDOSCOPY N/A 12/13/2023    Procedure:  ESOPHAGOGASTRODUODENOSCOPY with removal of NJ tube from right nare;  Surgeon: Jaret Valadez MD;  Location: [facility] ENDOSCOPY;  Service: Gastroenterology;  Laterality: N/A;  esophagitis, gastric ulcer    EXTRACORPOREAL SHOCK WAVE LITHOTRIPSY (ESWL)      HERNIA REPAIR      KNEE ACL RECONSTRUCTION Left     TONSILLECTOMY        Social History     Socioeconomic History    Marital status:    Tobacco Use    Smoking status: Never    Smokeless tobacco: Never   Vaping Use    Vaping Use: Never used   Substance and Sexual Activity    Alcohol use: Never    Drug use: Never    Sexual activity: Defer      Family History   Problem Relation Age of Onset    No Known Problems Mother     No Known Problems Father       Allergies   Allergen Reactions    Contrast Dye (Echo Or Unknown Ct/Mr) Anaphylaxis    Iodinated Contrast Media Shortness Of Breath    Iodine Shortness Of Breath    Aspirin Nausea And Vomiting    Lipitor [Atorvastatin] Unknown - High Severity    Prednisone Hives      Social Determinants of Health     Tobacco Use: Not on file   Alcohol Use: Not At Risk (12/12/2023)    AUDIT-C     Frequency of Alcohol Consumption: Never     Average Number of Drinks: Patient does not drink     Frequency of Binge Drinking: Never   Financial Resource Strain: Not on file   Food Insecurity: No Food Insecurity (12/12/2023)    Hunger Vital Sign     Worried About Running Out of Food in the Last Year: Never true     Ran Out of Food in the Last Year: Never true   Transportation Needs: Not on file   Physical Activity: Not on file   Stress: Not on file   Social Connections: Unknown (10/11/2023)    Family and Community Support     Help with Day-to-Day Activities: Not on file     Lonely or Isolated: Not on file   Interpersonal Safety: Not At Risk (12/28/2023)    Abuse Screen     Unsafe at Home or Work/School: no     Feels Threatened by Someone?: no     Does Anyone Keep You from Contacting Others or Doint Things Outside the Home?: no      Physical Sign of Abuse Present: no   Depression: Not at risk (10/22/2022)    PHQ-2     PHQ-2 Score: 0   Housing Stability: Not At Risk (12/12/2023)    Housing Stability     Current Living Arrangements: home     Potentially Unsafe Housing Conditions: none   Utilities: Not At Risk (12/12/2023)    [facility] Utilities     Threatened with loss of utilities: No   Health Literacy: Unknown (12/12/2023)    Education     Help with school or training?: Not on file     Preferred Language: English   Employment: Unknown (10/11/2023)    Employment     Do you want help finding or keeping work or a job?: Not on file   Disabilities: At Risk (12/12/2023)    Disabilities     Concentrating, Remembering, or Making Decisions Difficulty: no     Doing Errands Independently Difficulty: yes        Home Medications     Prior to Admission medications    Medication Sig Start Date End Date Taking? Authorizing Provider   clopidogrel (PLAVIX) 75 MG tablet Take 1 tablet by mouth Daily.    Isai Canales MD   insulin glargine (LANTUS, SEMGLEE) 100 UNIT/ML injection Inject 20 Units under the skin into the appropriate area as directed Every Night.    Isai Canales MD   lisinopril (PRINIVIL,ZESTRIL) 40 MG tablet Take 1 tablet by mouth Daily.    Isai Canales MD   pantoprazole (PROTONIX) 40 MG EC tablet Take 1 tablet by mouth 2 (Two) Times a Day Before Meals. 12/14/23   Pablito Vasquez MD   pioglitazone (ACTOS) 30 MG tablet Take 1 tablet by mouth Daily.    Isai Canales MD   triamterene-hydrochlorothiazide (MAXZIDE) 75-50 MG per tablet Take 1 tablet by mouth Daily.    Isai Canales MD        Objective / Physical Exam     Vital signs:  Temp: 99.5 °F (37.5 °C)  BP: 156/86  Heart Rate: 103  Resp: 18  SpO2: 94 %  Weight: 88.5 kg (195 lb)    Admission Weight: Weight: 88.5 kg (195 lb)    Physical Exam  Constitutional:       General: He is not in acute distress.     Appearance: He is ill-appearing. He is not toxic-appearing or  diaphoretic.   HENT:      Head: Normocephalic and atraumatic.      Nose: Nose normal. Rhinorrhea present. No congestion.      Mouth/Throat:      Mouth: Mucous membranes are dry.      Pharynx: Oropharynx is clear. No oropharyngeal exudate.   Eyes:      General: No scleral icterus.     Extraocular Movements: Extraocular movements intact.      Conjunctiva/sclera: Conjunctivae normal.      Pupils: Pupils are equal, round, and reactive to light.   Cardiovascular:      Rate and Rhythm: Tachycardia present.      Pulses: Normal pulses.      Heart sounds: Normal heart sounds. No murmur heard.     Comments: Mainly regular, occasional irregularity when looking at bedside monitor.  Pulmonary:      Effort: Pulmonary effort is normal. No respiratory distress.      Breath sounds: Normal breath sounds.   Abdominal:      General: Abdomen is flat. Bowel sounds are normal.      Palpations: Abdomen is soft.   Musculoskeletal:      Right lower leg: No edema.      Left lower leg: No edema.      Comments: Right shoulder in splint since 11/29/2023.   Skin:     General: Skin is warm and dry.      Findings: No rash.   Neurological:      General: No focal deficit present.      Mental Status: He is alert and oriented to person, place, and time.      Comments: Generalized deconditioning noted.   Psychiatric:      Comments: Flat, calm, cooperative.            Labs     Results from last 7 days   Lab Units 12/28/23  1332   WBC 10*3/mm3 6.50   HEMOGLOBIN g/dL 14.1   HEMATOCRIT % 41.9   PLATELETS 10*3/mm3 159      Results from last 7 days   Lab Units 12/28/23  1332   ALK PHOS U/L 118*   AST (SGOT) U/L 23   ALT (SGPT) U/L 18           Results from last 7 days   Lab Units 12/28/23  1332   SODIUM mmol/L 140   POTASSIUM mmol/L 4.1   CHLORIDE mmol/L 100   CO2 mmol/L 30.0*   BUN mg/dL 24*   CREATININE mg/dL 1.40*   GLUCOSE mg/dL 268*        Imaging     CT Abdomen Pelvis Without Contrast    Result Date: 12/28/2023  CT ABDOMEN PELVIS WO CONTRAST Date of  Exam: 12/28/2023 3:34 PM EST Indication: abdominal pain. Comparison: CT abdomen and pelvis without contrast 12/12/2023 Technique: Axial CT images were obtained of the abdomen and pelvis without the administration of contrast. Sagittal and coronal reconstructions were performed.  Automated exposure control and iterative reconstruction methods were used. Findings: Lung bases are without consolidation. Minimal linear atelectasis at left lower lobe. Heart mildly enlarged. Coronary artery calcifications partially imaged. Small pericardial effusion similar to prior exam. Small subcutaneous area of nodularity in the left lateral chest measuring 2.2 cm stable which is nonspecific and could relate to sebaceous cyst containing debris (3/8). Lack of contrast limits assessment of abdominal organs and vasculature. The liver and spleen are normal in size and contour. No pericholecystic inflammation. Normal adrenal glands. Pancreas without findings of pancreatitis. No biliary dilatation. Kidneys are without hydronephrosis. Hyperdense lesion at the upper pole right kidney measuring 12 mm and at the mid left kidney measuring 11 mm are not fully characterized likely hemorrhagic cysts similar to prior exam. Negative for hydronephrosis or obstructive uropathy. The urinary bladder is thin-walled. Prostate mildly enlarged measuring 5.3 cm in transverse diameter. Negative for pneumoperitoneum or pneumatosis intestinalis. No findings of bowel obstruction. No fluid collection in the abdomen or pelvis. Normal appendix. Moderate atherosclerotic calcifications of the abdominal aorta and branch vasculature without aneurysm. No fluid collection within the abdomen or pelvis. No aggressive osseous lesion or acute fracture. Mild grade 1 anterolisthesis of L4 and L5. Rectocele.     Impression: 1. No acute abnormality in the abdomen or pelvis. 2. Chronic findings above. Electronically Signed: Vitor Pena MD  12/28/2023 3:46 PM EST  Workstation ID:  [id number]    XR Chest 1 View    Result Date: 12/28/2023  XR CHEST 1 VW Date of Exam: 12/28/2023 1:15 PM EST Indication: cough Comparison: 11/4/2023. Findings: There is stable borderline cardiomegaly. Pulmonary vessels appear within normal limits. Lung fields are clear of acute infiltrates or effusions.     Impression: No acute process. Electronically Signed: Jennyfer Liu MD  12/28/2023 1:39 PM EST  Workstation ID: [id number]        Current Medications     Scheduled Meds:  aspirin, 324 mg, Oral, Once  enoxaparin, 40 mg, Subcutaneous, Q24H  insulin glargine, 10 Units, Subcutaneous, Nightly  insulin lispro, 2-9 Units, Subcutaneous, 4x Daily AC & at Bedtime  senna-docusate sodium, 2 tablet, Oral, BID  sodium chloride, 10 mL, Intravenous, Q12H         Continuous Infusions:        Plan discussed with RN. Reviewed all other data in the last 24 hours, including but not limited to vitals, labs, microbiology, imaging and pertinent notes from other providers.  Plan also discussed with patient at the bedside.  Case also discussed with Dr. Whiting, see additional progress note.      Cameron Workman[facility] Service  12/28/23   16:55 EST

## 2023-12-28 NOTE — Clinical Note
Level of Care: Telemetry [5]   Diagnosis: Generalized weakness [451231]   Admitting Physician: DEDRA BADILLO [[id number]]   Attending Physician: DEDRA BADILLO [[id number]]

## 2023-12-28 NOTE — ED PROVIDER NOTES
"Subjective   History of Present Illness  Chief complaint: Patient is 73-year-old male recently admitted a few weeks ago with a GI bleed.  He presents today because he is feeling bad.  He states he feels increasing weakness.  He states he had an NG tube placed 2 weeks ago and has been coughing ever since.  It has been a dry cough.  He has some lower abdominal discomfort.  He thinks maybe it is from\" coughing so much\".  He is not having any chest pain or shortness of breath.  He is not vomiting.  But he just feels overall increasing weakness.  It is hard for him to stand up and ambulate.  He is from an assisted living center.  He presented here for further evaluation.  He has no focal weakness.    Context:    Duration:    Timing:    Severity:    Associated Symptoms:        PCP:  LMP:      Review of Systems   Constitutional:  Positive for fatigue.   HENT: Negative.     Respiratory:  Positive for cough. Negative for shortness of breath.    Cardiovascular:  Negative for chest pain.   Gastrointestinal:  Positive for abdominal pain.   Musculoskeletal: Negative.        Past Medical History:   Diagnosis Date    Diabetes mellitus     Hyperlipidemia     Hypertension     Kidney stone     TIA (transient ischemic attack) 2016    Type 2 diabetes mellitus with stage 3 chronic kidney disease, with long-term current use of insulin 12/13/2021       Allergies   Allergen Reactions    Contrast Dye (Echo Or Unknown Ct/Mr) Anaphylaxis    Iodinated Contrast Media Shortness Of Breath    Iodine Shortness Of Breath    Aspirin Nausea And Vomiting    Lipitor [Atorvastatin] Unknown - High Severity    Prednisone Hives       Past Surgical History:   Procedure Laterality Date    ADENOIDECTOMY      ENDOSCOPY N/A 12/13/2023    Procedure: ESOPHAGOGASTRODUODENOSCOPY with removal of NJ tube from right nare;  Surgeon: Jaret Valadez MD;  Location: [facility] ENDOSCOPY;  Service: Gastroenterology;  Laterality: N/A;  esophagitis, gastric ulcer    " EXTRACORPOREAL SHOCK WAVE LITHOTRIPSY (ESWL)      HERNIA REPAIR      KNEE ACL RECONSTRUCTION Left     TONSILLECTOMY         Family History   Problem Relation Age of Onset    No Known Problems Mother     No Known Problems Father        Social History     Socioeconomic History    Marital status:    Tobacco Use    Smoking status: Never    Smokeless tobacco: Never   Vaping Use    Vaping Use: Never used   Substance and Sexual Activity    Alcohol use: Never    Drug use: Never    Sexual activity: Defer           Objective   Physical Exam  Vitals and nursing note reviewed.   Constitutional:       General: He is not in acute distress.  HENT:      Head: Normocephalic and atraumatic.   Cardiovascular:      Rate and Rhythm: Regular rhythm. Tachycardia present.   Pulmonary:      Effort: Pulmonary effort is normal.      Breath sounds: Normal breath sounds.   Abdominal:      General: Abdomen is protuberant.      Palpations: Abdomen is soft.      Tenderness: There is abdominal tenderness in the right lower quadrant, suprapubic area and left lower quadrant.       Musculoskeletal:         General: Normal range of motion.   Skin:     General: Skin is warm and dry.   Neurological:      Mental Status: He is alert and oriented to person, place, and time.   Psychiatric:         Mood and Affect: Mood normal.         Thought Content: Thought content normal.         Procedures           ED Course                                             Medical Decision Making  Patient was seen evaluated for the above problem    Differential diagnose includes but is not limited to COVID, pneumonia, small bowel obstruction, UTI, ACS    Patient with increasing weakness and inability to care for himself at home.  Initial EKG interpreted by myself does show sinus rhythm rate of 111.  Poor baseline.  He did receive IV fluids here in the emergency department.  With increasing lower abdominal discomfort he was sent for CT scan showing no acute process.   Urinalysis is still pending.  Plan to place the patient in the hospital he does have elevated troponin level.  Will continue to hydrate and rule out from cardiac standpoint.  Patient verbalized understanding is okay with this.  Discussed with Kiran on-call for  who agrees to admit the patient.    Problems Addressed:  COVID-19: complicated acute illness or injury  Weakness: complicated acute illness or injury    Amount and/or Complexity of Data Reviewed  Labs: ordered. Decision-making details documented in ED Course.     Details: Labs reviewed by myself  Radiology: ordered and independent interpretation performed. Decision-making details documented in ED Course.     Details: CT scan reviewed by myself as above  ECG/medicine tests: ordered and independent interpretation performed.     Details: EKG interpreted by myself as above    Risk  Prescription drug management.  Decision regarding hospitalization.        Final diagnoses:   None   COVID-19  Weakness  Elevated troponin    ED Disposition  ED Disposition       None            No follow-up provider specified.       Medication List      No changes were made to your prescriptions during this visit.            Avery Pearson,   12/28/23 [id number]

## 2023-12-29 ENCOUNTER — APPOINTMENT (OUTPATIENT)
Dept: CARDIOLOGY | Facility: HOSPITAL | Age: 73
End: 2023-12-29
Payer: MEDICARE

## 2023-12-29 PROBLEM — U07.1 COVID: Status: ACTIVE | Noted: 2023-12-29

## 2023-12-29 LAB
ALBUMIN SERPL-MCNC: 3.5 G/DL (ref 3.5–5.2)
ALBUMIN/GLOB SERPL: 1.1 G/DL
ALP SERPL-CCNC: 100 U/L (ref 39–117)
ALT SERPL W P-5'-P-CCNC: 19 U/L (ref 1–41)
ANION GAP SERPL CALCULATED.3IONS-SCNC: 10 MMOL/L (ref 5–15)
AST SERPL-CCNC: 29 U/L (ref 1–40)
BASOPHILS # BLD AUTO: 0.1 10*3/MM3 (ref 0–0.2)
BASOPHILS NFR BLD AUTO: 1.1 % (ref 0–1.5)
BH CV ECHO MEAS - ACS: 1.8 CM
BH CV ECHO MEAS - AO MAX PG: 9.1 MMHG
BH CV ECHO MEAS - AO MEAN PG: 5 MMHG
BH CV ECHO MEAS - AO V2 MAX: 151 CM/SEC
BH CV ECHO MEAS - AO V2 VTI: 22.4 CM
BH CV ECHO MEAS - AVA(I,D): 2.49 CM2
BH CV ECHO MEAS - EDV(CUBED): 59.3 ML
BH CV ECHO MEAS - EDV(MOD-SP4): 48.2 ML
BH CV ECHO MEAS - EF(MOD-BP): 55 %
BH CV ECHO MEAS - EF(MOD-SP4): 55.4 %
BH CV ECHO MEAS - ESV(CUBED): 24.4 ML
BH CV ECHO MEAS - ESV(MOD-SP4): 21.5 ML
BH CV ECHO MEAS - FS: 25.6 %
BH CV ECHO MEAS - IVS/LVPW: 0.9 CM
BH CV ECHO MEAS - IVSD: 0.9 CM
BH CV ECHO MEAS - LA DIMENSION: 2.7 CM
BH CV ECHO MEAS - LAT PEAK E' VEL: 8.5 CM/SEC
BH CV ECHO MEAS - LV DIASTOLIC VOL/BSA (35-75): 23.3 CM2
BH CV ECHO MEAS - LV MASS(C)D: 113.6 GRAMS
BH CV ECHO MEAS - LV MAX PG: 5.6 MMHG
BH CV ECHO MEAS - LV MEAN PG: 3 MMHG
BH CV ECHO MEAS - LV SYSTOLIC VOL/BSA (12-30): 10.4 CM2
BH CV ECHO MEAS - LV V1 MAX: 118 CM/SEC
BH CV ECHO MEAS - LV V1 VTI: 16.1 CM
BH CV ECHO MEAS - LVIDD: 3.9 CM
BH CV ECHO MEAS - LVIDS: 2.9 CM
BH CV ECHO MEAS - LVOT AREA: 3.5 CM2
BH CV ECHO MEAS - LVOT DIAM: 2.1 CM
BH CV ECHO MEAS - LVPWD: 1 CM
BH CV ECHO MEAS - MED PEAK E' VEL: 9.1 CM/SEC
BH CV ECHO MEAS - MV A MAX VEL: 105 CM/SEC
BH CV ECHO MEAS - MV DEC SLOPE: 1013 CM/SEC2
BH CV ECHO MEAS - MV DEC TIME: 0.12 SEC
BH CV ECHO MEAS - MV E MAX VEL: 73.4 CM/SEC
BH CV ECHO MEAS - MV E/A: 0.7
BH CV ECHO MEAS - MV MAX PG: 5.5 MMHG
BH CV ECHO MEAS - MV MEAN PG: 4 MMHG
BH CV ECHO MEAS - MV P1/2T: 32.4 MSEC
BH CV ECHO MEAS - MV V2 VTI: 20.4 CM
BH CV ECHO MEAS - MVA(P1/2T): 6.8 CM2
BH CV ECHO MEAS - MVA(VTI): 2.7 CM2
BH CV ECHO MEAS - PA ACC TIME: 0.09 SEC
BH CV ECHO MEAS - PA V2 MAX: 110 CM/SEC
BH CV ECHO MEAS - RAP SYSTOLE: 8 MMHG
BH CV ECHO MEAS - RV MAX PG: 3.7 MMHG
BH CV ECHO MEAS - RV V1 MAX: 95.8 CM/SEC
BH CV ECHO MEAS - RV V1 VTI: 13.9 CM
BH CV ECHO MEAS - RVDD: 2.9 CM
BH CV ECHO MEAS - RVSP: 39.4 MMHG
BH CV ECHO MEAS - SI(MOD-SP4): 12.9 ML/M2
BH CV ECHO MEAS - SV(LVOT): 55.8 ML
BH CV ECHO MEAS - SV(MOD-SP4): 26.7 ML
BH CV ECHO MEAS - TAPSE (>1.6): 2.25 CM
BH CV ECHO MEAS - TR MAX PG: 31.4 MMHG
BH CV ECHO MEAS - TR MAX VEL: 280 CM/SEC
BH CV ECHO MEASUREMENTS AVERAGE E/E' RATIO: 8.34
BH CV XLRA - TDI S': 19.8 CM/SEC
BILIRUB SERPL-MCNC: 0.7 MG/DL (ref 0–1.2)
BUN SERPL-MCNC: 22 MG/DL (ref 8–23)
BUN/CREAT SERPL: 16.9 (ref 7–25)
CALCIUM SPEC-SCNC: 9.2 MG/DL (ref 8.6–10.5)
CHLORIDE SERPL-SCNC: 104 MMOL/L (ref 98–107)
CO2 SERPL-SCNC: 27 MMOL/L (ref 22–29)
CREAT SERPL-MCNC: 1.3 MG/DL (ref 0.76–1.27)
CRP SERPL-MCNC: 7.14 MG/DL (ref 0–0.5)
DEPRECATED RDW RBC AUTO: 45.1 FL (ref 37–54)
EGFRCR SERPLBLD CKD-EPI 2021: 57.6 ML/MIN/1.73
EOSINOPHIL # BLD AUTO: 0 10*3/MM3 (ref 0–0.4)
EOSINOPHIL NFR BLD AUTO: 0.1 % (ref 0.3–6.2)
ERYTHROCYTE [DISTWIDTH] IN BLOOD BY AUTOMATED COUNT: 13.3 % (ref 12.3–15.4)
ERYTHROCYTE [SEDIMENTATION RATE] IN BLOOD: 47 MM/HR (ref 0–20)
GLOBULIN UR ELPH-MCNC: 3.3 GM/DL
GLUCOSE BLDC GLUCOMTR-MCNC: 105 MG/DL (ref 70–105)
GLUCOSE BLDC GLUCOMTR-MCNC: 145 MG/DL (ref 70–105)
GLUCOSE BLDC GLUCOMTR-MCNC: 160 MG/DL (ref 70–105)
GLUCOSE BLDC GLUCOMTR-MCNC: 97 MG/DL (ref 70–105)
GLUCOSE SERPL-MCNC: 113 MG/DL (ref 65–99)
HCT VFR BLD AUTO: 36.5 % (ref 37.5–51)
HGB BLD-MCNC: 12.4 G/DL (ref 13–17.7)
LEFT ATRIUM VOLUME INDEX: 17.3 ML/M2
LYMPHOCYTES # BLD AUTO: 1 10*3/MM3 (ref 0.7–3.1)
LYMPHOCYTES NFR BLD AUTO: 13.9 % (ref 19.6–45.3)
MAGNESIUM SERPL-MCNC: 1.7 MG/DL (ref 1.6–2.4)
MCH RBC QN AUTO: 30.7 PG (ref 26.6–33)
MCHC RBC AUTO-ENTMCNC: 34 G/DL (ref 31.5–35.7)
MCV RBC AUTO: 90.5 FL (ref 79–97)
MONOCYTES # BLD AUTO: 0.7 10*3/MM3 (ref 0.1–0.9)
MONOCYTES NFR BLD AUTO: 9.3 % (ref 5–12)
NEUTROPHILS NFR BLD AUTO: 5.5 10*3/MM3 (ref 1.7–7)
NEUTROPHILS NFR BLD AUTO: 75.6 % (ref 42.7–76)
NRBC BLD AUTO-RTO: 0 /100 WBC (ref 0–0.2)
PHOSPHATE SERPL-MCNC: 3 MG/DL (ref 2.5–4.5)
PLATELET # BLD AUTO: 126 10*3/MM3 (ref 140–450)
PMV BLD AUTO: 8.2 FL (ref 6–12)
POTASSIUM SERPL-SCNC: 3.9 MMOL/L (ref 3.5–5.2)
PROCALCITONIN SERPL-MCNC: 0.17 NG/ML (ref 0–0.25)
PROT SERPL-MCNC: 6.8 G/DL (ref 6–8.5)
QT INTERVAL: 316 MS
QTC INTERVAL: 430 MS
RBC # BLD AUTO: 4.03 10*6/MM3 (ref 4.14–5.8)
SINUS: 2.9 CM
SODIUM SERPL-SCNC: 141 MMOL/L (ref 136–145)
WBC NRBC COR # BLD AUTO: 7.3 10*3/MM3 (ref 3.4–10.8)

## 2023-12-29 PROCEDURE — 93306 TTE W/DOPPLER COMPLETE: CPT | Performed by: INTERNAL MEDICINE

## 2023-12-29 PROCEDURE — 99222 1ST HOSP IP/OBS MODERATE 55: CPT | Performed by: INTERNAL MEDICINE

## 2023-12-29 PROCEDURE — 25010000002 MAGNESIUM SULFATE 2 GM/50ML SOLUTION: Performed by: NURSE PRACTITIONER

## 2023-12-29 PROCEDURE — 86140 C-REACTIVE PROTEIN: CPT | Performed by: INTERNAL MEDICINE

## 2023-12-29 PROCEDURE — 63710000001 INSULIN GLARGINE PER 5 UNITS: Performed by: NURSE PRACTITIONER

## 2023-12-29 PROCEDURE — 25810000003 SODIUM CHLORIDE 0.9 % SOLUTION: Performed by: INTERNAL MEDICINE

## 2023-12-29 PROCEDURE — 97162 PT EVAL MOD COMPLEX 30 MIN: CPT

## 2023-12-29 PROCEDURE — 83735 ASSAY OF MAGNESIUM: CPT | Performed by: NURSE PRACTITIONER

## 2023-12-29 PROCEDURE — 80053 COMPREHEN METABOLIC PANEL: CPT | Performed by: NURSE PRACTITIONER

## 2023-12-29 PROCEDURE — 84100 ASSAY OF PHOSPHORUS: CPT | Performed by: NURSE PRACTITIONER

## 2023-12-29 PROCEDURE — 84145 PROCALCITONIN (PCT): CPT | Performed by: INTERNAL MEDICINE

## 2023-12-29 PROCEDURE — 82948 REAGENT STRIP/BLOOD GLUCOSE: CPT

## 2023-12-29 PROCEDURE — 85652 RBC SED RATE AUTOMATED: CPT | Performed by: INTERNAL MEDICINE

## 2023-12-29 PROCEDURE — 92610 EVALUATE SWALLOWING FUNCTION: CPT

## 2023-12-29 PROCEDURE — 97167 OT EVAL HIGH COMPLEX 60 MIN: CPT

## 2023-12-29 PROCEDURE — 25010000002 ENOXAPARIN PER 10 MG: Performed by: NURSE PRACTITIONER

## 2023-12-29 PROCEDURE — 93306 TTE W/DOPPLER COMPLETE: CPT

## 2023-12-29 PROCEDURE — 85025 COMPLETE CBC W/AUTO DIFF WBC: CPT | Performed by: NURSE PRACTITIONER

## 2023-12-29 RX ORDER — METOPROLOL SUCCINATE 50 MG/1
100 TABLET, EXTENDED RELEASE ORAL
Status: DISCONTINUED | OUTPATIENT
Start: 2023-12-29 | End: 2024-01-04 | Stop reason: HOSPADM

## 2023-12-29 RX ORDER — MAGNESIUM SULFATE HEPTAHYDRATE 40 MG/ML
4 INJECTION, SOLUTION INTRAVENOUS ONCE
Status: COMPLETED | OUTPATIENT
Start: 2023-12-29 | End: 2023-12-29

## 2023-12-29 RX ADMIN — SODIUM CHLORIDE 75 ML/HR: 9 INJECTION, SOLUTION INTRAVENOUS at 09:58

## 2023-12-29 RX ADMIN — INSULIN GLARGINE 10 UNITS: 100 INJECTION, SOLUTION SUBCUTANEOUS at 21:48

## 2023-12-29 RX ADMIN — ACETAMINOPHEN 650 MG: 325 TABLET, FILM COATED ORAL at 22:49

## 2023-12-29 RX ADMIN — BENZONATATE 200 MG: 100 CAPSULE ORAL at 03:52

## 2023-12-29 RX ADMIN — Medication 10 ML: at 09:58

## 2023-12-29 RX ADMIN — PANTOPRAZOLE SODIUM 40 MG: 40 INJECTION, POWDER, FOR SOLUTION INTRAVENOUS at 21:47

## 2023-12-29 RX ADMIN — MAGNESIUM SULFATE HEPTAHYDRATE 4 G: 40 INJECTION, SOLUTION INTRAVENOUS at 03:45

## 2023-12-29 RX ADMIN — Medication 10 ML: at 21:47

## 2023-12-29 RX ADMIN — METOPROLOL SUCCINATE 100 MG: 50 TABLET, EXTENDED RELEASE ORAL at 21:48

## 2023-12-29 RX ADMIN — PANTOPRAZOLE SODIUM 40 MG: 40 INJECTION, POWDER, FOR SOLUTION INTRAVENOUS at 09:57

## 2023-12-29 RX ADMIN — ENOXAPARIN SODIUM 40 MG: 100 INJECTION SUBCUTANEOUS at 17:00

## 2023-12-29 RX ADMIN — CETIRIZINE HYDROCHLORIDE 10 MG: 10 TABLET, FILM COATED ORAL at 09:57

## 2023-12-29 RX ADMIN — CLOPIDOGREL BISULFATE 75 MG: 75 TABLET ORAL at 09:57

## 2023-12-29 NOTE — THERAPY EVALUATION
Patient Name: Blas Mojica  : 1950    MRN: [id number]                              Today's Date: 2023       Admit Date: 2023    Visit Dx:     ICD-10-CM ICD-9-CM   1. Weakness  R53.1 780.79   2. COVID-19  U07.1 079.89   3. Elevated troponin  R79.89 790.6     Patient Active Problem List   Diagnosis    Primary hypertension    TIA (transient ischemic attack)    Stage 3 chronic kidney disease    Dyslipidemia    History of basal cell carcinoma (BCC)    Primary osteoarthritis    Vitamin D deficiency    Acute pain of left knee    Generalized weakness    Hypokalemia    Moderate malnutrition    Physical debility    Type 2 diabetes mellitus with stage 3 chronic kidney disease, with long-term current use of insulin    Sinus tachycardia    Acute hypoxemic respiratory failure due to COVID-19    Pneumonia due to COVID-19 virus    Mixed hyperlipidemia    COVID-19 virus infection    Weakness     Past Medical History:   Diagnosis Date    Diabetes mellitus     Hyperlipidemia     Hypertension     Kidney stone     TIA (transient ischemic attack)     Type 2 diabetes mellitus with stage 3 chronic kidney disease, with long-term current use of insulin 2021     Past Surgical History:   Procedure Laterality Date    ADENOIDECTOMY      ENDOSCOPY N/A 2023    Procedure: ESOPHAGOGASTRODUODENOSCOPY with removal of NJ tube from right nare;  Surgeon: Jaret Valadez MD;  Location: [facility] ENDOSCOPY;  Service: Gastroenterology;  Laterality: N/A;  esophagitis, gastric ulcer    EXTRACORPOREAL SHOCK WAVE LITHOTRIPSY (ESWL)      HERNIA REPAIR      KNEE ACL RECONSTRUCTION Left     TONSILLECTOMY        General Information       Row Name 23 1245          Physical Therapy Time and Intention    Document Type evaluation  -MB     Mode of Treatment physical therapy  -MB       Row Name 23 1245          General Information    Patient Profile Reviewed yes  -MB     Prior Level of Function independent:;all  household mobility;ADL's  -MB     Existing Precautions/Restrictions fall  -MB     Barriers to Rehab medically complex;hearing deficit  -MB       Row Name 12/29/23 1245          Living Environment    People in Home spouse  -MB       Row Name 12/29/23 1245          Home Main Entrance    Number of Stairs, Main Entrance none  -MB       Row Name 12/29/23 1245          Stairs Within Home, Primary    Number of Stairs, Within Home, Primary none  -MB       Row Name 12/29/23 1245          Cognition    Orientation Status (Cognition) oriented x 4  -MB       Row Name 12/29/23 1245          Safety Issues, Functional Mobility    Impairments Affecting Function (Mobility) balance;strength;endurance/activity tolerance;pain;postural/trunk control  -MB               User Key  (r) = Recorded By, (t) = Taken By, (c) = Cosigned By      Initials Name Provider Type    Wale Quesada, PT Physical Therapist                   Mobility       Row Name 12/29/23 1246          Bed Mobility    Bed Mobility bed mobility (all) activities  -MB     All Activities, [place] (Bed Mobility) dependent (less than 25% patient effort);2 person assist  -MB     Comment, (Bed Mobility) dep x 2 to complete supine<>sit EOB  -MB               User Key  (r) = Recorded By, (t) = Taken By, (c) = Cosigned By      Initials Name Provider Type    Wale Quesada, PT Physical Therapist                   Obj/Interventions       Row Name 12/29/23 1246          Range of Motion Comprehensive    General Range of Motion no range of motion deficits identified  -MB       Row Name 12/29/23 1246          Strength Comprehensive (MMT)    Comment, General Manual Muscle Testing (MMT) Assessment BLE Strength 2/5 grossly  -MB       Row Name 12/29/23 1246          Balance    Balance Assessment sitting static balance;sitting dynamic balance  -MB     Static Sitting Balance contact guard  -MB     Dynamic Sitting Balance minimal assist;moderate assist  -MB     Position, Sitting Balance  unsupported;sitting edge of bed  -MB       Row Name 12/29/23 1246          Sensory Assessment (Somatosensory)    Sensory Assessment (Somatosensory) sensation intact  -MB               User Key  (r) = Recorded By, (t) = Taken By, (c) = Cosigned By      Initials Name Provider Type    Wale Quesada, PT Physical Therapist                   Goals/Plan       Row Name 12/29/23 1249          Bed Mobility Goal 1 (PT)    Activity/Assistive Device (Bed Mobility Goal 1, PT) bed mobility activities, all  -MB     [place] Level/Cues Needed (Bed Mobility Goal 1, PT) minimum assist (75% or more patient effort)  -MB     Time Frame (Bed Mobility Goal 1, PT) long term goal (LTG);2 weeks  -MB       Row Name 12/29/23 1249          Transfer Goal 1 (PT)    Activity/Assistive Device (Transfer Goal 1, PT) transfers, all;walker, rolling  -MB     [place] Level/Cues Needed (Transfer Goal 1, PT) minimum assist (75% or more patient effort)  -MB     Time Frame (Transfer Goal 1, PT) long term goal (LTG);2 weeks  -MB       Row Name 12/29/23 1249          Therapy Assessment/Plan (PT)    Planned Therapy Interventions (PT) balance training;bed mobility training;home exercise program;patient/family education;neuromuscular re-education;strengthening;transfer training  -MB               User Key  (r) = Recorded By, (t) = Taken By, (c) = Cosigned By      Initials Name Provider Type    Wale Quesada, PT Physical Therapist                   Clinical Impression       Row Name 12/29/23 1247          Pain    Additional Documentation Pain Scale: FACES Pre/Post-Treatment (Group)  -MB       Row Name 12/29/23 1244          Pain Scale: FACES Pre/Post-Treatment    Pain: FACES Scale, Pretreatment 8-->hurts whole lot  -MB     Posttreatment Pain Rating 8-->hurts whole lot  -MB     Pre/Posttreatment Pain Comment scrotum with movement  -MB       Row Name 12/29/23 1300 12/29/23 1247       Plan of Care Review    Plan of Care Reviewed With -- patient  -MB     Progress -- no change  -MB    Outcome Evaluation Pt is a 72 y/o M admitted to [facility] on 12/28/23 with complaints of increasing weakness, lower abdominal discomfort, and coughing since NG tube was placed two weeks ago. He also has recent fracture of proximal humeral neck from [facility] hospital. Placed in abduction sling, but is unsure if surgery is necessary. Planning on follow-up with ortho OP. He is currently living at [facility] living facility with spouse and reports that he is normally IND with all mobility with use of SPC, most recently req assist from spouse with nearly all ADLs since that fall that fractured his arm. Pt currently has a hospital bed, w/c, RW, SPC, grab bars, walk-shower, and BSC that he uses over his toilet. This date, pt is lying supine and is very hard of hearing. He complains of significant pain in the scrotum with movement. He has very little AROM of BLE, along with severe BLE strength deficits. Completes supine<>sit dep x 2 this session and exhibits poor sitting balance. He is very fr below his baseline at this time and spouse will not be able to care for him in this condition. He will require SNF at d/c for continued care and monitoring following d/c from hospital. PT will follow during stay.  -MB --      Row Name 12/29/23 5975          Therapy Assessment/Plan (PT)    Rehab Potential (PT) fair, will monitor progress closely  -MB     Criteria for Skilled Interventions Met (PT) yes;meets criteria  -MB     Therapy Frequency (PT) 3 times/wk  -MB     Predicted Duration of Therapy Intervention (PT) until d/c  -MB       Row Name 12/29/23 1244          Vital Signs    Pretreatment Heart Rate (beats/min) 118  -MB     Intratreatment Heart Rate (beats/min) 125  -MB     Posttreatment Heart Rate (beats/min) 116  -MB     O2 Delivery Pre Treatment supplemental O2  2L  -MB     O2 Delivery Intra Treatment supplemental O2  2L  -MB     O2 Delivery Post Treatment supplemental O2  2L  -MB     Pre Patient  Position Supine  -MB     Intra Patient Position Sitting  -MB     Post Patient Position Supine  -MB       Row Name 12/29/23 1247          Positioning and Restraints    Pre-Treatment Position in bed  -MB     Post Treatment Position bed  -MB     In Bed notified nsg;supine;call light within reach;encouraged to call for assist  -MB               User Key  (r) = Recorded By, (t) = Taken By, (c) = Cosigned By      Initials Name Provider Type    Wale Quesada, FELI Physical Therapist                   Outcome Measures       Row Name 12/29/23 1249 12/29/23 0135       How much help from another person do you currently need...    Turning from your back to your side while in flat bed without using bedrails? 1  -MB 2  -SL    Moving from lying on back to sitting on the side of a flat bed without bedrails? 1  -MB 2  -SL    Moving to and from a bed to a chair (including a wheelchair)? 1  -MB 1  -SL    Standing up from a chair using your arms (e.g., wheelchair, bedside chair)? 1  -MB 1  -SL    Climbing 3-5 steps with a railing? 1  -MB 1  -SL    To walk in hospital room? 1  -MB 1  -SL    AM-PAC 6 Clicks Score (PT) 6  -MB 8  -SL    Highest Level of Mobility Goal 2 --> Bed activities/dependent transfer  -MB 3 --> Sit at edge of bed  -SL              User Key  (r) = Recorded By, (t) = Taken By, (c) = Cosigned By      Initials Name Provider Type    Hafsa Ruiz RN Registered Nurse    Wale Quesada, PT Physical Therapist                                 Physical Therapy Education       Title: PT OT SLP Therapies (In Progress)       Topic: Physical Therapy (In Progress)       Point: Mobility training (Done)       Learning Progress Summary             Patient Acceptance, E,TB, VU by MB at 12/29/2023 1249    Acceptance, E, NR by CAROLIN at 12/29/2023 1245                         Point: Home exercise program (In Progress)       Learning Progress Summary             Patient Acceptance, E, NR by CAROLIN at 12/29/2023 1245                          Point: Body mechanics (Done)       Learning Progress Summary             Patient Acceptance, E,TB, VU by MB at 12/29/2023 1249    Acceptance, E, NR by CAROLIN at 12/29/2023 1245                         Point: Precautions (Done)       Learning Progress Summary             Patient Acceptance, E,TB, VU by MB at 12/29/2023 1249    Acceptance, E, NR by CAROLIN at 12/29/2023 1245                                         User Key       Initials Effective Dates Name Provider Type Discipline    CAROLIN 06/16/21 -  Alysha De La Paz RN Registered Nurse Nurse    MB 06/06/23 -  Wale Henderson, PT Physical Therapist PT                  PT Recommendation and Plan  Planned Therapy Interventions (PT): balance training, bed mobility training, home exercise program, patient/family education, neuromuscular re-education, strengthening, transfer training  Plan of Care Reviewed With: patient  Progress: no change  Outcome Evaluation: Pt is a 72 y/o M admitted to [facility] on 12/28/23 with complaints of increasing weakness, lower abdominal discomfort, and coughing since NG tube was placed two weeks ago. He also has recent fracture of proximal humeral neck from [facility]. Placed in abduction sling, but is unsure if surgery is necessary. Planning on follow-up with ortho OP. He is currently living at [facility] living facility with spouse and reports that he is normally IND with all mobility with use of SPC, most recently req assist from spouse with nearly all ADLs since that fall that fractured his arm. Pt currently has a hospital bed, w/c, RW, SPC, grab bars, walk-shower, and BSC that he uses over his toilet. This date, pt is lying supine and is very hard of hearing. He complains of significant pain in the scrotum with movement. He has very little AROM of BLE, along with severe BLE strength deficits. Completes supine<>sit dep x 2 this session and exhibits poor sitting balance. He is very fr below his baseline at this time and spouse will  not be able to care for him in this condition. He will require SNF at d/c for continued care and monitoring following d/c from hospital. PT will follow during stay.     Time Calculation:   PT Evaluation Complexity  History, PT Evaluation Complexity: 1-2 personal factors and/or comorbidities  Examination of Body Systems (PT Eval Complexity): total of 3 or more elements  Clinical Presentation (PT Evaluation Complexity): evolving  Clinical Decision Making (PT Evaluation Complexity): moderate complexity  Overall Complexity (PT Evaluation Complexity): moderate complexity     PT Charges       Row Name 12/29/23 1249             Time Calculation    Start Time 1122  -MB      Stop Time 1144  -MB      Time Calculation (min) 22 min  -MB      PT Received On 12/29/23  -MB      PT - Next Appointment 12/31/23  -MB      PT Goal Re-Cert Due Date 01/12/24  -MB         Time Calculation- PT    Total Timed Code Minutes- PT 0 minute(s)  -MB                User Key  (r) = Recorded By, (t) = Taken By, (c) = Cosigned By      Initials Name Provider Type    Wale Quesada, PT Physical Therapist                  Therapy Charges for Today       Code Description Service Date Service Provider Modifiers Qty    39791568064 HC PT EVAL MOD COMPLEXITY 4 12/29/2023 Wale Henderson, PT GP 1            PT G-Codes  AM-PAC 6 Clicks Score (PT): 6  PT Discharge Summary  Anticipated Discharge Disposition (PT): skilled nursing facility    Wale Henderson PT  12/29/2023

## 2023-12-29 NOTE — THERAPY EVALUATION
Acute Care - Speech Language Pathology   Swallow Initial Evaluation [facility]     Patient Name: Blas Mojica  : 1950  MRN: [id number]  Today's Date: 2023               Admit Date: 2023    Visit Dx:     ICD-10-CM ICD-9-CM   1. Weakness  R53.1 780.79   2. COVID-19  U07.1 079.89   3. Elevated troponin  R79.89 790.6     Patient Active Problem List   Diagnosis    Primary hypertension    TIA (transient ischemic attack)    Stage 3 chronic kidney disease    Dyslipidemia    History of basal cell carcinoma (BCC)    Primary osteoarthritis    Vitamin D deficiency    Acute pain of left knee    Generalized weakness    Hypokalemia    Moderate malnutrition    Physical debility    Type 2 diabetes mellitus with stage 3 chronic kidney disease, with long-term current use of insulin    Sinus tachycardia    Acute hypoxemic respiratory failure due to COVID-19    Pneumonia due to COVID-19 virus    Mixed hyperlipidemia    COVID-19 virus infection    Weakness    COVID     Past Medical History:   Diagnosis Date    Diabetes mellitus     Hyperlipidemia     Hypertension     Kidney stone     TIA (transient ischemic attack)     Type 2 diabetes mellitus with stage 3 chronic kidney disease, with long-term current use of insulin 2021     Past Surgical History:   Procedure Laterality Date    ADENOIDECTOMY      ENDOSCOPY N/A 2023    Procedure: ESOPHAGOGASTRODUODENOSCOPY with removal of NJ tube from right nare;  Surgeon: Jaret Valadez MD;  Location: [facility];  Service: Gastroenterology;  Laterality: N/A;  esophagitis, gastric ulcer    EXTRACORPOREAL SHOCK WAVE LITHOTRIPSY (ESWL)      HERNIA REPAIR      KNEE ACL RECONSTRUCTION Left     TONSILLECTOMY         SLP Recommendation and Plan  SLP Swallowing Diagnosis: functional oral phase, functional pharyngeal phase (23 1300)  SLP Diet Recommendation: regular textures, thin liquids (23 1300)  Recommended Precautions and Strategies: upright  posture during/after eating, small bites of food and sips of liquid, alternate between small bites of food and sips of liquid, general aspiration precautions, reflux precautions (12/29/23 1300)  SLP Rec. for Method of Medication Administration: meds whole, with thin liquids, as tolerated (12/29/23 1300)     Monitor for Signs of Aspiration: yes, notify SLP if any concerns, cough, gurgly voice, throat clearing (12/29/23 1300)     Swallow Criteria for Skilled Therapeutic Interventions Met: demonstrates skilled criteria (12/29/23 1300)  Anticipated Discharge Disposition (SLP): inpatient rehabilitation facility (12/29/23 1300)  Rehab Potential/Prognosis, Swallowing: good, to achieve stated therapy goals (12/29/23 1300)  Therapy Frequency (Swallow): PRN (12/29/23 1300)  Predicted Duration Therapy Intervention (Days): until discharge (12/29/23 1300)  Oral Care Recommendations: Oral Care BID/PRN, Toothbrush (12/29/23 1300)                                      Oral Care Recommendations: Oral Care BID/PRN, Toothbrush (12/29/23 1300)           SWALLOW EVALUATION (last 72 hours)       SLP Adult Swallow Evaluation       Row Name 12/29/23 1300       Rehab Evaluation    Document Type evaluation  -CP    Subjective Information complains of;weakness  -CP    Patient Observations alert;cooperative  -CP    Patient Effort good  -CP       General Information    Patient Profile Reviewed yes  -CP    Pertinent History Of Current Problem Blas Mojica is a 73 y.o. male with PMH of TIA, HLD, DM, HTN, CKD, and presented to the hospital for generalized weakness and generalized lower abdominal pain, and was admitted with a principal diagnosis of Generalized weakness.  Patient was recently hospitalized for a GI Bleed which he underwent and EGD with NGT placement, and EGD findings were consistent with erosive gastritis and gastric ulcers.  Patient was discharged home on BID protonix.  Patient was noted to have a post-nasal drip after difficulty  with placing NGT.  This has caused a persistent dry cough since his discharge.  Patient was discharged to [facility]. Swallow eval ordered due to pt coughing after pills.  -CP    Current Method of Nutrition NPO  -CP    Prior Level of Function-Swallowing no diet consistency restrictions;regular textures;thin liquids  -CP    Plans/Goals Discussed with patient  -CP    Barriers to Rehab none identified  -CP       Pain    Additional Documentation Pain Scale: FACES Pre/Post-Treatment (Group)  -CP       Pain Scale: FACES Pre/Post-Treatment    Pain: FACES Scale, Pretreatment 0-->no hurt  -CP    Posttreatment Pain Rating 0-->no hurt  -CP       Oral Motor Structure and Function    Dentition Assessment natural, present and adequate  -CP       Oral Musculature and Cranial Nerve Assessment    Oral Motor General Assessment WFL  -CP       General Eating/Swallowing Observations    Respiratory Support Currently in Use room air  -CP    Eating/Swallowing Skills fed by SLP  -CP    Positioning During Eating upright in bed  -CP    Utensils Used spoon;straw  -CP    Consistencies Trialed thin liquids;pureed;regular textures  -CP       Clinical Swallow Eval    Clinical Swallow Evaluation Summary Pt seen for clinical swallow eval. Pt was brought up in the bed and was fed by clinician due to R arm being in a sling. Pt is awake and alert and able to answer all questions. He is very [place] however and needed frequent repetition of questions. Pt had baseline cough and wet, crackly respirations prior to swallow evaluation and cough throughout that did not appear related to swallow. Pt was given trials of ice chips, water by spoon and straw, applesauce, and a pb cracker. Mastication was timely and efficient. Oral transit timely on puree and liquids. There was no pocketing or oral residual of any consistency. Digital palpation of swallow suggests timely initiation/functional pharyngeal phase of swallow. Pt had no cough or overt s/s  of aspiration immediately after the swallow to indicate a pharyngeal deficit. Pt did cough, but it did not appear related to swallow. Due to no clinical signs of aspiration and pt having a clear chest X-ray, beginning a PO diet appears appropriate for this pt. It is rec that pt initiate a regular diet with thin liquids. Monitor pt closely for s/s of aspiration and if pt shows these s/s, make NPO. Pt should take small bites and sips and eat at a slow rate. Pt has a hx of esophageal issues and should remain upright for 30-60 minutes after all PO. Education provided to pt on safe swallow strategies and reflux precautions. He verbalized understanding. ST will follow to assure tolerance of diet and make further recs as indicated.  -CP       SLP Evaluation Clinical Impression    SLP Swallowing Diagnosis functional oral phase;functional pharyngeal phase  -CP    Functional Impact risk of aspiration/pneumonia  -CP    Rehab Potential/Prognosis, Swallowing good, to achieve stated therapy goals  -CP    Swallow Criteria for Skilled Therapeutic Interventions Met demonstrates skilled criteria  -CP       SLP Treatment Clinical Impressions    Care Plan Review evaluation/treatment results reviewed  -CP       Recommendations    Therapy Frequency (Swallow) PRN  -CP    Predicted Duration Therapy Intervention (Days) until discharge  -CP    SLP Diet Recommendation regular textures;thin liquids  -CP    Recommended Precautions and Strategies upright posture during/after eating;small bites of food and sips of liquid;alternate between small bites of food and sips of liquid;general aspiration precautions;reflux precautions  -CP    Oral Care Recommendations Oral Care BID/PRN;Toothbrush  -CP    SLP Rec. for Method of Medication Administration meds whole;with thin liquids;as tolerated  -CP    Monitor for Signs of Aspiration yes;notify SLP if any concerns;cough;gurgly voice;throat clearing  -CP    Anticipated Discharge Disposition (SLP) inpatient  rehabilitation facility  -CP       Swallow Goals (SLP)    Swallow LTGs Swallow Long Term Goal (free text)  -CP    Swallow STGs diet tolerance goal selection (SLP)  -CP    Diet Tolerance Goal Selection (SLP) Swallow Short Term Goal 1;Swallow Short Term Goal 2  -CP       (LTG) Swallow    (LTG) Swallow Pt will maximize swallow function for least restrictive PO diet, exhibiting no complication associated with dysphagia, adequate PO intake, and demonstrating independent use of swallow compensations  -CP    Time Frame (Swallow Long Term Goal) by discharge  -CP       (STG) Swallow 1    (STG) Swallow 1 The patient will participate in a meal/follow-up assessment to determine safety and adequacy of recommended diet, independent use of safe swallow compensations, pt/family education and additional goals/recommendations to follow  -CP    Time Frame (Swallow Short Term Goal 1) 1 week  -CP       (STG) Swallow 2    (STG) Swallow 2 Pt will participate in ongoing swallow assessment to include VFSS if indicated, and caregiver teaching.  -CP    Time Frame (Swallow Short Term Goal 2) 1 week  -CP              User Key  (r) = Recorded By, (t) = Taken By, (c) = Cosigned By      Initials Name Effective Dates    CP Millicent Mclain, JENIFER 06/16/21 -                     EDUCATION  The patient has been educated in the following areas:   Dysphagia (Swallowing Impairment).        SLP GOALS       Row Name 12/29/23 1300             (LTG) Swallow    (LTG) Swallow Pt will maximize swallow function for least restrictive PO diet, exhibiting no complication associated with dysphagia, adequate PO intake, and demonstrating independent use of swallow compensations  -CP      Time Frame (Swallow Long Term Goal) by discharge  -CP         (STG) Swallow 1    (STG) Swallow 1 The patient will participate in a meal/follow-up assessment to determine safety and adequacy of recommended diet, independent use of safe swallow compensations, pt/family education and  additional goals/recommendations to follow  -CP      Time Frame (Swallow Short Term Goal 1) 1 week  -CP         (STG) Swallow 2    (STG) Swallow 2 Pt will participate in ongoing swallow assessment to include VFSS if indicated, and caregiver teaching.  -CP      Time Frame (Swallow Short Term Goal 2) 1 week  -CP                User Key  (r) = Recorded By, (t) = Taken By, (c) = Cosigned By      Initials Name Provider Type    CP Millicent Mclain, SLP Speech and Language Pathologist                       Time Calculation:                JENIFER Denney  12/29/2023

## 2023-12-29 NOTE — PROGRESS NOTES
Please see H&P for details. Nice 73 year old gentleman who presents with new onset a fib with RVR and acute covid infection with cough and mild sob. Patient choking on his pills during my evaluation hence made NPO and SLP consulted. No urine output in bowden cath hence IVFs ordered. Patient with RUE sling after recent right shoulder injury. Will get cardio given A fib rvr and recent GI bleed making anticoagulation difficult.

## 2023-12-29 NOTE — PLAN OF CARE
Goal Outcome Evaluation:   Pt seen for clinical swallow eval. Pt was brought up in the bed and was fed by clinician due to R arm being in a sling. Pt is awake and alert and able to answer all questions. He is very [place] however and needed frequent repetition of questions. Pt had baseline cough and wet, crackly respirations prior to swallow evaluation and cough throughout that did not appear related to swallow. Pt was given trials of ice chips, water by spoon and straw, applesauce, and a pb cracker. Mastication was timely and efficient. Oral transit timely on puree and liquids. There was no pocketing or oral residual of any consistency. Digital palpation of swallow suggests timely initiation/functional pharyngeal phase of swallow. Pt had no cough or overt s/s of aspiration immediately after the swallow to indicate a pharyngeal deficit. Pt did cough, but it did not appear related to swallow.     Due to no clinical signs of aspiration and pt having a clear chest X-ray, beginning a PO diet appears appropriate for this pt. It is rec that pt initiate a regular diet with thin liquids. Monitor pt closely for s/s of aspiration and if pt shows these s/s, make NPO. Pt should take small bites and sips and eat at a slow rate. Pt has a hx of esophageal issues and should remain upright for 30-60 minutes after all PO. Education provided to pt on safe swallow strategies and reflux precautions. He verbalized understanding. ST will follow to assure tolerance of diet and make further recs as indicated.

## 2023-12-29 NOTE — THERAPY EVALUATION
Patient Name: Blas Mojica  : 1950    MRN: [id number]                              Today's Date: 2023       Admit Date: 2023    Visit Dx:     ICD-10-CM ICD-9-CM   1. Weakness  R53.1 780.79   2. COVID-19  U07.1 079.89   3. Elevated troponin  R79.89 790.6     Patient Active Problem List   Diagnosis    Primary hypertension    TIA (transient ischemic attack)    Stage 3 chronic kidney disease    Dyslipidemia    History of basal cell carcinoma (BCC)    Primary osteoarthritis    Vitamin D deficiency    Acute pain of left knee    Generalized weakness    Hypokalemia    Moderate malnutrition    Physical debility    Type 2 diabetes mellitus with stage 3 chronic kidney disease, with long-term current use of insulin    Sinus tachycardia    Acute hypoxemic respiratory failure due to COVID-19    Pneumonia due to COVID-19 virus    Mixed hyperlipidemia    COVID-19 virus infection    Weakness    COVID     Past Medical History:   Diagnosis Date    Diabetes mellitus     Hyperlipidemia     Hypertension     Kidney stone     TIA (transient ischemic attack)     Type 2 diabetes mellitus with stage 3 chronic kidney disease, with long-term current use of insulin 2021     Past Surgical History:   Procedure Laterality Date    ADENOIDECTOMY      ENDOSCOPY N/A 2023    Procedure: ESOPHAGOGASTRODUODENOSCOPY with removal of NJ tube from right nare;  Surgeon: Jaret Valadez MD;  [facility] ENDOSCOPY;  Service: Gastroenterology;  Laterality: N/A;  esophagitis, gastric ulcer    EXTRACORPOREAL SHOCK WAVE LITHOTRIPSY (ESWL)      HERNIA REPAIR      KNEE ACL RECONSTRUCTION Left     TONSILLECTOMY        General Information       Row Name 23 1457          OT Time and Intention    Document Type evaluation  -LS     Mode of Treatment occupational therapy  -LS       Row Name 23 1457          General Information    Patient Profile Reviewed yes  -LS     Prior Level of Function independent:;ADL's;all  household mobility  -     Existing Precautions/Restrictions fall  -     Barriers to Rehab medically complex;previous functional deficit;hearing deficit  -       Row Name 12/29/23 1457          Living Environment    People in Home spouse  -       Row Name 12/29/23 1457          Cognition    Orientation Status (Cognition) oriented x 4  -       Row Name 12/29/23 1457          Safety Issues, Functional Mobility    Impairments Affecting Function (Mobility) balance;strength;endurance/activity tolerance;pain;postural/trunk control;range of motion (ROM);shortness of breath  -               User Key  (r) = Recorded By, (t) = Taken By, (c) = Cosigned By      Initials Name Provider Type    Gilberto Carr OT Occupational Therapist                     Mobility/ADL's       Row Name 12/29/23 1458          Bed Mobility    Bed Mobility bed mobility (all) activities  -     All Activities, [place] (Bed Mobility) dependent (less than 25% patient effort);2 person assist  -       Row Name 12/29/23 1458          Transfers    Comment, (Transfers) Pt unable to even attempt standing this date, with pain and HR elevated to 129  -       Row Name 12/29/23 1458          Activities of Daily Living    BADL Assessment/Intervention other (see comments)  Pt appears to be dependent for toileting, dressing, bathing at this time  -               User Key  (r) = Recorded By, (t) = Taken By, (c) = Cosigned By      Initials Name Provider Type    Gilberto Carr OT Occupational Therapist                   Obj/Interventions       Row Name 12/29/23 1502          Sensory Assessment (Somatosensory)    Sensory Assessment (Somatosensory) sensation intact  -       Row Name 12/29/23 1502          Range of Motion Comprehensive    Comment, General Range of Motion RUE in abduction sling, LUE limited to 75% AROM at shoulder  -       Row Name 12/29/23 1502          Strength Comprehensive (MMT)    Comment, General Manual Muscle Testing  (MMT) Assessment ALEKSANDR LEIGH 3/5  -       Row Name 12/29/23 1502          Balance    Balance Assessment sitting static balance;sitting dynamic balance  -LS     Static Sitting Balance contact guard  -LS     Dynamic Sitting Balance moderate assist  -LS     Position, Sitting Balance sitting edge of bed  -LS               User Key  (r) = Recorded By, (t) = Taken By, (c) = Cosigned By      Initials Name Provider Type    LS Gilberto Lundberg OT Occupational Therapist                   Goals/Plan       Row Name 12/29/23 1510          Bed Mobility Goal 1 (OT)    Activity/Assistive Device (Bed Mobility Goal 1, OT) bed mobility activities, all  -LS     [place] Level/Cues Needed (Bed Mobility Goal 1, OT) minimum assist (75% or more patient effort)  -LS     Time Frame (Bed Mobility Goal 1, OT) long term goal (LTG);2 weeks  -       Row Name 12/29/23 1510          Transfer Goal 1 (OT)    Activity/Assistive Device (Transfer Goal 1, OT) transfers, all  -LS     [place] Level/Cues Needed (Transfer Goal 1, OT) minimum assist (75% or more patient effort)  -LS     Time Frame (Transfer Goal 1, OT) long term goal (LTG);2 weeks  -       Row Name 12/29/23 1510          Dressing Goal 1 (OT)    Activity/Device (Dressing Goal 1, OT) dressing skills, all  -LS     [place]/Cues Needed (Dressing Goal 1, OT) minimum assist (75% or more patient effort);moderate assist (50-74% patient effort)  -LS     Time Frame (Dressing Goal 1, OT) long term goal (LTG);2 weeks  -       Row Name 12/29/23 1510          Toileting Goal 1 (OT)    Activity/Device (Toileting Goal 1, OT) toileting skills, all  -LS     [place] Level/Cues Needed (Toileting Goal 1, OT) minimum assist (75% or more patient effort)  -LS     Time Frame (Toileting Goal 1, OT) long term goal (LTG);2 weeks  -       Row Name 12/29/23 1510          Therapy Assessment/Plan (OT)    Planned Therapy Interventions (OT) activity tolerance training;BADL retraining;functional  balance retraining;IADL retraining;occupation/activity based interventions;ROM/therapeutic exercise;strengthening exercise;transfer/mobility retraining;patient/caregiver education/training;neuromuscular control/coordination retraining  -               User Key  (r) = Recorded By, (t) = Taken By, (c) = Cosigned By      Initials Name Provider Type    LS Gilberto Lundberg OT Occupational Therapist                   Clinical Impression       Row Name 12/29/23 1505          Pain Assessment    Additional Documentation Pain Scale: FACES Pre/Post-Treatment (Group)  -LS       Row Name 12/29/23 150          Pain Scale: FACES Pre/Post-Treatment    Pain: FACES Scale, Pretreatment 2-->hurts little bit  -LS     Posttreatment Pain Rating 8-->hurts whole lot  -LS     Pain Location - Side/Orientation Right  -LS     Pain Location - shoulder  -LS     Pre/Posttreatment Pain Comment Scrotum pain with movement  -LS       Row Name 12/29/23 1508          Plan of Care Review    Plan of Care Reviewed With patient  -LS     Outcome Evaluation Pt is a 74 y/o M admitted to [facility] 12/28/2023 with generalized weakness and lower abdominal pain. ED workup revealed a positive COVID test and A-fib with RVR. Pt known to have a R humerus fx on Nov 28, 2023. PMHx significant for HTN, hx TIA, CKDIII, and DMII. At baseline pt resides with spouse in independent living at [facility] floor [facility] and utilizes elevator to access apartment. Pt typically utilizes straight cane for mobility, however recently has had functional decline, only mobilizing within home and wife has increased assist with ADLs. Pt currently has a hospital bed, w/c, RW, SPC, grab bars, walk-shower, and BSC that he uses over his toilet. Pt very [place] and without hearing aides. He is however oriented x4. Dependent x2 to come to sitting EOB, with increased scrotum pain with movement. He admits to using briefs recently to go to the bathroom, thus scrotum is raw. Once sitting EOB, able  to maintain balance with CGA. He is dependent at this time for lower body ADLs. Unsafe to advance to standing this date with elevated heart rate, tech entered room to perform an echo thus pt assisted back to supine. He is far from his baseline, and cannot return home as spouse is unable to provided necessary level of care. OT will follow, recommending SNF.  -       Row Name 12/29/23 1507          Therapy Assessment/Plan (OT)    Rehab Potential (OT) good, to achieve stated therapy goals  -     Criteria for Skilled Therapeutic Interventions Met (OT) yes;skilled treatment is necessary  -LS     Therapy Frequency (OT) 3 times/wk  -LS     Predicted Duration of Therapy Intervention (OT) until dc  -LS       Row Name 12/29/23 1502          Therapy Plan Review/Discharge Plan (OT)    Anticipated Discharge Disposition (OT) skilled nursing facility  -       Row Name 12/29/23 150          Vital Signs    Pretreatment Heart Rate (beats/min) 109  -LS     Intratreatment Heart Rate (beats/min) 129  -LS     Posttreatment Heart Rate (beats/min) 111  -LS     O2 Delivery Pre Treatment supplemental O2  2L  -LS     O2 Delivery Intra Treatment supplemental O2  -LS     O2 Delivery Post Treatment supplemental O2  -LS     Pre Patient Position Supine  -LS     Intra Patient Position Sitting  -LS     Post Patient Position Supine  -LS       Row Name 12/29/23 1500          Positioning and Restraints    Pre-Treatment Position in bed  -LS     Post Treatment Position bed  -LS     In Bed with other staff  -               User Key  (r) = Recorded By, (t) = Taken By, (c) = Cosigned By      Initials Name Provider Type    LS Gilberto Lundberg, JAS Occupational Therapist                   Outcome Measures       Row Name 12/29/23 8925          How much help from another is currently needed...    Putting on and taking off regular lower body clothing? 1  -LS     Bathing (including washing, rinsing, and drying) 1  -LS     Toileting (which includes using  toilet bed pan or urinal) 1  -LS     Putting on and taking off regular upper body clothing 1  -LS     Taking care of personal grooming (such as brushing teeth) 2  -LS     Eating meals 4  -LS     AM-PAC 6 Clicks Score (OT) 10  -LS       Row Name 12/29/23 1249          How much help from another person do you currently need...    Turning from your back to your side while in flat bed without using bedrails? 1  -MB     Moving from lying on back to sitting on the side of a flat bed without bedrails? 1  -MB     Moving to and from a bed to a chair (including a wheelchair)? 1  -MB     Standing up from a chair using your arms (e.g., wheelchair, bedside chair)? 1  -MB     Climbing 3-5 steps with a railing? 1  -MB     To walk in hospital room? 1  -MB     AM-PAC 6 Clicks Score (PT) 6  -MB     Highest Level of Mobility Goal 2 --> Bed activities/dependent transfer  -MB       Row Name 12/29/23 1511          Functional Assessment    Outcome Measure Options AM-PAC 6 Clicks Daily Activity (OT)  -               User Key  (r) = Recorded By, (t) = Taken By, (c) = Cosigned By      Initials Name Provider Type     Gilberto Lundberg OT Occupational Therapist    Wale Quesada, PT Physical Therapist                    Occupational Therapy Education       Title: PT OT SLP Therapies (In Progress)       Topic: Occupational Therapy (In Progress)       Point: ADL training (Done)       Description:   Instruct learner(s) on proper safety adaptation and remediation techniques during self care or transfers.   Instruct in proper use of assistive devices.                  Learning Progress Summary             Patient Acceptance, E,TB, VU by  at 12/29/2023 1511                         Point: Home exercise program (Not Started)       Description:   Instruct learner(s) on appropriate technique for monitoring, assisting and/or progressing therapeutic exercises/activities.                  Learner Progress:  Not documented in this visit.               Point: Precautions (Not Started)       Description:   Instruct learner(s) on prescribed precautions during self-care and functional transfers.                  Learner Progress:  Not documented in this visit.              Point: Body mechanics (Not Started)       Description:   Instruct learner(s) on proper positioning and spine alignment during self-care, functional mobility activities and/or exercises.                  Learner Progress:  Not documented in this visit.                              User Key       Initials Effective Dates Name Provider Type Discipline     09/22/22 -  Gilberto Lundberg OT Occupational Therapist OT                  OT Recommendation and Plan  Planned Therapy Interventions (OT): activity tolerance training, BADL retraining, functional balance retraining, IADL retraining, occupation/activity based interventions, ROM/therapeutic exercise, strengthening exercise, transfer/mobility retraining, patient/caregiver education/training, neuromuscular control/coordination retraining  Therapy Frequency (OT): 3 times/wk  Plan of Care Review  Plan of Care Reviewed With: patient  Outcome Evaluation: Pt is a 72 y/o M admitted to [facility] 12/28/2023 with generalized weakness and lower abdominal pain. ED workup revealed a positive COVID test and A-fib with RVR. Pt known to have a R humerus fx on Nov 28, 2023. PMHx significant for HTN, hx TIA, CKDIII, and DMII. At baseline pt resides with spouse [facility] independent [facility] to access [facility]. Pt typically utilizes straight cane for mobility, however recently has had functional decline, only mobilizing within home and wife has increased assist with ADLs. Pt currently has a hospital bed, w/c, RW, SPC, grab bars, walk-shower, and BSC that he uses over his toilet. Pt very [place] and without hearing aides. He is however oriented x4. Dependent x2 to come to sitting EOB, with increased scrotum pain with movement. He  admits to using briefs recently to go to the bathroom, thus scrotum is raw. Once sitting EOB, able to maintain balance with CGA. He is dependent at this time for lower body ADLs. Unsafe to advance to standing this date with elevated heart rate, tech entered room to perform an echo thus pt assisted back to supine. He is far from his baseline, and cannot return home as spouse is unable to provided necessary level of care. OT will follow, recommending SNF.     Time Calculation:         Time Calculation- OT       Row Name 12/29/23 1511             Time Calculation- OT    OT Start Time 1122  -LS      OT Stop Time 1147  -LS      OT Time Calculation (min) 25 min  -LS      OT Received On 12/29/23  -      OT - Next Appointment 01/02/24  -      OT Goal Re-Cert Due Date 01/12/24  -         Untimed Charges    OT Eval/Re-eval Minutes 25  -LS         Total Minutes    Untimed Charges Total Minutes 25  -LS       Total Minutes 25  -LS                User Key  (r) = Recorded By, (t) = Taken By, (c) = Cosigned By      Initials Name Provider Type     Gilberto Lundberg OT Occupational Therapist                  Therapy Charges for Today       Code Description Service Date Service Provider Modifiers Qty    [id number] HC OT EVAL HIGH COMPLEXITY 4 12/29/2023 Gilberto Lundberg OT GO 1                 Gilberto Lundberg OT  12/29/2023

## 2023-12-29 NOTE — PLAN OF CARE
Goal Outcome Evaluation:  Plan of Care Reviewed With: patient        Progress: no change   Pt is a 74 y/o M admitted to [facility] on 12/28/23 with complaints of increasing weakness, lower abdominal discomfort, and coughing since NG tube was placed two weeks ago. [facility] in abduction sling, but is unsure if surgery is necessary. Planning on follow-up with ortho OP. He is currently living at [facility] living [facility] with spouse and reports that he is normally IND with all mobility with use of SPC, most recently req assist from spouse with nearly all ADLs since that fall that fractured his arm. Pt currently has a hospital bed, w/c, RW, SPC, grab bars, walk-shower, and BSC that he uses over his toilet. This date, pt is lying supine and is very hard of hearing. He complains of significant pain in the scrotum with movement. He has very little AROM of BLE, along with severe BLE strength deficits. Completes supine<>sit dep x 2 this session and exhibits poor sitting balance. He is very fr below his baseline at this time and spouse will not be able to care for him in this condition. He will require SNF at d/c for continued care and monitoring following d/c from hospital. PT will follow during stay.    Anticipated Discharge Disposition (PT): skilled nursing facility

## 2023-12-29 NOTE — DISCHARGE PLACEMENT REQUEST
"Blas Mojica (73 y.o. Male)       Date of Birth   1950    Social Security Number       Address   [address] [facility] [address] [place] [address]    Home Phone   [phone]    MRN   [id number]       [place]   [employer]    Marital Status                               Admission Date   12/28/23    Admission Type   Emergency    Admitting Provider   Gilmar Whiting MD    Attending Provider   Gilmar Whiting MD    Department, Room/Bed   [facility] EMERGENCY DEPARTMENT, 29/29       Discharge Date       Discharge Disposition       Discharge Destination                                 Attending Provider: Gilmar Whiting MD    Allergies: Contrast Dye (Echo Or Unknown Ct/mr), Iodinated Contrast Media, Iodine, Aspirin, Lipitor [Atorvastatin], Prednisone    Isolation: Enh Drop/Con, Contact   Infection: COVID (confirmed) (12/28/23), Oanh Auris (rule out) (12/28/23)   Code Status: CPR    Ht: 177.8 cm (70\")   Wt: 88.5 kg (195 lb)    Admission Cmt: None   Principal Problem: Generalized weakness [R53.1]                   Active Insurance as of 12/28/2023       Primary Coverage       Payor Plan Insurance Group Employer/Plan Group    MEDICARE MEDICARE A & B        Payor Plan Address Payor Plan Phone Number Payor Plan Fax Number Effective Dates    [address] [phone]  9/1/2014 - None Entered    [facility] [id number]         Subscriber Name Subscriber Birth Date Member ID       BLAS MOJICA 1950 [id number]               Secondary Coverage       Payor Plan Insurance Group Employer/Plan Group    HUMANA HU[employer]NA [employer]              [id number]       Payor Plan Address Payor Plan Phone Number Payor Plan Fax Number Effective Dates    [address] [address]   9/1/2014 - None Entered    [facility] [id number]         Subscriber Name Subscriber Birth Date Member ID       BLAS MOJICA 1950 [id number]                     Emergency Contacts        (Rel.) Home Phone Work Phone Mobile Phone    " NICK MOJICA (Spouse) [phone] -- [phone]    EtienneApril (Daughter) -- -- --    Gutierrez Mojica (Daughter) -- -- --    Carie Renee (Daughter) -- -- --

## 2023-12-29 NOTE — PLAN OF CARE
Goal Outcome Evaluation:  Plan of Care Reviewed With: patient           Outcome Evaluation: Pt is a 72 y/o M admitted to [facility] 12/28/2023 with generalized weakness and lower abdominal pain. ED workup revealed a positive COVID test and A-fib with RVR. Pt known to have a R humerus fx on Nov 28, 2023. PMHx significant for HTN, hx TIA, CKDIII, and DMII. At baseline pt resides with [facility] to access [facility]. Pt typically utilizes straight cane for mobility, however recently has had functional decline, only mobilizing within home and wife has increased assist with ADLs. Pt currently has a hospital bed, w/c, RW, SPC, grab bars, walk-shower, and BSC that he uses over his toilet. Pt very [place] and without hearing aides. He is however oriented x4. Dependent x2 to come to sitting EOB, with increased scrotum pain with movement. He admits to using briefs recently to go to the bathroom, thus scrotum is raw. Once sitting EOB, able to maintain balance with CGA. He is dependent at this time for lower body ADLs. Unsafe to advance to standing this date with elevated heart rate, tech entered room to perform an echo thus pt assisted back to supine. He is far from his baseline, and cannot return home as spouse is unable to provided necessary level of care. OT will follow, recommending SNF.      Anticipated Discharge Disposition (OT): skilled nursing facility

## 2023-12-29 NOTE — PROGRESS NOTES
[facility] Medicine Services Daily Progress Note    Patient Name: Blas Mojica  : 1950  MRN: [id number]  Primary Care Physician:  Sahil Read MD  Date of admission: 2023      Subjective      Chief Complaint: Shortness of breath generalized weakness      Patient Reports he still having cough and shortness of breath.  He is on room air but coughing very frequently.  Speech therapy consulted for recurrent choking after pills and food.  Patient remains in A-fib over 100 bpm.    ROS negative except as above      Objective      Vitals:   Temp:  [99.5 °F (37.5 °C)] 99.5 °F (37.5 °C)  Heart Rate:  [] 110  Resp:  [18] 18  BP: (124-156)/(67-86) 139/81    Physical Exam  Vitals reviewed.   Constitutional:       Comments: On room air  Coughing frequently   HENT:      Head: Normocephalic.   Cardiovascular:      Rate and Rhythm: Tachycardia present. Rhythm irregular.   Pulmonary:      Effort: Pulmonary effort is normal.   Abdominal:      General: Abdomen is flat.      Palpations: Abdomen is soft.   Musculoskeletal:         General: Tenderness present. Normal range of motion.      Cervical back: Normal range of motion.      Comments: Right upper extremity shoulder sling   Skin:     General: Skin is warm.   Neurological:      General: No focal deficit present.      Mental Status: He is alert and oriented to person, place, and time.   Psychiatric:         Mood and Affect: Mood normal.         Behavior: Behavior normal.             Result Review    Result Review:  I have personally reviewed the results from the time of this admission to 2023 11:56 EST and agree with these findings:  [x]  Laboratory  []  Microbiology  []  Radiology  []  EKG/Telemetry   []  Cardiology/Vascular   []  Pathology  []  Old records  []  Other:  Most notable findings include: Creatinine 1.3          Assessment & Plan      Brief Patient Summary:  Blas Mojica is a 73 y.o. male who presents with  COVID generalized weakness dysphagia      cetirizine, 10 mg, Oral, Daily  clopidogrel, 75 mg, Oral, Daily  enoxaparin, 40 mg, Subcutaneous, Daily  insulin glargine, 10 Units, Subcutaneous, Nightly  insulin lispro, 2-9 Units, Subcutaneous, Q6H  [Held by provider] lisinopril, 40 mg, Oral, Daily  pantoprazole, 40 mg, Intravenous, Q12H  senna-docusate sodium, 2 tablet, Oral, BID  sodium chloride, 10 mL, Intravenous, Q12H       sodium chloride, 75 mL/hr, Last Rate: 75 mL/hr (12/29/23 0910)         Active Hospital Problems:  Active Hospital Problems    Diagnosis     **Generalized weakness      Plan:   Generalized lower abdominal pain  Recent EGD.  CT Abdomen/Pelvis: no acute abnormalities.  Further workup pending clinical progress.     COVID-19 positive  Has a persistent cough, unchanged from previous admission, unlikely COVID-related, asymptomatic, does not need treatment.  COVID-19 in ED.  Keep in isolation per hospital protocols.  Tessalon Perles and Zyrtec ordered.     Generalized weakness  Generalized deconditioning from back to back hospitalizations.  PT Eval and treat, seeking recommendations for discharge placement, currently from [facility] in independent living.  Case management assistance appreciated, may require GEOVANNI.     Sinus tachycardia, possible atrial fibrillation with RVR  EKG independently reviewed: Sinus tachycardia, heart rate 111, .  Without ST or T wave abnormalities.  Of note, there is a lot of artifact on the first half of the EKG.  EKG stated as atrial fibrillation, may be in/out of a. Fib.  Cardiology consulted, sees Dr. Vasquez.  No history of atrial fibrillation on PMH, on no rate controlling medications as outpatient.   [id number] Score of at least 5. Currently on prophylactic Lovenox, due to recent GIB, will hold off on full anticoagulation.     Diabetes mellitus Type 2, not insulin-dependent : well controlled.   Previous hemoglobin A1c on 12/12/2023, 7.70.  Continue Lantus as reduced  dose.  Hold oral agents.  Accu checks ACHS and c/w humalog coverage as needed.      Essential Hypertension: well controlled.   BP well controlled, hold Maxzide and lisinopril, resume when clinically appropriate.  Titrate medications as needed.     Chronic kidney disease  Remains nonoliguric.   Monitor Input/Output very closely.   Starting IVF to prevent BRIGIDO.  Avoids NSAIDs, nephrotoxic medications, and hypotension.  Renally adjust medications for Estimated Creatinine Clearance: 51.9 mL/min (A) (by C-G formula based on SCr of 1.4 mg/dL (H)).     History of recent suspected GIB with erosive esophagitis & gastric ulcers  Hemoglobin normal on admission, continue to monitor and trend labs.  Underwent EGD on 12/13/2023 with findings of erosive esophagitis and gastric ulcers, recommend BID Protonix x 1 month, then once daily.  Recommended repeat EGD in 8-12 weeks.  Did develop some post-nasal drainage with dry cough that has been persistent since placement of EGD during that admission.     Dysphagia  Keep NPO.  SLP evaluation.     Hyperdense lesion of the upper pole of right kidney & mid left kidney  Subsequent find on CT, no hydronephrosis or obstructive uropathy.  Continue follow up as outpatient.     Recent fracture of proximal humeral surgical neck  Seen and evaluated in [place], [place], with XR showing fracture of the proximal humeral surgical neck, with 3.5 cm sclerotic lesion within the proximal humerus.  CT of the upper extremity was completed with impression of nondisplaced minimally comminuted fracture of the greater tuberosity of the humerus.  Patient presents in sling, states that he is unsure of what the plan is, as orthopedic surgery has yet to determine what to do.  Consider orthopedic surgery referral as outpatient.  Continue with splint in place.     History of TIA: Continue Plavix, intolerant of statin therapy.     Level Of Support Discussed With: Patient  Code Status (Patient has no pulse and is  not breathing): CPR (Attempt to Resuscitate)  Medical Interventions (Patient has pulse or is breathing): Full Support 12/29/23      Electronically signed by Gilmar Whiting MD, 12/29/23, 11:56 EST.  [facility]

## 2023-12-29 NOTE — PLAN OF CARE
Goal Outcome Evaluation:      Pt admitted. He is from [facility] where he lives with his wife. He is positive for coivd. Pt has RUE immobilizer for future surgery. PRN meds given. Pt has been very weak lately. PT and ST consulted. Excoriation to scrotum. Cream applied. Ext. Cath in place. Plan of care ongoing.

## 2023-12-29 NOTE — CONSULTS
"  Diabetes Education  Assessment/Teaching    Patient Name:  Blas Mojica  YOB: 1950  MRN: [id number]  Admit Date:  12/28/2023      Assessment Date:  12/29/2023  Flowsheet Row Most Recent Value   General Information     Referral From: Database, Blood glucose, A1c  [MD consult per database, admission blood sugar 268, and on 12/12/2023 A1c was 7.7%.]   Height 177.8 cm (70\")   Height Method Stated   Weight 88.5 kg (195 lb)   Weight Method Standing scale   Pregnancy Assessment    Diabetes History    What type of diabetes do you have? Type 2   Length of Diabetes Diagnosis 10 + years  [Diagnosed over 30 years ago.]   Current DM knowledge fair   Have you had diabetes education/teaching in the past? yes   When and where was your diabetes education? Inpatient hosptialization at [facility] on 10/21/2022   Do you test your blood sugar at home? yes   Frequency of checks as often as needs   Meter type Dexcom G7   Who performs the test? patient   Typical readings lately 300-400 prior to that 200-300   Have you had high blood sugar? (>140mg/dl) yes   How often do you have high blood sugar? frequently   When was your last high blood sugar? Admission blood sugar 268   Education Preferences    What areas of diabetes would you like to learn about? avoiding high blood sugar, diabetes complications   Nutrition Information    Assessment Topics    Problem Solving - Assessment Needs education   Reducing Risk - Assessment Needs education   DM Goals    Problem Solving - Goal Today   Reducing Risk - Goal Today            Flowsheet Row Most Recent Value   DM Education Needs    Meter Has own   Meter Type Other (comment)  [Dexcom G7]   Medication Insulin, Oral, Pen  [At home patient stated that he takes Lantus 37 units at bedtime and Actos 15 mg 2 tabs daily.]   Problem Solving Hyperglycemia, Signs, Symptoms, Treatment   Reducing Risks A1C testing  [On 12/12/2023 A1c was 7.7%.]   Discharge Plan Home   Motivation Moderate   Teaching " Method Discussion, Handouts   Patient Response Verbalized understanding              Other Comments:  Appropriate PPE worn for COVID positive patient. A1c info sheet given with discussion on A1c target and healthy blood sugar range. Patient stated he drinks water and diet soda. Discussed with patient how illness can cause elevations in blood sugar. Patient has no further questions or concerns related to diabetes at this time.        Electronically signed by:  Rosanna Abdi RN  12/29/23 16:38 EST

## 2023-12-29 NOTE — CASE MANAGEMENT/SOCIAL WORK
Discharge Planning Assessment  [facility]     Patient Name: Blas Mojcia  MRN: [id number]  Today's Date: 12/29/2023    Admit Date: 12/28/2023    Plan: PT.callie pending, COVID +,spouse would like rehab placement  Addendum: PT suggested skilled rehab. Spouse requested referral be sent to [facility]. Notified liaison  Addendum: Referral to [facility] per spouse request     Discharge Needs Assessment       Row Name 12/29/23 1017       Living Environment    People in Home spouse    Name(s) of People in Home Debra-spouse    Current Living Arrangements apartment    Potentially Unsafe Housing Conditions none    Primary Care Provided by spouse/significant other    Provides Primary Care For no one    Family Caregiver if Needed spouse    Quality of Family Relationships helpful;involved;supportive    Able to Return to Prior Arrangements yes       Resource/Environmental Concerns    Resource/Environmental Concerns none    Transportation Concerns none       Transition Planning    Patient/Family Anticipates Transition to other (see comments)  PT callie.pending    Transportation Anticipated family or friend will provide  Spouse       Discharge Needs Assessment    Readmission Within the Last 30 Days no previous admission in last 30 days    Equipment Currently Used at Home walker, rolling;cane, straight    Concerns to be Addressed discharge planning                   Discharge Plan       Row Name 12/29/23 1021       Plan    Plan PT.callie pending, COVID +,spouse would like rehab placement    Plan Comments Spoke with joselni spouse, beccaly live in [facility]. Recently dishcarged from [facility] rehab. Confirmed PCP and pharmacy. Denies transportation or medication coverage issues. DC Barrires: PT callie. COVID +                  Continued Care and Services - Admitted Since 12/28/2023    Coordination has not been started for this encounter.       Selected Continued Care - Prior Encounters Includes continued care and service providers with  selected services from prior encounters from 9/29/2023 to 12/29/2023         Expected Discharge Date and Time       Expected Discharge Date Expected Discharge Time    Dec 30, 2023            Demographic Summary       Row Name 12/29/23 1015       General Information    Admission Type observation    Arrived From home    Required Notices Provided Observation Status Notice    Referral Source family  spouse    Reason for Consult discharge planning    Preferred Language English                   Functional Status       Row Name 12/29/23 1016       Functional Status    Usual Activity Tolerance fair    Current Activity Tolerance fair       Functional Status, IADL    Medications assistive person    Meal Preparation assistive person    Housekeeping assistive person    Laundry assistive person    Shopping assistive person       Mental Status    General Appearance WDL WDL                     Patient Forms       Row Name 12/29/23 1021       Patient Forms    Patient Observation Letter Delivered  12/28/23 Registration           Met with patient in room wearing PPE: mask, face shield/goggles, gloves, gown.      Maintained distance greater than six feet and spent less than 15 minutes in the room.     Natasha Barajas RN

## 2023-12-30 LAB
ALBUMIN SERPL-MCNC: 3.5 G/DL (ref 3.5–5.2)
ALBUMIN/GLOB SERPL: 1.3 G/DL
ALP SERPL-CCNC: 3881 U/L (ref 39–117)
ALT SERPL W P-5'-P-CCNC: 20 U/L (ref 1–41)
ANION GAP SERPL CALCULATED.3IONS-SCNC: 13 MMOL/L (ref 5–15)
ANISOCYTOSIS BLD QL: ABNORMAL
AST SERPL-CCNC: 38 U/L (ref 1–40)
BACTERIA UR QL AUTO: NORMAL /HPF
BILIRUB SERPL-MCNC: 0.5 MG/DL (ref 0–1.2)
BILIRUB UR QL STRIP: NEGATIVE
BUN SERPL-MCNC: 21 MG/DL (ref 8–23)
BUN/CREAT SERPL: 17.9 (ref 7–25)
CALCIUM SPEC-SCNC: 9 MG/DL (ref 8.6–10.5)
CHLORIDE SERPL-SCNC: 105 MMOL/L (ref 98–107)
CLARITY UR: ABNORMAL
CO2 SERPL-SCNC: 23 MMOL/L (ref 22–29)
COLOR UR: YELLOW
CREAT SERPL-MCNC: 1.17 MG/DL (ref 0.76–1.27)
DEPRECATED RDW RBC AUTO: 51.2 FL (ref 37–54)
EGFRCR SERPLBLD CKD-EPI 2021: 65.4 ML/MIN/1.73
ERYTHROCYTE [DISTWIDTH] IN BLOOD BY AUTOMATED COUNT: 15.9 % (ref 12.3–15.4)
GLOBULIN UR ELPH-MCNC: 2.8 GM/DL
GLUCOSE BLDC GLUCOMTR-MCNC: 120 MG/DL (ref 70–105)
GLUCOSE BLDC GLUCOMTR-MCNC: 189 MG/DL (ref 70–105)
GLUCOSE BLDC GLUCOMTR-MCNC: 205 MG/DL (ref 70–105)
GLUCOSE BLDC GLUCOMTR-MCNC: 284 MG/DL (ref 70–105)
GLUCOSE SERPL-MCNC: 148 MG/DL (ref 65–99)
GLUCOSE UR STRIP-MCNC: ABNORMAL MG/DL
HCT VFR BLD AUTO: 30.3 % (ref 37.5–51)
HGB BLD-MCNC: 10.1 G/DL (ref 13–17.7)
HGB UR QL STRIP.AUTO: ABNORMAL
HYALINE CASTS UR QL AUTO: NORMAL /LPF
KETONES UR QL STRIP: ABNORMAL
LEUKOCYTE ESTERASE UR QL STRIP.AUTO: NEGATIVE
LYMPHOCYTES # BLD MANUAL: 1.51 10*3/MM3 (ref 0.7–3.1)
LYMPHOCYTES NFR BLD MANUAL: 1 % (ref 5–12)
MAGNESIUM SERPL-MCNC: 2.3 MG/DL (ref 1.6–2.4)
MCH RBC QN AUTO: 29.4 PG (ref 26.6–33)
MCHC RBC AUTO-ENTMCNC: 33.1 G/DL (ref 31.5–35.7)
MCV RBC AUTO: 88.6 FL (ref 79–97)
MONOCYTES # BLD: 0.12 10*3/MM3 (ref 0.1–0.9)
MUCOUS THREADS URNS QL MICRO: NORMAL /HPF
NEUTROPHILS # BLD AUTO: 9.98 10*3/MM3 (ref 1.7–7)
NEUTROPHILS NFR BLD MANUAL: 82 % (ref 42.7–76)
NEUTS BAND NFR BLD MANUAL: 4 % (ref 0–5)
NEUTS VAC BLD QL SMEAR: ABNORMAL
NITRITE UR QL STRIP: NEGATIVE
PH UR STRIP.AUTO: <=5 [PH] (ref 5–8)
PHOSPHATE SERPL-MCNC: 3.7 MG/DL (ref 2.5–4.5)
PLAT MORPH BLD: NORMAL
PLATELET # BLD AUTO: 323 10*3/MM3 (ref 140–450)
PMV BLD AUTO: 8.3 FL (ref 6–12)
POTASSIUM SERPL-SCNC: 4.3 MMOL/L (ref 3.5–5.2)
PROT SERPL-MCNC: 6.3 G/DL (ref 6–8.5)
PROT UR QL STRIP: ABNORMAL
RBC # BLD AUTO: 3.43 10*6/MM3 (ref 4.14–5.8)
RBC # UR STRIP: NORMAL /HPF
REF LAB TEST METHOD: NORMAL
SCAN SLIDE: NORMAL
SODIUM SERPL-SCNC: 141 MMOL/L (ref 136–145)
SP GR UR STRIP: 1.02 (ref 1–1.03)
SQUAMOUS #/AREA URNS HPF: NORMAL /HPF
UNIDENT CRYS URNS QL MICRO: NORMAL /HPF
UROBILINOGEN UR QL STRIP: ABNORMAL
VARIANT LYMPHS NFR BLD MANUAL: 13 % (ref 19.6–45.3)
WBC # UR STRIP: NORMAL /HPF
WBC NRBC COR # BLD AUTO: 11.6 10*3/MM3 (ref 3.4–10.8)

## 2023-12-30 PROCEDURE — 81001 URINALYSIS AUTO W/SCOPE: CPT | Performed by: EMERGENCY MEDICINE

## 2023-12-30 PROCEDURE — 99232 SBSQ HOSP IP/OBS MODERATE 35: CPT | Performed by: INTERNAL MEDICINE

## 2023-12-30 PROCEDURE — 92526 ORAL FUNCTION THERAPY: CPT

## 2023-12-30 PROCEDURE — 84100 ASSAY OF PHOSPHORUS: CPT | Performed by: NURSE PRACTITIONER

## 2023-12-30 PROCEDURE — 83735 ASSAY OF MAGNESIUM: CPT | Performed by: NURSE PRACTITIONER

## 2023-12-30 PROCEDURE — 63710000001 INSULIN LISPRO (HUMAN) PER 5 UNITS: Performed by: INTERNAL MEDICINE

## 2023-12-30 PROCEDURE — 80053 COMPREHEN METABOLIC PANEL: CPT | Performed by: NURSE PRACTITIONER

## 2023-12-30 PROCEDURE — 63710000001 INSULIN GLARGINE PER 5 UNITS: Performed by: NURSE PRACTITIONER

## 2023-12-30 PROCEDURE — 36415 COLL VENOUS BLD VENIPUNCTURE: CPT | Performed by: NURSE PRACTITIONER

## 2023-12-30 PROCEDURE — 82948 REAGENT STRIP/BLOOD GLUCOSE: CPT

## 2023-12-30 PROCEDURE — 85025 COMPLETE CBC W/AUTO DIFF WBC: CPT | Performed by: NURSE PRACTITIONER

## 2023-12-30 PROCEDURE — 85007 BL SMEAR W/DIFF WBC COUNT: CPT | Performed by: NURSE PRACTITIONER

## 2023-12-30 RX ORDER — INSULIN LISPRO 100 [IU]/ML
2-9 INJECTION, SOLUTION INTRAVENOUS; SUBCUTANEOUS
Status: DISCONTINUED | OUTPATIENT
Start: 2023-12-30 | End: 2024-01-04 | Stop reason: HOSPADM

## 2023-12-30 RX ADMIN — METOPROLOL SUCCINATE 100 MG: 50 TABLET, EXTENDED RELEASE ORAL at 08:21

## 2023-12-30 RX ADMIN — Medication 10 ML: at 08:21

## 2023-12-30 RX ADMIN — INSULIN LISPRO 2 UNITS: 100 INJECTION, SOLUTION INTRAVENOUS; SUBCUTANEOUS at 17:13

## 2023-12-30 RX ADMIN — PANTOPRAZOLE SODIUM 40 MG: 40 INJECTION, POWDER, FOR SOLUTION INTRAVENOUS at 08:21

## 2023-12-30 RX ADMIN — PANTOPRAZOLE SODIUM 40 MG: 40 INJECTION, POWDER, FOR SOLUTION INTRAVENOUS at 21:12

## 2023-12-30 RX ADMIN — INSULIN LISPRO 6 UNITS: 100 INJECTION, SOLUTION INTRAVENOUS; SUBCUTANEOUS at 21:34

## 2023-12-30 RX ADMIN — INSULIN LISPRO 4 UNITS: 100 INJECTION, SOLUTION INTRAVENOUS; SUBCUTANEOUS at 12:17

## 2023-12-30 RX ADMIN — SENNOSIDES AND DOCUSATE SODIUM 2 TABLET: 50; 8.6 TABLET ORAL at 08:21

## 2023-12-30 RX ADMIN — BENZONATATE 200 MG: 100 CAPSULE ORAL at 21:11

## 2023-12-30 RX ADMIN — Medication 10 ML: at 21:12

## 2023-12-30 RX ADMIN — CETIRIZINE HYDROCHLORIDE 10 MG: 10 TABLET, FILM COATED ORAL at 08:21

## 2023-12-30 RX ADMIN — INSULIN GLARGINE 33 UNITS: 100 INJECTION, SOLUTION SUBCUTANEOUS at 21:11

## 2023-12-30 NOTE — PROGRESS NOTES
Cardiology Progress Note      PATIENT IDENTIFICATION    Name: Blas Mojica  Age: 73 y.o. Sex: male : 1950  MRN: [id number]    Requesting Provider    Darrick Jha MD     LOS: 1 day       Reason For Followup:  Paroxysmal atrial fibrillation      Subjective:    Interval History:  Chart and labs reviewed.  Blood pressure and heart rate are well-controlled.  Patient is in sinus rhythm with PACs.      Assessment & Plan    Impressions:  Paroxysmal atrial fibrillation with rapid ventricular response and an elevated CHADS2 score  COVID-19 positive status  Diabetes  Hypertension  Chronic kidney disease  Generalized weakness    Recommendations:  Continue with beta-blocker therapy as blood pressure tolerates  Recommend initiation of Eliquis when okay from a gastrointestinal standpoint        Objective:    Medication Review:   Scheduled Meds:cetirizine, 10 mg, Oral, Daily  [Held by provider] clopidogrel, 75 mg, Oral, Daily  insulin glargine, 33 Units, Subcutaneous, Nightly  insulin lispro, 2-9 Units, Subcutaneous, 4x Daily With Meals & Nightly  [Held by provider] lisinopril, 40 mg, Oral, Daily  metoprolol succinate XL, 100 mg, Oral, Q24H  pantoprazole, 40 mg, Intravenous, Q12H  senna-docusate sodium, 2 tablet, Oral, BID  sodium chloride, 10 mL, Intravenous, Q12H      Continuous Infusions:sodium chloride, 75 mL/hr, Last Rate: 75 mL/hr (23)      PRN Meds:.  acetaminophen **OR** acetaminophen **OR** acetaminophen    benzonatate    senna-docusate sodium **AND** polyethylene glycol **AND** bisacodyl **AND** bisacodyl    Calcium Replacement - Follow Nurse / BPA Driven Protocol    dextrose    dextrose    glucagon (human recombinant)    guaifenesin    Magnesium Cardiology Dose Replacement - Follow Nurse / BPA Driven Protocol    nitroglycerin    ondansetron **OR** ondansetron    Phosphorus Replacement - Follow Nurse / BPA Driven Protocol    Potassium Replacement - Follow Nurse / BPA Driven Protocol     [COMPLETED] Insert Peripheral IV **AND** sodium chloride    sodium chloride    sodium chloride      Generalized weakness    COVID         Physical Exam:  Physical examination deferred secondary to isolation status.  Physical exam of admitting service and nursing assessments utilized for medical decision-making purposes    Vital Signs:  Vitals:    12/30/23 0142 12/30/23 0414 12/30/23 0526 12/30/23 0748   BP:   102/59 124/74   BP Location:   Left arm Left arm   Patient Position:   Lying Lying   Pulse: 102 98 89 92   Resp: 20 20 23 27   Temp:   97.3 °F (36.3 °C) 99.4 °F (37.4 °C)   TempSrc:   Oral Oral   SpO2: 92% 95% 96% 95%   Weight:   84.8 kg (186 lb 15.2 oz)    Height:         Wt Readings from Last 1 Encounters:   12/30/23 84.8 kg (186 lb 15.2 oz)       Intake/Output Summary (Last 24 hours) at 12/30/2023 1004  Last data filed at 12/30/2023 0526  Gross per 24 hour   Intake 1625 ml   Output 650 ml   Net 975 ml         Results Review:     CBC    Results from last 7 days   Lab Units 12/30/23  0438 12/29/23  0158 12/28/23  1332   WBC 10*3/mm3 11.60* 7.30 6.50   HEMOGLOBIN g/dL 10.1* 12.4* 14.1   PLATELETS 10*3/mm3 323 126* 159     Cr Clearance Estimated Creatinine Clearance: 66.4 mL/min (by C-G formula based on SCr of 1.17 mg/dL).  Coag     HbA1C   Lab Results   Component Value Date    HGBA1C 7.70 (H) 12/12/2023    HGBA1C 8.0 (H) 10/20/2022    HGBA1C 9.2 (H) 02/28/2022     Blood Glucose   Glucose   Date/Time Value Ref Range Status   12/30/2023 0752 120 (H) 70 - 105 mg/dL Final     Comment:     Serial Number: [id number]:  [id number]   12/29/2023 2255 160 (H) 70 - 105 mg/dL Final     Comment:     Serial Number: [id number]:  [id number]   12/29/2023 2126 145 (H) 70 - 105 mg/dL Final     Comment:     Serial Number: [id number]:  [id number]   12/29/2023 1704 105 70 - 105 mg/dL Final     Comment:     Serial Number: [id number]:  [id number]   12/29/2023 1409 97 70 - 105 mg/dL Final     Comment:     Serial  "Number: [id number]:  [id number]   12/28/2023 1834 179 (H) 70 - 105 mg/dL Final     Comment:     Serial Number: [id number]:  [id number]     Infection   Results from last 7 days   Lab Units 12/29/23  0158   PROCALCITONIN ng/mL 0.17     CMP   Results from last 7 days   Lab Units 12/30/23  0438 12/29/23  0158 12/28/23  1332   SODIUM mmol/L 141 141 140   POTASSIUM mmol/L 4.3 3.9 4.1   CHLORIDE mmol/L 105 104 100   CO2 mmol/L 23.0 27.0 30.0*   BUN mg/dL 21 22 24*   CREATININE mg/dL 1.17 1.30* 1.40*   GLUCOSE mg/dL 148* 113* 268*   ALBUMIN g/dL 3.5 3.5 3.9   BILIRUBIN mg/dL 0.5 0.7 0.8   ALK PHOS U/L 3,881* 100 118*   AST (SGOT) U/L 38 29 23   ALT (SGPT) U/L 20 19 18   LIPASE U/L  --   --  30     ABG      UA    Results from last 7 days   Lab Units 12/30/23  0213   NITRITE UA  Negative   WBC UA /HPF None Seen   BACTERIA UA /HPF None Seen   SQUAM EPITHEL UA /HPF 0-2     MIGUEL ANGEL  No results found for: \"POCMETH\", \"POCAMPHET\", \"POCBARBITUR\", \"POCBENZO\", \"POCCOCAINE\", \"POCOPIATES\", \"POCOXYCODO\", \"POCPHENCYC\", \"POCPROPOXY\", \"POCTHC\", \"POCTRICYC\"  Lysis Labs   Results from last 7 days   Lab Units 12/30/23  0438 12/29/23  0158 12/28/23  1332   HEMOGLOBIN g/dL 10.1* 12.4* 14.1   PLATELETS 10*3/mm3 323 126* 159   CREATININE mg/dL 1.17 1.30* 1.40*     Radiology(recent) CT Abdomen Pelvis Without Contrast    Result Date: 12/28/2023  Impression: 1. No acute abnormality in the abdomen or pelvis. 2. Chronic findings above. Electronically Signed: Vitor Pena MD  12/28/2023 3:46 PM EST  Workstation ID: [id number]    XR Chest 1 View    Result Date: 12/28/2023  Impression: No acute process. Electronically Signed: Jennyfer Liu MD  12/28/2023 1:39 PM EST  Workstation ID: [id number]       Results from last 7 days   Lab Units 12/28/23  1755   HSTROP T ng/L 47*       Imaging Results (Last 24 Hours)       ** No results found for the last 24 hours. **            Cardiac Studies:  Echo- Results for orders placed during the hospital encounter " of 12/28/23    Adult Transthoracic Echo Complete w/ Color, Spectral and Contrast if Necessary Per Protocol    Interpretation Summary    Left ventricular systolic function is normal. Left ventricular ejection fraction appears to be 56 - 60%.    Left ventricular diastolic function is consistent with (grade I) impaired relaxation.    There is calcification of the aortic valve mainly affecting the left coronary cusp(s).    Estimated right ventricular systolic pressure from tricuspid regurgitation is mildly elevated (35-45 mmHg).    Stress Myoview-  Cath-        Dylon Baeza DO  12/30/23  10:04 EST

## 2023-12-30 NOTE — PROGRESS NOTES
[facility] MEDICINE SERVICE  DAILY PROGRESS NOTE    NAME: Blas Mojica  : 1950  MRN: [id number]      LOS: 1 day     PROVIDER OF SERVICE: Darrick Jha MD    Chief Complaint: Generalized weakness    Subjective:     Interval History:  History taken from: patient      Doing stable , has mild shortness of air. Has cough    Review of Systems:   Review of Systems   Respiratory:  Positive for cough and shortness of breath.        Objective:     Vital Signs  Temp:  [97.1 °F (36.2 °C)-103 °F (39.4 °C)] 97.1 °F (36.2 °C)  Heart Rate:  [] 93  Resp:  [17-32] 24  BP: (102-135)/(59-74) 106/62  Flow (L/min):  [2-3] 2   Body mass index is 26.82 kg/m².    Physical Exam  Physical Exam  Vitals and nursing note reviewed.   Constitutional:       General: He is not in acute distress.     Appearance: He is well-developed. He is not diaphoretic.   HENT:      Head: Normocephalic and atraumatic.   Cardiovascular:      Rate and Rhythm: Normal rate.   Pulmonary:      Effort: Pulmonary effort is normal. No respiratory distress.      Breath sounds: No wheezing.   Abdominal:      General: There is no distension.      Palpations: Abdomen is soft.   Musculoskeletal:         General: Normal range of motion.   Skin:     General: Skin is warm and dry.   Neurological:      Mental Status: He is alert.      Cranial Nerves: No cranial nerve deficit.   Psychiatric:         Behavior: Behavior normal.         Thought Content: Thought content normal.         Judgment: Judgment normal.         Scheduled Meds   cetirizine, 10 mg, Oral, Daily  [Held by provider] clopidogrel, 75 mg, Oral, Daily  insulin glargine, 33 Units, Subcutaneous, Nightly  insulin lispro, 2-9 Units, Subcutaneous, 4x Daily With Meals & Nightly  [Held by provider] lisinopril, 40 mg, Oral, Daily  metoprolol succinate XL, 100 mg, Oral, Q24H  pantoprazole, 40 mg, Intravenous, Q12H  senna-docusate sodium, 2 tablet, Oral, BID  sodium chloride, 10 mL, Intravenous,  Q12H       PRN Meds     acetaminophen **OR** acetaminophen **OR** acetaminophen    benzonatate    senna-docusate sodium **AND** polyethylene glycol **AND** bisacodyl **AND** bisacodyl    Calcium Replacement - Follow Nurse / BPA Driven Protocol    dextrose    dextrose    glucagon (human recombinant)    guaifenesin    Magnesium Cardiology Dose Replacement - Follow Nurse / BPA Driven Protocol    nitroglycerin    ondansetron **OR** ondansetron    Phosphorus Replacement - Follow Nurse / BPA Driven Protocol    Potassium Replacement - Follow Nurse / BPA Driven Protocol    [COMPLETED] Insert Peripheral IV **AND** sodium chloride    sodium chloride    sodium chloride   Infusions         Diagnostic Data    Results from last 7 days   Lab Units 12/30/23  0438   WBC 10*3/mm3 11.60*   HEMOGLOBIN g/dL 10.1*   HEMATOCRIT % 30.3*   PLATELETS 10*3/mm3 323   GLUCOSE mg/dL 148*   CREATININE mg/dL 1.17   BUN mg/dL 21   SODIUM mmol/L 141   POTASSIUM mmol/L 4.3   AST (SGOT) U/L 38   ALT (SGPT) U/L 20   ALK PHOS U/L 3,881*   BILIRUBIN mg/dL 0.5   ANION GAP mmol/L 13.0       No radiology results for the last day      I reviewed the patient's new clinical results.    Assessment/Plan:     Active and Resolved Problems  Active Hospital Problems    Diagnosis  POA    **Generalized weakness [R53.1]  Yes    COVID [U07.1]  Yes      Resolved Hospital Problems   No resolved problems to display.     Generalized lower abdominal pain  Recent EGD.  CT Abdomen/Pelvis: no acute abnormalities.  Further workup pending clinical progress.     COVID-19 positive  Has a persistent cough, unchanged from previous admission, unlikely COVID-related, asymptomatic, does not need treatment.  COVID-19 in ED.  Keep in isolation per hospital protocols.  Tessalon Perles and Zyrtec ordered.  Supplemental oxygen as needed     Generalized weakness  Generalized deconditioning from back to back hospitalizations.  PT Eval and treat, seeking recommendations for discharge placement,  currently from [place] in independent living.  Case management assistance appreciated, may require GEOVANNI.     Sinus tachycardia, possible atrial fibrillation with RVR  EKG independently reviewed: Sinus tachycardia, heart rate 111, .  Without ST or T wave abnormalities.  Of note, there is a lot of artifact on the first half of the EKG.  EKG stated as atrial fibrillation, may be in/out of a. Fib.  Cardiology consulted, sees Dr. Vasquez.  No history of atrial fibrillation on PMH, on no rate controlling medications as outpatient.   [id number] Score of at least 5. Currently on prophylactic Lovenox, due to recent GIB, will hold off on full anticoagulation.     Diabetes mellitus Type 2, not insulin-dependent : well controlled.   Previous hemoglobin A1c on 12/12/2023, 7.70.  Continue Lantus as reduced dose.  Hold oral agents.  Accu checks ACHS and c/w humalog coverage as needed.      Essential Hypertension: well controlled.   BP well controlled, hold Maxzide and lisinopril, resume when clinically appropriate.  Titrate medications as needed.     Chronic kidney disease  Remains nonoliguric.   Monitor Input/Output very closely.   Starting IVF to prevent BRIGIDO.  Avoids NSAIDs, nephrotoxic medications, and hypotension.  Renally adjust medications for Estimated Creatinine Clearance: 51.9 mL/min (A) (by C-G formula based on SCr of 1.4 mg/dL (H)).     History of recent suspected GIB with erosive esophagitis & gastric ulcers  Hemoglobin normal on admission, continue to monitor and trend labs.  Underwent EGD on 12/13/2023 with findings of erosive esophagitis and gastric ulcers, recommend BID Protonix x 1 month, then once daily.  Recommended repeat EGD in 8-12 weeks.  Did develop some post-nasal drainage with dry cough that has been persistent since placement of EGD during that admission.     Dysphagia  Advance diet as per Speech therapy     Hyperdense lesion of the upper pole of right kidney & mid left kidney  Subsequent find on  CT, no hydronephrosis or obstructive uropathy.  Continue follow up as outpatient.     Recent fracture of proximal humeral surgical neck  Seen and evaluated in [place], [place], with XR showing fracture of the proximal humeral surgical neck, with 3.5 cm sclerotic lesion within the proximal humerus.  CT of the upper extremity was completed with impression of nondisplaced minimally comminuted fracture of the greater tuberosity of the humerus.  Patient presents in sling, states that he is unsure of what the plan is, as orthopedic surgery has yet to determine what to do.  Consider orthopedic surgery referral as outpatient.  Continue with splint in place.     History of TIA: Continue Plavix, intolerant of statin therapy.      DVT prophylaxis:  Mechanical DVT prophylaxis orders are present.     Code status is   Code Status and Medical Interventions:   Ordered at: 12/28/23 5554     Level Of Support Discussed With:    Patient     Code Status (Patient has no pulse and is not breathing):    CPR (Attempt to Resuscitate)     Medical Interventions (Patient has pulse or is breathing):    Full Support       Plan for disposition:home in 1-2 days    Time: 30 minutes    Signature: Electronically signed by Darrick Jha MD, 12/30/23, 15:49 EST.  [facility]ist Team

## 2023-12-30 NOTE — CONSULTS
CARDIOLOGY CONSULT      Requesting Provider    Gilmar Whiting MD      PATIENT IDENTIFICATION    Name: Blas Mojica  Age: 73 y.o. Sex: male : 1950  MRN: [id number]    REASON FOR CONSULTATION:  Paroxysmal atrial fibrillation    CHIEF COMPLAINT:  Generalized weakness    HISTORY OF PRESENT ILLNESS:   This is a 73-year-old male with no established history of ischemic heart disease who presents to the hospital with generalized weakness.  He was recently hospitalized for gastrointestinal bleeding for which she underwent an EGD.  EGD revealed erosive gastritis and gastric ulcers.  He was discharged home on twice daily Protonix.  He was discharged to [facility].    The patient noted that he has been having lower abdominal discomfort that may be related to coughing.  He denies any chest pain.  He had a fall in November.  He was found to have a humeral neck fracture.  He is remained in a splint.  He reports since then he has become generally weak and fatigued.    In the course of his workup he was found to be in rapid atrial fibrillation.  He does have a history of A-fib and has been seen by cardiac electrophysiology in the past.  He was also found to be COVID-positive.    IMPRESSIONS  Paroxysmal atrial fibrillation with rapid ventricular response and an elevated CHADS2 score  COVID-19 positive status  Diabetes  Hypertension  Chronic kidney disease  Generalized weakness    RECOMMENDATIONS:  Add beta-blocker therapy  Patient was on beta-blockers in the past but appears to no longer be on those.  Monitor rate and rhythm  Monitor blood pressure with the addition of beta-blocker therapy  2D echocardiogram demonstrates normal left ventricular systolic function with an ejection fraction of 55 to 60%.  No other significant pathology was identified  Further recommendations patient's course progresses.      Medical History    Past Medical History:   Diagnosis Date    Diabetes mellitus     Hyperlipidemia   "   Hypertension     Kidney stone     TIA (transient ischemic attack) 2016    Type 2 diabetes mellitus with stage 3 chronic kidney disease, with long-term current use of insulin 12/13/2021        Surgical History    Past Surgical History:   Procedure Laterality Date    ADENOIDECTOMY      ENDOSCOPY N/A 12/13/2023    Procedure: ESOPHAGOGASTRODUODENOSCOPY with removal of NJ tube from right nare;  Surgeon: Jaret Valadez MD;  Location: [facility] ENDOSCOPY;  Service: Gastroenterology;  Laterality: N/A;  esophagitis, gastric ulcer    EXTRACORPOREAL SHOCK WAVE LITHOTRIPSY (ESWL)      HERNIA REPAIR      KNEE ACL RECONSTRUCTION Left     TONSILLECTOMY          Family History    Family History   Problem Relation Age of Onset    No Known Problems Mother     No Known Problems Father        Social History    Social History     Tobacco Use    Smoking status: Never    Smokeless tobacco: Never   Substance Use Topics    Alcohol use: Never        Allergies    Allergies   Allergen Reactions    Contrast Dye (Echo Or Unknown Ct/Mr) Anaphylaxis    Iodinated Contrast Media Shortness Of Breath    Iodine Shortness Of Breath    Aspirin Nausea And Vomiting    Lipitor [Atorvastatin] Unknown - High Severity    Prednisone Hives       REVIEW OF SYSTEMS:  Pertinent items are noted in HPI    OBJECTIVE     VITAL SIGNS:  Visit Vitals  /75   Pulse 116   Temp 99.5 °F (37.5 °C) (Oral)   Resp 18   Ht 177.8 cm (70\")   Wt 88.5 kg (195 lb)   SpO2 93%   BMI 27.98 kg/m²     Oxygen Therapy  SpO2: 93 %  Flowsheet Rows      Flowsheet Row First Filed Value   Admission Height 177.8 cm (70\") Documented at 12/28/2023 1240   Admission Weight 88.5 kg (195 lb) Documented at 12/28/2023 1240          Intake & Output (last 3 days)         12/27 0701  12/28 0700 12/28 0701  12/29 0700 12/29 0701  12/30 0700    I.V. (mL/kg)   1725 (19.5)    IV Piggyback  500     Total Intake(mL/kg)  500 (5.6) 1725 (19.5)    Urine (mL/kg/hr)  200     Stool  0     Total Output  200  " "   Net  +300 +1725           Stool Unmeasured Occurrence  1 x           Lines, Drains & Airways       Active LDAs       Name Placement date Placement time Site Days    Peripheral IV 12/28/23 1333 Left Antecubital 12/28/23  1333  Antecubital  1    External Urinary Catheter 12/12/23  2100  --  17                  /75   Pulse 116   Temp 99.5 °F (37.5 °C) (Oral)   Resp 18   Ht 177.8 cm (70\")   Wt 88.5 kg (195 lb)   SpO2 93%   BMI 27.98 kg/m²     INTAKE/OUTPUT:    Intake/Output this shift:  No intake/output data recorded.  Intake/Output last 3 shifts:  I/O last 3 completed shifts:  In: 2225 [I.V.:1725; IV Piggyback:500]  Out: 200 [Urine:200]      PHYSICAL EXAM:    Physical exam deferred secondary to COVID status.  Physical exam of admitting service and nursing assessments utilized for medical decision-making purposes.    Scheduled Meds:      cetirizine, 10 mg, Oral, Daily  clopidogrel, 75 mg, Oral, Daily  enoxaparin, 40 mg, Subcutaneous, Daily  insulin glargine, 10 Units, Subcutaneous, Nightly  insulin lispro, 2-9 Units, Subcutaneous, Q6H  [Held by provider] lisinopril, 40 mg, Oral, Daily  metoprolol succinate XL, 100 mg, Oral, Q24H  pantoprazole, 40 mg, Intravenous, Q12H  senna-docusate sodium, 2 tablet, Oral, BID  sodium chloride, 10 mL, Intravenous, Q12H        Continuous Infusions:    sodium chloride, 75 mL/hr, Last Rate: 75 mL/hr (12/29/23 1801)        PRN Meds:      acetaminophen **OR** acetaminophen **OR** acetaminophen    benzonatate    senna-docusate sodium **AND** polyethylene glycol **AND** bisacodyl **AND** bisacodyl    Calcium Replacement - Follow Nurse / BPA Driven Protocol    dextrose    dextrose    glucagon (human recombinant)    guaifenesin    Magnesium Cardiology Dose Replacement - Follow Nurse / BPA Driven Protocol    nitroglycerin    ondansetron **OR** ondansetron    Phosphorus Replacement - Follow Nurse / BPA Driven Protocol    Potassium Replacement - Follow Nurse / BPA Driven Protocol   " " [COMPLETED] Insert Peripheral IV **AND** sodium chloride    sodium chloride    sodium chloride     Data Review:     X-rays, labs reviewed personally by provider.    CBC    Results from last 7 days   Lab Units 12/29/23  0158 12/28/23  1332   WBC 10*3/mm3 7.30 6.50   HEMOGLOBIN g/dL 12.4* 14.1   PLATELETS 10*3/mm3 126* 159     Cr Clearance Estimated Creatinine Clearance: 55.8 mL/min (A) (by C-G formula based on SCr of 1.3 mg/dL (H)).  Coag     HbA1C   Lab Results   Component Value Date    HGBA1C 7.70 (H) 12/12/2023    HGBA1C 8.0 (H) 10/20/2022    HGBA1C 9.2 (H) 02/28/2022     Blood Glucose   Glucose   Date/Time Value Ref Range Status   12/29/2023 2126 145 (H) 70 - 105 mg/dL Final     Comment:     Serial Number: [id number]:  [id number]   12/29/2023 1704 105 70 - 105 mg/dL Final     Comment:     Serial Number: [id number]:  [id number]   12/29/2023 1409 97 70 - 105 mg/dL Final     Comment:     Serial Number: [id number]:  [id number]   12/28/2023 1834 179 (H) 70 - 105 mg/dL Final     Comment:     Serial Number: [id number]:  [id number]     Infection   Results from last 7 days   Lab Units 12/29/23  0158   PROCALCITONIN ng/mL 0.17     CMP   Results from last 7 days   Lab Units 12/29/23  0158 12/28/23  1332   SODIUM mmol/L 141 140   POTASSIUM mmol/L 3.9 4.1   CHLORIDE mmol/L 104 100   CO2 mmol/L 27.0 30.0*   BUN mg/dL 22 24*   CREATININE mg/dL 1.30* 1.40*   GLUCOSE mg/dL 113* 268*   ALBUMIN g/dL 3.5 3.9   BILIRUBIN mg/dL 0.7 0.8   ALK PHOS U/L 100 118*   AST (SGOT) U/L 29 23   ALT (SGPT) U/L 19 18   LIPASE U/L  --  30     ABG      UA      MIGUEL ANGEL  No results found for: \"POCMETH\", \"POCAMPHET\", \"POCBARBITUR\", \"POCBENZO\", \"POCCOCAINE\", \"POCOPIATES\", \"POCOXYCODO\", \"POCPHENCYC\", \"POCPROPOXY\", \"POCTHC\", \"POCTRICYC\"  Lysis Labs   Results from last 7 days   Lab Units 12/29/23  0158 12/28/23  1332   HEMOGLOBIN g/dL 12.4* 14.1   PLATELETS 10*3/mm3 126* 159   CREATININE mg/dL 1.30* 1.40*     Radiology(recent) CT " Abdomen Pelvis Without Contrast    Result Date: 12/28/2023  Impression: 1. No acute abnormality in the abdomen or pelvis. 2. Chronic findings above. Electronically Signed: Vitor Pena MD  12/28/2023 3:46 PM EST  Workstation ID: [id number]    XR Chest 1 View    Result Date: 12/28/2023  Impression: No acute process. Electronically Signed: Jennyfer Liu MD  12/28/2023 1:39 PM EST  Workstation ID: [id number]       Results from last 7 days   Lab Units 12/28/23  1755   HSTROP T ng/L 47*       ECG/EMG Results (most recent)       Procedure Component Value Units Date/Time    ECG 12 Lead Other; weakness [[id number]] Collected: 12/28/23 1324     Updated: 12/29/23 0743     QT Interval 316 ms      QTC Interval 430 ms     Narrative:      HEART RATE= 111  bpm  RR Interval= 540  ms  NY Interval=   ms  P Horizontal Axis=   deg  P Front Axis=   deg  QRSD Interval= 70  ms  QT Interval= 316  ms  QTcB= 430  ms  QRS Axis= 3  deg  T Wave Axis= 42  deg  - ABNORMAL ECG -  Low voltage, precordial leads  When compared with ECG of 04-Nov-2023 16:07:19,  Significant change in rhythm: previously sinus  Electronically Signed By: Avery Pearson (SLOAN) 29-Dec-2023 07:43:36  Date and Time of Study: 2023-12-28 13:24:36    Adult Transthoracic Echo Complete w/ Color, Spectral and Contrast if Necessary Per Protocol [[id number]] Resulted: 12/29/23 1549     Updated: 12/29/23 1556     LVIDd 3.9 cm      LVIDs 2.9 cm      IVSd 0.90 cm      LVPWd 1.00 cm      FS 25.6 %      IVS/LVPW 0.90 cm      ESV(cubed) 24.4 ml      LV Sys Vol (BSA corrected) 10.4 cm2      EDV(cubed) 59.3 ml      LV [place] Vol (BSA corrected) 23.3 cm2      LV mass(C)d 113.6 grams      LVOT area 3.5 cm2      LVOT diam 2.10 cm      EDV(MOD-sp4) 48.2 ml      ESV(MOD-sp4) 21.5 ml      SV(MOD-sp4) 26.7 ml      SI(MOD-sp4) 12.9 ml/m2      EF(MOD-sp4) 55.4 %      MV E max bina 73.4 cm/sec      MV A max bina 105.0 cm/sec      MV dec time 0.12 sec      MV E/A 0.70     LA ESV Index (BP) 17.3 ml/m2      Med  Peak E' Mahesh 9.1 cm/sec      Lat Peak E' Mahesh 8.5 cm/sec      TR max mahesh 280.0 cm/sec      Avg E/e' ratio 8.34     SV(LVOT) 55.8 ml      RVIDd 2.9 cm      TAPSE (>1.6) 2.25 cm      RV S' 19.8 cm/sec      LA dimension (2D)  2.7 cm      LV V1 max 118.0 cm/sec      LV V1 max PG 5.6 mmHg      LV V1 mean PG 3.0 mmHg      LV V1 VTI 16.1 cm      Ao pk mahesh 151.0 cm/sec      Ao max PG 9.1 mmHg      Ao mean PG 5.0 mmHg      Ao V2 VTI 22.4 cm      STEW(I,D) 2.49 cm2      MV max PG 5.5 mmHg      MV mean PG 4.0 mmHg      MV V2 VTI 20.4 cm      MV P1/2t 32.4 msec      MVA(P1/2t) 6.8 cm2      MVA(VTI) 2.7 cm2      MV dec slope 1,013 cm/sec2      TR max PG 31.4 mmHg      RVSP(TR) 39.4 mmHg      RAP systole 8.0 mmHg      RV V1 max PG 3.7 mmHg      RV V1 max 95.8 cm/sec      RV V1 VTI 13.9 cm      PA V2 max 110.0 cm/sec      PA acc time 0.09 sec      ACS 1.80 cm      Sinus 2.9 cm      EF(MOD-bp) 55.0 %     Narrative:        Left ventricular systolic function is normal. Left ventricular ejection   fraction appears to be 56 - 60%.    Left ventricular diastolic function is consistent with (grade I)   impaired relaxation.    There is calcification of the aortic valve mainly affecting the left   coronary cusp(s).    Estimated right ventricular systolic pressure from tricuspid   regurgitation is mildly elevated (35-45 mmHg).              Imaging:  Imaging Results (Last 72 Hours)       Procedure Component Value Units Date/Time    CT Abdomen Pelvis Without Contrast [[id number]] Collected: 12/28/23 1538     Updated: 12/28/23 1549    Narrative:      CT ABDOMEN PELVIS WO CONTRAST    Date of Exam: 12/28/2023 3:34 PM EST    Indication: abdominal pain.    Comparison: CT abdomen and pelvis without contrast 12/12/2023    Technique: Axial CT images were obtained of the abdomen and pelvis without the administration of contrast. Sagittal and coronal reconstructions were performed.  Automated exposure control and iterative reconstruction methods were  used.    Findings:  Lung bases are without consolidation. Minimal linear atelectasis at left lower lobe. Heart mildly enlarged. Coronary artery calcifications partially imaged. Small pericardial effusion similar to prior exam. Small subcutaneous area of nodularity in the   left lateral chest measuring 2.2 cm stable which is nonspecific and could relate to sebaceous cyst containing debris (3/8).     Lack of contrast limits assessment of abdominal organs and vasculature. The liver and spleen are normal in size and contour. No pericholecystic inflammation. Normal adrenal glands. Pancreas without findings of pancreatitis. No biliary dilatation.     Kidneys are without hydronephrosis. Hyperdense lesion at the upper pole right kidney measuring 12 mm and at the mid left kidney measuring 11 mm are not fully characterized likely hemorrhagic cysts similar to prior exam. Negative for hydronephrosis or   obstructive uropathy. The urinary bladder is thin-walled. Prostate mildly enlarged measuring 5.3 cm in transverse diameter.    Negative for pneumoperitoneum or pneumatosis intestinalis. No findings of bowel obstruction. No fluid collection in the abdomen or pelvis. Normal appendix. Moderate atherosclerotic calcifications of the abdominal aorta and branch vasculature without   aneurysm. No fluid collection within the abdomen or pelvis. No aggressive osseous lesion or acute fracture. Mild grade 1 anterolisthesis of L4 and L5. Rectocele.      Impression:      Impression:  1. No acute abnormality in the abdomen or pelvis.  2. Chronic findings above.            Electronically Signed: Vitor Pena MD    12/28/2023 3:46 PM EST    Workstation ID: [id number]    XR Chest 1 View [[id number]] Collected: 12/28/23 1338     Updated: 12/28/23 1341    Narrative:      XR CHEST 1 VW    Date of Exam: 12/28/2023 1:15 PM EST    Indication: cough    Comparison: 11/4/2023.    Findings:  There is stable borderline cardiomegaly. Pulmonary vessels appear  within normal limits. Lung fields are clear of acute infiltrates or effusions.      Impression:      Impression:  No acute process.      Electronically Signed: Jennyfer Liu MD    12/28/2023 1:39 PM EST    Workstation ID: [id number]              Dylon Baeza DO  12/29/23  21:34 EST

## 2023-12-30 NOTE — PLAN OF CARE
Goal Outcome Evaluation:  Plan of Care Reviewed With: patient        Progress: no change  Outcome Evaluation: Patient shows no s/s of distress and vitals are stable. Pt voiced no concerns. Bed alarm on and call light within reach.

## 2023-12-30 NOTE — THERAPY TREATMENT NOTE
Acute Care - Speech Language Pathology   Swallow Treatment Note [facility]     Patient Name: Blas Mojica  : 1950  MRN: [id number]  Today's Date: 2023               Admit Date: 2023    Visit Dx:     ICD-10-CM ICD-9-CM   1. Weakness  R53.1 780.79   2. COVID-19  U07.1 079.89   3. Elevated troponin  R79.89 790.6     Patient Active Problem List   Diagnosis    Primary hypertension    TIA (transient ischemic attack)    Stage 3 chronic kidney disease    Dyslipidemia    History of basal cell carcinoma (BCC)    Primary osteoarthritis    Vitamin D deficiency    Acute pain of left knee    Generalized weakness    Hypokalemia    Moderate malnutrition    Physical debility    Type 2 diabetes mellitus with stage 3 chronic kidney disease, with long-term current use of insulin    Sinus tachycardia    Acute hypoxemic respiratory failure due to COVID-19    Pneumonia due to COVID-19 virus    Mixed hyperlipidemia    COVID-19 virus infection    Weakness    COVID     Past Medical History:   Diagnosis Date    Diabetes mellitus     Hyperlipidemia     Hypertension     Kidney stone     TIA (transient ischemic attack)     Type 2 diabetes mellitus with stage 3 chronic kidney disease, with long-term current use of insulin 2021     Past Surgical History:   Procedure Laterality Date    ADENOIDECTOMY      ENDOSCOPY N/A 2023    Procedure: ESOPHAGOGASTRODUODENOSCOPY with removal of NJ tube from right nare;  Surgeon: aJret Valadez MD;  Location: [facility];  Service: Gastroenterology;  Laterality: N/A;  esophagitis, gastric ulcer    EXTRACORPOREAL SHOCK WAVE LITHOTRIPSY (ESWL)      HERNIA REPAIR      KNEE ACL RECONSTRUCTION Left     TONSILLECTOMY         SLP Recommendation and Plan        SWALLOW EVALUATION (last 72 hours)            EDUCATION  The patient has been educated in the following areas:   Dysphagia (Swallowing Impairment) Oral Care/Hydration.        SLP GOALS       Row Name 23 1200        (LTG) Swallow    (LTG) Swallow Pt will maximize swallow function for least restrictive PO diet, exhibiting no complication associated with dysphagia, adequate PO intake, and demonstrating independent use of swallow compensations  -CB    Time Frame (Swallow Long Term Goal) by discharge  -CB    Progress/Outcomes (Swallow Long Term Goal) goal ongoing  -CB       (STG) Swallow 1    (STG) Swallow 1 The patient will participate in a meal/follow-up assessment to determine safety and adequacy of recommended diet, independent use of safe swallow compensations, pt/family education and additional goals/recommendations to follow  -CB    Time Frame (Swallow Short Term Goal 1) 1 week  -CB    Progress/Outcomes (Swallow Short Term Goal 1) goal ongoing  -CB    Comment (Swallow Short Term Goal 1) Patient was seen for DT/meal at breakfast. Patient has COVID, therefore, proper PPE's were worn during this visit. Patient was sitting upright in bed prior to meal. Patient noted coughing but not directly followed by oral intake.Patient tolerated regular consistency without difficulty. Patient demonstrated adequate rotary chewing. Patient cleared oral cavity effectively between bites given liquid wash. Patient took sips of thins via straw without any evidence of clearing of throat, cough and/or vocal changes. Patient does demonstrate intermittent coughing  but did not  reflect directly after eating or drinking. ST will continue to follow to assure safety and tolerance with least restrictive diet.  -CB       (STG) Swallow 2    (STG) Swallow 2 Pt will participate in ongoing swallow assessment to include VFSS if indicated, and caregiver teaching.  -CB    Time Frame (Swallow Short Term Goal 2) 1 week  -CB              User Key  (r) = Recorded By, (t) = Taken By, (c) = Cosigned By      Initials Name Provider Type          Antonina Syed, SLP Speech and Language Pathologist                       Time Calculation:                Antonina  Evens, SLP  12/30/2023

## 2023-12-31 ENCOUNTER — APPOINTMENT (OUTPATIENT)
Dept: GENERAL RADIOLOGY | Facility: HOSPITAL | Age: 73
End: 2023-12-31
Payer: MEDICARE

## 2023-12-31 LAB
ALBUMIN SERPL-MCNC: 2.8 G/DL (ref 3.5–5.2)
ALBUMIN/GLOB SERPL: 1 G/DL
ALP SERPL-CCNC: 84 U/L (ref 39–117)
ALT SERPL W P-5'-P-CCNC: 30 U/L (ref 1–41)
ANION GAP SERPL CALCULATED.3IONS-SCNC: 9 MMOL/L (ref 5–15)
AST SERPL-CCNC: 50 U/L (ref 1–40)
BASOPHILS # BLD AUTO: 0 10*3/MM3 (ref 0–0.2)
BASOPHILS NFR BLD AUTO: 0.1 % (ref 0–1.5)
BILIRUB SERPL-MCNC: 0.4 MG/DL (ref 0–1.2)
BUN SERPL-MCNC: 32 MG/DL (ref 8–23)
BUN/CREAT SERPL: 21.9 (ref 7–25)
CALCIUM SPEC-SCNC: 8.5 MG/DL (ref 8.6–10.5)
CHLORIDE SERPL-SCNC: 105 MMOL/L (ref 98–107)
CO2 SERPL-SCNC: 25 MMOL/L (ref 22–29)
CREAT SERPL-MCNC: 1.46 MG/DL (ref 0.76–1.27)
DEPRECATED RDW RBC AUTO: 43.3 FL (ref 37–54)
EGFRCR SERPLBLD CKD-EPI 2021: 50.2 ML/MIN/1.73
EOSINOPHIL # BLD AUTO: 0.1 10*3/MM3 (ref 0–0.4)
EOSINOPHIL NFR BLD AUTO: 1.6 % (ref 0.3–6.2)
ERYTHROCYTE [DISTWIDTH] IN BLOOD BY AUTOMATED COUNT: 13.4 % (ref 12.3–15.4)
GLOBULIN UR ELPH-MCNC: 2.7 GM/DL
GLUCOSE BLDC GLUCOMTR-MCNC: 186 MG/DL (ref 70–105)
GLUCOSE BLDC GLUCOMTR-MCNC: 217 MG/DL (ref 70–105)
GLUCOSE BLDC GLUCOMTR-MCNC: 229 MG/DL (ref 70–105)
GLUCOSE BLDC GLUCOMTR-MCNC: 261 MG/DL (ref 70–105)
GLUCOSE SERPL-MCNC: 227 MG/DL (ref 65–99)
HCT VFR BLD AUTO: 30.8 % (ref 37.5–51)
HGB BLD-MCNC: 10.2 G/DL (ref 13–17.7)
LYMPHOCYTES # BLD AUTO: 1.2 10*3/MM3 (ref 0.7–3.1)
LYMPHOCYTES NFR BLD AUTO: 23.1 % (ref 19.6–45.3)
MAGNESIUM SERPL-MCNC: 2.1 MG/DL (ref 1.6–2.4)
MCH RBC QN AUTO: 30.6 PG (ref 26.6–33)
MCHC RBC AUTO-ENTMCNC: 33.1 G/DL (ref 31.5–35.7)
MCV RBC AUTO: 92.4 FL (ref 79–97)
MONOCYTES # BLD AUTO: 0.2 10*3/MM3 (ref 0.1–0.9)
MONOCYTES NFR BLD AUTO: 4.5 % (ref 5–12)
NEUTROPHILS NFR BLD AUTO: 3.7 10*3/MM3 (ref 1.7–7)
NEUTROPHILS NFR BLD AUTO: 70.7 % (ref 42.7–76)
NRBC BLD AUTO-RTO: 0.1 /100 WBC (ref 0–0.2)
PHOSPHATE SERPL-MCNC: 2.5 MG/DL (ref 2.5–4.5)
PLATELET # BLD AUTO: 103 10*3/MM3 (ref 140–450)
PMV BLD AUTO: 9 FL (ref 6–12)
POTASSIUM SERPL-SCNC: 3.6 MMOL/L (ref 3.5–5.2)
PROT SERPL-MCNC: 5.5 G/DL (ref 6–8.5)
RBC # BLD AUTO: 3.34 10*6/MM3 (ref 4.14–5.8)
SODIUM SERPL-SCNC: 139 MMOL/L (ref 136–145)
WBC NRBC COR # BLD AUTO: 5.2 10*3/MM3 (ref 3.4–10.8)

## 2023-12-31 PROCEDURE — 84100 ASSAY OF PHOSPHORUS: CPT | Performed by: NURSE PRACTITIONER

## 2023-12-31 PROCEDURE — 63710000001 INSULIN LISPRO (HUMAN) PER 5 UNITS: Performed by: INTERNAL MEDICINE

## 2023-12-31 PROCEDURE — 83735 ASSAY OF MAGNESIUM: CPT | Performed by: NURSE PRACTITIONER

## 2023-12-31 PROCEDURE — 36415 COLL VENOUS BLD VENIPUNCTURE: CPT | Performed by: NURSE PRACTITIONER

## 2023-12-31 PROCEDURE — 63710000001 INSULIN GLARGINE PER 5 UNITS: Performed by: NURSE PRACTITIONER

## 2023-12-31 PROCEDURE — 80053 COMPREHEN METABOLIC PANEL: CPT | Performed by: NURSE PRACTITIONER

## 2023-12-31 PROCEDURE — 85025 COMPLETE CBC W/AUTO DIFF WBC: CPT | Performed by: NURSE PRACTITIONER

## 2023-12-31 PROCEDURE — 71045 X-RAY EXAM CHEST 1 VIEW: CPT

## 2023-12-31 PROCEDURE — 82948 REAGENT STRIP/BLOOD GLUCOSE: CPT

## 2023-12-31 PROCEDURE — 99232 SBSQ HOSP IP/OBS MODERATE 35: CPT | Performed by: INTERNAL MEDICINE

## 2023-12-31 RX ORDER — ASCORBIC ACID 500 MG
500 TABLET ORAL DAILY
Status: DISCONTINUED | OUTPATIENT
Start: 2023-12-31 | End: 2024-01-04 | Stop reason: HOSPADM

## 2023-12-31 RX ORDER — BUDESONIDE AND FORMOTEROL FUMARATE DIHYDRATE 160; 4.5 UG/1; UG/1
2 AEROSOL RESPIRATORY (INHALATION)
Status: DISCONTINUED | OUTPATIENT
Start: 2023-12-31 | End: 2024-01-04 | Stop reason: HOSPADM

## 2023-12-31 RX ADMIN — INSULIN GLARGINE 33 UNITS: 100 INJECTION, SOLUTION SUBCUTANEOUS at 21:32

## 2023-12-31 RX ADMIN — Medication 10 ML: at 10:02

## 2023-12-31 RX ADMIN — INSULIN LISPRO 2 UNITS: 100 INJECTION, SOLUTION INTRAVENOUS; SUBCUTANEOUS at 10:07

## 2023-12-31 RX ADMIN — OXYCODONE HYDROCHLORIDE AND ACETAMINOPHEN 500 MG: 500 TABLET ORAL at 14:20

## 2023-12-31 RX ADMIN — METOPROLOL SUCCINATE 100 MG: 50 TABLET, EXTENDED RELEASE ORAL at 10:02

## 2023-12-31 RX ADMIN — CETIRIZINE HYDROCHLORIDE 10 MG: 10 TABLET, FILM COATED ORAL at 10:02

## 2023-12-31 RX ADMIN — PANTOPRAZOLE SODIUM 40 MG: 40 INJECTION, POWDER, FOR SOLUTION INTRAVENOUS at 21:33

## 2023-12-31 RX ADMIN — Medication 10 ML: at 21:33

## 2023-12-31 RX ADMIN — INSULIN LISPRO 4 UNITS: 100 INJECTION, SOLUTION INTRAVENOUS; SUBCUTANEOUS at 11:49

## 2023-12-31 RX ADMIN — PANTOPRAZOLE SODIUM 40 MG: 40 INJECTION, POWDER, FOR SOLUTION INTRAVENOUS at 10:02

## 2023-12-31 RX ADMIN — SENNOSIDES AND DOCUSATE SODIUM 2 TABLET: 50; 8.6 TABLET ORAL at 10:02

## 2023-12-31 RX ADMIN — INSULIN LISPRO 6 UNITS: 100 INJECTION, SOLUTION INTRAVENOUS; SUBCUTANEOUS at 17:27

## 2023-12-31 RX ADMIN — SENNOSIDES AND DOCUSATE SODIUM 2 TABLET: 50; 8.6 TABLET ORAL at 21:32

## 2023-12-31 RX ADMIN — INSULIN LISPRO 4 UNITS: 100 INJECTION, SOLUTION INTRAVENOUS; SUBCUTANEOUS at 21:32

## 2023-12-31 NOTE — CONSULTS
Group: [facility]         CONSULT NOTE    Patient Identification:  Blas Mojica  73 y.o.  male  1950  [id number]            Requesting physician: Attending physician    Reason for Consultation: COVID-19      History of Present Illness:  73-year-old male admitted 12/28/2023 with weakness, due to increased oxygen requirements pulmonary consultation was requested  Patient tested positive for COVID-19        Assessment:      Hypoxic respiratory insufficiency    COVID-19 positive    Chest x-ray findings and CT abdomen did not show significant alveolar infiltrate    Recommendations:    Oxygen titration currently on 4 L  Symbicort    May benefit from short course of Decadron 6 mg daily for 6 days due to hypoxia  However due to listed allergy to prednisone we will hold off and monitor his respiratory status    Monitoring thrombocytopenia            Review of Sytems:  Review of Systems   Unable to perform ROS: Dementia       Past Medical History:  Past Medical History:   Diagnosis Date    Diabetes mellitus     Hyperlipidemia     Hypertension     Kidney stone     TIA (transient ischemic attack) 2016    Type 2 diabetes mellitus with stage 3 chronic kidney disease, with long-term current use of insulin 12/13/2021       Past Surgical History:  Past Surgical History:   Procedure Laterality Date    ADENOIDECTOMY      ENDOSCOPY N/A 12/13/2023    Procedure: ESOPHAGOGASTRODUODENOSCOPY with removal of NJ tube from right nare;  Surgeon: Jaret Valadez MD;  Location: [facility] ENDOSCOPY;  Service: Gastroenterology;  Laterality: N/A;  esophagitis, gastric ulcer    EXTRACORPOREAL SHOCK WAVE LITHOTRIPSY (ESWL)      HERNIA REPAIR      KNEE ACL RECONSTRUCTION Left     TONSILLECTOMY          Home Meds:  Medications Prior to Admission   Medication Sig Dispense Refill Last Dose    clopidogrel (PLAVIX) 75 MG tablet Take 1 tablet by mouth Daily.       insulin glargine (LANTUS, SEMGLEE) 100 UNIT/ML injection Inject 33  "Units under the skin into the appropriate area as directed Every Night.       lisinopril (PRINIVIL,ZESTRIL) 20 MG tablet Take 2 tablets by mouth Daily.       pioglitazone (ACTOS) 15 MG tablet Take 2 tablets by mouth Daily.       triamterene-hydrochlorothiazide (MAXZIDE-25) 37.5-25 MG per tablet Take 2 tablets by mouth Daily.          Allergies:  Allergies   Allergen Reactions    Contrast Dye (Echo Or Unknown Ct/Mr) Anaphylaxis    Iodinated Contrast Media Shortness Of Breath    Iodine Shortness Of Breath    Aspirin Nausea And Vomiting    Lipitor [Atorvastatin] Unknown - High Severity    Prednisone Hives       Social History:   Social History     Socioeconomic History    Marital status:    Tobacco Use    Smoking status: Never    Smokeless tobacco: Never   Vaping Use    Vaping Use: Never used   Substance and Sexual Activity    Alcohol use: Never    Drug use: Never    Sexual activity: Defer       Family History:  Family History   Problem Relation Age of Onset    No Known Problems Mother     No Known Problems Father        Physical Exam:  /71 (BP Location: Left arm, Patient Position: Lying)   Pulse 80   Temp 97.4 °F (36.3 °C) (Oral)   Resp 20   Ht 177.8 cm (70\")   Wt 86.5 kg (190 lb 11.2 oz)   SpO2 94%   BMI 27.36 kg/m²  Body mass index is 27.36 kg/m². 94% 86.5 kg (190 lb 11.2 oz)  Physical Exam  Cardiovascular:      Heart sounds: No murmur heard.     No gallop.   Pulmonary:      Effort: No respiratory distress.      Breath sounds: No stridor. Rhonchi present. No wheezing or rales.   Chest:      Chest wall: No tenderness.         LABS:  Lab Results   Component Value Date    CALCIUM 8.5 (L) 12/31/2023    PHOS 2.5 12/31/2023     Results from last 7 days   Lab Units 12/31/23  0152 12/30/23  0438 12/29/23  0158 12/28/23  1332   MAGNESIUM mg/dL 2.1 2.3 1.7 1.9   SODIUM mmol/L 139 141 141 140   POTASSIUM mmol/L 3.6 4.3 3.9 4.1   CHLORIDE mmol/L 105 105 104 100   CO2 mmol/L 25.0 23.0 27.0 30.0*   BUN mg/dL " 32* 21 22 24*   CREATININE mg/dL 1.46* 1.17 1.30* 1.40*   GLUCOSE mg/dL 227* 148* 113* 268*   CALCIUM mg/dL 8.5* 9.0 9.2 9.8   WBC 10*3/mm3 5.20 11.60* 7.30 6.50   HEMOGLOBIN g/dL 10.2* 10.1* 12.4* 14.1   PLATELETS 10*3/mm3 103* 323 126* 159   ALT (SGPT) U/L 30 20 19 18   AST (SGOT) U/L 50* 38 29 23   PROBNP pg/mL  --   --   --  581.4   PROCALCITONIN ng/mL  --   --  0.17  --      Lab Results   Component Value Date    TROPONINT 47 (H) 12/28/2023     Results from last 7 days   Lab Units 12/28/23  1755 12/28/23  1332   HSTROP T ng/L 47* 53*         Results from last 7 days   Lab Units 12/29/23  0158   PROCALCITONIN ng/mL 0.17         Results from last 7 days   Lab Units 12/28/23  1326   INFLUENZA B PCR  Not Detected   INFLUENZA A PCR  Not Detected   RSV, PCR  Not Detected             Lab Results   Component Value Date    TSH 3.750 04/05/2023     Estimated Creatinine Clearance: 54.3 mL/min (A) (by C-G formula based on SCr of 1.46 mg/dL (H)).  Results from last 7 days   Lab Units 12/30/23  0213   NITRITE UA  Negative   WBC UA /HPF None Seen   BACTERIA UA /HPF None Seen   SQUAM EPITHEL UA /HPF 0-2        Imaging:  Imaging Results (Last 24 Hours)       ** No results found for the last 24 hours. **              Current Meds:   SCHEDULE  ascorbic acid, 500 mg, Oral, Daily  budesonide-formoterol, 2 puff, Inhalation, BID - RT  cetirizine, 10 mg, Oral, Daily  [Held by provider] clopidogrel, 75 mg, Oral, Daily  insulin glargine, 33 Units, Subcutaneous, Nightly  insulin lispro, 2-9 Units, Subcutaneous, 4x Daily With Meals & Nightly  [Held by provider] lisinopril, 40 mg, Oral, Daily  metoprolol succinate XL, 100 mg, Oral, Q24H  pantoprazole, 40 mg, Intravenous, Q12H  senna-docusate sodium, 2 tablet, Oral, BID  sodium chloride, 10 mL, Intravenous, Q12H      Infusions     PRNs    acetaminophen **OR** acetaminophen **OR** acetaminophen    benzonatate    senna-docusate sodium **AND** polyethylene glycol **AND** bisacodyl **AND**  bisacodyl    Calcium Replacement - Follow Nurse / BPA Driven Protocol    dextrose    dextrose    glucagon (human recombinant)    guaifenesin    Magnesium Cardiology Dose Replacement - Follow Nurse / BPA Driven Protocol    nitroglycerin    ondansetron **OR** ondansetron    Phosphorus Replacement - Follow Nurse / BPA Driven Protocol    Potassium Replacement - Follow Nurse / BPA Driven Protocol    [COMPLETED] Insert Peripheral IV **AND** sodium chloride    sodium chloride    sodium chloride        Lety Brower MD  12/31/2023  13:48 EST      Much of this encounter note is an electronic transcription/translation of spoken language to printed text using Dragon Software.

## 2023-12-31 NOTE — PLAN OF CARE
Goal Outcome Evaluation:              Outcome Evaluation: Patient is alert and oriented. COVID precautions continue. Right arm sling remains in place.

## 2023-12-31 NOTE — PROGRESS NOTES
[facility] MEDICINE SERVICE  DAILY PROGRESS NOTE    NAME: Blas Mojica  : 1950  MRN: [id number]      LOS: 2 days     PROVIDER OF SERVICE: Darrick Jha MD    Chief Complaint: Generalized weakness    Subjective:     Interval History:  History taken from: patient    He is still having some difficulty breathing and coughing.    Review of Systems:   Review of Systems   Respiratory:  Positive for cough and shortness of breath.        Objective:     Vital Signs  Temp:  [97.4 °F (36.3 °C)-99.5 °F (37.5 °C)] 97.4 °F (36.3 °C)  Heart Rate:  [80-92] 80  Resp:  [19-28] 20  BP: ()/(52-74) 139/71  Flow (L/min):  [2-4] 4   Body mass index is 27.36 kg/m².    Physical Exam  Physical Exam  Vitals and nursing note reviewed.   Constitutional:       General: He is not in acute distress.     Appearance: He is well-developed. He is not diaphoretic.   HENT:      Head: Normocephalic and atraumatic.   Cardiovascular:      Rate and Rhythm: Normal rate.   Pulmonary:      Effort: Pulmonary effort is normal. No respiratory distress.      Breath sounds: No wheezing.   Abdominal:      General: There is no distension.      Palpations: Abdomen is soft.   Musculoskeletal:         General: Normal range of motion.   Skin:     General: Skin is warm and dry.   Neurological:      Mental Status: He is alert.      Cranial Nerves: No cranial nerve deficit.   Psychiatric:         Behavior: Behavior normal.         Thought Content: Thought content normal.         Judgment: Judgment normal.         Scheduled Meds   cetirizine, 10 mg, Oral, Daily  [Held by provider] clopidogrel, 75 mg, Oral, Daily  insulin glargine, 33 Units, Subcutaneous, Nightly  insulin lispro, 2-9 Units, Subcutaneous, 4x Daily With Meals & Nightly  [Held by provider] lisinopril, 40 mg, Oral, Daily  metoprolol succinate XL, 100 mg, Oral, Q24H  pantoprazole, 40 mg, Intravenous, Q12H  senna-docusate sodium, 2 tablet, Oral, BID  sodium chloride, 10 mL,  Intravenous, Q12H       PRN Meds     acetaminophen **OR** acetaminophen **OR** acetaminophen    benzonatate    senna-docusate sodium **AND** polyethylene glycol **AND** bisacodyl **AND** bisacodyl    Calcium Replacement - Follow Nurse / BPA Driven Protocol    dextrose    dextrose    glucagon (human recombinant)    guaifenesin    Magnesium Cardiology Dose Replacement - Follow Nurse / BPA Driven Protocol    nitroglycerin    ondansetron **OR** ondansetron    Phosphorus Replacement - Follow Nurse / BPA Driven Protocol    Potassium Replacement - Follow Nurse / BPA Driven Protocol    [COMPLETED] Insert Peripheral IV **AND** sodium chloride    sodium chloride    sodium chloride   Infusions         Diagnostic Data    Results from last 7 days   Lab Units 12/31/23  0152   WBC 10*3/mm3 5.20   HEMOGLOBIN g/dL 10.2*   HEMATOCRIT % 30.8*   PLATELETS 10*3/mm3 103*   GLUCOSE mg/dL 227*   CREATININE mg/dL 1.46*   BUN mg/dL 32*   SODIUM mmol/L 139   POTASSIUM mmol/L 3.6   AST (SGOT) U/L 50*   ALT (SGPT) U/L 30   ALK PHOS U/L 84   BILIRUBIN mg/dL 0.4   ANION GAP mmol/L 9.0       No radiology results for the last day      I reviewed the patient's new clinical results.    Assessment/Plan:     Active and Resolved Problems  Active Hospital Problems    Diagnosis  POA    **Generalized weakness [R53.1]  Yes    COVID [U07.1]  Yes      Resolved Hospital Problems   No resolved problems to display.       Generalized lower abdominal pain  Recent EGD.  CT Abdomen/Pelvis: no acute abnormalities.  Further workup pending clinical progress.     COVID-19 positive  Becoming more hypoxic, currently on 4 L supplemental oxygen  Will consult pulmonology  Chest x-ray will be ordered     Generalized weakness  Generalized deconditioning from back to back hospitalizations.  PT Eval and treat, seeking recommendations for discharge placement, currently from [place] in independent living.  Case management assistance appreciated, may require GEOVANNI.     Sinus  tachycardia, possible atrial fibrillation with RVR  EKG independently reviewed: Sinus tachycardia, heart rate 111, .  Without ST or T wave abnormalities.  Of note, there is a lot of artifact on the first half of the EKG.  EKG stated as atrial fibrillation, may be in/out of a. Fib.  Cardiology consulted, sees Dr. Vasquez.  No history of atrial fibrillation on PMH, on no rate controlling medications as outpatient.   [id number] Score of at least 5. Currently on prophylactic Lovenox, due to recent GIB, will hold off on full anticoagulation.     Diabetes mellitus Type 2, not insulin-dependent : well controlled.   Previous hemoglobin A1c on 12/12/2023, 7.70.  Continue Lantus as reduced dose.  Hold oral agents.  Accu checks ACHS and c/w humalog coverage as needed.      Essential Hypertension: well controlled.   BP well controlled, hold Maxzide and lisinopril, resume when clinically appropriate.  Titrate medications as needed.     Chronic kidney disease  Remains nonoliguric.   Monitor Input/Output very closely.   Starting IVF to prevent BRIGIDO.  Avoids NSAIDs, nephrotoxic medications, and hypotension.  Renally adjust medications for Estimated Creatinine Clearance: 51.9 mL/min (A) (by C-G formula based on SCr of 1.4 mg/dL (H)).     History of recent suspected GIB with erosive esophagitis & gastric ulcers  Hemoglobin normal on admission, continue to monitor and trend labs.  Underwent EGD on 12/13/2023 with findings of erosive esophagitis and gastric ulcers, recommend BID Protonix x 1 month, then once daily.  Recommended repeat EGD in 8-12 weeks.  Did develop some post-nasal drainage with dry cough that has been persistent since placement of EGD during that admission.     Dysphagia  Advance diet as per Speech therapy     Hyperdense lesion of the upper pole of right kidney & mid left kidney  Subsequent find on CT, no hydronephrosis or obstructive uropathy.  Continue follow up as outpatient.     Recent fracture of proximal  humeral surgical neck  Seen and evaluated in [place], [place], with XR showing fracture of the proximal humeral surgical neck, with 3.5 cm sclerotic lesion within the proximal humerus.  CT of the upper extremity was completed with impression of nondisplaced minimally comminuted fracture of the greater tuberosity of the humerus.  Patient presents in sling, states that he is unsure of what the plan is, as orthopedic surgery has yet to determine what to do.  Consider orthopedic surgery referral as outpatient.  Continue with splint in place.     History of TIA: Continue Plavix, intolerant of statin therapy.        DVT prophylaxis:  Mechanical DVT prophylaxis orders are present.     Code status is   Code Status and Medical Interventions:   Ordered at: 12/28/23 1013     Level Of Support Discussed With:    Patient     Code Status (Patient has no pulse and is not breathing):    CPR (Attempt to Resuscitate)     Medical Interventions (Patient has pulse or is breathing):    Full Support       Plan for disposition:home in 1-2 days    Time: 30 minutes    Signature: Electronically signed by Darrick Jha MD, 12/31/23, 13:21 EST.  [facility] Hospitalist Team

## 2023-12-31 NOTE — PROGRESS NOTES
Cardiology Progress Note      PATIENT IDENTIFICATION    Name: Blas Mojica  Age: 73 y.o. Sex: male : 1950  MRN: [id number]    Requesting Provider    Darrick Jha MD     LOS: 2 days       Reason For Followup:  Paroxysmal atrial fibrillation      Subjective:    Interval History:  Chart and labs reviewed.  Heart rate remains well-controlled.  Sinus rhythm with PACs.      Assessment & Plan    Impressions:  Paroxysmal atrial fibrillation with rapid ventricular response and an elevated CHADS2 score  COVID-19 positive status  Diabetes  Hypertension  Chronic kidney disease  Generalized weakness    Recommendations:  Continue with beta-blocker therapy as blood pressure tolerates  Recommend initiation of Eliquis when okay from a gastrointestinal standpoint        Objective:    Medication Review:   Scheduled Meds:ascorbic acid, 500 mg, Oral, Daily  budesonide-formoterol, 2 puff, Inhalation, BID - RT  cetirizine, 10 mg, Oral, Daily  [Held by provider] clopidogrel, 75 mg, Oral, Daily  insulin glargine, 33 Units, Subcutaneous, Nightly  insulin lispro, 2-9 Units, Subcutaneous, 4x Daily With Meals & Nightly  [Held by provider] lisinopril, 40 mg, Oral, Daily  metoprolol succinate XL, 100 mg, Oral, Q24H  pantoprazole, 40 mg, Intravenous, Q12H  senna-docusate sodium, 2 tablet, Oral, BID  sodium chloride, 10 mL, Intravenous, Q12H      Continuous Infusions:     PRN Meds:.  acetaminophen **OR** acetaminophen **OR** acetaminophen    benzonatate    senna-docusate sodium **AND** polyethylene glycol **AND** bisacodyl **AND** bisacodyl    Calcium Replacement - Follow Nurse / BPA Driven Protocol    dextrose    dextrose    glucagon (human recombinant)    guaifenesin    Magnesium Cardiology Dose Replacement - Follow Nurse / BPA Driven Protocol    nitroglycerin    ondansetron **OR** ondansetron    Phosphorus Replacement - Follow Nurse / BPA Driven Protocol    Potassium Replacement - Follow Nurse / BPA Driven Protocol     [COMPLETED] Insert Peripheral IV **AND** sodium chloride    sodium chloride    sodium chloride      Generalized weakness    COVID         Physical Exam:  Physical examination deferred secondary to isolation status.  Physical exam of admitting service and nursing assessments utilized for medical decision-making purposes    Vital Signs:  Vitals:    12/31/23 0426 12/31/23 0713 12/31/23 1121 12/31/23 1509   BP: 119/71 130/74 139/71 144/61   BP Location: Left arm Left arm Left arm Left arm   Patient Position: Lying Lying Lying Lying   Pulse: 84 82 80 81   Resp: 20 19 20 25   Temp: 97.5 °F (36.4 °C) 98.4 °F (36.9 °C) 97.4 °F (36.3 °C) 98.2 °F (36.8 °C)   TempSrc: Temporal Oral Oral Oral   SpO2: 97% 97% 94% 100%   Weight: 86.5 kg (190 lb 11.2 oz)      Height:         Wt Readings from Last 1 Encounters:   12/31/23 86.5 kg (190 lb 11.2 oz)       Intake/Output Summary (Last 24 hours) at 12/31/2023 1532  Last data filed at 12/31/2023 0900  Gross per 24 hour   Intake 340 ml   Output 1000 ml   Net -660 ml         Results Review:     CBC    Results from last 7 days   Lab Units 12/31/23  0152 12/30/23  0438 12/29/23  0158 12/28/23  1332   WBC 10*3/mm3 5.20 11.60* 7.30 6.50   HEMOGLOBIN g/dL 10.2* 10.1* 12.4* 14.1   PLATELETS 10*3/mm3 103* 323 126* 159     Cr Clearance Estimated Creatinine Clearance: 54.3 mL/min (A) (by C-G formula based on SCr of 1.46 mg/dL (H)).  Coag     HbA1C   Lab Results   Component Value Date    HGBA1C 7.70 (H) 12/12/2023    HGBA1C 8.0 (H) 10/20/2022    HGBA1C 9.2 (H) 02/28/2022     Blood Glucose   Glucose   Date/Time Value Ref Range Status   12/31/2023 1120 229 (H) 70 - 105 mg/dL Final     Comment:     Serial Number: [id number]:  [id number]   12/31/2023 0712 186 (H) 70 - 105 mg/dL Final     Comment:     Serial Number: [id number]:  [id number]   12/30/2023 1958 284 (H) 70 - 105 mg/dL Final     Comment:     Serial Number: [id number]:  [id number]   12/30/2023 1642 189 (H) 70 - 105 mg/dL Final  "    Comment:     Serial Number: [id number]:  [id number]   12/30/2023 1212 205 (H) 70 - 105 mg/dL Final     Comment:     Serial Number: [id number]:  [id number]   12/30/2023 0752 120 (H) 70 - 105 mg/dL Final     Comment:     Serial Number: [id number]:  [id number]   12/29/2023 2255 160 (H) 70 - 105 mg/dL Final     Comment:     Serial Number: [id number]:  [id number]   12/29/2023 2126 145 (H) 70 - 105 mg/dL Final     Comment:     Serial Number: [id number]:  [id number]     Infection   Results from last 7 days   Lab Units 12/29/23  0158   PROCALCITONIN ng/mL 0.17     CMP   Results from last 7 days   Lab Units 12/31/23  0152 12/30/23  0438 12/29/23  0158 12/28/23  1332   SODIUM mmol/L 139 141 141 140   POTASSIUM mmol/L 3.6 4.3 3.9 4.1   CHLORIDE mmol/L 105 105 104 100   CO2 mmol/L 25.0 23.0 27.0 30.0*   BUN mg/dL 32* 21 22 24*   CREATININE mg/dL 1.46* 1.17 1.30* 1.40*   GLUCOSE mg/dL 227* 148* 113* 268*   ALBUMIN g/dL 2.8* 3.5 3.5 3.9   BILIRUBIN mg/dL 0.4 0.5 0.7 0.8   ALK PHOS U/L 84 3,881* 100 118*   AST (SGOT) U/L 50* 38 29 23   ALT (SGPT) U/L 30 20 19 18   LIPASE U/L  --   --   --  30     ABG      UA    Results from last 7 days   Lab Units 12/30/23  0213   NITRITE UA  Negative   WBC UA /HPF None Seen   BACTERIA UA /HPF None Seen   SQUAM EPITHEL UA /HPF 0-2     MIGUEL ANGEL  No results found for: \"POCMETH\", \"POCAMPHET\", \"POCBARBITUR\", \"POCBENZO\", \"POCCOCAINE\", \"POCOPIATES\", \"POCOXYCODO\", \"POCPHENCYC\", \"POCPROPOXY\", \"POCTHC\", \"POCTRICYC\"  Lysis Labs   Results from last 7 days   Lab Units 12/31/23  0152 12/30/23  0438 12/29/23  0158 12/28/23  1332   HEMOGLOBIN g/dL 10.2* 10.1* 12.4* 14.1   PLATELETS 10*3/mm3 103* 323 126* 159   CREATININE mg/dL 1.46* 1.17 1.30* 1.40*     Radiology(recent) XR Chest 1 View    Result Date: 12/31/2023  Impression: New right basilar airspace opacity concerning for pneumonia Electronically Signed: Tenzin Montejo  12/31/2023 2:28 PM EST  Workstation ID: [id number]       Results " from last 7 days   Lab Units 12/28/23  1755   HSTROP T ng/L 47*       Imaging Results (Last 24 Hours)       Procedure Component Value Units Date/Time    XR Chest 1 View [[id number]] Collected: 12/31/23 1427     Updated: 12/31/23 1430    Narrative:      XR CHEST 1 VW    Date of Exam: 12/31/2023 1:54 PM EST    Indication: shortness of air    Comparison: 12/28/2023    Findings:  Heart size borderline. Pulmonary vasculature within normal limits. New airspace opacity in the right lung base. Left lung grossly clear      Impression:      Impression:  New right basilar airspace opacity concerning for pneumonia      Electronically Signed: Tenzin Montejo    12/31/2023 2:28 PM EST    Workstation ID: [id number]            Cardiac Studies:  Echo- Results for orders placed during the hospital encounter of 12/28/23    Adult Transthoracic Echo Complete w/ Color, Spectral and Contrast if Necessary Per Protocol    Interpretation Summary    Left ventricular systolic function is normal. Left ventricular ejection fraction appears to be 56 - 60%.    Left ventricular diastolic function is consistent with (grade I) impaired relaxation.    There is calcification of the aortic valve mainly affecting the left coronary cusp(s).    Estimated right ventricular systolic pressure from tricuspid regurgitation is mildly elevated (35-45 mmHg).    Stress Myoview-  Cath-        Dylon Baeza DO  12/31/23  15:32 EST

## 2024-01-01 LAB
ALBUMIN SERPL-MCNC: 2.8 G/DL (ref 3.5–5.2)
ALBUMIN/GLOB SERPL: 0.9 G/DL
ALP SERPL-CCNC: 114 U/L (ref 39–117)
ALT SERPL W P-5'-P-CCNC: 43 U/L (ref 1–41)
ANION GAP SERPL CALCULATED.3IONS-SCNC: 9 MMOL/L (ref 5–15)
AST SERPL-CCNC: 62 U/L (ref 1–40)
BASOPHILS # BLD AUTO: 0 10*3/MM3 (ref 0–0.2)
BASOPHILS NFR BLD AUTO: 0.4 % (ref 0–1.5)
BILIRUB SERPL-MCNC: 0.4 MG/DL (ref 0–1.2)
BUN SERPL-MCNC: 25 MG/DL (ref 8–23)
BUN/CREAT SERPL: 18.1 (ref 7–25)
CALCIUM SPEC-SCNC: 8.5 MG/DL (ref 8.6–10.5)
CHLORIDE SERPL-SCNC: 105 MMOL/L (ref 98–107)
CO2 SERPL-SCNC: 26 MMOL/L (ref 22–29)
CREAT SERPL-MCNC: 1.38 MG/DL (ref 0.76–1.27)
DEPRECATED RDW RBC AUTO: 42.4 FL (ref 37–54)
EGFRCR SERPLBLD CKD-EPI 2021: 53.7 ML/MIN/1.73
EOSINOPHIL # BLD AUTO: 0.1 10*3/MM3 (ref 0–0.4)
EOSINOPHIL NFR BLD AUTO: 3 % (ref 0.3–6.2)
ERYTHROCYTE [DISTWIDTH] IN BLOOD BY AUTOMATED COUNT: 13.2 % (ref 12.3–15.4)
GLOBULIN UR ELPH-MCNC: 3 GM/DL
GLUCOSE BLDC GLUCOMTR-MCNC: 116 MG/DL (ref 70–105)
GLUCOSE BLDC GLUCOMTR-MCNC: 131 MG/DL (ref 70–105)
GLUCOSE BLDC GLUCOMTR-MCNC: 192 MG/DL (ref 70–105)
GLUCOSE BLDC GLUCOMTR-MCNC: 231 MG/DL (ref 70–105)
GLUCOSE SERPL-MCNC: 196 MG/DL (ref 65–99)
HCT VFR BLD AUTO: 32.5 % (ref 37.5–51)
HGB BLD-MCNC: 10.5 G/DL (ref 13–17.7)
LYMPHOCYTES # BLD AUTO: 1 10*3/MM3 (ref 0.7–3.1)
LYMPHOCYTES NFR BLD AUTO: 23.2 % (ref 19.6–45.3)
MAGNESIUM SERPL-MCNC: 1.9 MG/DL (ref 1.6–2.4)
MCH RBC QN AUTO: 29.9 PG (ref 26.6–33)
MCHC RBC AUTO-ENTMCNC: 32.4 G/DL (ref 31.5–35.7)
MCV RBC AUTO: 92.2 FL (ref 79–97)
MONOCYTES # BLD AUTO: 0.2 10*3/MM3 (ref 0.1–0.9)
MONOCYTES NFR BLD AUTO: 4.6 % (ref 5–12)
NEUTROPHILS NFR BLD AUTO: 3 10*3/MM3 (ref 1.7–7)
NEUTROPHILS NFR BLD AUTO: 68.8 % (ref 42.7–76)
NRBC BLD AUTO-RTO: 0.1 /100 WBC (ref 0–0.2)
PHOSPHATE SERPL-MCNC: 2.4 MG/DL (ref 2.5–4.5)
PLATELET # BLD AUTO: 108 10*3/MM3 (ref 140–450)
PMV BLD AUTO: 8.6 FL (ref 6–12)
POTASSIUM SERPL-SCNC: 3.4 MMOL/L (ref 3.5–5.2)
POTASSIUM SERPL-SCNC: 3.8 MMOL/L (ref 3.5–5.2)
PROT SERPL-MCNC: 5.8 G/DL (ref 6–8.5)
RBC # BLD AUTO: 3.52 10*6/MM3 (ref 4.14–5.8)
SODIUM SERPL-SCNC: 140 MMOL/L (ref 136–145)
WBC NRBC COR # BLD AUTO: 4.3 10*3/MM3 (ref 3.4–10.8)

## 2024-01-01 PROCEDURE — 63710000001 INSULIN LISPRO (HUMAN) PER 5 UNITS: Performed by: INTERNAL MEDICINE

## 2024-01-01 PROCEDURE — 82948 REAGENT STRIP/BLOOD GLUCOSE: CPT

## 2024-01-01 PROCEDURE — 94640 AIRWAY INHALATION TREATMENT: CPT

## 2024-01-01 PROCEDURE — 94664 DEMO&/EVAL PT USE INHALER: CPT

## 2024-01-01 PROCEDURE — 94799 UNLISTED PULMONARY SVC/PX: CPT

## 2024-01-01 PROCEDURE — 84132 ASSAY OF SERUM POTASSIUM: CPT | Performed by: FAMILY MEDICINE

## 2024-01-01 PROCEDURE — 63710000001 INSULIN GLARGINE PER 5 UNITS: Performed by: NURSE PRACTITIONER

## 2024-01-01 PROCEDURE — 94761 N-INVAS EAR/PLS OXIMETRY MLT: CPT

## 2024-01-01 PROCEDURE — 25010000002 MAGNESIUM SULFATE 4 GM/100ML SOLUTION: Performed by: FAMILY MEDICINE

## 2024-01-01 PROCEDURE — 99233 SBSQ HOSP IP/OBS HIGH 50: CPT | Performed by: INTERNAL MEDICINE

## 2024-01-01 RX ORDER — PANTOPRAZOLE SODIUM 40 MG/1
40 TABLET, DELAYED RELEASE ORAL
Status: DISCONTINUED | OUTPATIENT
Start: 2024-01-01 | End: 2024-01-04 | Stop reason: HOSPADM

## 2024-01-01 RX ORDER — MAGNESIUM SULFATE HEPTAHYDRATE 40 MG/ML
4 INJECTION, SOLUTION INTRAVENOUS ONCE
Status: COMPLETED | OUTPATIENT
Start: 2024-01-01 | End: 2024-01-01

## 2024-01-01 RX ORDER — POTASSIUM CHLORIDE 20 MEQ/1
40 TABLET, EXTENDED RELEASE ORAL EVERY 4 HOURS
Status: ACTIVE | OUTPATIENT
Start: 2024-01-01 | End: 2024-01-01

## 2024-01-01 RX ADMIN — PANTOPRAZOLE SODIUM 40 MG: 40 INJECTION, POWDER, FOR SOLUTION INTRAVENOUS at 08:59

## 2024-01-01 RX ADMIN — MAGNESIUM SULFATE HEPTAHYDRATE 4 G: 40 INJECTION, SOLUTION INTRAVENOUS at 03:51

## 2024-01-01 RX ADMIN — INSULIN GLARGINE 33 UNITS: 100 INJECTION, SOLUTION SUBCUTANEOUS at 21:58

## 2024-01-01 RX ADMIN — Medication 10 ML: at 21:57

## 2024-01-01 RX ADMIN — INSULIN LISPRO 2 UNITS: 100 INJECTION, SOLUTION INTRAVENOUS; SUBCUTANEOUS at 17:22

## 2024-01-01 RX ADMIN — Medication 10 ML: at 09:00

## 2024-01-01 RX ADMIN — BUDESONIDE AND FORMOTEROL FUMARATE DIHYDRATE 2 PUFF: 160; 4.5 AEROSOL RESPIRATORY (INHALATION) at 06:40

## 2024-01-01 RX ADMIN — POTASSIUM CHLORIDE 40 MEQ: 1500 TABLET, EXTENDED RELEASE ORAL at 03:50

## 2024-01-01 RX ADMIN — APIXABAN 2.5 MG: 2.5 TABLET, FILM COATED ORAL at 21:57

## 2024-01-01 RX ADMIN — PANTOPRAZOLE SODIUM 40 MG: 40 TABLET, DELAYED RELEASE ORAL at 17:22

## 2024-01-01 RX ADMIN — INSULIN LISPRO 4 UNITS: 100 INJECTION, SOLUTION INTRAVENOUS; SUBCUTANEOUS at 21:58

## 2024-01-01 NOTE — PROGRESS NOTES
Cardiology Progress Note      PATIENT IDENTIFICATION    Name: Blas Mojica  Age: 73 y.o. Sex: male : 1950  MRN: [id number]    Requesting Provider    Darrick Jha MD     LOS: 3 days       Reason For Followup:  Paroxysmal atrial fibrillation      Subjective:    Interval History:  Chart and labs reviewed.  Heart rate remains well-controlled.  Sinus rhythm with PACs.  Long conversation with patient's spouse.  Patient did have episode of atrial fibrillation this admission but the patient's spouse was convinced that he does not have A-fib based on a previous examination.  Discussed that acute respiratory illness can unmask atrial fibrillation.  Additionally patient had EP study performed that confirmed the presence of atrial fibrillation in the past.  Patient has been reluctant to take medications because he does not hear very well and is concerned about different medications.    Discussed importance of anticoagulation with patient and spouse in the prevention of CVA associated with A-fib.  She is reluctant to allow full dose Eliquis because of his recent bleed.   has approved patient to receive full dose anticoagulation.  Patient and spouse requesting 2.5 mg.  Will acquiesce to this.    Greater than 50 minutes of time spent with patient's spouse in coordination of care, answering questions, and medication therapy.      Assessment & Plan    Impressions:  Paroxysmal atrial fibrillation with rapid ventricular response and an elevated CHADS2 score  COVID-19 positive status  Diabetes  Hypertension  Chronic kidney disease  Generalized weakness    Recommendations:  Continue with beta-blocker therapy as blood pressure tolerates  Eliquis 2.5 mg twice daily  Monitor rate and rhythm  Monitor for signs and symptoms of bleeding        Objective:    Medication Review:   Scheduled Meds:ascorbic acid, 500 mg, Oral, Daily  budesonide-formoterol, 2 puff, Inhalation, BID - RT  cetirizine, 10 mg, Oral, Daily  [Held by  provider] clopidogrel, 75 mg, Oral, Daily  insulin glargine, 33 Units, Subcutaneous, Nightly  insulin lispro, 2-9 Units, Subcutaneous, 4x Daily With Meals & Nightly  [Held by provider] lisinopril, 40 mg, Oral, Daily  metoprolol succinate XL, 100 mg, Oral, Q24H  pantoprazole, 40 mg, Intravenous, Q12H  senna-docusate sodium, 2 tablet, Oral, BID  sodium chloride, 10 mL, Intravenous, Q12H      Continuous Infusions:     PRN Meds:.  acetaminophen **OR** acetaminophen **OR** acetaminophen    benzonatate    senna-docusate sodium **AND** polyethylene glycol **AND** bisacodyl **AND** bisacodyl    Calcium Replacement - Follow Nurse / BPA Driven Protocol    dextrose    dextrose    glucagon (human recombinant)    guaifenesin    Magnesium Cardiology Dose Replacement - Follow Nurse / BPA Driven Protocol    nitroglycerin    ondansetron **OR** ondansetron    Phosphorus Replacement - Follow Nurse / BPA Driven Protocol    Potassium Replacement - Follow Nurse / BPA Driven Protocol    [COMPLETED] Insert Peripheral IV **AND** sodium chloride    sodium chloride    sodium chloride      Generalized weakness    COVID         Physical Exam:  Physical examination deferred secondary to isolation status.  Physical exam of admitting service and nursing assessments utilized for medical decision-making purposes    Vital Signs:  Vitals:    01/01/24 0640 01/01/24 0643 01/01/24 0810 01/01/24 1245   BP:   127/81 139/77   BP Location:   Left arm Left arm   Patient Position:   Lying Lying   Pulse: 78 77 83 83   Resp: 15 15 15 16   Temp:   97.9 °F (36.6 °C) 98 °F (36.7 °C)   TempSrc:   Oral Oral   SpO2: 100% 98% 100% 99%   Weight:       Height:         Wt Readings from Last 1 Encounters:   01/01/24 85.9 kg (189 lb 6 oz)       Intake/Output Summary (Last 24 hours) at 1/1/2024 1406  Last data filed at 1/1/2024 1245  Gross per 24 hour   Intake 960 ml   Output 620 ml   Net 340 ml         Results Review:     CBC    Results from last 7 days   Lab Units  12/31/23  2232 12/31/23  0152 12/30/23  0438 12/29/23  0158 12/28/23  1332   WBC 10*3/mm3 4.30 5.20 11.60* 7.30 6.50   HEMOGLOBIN g/dL 10.5* 10.2* 10.1* 12.4* 14.1   PLATELETS 10*3/mm3 108* 103* 323 126* 159     Cr Clearance Estimated Creatinine Clearance: 57.1 mL/min (A) (by C-G formula based on SCr of 1.38 mg/dL (H)).  Coag     HbA1C   Lab Results   Component Value Date    HGBA1C 7.70 (H) 12/12/2023    HGBA1C 8.0 (H) 10/20/2022    HGBA1C 9.2 (H) 02/28/2022     Blood Glucose   Glucose   Date/Time Value Ref Range Status   01/01/2024 1156 116 (H) 70 - 105 mg/dL Final     Comment:     Serial Number: [id number]:  [id number]   01/01/2024 0807 131 (H) 70 - 105 mg/dL Final     Comment:     Serial Number: [id number]:  [id number]   12/31/2023 2028 217 (H) 70 - 105 mg/dL Final     Comment:     Serial Number: [id number]:  [id number]   12/31/2023 1704 261 (H) 70 - 105 mg/dL Final     Comment:     Serial Number: [id number]:  [id number]   12/31/2023 1120 229 (H) 70 - 105 mg/dL Final     Comment:     Serial Number: [id number]:  [id number]   12/31/2023 0712 186 (H) 70 - 105 mg/dL Final     Comment:     Serial Number: [id number]:  [id number]   12/30/2023 1958 284 (H) 70 - 105 mg/dL Final     Comment:     Serial Number: [id number]:  [id number]   12/30/2023 1642 189 (H) 70 - 105 mg/dL Final     Comment:     Serial Number: [id number]:  [id number]     Infection   Results from last 7 days   Lab Units 12/29/23  0158   PROCALCITONIN ng/mL 0.17     CMP   Results from last 7 days   Lab Units 12/31/23  2232 12/31/23  0152 12/30/23  0438 12/29/23  0158 12/28/23  1332   SODIUM mmol/L 140 139 141 141 140   POTASSIUM mmol/L 3.4* 3.6 4.3 3.9 4.1   CHLORIDE mmol/L 105 105 105 104 100   CO2 mmol/L 26.0 25.0 23.0 27.0 30.0*   BUN mg/dL 25* 32* 21 22 24*   CREATININE mg/dL 1.38* 1.46* 1.17 1.30* 1.40*   GLUCOSE mg/dL 196* 227* 148* 113* 268*   ALBUMIN g/dL 2.8* 2.8* 3.5 3.5 3.9   BILIRUBIN mg/dL 0.4 0.4  "0.5 0.7 0.8   ALK PHOS U/L 114 84 3,881* 100 118*   AST (SGOT) U/L 62* 50* 38 29 23   ALT (SGPT) U/L 43* 30 20 19 18   LIPASE U/L  --   --   --   --  30     ABG      UA    Results from last 7 days   Lab Units 12/30/23  0213   NITRITE UA  Negative   WBC UA /HPF None Seen   BACTERIA UA /HPF None Seen   SQUAM EPITHEL UA /HPF 0-2     MIGUEL ANGEL  No results found for: \"POCMETH\", \"POCAMPHET\", \"POCBARBITUR\", \"POCBENZO\", \"POCCOCAINE\", \"POCOPIATES\", \"POCOXYCODO\", \"POCPHENCYC\", \"POCPROPOXY\", \"POCTHC\", \"POCTRICYC\"  Lysis Labs   Results from last 7 days   Lab Units 12/31/23  2232 12/31/23  0152 12/30/23  0438 12/29/23  0158 12/28/23  1332   HEMOGLOBIN g/dL 10.5* 10.2* 10.1* 12.4* 14.1   PLATELETS 10*3/mm3 108* 103* 323 126* 159   CREATININE mg/dL 1.38* 1.46* 1.17 1.30* 1.40*     Radiology(recent) XR Chest 1 View    Result Date: 12/31/2023  Impression: New right basilar airspace opacity concerning for pneumonia Electronically Signed: Tenzin Montejo  12/31/2023 2:28 PM EST  Workstation ID: [id number]       Results from last 7 days   Lab Units 12/28/23  1755   HSTROP T ng/L 47*       Imaging Results (Last 24 Hours)       Procedure Component Value Units Date/Time    XR Chest 1 View [[id number]] Collected: 12/31/23 1427     Updated: 12/31/23 1430    Narrative:      XR CHEST 1 VW    Date of Exam: 12/31/2023 1:54 PM EST    Indication: shortness of air    Comparison: 12/28/2023    Findings:  Heart size borderline. Pulmonary vasculature within normal limits. New airspace opacity in the right lung base. Left lung grossly clear      Impression:      Impression:  New right basilar airspace opacity concerning for pneumonia      Electronically Signed: Tenzin Montejo    12/31/2023 2:28 PM EST    Workstation ID: [id number]            Cardiac Studies:  Echo- Results for orders placed during the hospital encounter of 12/28/23    Adult Transthoracic Echo Complete w/ Color, Spectral and Contrast if Necessary Per Protocol    Interpretation Summary    Left " ventricular systolic function is normal. Left ventricular ejection fraction appears to be 56 - 60%.    Left ventricular diastolic function is consistent with (grade I) impaired relaxation.    There is calcification of the aortic valve mainly affecting the left coronary cusp(s).    Estimated right ventricular systolic pressure from tricuspid regurgitation is mildly elevated (35-45 mmHg).    Stress Myoview-  Cath-        Dylon Baeza DO  01/01/24  14:06 EST

## 2024-01-01 NOTE — PROGRESS NOTES
[facility] MEDICINE SERVICE  DAILY PROGRESS NOTE    NAME: Blas Mojica  : 1950  MRN: [id number]      LOS: 3 days     PROVIDER OF SERVICE: Darrick Jha MD    Chief Complaint: Generalized weakness    Subjective:     Interval History:  History taken from: patient    Doing stable on 2L oxygen.  Otherwise feels fine    Review of Systems:   Review of Systems   Respiratory:  Positive for shortness of breath.        Objective:     Vital Signs  Temp:  [97.5 °F (36.4 °C)-98.2 °F (36.8 °C)] 98 °F (36.7 °C)  Heart Rate:  [77-83] 83  Resp:  [15-25] 16  BP: (127-144)/(61-81) 139/77  Flow (L/min):  [2-4] 2   Body mass index is 27.17 kg/m².    Physical Exam  Physical Exam  Vitals and nursing note reviewed.   Constitutional:       General: He is not in acute distress.     Appearance: He is well-developed. He is not diaphoretic.   HENT:      Head: Normocephalic and atraumatic.   Cardiovascular:      Rate and Rhythm: Normal rate.   Pulmonary:      Effort: Pulmonary effort is normal. No respiratory distress.      Breath sounds: No wheezing.   Abdominal:      General: There is no distension.      Palpations: Abdomen is soft.   Musculoskeletal:         General: Normal range of motion.   Skin:     General: Skin is warm and dry.   Neurological:      Mental Status: He is alert.      Cranial Nerves: No cranial nerve deficit.   Psychiatric:         Behavior: Behavior normal.         Thought Content: Thought content normal.         Judgment: Judgment normal.         Scheduled Meds   ascorbic acid, 500 mg, Oral, Daily  budesonide-formoterol, 2 puff, Inhalation, BID - RT  cetirizine, 10 mg, Oral, Daily  [Held by provider] clopidogrel, 75 mg, Oral, Daily  insulin glargine, 33 Units, Subcutaneous, Nightly  insulin lispro, 2-9 Units, Subcutaneous, 4x Daily With Meals & Nightly  [Held by provider] lisinopril, 40 mg, Oral, Daily  metoprolol succinate XL, 100 mg, Oral, Q24H  pantoprazole, 40 mg, Intravenous,  Q12H  senna-docusate sodium, 2 tablet, Oral, BID  sodium chloride, 10 mL, Intravenous, Q12H       PRN Meds     acetaminophen **OR** acetaminophen **OR** acetaminophen    benzonatate    senna-docusate sodium **AND** polyethylene glycol **AND** bisacodyl **AND** bisacodyl    Calcium Replacement - Follow Nurse / BPA Driven Protocol    dextrose    dextrose    glucagon (human recombinant)    guaifenesin    Magnesium Cardiology Dose Replacement - Follow Nurse / BPA Driven Protocol    nitroglycerin    ondansetron **OR** ondansetron    Phosphorus Replacement - Follow Nurse / BPA Driven Protocol    Potassium Replacement - Follow Nurse / BPA Driven Protocol    [COMPLETED] Insert Peripheral IV **AND** sodium chloride    sodium chloride    sodium chloride   Infusions         Diagnostic Data    Results from last 7 days   Lab Units 12/31/23  2232   WBC 10*3/mm3 4.30   HEMOGLOBIN g/dL 10.5*   HEMATOCRIT % 32.5*   PLATELETS 10*3/mm3 108*   GLUCOSE mg/dL 196*   CREATININE mg/dL 1.38*   BUN mg/dL 25*   SODIUM mmol/L 140   POTASSIUM mmol/L 3.4*   AST (SGOT) U/L 62*   ALT (SGPT) U/L 43*   ALK PHOS U/L 114   BILIRUBIN mg/dL 0.4   ANION GAP mmol/L 9.0       XR Chest 1 View    Result Date: 12/31/2023  Impression: New right basilar airspace opacity concerning for pneumonia Electronically Signed: Tenzin Montejo  12/31/2023 2:28 PM EST  Workstation ID: [id number]       I reviewed the patient's new clinical results.    Assessment/Plan:     Active and Resolved Problems  Active Hospital Problems    Diagnosis  POA    **Generalized weakness [R53.1]  Yes    COVID [U07.1]  Yes      Resolved Hospital Problems   No resolved problems to display.       Generalized lower abdominal pain  Recent EGD.  CT Abdomen/Pelvis: no acute abnormalities.  Further workup pending clinical progress.     COVID-19 positive  Becoming more hypoxic, currently on 4 L supplemental oxygen  Will consult pulmonology  Chest x-ray will be ordered  On 2L today. Wean as possible      Generalized weakness  Generalized deconditioning from back to back hospitalizations.  PT Eval and treat, seeking recommendations for discharge placement, currently from [place] in independent living.  Case management assistance appreciated, may require GEOVANNI.     Sinus tachycardia, possible atrial fibrillation with RVR  EKG independently reviewed: Sinus tachycardia, heart rate 111, .  Without ST or T wave abnormalities.  Of note, there is a lot of artifact on the first half of the EKG.  EKG stated as atrial fibrillation, may be in/out of a. Fib.  Cardiology consulted, sees Dr. Vasquez.  No history of atrial fibrillation on PMH, on no rate controlling medications as outpatient.   [id number] Score of at least 5. Currently on prophylactic Lovenox, due to recent GIB, will hold off on full anticoagulation.     Diabetes mellitus Type 2, not insulin-dependent : well controlled.   Previous hemoglobin A1c on 12/12/2023, 7.70.  Continue Lantus as reduced dose.  Hold oral agents.  Accu checks ACHS and c/w humalog coverage as needed.      Essential Hypertension: well controlled.   BP well controlled, hold Maxzide and lisinopril, resume when clinically appropriate.  Titrate medications as needed.     Chronic kidney disease  Remains nonoliguric.   Monitor Input/Output very closely.   Starting IVF to prevent BRIGIDO.  Avoids NSAIDs, nephrotoxic medications, and hypotension.  Renally adjust medications for Estimated Creatinine Clearance: 51.9 mL/min (A) (by C-G formula based on SCr of 1.4 mg/dL (H)).     History of recent suspected GIB with erosive esophagitis & gastric ulcers  Hemoglobin normal on admission, continue to monitor and trend labs.  Underwent EGD on 12/13/2023 with findings of erosive esophagitis and gastric ulcers, recommend BID Protonix x 1 month, then once daily.  Recommended repeat EGD in 8-12 weeks.  Did develop some post-nasal drainage with dry cough that has been persistent since placement of EGD during  that admission.     Dysphagia  Advance diet as per Speech therapy     Hyperdense lesion of the upper pole of right kidney & mid left kidney  Subsequent find on CT, no hydronephrosis or obstructive uropathy.  Continue follow up as outpatient.     Recent fracture of proximal humeral surgical neck  Seen and evaluated in [place], [place], with XR showing fracture of the proximal humeral surgical neck, with 3.5 cm sclerotic lesion within the proximal humerus.  CT of the upper extremity was completed with impression of nondisplaced minimally comminuted fracture of the greater tuberosity of the humerus.  Patient presents in sling, states that he is unsure of what the plan is, as orthopedic surgery has yet to determine what to do.  Consider orthopedic surgery referral as outpatient.  Continue with splint in place.     History of TIA: Continue Plavix, intolerant of statin therapy.        DVT prophylaxis:  Mechanical DVT prophylaxis orders are present.     Code status is   Code Status and Medical Interventions:   Ordered at: 12/28/23 8887     Level Of Support Discussed With:    Patient     Code Status (Patient has no pulse and is not breathing):    CPR (Attempt to Resuscitate)     Medical Interventions (Patient has pulse or is breathing):    Full Support       Plan for disposition:home in 1-2 days    Time: 30 minutes    Signature: Electronically signed by Darrick Jha MD, 01/01/24, 14:07 EST.  [facility] Hospitalist Team

## 2024-01-01 NOTE — PLAN OF CARE
Goal Outcome Evaluation:  Plan of Care Reviewed With: patient        Progress: no change  Outcome Evaluation: Patient is shows no s/s of distress and vitals are stable, patient remains on 4L NC. Pt voiced no concerns. Right arm remains in sling. Wife was notified that patient was going to restarted on blood thinners per GI and cardiology, wife spoke to cardiologist via phone call. Bed alarm on and call light within reach.

## 2024-01-01 NOTE — PLAN OF CARE
Goal Outcome Evaluation: pt remains in isolation for Covid positive. 02 in place. Right arm in sling. Denies any pain. Plan of care ongoing

## 2024-01-01 NOTE — PROGRESS NOTES
Daily Progress Note        Generalized weakness    COVID      Assessment:        Hypoxic respiratory insufficiency     COVID-19 positive     Chest x-ray findings and CT abdomen did not show significant alveolar infiltrate     Recommendations:     Oxygen titration currently on 4 L  Symbicort     May benefit from short course of Decadron 6 mg daily for 6 days due to hypoxia  However due to listed allergy to prednisone we will hold off and monitor his respiratory status     Monitoring thrombocytopenia                LOS: 3 days     Subjective         Objective     Vital signs for last 24 hours:  Vitals:    01/01/24 0640 01/01/24 0643 01/01/24 0810 01/01/24 1245   BP:   127/81 139/77   BP Location:   Left arm Left arm   Patient Position:   Lying Lying   Pulse: 78 77 83 83   Resp: 15 15 15 16   Temp:   97.9 °F (36.6 °C) 98 °F (36.7 °C)   TempSrc:   Oral Oral   SpO2: 100% 98% 100% 99%   Weight:       Height:           Intake/Output last 3 shifts:  I/O last 3 completed shifts:  In: 1522 [P.O.:1522]  Out: 1620 [Urine:1620]  Intake/Output this shift:  I/O this shift:  In: 240 [P.O.:240]  Out: -       Radiology  Imaging Results (Last 24 Hours)       Procedure Component Value Units Date/Time    XR Chest 1 View [[id number]] Collected: 12/31/23 1427     Updated: 12/31/23 1430    Narrative:      XR CHEST 1 VW    Date of Exam: 12/31/2023 1:54 PM EST    Indication: shortness of air    Comparison: 12/28/2023    Findings:  Heart size borderline. Pulmonary vasculature within normal limits. New airspace opacity in the right lung base. Left lung grossly clear      Impression:      Impression:  New right basilar airspace opacity concerning for pneumonia      Electronically Signed: Tenzin Montejo    12/31/2023 2:28 PM EST    Workstation ID: [id number]            Labs:  Results from last 7 days   Lab Units 12/31/23 2232   WBC 10*3/mm3 4.30   HEMOGLOBIN g/dL 10.5*   HEMATOCRIT % 32.5*   PLATELETS 10*3/mm3 108*     Results from last 7 days    Lab Units 12/31/23  2232   SODIUM mmol/L 140   POTASSIUM mmol/L 3.4*   CHLORIDE mmol/L 105   CO2 mmol/L 26.0   BUN mg/dL 25*   CREATININE mg/dL 1.38*   CALCIUM mg/dL 8.5*   BILIRUBIN mg/dL 0.4   ALK PHOS U/L 114   ALT (SGPT) U/L 43*   AST (SGOT) U/L 62*   GLUCOSE mg/dL 196*         Results from last 7 days   Lab Units 12/31/23  2232 12/31/23  0152 12/30/23  0438   ALBUMIN g/dL 2.8* 2.8* 3.5     Results from last 7 days   Lab Units 12/28/23  1755 12/28/23  1332   HSTROP T ng/L 47* 53*         Results from last 7 days   Lab Units 12/31/23  2232   MAGNESIUM mg/dL 1.9                   Meds:   SCHEDULE  ascorbic acid, 500 mg, Oral, Daily  budesonide-formoterol, 2 puff, Inhalation, BID - RT  cetirizine, 10 mg, Oral, Daily  [Held by provider] clopidogrel, 75 mg, Oral, Daily  insulin glargine, 33 Units, Subcutaneous, Nightly  insulin lispro, 2-9 Units, Subcutaneous, 4x Daily With Meals & Nightly  [Held by provider] lisinopril, 40 mg, Oral, Daily  metoprolol succinate XL, 100 mg, Oral, Q24H  pantoprazole, 40 mg, Intravenous, Q12H  senna-docusate sodium, 2 tablet, Oral, BID  sodium chloride, 10 mL, Intravenous, Q12H      Infusions     PRNs    acetaminophen **OR** acetaminophen **OR** acetaminophen    benzonatate    senna-docusate sodium **AND** polyethylene glycol **AND** bisacodyl **AND** bisacodyl    Calcium Replacement - Follow Nurse / BPA Driven Protocol    dextrose    dextrose    glucagon (human recombinant)    guaifenesin    Magnesium Cardiology Dose Replacement - Follow Nurse / BPA Driven Protocol    nitroglycerin    ondansetron **OR** ondansetron    Phosphorus Replacement - Follow Nurse / BPA Driven Protocol    Potassium Replacement - Follow Nurse / BPA Driven Protocol    [COMPLETED] Insert Peripheral IV **AND** sodium chloride    sodium chloride    sodium chloride    Physical Exam:  Physical Exam  Cardiovascular:      Heart sounds: Murmur heard.   Pulmonary:      Effort: No respiratory distress.      Breath  sounds: No stridor. Rhonchi and rales present. No wheezing.   Chest:      Chest wall: No tenderness.         ROS  Review of Systems   Respiratory:  Positive for cough and shortness of breath. Negative for wheezing and stridor.    Cardiovascular:  Negative for chest pain, palpitations and leg swelling.             Total time spent with patient greater than: 45 Minutes

## 2024-01-02 LAB
BACTERIA ISLT: NORMAL
GLUCOSE BLDC GLUCOMTR-MCNC: 144 MG/DL (ref 70–105)
GLUCOSE BLDC GLUCOMTR-MCNC: 149 MG/DL (ref 70–105)
GLUCOSE BLDC GLUCOMTR-MCNC: 152 MG/DL (ref 70–105)
GLUCOSE BLDC GLUCOMTR-MCNC: 89 MG/DL (ref 70–105)

## 2024-01-02 PROCEDURE — 99232 SBSQ HOSP IP/OBS MODERATE 35: CPT | Performed by: INTERNAL MEDICINE

## 2024-01-02 PROCEDURE — 97530 THERAPEUTIC ACTIVITIES: CPT

## 2024-01-02 PROCEDURE — 63710000001 INSULIN LISPRO (HUMAN) PER 5 UNITS: Performed by: INTERNAL MEDICINE

## 2024-01-02 PROCEDURE — 97535 SELF CARE MNGMENT TRAINING: CPT

## 2024-01-02 PROCEDURE — 82948 REAGENT STRIP/BLOOD GLUCOSE: CPT

## 2024-01-02 PROCEDURE — 63710000001 INSULIN GLARGINE PER 5 UNITS: Performed by: NURSE PRACTITIONER

## 2024-01-02 PROCEDURE — 97110 THERAPEUTIC EXERCISES: CPT

## 2024-01-02 RX ADMIN — CETIRIZINE HYDROCHLORIDE 10 MG: 10 TABLET, FILM COATED ORAL at 08:46

## 2024-01-02 RX ADMIN — APIXABAN 2.5 MG: 2.5 TABLET, FILM COATED ORAL at 08:46

## 2024-01-02 RX ADMIN — OXYCODONE HYDROCHLORIDE AND ACETAMINOPHEN 500 MG: 500 TABLET ORAL at 08:46

## 2024-01-02 RX ADMIN — INSULIN GLARGINE 33 UNITS: 100 INJECTION, SOLUTION SUBCUTANEOUS at 21:23

## 2024-01-02 RX ADMIN — INSULIN LISPRO 2 UNITS: 100 INJECTION, SOLUTION INTRAVENOUS; SUBCUTANEOUS at 17:51

## 2024-01-02 RX ADMIN — METOPROLOL SUCCINATE 100 MG: 50 TABLET, EXTENDED RELEASE ORAL at 08:46

## 2024-01-02 RX ADMIN — PANTOPRAZOLE SODIUM 40 MG: 40 TABLET, DELAYED RELEASE ORAL at 08:46

## 2024-01-02 RX ADMIN — Medication 10 ML: at 08:46

## 2024-01-02 RX ADMIN — Medication 10 ML: at 21:23

## 2024-01-02 RX ADMIN — SENNOSIDES AND DOCUSATE SODIUM 2 TABLET: 50; 8.6 TABLET ORAL at 08:46

## 2024-01-02 RX ADMIN — PANTOPRAZOLE SODIUM 40 MG: 40 TABLET, DELAYED RELEASE ORAL at 17:51

## 2024-01-02 RX ADMIN — APIXABAN 2.5 MG: 2.5 TABLET, FILM COATED ORAL at 21:23

## 2024-01-02 NOTE — THERAPY EVALUATION
Patient Name: Blas Mojica  : 1950    MRN: [id number]                              Today's Date: 2023       Admit Date: 2023    Visit Dx:     ICD-10-CM ICD-9-CM   1. Coffee ground emesis  K92.0 578.0   2. Gastric distention  K31.89 536.8   3. Weakness  R53.1 780.79     Patient Active Problem List   Diagnosis    Primary hypertension    TIA (transient ischemic attack)    Stage 3 chronic kidney disease    Dyslipidemia    History of basal cell carcinoma (BCC)    Primary osteoarthritis    Vitamin D deficiency    Acute pain of left knee    Generalized weakness    Hypokalemia    Moderate malnutrition    Physical debility    Type 2 diabetes mellitus with stage 3 chronic kidney disease, with long-term current use of insulin    Sinus tachycardia    Acute hypoxemic respiratory failure due to COVID-19    Pneumonia due to COVID-19 virus    Mixed hyperlipidemia    COVID-19 virus infection    Weakness     Past Medical History:   Diagnosis Date    Diabetes mellitus     Hyperlipidemia     Hypertension     Kidney stone     TIA (transient ischemic attack)     Type 2 diabetes mellitus with stage 3 chronic kidney disease, with long-term current use of insulin 2021     Past Surgical History:   Procedure Laterality Date    ADENOIDECTOMY      EXTRACORPOREAL SHOCK WAVE LITHOTRIPSY (ESWL)      HERNIA REPAIR      KNEE ACL RECONSTRUCTION Left     TONSILLECTOMY        General Information       Row Name 23 1253          OT Time and Intention    Document Type evaluation  -SP     Mode of Treatment occupational therapy  -SP       Row Name 23 1253          General Information    Patient Profile Reviewed yes  -SP     Prior Level of Function independent:;ADL's;gait;transfer;home management;bathing;dressing;community mobility;driving;bed mobility  per pt, prior to humerus fx in Nov, he was independent and used SPC during gait. Since fx he requires assist from spouse and has not been ambulating. Spouse  provided assist with lower body dressing and bed mobility/transfers.  -SP     Existing Precautions/Restrictions fall;shoulder  -SP     Barriers to Rehab medically complex;previous functional deficit;physical barrier  -SP       Row Name 12/14/23 1253          Occupational Profile    Reason for Services/Referral (Occupational Profile) Pt is a 74 y/o M admitted to [facility] 12/12/23 with c/o vomiting coffee ground emesis. Hospital dx GI bleed. EGD pending. CT A/P indicates moderately severe gastric distention. XR abdomen Antegrade oriented gastric tube terminates over proximal stomach, with sideport near the GE junction. Consider further advancement. Pt known to have a R humerus fx on Nov 28, 2023. PMHx significant for HTN, hx TIA, CKDIII, and DMII. At baseline pt resides with spouse in independent living, second floor and utilizes elevator to access apartment. Pt typically utilizes straight cane for mobility, however recently has had functional decline, only mobilizing within home and wife has increased assist with ADLs.  -SP       Row Name 12/14/23 1253          Living Environment    People in Home spouse  -SP       Row Name 12/14/23 1253          Home Main Entrance    Number of Stairs, Main Entrance none  -SP       Row Name 12/14/23 1253          Cognition    Orientation Status (Cognition) oriented x 4  -SP       Row Name 12/14/23 1253          Safety Issues, Functional Mobility    Safety Issues Affecting Function (Mobility) impulsivity;problem-solving;sequencing abilities  -SP     Impairments Affecting Function (Mobility) balance;strength;endurance/activity tolerance;postural/trunk control  -SP               User Key  (r) = Recorded By, (t) = Taken By, (c) = Cosigned By      Initials Name Provider Type    SP Hussein Vargas OT Occupational Therapist                     Mobility/ADL's       Row Name 12/14/23 1259          Bed Mobility    Bed Mobility bed mobility (all) activities  -SP     All Activities, [place]  (Bed Mobility) moderate assist (50% patient effort)  -SP     Assistive Device (Bed Mobility) bed rails;head of bed elevated  -SP       Row Name 12/14/23 1259          Transfers    Transfers bed-chair transfer;sit-stand transfer;stand-sit transfer  -SP       Row Name 12/14/23 1259          Bed-Chair Transfer    Bed-Chair [place] (Transfers) maximum assist (25% patient effort);2 person assist  -SP       Row Name 12/14/23 1259          Sit-Stand Transfer    Sit-Stand [place] (Transfers) moderate assist (50% patient effort);2 person assist  -SP       Row Name 12/14/23 1259          Activities of Daily Living    BADL Assessment/Intervention lower body dressing  -SP       Row Name 12/14/23 1259          Lower Body Dressing Assessment/Training    [place] Level (Lower Body Dressing) don;socks;dependent (less than 25% patient effort)  Pt demoed abilit to lift BLEs to don socks long sitting 2/2 stating he has difficulty to RUE fx and not L handed.  -SP     Position (Lower Body Dressing) long sitting  -SP               User Key  (r) = Recorded By, (t) = Taken By, (c) = Cosigned By      Initials Name Provider Type    SP Hussein Vargas OT Occupational Therapist                   Obj/Interventions       Row Name 12/14/23 1303          Sensory Assessment (Somatosensory)    Sensory Assessment (Somatosensory) sensation intact  -SP       Row Name 12/14/23 1303          Vision Assessment/Intervention    Visual Impairment/Limitations corrective lenses for reading  -SP       Row Name 12/14/23 1303          Range of Motion Comprehensive    General Range of Motion bilateral upper extremity ROM WFL  -SP       Row Name 12/14/23 1303          Strength Comprehensive (MMT)    Comment, General Manual Muscle Testing (MMT) Assessment BUEs grossly 4-/5  -SP       Row Name 12/14/23 1303          Balance    Balance Assessment sitting static balance;sitting dynamic balance;sit to stand dynamic balance;standing static balance;standing  dynamic balance  -SP     Static Sitting Balance minimal assist  -SP     Dynamic Sitting Balance moderate assist  -SP     Position, Sitting Balance supported;sitting edge of bed  -SP     Sit to Stand Dynamic Balance moderate assist;2-person assist  -SP     Static Standing Balance moderate assist;2-person assist  -SP     Dynamic Standing Balance maximum assist;2-person assist  -SP     Position/Device Used, Standing Balance supported  -SP     Balance Interventions sitting;standing;sit to stand;supported;static;dynamic;minimal challenge  -SP               User Key  (r) = Recorded By, (t) = Taken By, (c) = Cosigned By      Initials Name Provider Type    SP Hussein Vargas OT Occupational Therapist                   Goals/Plan       Row Name 12/14/23 1315          Transfer Goal 1 (OT)    Activity/Assistive Device (Transfer Goal 1, OT) transfers, all  -SP     [place] Level/Cues Needed (Transfer Goal 1, OT) modified independence  -SP     Time Frame (Transfer Goal 1, OT) 2 weeks  -SP       Row Name 12/14/23 1315          Dressing Goal 1 (OT)    Activity/Device (Dressing Goal 1, OT) dressing skills, all  -SP     [place]/Cues Needed (Dressing Goal 1, OT) minimum assist (75% or more patient effort)  -SP     Time Frame (Dressing Goal 1, OT) 2 weeks  -SP       Row Name 12/14/23 1315          Therapy Assessment/Plan (OT)    Planned Therapy Interventions (OT) activity tolerance training;patient/caregiver education/training;strengthening exercise;occupation/activity based interventions;functional balance retraining;transfer/mobility retraining;ROM/therapeutic exercise  -SP               User Key  (r) = Recorded By, (t) = Taken By, (c) = Cosigned By      Initials Name Provider Type    SP Hussein Vargas OT Occupational Therapist                   Clinical Impression       Row Name 12/14/23 1309          Pain Assessment    Pretreatment Pain Rating 7/10  -SP     Posttreatment Pain Rating 7/10  -SP     Pain Location -  shoulder  -SP     Pain Intervention(s) Therapeutic presence;Repositioned;Emotional support;Ambulation/increased activity  -SP       Row Name 12/14/23 0949          Plan of Care Review    Plan of Care Reviewed With patient  -SP     Outcome Evaluation Pt is a 74 y/o M admitted to [facility] 12/12/23 with c/o vomiting coffee ground emesis. Hospital dx GI bleed. EGD pending. CT A/P indicates moderately severe gastric distention. XR abdomen Antegrade oriented gastric tube terminates over proximal stomach, with sideport near the GE junction. Consider further advancement. Pt known to have a R humerus fx on Nov 28, 2023. PMHx significant for HTN, hx TIA, CKDIII, and DMII. At baseline pt resides with spouse in independent living, second floor and utilizes elevator to access apartment. Pt typically utilizes straight cane for mobility, however recently has had functional decline, only mobilizing within home and wife has increased assist with ADLs. Pt reports he uses hospital bed to sleep in and incontinent at night. Pt has w/c, walker and cane at home, walk-in shower with grab bars in bathroom, and BSC he places over toilet for hand bars. During evaluation, pt required mod a with sup to sit. Once sitting EOB pt observed with posterior lean, requiring VCs and mod a to correct for weight-shifting. Pt STS with mod a x2 and HHA LUE. Pt fearful of falling during bed to chair transfer. Pt rigid and required max a x2 to successfully complete transfer. Pt is below baseline and is not safe to d/c home at this time. OT recommends SNF when medically appropriate for d/c. If pt refuses SNF, OT recommends  OT. OT will follow while admitted for continued skilled OT services.  -SP       Row Name 12/14/23 5769          Therapy Assessment/Plan (OT)    Criteria for Skilled Therapeutic Interventions Met (OT) yes  -SP     Therapy Frequency (OT) 5 times/wk  -SP     Predicted Duration of Therapy Intervention (OT) until d/c.  -SP       Row Name 12/14/23  1305          Therapy Plan Review/Discharge Plan (OT)    Anticipated Discharge Disposition (OT) skilled nursing facility  -SP       Row Name 12/14/23 1305          Positioning and Restraints    Pre-Treatment Position in bed  -SP     Post Treatment Position chair  -SP     In Chair reclined;exit alarm on;encouraged to call for assist;call light within reach  -SP               User Key  (r) = Recorded By, (t) = Taken By, (c) = Cosigned By      Initials Name Provider Type    SP Hussein Vargas, JAS Occupational Therapist                   Outcome Measures       Row Name 12/14/23 1317          How much help from another is currently needed...    Putting on and taking off regular lower body clothing? 2  -SP     Bathing (including washing, rinsing, and drying) 2  -SP     Toileting (which includes using toilet bed pan or urinal) 3  -SP     Putting on and taking off regular upper body clothing 3  -SP     Taking care of personal grooming (such as brushing teeth) 3  -SP     Eating meals 4  -SP     AM-PAC 6 Clicks Score (OT) 17  -SP       Row Name 12/14/23 1019 12/14/23 0850       How much help from another person do you currently need...    Turning from your back to your side while in flat bed without using bedrails? 2  -EJ 2  -ES    Moving from lying on back to sitting on the side of a flat bed without bedrails? 2  -EJ 2  -ES    Moving to and from a bed to a chair (including a wheelchair)? 2  -EJ 2  -ES    Standing up from a chair using your arms (e.g., wheelchair, bedside chair)? 2  -EJ 1  -ES    Climbing 3-5 steps with a railing? 1  -EJ 1  -ES    To walk in hospital room? 1  -EJ 1  -ES    AM-PAC 6 Clicks Score (PT) 10  -EJ 9  -ES    Highest Level of Mobility Goal 4 --> Transfer to chair/commode  -EJ 3 --> Sit at edge of bed  -ES      Row Name 12/14/23 1317 12/14/23 1019       Functional Assessment    Outcome Measure Options AM-PAC 6 Clicks Daily Activity (OT)  -SP AM-PAC 6 Clicks Basic Mobility (PT)  -EJ              User  Key  (r) = Recorded By, (t) = Taken By, (c) = Cosigned By      Initials Name Provider Type    Nati Linares, PT Physical Therapist    Fabiola Cardenas RN Registered Nurse    Hussein Good OT Occupational Therapist                    Occupational Therapy Education       Title: PT OT SLP Therapies (In Progress)       Topic: Occupational Therapy (In Progress)       Point: ADL training (Done)       Description:   Instruct learner(s) on proper safety adaptation and remediation techniques during self care or transfers.   Instruct in proper use of assistive devices.                  Learning Progress Summary             Patient Acceptance, E,TB, VU by SP at 12/14/2023 1317                         Point: Home exercise program (Not Started)       Description:   Instruct learner(s) on appropriate technique for monitoring, assisting and/or progressing therapeutic exercises/activities.                  Learner Progress:  Not documented in this visit.              Point: Precautions (Done)       Description:   Instruct learner(s) on prescribed precautions during self-care and functional transfers.                  Learning Progress Summary             Patient Acceptance, E,TB, VU by SP at 12/14/2023 1317                         Point: Body mechanics (Done)       Description:   Instruct learner(s) on proper positioning and spine alignment during self-care, functional mobility activities and/or exercises.                  Learning Progress Summary             Patient Acceptance, E,TB, VU by SP at 12/14/2023 1317                                         User Key       Initials Effective Dates Name Provider Type Discipline    YOON 11/15/23 -  Hussein Vargas OT Occupational Therapist OT                  OT Recommendation and Plan  Planned Therapy Interventions (OT): activity tolerance training, patient/caregiver education/training, strengthening exercise, occupation/activity based interventions, functional balance  retraining, transfer/mobility retraining, ROM/therapeutic exercise  Therapy Frequency (OT): 5 times/wk  Plan of Care Review  Plan of Care Reviewed With: patient  Outcome Evaluation: Pt is a 72 y/o M admitted to [facility] 12/12/23 with c/o vomiting coffee ground emesis. Hospital dx GI bleed. EGD pending. CT A/P indicates moderately severe gastric distention. XR abdomen Antegrade oriented gastric tube terminates over proximal stomach, with sideport near the GE junction. Consider further advancement. Pt known to have a R humerus fx on Nov 28, 2023. PMHx significant for HTN, hx TIA, CKDIII, and DMII. At baseline pt resides with spouse in independent living, second floor and utilizes elevator to access apartment. Pt typically utilizes straight cane for mobility, however recently has had functional decline, only mobilizing within home and wife has increased assist with ADLs. Pt reports he uses hospital bed to sleep in and incontinent at night. Pt has w/c, walker and cane at home, walk-in shower with grab bars in bathroom, and BSC he places over toilet for hand bars. During evaluation, pt required mod a with sup to sit. Once sitting EOB pt observed with posterior lean, requiring VCs and mod a to correct for weight-shifting. Pt STS with mod a x2 and HHA LUE. Pt fearful of falling during bed to chair transfer. Pt rigid and required max a x2 to successfully complete transfer. Pt is below baseline and is not safe to d/c home at this time. OT recommends SNF when medically appropriate for d/c. If pt refuses SNF, OT recommends  OT. OT will follow while admitted for continued skilled OT services.     Time Calculation:         Time Calculation- OT       Row Name 12/14/23 1318             Time Calculation- OT    OT Start Time 0840  -SP      OT Stop Time 0925  -SP      OT Time Calculation (min) 45 min  -SP      OT Received On 12/14/23  -SP      OT - Next Appointment 12/15/23  -SP      OT Goal Re-Cert Due Date 12/28/23  -SP                 User Key  (r) = Recorded By, (t) = Taken By, (c) = Cosigned By      Initials Name Provider Type    SP Hussein Vargas OT Occupational Therapist                  Therapy Charges for Today       Code Description Service Date Service Provider Modifiers Qty    43015596363 HC OT EVAL MOD COMPLEXITY 4 12/14/2023 Hussein Vargas OT GO 1                 Hussein Vargas OT  12/14/2023   n/a

## 2024-01-02 NOTE — CASE MANAGEMENT/SOCIAL WORK
Continued Stay Note   Farhan     Patient Name: Blas Mojica  MRN: [id number]  Today's Date: 1/2/2024    Admit Date: 12/28/2023    Plan: [facility]. No precert or PASRR required.   Discharge Plan       Row Name 01/02/24 1349       Plan    Plan [facility] accepted. No precert or PASRR required.    Patient/Family in Agreement with Plan yes    Plan Comments CM spoke to patient's wife Debra by phone. Reviewed IMM letter and copy emailed. Discussed referrals to [facility] rehab and [place] and wife reports pt will be discharging to [facility]. Message sent to liaison Nellie who confirmed pt has been accepted.             Megan Naegele, RN     Office Phone: [phone]  Office Cell: [phone]

## 2024-01-02 NOTE — PLAN OF CARE
Goal Outcome Evaluation:   Patient alert and oriented. Denied any pain or SOB. Stable throughout the shift. Kept safe and comfortable.

## 2024-01-02 NOTE — THERAPY TREATMENT NOTE
"Subjective: Pt agreeable to therapeutic plan of care.     Objective:     Bed mobility - Mod-A and Assist x 2  Transfers - Mod-A and Assist x 2 w/ HHA on LUE  Ambulation - N/A or Not attempted.    Therapeutic Exercise - 10 Reps B LE AROM unsupported sitting / EOB    Vitals: WNL    Pain: Pt did not rate pain  Intervention for pain: N/A    Education: Provided education on the importance of mobility in the acute care setting, Verbal/Tactile Cues, Transfer Training, Energy conservation strategies, and WB'ing status    Assessment: Blas Mojica presents with functional mobility impairments which indicate the need for skilled intervention. Pt displayed ability to perform bed mobility with mod A x2 and extended time. Pt required cuing throughout tx session for safety and staying on task. Pt performed one STS with mod A x2/ HHA on LUE. Pt displayed forward flexed posture requiring cuing for upright posture during standing trial. Pt able to sit eob ~15 minutes with min A progressing to CGA. Tolerating session today without incident. Will continue to follow and progress as tolerated.     Plan/Recommendations:   If medically appropriate, Moderate Intensity Therapy recommended post-acute care. This is recommended as therapy feels the patient would require 3-4 days per week and wouldn't tolerate \"3 hour daily\" rehab intensity. SNF would be the preferred choice. If the patient does not agree to SNF, arrange HH or OP depending on home bound status. If patient is medically complex, consider LTACH. Pt requires no DME at discharge.     Pt desires Skilled Rehab placement at discharge. Pt cooperative; agreeable to therapeutic recommendations and plan of care.         Basic Mobility 6-click:  Rollin = Total, A lot = 2, A little = 3; 4 = None  Supine>Sit:   1 = Total, A lot = 2, A little = 3; 4 = None   Sit>Stand with arms:  1 = Total, A lot = 2, A little = 3; 4 = None  Bed>Chair:   1 = Total, A lot = 2, A little = 3; 4 = " None  Ambulate in room:  1 = Total, A lot = 2, A little = 3; 4 = None  3-5 Steps with railin = Total, A lot = 2, A little = 3; 4 = None  Score: 10    Modified [place]: N/A = No pre-op stroke/TIA    Post-Tx Position: Supine with HOB Elevated, Alarms activated, and Call light and personal items within reach  PPE: gloves, gown, eye protection, and N95

## 2024-01-02 NOTE — PROGRESS NOTES
Daily Progress Note        Generalized weakness    COVID      Assessment:        Hypoxic respiratory insufficiency     COVID-19 positive     Chest x-ray findings and CT abdomen did not show significant alveolar infiltrate         Recommendations:     Oxygen titration currently on 1 L  Symbicort     DVT prophylaxis Eliquis 2.5 b.I.d.   Bronchodilator Symbicort    DVT prophylaxis Protonix             LOS: 4 days     Subjective         Objective     Vital signs for last 24 hours:  Vitals:    01/01/24 1245 01/01/24 1655 01/01/24 2120 01/02/24 0300   BP: 139/77 127/73 138/75    BP Location: Left arm Left arm Left arm    Patient Position: Lying Lying Lying    Pulse: 83 81 83 75   Resp: 16 20 14 14   Temp: 98 °F (36.7 °C) 98.7 °F (37.1 °C) 98.4 °F (36.9 °C) 98.1 °F (36.7 °C)   TempSrc: Oral Oral Oral Axillary   SpO2: 99% 95% 91% 98%   Weight:       Height:           Intake/Output last 3 shifts:  I/O last 3 completed shifts:  In: 1320 [P.O.:1320]  Out: 1520 [Urine:1520]  Intake/Output this shift:  No intake/output data recorded.      Radiology  Imaging Results (Last 24 Hours)       ** No results found for the last 24 hours. **            Labs:  Results from last 7 days   Lab Units 12/31/23 2232   WBC 10*3/mm3 4.30   HEMOGLOBIN g/dL 10.5*   HEMATOCRIT % 32.5*   PLATELETS 10*3/mm3 108*     Results from last 7 days   Lab Units 01/01/24  1559 12/31/23  2232   SODIUM mmol/L  --  140   POTASSIUM mmol/L 3.8 3.4*   CHLORIDE mmol/L  --  105   CO2 mmol/L  --  26.0   BUN mg/dL  --  25*   CREATININE mg/dL  --  1.38*   CALCIUM mg/dL  --  8.5*   BILIRUBIN mg/dL  --  0.4   ALK PHOS U/L  --  114   ALT (SGPT) U/L  --  43*   AST (SGOT) U/L  --  62*   GLUCOSE mg/dL  --  196*         Results from last 7 days   Lab Units 12/31/23  2232 12/31/23  0152 12/30/23  0438   ALBUMIN g/dL 2.8* 2.8* 3.5     Results from last 7 days   Lab Units 12/28/23  1755 12/28/23  1332   HSTROP T ng/L 47* 53*         Results from last 7 days   Lab Units  12/31/23  2232   MAGNESIUM mg/dL 1.9                   Meds:   SCHEDULE  apixaban, 2.5 mg, Oral, Q12H  ascorbic acid, 500 mg, Oral, Daily  budesonide-formoterol, 2 puff, Inhalation, BID - RT  cetirizine, 10 mg, Oral, Daily  insulin glargine, 33 Units, Subcutaneous, Nightly  insulin lispro, 2-9 Units, Subcutaneous, 4x Daily With Meals & Nightly  [Held by provider] lisinopril, 40 mg, Oral, Daily  metoprolol succinate XL, 100 mg, Oral, Q24H  pantoprazole, 40 mg, Oral, BID AC  senna-docusate sodium, 2 tablet, Oral, BID  sodium chloride, 10 mL, Intravenous, Q12H      Infusions     PRNs    acetaminophen **OR** acetaminophen **OR** acetaminophen    benzonatate    senna-docusate sodium **AND** polyethylene glycol **AND** bisacodyl **AND** bisacodyl    Calcium Replacement - Follow Nurse / BPA Driven Protocol    dextrose    dextrose    glucagon (human recombinant)    guaifenesin    Magnesium Cardiology Dose Replacement - Follow Nurse / BPA Driven Protocol    nitroglycerin    ondansetron **OR** ondansetron    Phosphorus Replacement - Follow Nurse / BPA Driven Protocol    Potassium Replacement - Follow Nurse / BPA Driven Protocol    [COMPLETED] Insert Peripheral IV **AND** sodium chloride    sodium chloride    sodium chloride    Physical Exam:  Physical Exam  Cardiovascular:      Heart sounds: Murmur heard.   Pulmonary:      Effort: No respiratory distress.      Breath sounds: No stridor. Rhonchi and rales present. No wheezing.   Chest:      Chest wall: No tenderness.         ROS  Review of Systems   Respiratory:  Positive for cough and shortness of breath. Negative for wheezing and stridor.    Cardiovascular:  Negative for chest pain, palpitations and leg swelling.             Total time spent with patient greater than: 45 Minutes

## 2024-01-02 NOTE — PLAN OF CARE
"Assessment: Blas Mojica presents with ADL impairments affecting function including balance, coordination, endurance / activity tolerance, postural / trunk control, shortness of breath, and strength. Pt requires mod assist x2 for all bed mobility, functional transfers, and ambulation. Sitting EOB dynamic, pt required min assist progressing to CGA. In standing, pt requires mod asssit x2 with HHA in LUE, presenting with forwards flexed posture requiring vc and gestures for head/neck extension. Pt requires mod verbal cues to initiate tasks, no reports of pain, and vitals WNL. Demonstrated functioning below baseline abilities indicate the need for continued skilled intervention while inpatient. Tolerating session today without incident. Will continue to follow and progress as tolerated.      Plan/Recommendations:   Moderate Intensity Therapy recommended post-acute care. This is recommended as therapy feels the patient would require 3-4 days per week and wouldn't tolerate \"3 hour daily\" rehab intensity. SNF would be the preferred choice. If the patient does not agree to SNF, arrange HH or OP depending on home bound status. If patient is medically complex, consider LTACH.  "

## 2024-01-02 NOTE — THERAPY TREATMENT NOTE
Subjective: Pt agreeable to therapeutic plan of care.  Cognition: arousal/alertness: Alert and Attentive, Pt a&O x4, pt loquacious and motivated    Objective:     Bed Mobility: Mod-A and Assist x 2   Functional Transfers: Mod-A and Assist x 2, pt required HHA on LUE, RUE in abduction sling. Pt requires mod verbal cueing to initiate. In standing pt presented with forwards flexed posture, requiring vc and gestures for head/neck flexion.  Balance: sitting EOB CGA and Min-A, Pt required min assist progressing to CGA  Functional Ambulation: N/A or Not attempted.    Lower Body Dressing: Dependent  ADL Position:  Fowlers  ADL Comments: Pt required dependent assist to don socks in fowlers position this date.    Grooming: SBA  ADL Position: edge of bed sitting  ADL Comments: Pt required set up and SBA siting EOB to wash face this date.     Vitals: WNL    Pain: 0 VAS  Location: N/A  Interventions for pain: N/A  Education: Provided education on the importance of mobility in the acute care setting      Assessment: Blas Mojica presents with ADL impairments affecting function including balance, coordination, endurance / activity tolerance, postural / trunk control, shortness of breath, and strength. Pt requires mod assist x2 for all bed mobility, functional transfers, and ambulation. Sitting EOB dynamic, pt required min assist progressing to CGA. In standing, pt requires mod asssit x2 with HHA in LUE, presenting with forwards flexed posture requiring vc and gestures for head/neck extension. Pt requires mod verbal cues to initiate tasks, no reports of pain, and vitals WNL. Demonstrated functioning below baseline abilities indicate the need for continued skilled intervention while inpatient. Tolerating session today without incident. Will continue to follow and progress as tolerated.     Plan/Recommendations:   Moderate Intensity Therapy recommended post-acute care. This is recommended as therapy feels the patient would  "require 3-4 days per week and wouldn't tolerate \"3 hour daily\" rehab intensity. SNF would be the preferred choice. If the patient does not agree to SNF, arrange HH or OP depending on home bound status. If patient is medically complex, consider LTACH.    Pt desires Skilled Rehab placement at discharge. Pt cooperative; agreeable to therapeutic recommendations and plan of care.     Modified Selina: N/A = No pre-op stroke/TIA    Post-Tx Position: Supine with HOB Elevated, Alarms activated, and Call light and personal items within reach  PPE: gloves, gown, eye protection, and N95   "

## 2024-01-02 NOTE — PLAN OF CARE
Goal Outcome Evaluation:      Blas Mojica presents with functional mobility impairments which indicate the need for skilled intervention. Pt displayed ability to perform bed mobility with mod A x2 and extended time. Pt required cuing throughout tx session for safety and staying on task. Pt performed one STS with mod A x2/ HHA on LUE. Pt displayed forward flexed posture requiring cuing for upright posture during standing trial. Pt able to sit eob ~15 minutes with min A progressing to CGA. Tolerating session today without incident. Will continue to follow and progress as tolerated.

## 2024-01-02 NOTE — PROGRESS NOTES
[facility] MEDICINE SERVICE  DAILY PROGRESS NOTE    NAME: Blas Mojica  : 1950  MRN: [id number]      LOS: 4 days     PROVIDER OF SERVICE: Darrick Jha MD    Chief Complaint: Generalized weakness    Subjective:     Interval History:  History taken from: patient    Patient is doing stable he is currently on supplemental oxygen 1 L.  Breathing has been stable.      Review of Systems:   Review of Systems   Respiratory:  Positive for shortness of breath.        Objective:     Vital Signs  Temp:  [98 °F (36.7 °C)-98.7 °F (37.1 °C)] 98 °F (36.7 °C)  Heart Rate:  [71-83] 71  Resp:  [14-20] 16  BP: (105-138)/(52-75) 105/52  Flow (L/min):  [1-4] 1   Body mass index is 27.17 kg/m².    Physical Exam  Physical Exam  Vitals and nursing note reviewed.   Constitutional:       General: He is not in acute distress.     Appearance: He is well-developed. He is not diaphoretic.   HENT:      Head: Normocephalic and atraumatic.   Cardiovascular:      Rate and Rhythm: Normal rate.   Pulmonary:      Effort: Pulmonary effort is normal. No respiratory distress.      Breath sounds: No wheezing.   Abdominal:      General: There is no distension.      Palpations: Abdomen is soft.   Musculoskeletal:         General: Normal range of motion.   Skin:     General: Skin is warm and dry.   Neurological:      Mental Status: He is alert.      Cranial Nerves: No cranial nerve deficit.   Psychiatric:         Behavior: Behavior normal.         Thought Content: Thought content normal.         Judgment: Judgment normal.         Scheduled Meds   apixaban, 2.5 mg, Oral, Q12H  ascorbic acid, 500 mg, Oral, Daily  budesonide-formoterol, 2 puff, Inhalation, BID - RT  cetirizine, 10 mg, Oral, Daily  insulin glargine, 33 Units, Subcutaneous, Nightly  insulin lispro, 2-9 Units, Subcutaneous, 4x Daily With Meals & Nightly  [Held by provider] lisinopril, 40 mg, Oral, Daily  metoprolol succinate XL, 100 mg, Oral, Q24H  pantoprazole, 40 mg, Oral,  BID AC  senna-docusate sodium, 2 tablet, Oral, BID  sodium chloride, 10 mL, Intravenous, Q12H       PRN Meds     acetaminophen **OR** acetaminophen **OR** acetaminophen    benzonatate    senna-docusate sodium **AND** polyethylene glycol **AND** bisacodyl **AND** bisacodyl    Calcium Replacement - Follow Nurse / BPA Driven Protocol    dextrose    dextrose    glucagon (human recombinant)    guaifenesin    Magnesium Cardiology Dose Replacement - Follow Nurse / BPA Driven Protocol    nitroglycerin    ondansetron **OR** ondansetron    Phosphorus Replacement - Follow Nurse / BPA Driven Protocol    Potassium Replacement - Follow Nurse / BPA Driven Protocol    [COMPLETED] Insert Peripheral IV **AND** sodium chloride    sodium chloride    sodium chloride   Infusions         Diagnostic Data    Results from last 7 days   Lab Units 01/01/24  1559 12/31/23  2232   WBC 10*3/mm3  --  4.30   HEMOGLOBIN g/dL  --  10.5*   HEMATOCRIT %  --  32.5*   PLATELETS 10*3/mm3  --  108*   GLUCOSE mg/dL  --  196*   CREATININE mg/dL  --  1.38*   BUN mg/dL  --  25*   SODIUM mmol/L  --  140   POTASSIUM mmol/L 3.8 3.4*   AST (SGOT) U/L  --  62*   ALT (SGPT) U/L  --  43*   ALK PHOS U/L  --  114   BILIRUBIN mg/dL  --  0.4   ANION GAP mmol/L  --  9.0       XR Chest 1 View    Result Date: 12/31/2023  Impression: New right basilar airspace opacity concerning for pneumonia Electronically Signed: Tenzin Montejo  12/31/2023 2:28 PM EST  Workstation ID: [id number]       I reviewed the patient's new clinical results.    Assessment/Plan:     Active and Resolved Problems  Active Hospital Problems    Diagnosis  POA    **Generalized weakness [R53.1]  Yes    COVID [U07.1]  Yes      Resolved Hospital Problems   No resolved problems to display.       Generalized lower abdominal pain  Recent EGD.  CT Abdomen/Pelvis: no acute abnormalities.  Further workup pending clinical progress.     COVID-19 positive  Becoming more hypoxic,   Will consult pulmonology  Chest x-ray  will be ordered  Currently on 1 L supplemental oxygen, continue to wean as tolerated.     Generalized weakness  Generalized deconditioning from back to back hospitalizations.  PT Eval and treat, seeking recommendations for discharge placement, currently from [place] in independent living.  Case management assistance appreciated, may require GEOVANNI.     Sinus tachycardia, possible atrial fibrillation with RVR  EKG independently reviewed: Sinus tachycardia, heart rate 111, .  Without ST or T wave abnormalities.  Of note, there is a lot of artifact on the first half of the EKG.  EKG stated as atrial fibrillation, may be in/out of a. Fib.  Cardiology consulted, sees Dr. Vasquez.  No history of atrial fibrillation on PMH, on no rate controlling medications as outpatient.   [id number] Score of at least 5. Currently on prophylactic Lovenox, due to recent GIB, will hold off on full anticoagulation.  Has been restarted on anticoagulation after cardiology has discussion with family and patient.     Diabetes mellitus Type 2, not insulin-dependent : well controlled.   Previous hemoglobin A1c on 12/12/2023, 7.70.  Continue Lantus as reduced dose.  Hold oral agents.  Accu checks ACHS and c/w humalog coverage as needed.      Essential Hypertension: well controlled.   BP well controlled, hold Maxzide and lisinopril, resume when clinically appropriate.  Titrate medications as needed.     Chronic kidney disease  Remains nonoliguric.   Monitor Input/Output very closely.   Starting IVF to prevent BRIGIDO.  Avoids NSAIDs, nephrotoxic medications, and hypotension.  Renally adjust medications for Estimated Creatinine Clearance: 51.9 mL/min (A) (by C-G formula based on SCr of 1.4 mg/dL (H)).     History of recent suspected GIB with erosive esophagitis & gastric ulcers  Hemoglobin normal on admission, continue to monitor and trend labs.  Underwent EGD on 12/13/2023 with findings of erosive esophagitis and gastric ulcers, recommend BID  Protonix x 1 month, then once daily.  Recommended repeat EGD in 8-12 weeks.  Did develop some post-nasal drainage with dry cough that has been persistent since placement of EGD during that admission.     Dysphagia  Advance diet as per Speech therapy     Hyperdense lesion of the upper pole of right kidney & mid left kidney  Subsequent find on CT, no hydronephrosis or obstructive uropathy.  Continue follow up as outpatient.     Recent fracture of proximal humeral surgical neck  Seen and evaluated in [place], [place], with XR showing fracture of the proximal humeral surgical neck, with 3.5 cm sclerotic lesion within the proximal humerus.  CT of the upper extremity was completed with impression of nondisplaced minimally comminuted fracture of the greater tuberosity of the humerus.  Patient presents in sling, states that he is unsure of what the plan is, as orthopedic surgery has yet to determine what to do.  Consider orthopedic surgery referral as outpatient.  Continue with splint in place.     History of TIA: Continue Plavix, intolerant of statin therapy.        DVT prophylaxis:  Medical and mechanical DVT prophylaxis orders are present.     Code status is   Code Status and Medical Interventions:   Ordered at: 12/28/23 1759     Level Of Support Discussed With:    Patient     Code Status (Patient has no pulse and is not breathing):    CPR (Attempt to Resuscitate)     Medical Interventions (Patient has pulse or is breathing):    Full Support       Plan for disposition: Home in 1-2 days    Time: 30 minutes    Signature: Electronically signed by Darrick Jha MD, 01/02/24, 13:12 EST.  [facility]ist Team

## 2024-01-02 NOTE — PROGRESS NOTES
Cardiology Progress Note      PATIENT IDENTIFICATION    Name: Blas Mojica  Age: 73 y.o. Sex: male : 1950  MRN: [id number]    Requesting Provider    Darrick Jha MD     LOS: 4 days       Reason For Followup:  Paroxysmal atrial fibrillation      Subjective:    Interval History:  Chart and labs reviewed.  Heart rate remains well-controlled.  Sinus rhythm with PACs.  Patient is now agreeable to taking medications.    Please kindly note that during this admission I had a long conversation with patient's spouse.  Patient did have episode of atrial fibrillation this admission but the patient's spouse was convinced that he does not have A-fib based on a previous examination.  Discussed that acute respiratory illness can unmask atrial fibrillation.  Additionally patient had EP study performed that confirmed the presence of atrial fibrillation in the past.  Patient has been reluctant to take medications because he does not hear very well and is concerned about different medications.    Discussed importance of anticoagulation with patient and spouse in the prevention of CVA associated with A-fib.  She is reluctant to allow full dose Eliquis because of his recent bleed.  GI has approved patient to receive full dose anticoagulation.  Patient and spouse requesting 2.5 mg.  Will acquiesce to this.    Assessment & Plan    Impressions:  Paroxysmal atrial fibrillation with rapid ventricular response and an elevated CHADS2 score  COVID-19 positive status  Diabetes  Hypertension  Chronic kidney disease  Generalized weakness    Recommendations:  Continue with beta-blocker therapy as blood pressure tolerates  Continue Eliquis 2.5 mg twice daily  Monitor rate and rhythm  Monitor for signs and symptoms of bleeding        Objective:    Medication Review:   Scheduled Meds:apixaban, 2.5 mg, Oral, Q12H  ascorbic acid, 500 mg, Oral, Daily  budesonide-formoterol, 2 puff, Inhalation, BID - RT  cetirizine, 10 mg, Oral,  Daily  insulin glargine, 33 Units, Subcutaneous, Nightly  insulin lispro, 2-9 Units, Subcutaneous, 4x Daily With Meals & Nightly  [Held by provider] lisinopril, 40 mg, Oral, Daily  metoprolol succinate XL, 100 mg, Oral, Q24H  pantoprazole, 40 mg, Oral, BID AC  senna-docusate sodium, 2 tablet, Oral, BID  sodium chloride, 10 mL, Intravenous, Q12H      Continuous Infusions:     PRN Meds:.  acetaminophen **OR** acetaminophen **OR** acetaminophen    benzonatate    senna-docusate sodium **AND** polyethylene glycol **AND** bisacodyl **AND** bisacodyl    Calcium Replacement - Follow Nurse / BPA Driven Protocol    dextrose    dextrose    glucagon (human recombinant)    guaifenesin    Magnesium Cardiology Dose Replacement - Follow Nurse / BPA Driven Protocol    nitroglycerin    ondansetron **OR** ondansetron    Phosphorus Replacement - Follow Nurse / BPA Driven Protocol    Potassium Replacement - Follow Nurse / BPA Driven Protocol    [COMPLETED] Insert Peripheral IV **AND** sodium chloride    sodium chloride    sodium chloride      Generalized weakness    COVID         Physical Exam:  Physical examination deferred secondary to isolation status.  Physical exam of admitting service and nursing assessments utilized for medical decision-making purposes    Vital Signs:  Vitals:    01/01/24 1655 01/01/24 2120 01/02/24 0300 01/02/24 0810   BP: 127/73 138/75  131/72   BP Location: Left arm Left arm  Left arm   Patient Position: Lying Lying  Lying   Pulse: 81 83 75 81   Resp: 20 14 14 20   Temp: 98.7 °F (37.1 °C) 98.4 °F (36.9 °C) 98.1 °F (36.7 °C) 98.1 °F (36.7 °C)   TempSrc: Oral Oral Axillary Oral   SpO2: 95% 91% 98% 94%   Weight:       Height:         Wt Readings from Last 1 Encounters:   01/01/24 85.9 kg (189 lb 6 oz)       Intake/Output Summary (Last 24 hours) at 1/2/2024 0908  Last data filed at 1/1/2024 1827  Gross per 24 hour   Intake 600 ml   Output 900 ml   Net -300 ml         Results Review:     CBC    Results from last 7  days   Lab Units 12/31/23  2232 12/31/23  0152 12/30/23  0438 12/29/23  0158 12/28/23  1332   WBC 10*3/mm3 4.30 5.20 11.60* 7.30 6.50   HEMOGLOBIN g/dL 10.5* 10.2* 10.1* 12.4* 14.1   PLATELETS 10*3/mm3 108* 103* 323 126* 159     Cr Clearance Estimated Creatinine Clearance: 57.1 mL/min (A) (by C-G formula based on SCr of 1.38 mg/dL (H)).  Coag     HbA1C   Lab Results   Component Value Date    HGBA1C 7.70 (H) 12/12/2023    HGBA1C 8.0 (H) 10/20/2022    HGBA1C 9.2 (H) 02/28/2022     Blood Glucose   Glucose   Date/Time Value Ref Range Status   01/02/2024 0808 89 70 - 105 mg/dL Final     Comment:     Serial Number: [id number]:  [id number]   01/01/2024 2119 231 (H) 70 - 105 mg/dL Final     Comment:     Serial Number: [id number]:  [id number]   01/01/2024 1654 192 (H) 70 - 105 mg/dL Final     Comment:     Serial Number: [id number]:  [id number]   01/01/2024 1156 116 (H) 70 - 105 mg/dL Final     Comment:     Serial Number: [id number]:  [id number]   01/01/2024 0807 131 (H) 70 - 105 mg/dL Final     Comment:     Serial Number: [id number]:  [id number]   12/31/2023 2028 217 (H) 70 - 105 mg/dL Final     Comment:     Serial Number: [id number]:  [id number]   12/31/2023 1704 261 (H) 70 - 105 mg/dL Final     Comment:     Serial Number: [id number]:  [id number]   12/31/2023 1120 229 (H) 70 - 105 mg/dL Final     Comment:     Serial Number: [id number]:  [id number]     Infection   Results from last 7 days   Lab Units 12/29/23  0158   PROCALCITONIN ng/mL 0.17     CMP   Results from last 7 days   Lab Units 01/01/24  1559 12/31/23  2232 12/31/23  0152 12/30/23  0438 12/29/23  0158 12/28/23  1332   SODIUM mmol/L  --  140 139 141 141 140   POTASSIUM mmol/L 3.8 3.4* 3.6 4.3 3.9 4.1   CHLORIDE mmol/L  --  105 105 105 104 100   CO2 mmol/L  --  26.0 25.0 23.0 27.0 30.0*   BUN mg/dL  --  25* 32* 21 22 24*   CREATININE mg/dL  --  1.38* 1.46* 1.17 1.30* 1.40*   GLUCOSE mg/dL  --  196* 227* 148* 113* 268*  "  ALBUMIN g/dL  --  2.8* 2.8* 3.5 3.5 3.9   BILIRUBIN mg/dL  --  0.4 0.4 0.5 0.7 0.8   ALK PHOS U/L  --  114 84 3,881* 100 118*   AST (SGOT) U/L  --  62* 50* 38 29 23   ALT (SGPT) U/L  --  43* 30 20 19 18   LIPASE U/L  --   --   --   --   --  30     ABG      UA    Results from last 7 days   Lab Units 12/30/23  0213   NITRITE UA  Negative   WBC UA /HPF None Seen   BACTERIA UA /HPF None Seen   SQUAM EPITHEL UA /HPF 0-2     MIGUEL ANGEL  No results found for: \"POCMETH\", \"POCAMPHET\", \"POCBARBITUR\", \"POCBENZO\", \"POCCOCAINE\", \"POCOPIATES\", \"POCOXYCODO\", \"POCPHENCYC\", \"POCPROPOXY\", \"POCTHC\", \"POCTRICYC\"  Lysis Labs   Results from last 7 days   Lab Units 12/31/23  2232 12/31/23  0152 12/30/23  0438 12/29/23  0158 12/28/23  1332   HEMOGLOBIN g/dL 10.5* 10.2* 10.1* 12.4* 14.1   PLATELETS 10*3/mm3 108* 103* 323 126* 159   CREATININE mg/dL 1.38* 1.46* 1.17 1.30* 1.40*     Radiology(recent) XR Chest 1 View    Result Date: 12/31/2023  Impression: New right basilar airspace opacity concerning for pneumonia Electronically Signed: Tenzin Montejo  12/31/2023 2:28 PM EST  Workstation ID: [id number]       Results from last 7 days   Lab Units 12/28/23  1755   HSTROP T ng/L 47*       Imaging Results (Last 24 Hours)       ** No results found for the last 24 hours. **            Cardiac Studies:  Echo- Results for orders placed during the hospital encounter of 12/28/23    Adult Transthoracic Echo Complete w/ Color, Spectral and Contrast if Necessary Per Protocol    Interpretation Summary    Left ventricular systolic function is normal. Left ventricular ejection fraction appears to be 56 - 60%.    Left ventricular diastolic function is consistent with (grade I) impaired relaxation.    There is calcification of the aortic valve mainly affecting the left coronary cusp(s).    Estimated right ventricular systolic pressure from tricuspid regurgitation is mildly elevated (35-45 mmHg).    Stress Myoview-  Cath-        Dylon Baeza DO  01/02/24  09:08 " EST

## 2024-01-03 LAB
ALBUMIN SERPL-MCNC: 2.9 G/DL (ref 3.5–5.2)
ALBUMIN/GLOB SERPL: 0.9 G/DL
ALP SERPL-CCNC: 127 U/L (ref 39–117)
ALT SERPL W P-5'-P-CCNC: 27 U/L (ref 1–41)
ANION GAP SERPL CALCULATED.3IONS-SCNC: 6 MMOL/L (ref 5–15)
AST SERPL-CCNC: 26 U/L (ref 1–40)
BILIRUB SERPL-MCNC: 0.4 MG/DL (ref 0–1.2)
BUN SERPL-MCNC: 18 MG/DL (ref 8–23)
BUN/CREAT SERPL: 17.3 (ref 7–25)
CALCIUM SPEC-SCNC: 9 MG/DL (ref 8.6–10.5)
CHLORIDE SERPL-SCNC: 105 MMOL/L (ref 98–107)
CO2 SERPL-SCNC: 30 MMOL/L (ref 22–29)
CREAT SERPL-MCNC: 1.04 MG/DL (ref 0.76–1.27)
EGFRCR SERPLBLD CKD-EPI 2021: 75.3 ML/MIN/1.73
GLOBULIN UR ELPH-MCNC: 3.1 GM/DL
GLUCOSE BLDC GLUCOMTR-MCNC: 123 MG/DL (ref 70–105)
GLUCOSE BLDC GLUCOMTR-MCNC: 207 MG/DL (ref 70–105)
GLUCOSE BLDC GLUCOMTR-MCNC: 262 MG/DL (ref 70–105)
GLUCOSE BLDC GLUCOMTR-MCNC: 91 MG/DL (ref 70–105)
GLUCOSE SERPL-MCNC: 119 MG/DL (ref 65–99)
POTASSIUM SERPL-SCNC: 3.9 MMOL/L (ref 3.5–5.2)
PROT SERPL-MCNC: 6 G/DL (ref 6–8.5)
SODIUM SERPL-SCNC: 141 MMOL/L (ref 136–145)

## 2024-01-03 PROCEDURE — 94761 N-INVAS EAR/PLS OXIMETRY MLT: CPT

## 2024-01-03 PROCEDURE — 80053 COMPREHEN METABOLIC PANEL: CPT | Performed by: FAMILY MEDICINE

## 2024-01-03 PROCEDURE — 83735 ASSAY OF MAGNESIUM: CPT | Performed by: NURSE PRACTITIONER

## 2024-01-03 PROCEDURE — 63710000001 INSULIN LISPRO (HUMAN) PER 5 UNITS: Performed by: INTERNAL MEDICINE

## 2024-01-03 PROCEDURE — 94799 UNLISTED PULMONARY SVC/PX: CPT

## 2024-01-03 PROCEDURE — 99232 SBSQ HOSP IP/OBS MODERATE 35: CPT | Performed by: INTERNAL MEDICINE

## 2024-01-03 PROCEDURE — 63710000001 INSULIN GLARGINE PER 5 UNITS: Performed by: NURSE PRACTITIONER

## 2024-01-03 PROCEDURE — 92526 ORAL FUNCTION THERAPY: CPT

## 2024-01-03 PROCEDURE — 82948 REAGENT STRIP/BLOOD GLUCOSE: CPT

## 2024-01-03 PROCEDURE — 84100 ASSAY OF PHOSPHORUS: CPT | Performed by: NURSE PRACTITIONER

## 2024-01-03 PROCEDURE — 36415 COLL VENOUS BLD VENIPUNCTURE: CPT | Performed by: NURSE PRACTITIONER

## 2024-01-03 RX ADMIN — GUAIFENESIN 200 MG: 200 SOLUTION ORAL at 21:57

## 2024-01-03 RX ADMIN — Medication 10 ML: at 09:40

## 2024-01-03 RX ADMIN — APIXABAN 2.5 MG: 2.5 TABLET, FILM COATED ORAL at 09:40

## 2024-01-03 RX ADMIN — APIXABAN 2.5 MG: 2.5 TABLET, FILM COATED ORAL at 21:55

## 2024-01-03 RX ADMIN — PANTOPRAZOLE SODIUM 40 MG: 40 TABLET, DELAYED RELEASE ORAL at 09:40

## 2024-01-03 RX ADMIN — Medication 10 ML: at 21:55

## 2024-01-03 RX ADMIN — PANTOPRAZOLE SODIUM 40 MG: 40 TABLET, DELAYED RELEASE ORAL at 16:28

## 2024-01-03 RX ADMIN — METOPROLOL SUCCINATE 100 MG: 50 TABLET, EXTENDED RELEASE ORAL at 09:40

## 2024-01-03 RX ADMIN — CETIRIZINE HYDROCHLORIDE 10 MG: 10 TABLET, FILM COATED ORAL at 09:40

## 2024-01-03 RX ADMIN — BENZONATATE 200 MG: 100 CAPSULE ORAL at 00:57

## 2024-01-03 RX ADMIN — INSULIN GLARGINE 33 UNITS: 100 INJECTION, SOLUTION SUBCUTANEOUS at 21:55

## 2024-01-03 RX ADMIN — INSULIN LISPRO 4 UNITS: 100 INJECTION, SOLUTION INTRAVENOUS; SUBCUTANEOUS at 17:32

## 2024-01-03 RX ADMIN — BENZONATATE 200 MG: 100 CAPSULE ORAL at 21:55

## 2024-01-03 RX ADMIN — INSULIN LISPRO 6 UNITS: 100 INJECTION, SOLUTION INTRAVENOUS; SUBCUTANEOUS at 21:55

## 2024-01-03 RX ADMIN — SENNOSIDES AND DOCUSATE SODIUM 2 TABLET: 50; 8.6 TABLET ORAL at 09:40

## 2024-01-03 RX ADMIN — OXYCODONE HYDROCHLORIDE AND ACETAMINOPHEN 500 MG: 500 TABLET ORAL at 09:40

## 2024-01-03 NOTE — CONSULTS
"Nutrition Services  Patient Name: Blas Mojica  YOB: 1950  MRN: [id number]  Admission date: 12/28/2023    NUTRITION SCREENING      Trending Narrative: 1/2: Pt assessed due to MST score of 2 for unsure weight Hx. Pt's weight actually has been stable in the last year, per documentation. Current diet remains the most appropriate intervention at this time - will continue.       PO Diet: Diet: Diabetic Diets; Consistent Carbohydrate; Texture: Regular Texture (IDDSI 7); Fluid Consistency: Thin (IDDSI 0)   PO Supplements: None ordered    Trending PO Intake:  100% intake at recent meals        Nutritionally-Pertinent Medications RDN Reviewed, C/W clinical course         Labs (reviewed below): No current labs available for review      Results from last 7 days   Lab Units 01/01/24  1559 12/31/23 2232 12/31/23 0152 12/30/23  0438   SODIUM mmol/L  --  140 139 141   POTASSIUM mmol/L 3.8 3.4* 3.6 4.3   CHLORIDE mmol/L  --  105 105 105   CO2 mmol/L  --  26.0 25.0 23.0   BUN mg/dL  --  25* 32* 21   CREATININE mg/dL  --  1.38* 1.46* 1.17   CALCIUM mg/dL  --  8.5* 8.5* 9.0   BILIRUBIN mg/dL  --  0.4 0.4 0.5   ALK PHOS U/L  --  114 84 3,881*   ALT (SGPT) U/L  --  43* 30 20   AST (SGOT) U/L  --  62* 50* 38   GLUCOSE mg/dL  --  196* 227* 148*     Results from last 7 days   Lab Units 12/31/23 2232 12/31/23 0152 12/30/23  0438   MAGNESIUM mg/dL 1.9 2.1 2.3   PHOSPHORUS mg/dL 2.4* 2.5 3.7   HEMOGLOBIN g/dL 10.5* 10.2* 10.1*   HEMATOCRIT % 32.5* 30.8* 30.3*     Lab Results   Component Value Date    HGBA1C 7.70 (H) 12/12/2023          GI Function:  Stool Output  Stool Unmeasured Occurrence: 0 (01/03/24 0455)  Bowel Incontinence: Yes (12/31/23 2010)  Stool Amount: small (12/31/23 2010)          Skin: No current pressure injuries documented        Weight Review: Estimated body mass index is 27.17 kg/m² as calculated from the following:    Height as of this encounter: 177.8 cm (70\").    Weight as of this encounter: 85.9 " kg (189 lb 6 oz).    Comment:   1/2: Scale weight 85.9 kg - stable x 1 year    Wt Readings from Last 30 Encounters:   01/01/24 0500 85.9 kg (189 lb 6 oz)   12/31/23 0426 86.5 kg (190 lb 11.2 oz)   12/30/23 0526 84.8 kg (186 lb 15.2 oz)   12/29/23 1138 88.5 kg (195 lb)   12/28/23 1240 88.5 kg (195 lb)   12/12/23 1324 88.6 kg (195 lb 5.2 oz)   12/12/23 0634 86.2 kg (190 lb)   11/04/23 1510 93.8 kg (206 lb 12.7 oz)   09/12/23 1929 85 kg (187 lb 6.3 oz)   07/25/23 1943 82 kg (180 lb 12.4 oz)   04/05/23 1856 81.6 kg (180 lb)   10/21/22 1633 79.4 kg (175 lb)   10/20/22 0600 79.5 kg (175 lb 4.3 oz)   10/20/22 0103 77.5 kg (170 lb 13.7 oz)   10/19/22 2258 74.8 kg (165 lb)   06/16/22 0056 72.4 kg (159 lb 9.8 oz)   06/15/22 1125 74.8 kg (165 lb)   06/12/22 1148 74.8 kg (165 lb)   03/03/22 0338 74.5 kg (164 lb 3.9 oz)   03/02/22 0354 75.6 kg (166 lb 10.7 oz)   03/02/22 0140 75.6 kg (166 lb 10.7 oz)   02/28/22 1624 72.6 kg (160 lb)   11/25/21 0618 76.5 kg (168 lb 10.4 oz)   11/23/21 1717 76.4 kg (168 lb 8 oz)   11/22/21 0651 77.1 kg (170 lb)   03/25/21 1728 81.6 kg (180 lb)   07/05/20 1415 84 kg (185 lb 3 oz)   08/24/17 1423 99.3 kg (219 lb)   02/23/17 1320 97.1 kg (214 lb)   10/10/16 1023 95.3 kg (210 lb)   09/08/16 1354 91.6 kg (202 lb)   08/25/16 1313 89.8 kg (198 lb)   08/11/16 0930 89.5 kg (197 lb 6.1 oz)   07/29/16 1313 88.7 kg (195 lb 8 oz)   07/19/16 1524 87.6 kg (193 lb 2.1 oz)   07/12/16 0927 88 kg (194 lb)   03/08/16 1015 92.5 kg (204 lb)              Trending Physical   Appearance, NFPE 1/2: NFPE completed and not consistent with nutrition diagnosis of malnutrition at this time using AND/ASPEN criteria             Nutrition Problem Statement: No nutrition Dx identified presently; will continue to monitor/follow up to determine if Dx arises.        Nutrition Intervention: Continue current diet as tolerated.          Monitoring/Evaluation PO intake, Pertinent labs, Weight, Skin status, GI status, POC/GOC        RD to  follow up per protocol.    Electronically signed by:  Brooke Blackburn RD  01/02/24 20:34 EST

## 2024-01-03 NOTE — PROGRESS NOTES
Daily Progress Note        Generalized weakness    COVID      Assessment:        Hypoxic respiratory insufficiency     COVID-19 positive     Chest x-ray findings and CT abdomen did not show significant alveolar infiltrate         Recommendations:     Oxygen titration currently on 1 L  Symbicort     DVT prophylaxis Eliquis 2.5 b.I.d.   Bronchodilator Symbicort    DVT prophylaxis Protonix             LOS: 5 days     Subjective         Objective     Vital signs for last 24 hours:  Vitals:    01/03/24 0050 01/03/24 0455 01/03/24 0502 01/03/24 0737   BP: 131/78  134/71 137/85   BP Location: Left arm  Left arm Left arm   Patient Position: Lying  Lying Lying   Pulse: 78   80   Resp: 19 15  19   Temp: 97.6 °F (36.4 °C) 97.9 °F (36.6 °C)  98.8 °F (37.1 °C)   TempSrc: Oral Oral  Oral   SpO2: 91%   97%   Weight:  84.6 kg (186 lb 8.2 oz)     Height:           Intake/Output last 3 shifts:  I/O last 3 completed shifts:  In: 960 [P.O.:960]  Out: 2250 [Urine:2250]  Intake/Output this shift:  I/O this shift:  In: -   Out: 150 [Urine:150]      Radiology  Imaging Results (Last 24 Hours)       ** No results found for the last 24 hours. **            Labs:  Results from last 7 days   Lab Units 12/31/23 2232   WBC 10*3/mm3 4.30   HEMOGLOBIN g/dL 10.5*   HEMATOCRIT % 32.5*   PLATELETS 10*3/mm3 108*     Results from last 7 days   Lab Units 01/01/24  1559 12/31/23  2232   SODIUM mmol/L  --  140   POTASSIUM mmol/L 3.8 3.4*   CHLORIDE mmol/L  --  105   CO2 mmol/L  --  26.0   BUN mg/dL  --  25*   CREATININE mg/dL  --  1.38*   CALCIUM mg/dL  --  8.5*   BILIRUBIN mg/dL  --  0.4   ALK PHOS U/L  --  114   ALT (SGPT) U/L  --  43*   AST (SGOT) U/L  --  62*   GLUCOSE mg/dL  --  196*         Results from last 7 days   Lab Units 12/31/23  2232 12/31/23  0152 12/30/23  0438   ALBUMIN g/dL 2.8* 2.8* 3.5     Results from last 7 days   Lab Units 12/28/23  1755 12/28/23  1332   HSTROP T ng/L 47* 53*         Results from last 7 days   Lab Units  12/31/23  2232   MAGNESIUM mg/dL 1.9                   Meds:   SCHEDULE  apixaban, 2.5 mg, Oral, Q12H  ascorbic acid, 500 mg, Oral, Daily  budesonide-formoterol, 2 puff, Inhalation, BID - RT  cetirizine, 10 mg, Oral, Daily  insulin glargine, 33 Units, Subcutaneous, Nightly  insulin lispro, 2-9 Units, Subcutaneous, 4x Daily With Meals & Nightly  [Held by provider] lisinopril, 40 mg, Oral, Daily  metoprolol succinate XL, 100 mg, Oral, Q24H  pantoprazole, 40 mg, Oral, BID AC  senna-docusate sodium, 2 tablet, Oral, BID  sodium chloride, 10 mL, Intravenous, Q12H      Infusions     PRNs    acetaminophen **OR** acetaminophen **OR** acetaminophen    benzonatate    senna-docusate sodium **AND** polyethylene glycol **AND** bisacodyl **AND** bisacodyl    Calcium Replacement - Follow Nurse / BPA Driven Protocol    dextrose    dextrose    glucagon (human recombinant)    guaifenesin    Magnesium Cardiology Dose Replacement - Follow Nurse / BPA Driven Protocol    nitroglycerin    ondansetron **OR** ondansetron    Phosphorus Replacement - Follow Nurse / BPA Driven Protocol    Potassium Replacement - Follow Nurse / BPA Driven Protocol    [COMPLETED] Insert Peripheral IV **AND** sodium chloride    sodium chloride    sodium chloride    Physical Exam:  Physical Exam  Cardiovascular:      Heart sounds: Murmur heard.   Pulmonary:      Effort: No respiratory distress.      Breath sounds: No stridor. Rhonchi and rales present. No wheezing.   Chest:      Chest wall: No tenderness.         ROS  Review of Systems   Respiratory:  Positive for cough and shortness of breath. Negative for wheezing and stridor.    Cardiovascular:  Negative for chest pain, palpitations and leg swelling.             Total time spent with patient greater than: 45 Minutes

## 2024-01-03 NOTE — THERAPY TREATMENT NOTE
Acute Care - Speech Language Pathology   Swallow Treatment Note [facility]     Patient Name: Blas Mojica  : 1950  MRN: [id number]  Today's Date: 1/3/2024               Admit Date: 2023    Visit Dx:     ICD-10-CM ICD-9-CM   1. Weakness  R53.1 780.79   2. COVID-19  U07.1 079.89   3. Elevated troponin  R79.89 790.6     Patient Active Problem List   Diagnosis    Primary hypertension    TIA (transient ischemic attack)    Stage 3 chronic kidney disease    Dyslipidemia    History of basal cell carcinoma (BCC)    Primary osteoarthritis    Vitamin D deficiency    Acute pain of left knee    Generalized weakness    Hypokalemia    Moderate malnutrition    Physical debility    Type 2 diabetes mellitus with stage 3 chronic kidney disease, with long-term current use of insulin    Sinus tachycardia    Acute hypoxemic respiratory failure due to COVID-19    Pneumonia due to COVID-19 virus    Mixed hyperlipidemia    COVID-19 virus infection    Weakness    COVID     Past Medical History:   Diagnosis Date    Diabetes mellitus     Hyperlipidemia     Hypertension     Kidney stone     TIA (transient ischemic attack)     Type 2 diabetes mellitus with stage 3 chronic kidney disease, with long-term current use of insulin 2021     Past Surgical History:   Procedure Laterality Date    ADENOIDECTOMY      ENDOSCOPY N/A 2023    Procedure: ESOPHAGOGASTRODUODENOSCOPY with removal of NJ tube from right nare;  Surgeon: Jaret Valadez MD;  Location: [facility];  Service: Gastroenterology;  Laterality: N/A;  esophagitis, gastric ulcer    EXTRACORPOREAL SHOCK WAVE LITHOTRIPSY (ESWL)      HERNIA REPAIR      KNEE ACL RECONSTRUCTION Left     TONSILLECTOMY         SLP Recommendation and Plan     EDUCATION  The patient has been educated in the following areas:   Dysphagia (Swallowing Impairment) Oral Care/Hydration.        SLP GOALS       Row Name 24 1000       (LTG) Swallow    (LTG) Swallow Pt will  maximize swallow function for least restrictive PO diet, exhibiting no complication associated with dysphagia, adequate PO intake, and demonstrating independent use of swallow compensations  -CB    Time Frame (Swallow Long Term Goal) by discharge  -CB    Progress/Outcomes (Swallow Long Term Goal) goal ongoing  -CB       (STG) Swallow 1    (STG) Swallow 1 The patient will participate in a meal/follow-up assessment to determine safety and adequacy of recommended diet, independent use of safe swallow compensations, pt/family education and additional goals/recommendations to follow  -CB    Time Frame (Swallow Short Term Goal 1) 1 week  -CB    Progress/Outcomes (Swallow Short Term Goal 1) goal ongoing  -CB    Comment (Swallow Short Term Goal 1) Patient was seen for DT/meal at breakfast. Patient is currently receiving a regular diet. Patient has COVID, therefore, proper PPE's were worn. Patient was properly positioned upright in bed at 90 degree hip flexion prior to meal.  Patient demonstrates intermittent coughing throughout prior to meal and during meal that does not appear to coincide with actual swallowing difficulties. Patient displayed adequate rotary chewing. Patient clears oral cavity effectively between bites given added time. No vocal changes were detected during conversational exchange. ST will continue to follow to assure safety and tolerance with least restrictive diet.  -CB       (STG) Swallow 2    (STG) Swallow 2 Pt will participate in ongoing swallow assessment to include VFSS if indicated, and caregiver teaching.  -CB    Time Frame (Swallow Short Term Goal 2) 1 week  -CB              User Key  (r) = Recorded By, (t) = Taken By, (c) = Cosigned By      Initials Name Provider Type    Antonina Syed, SLP Speech and Language Pathologist                       Time Calculation:                JENIFER Elliott  1/3/2024

## 2024-01-03 NOTE — PROGRESS NOTES
Cardiology Progress Note      PATIENT IDENTIFICATION    Name: Blas Mojica  Age: 73 y.o. Sex: male : 1950  MRN: [id number]    Requesting Provider    Darrick Jha MD     LOS: 5 days       Reason For Followup:  Paroxysmal atrial fibrillation      Subjective:    Interval History:  Chart and labs reviewed.  Heart rate remains well-controlled.  Sinus rhythm with PACs.  Patient is now agreeable to taking medications.    Please kindly note that during this admission I had a long conversation with patient's spouse.  Patient did have episode of atrial fibrillation this admission but the patient's spouse was convinced that he does not have A-fib based on a previous examination.  Discussed that acute respiratory illness can unmask atrial fibrillation.  Additionally patient had EP study performed that confirmed the presence of atrial fibrillation in the past.  Patient has been reluctant to take medications because he does not hear very well and is concerned about different medications.    Discussed importance of anticoagulation with patient and spouse in the prevention of CVA associated with A-fib.  She is reluctant to allow full dose Eliquis because of his recent bleed.   has approved patient to receive full dose anticoagulation.  Patient and spouse requesting 2.5 mg.  Will acquiesce to this.      Patient denies any new cardiac symptoms  Tmax is 98.8 pulse is 76 respirations are 19 blood pressure is 126/58 sats are 94%  Sodium is 141 potassium is 3.9 creatinine is 1.0  LFTs are normal    Assessment & Plan    Impressions:  Paroxysmal atrial fibrillation with rapid ventricular response and an elevated CHADS2 score  COVID-19 positive status  Diabetes  Hypertension  Chronic kidney disease  Generalized weakness  History of TIA    Recommendations:  Continue with beta-blocker therapy as blood pressure tolerates  Continue Eliquis 2.5 mg twice daily  Current medications include Toprol- mg p.o. once a day  patient is on Eliquis 2.5 mg p.o. twice daily  Monitor rate and rhythm  Monitor for signs and symptoms of bleeding  Further recommendation based on patient course      Objective:    Medication Review:   Scheduled Meds:apixaban, 2.5 mg, Oral, Q12H  ascorbic acid, 500 mg, Oral, Daily  budesonide-formoterol, 2 puff, Inhalation, BID - RT  cetirizine, 10 mg, Oral, Daily  insulin glargine, 33 Units, Subcutaneous, Nightly  insulin lispro, 2-9 Units, Subcutaneous, 4x Daily With Meals & Nightly  [Held by provider] lisinopril, 40 mg, Oral, Daily  metoprolol succinate XL, 100 mg, Oral, Q24H  pantoprazole, 40 mg, Oral, BID AC  senna-docusate sodium, 2 tablet, Oral, BID  sodium chloride, 10 mL, Intravenous, Q12H      Continuous Infusions:     PRN Meds:.  acetaminophen **OR** acetaminophen **OR** acetaminophen    benzonatate    senna-docusate sodium **AND** polyethylene glycol **AND** bisacodyl **AND** bisacodyl    Calcium Replacement - Follow Nurse / BPA Driven Protocol    dextrose    dextrose    glucagon (human recombinant)    guaifenesin    Magnesium Cardiology Dose Replacement - Follow Nurse / BPA Driven Protocol    nitroglycerin    ondansetron **OR** ondansetron    Phosphorus Replacement - Follow Nurse / BPA Driven Protocol    Potassium Replacement - Follow Nurse / BPA Driven Protocol    [COMPLETED] Insert Peripheral IV **AND** sodium chloride    sodium chloride    sodium chloride      Generalized weakness    COVID         Physical Exam:  Physical examination deferred secondary to isolation status.  Physical exam of admitting service and nursing assessments utilized for medical decision-making purposes    Vital Signs:  Vitals:    01/03/24 0737 01/03/24 1019 01/03/24 1022 01/03/24 1300   BP: 137/85   126/58   BP Location: Left arm      Patient Position: Lying      Pulse: 80 81 77 77   Resp: 19      Temp: 98.8 °F (37.1 °C)      TempSrc: Oral      SpO2: 97% (S) (!) 87% 94% 94%   Weight:       Height:         Wt Readings from  Last 1 Encounters:   01/03/24 84.6 kg (186 lb 8.2 oz)       Intake/Output Summary (Last 24 hours) at 1/3/2024 1712  Last data filed at 1/3/2024 1340  Gross per 24 hour   Intake 720 ml   Output 2400 ml   Net -1680 ml         Results Review:     CBC    Results from last 7 days   Lab Units 12/31/23  2232 12/31/23  0152 12/30/23  0438 12/29/23  0158 12/28/23  1332   WBC 10*3/mm3 4.30 5.20 11.60* 7.30 6.50   HEMOGLOBIN g/dL 10.5* 10.2* 10.1* 12.4* 14.1   PLATELETS 10*3/mm3 108* 103* 323 126* 159     Cr Clearance Estimated Creatinine Clearance: 74.6 mL/min (by C-G formula based on SCr of 1.04 mg/dL).  Coag     HbA1C   Lab Results   Component Value Date    HGBA1C 7.70 (H) 12/12/2023    HGBA1C 8.0 (H) 10/20/2022    HGBA1C 9.2 (H) 02/28/2022     Blood Glucose   Glucose   Date/Time Value Ref Range Status   01/03/2024 1644 207 (H) 70 - 105 mg/dL Final     Comment:     Serial Number: [id number]:  [id number]   01/03/2024 1219 123 (H) 70 - 105 mg/dL Final     Comment:     Serial Number: [id number]:  [id number]   01/03/2024 0737 91 70 - 105 mg/dL Final     Comment:     Serial Number: [id number]:  [id number]   01/02/2024 2030 144 (H) 70 - 105 mg/dL Final     Comment:     Serial Number: [id number]:  [id number]   01/02/2024 1652 152 (H) 70 - 105 mg/dL Final     Comment:     Serial Number: [id number]:  [id number]   01/02/2024 1141 149 (H) 70 - 105 mg/dL Final     Comment:     Serial Number: [id number]:  [id number]   01/02/2024 0808 89 70 - 105 mg/dL Final     Comment:     Serial Number: [id number]:  [id number]   01/01/2024 2119 231 (H) 70 - 105 mg/dL Final     Comment:     Serial Number: [id number]:  [id number]     Infection   Results from last 7 days   Lab Units 12/29/23  0158   PROCALCITONIN ng/mL 0.17     CMP   Results from last 7 days   Lab Units 01/03/24  1249 01/01/24  1559 12/31/23  2232 12/31/23  0152 12/30/23  0438 12/29/23  0158 12/28/23  1332   SODIUM mmol/L 141  --  140 139  "141 141 140   POTASSIUM mmol/L 3.9 3.8 3.4* 3.6 4.3 3.9 4.1   CHLORIDE mmol/L 105  --  105 105 105 104 100   CO2 mmol/L 30.0*  --  26.0 25.0 23.0 27.0 30.0*   BUN mg/dL 18  --  25* 32* 21 22 24*   CREATININE mg/dL 1.04  --  1.38* 1.46* 1.17 1.30* 1.40*   GLUCOSE mg/dL 119*  --  196* 227* 148* 113* 268*   ALBUMIN g/dL 2.9*  --  2.8* 2.8* 3.5 3.5 3.9   BILIRUBIN mg/dL 0.4  --  0.4 0.4 0.5 0.7 0.8   ALK PHOS U/L 127*  --  114 84 3,881* 100 118*   AST (SGOT) U/L 26  --  62* 50* 38 29 23   ALT (SGPT) U/L 27  --  43* 30 20 19 18   LIPASE U/L  --   --   --   --   --   --  30     ABG      UA    Results from last 7 days   Lab Units 12/30/23  0213   NITRITE UA  Negative   WBC UA /HPF None Seen   BACTERIA UA /HPF None Seen   SQUAM EPITHEL UA /HPF 0-2     MIGUEL ANGEL  No results found for: \"POCMETH\", \"POCAMPHET\", \"POCBARBITUR\", \"POCBENZO\", \"POCCOCAINE\", \"POCOPIATES\", \"POCOXYCODO\", \"POCPHENCYC\", \"POCPROPOXY\", \"POCTHC\", \"POCTRICYC\"  Lysis Labs   Results from last 7 days   Lab Units 01/03/24  1249 12/31/23  2232 12/31/23  0152 12/30/23  0438 12/29/23  0158 12/28/23  1332   HEMOGLOBIN g/dL  --  10.5* 10.2* 10.1* 12.4* 14.1   PLATELETS 10*3/mm3  --  108* 103* 323 126* 159   CREATININE mg/dL 1.04 1.38* 1.46* 1.17 1.30* 1.40*     Radiology(recent) No radiology results for the last day      Results from last 7 days   Lab Units 12/28/23  1755   HSTROP T ng/L 47*       Imaging Results (Last 24 Hours)       ** No results found for the last 24 hours. **            Cardiac Studies:  Echo- Results for orders placed during the hospital encounter of 12/28/23    Adult Transthoracic Echo Complete w/ Color, Spectral and Contrast if Necessary Per Protocol    Interpretation Summary    Left ventricular systolic function is normal. Left ventricular ejection fraction appears to be 56 - 60%.    Left ventricular diastolic function is consistent with (grade I) impaired relaxation.    There is calcification of the aortic valve mainly affecting the left coronary " cusp(s).    Estimated right ventricular systolic pressure from tricuspid regurgitation is mildly elevated (35-45 mmHg).    Stress Myoview-  Cath-        Kirk Bunch MD  01/03/24  17:12 EST

## 2024-01-03 NOTE — PLAN OF CARE
Goal Outcome Evaluation:              Outcome Evaluation: Patient slept well during the night.  Complaints of cough once during the night.  Paient O2 started dropping but would come back up.  Patient placed on 1 liter during the night due to O2 dropping in the 70s.  No complaints at this time.  pt stable.

## 2024-01-03 NOTE — PROGRESS NOTES
[facility] MEDICINE SERVICE  DAILY PROGRESS NOTE    NAME: Blas Mojica  : 1950  MRN: [id number]      LOS: 5 days     PROVIDER OF SERVICE: Darrick Jha MD    Chief Complaint: Generalized weakness    Subjective:     Interval History:  History taken from: patient    Patient is doing stable, continue with the supplemental oxygen, currently on 1 L.      Review of Systems:   Review of Systems   Respiratory:  Positive for shortness of breath.        Objective:     Vital Signs  Temp:  [97.5 °F (36.4 °C)-98.8 °F (37.1 °C)] 98.8 °F (37.1 °C)  Heart Rate:  [76-84] 77  Resp:  [15-19] 19  BP: (131-161)/(71-85) 137/85  Flow (L/min):  [1] 1   Body mass index is 26.76 kg/m².    Physical Exam  Physical Exam  Vitals and nursing note reviewed.   Constitutional:       General: He is not in acute distress.     Appearance: He is well-developed. He is not diaphoretic.   HENT:      Head: Normocephalic and atraumatic.   Cardiovascular:      Rate and Rhythm: Normal rate.   Pulmonary:      Effort: Pulmonary effort is normal. No respiratory distress.      Breath sounds: No wheezing.   Abdominal:      General: There is no distension.      Palpations: Abdomen is soft.   Musculoskeletal:         General: Normal range of motion.   Skin:     General: Skin is warm and dry.   Neurological:      Mental Status: He is alert.      Cranial Nerves: No cranial nerve deficit.   Psychiatric:         Behavior: Behavior normal.         Thought Content: Thought content normal.         Judgment: Judgment normal.         Scheduled Meds   apixaban, 2.5 mg, Oral, Q12H  ascorbic acid, 500 mg, Oral, Daily  budesonide-formoterol, 2 puff, Inhalation, BID - RT  cetirizine, 10 mg, Oral, Daily  insulin glargine, 33 Units, Subcutaneous, Nightly  insulin lispro, 2-9 Units, Subcutaneous, 4x Daily With Meals & Nightly  [Held by provider] lisinopril, 40 mg, Oral, Daily  metoprolol succinate XL, 100 mg, Oral, Q24H  pantoprazole, 40 mg, Oral, BID  AC  senna-docusate sodium, 2 tablet, Oral, BID  sodium chloride, 10 mL, Intravenous, Q12H       PRN Meds     acetaminophen **OR** acetaminophen **OR** acetaminophen    benzonatate    senna-docusate sodium **AND** polyethylene glycol **AND** bisacodyl **AND** bisacodyl    Calcium Replacement - Follow Nurse / BPA Driven Protocol    dextrose    dextrose    glucagon (human recombinant)    guaifenesin    Magnesium Cardiology Dose Replacement - Follow Nurse / BPA Driven Protocol    nitroglycerin    ondansetron **OR** ondansetron    Phosphorus Replacement - Follow Nurse / BPA Driven Protocol    Potassium Replacement - Follow Nurse / BPA Driven Protocol    [COMPLETED] Insert Peripheral IV **AND** sodium chloride    sodium chloride    sodium chloride   Infusions         Diagnostic Data    Results from last 7 days   Lab Units 01/01/24  1559 12/31/23  2232   WBC 10*3/mm3  --  4.30   HEMOGLOBIN g/dL  --  10.5*   HEMATOCRIT %  --  32.5*   PLATELETS 10*3/mm3  --  108*   GLUCOSE mg/dL  --  196*   CREATININE mg/dL  --  1.38*   BUN mg/dL  --  25*   SODIUM mmol/L  --  140   POTASSIUM mmol/L 3.8 3.4*   AST (SGOT) U/L  --  62*   ALT (SGPT) U/L  --  43*   ALK PHOS U/L  --  114   BILIRUBIN mg/dL  --  0.4   ANION GAP mmol/L  --  9.0       No radiology results for the last day      I reviewed the patient's new clinical results.    Assessment/Plan:     Active and Resolved Problems  Active Hospital Problems    Diagnosis  POA    **Generalized weakness [R53.1]  Yes    COVID [U07.1]  Yes      Resolved Hospital Problems   No resolved problems to display.     Generalized lower abdominal pain  Recent EGD.  CT Abdomen/Pelvis: no acute abnormalities.  Further workup pending clinical progress.     COVID-19 positive  Becoming more hypoxic,   Will consult pulmonology  Chest x-ray will be ordered  Currently on 1 L supplemental oxygen, continue to wean as tolerated.     Generalized weakness  Generalized deconditioning from back to back  hospitalizations.  PT Eval and treat, seeking recommendations for discharge placement, currently from [place] in independent living.  Case management assistance appreciated, may require GEOVANNI.     Sinus tachycardia, possible atrial fibrillation with RVR  EKG independently reviewed: Sinus tachycardia, heart rate 111, .  Without ST or T wave abnormalities.  Of note, there is a lot of artifact on the first half of the EKG.  EKG stated as atrial fibrillation, may be in/out of a. Fib.  Cardiology consulted, sees Dr. Vasquez.  No history of atrial fibrillation on PMH, on no rate controlling medications as outpatient.   [id number] Score of at least 5. Currently on prophylactic Lovenox, due to recent GIB, will hold off on full anticoagulation.  Has been restarted on anticoagulation after cardiology has discussion with family and patient.     Diabetes mellitus Type 2, not insulin-dependent : well controlled.   Previous hemoglobin A1c on 12/12/2023, 7.70.  Continue Lantus as reduced dose.  Hold oral agents.  Accu checks ACHS and c/w humalog coverage as needed.      Essential Hypertension: well controlled.   BP well controlled, hold Maxzide and lisinopril, resume when clinically appropriate.  Titrate medications as needed.     Chronic kidney disease  Remains nonoliguric.   Monitor Input/Output very closely.   Starting IVF to prevent BRIGIDO.  Avoids NSAIDs, nephrotoxic medications, and hypotension.  Renally adjust medications for Estimated Creatinine Clearance: 51.9 mL/min (A) (by C-G formula based on SCr of 1.4 mg/dL (H)).     History of recent suspected GIB with erosive esophagitis & gastric ulcers  Hemoglobin normal on admission, continue to monitor and trend labs.  Underwent EGD on 12/13/2023 with findings of erosive esophagitis and gastric ulcers, recommend BID Protonix x 1 month, then once daily.  Recommended repeat EGD in 8-12 weeks.  Did develop some post-nasal drainage with dry cough that has been persistent since  placement of EGD during that admission.     Dysphagia  Advance diet as per Speech therapy     Hyperdense lesion of the upper pole of right kidney & mid left kidney  Subsequent find on CT, no hydronephrosis or obstructive uropathy.  Continue follow up as outpatient.     Recent fracture of proximal humeral surgical neck  Seen and evaluated in [place], [place], with XR showing fracture of the proximal humeral surgical neck, with 3.5 cm sclerotic lesion within the proximal humerus.  CT of the upper extremity was completed with impression of nondisplaced minimally comminuted fracture of the greater tuberosity of the humerus.  Patient presents in sling, states that he is unsure of what the plan is, as orthopedic surgery has yet to determine what to do.  Consider orthopedic surgery referral as outpatient.  Continue with splint in place.     History of TIA: Continue Plavix, intolerant of statin therapy.    Disposition-awaiting rehab placement      DVT prophylaxis:  Medical and mechanical DVT prophylaxis orders are present.     Code status is   Code Status and Medical Interventions:   Ordered at: 12/28/23 5808     Level Of Support Discussed With:    Patient     Code Status (Patient has no pulse and is not breathing):    CPR (Attempt to Resuscitate)     Medical Interventions (Patient has pulse or is breathing):    Full Support       Plan for disposition: Rehab in 1-2 days    Time: 30 minutes    Signature: Electronically signed by Darrick Jha MD, 01/03/24, 13:27 EST.  [facility]ist Team

## 2024-01-04 ENCOUNTER — APPOINTMENT (OUTPATIENT)
Dept: GENERAL RADIOLOGY | Facility: HOSPITAL | Age: 74
End: 2024-01-04
Payer: MEDICARE

## 2024-01-04 VITALS
RESPIRATION RATE: 16 BRPM | SYSTOLIC BLOOD PRESSURE: 131 MMHG | HEART RATE: 90 BPM | DIASTOLIC BLOOD PRESSURE: 75 MMHG | TEMPERATURE: 98.2 F | BODY MASS INDEX: 26.7 KG/M2 | WEIGHT: 186.51 LBS | OXYGEN SATURATION: 92 % | HEIGHT: 70 IN

## 2024-01-04 LAB
BASOPHILS # BLD AUTO: 0 10*3/MM3 (ref 0–0.2)
BASOPHILS NFR BLD AUTO: 0.6 % (ref 0–1.5)
DEPRECATED RDW RBC AUTO: 42.4 FL (ref 37–54)
EOSINOPHIL # BLD AUTO: 0.1 10*3/MM3 (ref 0–0.4)
EOSINOPHIL NFR BLD AUTO: 1.5 % (ref 0.3–6.2)
ERYTHROCYTE [DISTWIDTH] IN BLOOD BY AUTOMATED COUNT: 13 % (ref 12.3–15.4)
GLUCOSE BLDC GLUCOMTR-MCNC: 105 MG/DL (ref 70–105)
GLUCOSE BLDC GLUCOMTR-MCNC: 106 MG/DL (ref 70–105)
GLUCOSE BLDC GLUCOMTR-MCNC: 142 MG/DL (ref 70–105)
HCT VFR BLD AUTO: 36.6 % (ref 37.5–51)
HGB BLD-MCNC: 12.3 G/DL (ref 13–17.7)
LYMPHOCYTES # BLD AUTO: 1.1 10*3/MM3 (ref 0.7–3.1)
LYMPHOCYTES NFR BLD AUTO: 14.7 % (ref 19.6–45.3)
MAGNESIUM SERPL-MCNC: 1.8 MG/DL (ref 1.6–2.4)
MCH RBC QN AUTO: 29.8 PG (ref 26.6–33)
MCHC RBC AUTO-ENTMCNC: 33.7 G/DL (ref 31.5–35.7)
MCV RBC AUTO: 88.2 FL (ref 79–97)
MONOCYTES # BLD AUTO: 0.5 10*3/MM3 (ref 0.1–0.9)
MONOCYTES NFR BLD AUTO: 6.6 % (ref 5–12)
NEUTROPHILS NFR BLD AUTO: 5.6 10*3/MM3 (ref 1.7–7)
NEUTROPHILS NFR BLD AUTO: 76.6 % (ref 42.7–76)
NRBC BLD AUTO-RTO: 0.1 /100 WBC (ref 0–0.2)
PHOSPHATE SERPL-MCNC: 2.3 MG/DL (ref 2.5–4.5)
PLATELET # BLD AUTO: 175 10*3/MM3 (ref 140–450)
PMV BLD AUTO: 8 FL (ref 6–12)
RBC # BLD AUTO: 4.14 10*6/MM3 (ref 4.14–5.8)
WBC NRBC COR # BLD AUTO: 7.3 10*3/MM3 (ref 3.4–10.8)

## 2024-01-04 PROCEDURE — 97535 SELF CARE MNGMENT TRAINING: CPT

## 2024-01-04 PROCEDURE — 82948 REAGENT STRIP/BLOOD GLUCOSE: CPT

## 2024-01-04 PROCEDURE — 97530 THERAPEUTIC ACTIVITIES: CPT

## 2024-01-04 PROCEDURE — 85025 COMPLETE CBC W/AUTO DIFF WBC: CPT | Performed by: FAMILY MEDICINE

## 2024-01-04 PROCEDURE — 73060 X-RAY EXAM OF HUMERUS: CPT

## 2024-01-04 PROCEDURE — 97110 THERAPEUTIC EXERCISES: CPT

## 2024-01-04 PROCEDURE — 97112 NEUROMUSCULAR REEDUCATION: CPT

## 2024-01-04 RX ORDER — PANTOPRAZOLE SODIUM 40 MG/1
40 TABLET, DELAYED RELEASE ORAL
Qty: 30 TABLET | Refills: 0 | Status: SHIPPED | OUTPATIENT
Start: 2024-01-04

## 2024-01-04 RX ORDER — BENZONATATE 100 MG/1
200 CAPSULE ORAL 3 TIMES DAILY
Status: DISCONTINUED | OUTPATIENT
Start: 2024-01-04 | End: 2024-01-04 | Stop reason: HOSPADM

## 2024-01-04 RX ORDER — BENZONATATE 200 MG/1
200 CAPSULE ORAL 3 TIMES DAILY
Qty: 30 CAPSULE | Refills: 0 | Status: SHIPPED | OUTPATIENT
Start: 2024-01-04

## 2024-01-04 RX ORDER — METOPROLOL SUCCINATE 100 MG/1
100 TABLET, EXTENDED RELEASE ORAL
Qty: 30 TABLET | Refills: 0 | Status: SHIPPED | OUTPATIENT
Start: 2024-01-05

## 2024-01-04 RX ORDER — BUDESONIDE AND FORMOTEROL FUMARATE DIHYDRATE 160; 4.5 UG/1; UG/1
2 AEROSOL RESPIRATORY (INHALATION)
Qty: 6 G | Refills: 12 | Status: SHIPPED | OUTPATIENT
Start: 2024-01-04

## 2024-01-04 RX ADMIN — APIXABAN 2.5 MG: 2.5 TABLET, FILM COATED ORAL at 10:54

## 2024-01-04 RX ADMIN — CETIRIZINE HYDROCHLORIDE 10 MG: 10 TABLET, FILM COATED ORAL at 10:55

## 2024-01-04 RX ADMIN — PANTOPRAZOLE SODIUM 40 MG: 40 TABLET, DELAYED RELEASE ORAL at 18:12

## 2024-01-04 RX ADMIN — Medication 10 ML: at 10:55

## 2024-01-04 RX ADMIN — PANTOPRAZOLE SODIUM 40 MG: 40 TABLET, DELAYED RELEASE ORAL at 10:54

## 2024-01-04 RX ADMIN — BENZONATATE 200 MG: 100 CAPSULE ORAL at 18:12

## 2024-01-04 RX ADMIN — BISACODYL 10 MG: 10 SUPPOSITORY RECTAL at 14:51

## 2024-01-04 RX ADMIN — SENNOSIDES AND DOCUSATE SODIUM 2 TABLET: 50; 8.6 TABLET ORAL at 10:55

## 2024-01-04 RX ADMIN — METOPROLOL SUCCINATE 100 MG: 50 TABLET, EXTENDED RELEASE ORAL at 10:54

## 2024-01-04 RX ADMIN — OXYCODONE HYDROCHLORIDE AND ACETAMINOPHEN 500 MG: 500 TABLET ORAL at 10:54

## 2024-01-04 NOTE — PLAN OF CARE
Goal Outcome Evaluation:     Bed mobility - Max-A and Assist x 2 supine to sit to supine. Log roll in the bed to clean pt's buttocks before transferring to edge of the bed.   Transfers - Max-A and Assist x 2 sit to stand from edge of the bed but only for about 20 seconds before fatigue. Pt's sling removed and repositioned while at edge of the bed.  Pt reported a very painful left hand  Ambulation - N/A or Not attempted.    Therapeutic Exercise - 10 Reps B LE AROM lying supine and unsupported sitting / EOB    If medically appropriate, Moderate Intensity Therapy recommended post-acute care.  SNF would be the preferred choice.  Pt requires no DME at discharge.     Pt desires Inpatient Rehabilitation placement at discharge. Pt cooperative; agreeable to therapeutic recommendations and plan of care.

## 2024-01-04 NOTE — THERAPY TREATMENT NOTE
Subjective: Pt agreeable to therapeutic plan of care.  Pt supine in the bed with rue sling ill-fitting    Objective:     Bed mobility - Max-A and Assist x 2 supine to sit to supine. Log roll in the bed to clean pt's buttocks before transferring to edge of the bed.   Transfers - Max-A and Assist x 2 sit to stand from edge of the bed but only for about 20 seconds before fatigue. Pt's sling removed and repositioned while at edge of the bed.  Pt reported a very painful left hand  Ambulation - N/A or Not attempted.    Therapeutic Exercise - 10 Reps B LE AROM lying supine and unsupported sitting / EOB    Vitals: WNL    Pain: 9 VAS   Location: left hand very tender to touch  Intervention for pain: Repositioned, RN notified, Increased Activity, and Therapeutic Presence    Education: Provided education on the importance of mobility in the acute care setting, Verbal/Tactile Cues, and Transfer Training    Assessment: Blas Mojica presents with functional mobility impairments which indicate the need for skilled intervention. Tolerating session today without incident. Pt needed encouragement today to participate.  Pt still requires significant assist with his mobility.  Pt has been in sling for about 6  weeks.  Will continue to follow and progress as tolerated.     Plan/Recommendations:   If medically appropriate, Moderate Intensity Therapy recommended post-acute care.  SNF would be the preferred choice.  Pt requires no DME at discharge.     Pt desires Inpatient Rehabilitation placement at discharge. Pt cooperative; agreeable to therapeutic recommendations and plan of care.     Basic Mobility 6-click:  Rollin = Total, A lot = 2, A little = 3; 4 = None  Supine>Sit:   1 = Total, A lot = 2, A little = 3; 4 = None   Sit>Stand with arms:  1 = Total, A lot = 2, A little = 3; 4 = None  Bed>Chair:   1 = Total, A lot = 2, A little = 3; 4 = None  Ambulate in room:  1 = Total, A lot = 2, A little = 3; 4 = None  3-5 Steps  with railin = Total, A lot = 2, A little = 3; 4 = None  Score: 9    Modified Selina: 4 = Moderately severe disability (Unable to attend to own bodily needs without assistance, and unable to walk unassisted)     Post-Tx Position: Supine with HOB Elevated, Alarms activated, and Call light and personal items within reach  PPE: gloves, gown, eye protection, and N95

## 2024-01-04 NOTE — PROGRESS NOTES
Daily Progress Note        Generalized weakness    COVID      Assessment:        Hypoxic respiratory insufficiency     COVID-19 positive     Chest x-ray findings and CT abdomen did not show significant alveolar infiltrate         Recommendations:     Oxygen titration currently on on room air doing well  Symbicort     DVT prophylaxis Eliquis 2.5 b.I.d.   Bronchodilator Symbicort    DVT prophylaxis Protonix             LOS: 6 days     Subjective         Objective     Vital signs for last 24 hours:  Vitals:    01/03/24 1645 01/03/24 2034 01/04/24 0005 01/04/24 0550   BP: 137/75 135/69 125/64 147/78   BP Location: Left arm Left arm Left arm Left arm   Patient Position: Lying Lying Lying Lying   Pulse: 80 81 80 78   Resp: 20 19 17 18   Temp: 98.9 °F (37.2 °C) 98.7 °F (37.1 °C)  98.5 °F (36.9 °C)   TempSrc: Oral Oral  Oral   SpO2: 96% 91% 95% 91%   Weight:       Height:           Intake/Output last 3 shifts:  I/O last 3 completed shifts:  In: 1560 [P.O.:1560]  Out: 3500 [Urine:3500]  Intake/Output this shift:  No intake/output data recorded.      Radiology  Imaging Results (Last 24 Hours)       ** No results found for the last 24 hours. **            Labs:  Results from last 7 days   Lab Units 12/31/23 2232   WBC 10*3/mm3 4.30   HEMOGLOBIN g/dL 10.5*   HEMATOCRIT % 32.5*   PLATELETS 10*3/mm3 108*     Results from last 7 days   Lab Units 01/03/24  1249   SODIUM mmol/L 141   POTASSIUM mmol/L 3.9   CHLORIDE mmol/L 105   CO2 mmol/L 30.0*   BUN mg/dL 18   CREATININE mg/dL 1.04   CALCIUM mg/dL 9.0   BILIRUBIN mg/dL 0.4   ALK PHOS U/L 127*   ALT (SGPT) U/L 27   AST (SGOT) U/L 26   GLUCOSE mg/dL 119*         Results from last 7 days   Lab Units 01/03/24  1249 12/31/23  2232 12/31/23  0152   ALBUMIN g/dL 2.9* 2.8* 2.8*     Results from last 7 days   Lab Units 12/28/23  1755 12/28/23  1332   HSTROP T ng/L 47* 53*         Results from last 7 days   Lab Units 01/03/24  2304   MAGNESIUM mg/dL 1.8                   Meds:    SCHEDULE  apixaban, 2.5 mg, Oral, Q12H  ascorbic acid, 500 mg, Oral, Daily  budesonide-formoterol, 2 puff, Inhalation, BID - RT  cetirizine, 10 mg, Oral, Daily  insulin glargine, 33 Units, Subcutaneous, Nightly  insulin lispro, 2-9 Units, Subcutaneous, 4x Daily With Meals & Nightly  metoprolol succinate XL, 100 mg, Oral, Q24H  pantoprazole, 40 mg, Oral, BID AC  senna-docusate sodium, 2 tablet, Oral, BID  sodium chloride, 10 mL, Intravenous, Q12H      Infusions     PRNs    acetaminophen **OR** acetaminophen **OR** acetaminophen    benzonatate    senna-docusate sodium **AND** polyethylene glycol **AND** bisacodyl **AND** bisacodyl    Calcium Replacement - Follow Nurse / BPA Driven Protocol    dextrose    dextrose    glucagon (human recombinant)    guaifenesin    Magnesium Cardiology Dose Replacement - Follow Nurse / BPA Driven Protocol    nitroglycerin    ondansetron **OR** ondansetron    Phosphorus Replacement - Follow Nurse / BPA Driven Protocol    Potassium Replacement - Follow Nurse / BPA Driven Protocol    [COMPLETED] Insert Peripheral IV **AND** sodium chloride    sodium chloride    sodium chloride    Physical Exam:  Physical Exam  Cardiovascular:      Heart sounds: Murmur heard.   Pulmonary:      Effort: No respiratory distress.      Breath sounds: No stridor. Rhonchi and rales present. No wheezing.   Chest:      Chest wall: No tenderness.         ROS  Review of Systems   Respiratory:  Positive for cough and shortness of breath. Negative for wheezing and stridor.    Cardiovascular:  Negative for chest pain, palpitations and leg swelling.             Total time spent with patient greater than: 45 Minutes

## 2024-01-04 NOTE — PLAN OF CARE
Goal Outcome Evaluation:              Outcome Evaluation: plan to discharge to [facility] rehab

## 2024-01-04 NOTE — PLAN OF CARE
Goal Outcome Evaluation:      Pt. W/o significant progress made this date, requires mod A x 2 for all bed mobility and sit to stand transfer from EOB 20 seconds prior to seated rest break secondary to fatigue. Pt. Provided max A for sling maintenance for comfortable fit. Pt. 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 Educated patient on desensitization, will benefit from further CRPS protocol. Pt. Completes elbow/wrist/hand AROM 10 reps x 1 set flexion/extension w/ increased time secondary to pain, shoulder ROM deferred until repeat imaging completed. Pt. Will require acute IP rehab at d/c to address aforementioned deficits.

## 2024-01-04 NOTE — DISCHARGE SUMMARY
[facility] Medicine Services  Discharge Summary    Date of Service: 2024  Patient Name: Blas Mojica  : 1950  MRN: [id number]    Date of Admission: 2023  Discharge Diagnosis: covid  Date of Discharge:  2024  Primary Care Physician: Sahil Read MD      Presenting Problem:   Weakness [R53.1]  Elevated troponin [R79.89]  Generalized weakness [R53.1]  COVID [U07.1]  COVID-19 [U07.1]    Active and Resolved Hospital Problems:  Active Hospital Problems    Diagnosis POA    **Generalized weakness [R53.1] Yes    COVID [U07.1] Yes      Resolved Hospital Problems   No resolved problems to display.         Hospital Course           Hospital Course:    73 y.o. male with PMH of TIA, HLD, DM, HTN, CKD, and presented to the hospital for generalized weakness and generalized lower abdominal pain, and was admitted with a principal diagnosis of Generalized weakness.  Patient was recently hospitalized for a GI Bleed which he underwent and EGD with NGT placement, and EGD findings were consistent with erosive gastritis and gastric ulcers.  Patient was discharged home on BID protonix.  Patient was noted to have a post-nasal drip after difficulty with placing NGT.  This has caused a persistent dry cough since his discharge.  Patient was [facility] to [facility].       Patient endorsed lower abdominal discomfort, thinks that maybe it is from his excessive coughing.  Patient denied chest pain, shortness of breath, palpitations, nausea without vomiting, constipation, diarrhea.  He denied any change in his diet or appetite.  He additionally denied any problems with eating or drinking.  Patient did endorse that it is hard for him to stand up and ambulate, as he had a fall in November, and was seen and evaluated at a hospital in [place], and found to have a proximal humeral surgical neck fracture, and has since remained in the splint.  Patient states that he has  just became generally weak and fatigued.  He denies fever or chills.  In the ED, labs were obtained with the following abnormalities: Troponin 53 with reflex of 47, CO2 30, BUN 24, creatinine 1.4, glucose 268, alk phos 118, lipase 30, without elevated WBC or bandemia.  Patient was tested for COVID-19 and discovered to be positive.  He denies any known recent contacts with ill individuals.  Chest x-ray was without acute cardiopulmonary process.  CT of the abdomen and pelvis was without acute abnormality in the abdomen or pelvis.  Patient received IV fluids in the ED, with planned urinalysis.  Patient additionally was discovered to have trouble with swallowing his pills, he was placed NPO with SLP evaluation.  He was also found to have elevated HR with EKG suggestive of atrial fibrillation though he seems to be in and out of atrial fibrillation with no documented history.  Cardiology will be consulted.  Hospitalist service was consulted to admit patient for further treatment and evaluation  Patient was admitted to the hospital for further care.  He was treated symptomatically for his COVID infection started on Symbicort and vitamin C supplementation.  Pulmonology also saw the patient and he continues to do well and was weaned off of his supplemental oxygen.  Patient was also restarted on Eliquis after cardiology had talked to his family and patient.  He does have a history of GI bleed however they agreed on starting Eliquis 2.5 mg twice daily for his atrial fibrillation.  Patient was then discharged to rehab facility for further care.  Patient will follow-up with orthopedic as outpatient for his humerus fracture.              Day of Discharge     Vital Signs:  Temp:  [96.1 °F (35.6 °C)-98.7 °F (37.1 °C)] 98.3 °F (36.8 °C)  Heart Rate:  [78-95] 95  Resp:  [16-20] 17  BP: (125-160)/(64-95) 160/95  Flow (L/min):  [1] 1    Physical Exam:  Physical Exam  Constitutional:       General: He is not in acute distress.      Appearance: He is well-developed. He is not diaphoretic.   HENT:      Head: Normocephalic and atraumatic.   Cardiovascular:      Rate and Rhythm: Normal rate.   Pulmonary:      Effort: Pulmonary effort is normal. No respiratory distress.      Breath sounds: No wheezing.   Abdominal:      General: There is no distension.      Palpations: Abdomen is soft.   Musculoskeletal:         General: Normal range of motion.   Skin:     General: Skin is warm and dry.   Neurological:      Mental Status: He is alert.      Cranial Nerves: No cranial nerve deficit.   Psychiatric:         Behavior: Behavior normal.         Thought Content: Thought content normal.         Judgment: Judgment normal.            Pertinent  and/or Most Recent Results     LAB RESULTS:      Lab 01/04/24  1701 12/31/23  2232 12/31/23  0152 12/30/23 0438 12/29/23 0158   WBC 7.30 4.30 5.20 11.60* 7.30   HEMOGLOBIN 12.3* 10.5* 10.2* 10.1* 12.4*   HEMATOCRIT 36.6* 32.5* 30.8* 30.3* 36.5*   PLATELETS 175 108* 103* 323 126*   NEUTROS ABS 5.60 3.00 3.70 9.98* 5.50   LYMPHS ABS 1.10 1.00 1.20  --  1.00   MONOS ABS 0.50 0.20 0.20  --  0.70   EOS ABS 0.10 0.10 0.10  --  0.00   MCV 88.2 92.2 92.4 88.6 90.5   SED RATE  --   --   --   --  47*   CRP  --   --   --   --  7.14*   PROCALCITONIN  --   --   --   --  0.17         Lab 01/03/24  2304 01/03/24  1249 01/01/24  1559 12/31/23  2232 12/31/23  0152 12/30/23 0438 12/29/23 0158   SODIUM  --  141  --  140 139 141 141   POTASSIUM  --  3.9 3.8 3.4* 3.6 4.3 3.9   CHLORIDE  --  105  --  105 105 105 104   CO2  --  30.0*  --  26.0 25.0 23.0 27.0   ANION GAP  --  6.0  --  9.0 9.0 13.0 10.0   BUN  --  18  --  25* 32* 21 22   CREATININE  --  1.04  --  1.38* 1.46* 1.17 1.30*   EGFR  --  75.3  --  53.7* 50.2* 65.4 57.6*   GLUCOSE  --  119*  --  196* 227* 148* 113*   CALCIUM  --  9.0  --  8.5* 8.5* 9.0 9.2   MAGNESIUM 1.8  --   --  1.9 2.1 2.3 1.7   PHOSPHORUS 2.3*  --   --  2.4* 2.5 3.7 3.0         Lab 01/03/24  3471  12/31/23  2232 12/31/23  0152 12/30/23  0438 12/29/23  0158   TOTAL PROTEIN 6.0 5.8* 5.5* 6.3 6.8   ALBUMIN 2.9* 2.8* 2.8* 3.5 3.5   GLOBULIN 3.1 3.0 2.7 2.8 3.3   ALT (SGPT) 27 43* 30 20 19   AST (SGOT) 26 62* 50* 38 29   BILIRUBIN 0.4 0.4 0.4 0.5 0.7   ALK PHOS 127* 114 84 3,881* 100                     Brief Urine Lab Results  (Last result in the past 365 days)        Color   Clarity   Blood   Leuk Est   Nitrite   Protein   CREAT   Urine HCG        12/30/23 0213 Yellow   Cloudy   Small (1+)   Negative   Negative   30 mg/dL (1+)                 Microbiology Results (last 10 days)       Procedure Component Value - Date/Time    CANDIDA AURIS SCREEN - Swab, Axilla Right, Axilla Left and Groin [[id number]]  (Normal) Collected: 12/28/23 1750    Lab Status: Final result Specimen: Swab from Axilla Right, Axilla Left and Groin Updated: 01/02/24 1800     Candida Auris Screen Culture No Candida auris isolated at 5 days    COVID-19, FLU A/B, RSV PCR 1 HR TAT - Swab, Nasopharynx [[id number]]  (Abnormal) Collected: 12/28/23 1326    Lab Status: Final result Specimen: Swab from Nasopharynx Updated: 12/28/23 1422     COVID19 Detected     Influenza A PCR Not Detected     Influenza B PCR Not Detected     RSV, PCR Not Detected    Narrative:      Fact sheet for providers: https://www.fda.gov/media/627629/download    Fact sheet for patients: https://www.fda.gov/media/564168/download    Test performed by PCR.            XR Humerus Right    Result Date: 1/4/2024  Impression: Impression: 1. No acute bony abnormality. 2. Sclerotic bone lesion within the proximal humeral diaphysis with nonaggressive features. Electronically Signed: Jorge Noe MD  1/4/2024 4:34 PM EST  Workstation ID: [id number]    XR Chest 1 View    Result Date: 12/31/2023  Impression: Impression: New right basilar airspace opacity concerning for pneumonia Electronically Signed: Tenzin Montejo  12/31/2023 2:28 PM EST  Workstation ID: [id number]    CT Abdomen Pelvis Without  Contrast    Result Date: 12/28/2023  Impression: Impression: 1. No acute abnormality in the abdomen or pelvis. 2. Chronic findings above. Electronically Signed: Vitor Pena MD  12/28/2023 3:46 PM EST  Workstation ID: [id number]    XR Chest 1 View    Result Date: 12/28/2023  Impression: Impression: No acute process. Electronically Signed: Jennyfer Liu MD  12/28/2023 1:39 PM EST  Workstation ID: [id number]    XR Abdomen KUB    Result Date: 12/12/2023  Impression: Impression: Antegrade oriented gastric tube terminates over proximal stomach, with sideport near the GE junction. Consider further advancement. Electronically Signed: Isaiah Wehatley MD  12/12/2023 6:57 PM EST  Workstation ID: [id number]    CT Abdomen Pelvis Without Contrast    Result Date: 12/12/2023  Impression: Impression: Moderately severe gastric distention. Continued small pericardial effusion. Electronically Signed: Jennyfer Liu MD  12/12/2023 8:58 AM EST  Workstation ID: [id number]     Results for orders placed during the hospital encounter of 10/19/22    Duplex Carotid Ultrasound CAR    Interpretation Summary    Proximal right internal carotid artery plaque without significant stenosis.    Proximal left internal carotid artery plaque without significant stenosis.      Results for orders placed during the hospital encounter of 10/19/22    Duplex Carotid Ultrasound CAR    Interpretation Summary    Proximal right internal carotid artery plaque without significant stenosis.    Proximal left internal carotid artery plaque without significant stenosis.      Results for orders placed during the hospital encounter of 12/28/23    Adult Transthoracic Echo Complete w/ Color, Spectral and Contrast if Necessary Per Protocol    Interpretation Summary    Left ventricular systolic function is normal. Left ventricular ejection fraction appears to be 56 - 60%.    Left ventricular diastolic function is consistent with (grade I) impaired relaxation.    There is  calcification of the aortic valve mainly affecting the left coronary cusp(s).    Estimated right ventricular systolic pressure from tricuspid regurgitation is mildly elevated (35-45 mmHg).      Labs Pending at Discharge:      Procedures Performed           Consults:   Consults       Date and Time Order Name Status Description    12/31/2023  1:23 PM Inpatient Pulmonology Consult Completed     12/28/2023  6:34 PM Inpatient Cardiology Consult Completed     12/28/2023  4:01 PM Hospitalist (on-call MD unless specified)      12/12/2023  9:28 AM Gastroenterology (on-call MD unless specified) Completed               Discharge Details        Discharge Medications        New Medications        Instructions Start Date   apixaban 2.5 MG tablet tablet  Commonly known as: ELIQUIS   2.5 mg, Oral, Every 12 Hours Scheduled      benzonatate 200 MG capsule  Commonly known as: TESSALON   200 mg, Oral, 3 Times Daily      budesonide-formoterol 160-4.5 MCG/ACT inhaler  Commonly known as: SYMBICORT   2 puffs, Inhalation, 2 Times Daily - RT      metoprolol succinate  MG 24 hr tablet  Commonly known as: TOPROL-XL   100 mg, Oral, Every 24 Hours Scheduled   Start Date: January 5, 2024     pantoprazole 40 MG EC tablet  Commonly known as: PROTONIX   40 mg, Oral, 2 Times Daily Before Meals             Continue These Medications        Instructions Start Date   insulin glargine 100 UNIT/ML injection  Commonly known as: LANT SEMGLEE   33 Units, Subcutaneous, Nightly             Stop These Medications      clopidogrel 75 MG tablet  Commonly known as: PLAVIX     lisinopril 20 MG tablet  Commonly known as: PRINIVIL,ZESTRIL     pioglitazone 15 MG tablet  Commonly known as: ACTOS     triamterene-hydrochlorothiazide 37.5-25 MG per tablet  Commonly known as: MAXZIDE-25              Allergies   Allergen Reactions    Contrast Dye (Echo Or Unknown Ct/Mr) Anaphylaxis    Iodinated Contrast Media Shortness Of Breath    Iodine Shortness Of Breath     Aspirin Nausea And Vomiting    Lipitor [Atorvastatin] Unknown - High Severity    Prednisone Hives         Discharge Disposition:   Rehab Facility or Unit (DC - External)    Diet:  Hospital:  Diet Order   Procedures    Diet: Diabetic Diets; Consistent Carbohydrate; Texture: Regular Texture (IDDSI 7); Fluid Consistency: Thin (IDDSI 0)         Discharge Activity:   Activity Instructions       Activity as Tolerated                CODE STATUS:  Code Status and Medical Interventions:   Ordered at: 12/28/23 1653     Level Of Support Discussed With:    Patient     Code Status (Patient has no pulse and is not breathing):    CPR (Attempt to Resuscitate)     Medical Interventions (Patient has pulse or is breathing):    Full Support         No future appointments.    Additional Instructions for the Follow-ups that You Need to Schedule       Discharge Follow-up with PCP   As directed       Currently Documented PCP:    Sahil Read MD    PCP Phone Number:    [phone]     Follow Up Details: 1 wk                Time spent on Discharge including face to face service:  >35 minutes    Signature: Electronically signed by Darrick Jha MD, 01/04/24, 18:02 EST.  [facility] Hospitalist Team

## 2024-01-04 NOTE — CASE MANAGEMENT/SOCIAL WORK
Continued Stay Note  [facility]     Patient Name: Blas Mojica  MRN: [id number]  Today's Date: 1/4/2024    Admit Date: 12/28/2023    Plan: [facility] accepted, bed available 1/4. No precert or PASRR required.   Discharge Plan       Row Name 01/04/24 1636       Plan    Plan [facility] accepted, bed available 1/4. No precert or PASRR required.    Patient/Family in Agreement with Plan yes    Plan Comments CM received confirmation from [facility] liaison Nellie that pt has bed available today if medically ready. Secure chat sent to Dr Jha and nursing. KRISTEL contacted patient's wife Debra who had questions and concerns and requested to speak to Dr Jha. Provided her contact information and he contacted her. X-ray of arm ordered, tessalon perles for cough, and awaiting Dr Brower to speak to wife also. KRISTEL Romero contacted wife and she is agreeable to pt d/c-ing to [facility] with the x-ray completed. KRISTEL contacted [facility] and scheduled transport with w/c van. [facility] does not have cut-off time for admittance. Updated nurse Hafsa.             Megan Naegele, RN     Office Phone: [phone]  Office Cell: [phone]

## 2024-01-04 NOTE — THERAPY TREATMENT NOTE
Subjective: Pt agreeable to therapeutic plan of care.  Cognition: oriented to Person, Place, and Time    Objective:     Bed Mobility: Mod-A, Max-A, and Assist x 2   Functional Transfers: Mod-A, Max-A, and Assist x 2     Balance: supported and static Max-A  Functional Ambulation: N/A or Not attempted.    Upper Body Dressing: Max-A  ADL Position: edge of bed sitting      Toileting: Dependent  ADL Position: supine      Therapeutic Exercise - 10 Reps R Upper Extremity AAROM unsupported sitting / EOB    Vitals: WNL    Pain: 9 VAS  Location: R hand  Interventions for pain: Repositioned  Education: Provided education on the importance of mobility in the acute care setting      Assessment: Blas Mojica presents with ADL impairments affecting function including balance, endurance / activity tolerance, pain, range of motion (ROM), and strength. Demonstrated functioning below baseline abilities indicate the need for continued skilled intervention while inpatient. Tolerating session today without incident. Will continue to follow and progress as tolerated.     Plan/Recommendations:   High Intensity Therapy recommended post-acute care. This is recommended as therapy feels the patient would require 5-6 days per week, 2-3 hours per day. At this time, inpatient rehabilitation (acute rehab) would be the first choice and SNF would be second.. Pt requires no DME at discharge.     Pt desires Inpatient Rehabilitation placement at discharge. Pt cooperative; agreeable to therapeutic recommendations and plan of care.     Modified Selina: N/A = No pre-op stroke/TIA    Post-Tx Position: Supine with HOB Elevated  PPE: gloves

## 2024-01-04 NOTE — PLAN OF CARE
Goal Outcome Evaluation:              Outcome Evaluation: PT slept well during the night.  Complaints of cough medication given.  Pt remained on 1L NC during the night.  Wife requesting for patient arm to be xray while here to check for improvement in the break.  No other complaints voiced.  Pt stable at this time.

## 2024-01-05 NOTE — CASE MANAGEMENT/SOCIAL WORK
Case Management Discharge Note      Final Note: [facility]         Selected Continued Care - Discharged on 1/4/2024 Admission date: 12/28/2023 - Discharge disposition: Rehab Facility or Unit (DC - External)      Destination Coordination complete.      Service Provider Selected Services Address Phone Fax Patient Preferred    [facility] [address] [place] [address] [place] [address] [phone] --             Transportation Services  W/C Van: John Granados    Final Discharge Disposition Code: 62 - inpatient rehab facility

## 2024-01-18 ENCOUNTER — APPOINTMENT (OUTPATIENT)
Dept: GENERAL RADIOLOGY | Facility: HOSPITAL | Age: 74
End: 2024-01-18
Payer: MEDICARE

## 2024-01-18 ENCOUNTER — HOSPITAL ENCOUNTER (OUTPATIENT)
Facility: HOSPITAL | Age: 74
Setting detail: OBSERVATION
Discharge: NURSING, REHAB OR LONG-TERM CARE | End: 2024-01-21
Attending: EMERGENCY MEDICINE | Admitting: PODIATRIST
Payer: MEDICARE

## 2024-01-18 DIAGNOSIS — L03.032 CELLULITIS OF TOE OF LEFT FOOT: ICD-10-CM

## 2024-01-18 DIAGNOSIS — R53.1 GENERAL WEAKNESS: Primary | ICD-10-CM

## 2024-01-18 LAB
ALBUMIN SERPL-MCNC: 3.7 G/DL (ref 3.5–5.2)
ALBUMIN/GLOB SERPL: 1.1 G/DL
ALP SERPL-CCNC: 103 U/L (ref 39–117)
ALT SERPL W P-5'-P-CCNC: 12 U/L (ref 1–41)
ANION GAP SERPL CALCULATED.3IONS-SCNC: 9 MMOL/L (ref 5–15)
AST SERPL-CCNC: 17 U/L (ref 1–40)
BASOPHILS # BLD AUTO: 0.1 10*3/MM3 (ref 0–0.2)
BASOPHILS NFR BLD AUTO: 0.9 % (ref 0–1.5)
BILIRUB SERPL-MCNC: 0.6 MG/DL (ref 0–1.2)
BILIRUB UR QL STRIP: NEGATIVE
BUN SERPL-MCNC: 23 MG/DL (ref 8–23)
BUN/CREAT SERPL: 19.7 (ref 7–25)
CALCIUM SPEC-SCNC: 9.4 MG/DL (ref 8.6–10.5)
CHLORIDE SERPL-SCNC: 103 MMOL/L (ref 98–107)
CK SERPL-CCNC: 38 U/L (ref 20–200)
CLARITY UR: CLEAR
CO2 SERPL-SCNC: 27 MMOL/L (ref 22–29)
COLOR UR: YELLOW
CREAT SERPL-MCNC: 1.17 MG/DL (ref 0.76–1.27)
DEPRECATED RDW RBC AUTO: 46.8 FL (ref 37–54)
EGFRCR SERPLBLD CKD-EPI 2021: 65.4 ML/MIN/1.73
EOSINOPHIL # BLD AUTO: 0.2 10*3/MM3 (ref 0–0.4)
EOSINOPHIL NFR BLD AUTO: 2.2 % (ref 0.3–6.2)
ERYTHROCYTE [DISTWIDTH] IN BLOOD BY AUTOMATED COUNT: 14.1 % (ref 12.3–15.4)
GLOBULIN UR ELPH-MCNC: 3.4 GM/DL
GLUCOSE SERPL-MCNC: 116 MG/DL (ref 65–99)
GLUCOSE UR STRIP-MCNC: NEGATIVE MG/DL
HCT VFR BLD AUTO: 39.1 % (ref 37.5–51)
HGB BLD-MCNC: 12.9 G/DL (ref 13–17.7)
HGB UR QL STRIP.AUTO: NEGATIVE
KETONES UR QL STRIP: NEGATIVE
LEUKOCYTE ESTERASE UR QL STRIP.AUTO: NEGATIVE
LYMPHOCYTES # BLD AUTO: 1.1 10*3/MM3 (ref 0.7–3.1)
LYMPHOCYTES NFR BLD AUTO: 15.8 % (ref 19.6–45.3)
MAGNESIUM SERPL-MCNC: 2 MG/DL (ref 1.6–2.4)
MCH RBC QN AUTO: 29.2 PG (ref 26.6–33)
MCHC RBC AUTO-ENTMCNC: 33 G/DL (ref 31.5–35.7)
MCV RBC AUTO: 88.7 FL (ref 79–97)
MONOCYTES # BLD AUTO: 0.5 10*3/MM3 (ref 0.1–0.9)
MONOCYTES NFR BLD AUTO: 7.2 % (ref 5–12)
NEUTROPHILS NFR BLD AUTO: 5.1 10*3/MM3 (ref 1.7–7)
NEUTROPHILS NFR BLD AUTO: 73.9 % (ref 42.7–76)
NITRITE UR QL STRIP: NEGATIVE
NRBC BLD AUTO-RTO: 0 /100 WBC (ref 0–0.2)
PH UR STRIP.AUTO: <=5 [PH] (ref 5–8)
PLATELET # BLD AUTO: 158 10*3/MM3 (ref 140–450)
PMV BLD AUTO: 7.8 FL (ref 6–12)
POTASSIUM SERPL-SCNC: 4 MMOL/L (ref 3.5–5.2)
PROT SERPL-MCNC: 7.1 G/DL (ref 6–8.5)
PROT UR QL STRIP: NEGATIVE
RBC # BLD AUTO: 4.4 10*6/MM3 (ref 4.14–5.8)
SODIUM SERPL-SCNC: 139 MMOL/L (ref 136–145)
SP GR UR STRIP: 1.02 (ref 1–1.03)
UROBILINOGEN UR QL STRIP: NORMAL
WBC NRBC COR # BLD AUTO: 7 10*3/MM3 (ref 3.4–10.8)

## 2024-01-18 PROCEDURE — G0378 HOSPITAL OBSERVATION PER HR: HCPCS

## 2024-01-18 PROCEDURE — 25010000002 VANCOMYCIN HCL IN NACL 1.75-0.9 GM/500ML-% SOLUTION: Performed by: EMERGENCY MEDICINE

## 2024-01-18 PROCEDURE — 80053 COMPREHEN METABOLIC PANEL: CPT | Performed by: EMERGENCY MEDICINE

## 2024-01-18 PROCEDURE — 96365 THER/PROPH/DIAG IV INF INIT: CPT

## 2024-01-18 PROCEDURE — 99284 EMERGENCY DEPT VISIT MOD MDM: CPT

## 2024-01-18 PROCEDURE — 81003 URINALYSIS AUTO W/O SCOPE: CPT | Performed by: EMERGENCY MEDICINE

## 2024-01-18 PROCEDURE — 71045 X-RAY EXAM CHEST 1 VIEW: CPT

## 2024-01-18 PROCEDURE — 82550 ASSAY OF CK (CPK): CPT | Performed by: EMERGENCY MEDICINE

## 2024-01-18 PROCEDURE — 85025 COMPLETE CBC W/AUTO DIFF WBC: CPT | Performed by: EMERGENCY MEDICINE

## 2024-01-18 PROCEDURE — 83735 ASSAY OF MAGNESIUM: CPT | Performed by: EMERGENCY MEDICINE

## 2024-01-18 RX ORDER — BISACODYL 10 MG
10 SUPPOSITORY, RECTAL RECTAL DAILY PRN
Status: DISCONTINUED | OUTPATIENT
Start: 2024-01-18 | End: 2024-01-21 | Stop reason: HOSPADM

## 2024-01-18 RX ORDER — ACETAMINOPHEN 325 MG/1
650 TABLET ORAL EVERY 4 HOURS PRN
Status: DISCONTINUED | OUTPATIENT
Start: 2024-01-18 | End: 2024-01-21 | Stop reason: HOSPADM

## 2024-01-18 RX ORDER — SODIUM CHLORIDE 9 MG/ML
40 INJECTION, SOLUTION INTRAVENOUS AS NEEDED
Status: DISCONTINUED | OUTPATIENT
Start: 2024-01-18 | End: 2024-01-21 | Stop reason: HOSPADM

## 2024-01-18 RX ORDER — PIOGLITAZONEHYDROCHLORIDE 30 MG/1
30 TABLET ORAL DAILY
COMMUNITY

## 2024-01-18 RX ORDER — ACETAMINOPHEN 650 MG/1
650 SUPPOSITORY RECTAL EVERY 4 HOURS PRN
Status: DISCONTINUED | OUTPATIENT
Start: 2024-01-18 | End: 2024-01-21 | Stop reason: HOSPADM

## 2024-01-18 RX ORDER — LISINOPRIL 40 MG/1
40 TABLET ORAL DAILY
COMMUNITY

## 2024-01-18 RX ORDER — POLYETHYLENE GLYCOL 3350 17 G/17G
17 POWDER, FOR SOLUTION ORAL DAILY PRN
Status: DISCONTINUED | OUTPATIENT
Start: 2024-01-18 | End: 2024-01-21 | Stop reason: HOSPADM

## 2024-01-18 RX ORDER — BISACODYL 5 MG/1
5 TABLET, DELAYED RELEASE ORAL DAILY PRN
Status: DISCONTINUED | OUTPATIENT
Start: 2024-01-18 | End: 2024-01-21 | Stop reason: HOSPADM

## 2024-01-18 RX ORDER — INSULIN LISPRO 100 [IU]/ML
3-14 INJECTION, SOLUTION INTRAVENOUS; SUBCUTANEOUS
Status: DISCONTINUED | OUTPATIENT
Start: 2024-01-19 | End: 2024-01-21 | Stop reason: HOSPADM

## 2024-01-18 RX ORDER — SODIUM CHLORIDE 0.9 % (FLUSH) 0.9 %
10 SYRINGE (ML) INJECTION AS NEEDED
Status: DISCONTINUED | OUTPATIENT
Start: 2024-01-18 | End: 2024-01-21 | Stop reason: HOSPADM

## 2024-01-18 RX ORDER — IBUPROFEN 600 MG/1
1 TABLET ORAL
Status: DISCONTINUED | OUTPATIENT
Start: 2024-01-18 | End: 2024-01-21 | Stop reason: HOSPADM

## 2024-01-18 RX ORDER — VANCOMYCIN 1.75 GRAM/500 ML IN 0.9 % SODIUM CHLORIDE INTRAVENOUS
20 ONCE
Qty: 500 ML | Refills: 0 | Status: COMPLETED | OUTPATIENT
Start: 2024-01-18 | End: 2024-01-19

## 2024-01-18 RX ORDER — DEXTROSE MONOHYDRATE 25 G/50ML
25 INJECTION, SOLUTION INTRAVENOUS
Status: DISCONTINUED | OUTPATIENT
Start: 2024-01-18 | End: 2024-01-21 | Stop reason: HOSPADM

## 2024-01-18 RX ORDER — ACETAMINOPHEN 160 MG/5ML
650 SOLUTION ORAL EVERY 4 HOURS PRN
Status: DISCONTINUED | OUTPATIENT
Start: 2024-01-18 | End: 2024-01-21 | Stop reason: HOSPADM

## 2024-01-18 RX ORDER — NICOTINE POLACRILEX 4 MG
15 LOZENGE BUCCAL
Status: DISCONTINUED | OUTPATIENT
Start: 2024-01-18 | End: 2024-01-21 | Stop reason: HOSPADM

## 2024-01-18 RX ORDER — SODIUM CHLORIDE 0.9 % (FLUSH) 0.9 %
10 SYRINGE (ML) INJECTION EVERY 12 HOURS SCHEDULED
Status: DISCONTINUED | OUTPATIENT
Start: 2024-01-18 | End: 2024-01-21 | Stop reason: HOSPADM

## 2024-01-18 RX ORDER — AMOXICILLIN 250 MG
2 CAPSULE ORAL 2 TIMES DAILY PRN
Status: DISCONTINUED | OUTPATIENT
Start: 2024-01-18 | End: 2024-01-21 | Stop reason: HOSPADM

## 2024-01-18 RX ADMIN — Medication 1750 MG: at 23:25

## 2024-01-18 RX ADMIN — Medication 10 ML: at 23:25

## 2024-01-18 NOTE — Clinical Note
Level of Care: Med/Surg [1]   Admitting Physician: GONZALEZ BANEGAS [[id number]]   Attending Physician: GONZALEZ BANEGAS [[id number]]

## 2024-01-19 ENCOUNTER — APPOINTMENT (OUTPATIENT)
Dept: MRI IMAGING | Facility: HOSPITAL | Age: 74
End: 2024-01-19
Payer: MEDICARE

## 2024-01-19 ENCOUNTER — APPOINTMENT (OUTPATIENT)
Dept: CARDIOLOGY | Facility: HOSPITAL | Age: 74
End: 2024-01-19
Payer: MEDICARE

## 2024-01-19 ENCOUNTER — APPOINTMENT (OUTPATIENT)
Dept: GENERAL RADIOLOGY | Facility: HOSPITAL | Age: 74
End: 2024-01-19
Payer: MEDICARE

## 2024-01-19 PROBLEM — E13.621 FOOT ULCER DUE TO SECONDARY DM: Status: ACTIVE | Noted: 2024-01-19

## 2024-01-19 PROBLEM — L97.509 FOOT ULCER DUE TO SECONDARY DM: Status: ACTIVE | Noted: 2024-01-19

## 2024-01-19 LAB
ANION GAP SERPL CALCULATED.3IONS-SCNC: 7 MMOL/L (ref 5–15)
BH CV LOWER ARTERIAL LEFT ABI RATIO: 0.95
BH CV LOWER ARTERIAL LEFT DORSALIS PEDIS SYS MAX: 123
BH CV LOWER ARTERIAL LEFT GREAT TOE SYS MAX: 103
BH CV LOWER ARTERIAL LEFT POST TIBIAL SYS MAX: 111
BH CV LOWER ARTERIAL LEFT TBI RATIO: 0.8
BH CV LOWER ARTERIAL RIGHT ABI RATIO: NORMAL
BH CV LOWER ARTERIAL RIGHT DORSALIS PEDIS SYS MAX: 150
BH CV LOWER ARTERIAL RIGHT GREAT TOE SYS MAX: 112
BH CV LOWER ARTERIAL RIGHT POST TIBIAL SYS MAX: NORMAL
BH CV LOWER ARTERIAL RIGHT TBI RATIO: 0.87
BUN SERPL-MCNC: 21 MG/DL (ref 8–23)
BUN/CREAT SERPL: 17.9 (ref 7–25)
CALCIUM SPEC-SCNC: 8.9 MG/DL (ref 8.6–10.5)
CHLORIDE SERPL-SCNC: 105 MMOL/L (ref 98–107)
CO2 SERPL-SCNC: 27 MMOL/L (ref 22–29)
CREAT SERPL-MCNC: 1.17 MG/DL (ref 0.76–1.27)
DEPRECATED RDW RBC AUTO: 43.3 FL (ref 37–54)
EGFRCR SERPLBLD CKD-EPI 2021: 65.4 ML/MIN/1.73
ERYTHROCYTE [DISTWIDTH] IN BLOOD BY AUTOMATED COUNT: 13.7 % (ref 12.3–15.4)
GLUCOSE BLDC GLUCOMTR-MCNC: 155 MG/DL (ref 70–105)
GLUCOSE BLDC GLUCOMTR-MCNC: 159 MG/DL (ref 70–105)
GLUCOSE BLDC GLUCOMTR-MCNC: 224 MG/DL (ref 70–105)
GLUCOSE SERPL-MCNC: 250 MG/DL (ref 65–99)
HCT VFR BLD AUTO: 33.3 % (ref 37.5–51)
HGB BLD-MCNC: 11 G/DL (ref 13–17.7)
MCH RBC QN AUTO: 29.5 PG (ref 26.6–33)
MCHC RBC AUTO-ENTMCNC: 33 G/DL (ref 31.5–35.7)
MCV RBC AUTO: 89.3 FL (ref 79–97)
MRSA DNA SPEC QL NAA+PROBE: ABNORMAL
PLATELET # BLD AUTO: 124 10*3/MM3 (ref 140–450)
PMV BLD AUTO: 7.7 FL (ref 6–12)
POTASSIUM SERPL-SCNC: 3.9 MMOL/L (ref 3.5–5.2)
RBC # BLD AUTO: 3.73 10*6/MM3 (ref 4.14–5.8)
SODIUM SERPL-SCNC: 139 MMOL/L (ref 136–145)
UPPER ARTERIAL LEFT ARM BRACHIAL SYS MAX: 129
WBC NRBC COR # BLD AUTO: 5 10*3/MM3 (ref 3.4–10.8)

## 2024-01-19 PROCEDURE — G0378 HOSPITAL OBSERVATION PER HR: HCPCS

## 2024-01-19 PROCEDURE — 96366 THER/PROPH/DIAG IV INF ADDON: CPT

## 2024-01-19 PROCEDURE — 93922 UPR/L XTREMITY ART 2 LEVELS: CPT

## 2024-01-19 PROCEDURE — 73630 X-RAY EXAM OF FOOT: CPT

## 2024-01-19 PROCEDURE — 63710000001 INSULIN LISPRO (HUMAN) PER 5 UNITS: Performed by: NURSE PRACTITIONER

## 2024-01-19 PROCEDURE — 25010000002 CEFTRIAXONE PER 250 MG: Performed by: INTERNAL MEDICINE

## 2024-01-19 PROCEDURE — 99283 EMERGENCY DEPT VISIT LOW MDM: CPT | Performed by: PODIATRIST

## 2024-01-19 PROCEDURE — 63710000001 INSULIN GLARGINE PER 5 UNITS: Performed by: NURSE PRACTITIONER

## 2024-01-19 PROCEDURE — 87641 MR-STAPH DNA AMP PROBE: CPT | Performed by: NURSE PRACTITIONER

## 2024-01-19 PROCEDURE — 97162 PT EVAL MOD COMPLEX 30 MIN: CPT

## 2024-01-19 PROCEDURE — 73718 MRI LOWER EXTREMITY W/O DYE: CPT

## 2024-01-19 PROCEDURE — 85027 COMPLETE CBC AUTOMATED: CPT | Performed by: NURSE PRACTITIONER

## 2024-01-19 PROCEDURE — 25010000002 VANCOMYCIN HCL IN NACL 1.5-0.9 GM/500ML-% SOLUTION: Performed by: NURSE PRACTITIONER

## 2024-01-19 PROCEDURE — 80048 BASIC METABOLIC PNL TOTAL CA: CPT | Performed by: NURSE PRACTITIONER

## 2024-01-19 PROCEDURE — 82948 REAGENT STRIP/BLOOD GLUCOSE: CPT

## 2024-01-19 PROCEDURE — 97166 OT EVAL MOD COMPLEX 45 MIN: CPT

## 2024-01-19 RX ORDER — LISINOPRIL 20 MG/1
40 TABLET ORAL DAILY
Status: DISCONTINUED | OUTPATIENT
Start: 2024-01-19 | End: 2024-01-21 | Stop reason: HOSPADM

## 2024-01-19 RX ORDER — INSULIN GLARGINE 100 [IU]/ML
33 INJECTION, SOLUTION SUBCUTANEOUS NIGHTLY
Status: DISCONTINUED | OUTPATIENT
Start: 2024-01-19 | End: 2024-01-21 | Stop reason: HOSPADM

## 2024-01-19 RX ORDER — VANCOMYCIN/0.9 % SOD CHLORIDE 1.5G/250ML
1500 PLASTIC BAG, INJECTION (ML) INTRAVENOUS EVERY 24 HOURS
Qty: 3500 ML | Refills: 0 | Status: DISCONTINUED | OUTPATIENT
Start: 2024-01-19 | End: 2024-01-21 | Stop reason: HOSPADM

## 2024-01-19 RX ORDER — PANTOPRAZOLE SODIUM 40 MG/1
40 TABLET, DELAYED RELEASE ORAL
Status: DISCONTINUED | OUTPATIENT
Start: 2024-01-19 | End: 2024-01-21 | Stop reason: HOSPADM

## 2024-01-19 RX ORDER — METOPROLOL SUCCINATE 50 MG/1
100 TABLET, EXTENDED RELEASE ORAL
Status: DISCONTINUED | OUTPATIENT
Start: 2024-01-19 | End: 2024-01-21 | Stop reason: HOSPADM

## 2024-01-19 RX ADMIN — INSULIN LISPRO 5 UNITS: 100 INJECTION, SOLUTION INTRAVENOUS; SUBCUTANEOUS at 08:59

## 2024-01-19 RX ADMIN — APIXABAN 2.5 MG: 2.5 TABLET, FILM COATED ORAL at 20:12

## 2024-01-19 RX ADMIN — INSULIN GLARGINE 33 UNITS: 100 INJECTION, SOLUTION SUBCUTANEOUS at 20:12

## 2024-01-19 RX ADMIN — PANTOPRAZOLE SODIUM 40 MG: 40 TABLET, DELAYED RELEASE ORAL at 08:59

## 2024-01-19 RX ADMIN — APIXABAN 2.5 MG: 2.5 TABLET, FILM COATED ORAL at 08:59

## 2024-01-19 RX ADMIN — LISINOPRIL 40 MG: 20 TABLET ORAL at 08:58

## 2024-01-19 RX ADMIN — Medication 1500 MG: at 22:36

## 2024-01-19 RX ADMIN — INSULIN LISPRO 3 UNITS: 100 INJECTION, SOLUTION INTRAVENOUS; SUBCUTANEOUS at 18:45

## 2024-01-19 RX ADMIN — PANTOPRAZOLE SODIUM 40 MG: 40 TABLET, DELAYED RELEASE ORAL at 18:46

## 2024-01-19 RX ADMIN — CEFTRIAXONE 2000 MG: 2 INJECTION, POWDER, FOR SOLUTION INTRAMUSCULAR; INTRAVENOUS at 19:43

## 2024-01-19 RX ADMIN — METOPROLOL SUCCINATE 100 MG: 50 TABLET, EXTENDED RELEASE ORAL at 08:59

## 2024-01-19 NOTE — PROGRESS NOTES
[facility] Medicine Services       Patient Name: Blas Mojica  : 1950  MRN: [id number]  Primary Care Physician:  Sahil Read MD  Date of admission: 2024  Date and Time of Service: 2024 at 2322     Subjective       Chief Complaint: Weakness     History of Present Illness: Blas Mojica is a 73 y.o. male with a PMH of stage III CKD, type 2 diabetes mellitus, HTN, HLD, recent hospitalization for gastric ulcers and GI bleeding followed by another hospitalization for COVID-19 who presented to [facility] on 2024 with complaints of weakness. Mr Mojica report he was discharged from [facility] rehab where he went after his recent hospitalizations for Covid 19 and GI bleeding,. He states he was able to ambulate to the bathroom with a rogers this afternoon but could not get himself back up off the commode because he was too weak. He does endorse ongoing right humerus fracture, but he has seen orthopedic surgery for but is not fully sure of ongoing plans other than he is supposed to be doing exercises with his hand.His wife at bedside today states she tried to help him get back up onto his feet but was unsuccessful, so she called EMS who was able to get him lifted up and transported to the emergency room for further evaluation.  He also complains of great left toe wound that has been ongoing since his last discharge. Upon exam the toe patient did have areas of necrosis, erythematous and is also warm to touch, He denies any other acute distress chest pain, shortness of breath, palpitations, lightheadedness, dizziness, fever, chills, abdominal pain, nausea, vomiting, diarrhea, constipation or  symptoms.     Initial evaluation in the emergency room feels he is hemodynamically stable blood pressure 132/68, heart rate 62, respiratory rate 16, oxygen saturation 96% on room air, he is afebrile with Tmax 98.  He is anemic globin is stable today at 12.8 last his CBC, BMP are unremarkable.   Urinalysis unremarkable  X-ray is unremarkable for any acute cardiopulmonary processes.  There is noted stable cardiomegaly.  Review of Systems   Constitutional:  Negative for chills and fever.   HENT: Negative.  Negative for congestion and trouble swallowing.    Eyes: Negative.  Negative for visual disturbance.   Respiratory: Negative.  Negative for cough, chest tightness, shortness of breath and wheezing.    Cardiovascular: Negative.  Negative for chest pain and palpitations.   Gastrointestinal: Negative.  Negative for abdominal distention, abdominal pain, constipation, diarrhea, nausea and vomiting.   Endocrine: Negative.    Genitourinary: Negative.  Negative for dysuria, frequency and urgency.   Musculoskeletal: Negative.  Negative for arthralgias.   Skin:  Positive for wound. Negative for rash.   Neurological:  Positive for weakness. Negative for dizziness and headaches.   Psychiatric/Behavioral:  Negative for agitation and confusion.    All other systems reviewed and are negative.      1/19  Patient admitted yesterday  Patient was infection on the left big toe mostly diabetic ulcer  Awaiting podiatry evaluation  Patient with generalized weakness over the last couple weeks  Patient with stage III chronic kidney disease with diabetic nephropathy  Patient is with physical debilitation  He is currently on IV vancomycin pharmacy is dosing  Continue Lantus and sliding scale coverage  Awaiting PT evaluation    Seen per vascular ,will get a rterial doppler,may need mri  Bedside debridement in am    Personal History      Medical History        Past Medical History:   Diagnosis Date    Diabetes mellitus      Hyperlipidemia      Hypertension      Kidney stone      TIA (transient ischemic attack) 2016    Type 2 diabetes mellitus with stage 3 chronic kidney disease, with long-term current use of insulin 12/13/2021            Surgical History         Past Surgical History:   Procedure Laterality Date    ADENOIDECTOMY         ENDOSCOPY N/A 12/13/2023     Procedure: ESOPHAGOGASTRODUODENOSCOPY with removal of NJ tube from right nare;  Surgeon: Jaret Valadez MD;  Location: [facility] ENDOSCOPY;  Service: Gastroenterology;  Laterality: N/A;  esophagitis, gastric ulcer    EXTRACORPOREAL SHOCK WAVE LITHOTRIPSY (ESWL)        HERNIA REPAIR        KNEE ACL RECONSTRUCTION Left      TONSILLECTOMY                Family History: family history includes No Known Problems in his father and mother.      Social History:  reports that he has never smoked. He has never used smokeless tobacco. He reports that he does not drink alcohol and does not use drugs.     Home Medications:  Prior to Admission Medications         Prescriptions Last Dose Informant Patient Reported? Taking?     apixaban (ELIQUIS) 2.5 MG tablet tablet     No No     Take 1 tablet by mouth Every 12 (Twelve) Hours. Indications: Atrial Fibrillation     benzonatate (TESSALON) 200 MG capsule     No No     Take 1 capsule by mouth 3 (Three) Times a Day.     budesonide-formoterol (SYMBICORT) 160-4.5 MCG/ACT inhaler     No No     Inhale 2 puffs 2 (Two) Times a Day.     insulin glargine (LANTUS, SEMGLEE) 100 UNIT/ML injection     Yes No     Inject 33 Units under the skin into the appropriate area as directed Every Night.     metoprolol succinate XL (TOPROL-XL) 100 MG 24 hr tablet     No No     Take 1 tablet by mouth Daily.     pantoprazole (PROTONIX) 40 MG EC tablet     No No     Take 1 tablet by mouth 2 (Two) Times a Day Before Meals.                   Allergies:       Allergies   Allergen Reactions    Contrast Dye (Echo Or Unknown Ct/Mr) Anaphylaxis    Iodinated Contrast Media Shortness Of Breath    Iodine Shortness Of Breath    Aspirin Nausea And Vomiting    Lipitor [Atorvastatin] Unknown - High Severity    Prednisone Hives         Objective       Vitals:   Temp:  [98.1 °F (36.7 °C)] 98.1 °F (36.7 °C)  Heart Rate:  [62-77] 72  Resp:  [16] 16  BP: (132-152)/(65-80) 152/68  Body mass index  is 26.69 kg/m².  Physical Exam  Vitals and nursing note reviewed.   Constitutional:       General: He is awake.      Appearance: He is ill-appearing.   HENT:      Mouth/Throat:      Mouth: Mucous membranes are dry.   Eyes:      Conjunctiva/sclera: Conjunctivae normal.      Pupils: Pupils are equal, round, and reactive to light.   Cardiovascular:      Rate and Rhythm: Normal rate and regular rhythm.      Pulses: Normal pulses.           Radial pulses are 2+ on the right side and 2+ on the left side.        Dorsalis pedis pulses are 1+ on the right side and 2+ on the left side.        Posterior tibial pulses are 2+ on the right side and 2+ on the left side.      Heart sounds: Normal heart sounds.   Pulmonary:      Effort: Pulmonary effort is normal.      Breath sounds: Normal breath sounds.   Abdominal:      General: Bowel sounds are normal.   Musculoskeletal:      Right lower leg: Edema present.      Left lower leg: Edema present.   Skin:     General: Skin is warm and dry.      Capillary Refill: Capillary refill takes less than 2 seconds.      Findings: Erythema and wound present.      Comments: Great left   Neurological:      Mental Status: He is alert and oriented to person, place, and time. Mental status is at baseline.   Psychiatric:         Behavior: Behavior is cooperative.            Diagnostic Data:  Lab Results (last 24 hours)         Procedure Component Value Units Date/Time     MRSA Screen, PCR (Inpatient) - Swab, Nares [[id number]] Collected: 01/19/24 0009     Specimen: Swab from Nares Updated: 01/19/24 0014     Urinalysis With Microscopic If Indicated (No Culture) - Urine, Clean Catch [[id number]]  (Normal) Collected: 01/18/24 2237     Specimen: Urine, Clean Catch Updated: 01/18/24 2244       Color, UA Yellow       Appearance, UA Clear       pH, UA <=5.0       Specific Gravity, UA 1.019       Glucose, UA Negative       Ketones, UA Negative       Bilirubin, UA Negative       Blood, UA Negative        Protein, UA Negative       Leuk Esterase, UA Negative       Nitrite, UA Negative       Urobilinogen, UA 0.2 E.U./dL     Narrative:       Urine microscopic not indicated.     Comprehensive Metabolic Panel [[id number]]  (Abnormal) Collected: 01/18/24 2153     Specimen: Blood Updated: 01/18/24 2220       Glucose 116 mg/dL         BUN 23 mg/dL         Creatinine 1.17 mg/dL         Sodium 139 mmol/L         Potassium 4.0 mmol/L         Chloride 103 mmol/L         CO2 27.0 mmol/L         Calcium 9.4 mg/dL         Total Protein 7.1 g/dL         Albumin 3.7 g/dL         ALT (SGPT) 12 U/L         AST (SGOT) 17 U/L         Alkaline Phosphatase 103 U/L         Total Bilirubin 0.6 mg/dL         Globulin 3.4 gm/dL         A/G Ratio 1.1 g/dL         BUN/Creatinine Ratio 19.7       Anion Gap 9.0 mmol/L         eGFR 65.4 mL/min/1.73       Narrative:       GFR Normal >60  Chronic Kidney Disease <60  Kidney Failure <15     The GFR formula is only valid for adults with stable renal function between ages 18 and 70.     CK [[id number]]  (Normal) Collected: 01/18/24 2153     Specimen: Blood Updated: 01/18/24 2220       Creatine Kinase 38 U/L       Magnesium [[id number]]  (Normal) Collected: 01/18/24 2153     Specimen: Blood Updated: 01/18/24 2220       Magnesium 2.0 mg/dL       CBC & Differential [[id number]]  (Abnormal) Collected: 01/18/24 2153     Specimen: Blood Updated: 01/18/24 2201     Narrative:       The following orders were created for panel order CBC & Differential.  Procedure                               Abnormality         Status                     ---------                               -----------         ------                     CBC Auto Differential[[id number]]        Abnormal            Final result                  Please view results for these tests on the individual orders.     CBC Auto Differential [[id number]]  (Abnormal) Collected: 01/18/24 2153     Specimen: Blood Updated: 01/18/24 2201       WBC 7.00 10*3/mm3          RBC 4.40 10*6/mm3         Hemoglobin 12.9 g/dL         Hematocrit 39.1 %         MCV 88.7 fL         MCH 29.2 pg         MCHC 33.0 g/dL         RDW 14.1 %         RDW-SD 46.8 fl         MPV 7.8 fL         Platelets 158 10*3/mm3         Neutrophil % 73.9 %         Lymphocyte % 15.8 %         Monocyte % 7.2 %         Eosinophil % 2.2 %         Basophil % 0.9 %         Neutrophils, Absolute 5.10 10*3/mm3         Lymphocytes, Absolute 1.10 10*3/mm3         Monocytes, Absolute 0.50 10*3/mm3         Eosinophils, Absolute 0.20 10*3/mm3         Basophils, Absolute 0.10 10*3/mm3         nRBC 0.0 /100 WBC                             Imaging Results (Last 24 Hours)         Procedure Component Value Units Date/Time     XR Chest 1 View [[id number]] Collected: 01/18/24 2222       Updated: 01/18/24 2228     Narrative:       XR CHEST 1 VW     Date of Exam: 1/18/2024 10:06 PM EST     Indication: General weakness     Comparison: Portable chest dated 12/31/2023     Findings:     The lungs are grossly clear except for areas of scarring. Cardiac, hilar, and mediastinal silhouettes are stable with moderate cardiomegaly.. Pulmonary vascularity is within normal limits. No pneumothorax or pleural effusions. The trachea is midline.  No   acute bony abnormality or aggressive appearing focal osseous lesions in the visualized bony thorax.               Impression:       Impression:  Stable cardiomegaly. No acute cardial pulmonary disease.        Electronically Signed: Blaise Kapadia DO    1/18/2024 10:26 PM EST    Workstation ID: [id number]                   Assessment & Plan             Active and Resolved Problems        Active Hospital Problems     Diagnosis   POA    **Generalized weakness [R53.1]   Yes    Foot ulcer due to secondary DM [E13.621, L97.509]   Yes    Physical debility [R53.81]   Yes    Type 2 diabetes mellitus with stage 3 chronic kidney disease, with long-term current use of insulin [E11.22, N18.30, Z79.4]   Not Applicable     Stage 3 chronic kidney disease [N18.30]   Yes       Resolved Hospital Problems   No resolved problems to display.      Generalized weakness  Physical debility   Initial evaluation has been unremarkable   PT OT consulted   He reports right  humerus fracture-initially seen at [facility] and was , placed in a sling-for 5 weeks-x-ray from 1/4/2024 shows no acute fracture, bony abnormality.  Sclerotic bone lesions within the proximal humeral diaphysis   Consult case management for discharge planning will need additional SNF/reconditioning  Has had multiple recent hospitalization 1 with COVID-19, and another with a GI bleed is chronic anticoagulation with Eliquis has been restarted.  Admit to medical surgical unit for further evaluation and treatment  Vital signs per policy  Check CBC, BMP in a.m.     Type 2 diabetes mellitus with stage III chronic kidney disease, with long-term use of insulin  Chronic/stable  Continue home Lantus  Accu-Cheks ACHS  SSI ordered for hyperglycemia  Hypoglycemia treatment per protocol     Foot Ulcer due to secondary DM  Acute   Continue Vancomycin left great toe appears to also have a subcutaneous cellulitis involved  Blood cultures are pending   Podiatry consulted     Stage 3 chronic kidney disease  Chronic/Stable   Monitor I's and O's  Daily weights  Avoid nephrotoxic medications, hypovolemia, hypotension     Actual atrial fibrillation  AC Eliquis  RC metoprolol           DVT prophylaxis:  No DVT prophylaxis order currently exists.     The patient desires to be as follows:     CODE STATUS:    Code Status (Patient has no pulse and is not breathing): CPR (Attempt to Resuscitate)  Medical Interventions (Patient has pulse or is breathing): Full Support           Debra Mojica , who can be contacted at [phone] or at bedside, is the designated person to make medical decisions on the patient's behalf if He is incapable of doing so. This was clarified with patient and/or next of kin on  1/18/2024 during the course of this H&P.     Admission Status:  I believe this patient meets observation  status.     Expected Length of Stay: 1-2 days pending clinical course      PDMP and Medication Dispenses via Sidebar reviewed and consistent with patient reported medications.     I discussed the patient's findings and my recommendations with patient and family.     Patient was seen on 01/18/2024- dictation delay due to patient care   Signature:

## 2024-01-19 NOTE — CONSULTS
01/19/24   Foot and Ankle Surgery - Consult  Provider: Dr. Isaiah Brown DPM  Location: [facility]    Subjective:  Blas Mojica is a 73 y.o. male.     Chief Complaint   Patient presents with    Weakness - Generalized       Consulting Physician: Primary service    Reason for Consult: Left great toe discoloration    HPI: Patient is a 73-year-old diabetic male that has been admitted for concerns of weakness.  Patient was recently released from [facility] rehab and was unable to stand after going to the restroom prompting him to report to the ED.  Patient has had some recent medical issues including GI bleeding.  I have been asked to evaluate the patient's left great toe.  He feels that the toe has been discolored and abnormal for approximately 1 to 2 weeks.  He is unsure how or why issues began.  Patient states that he does have a podiatrist in [place] and was supposed to have an appointment today.  He does not recall any injury to the toe.  Denies fever, chills, nausea, vomiting.    Allergies   Allergen Reactions    Contrast Dye (Echo Or Unknown Ct/Mr) Anaphylaxis    Iodinated Contrast Media Shortness Of Breath    Iodine Shortness Of Breath    Aspirin Nausea And Vomiting    Lipitor [Atorvastatin] Unknown - High Severity    Prednisone Hives       Past Medical History:   Diagnosis Date    Diabetes mellitus     Hyperlipidemia     Hypertension     Kidney stone     TIA (transient ischemic attack) 2016    Type 2 diabetes mellitus with stage 3 chronic kidney disease, with long-term current use of insulin 12/13/2021       Past Surgical History:   Procedure Laterality Date    ADENOIDECTOMY      ENDOSCOPY N/A 12/13/2023    Procedure: ESOPHAGOGASTRODUODENOSCOPY with removal of NJ tube from right nare;  Surgeon: Jaret Valadez MD;  Location: [facility] ENDOSCOPY;  Service: Gastroenterology;  Laterality: N/A;  esophagitis, gastric ulcer    EXTRACORPOREAL SHOCK WAVE LITHOTRIPSY (ESWL)      HERNIA REPAIR      KNEE ACL  RECONSTRUCTION Left     TONSILLECTOMY         Family History   Problem Relation Age of Onset    No Known Problems Mother     No Known Problems Father        Social History     Socioeconomic History    Marital status:    Tobacco Use    Smoking status: Never    Smokeless tobacco: Never   Vaping Use    Vaping Use: Never used   Substance and Sexual Activity    Alcohol use: Never    Drug use: Never    Sexual activity: Defer          Current Facility-Administered Medications:     acetaminophen (TYLENOL) tablet 650 mg, 650 mg, Oral, Q4H PRN **OR** acetaminophen (TYLENOL) 160 MG/5ML oral solution 650 mg, 650 mg, Oral, Q4H PRN **OR** acetaminophen (TYLENOL) suppository 650 mg, 650 mg, Rectal, Q4H PRN, Lorie Calvin APRN    apixaban (ELIQUIS) tablet 2.5 mg, 2.5 mg, Oral, Q12H, Lorie Calvin APRN    sennosides-docusate (PERICOLACE) 8.6-50 MG per tablet 2 tablet, 2 tablet, Oral, BID PRN **AND** polyethylene glycol (MIRALAX) packet 17 g, 17 g, Oral, Daily PRN **AND** bisacodyl (DULCOLAX) EC tablet 5 mg, 5 mg, Oral, Daily PRN **AND** bisacodyl (DULCOLAX) suppository 10 mg, 10 mg, Rectal, Daily PRN, Lorie Calvin APRN    dextrose (D50W) (25 g/50 mL) IV injection 25 g, 25 g, Intravenous, Q15 Min PRN, Lorie Calvin APRN    dextrose (GLUTOSE) oral gel 15 g, 15 g, Oral, Q15 Min PRN, Lorie Calvin APRN    glucagon (GLUCAGEN) injection 1 mg, 1 mg, Intramuscular, Q15 Min PRN, Lorie Calvin APRN    insulin glargine (LANTUS, SEMGLEE) injection 33 Units, 33 Units, Subcutaneous, Nightly, Lorie Calvin APRN    insulin lispro (HUMALOG/ADMELOG) injection 3-14 Units, 3-14 Units, Subcutaneous, 4x Daily With Meals & Nightly, Lorie Calvin APRN    lisinopril (PRINIVIL,ZESTRIL) tablet 40 mg, 40 mg, Oral, Daily, Lorie Calvin APRN    metoprolol succinate XL (TOPROL-XL) 24 hr tablet 100 mg, 100 mg, Oral, Q24H, Lorie Calvin APRN    pantoprazole  (PROTONIX) EC tablet 40 mg, 40 mg, Oral, BID AC, Lorie Calvin APRN    Pharmacy to dose vancomycin, , Does not apply, Continuous PRN, Lorie Calvin APRN    [COMPLETED] Insert Peripheral IV, , , Once **AND** sodium chloride 0.9 % flush 10 mL, 10 mL, Intravenous, PRN, Pito Tiwari MD    sodium chloride 0.9 % flush 10 mL, 10 mL, Intravenous, Q12H, Lorie Calvin APRN, 10 mL at 01/18/24 2325    sodium chloride 0.9 % flush 10 mL, 10 mL, Intravenous, PRN, Lorie Calvin APRN    sodium chloride 0.9 % infusion 40 mL, 40 mL, Intravenous, PRN, Lorie Calvin APRN    vancomycin IVPB 1500 mg in 0.9% NaCl (Premix) 500 mL, 1,500 mg, Intravenous, Q24H, Lorie Calvin APRN    Current Outpatient Medications:     apixaban (ELIQUIS) 2.5 MG tablet tablet, Take 1 tablet by mouth Every 12 (Twelve) Hours. Indications: Atrial Fibrillation, Disp: 60 tablet, Rfl: 0    insulin glargine (LANTUS, SEMGLEE) 100 UNIT/ML injection, Inject 33 Units under the skin into the appropriate area as directed Every Night., Disp: , Rfl:     lisinopril (PRINIVIL,ZESTRIL) 40 MG tablet, Take 1 tablet by mouth Daily., Disp: , Rfl:     metoprolol succinate XL (TOPROL-XL) 100 MG 24 hr tablet, Take 1 tablet by mouth Daily., Disp: 30 tablet, Rfl: 0    pantoprazole (PROTONIX) 40 MG EC tablet, Take 1 tablet by mouth 2 (Two) Times a Day Before Meals., Disp: 30 tablet, Rfl: 0    pioglitazone (ACTOS) 30 MG tablet, Take 1 tablet by mouth Daily., Disp: , Rfl:     Review of Systems:  General: Denies fever, chills, fatigue, and weakness.  Eyes: Denies vision loss, blurry vision, and excessive redness.  ENT: Denies hearing issues and difficulty swallowing.  Cardiovascular: Denies palpitations, chest pain, or syncopal episodes.  Respiratory: Denies shortness of breath, wheezing, and coughing.  GI: Denies abdominal pain, nausea, and vomiting.   : Denies frequency, hematuria, and urgency.  Musculoskeletal: Denies muscle  "cramps, joint pains, and stiffness.  Derm: + left great toe discoloration  Neuro: Denies headaches, numbness, loss of coordination, and tremors.  Psych: Denies anxiety and depression.  Endocrine: Denies temperature intolerance and changes in appetite.  Heme: Denies bleeding disorders or abnormal bruising.     Objective   /61   Pulse 73   Temp 98.1 °F (36.7 °C) (Oral)   Resp 16   Ht 177.8 cm (70\")   Wt 84.4 kg (186 lb)   SpO2 90%   BMI 26.69 kg/m²     Foot/Ankle Exam    GENERAL  Appearance:  appears stated age  Orientation:  AAOx3  Affect:  appropriate    VASCULAR     Left Foot Vascularity   Dorsalis pedis:  2+  Posterior tibial:  2+  Skin temperature:  warm  Edema grading:  None  CFT:  3  Pedal hair growth:  Absent     NEUROLOGIC     Left Foot Neurologic   Light touch sensation: diminished  Hot/Cold sensation:  diminished  Achilles reflex:  1+    MUSCULOSKELETAL     Left Foot Musculoskeletal   Ecchymosis:  toe 1  Tenderness:  none  Arch:  Normal    MUSCLE STRENGTH     Left Foot Muscle Strength   Foot dorsiflexion:  4+  Foot plantar flexion:  4+  Foot inversion:  4+  Foot eversion:  4+    DERMATOLOGIC        Left Foot Dermatologic   Skin  Positive for abnormal color, skin changes and atrophy. Negative for cellulitis, drainage, maceration, ulcer and gangrene.   Nails  1.  Positive for elongated, abnormal thickness and ingrown toenail.     Left foot additional comments: Mild crusting involving the nasal region.  No parth open wound, purulence, or gross signs of necrosis.            Results from last 7 days   Lab Units 01/19/24  0649   WBC 10*3/mm3 5.00   HEMOGLOBIN g/dL 11.0*   HEMATOCRIT % 33.3*   PLATELETS 10*3/mm3 124*       Assessment & Plan     Generalized weakness    Stage 3 chronic kidney disease    Physical debility    Type 2 diabetes mellitus with stage 3 chronic kidney disease, with long-term current use of insulin    Foot ulcer due to secondary DM    Patient was evaluated bedside.  No family " present.  He does have dystrophic nail with discoloration involving the great toe and crusting involving the nailbed.  Patient states that he had a normal-appearing nail prior to onset of issues.  It is difficult to discern whether or not he had injury, ingrown nail, or open wound with infection.  Plain film imaging has been ordered along with a Doppler to ensure no vascular compromise.  May consider proceeding with an MRI if plain film imaging is abnormal.  Will evaluate the toe tomorrow and consider bedside debridement of the nail and nailbed.    Thank you for the consultation and allowing me to participate in this patient's care. Please call with any additional questions or concerns.     Note is dictated utilizing voice recognition software. Unfortunately this leads to occasional typographical errors. I apologize in advance if the situation occurs. If questions occur please do not hesitate to call our office.

## 2024-01-19 NOTE — THERAPY EVALUATION
Patient Name: Blas Mojica  : 1950    MRN: [id number]                              Today's Date: 2024       Admit Date: 2024    Visit Dx:     ICD-10-CM ICD-9-CM   1. General weakness  R53.1 780.79   2. Cellulitis of toe of left foot  L03.032 681.10     Patient Active Problem List   Diagnosis    Primary hypertension    TIA (transient ischemic attack)    Stage 3 chronic kidney disease    Dyslipidemia    History of basal cell carcinoma (BCC)    Primary osteoarthritis    Vitamin D deficiency    Acute pain of left knee    Generalized weakness    Hypokalemia    Moderate malnutrition    Physical debility    Type 2 diabetes mellitus with stage 3 chronic kidney disease, with long-term current use of insulin    Sinus tachycardia    Acute hypoxemic respiratory failure due to COVID-19    Pneumonia due to COVID-19 virus    Mixed hyperlipidemia    COVID-19 virus infection    Weakness    COVID    Foot ulcer due to secondary DM     Past Medical History:   Diagnosis Date    Diabetes mellitus     Hyperlipidemia     Hypertension     Kidney stone     TIA (transient ischemic attack)     Type 2 diabetes mellitus with stage 3 chronic kidney disease, with long-term current use of insulin 2021     Past Surgical History:   Procedure Laterality Date    ADENOIDECTOMY      ENDOSCOPY N/A 2023    Procedure: ESOPHAGOGASTRODUODENOSCOPY with removal of NJ tube from right nare;  Surgeon: Jaret Valadez MD;  Location: [facility] END[facility];  Service: Gastroenterology;  Laterality: N/A;  esophagitis, gastric ulcer    EXTRACORPOREAL SHOCK WAVE LITHOTRIPSY (ESWL)      HERNIA REPAIR      KNEE ACL RECONSTRUCTION Left     TONSILLECTOMY        General Information       Row Name 24 1537          Physical Therapy Time and Intention    Document Type evaluation  -CL     Mode of Treatment physical therapy  -CL       Row Name 24 1537          General Information    Patient Profile Reviewed yes  -CL     Prior  Level of Function min assist:;transfer  -CL     Existing Precautions/Restrictions fall;right  -CL     Barriers to Rehab previous functional deficit  -CL       Row Name 01/19/24 1537          Living Environment    People in Home spouse  -CL     Name(s) of People in Home From [facility]  -CL       Row Name 01/19/24 1537          Home Main Entrance    Number of Stairs, Main Entrance none  -CL       Row Name 01/19/24 1537          Stairs Within Home, Primary    Number of Stairs, Within Home, Primary none  -CL       Row Name 01/19/24 1537          Cognition    Orientation Status (Cognition) oriented x 4  -CL       Row Name 01/19/24 1537          Safety Issues, Functional Mobility    Impairments Affecting Function (Mobility) balance;cognition;endurance/activity tolerance;motor planning;strength  -CL     Cognitive Impairments, Mobility Safety/Performance problem-solving/reasoning  -CL               User Key  (r) = Recorded By, (t) = Taken By, (c) = Cosigned By      Initials Name Provider Type    Catalina Lundberg PT Physical Therapist                   Mobility       Row Name 01/19/24 1541          Bed Mobility    Bed Mobility supine-sit-supine  -CL     Supine-Sit-Supine [place] (Bed Mobility) minimum assist (75% patient effort);2 person assist  -CL       Row Name 01/19/24 1541          Sit-Stand Transfer    Sit-Stand [place] (Transfers) minimum assist (75% patient effort);2 person assist  -CL       Row Name 01/19/24 1541          Gait/Stairs (Locomotion)    [place] Level (Gait) minimum assist (75% patient effort);2 person assist  -CL     Distance in Feet (Gait) side stepping 5'  -CL       Row Name 01/19/24 1541          Mobility    Extremity Weight-bearing Status right upper extremity  -CL     Right Upper Extremity (Weight-bearing Status) other (see comments)  Pt is at least 8 wks post humeral fx states he is non Wt bearing RUE.  -CL               User Key  (r) = Recorded By, (t) =  Taken By, (c) = Cosigned By      Initials Name Provider Type    Catalina Lundberg PT Physical Therapist                   Obj/Interventions       Row Name 01/19/24 1543          Range of Motion Comprehensive    General Range of Motion bilateral lower extremity ROM WFL  -CL       Row Name 01/19/24 1543          Strength Comprehensive (MMT)    Comment, General Manual Muscle Testing (MMT) Assessment Hip flex 3/5, knee ext 4/5 bilaterally  -CL       Row Name 01/19/24 1543          Balance    Balance Assessment sitting static balance;sitting dynamic balance;standing static balance;standing dynamic balance  -CL     Static Sitting Balance supervision  -CL     Dynamic Sitting Balance minimal assist  -CL     Position, Sitting Balance unsupported  -CL     Static Standing Balance minimal assist;2-person assist  -CL     Dynamic Standing Balance minimal assist;2-person assist  -CL       Row Name 01/19/24 1543          Sensory Assessment (Somatosensory)    Sensory Assessment (Somatosensory) sensation intact  -CL               User Key  (r) = Recorded By, (t) = Taken By, (c) = Cosigned By      Initials Name Provider Type    Catalina Lundberg PT Physical Therapist                   Goals/Plan       Row Name 01/19/24 1551          Bed Mobility Goal 1 (PT)    Activity/Assistive Device (Bed Mobility Goal 1, PT) bed mobility activities, all  -CL     [place] Level/Cues Needed (Bed Mobility Goal 1, PT) modified independence  -CL     Time Frame (Bed Mobility Goal 1, PT) long term goal (LTG);2 weeks  -CL       Row Name 01/19/24 1551          Transfer Goal 1 (PT)    Activity/Assistive Device (Transfer Goal 1, PT) sit-to-stand/stand-to-sit;bed-to-chair/chair-to-bed  -CL     [place] Level/Cues Needed (Transfer Goal 1, PT) supervision required  -CL     Time Frame (Transfer Goal 1, PT) long term goal (LTG);2 weeks  -CL       Row Name 01/19/24 1551          Gait Training Goal 1 (PT)    Activity/Assistive Device (Gait Training  Goal 1, PT) gait (walking locomotion);assistive device use  -CL     [place] Level (Gait Training Goal 1, PT) contact guard required  -CL     Distance (Gait Training Goal 1, PT) 25'  -CL     Time Frame (Gait Training Goal 1, PT) long term goal (LTG);2 weeks  -CL       Row Name 01/19/24 3620          Therapy Assessment/Plan (PT)    Planned Therapy Interventions (PT) balance training;bed mobility training;gait training;neuromuscular re-education;patient/family education;ROM (range of motion);strengthening;transfer training  -CL               User Key  (r) = Recorded By, (t) = Taken By, (c) = Cosigned By      Initials Name Provider Type    CL Catalina Mg, PT Physical Therapist                   Clinical Impression       Row Name 01/19/24 9026          Pain    Pretreatment Pain Rating 0/10 - no pain  -CL     Posttreatment Pain Rating 0/10 - no pain  -CL       Row Name 01/19/24 4046          Plan of Care Review    Plan of Care Reviewed With patient  -CL     Outcome Evaluation Blas Fuchs is a 73 y.o. male with recent hospitalization for gastric ulcer and GI Bleed who presents with weakness. Pt with recent hx of R humeral fx and L great toe wound  PMH: DM, HTN, HLD, CKD 3.  Pt just completed a stay at [facility] rehab and was home with wife when he was unable to get off toilet and had to call EMS.  At time of PT evaluation, he is A&OX4. He appears [place] and slightly delayed in responses.  He is fearful of attempting mobility due to fear of falling and hold RUE flexed, ADDucted and internally rotated, stating he is NWB RUE from humeral fx which is at least 8 wks old.  Pt requires extra time and min A 2 for bed mobility. Pt stands with min A 2 and takes several steps with min A2.  Pt does not appear safe to return home.  He would benefit from return to IRF for continued PT.  -CL       Row Name 01/19/24 3888          Therapy Assessment/Plan (PT)    Rehab Potential (PT) good, to achieve stated therapy goals  -CL      Criteria for Skilled Interventions Met (PT) yes;skilled treatment is necessary  -CL     Therapy Frequency (PT) 5 times/wk  -CL     Predicted Duration of Therapy Intervention (PT) Until discharge  -CL       Row Name 01/19/24 1544          Vital Signs    Pre Systolic BP Rehab 120  -CL     Pre Treatment Diastolic BP 57  -CL     Pretreatment Heart Rate (beats/min) 68  -CL     Pre SpO2 (%) 96  -CL     O2 Delivery Pre Treatment room air  -CL       Row Name 01/19/24 1544          Positioning and Restraints    Pre-Treatment Position in bed  -CL     Post Treatment Position bed  -CL     In Bed notified nsg;call light within reach;supine;encouraged to call for assist  -CL               User Key  (r) = Recorded By, (t) = Taken By, (c) = Cosigned By      Initials Name Provider Type    Catalina Lundberg, PT Physical Therapist                   Outcome Measures       Row Name 01/19/24 1551          How much help from another person do you currently need...    Turning from your back to your side while in flat bed without using bedrails? 3  -CL     Moving from lying on back to sitting on the side of a flat bed without bedrails? 3  -CL     Moving to and from a bed to a chair (including a wheelchair)? 3  -CL     Standing up from a chair using your arms (e.g., wheelchair, bedside chair)? 3  -CL     Climbing 3-5 steps with a railing? 2  -CL     To walk in hospital room? 2  -CL     AM-PAC 6 Clicks Score (PT) 16  -CL     Highest Level of Mobility Goal 5 --> Static standing  -CL               User Key  (r) = Recorded By, (t) = Taken By, (c) = Cosigned By      Initials Name Provider Type    Catalina Lundberg, PT Physical Therapist                                 Physical Therapy Education       Title: PT OT SLP Therapies (Done)       Topic: Physical Therapy (Done)       Point: Mobility training (Done)       Learning Progress Summary             Patient Acceptance, E,TB, VU by ISHMAEL at 1/19/2024 3878                                          User Key       Initials Effective Dates Name Provider Type Discipline     03/02/22 -  Catalina Mg, PT Physical Therapist PT                  PT Recommendation and Plan  Planned Therapy Interventions (PT): balance training, bed mobility training, gait training, neuromuscular re-education, patient/family education, ROM (range of motion), strengthening, transfer training  Plan of Care Reviewed With: patient  Outcome Evaluation: Blas Fuchs is a 73 y.o. male with recent hospitalization for gastric ulcer and GI Bleed who presents with weakness. Pt with recent hx of R humeral fx and L great toe wound  PMH: DM, HTN, HLD, CKD 3.  Pt just completed a stay at [facility] rehab and was home with wife when he was unable to get off toilet and had to call EMS.  At time of PT evaluation, he is A&OX4. He appears [place] and slightly delayed in responses.  He is fearful of attempting mobility due to fear of falling and hold RUE flexed, ADDucted and internally rotated, stating he is NWB RUE from humeral fx which is at least 8 wks old.  Pt requires extra time and min A 2 for bed mobility. Pt stands with min A 2 and takes several steps with min A2.  Pt does not appear safe to return home.  He would benefit from return to IRF for continued PT.     Time Calculation:   PT Evaluation Complexity  History, PT Evaluation Complexity: 3 or more personal factors and/or comorbidities  Examination of Body Systems (PT Eval Complexity): total of 3 or more elements  Clinical Presentation (PT Evaluation Complexity): evolving  Clinical Decision Making (PT Evaluation Complexity): moderate complexity  Overall Complexity (PT Evaluation Complexity): moderate complexity     PT Charges       Row Name 01/19/24 4664             Time Calculation    Start Time 1440  -CL      Stop Time 1505  -CL      Time Calculation (min) 25 min  -CL      PT Received On 01/19/24  -CL      PT - Next Appointment 01/21/24  -CL      PT Goal Re-Cert Due Date 02/02/24  -CL          Time Calculation- PT    Total Timed Code Minutes- PT 0 minute(s)  -CL                User Key  (r) = Recorded By, (t) = Taken By, (c) = Cosigned By      Initials Name Provider Type    Catalina Lundberg, PT Physical Therapist                  Therapy Charges for Today       Code Description Service Date Service Provider Modifiers Qty    [id number] HC PT EVAL MOD COMPLEXITY 4 1/19/2024 Catalina Mg, PT GP 1            PT G-Codes  AM-PAC 6 Clicks Score (PT): 16  PT Discharge Summary  Anticipated Discharge Disposition (PT): inpatient rehabilitation facility    Catalina Mg PT  1/19/2024

## 2024-01-19 NOTE — PLAN OF CARE
Problem: Adult Inpatient Plan of Care  Goal: Readiness for Transition of Care  Intervention: Mutually Develop Transition Plan  Description: Identify available resources for support (e.g., family, friends, community).  Identify and address barriers to ongoing treatment and home management (e.g., environmental, financial).  Provide opportunities to practice self-management skills.  Assess and monitor emotional readiness for transition.  Establish or reconnect linkage with outpatient providers or community-based services.  Recent Flowsheet Documentation  Taken 1/19/2024 0154 by Jessica Martini, RN  Transportation Anticipated: health plan transportation  Patient/Family Anticipated Services at Transition:   Patient/Family Anticipates Transition to: home with family  Taken 1/19/2024 0153 by Jessica Martini, RN  Equipment Currently Used at Home:   walker, rolling   cane, straight   Goal Outcome Evaluation:

## 2024-01-19 NOTE — CASE MANAGEMENT/SOCIAL WORK
Discharge Planning Assessment  [facility]     Patient Name: Blas Mojica  MRN: [id number]  Today's Date: 1/19/2024    Admit Date: 1/18/2024    Plan: SNF recommended. Pt requesting referral to [facility] (answer pending). [facility]    Discharge Needs Assessment       Row Name 01/19/24 1617       Resource/Environmental Concerns    Transportation Concerns none       Discharge Needs Assessment    Equipment Currently Used at Home cane, straight;wheelchair;grab bar;hospital bed;other (see comments);power chair,(recliner lift);walker, rolling;shower chair;walker, standard  Pt reports he has elevated toilet seat; therapy belt, upright rolling walker    Concerns to be Addressed discharge planning    Anticipated Changes Related to Illness inability to care for self    Equipment Needed After Discharge none    Outpatient/Agency/Support Group Needs skilled nursing facility    Discharge Facility/Level of Care Needs rehabilitation facility    Provided Post Acute Provider List? Yes    Post Acute Provider List Nursing Home    Provided Post Acute Provider Quality & Resource List? Yes    Delivered To Patient    Method of Delivery In person    Patient's Choice of Community Agency(s) Pt wants referral to [facility]- darrick dc'd from there yesterday    Current Discharge Risk chronically ill      Row Name 01/19/24 1612       Living Environment    People in Home spouse    Name(s) of People in Home [facility]    Current Living Arrangements independent living facility    Potentially Unsafe Housing Conditions none    In the past 12 months has the electric, gas, oil, or water company threatened to shut off services in your home? No    Primary Care Provided by self;spouse/significant other    Provides Primary Care For no one, unable/limited ability to care for self    Family Caregiver if Needed spouse    Family Caregiver Names Debra    Quality of Family Relationships supportive;involved;helpful    Able to Return to Prior Arrangements  other (see comments)  Pt hopes to return to [facility]       Resource/Environmental Concerns    Resource/Environmental Concerns none       Transportation Needs    In the past 12 months, has lack of transportation kept you from medical appointments or from getting medications? no    In the past 12 months, has lack of transportation kept you from meetings, work, or from getting things needed for daily living? No       Food Insecurity    Within the past 12 months, you worried that your food would run out before you got the money to buy more. Never true    Within the past 12 months, the food you bought just didn't last and you didn't have money to get more. Never true       Transition Planning    Patient/Family Anticipates Transition to inpatient rehabilitation facility    Patient/Family Anticipated Services at Transition rehabilitation services    Transportation Anticipated other (see comments);family or friend will provide       Discharge Needs Assessment    Current Outpatient/Agency/Support Group homecare agency                   Discharge Plan       Row Name 01/19/24 4457       Plan    Plan SNF recommended. Pt requesting referral to [facility] (answer pending). ?[facility]    Patient/Family in Agreement with Plan other (see comments)  Unable to reach wife to discuss    Plan Comments Pt reports he lives with spouse who assists him with ADLS. He reports he discharged from [facility] yesterday and would like to return. We discussed PT recommendations for SNF, but he is requesting that  check with [facility]. I have spoken with liasidary Ballard and she will review. Answer pending. Pt has SNF list. He confirms pcp and pharmacy. He denies difficulty obtaining medications/transportation/food.He states wife will provide transportation at discharge. He reports he is hesitant to consider SNF because he had a bad experience at [facility] in the past. He believes [facility] had made home health arrangements with [facility],but is unsure. I have left a  message with Juana at [facility] answering service to confirm and awaiting a return call. I have also left a voicemail for silvia Mock requesting a return call. Response pending. Pt did have a hospital inpatient stay 12/19/23-1/4/24. Pt requests wife to be contacted as well. I have left a message on their home voicemail requesting a return call.Response pending.                  Continued Care and Services - Admitted Since 1/18/2024       Destination       Service Provider Request Status Selected Services Address Phone Fax Patient Preferred    [facility] Pending - Request Sent N/A [address] [facility] [address] [facility] [place] [address] [phone] --              Home Medical Care       Service Provider Request Status Selected Services Address Phone Fax Patient Preferred    [facility] Pending - Request Sent N/A [address] [place] [address] [phone] --                     Demographic Summary       Row Name 01/19/24 1611       General Information    Admission Type observation    Arrived From home    Required Notices Provided Observation Status Notice    Referral Source admission list;high risk screening;hospital clinician/department    Reason for Consult discharge planning    Preferred Language English       Contact Information    Permission Granted to Share Info With ;family/designee;facility                    Functional Status       Row Name 01/19/24 1612       Functional Status    Usual Activity Tolerance fair    Current Activity Tolerance fair       Functional Status, IADL    Medications assistive person                     Patient Forms       Row Name 01/19/24 1636       Patient Forms    Important Message from Medicare ([facility]) --  SHIELDS 1/18/24 per registration    Patient Observation Letter Delivered  SHIELDS 1/18/24 per registration                  Charlene Lindo RN, [facility]  Office: [phone]  Fax:  [phone]  [email or web address]      I met with patient in room wearing PPE: mask and glasses     Maintained distance greater than six feet and spent </=15 minutes in the room    Charlene Lindo RN

## 2024-01-19 NOTE — PLAN OF CARE
Goal Outcome Evaluation:  Plan of Care Reviewed With: patient           Outcome Evaluation: Blas Fuchs is a 73 y.o. male with recent hospitalization for gastric ulcer and GI Bleed who presents with weakness. Pt with recent hx of R humeral fx and L great toe wound  PMH: DM, HTN, HLD, CKD 3.  Pt just completed a stay at [facility] rehab and was home with wife when he was unable to get off toilet and had to call EMS.  At time of PT evaluation, he is A&OX4. He appears [place] and slightly delayed in responses.  He is fearful of attempting mobility due to fear of falling and hold RUE flexed, ADDucted and internally rotated, stating he is NWB RUE from humeral fx which is at least 8 wks old.  Pt requires extra time and min A 2 for bed mobility. Pt stands with min A 2 and takes several steps with min A2.  Pt does not appear safe to return home.  He would benefit from return to IRF for continued PT.      Anticipated Discharge Disposition (PT): inpatient rehabilitation facility

## 2024-01-19 NOTE — PLAN OF CARE
Goal Outcome Evaluation:              Outcome Evaluation: Blas Mojica is a 73 y.o. male with a PMH of stage III CKD, type 2 diabetes mellitus, HTN, HLD, recent hospitalization for gastric ulcers and GI bleeding followed by another hospitalization for COVID-19 who presented to [facility] on 1/18/2024 with complaints of weakness.  Recently discharged home from [facility] and states he was using wc for mobility since then.  He has fracture of R humerus and is unable to weight bear through walker.  At baseline pt [facility] and [facility] to access apartment. Pt typically utilizes straight cane for mobility, however recently has had functional decline, only mobilizing within home and wife has increased assist with ADLs. Pt currently has a hospital bed, w/c, RW, SPC, grab bars, walk-shower, and BSC that he uses over his toilet.  This date he requires min assist for all mobility, though demos significant fear of falling. He is limited with use of RUE causing difficulty with all ADLs.  He is high risk for falls and is not safe to return home at discharge.  Recommend acue IP rehab at discharge.      Anticipated Discharge Disposition (OT): inpatient rehabilitation facility

## 2024-01-19 NOTE — PROGRESS NOTES
"Pharmacy Antimicrobial Dosing Service    Subjective:  Blas Mojica is a 73 y.o.male admitted with generalized weakness. Pharmacy has been consulted to dose Vancomycin for possible SSTI.      Assessment/Plan    1. Day #1/8 Vancomycin: Goal -600 mcg*h/mL. Pt received 1750mg (~21mg/kg ABW) IV once followed by 1500mg (~18mg/kg ABW) IV q24h for a predicted AUC ~523mcg*h/mL. Will obtain peak on 1/20 at 0300 and trough at 2200 prior to third dose.     2. MRSA: Pending.     Will continue to monitor drug levels, renal function, culture and sensitivities, and patient clinical status.       Objective:  Relevant clinical data and objective history reviewed:  177.8 cm (70\")   84.4 kg (186 lb)   Ideal body weight: 73 kg (160 lb 15 oz)  Adjusted ideal body weight: 77.5 kg (170 lb 15.4 oz)  Body mass index is 26.69 kg/m².        Results from last 7 days   Lab Units 01/18/24  2153   CREATININE mg/dL 1.17     Estimated Creatinine Clearance: 66.1 mL/min (by C-G formula based on SCr of 1.17 mg/dL).  No intake/output data recorded.    Results from last 7 days   Lab Units 01/18/24  2153   WBC 10*3/mm3 7.00     Temperature    01/18/24 2106   Temp: 98.1 °F (36.7 °C)     Baseline culture/source/susceptibility:  Microbiology Results (last 10 days)       ** No results found for the last 240 hours. **            Jessica Pemberton, PharmD  01/19/24 00:11 EST    "

## 2024-01-19 NOTE — ED PROVIDER NOTES
Subjective   History of Present Illness  Complaint weakness unable to function at home    History of present illness 73-year-old male who has had a couple recent admissions and December he was admitted for GI bleed and in January had a humerus fracture and admitted for general weakness and went to [facility] he has been at home but he has a red blackened big toe on the left he is weak wife states he can even get up and do anything and she cannot manage him because of his weakness.  He denies any chest pain neck arm jaw pain fever chills no vomiting or diarrhea no black or bloody stool.  No shortness of breath.  Has had some just generalized edema.  And denies any other complaints at this this time and feels like he needs to be back in rehab.  Patient is also concerned about his big toe he has been on Keflex for about 5 days and has not improved is still red and looks worse.      Review of Systems   Constitutional:  Positive for fatigue. Negative for chills and fever.   Respiratory:  Negative for chest tightness and shortness of breath.    Cardiovascular:  Negative for chest pain and palpitations.   Gastrointestinal:  Negative for abdominal pain, blood in stool and vomiting.   Genitourinary:  Negative for difficulty urinating and dysuria.   Musculoskeletal:  Positive for arthralgias and myalgias.   Skin:  Negative for rash and wound.   Neurological:  Positive for weakness. Negative for facial asymmetry and speech difficulty.   Psychiatric/Behavioral:  Negative for confusion.        Past Medical History:   Diagnosis Date    Diabetes mellitus     Hyperlipidemia     Hypertension     Kidney stone     TIA (transient ischemic attack) 2016    Type 2 diabetes mellitus with stage 3 chronic kidney disease, with long-term current use of insulin 12/13/2021       Allergies   Allergen Reactions    Contrast Dye (Echo Or Unknown Ct/Mr) Anaphylaxis    Iodinated Contrast Media Shortness Of Breath    Iodine Shortness Of Breath    Aspirin  Nausea And Vomiting    Lipitor [Atorvastatin] Unknown - High Severity    Prednisone Hives       Past Surgical History:   Procedure Laterality Date    ADENOIDECTOMY      ENDOSCOPY N/A 12/13/2023    Procedure: ESOPHAGOGASTRODUODENOSCOPY with removal of NJ tube from right nare;  Surgeon: Jaret Valadez MD;  Location: [facility] ENDOSCOPY;  Service: Gastroenterology;  Laterality: N/A;  esophagitis, gastric ulcer    EXTRACORPOREAL SHOCK WAVE LITHOTRIPSY (ESWL)      HERNIA REPAIR      KNEE ACL RECONSTRUCTION Left     TONSILLECTOMY         Family History   Problem Relation Age of Onset    No Known Problems Mother     No Known Problems Father        Social History     Socioeconomic History    Marital status:    Tobacco Use    Smoking status: Never    Smokeless tobacco: Never   Vaping Use    Vaping Use: Never used   Substance and Sexual Activity    Alcohol use: Never    Drug use: Never    Sexual activity: Defer     Prior to Admission medications    Medication Sig Start Date End Date Taking? Authorizing Provider   apixaban (ELIQUIS) 2.5 MG tablet tablet Take 1 tablet by mouth Every 12 (Twelve) Hours. Indications: Atrial Fibrillation 1/4/24   Darrick Jha MD   benzonatate (TESSALON) 200 MG capsule Take 1 capsule by mouth 3 (Three) Times a Day. 1/4/24   Darrick Jha MD   budesonide-formoterol (SYMBICORT) 160-4.5 MCG/ACT inhaler Inhale 2 puffs 2 (Two) Times a Day. 1/4/24   Darrick Jha MD   insulin glargine (LANTUS, SEMGLEE) 100 UNIT/ML injection Inject 33 Units under the skin into the appropriate area as directed Every Night.    Provider, MD Isai   metoprolol succinate XL (TOPROL-XL) 100 MG 24 hr tablet Take 1 tablet by mouth Daily. 1/5/24   Darrick Jha MD   pantoprazole (PROTONIX) 40 MG EC tablet Take 1 tablet by mouth 2 (Two) Times a Day Before Meals. 1/4/24   Darrick hJa MD          Objective   Physical Exam  Constitutional 73-year-old male awake alert chronically  ill-appearing triage vital signs reviewed.  HEENT extraocular muscles intact pupils equal round reactive sclera clear mouth clear neck supple no adenopathy no JV no bruits no meningeal signs lungs clear no retraction heart regular without murmur abdomen soft nontender good bowel sounds no peritoneal findings or pulsatile masses extremities pulses equal upper and lower extremities no edema no cords no Homans' sign no evidence of DVT skin warm and dry without rashes left big toe patient has some erythema and warmth noted to the big toe goes up on top of the foot and the bottom of the toe the nail is necrotic is got some blackened area at the tip of the nail.  Foot is warm and dry but good and equal status pedis and posterior tibialis pulses.  Patient has some mild ongoing pain with swelling to the right arm but good pulses good cap refill.  Limited range of motion.  Neurologic awake alert follows commands motor strength normal no facial symmetry speech normal no focal weakness.  Procedures           ED Course      Results for orders placed or performed during the hospital encounter of 01/18/24   Comprehensive Metabolic Panel    Specimen: Blood   Result Value Ref Range    Glucose 116 (H) 65 - 99 mg/dL    BUN 23 8 - 23 mg/dL    Creatinine 1.17 0.76 - 1.27 mg/dL    Sodium 139 136 - 145 mmol/L    Potassium 4.0 3.5 - 5.2 mmol/L    Chloride 103 98 - 107 mmol/L    CO2 27.0 22.0 - 29.0 mmol/L    Calcium 9.4 8.6 - 10.5 mg/dL    Total Protein 7.1 6.0 - 8.5 g/dL    Albumin 3.7 3.5 - 5.2 g/dL    ALT (SGPT) 12 1 - 41 U/L    AST (SGOT) 17 1 - 40 U/L    Alkaline Phosphatase 103 39 - 117 U/L    Total Bilirubin 0.6 0.0 - 1.2 mg/dL    Globulin 3.4 gm/dL    A/G Ratio 1.1 g/dL    BUN/Creatinine Ratio 19.7 7.0 - 25.0    Anion Gap 9.0 5.0 - 15.0 mmol/L    eGFR 65.4 >60.0 mL/min/1.73   Urinalysis With Microscopic If Indicated (No Culture) - Urine, Clean Catch    Specimen: Urine, Clean Catch   Result Value Ref Range    Color, UA Yellow  Yellow, Straw    Appearance, UA Clear Clear    pH, UA <=5.0 5.0 - 8.0    Specific Gravity, UA 1.019 1.005 - 1.030    Glucose, UA Negative Negative    Ketones, UA Negative Negative    Bilirubin, UA Negative Negative    Blood, UA Negative Negative    Protein, UA Negative Negative    Leuk Esterase, UA Negative Negative    Nitrite, UA Negative Negative    Urobilinogen, UA 0.2 E.U./dL 0.2 - 1.0 E.U./dL   CK    Specimen: Blood   Result Value Ref Range    Creatine Kinase 38 20 - 200 U/L   Magnesium    Specimen: Blood   Result Value Ref Range    Magnesium 2.0 1.6 - 2.4 mg/dL   CBC Auto Differential    Specimen: Blood   Result Value Ref Range    WBC 7.00 3.40 - 10.80 10*3/mm3    RBC 4.40 4.14 - 5.80 10*6/mm3    Hemoglobin 12.9 (L) 13.0 - 17.7 g/dL    Hematocrit 39.1 37.5 - 51.0 %    MCV 88.7 79.0 - 97.0 fL    MCH 29.2 26.6 - 33.0 pg    MCHC 33.0 31.5 - 35.7 g/dL    RDW 14.1 12.3 - 15.4 %    RDW-SD 46.8 37.0 - 54.0 fl    MPV 7.8 6.0 - 12.0 fL    Platelets 158 140 - 450 10*3/mm3    Neutrophil % 73.9 42.7 - 76.0 %    Lymphocyte % 15.8 (L) 19.6 - 45.3 %    Monocyte % 7.2 5.0 - 12.0 %    Eosinophil % 2.2 0.3 - 6.2 %    Basophil % 0.9 0.0 - 1.5 %    Neutrophils, Absolute 5.10 1.70 - 7.00 10*3/mm3    Lymphocytes, Absolute 1.10 0.70 - 3.10 10*3/mm3    Monocytes, Absolute 0.50 0.10 - 0.90 10*3/mm3    Eosinophils, Absolute 0.20 0.00 - 0.40 10*3/mm3    Basophils, Absolute 0.10 0.00 - 0.20 10*3/mm3    nRBC 0.0 0.0 - 0.2 /100 WBC     XR Chest 1 View    Result Date: 1/18/2024  Impression: Stable cardiomegaly. No acute cardial pulmonary disease. Electronically Signed: Blaise Kapadia DO  1/18/2024 10:26 PM EST  Workstation ID: [id number]   Medications   sodium chloride 0.9 % flush 10 mL (has no administration in time range)   vancomycin IVPB 1750 mg in 0.9% Sodium Chloride (premix) 500 mL (has no administration in time range)                                              Medical Decision Making  Medical decision making.  IV established monitor  placement review of sinus rhythm chest x-ray obtained my independent review I do not see pneumonia pneumothorax or CHF.  Radiology read some cardiomegaly otherwise negative labs obtained and pending by me comprehensive metabolic profile normal urine normal CBC unremarkable CK magnesium normal hemoglobin 12.9.  Patient record reviewed he had been admitted December 2023 had a GI bleed had a gastric ulcer and some esophagitis he was off his Eliquis for a bit and then got restarted by the cardiologist once he stabilized.  He is currently back on that he also had a humerus fracture recently he did end up going to rehab at [facility] and is currently been discharged from there.  Here I do not see evidence that suggest an acute stroke.  I would suggest acute spinal cord issue or acute intra-abdominal process or pneumonia urinary tract infection sepsis bacteremia DVT pulmonary embolism acute cardiac issue based on the history and physical and clinical findings here in the ER.  Patient does have a cellulitis to his toe he had been started on vancomycin.  He is taking Keflex as an outpatient for the last 5 days and has not improved at all.    Problems Addressed:  Cellulitis of toe of left foot: complicated acute illness or injury  General weakness: complicated acute illness or injury    Amount and/or Complexity of Data Reviewed  External Data Reviewed: notes.  Labs: ordered. Decision-making details documented in ED Course.  Radiology: ordered and independent interpretation performed. Decision-making details documented in ED Course.    Risk  Prescription drug management.  Decision regarding hospitalization.        Final diagnoses:   General weakness   Cellulitis of toe of left foot       ED Disposition  ED Disposition       ED Disposition   Decision to Admit    Condition   --    Comment   Level of Care: Med/Surg [1]   Admitting Physician: GONZALEZ BANEGAS [[id number]]   Attending Physician: GONZALEZ BANEGAS [[id number]]                 No  follow-up provider specified.       Medication List      No changes were made to your prescriptions during this visit.            Pito Tiwari MD  01/18/24 8585

## 2024-01-19 NOTE — NURSING NOTE
73-year-old male presents to the hospital with complaints of weakness and complaints of the left toe wound.  Chart reviewed orders noted.  Podiatry following.  Patient will defer to podiatry for now and follow along as needed

## 2024-01-19 NOTE — H&P
[facility] Medicine Services  History & Physical    Patient Name: Blas Mojica  : 1950  MRN: [id number]  Primary Care Physician:  Sahil Read MD  Date of admission: 2024  Date and Time of Service: 2024 at 2322    Subjective      Chief Complaint: Weakness    History of Present Illness: Blas Mojica is a 73 y.o. male with a PMH of stage III CKD, type 2 diabetes mellitus, HTN, HLD, recent hospitalization for gastric ulcers and GI bleeding followed by another hospitalization for COVID-19 who presented to [facility] on 2024 with complaints of weakness. Mr Mojica report he was discharged from [facility] rehab where he went after his recent hospitalizations for Covid 19 and GI bleeding,. He states he was able to ambulate to the bathroom with a rogers this afternoon but could not get himself back up off the commode because he was too weak. He does endorse ongoing right humerus fracture, but he has seen orthopedic surgery for but is not fully sure of ongoing plans other than he is supposed to be doing exercises with his hand.His wife at bedside today states she tried to help him get back up onto his feet but was unsuccessful, so she called EMS who was able to get him lifted up and transported to the emergency room for further evaluation.  He also complains of great left toe wound that has been ongoing since his last discharge. Upon exam the toe patient did have areas of necrosis, erythematous and is also warm to touch, He denies any other acute distress chest pain, shortness of breath, palpitations, lightheadedness, dizziness, fever, chills, abdominal pain, nausea, vomiting, diarrhea, constipation or  symptoms.    Initial evaluation in the emergency room feels he is hemodynamically stable blood pressure 132/68, heart rate 62, respiratory rate 16, oxygen saturation 96% on room air, he is afebrile with Tmax 98.  He is anemic globin is stable today at 12.8 last his CBC, BMP are  unremarkable.  Urinalysis unremarkable  X-ray is unremarkable for any acute cardiopulmonary processes.  There is noted stable cardiomegaly.  Review of Systems   Constitutional:  Negative for chills and fever.   HENT: Negative.  Negative for congestion and trouble swallowing.    Eyes: Negative.  Negative for visual disturbance.   Respiratory: Negative.  Negative for cough, chest tightness, shortness of breath and wheezing.    Cardiovascular: Negative.  Negative for chest pain and palpitations.   Gastrointestinal: Negative.  Negative for abdominal distention, abdominal pain, constipation, diarrhea, nausea and vomiting.   Endocrine: Negative.    Genitourinary: Negative.  Negative for dysuria, frequency and urgency.   Musculoskeletal: Negative.  Negative for arthralgias.   Skin:  Positive for wound. Negative for rash.   Neurological:  Positive for weakness. Negative for dizziness and headaches.   Psychiatric/Behavioral:  Negative for agitation and confusion.    All other systems reviewed and are negative.      Personal History     Past Medical History:   Diagnosis Date    Diabetes mellitus     Hyperlipidemia     Hypertension     Kidney stone     TIA (transient ischemic attack) 2016    Type 2 diabetes mellitus with stage 3 chronic kidney disease, with long-term current use of insulin 12/13/2021       Past Surgical History:   Procedure Laterality Date    ADENOIDECTOMY      ENDOSCOPY N/A 12/13/2023    Procedure: ESOPHAGOGASTRODUODENOSCOPY with removal of NJ tube from right nare;  Surgeon: Jaret Valadez MD;  Location: [facility] ENDOSCOPY;  Service: Gastroenterology;  Laterality: N/A;  esophagitis, gastric ulcer    EXTRACORPOREAL SHOCK WAVE LITHOTRIPSY (ESWL)      HERNIA REPAIR      KNEE ACL RECONSTRUCTION Left     TONSILLECTOMY         Family History: family history includes No Known Problems in his father and mother.     Social History:  reports that he has never smoked. He has never used smokeless tobacco. He  reports that he does not drink alcohol and does not use drugs.    Home Medications:  Prior to Admission Medications       Prescriptions Last Dose Informant Patient Reported? Taking?    apixaban (ELIQUIS) 2.5 MG tablet tablet   No No    Take 1 tablet by mouth Every 12 (Twelve) Hours. Indications: Atrial Fibrillation    benzonatate (TESSALON) 200 MG capsule   No No    Take 1 capsule by mouth 3 (Three) Times a Day.    budesonide-formoterol (SYMBICORT) 160-4.5 MCG/ACT inhaler   No No    Inhale 2 puffs 2 (Two) Times a Day.    insulin glargine (LANTUS, SEMGLEE) 100 UNIT/ML injection   Yes No    Inject 33 Units under the skin into the appropriate area as directed Every Night.    metoprolol succinate XL (TOPROL-XL) 100 MG 24 hr tablet   No No    Take 1 tablet by mouth Daily.    pantoprazole (PROTONIX) 40 MG EC tablet   No No    Take 1 tablet by mouth 2 (Two) Times a Day Before Meals.              Allergies:  Allergies   Allergen Reactions    Contrast Dye (Echo Or Unknown Ct/Mr) Anaphylaxis    Iodinated Contrast Media Shortness Of Breath    Iodine Shortness Of Breath    Aspirin Nausea And Vomiting    Lipitor [Atorvastatin] Unknown - High Severity    Prednisone Hives       Objective      Vitals:   Temp:  [98.1 °F (36.7 °C)] 98.1 °F (36.7 °C)  Heart Rate:  [62-77] 72  Resp:  [16] 16  BP: (132-152)/(65-80) 152/68  Body mass index is 26.69 kg/m².  Physical Exam  Vitals and nursing note reviewed.   Constitutional:       General: He is awake.      Appearance: He is ill-appearing.   HENT:      Mouth/Throat:      Mouth: Mucous membranes are dry.   Eyes:      Conjunctiva/sclera: Conjunctivae normal.      Pupils: Pupils are equal, round, and reactive to light.   Cardiovascular:      Rate and Rhythm: Normal rate and regular rhythm.      Pulses: Normal pulses.           Radial pulses are 2+ on the right side and 2+ on the left side.        Dorsalis pedis pulses are 1+ on the right side and 2+ on the left side.        Posterior tibial  pulses are 2+ on the right side and 2+ on the left side.      Heart sounds: Normal heart sounds.   Pulmonary:      Effort: Pulmonary effort is normal.      Breath sounds: Normal breath sounds.   Abdominal:      General: Bowel sounds are normal.   Musculoskeletal:      Right lower leg: Edema present.      Left lower leg: Edema present.   Skin:     General: Skin is warm and dry.      Capillary Refill: Capillary refill takes less than 2 seconds.      Findings: Erythema and wound present.      Comments: Great left   Neurological:      Mental Status: He is alert and oriented to person, place, and time. Mental status is at baseline.   Psychiatric:         Behavior: Behavior is cooperative.         Diagnostic Data:  Lab Results (last 24 hours)       Procedure Component Value Units Date/Time    MRSA Screen, PCR (Inpatient) - Swab, Nares [[id number]] Collected: 01/19/24 0009    Specimen: Swab from Nares Updated: 01/19/24 0014    Urinalysis With Microscopic If Indicated (No Culture) - Urine, Clean Catch [[id number]]  (Normal) Collected: 01/18/24 2237    Specimen: Urine, Clean Catch Updated: 01/18/24 2244     Color, UA Yellow     Appearance, UA Clear     pH, UA <=5.0     Specific Gravity, UA 1.019     Glucose, UA Negative     Ketones, UA Negative     Bilirubin, UA Negative     Blood, UA Negative     Protein, UA Negative     Leuk Esterase, UA Negative     Nitrite, UA Negative     Urobilinogen, UA 0.2 E.U./dL    Narrative:      Urine microscopic not indicated.    Comprehensive Metabolic Panel [[id number]]  (Abnormal) Collected: 01/18/24 2153    Specimen: Blood Updated: 01/18/24 2220     Glucose 116 mg/dL      BUN 23 mg/dL      Creatinine 1.17 mg/dL      Sodium 139 mmol/L      Potassium 4.0 mmol/L      Chloride 103 mmol/L      CO2 27.0 mmol/L      Calcium 9.4 mg/dL      Total Protein 7.1 g/dL      Albumin 3.7 g/dL      ALT (SGPT) 12 U/L      AST (SGOT) 17 U/L      Alkaline Phosphatase 103 U/L      Total Bilirubin 0.6 mg/dL       Globulin 3.4 gm/dL      A/G Ratio 1.1 g/dL      BUN/Creatinine Ratio 19.7     Anion Gap 9.0 mmol/L      eGFR 65.4 mL/min/1.73     Narrative:      GFR Normal >60  Chronic Kidney Disease <60  Kidney Failure <15    The GFR formula is only valid for adults with stable renal function between ages 18 and 70.    CK [[id number]]  (Normal) Collected: 01/18/24 2153    Specimen: Blood Updated: 01/18/24 2220     Creatine Kinase 38 U/L     Magnesium [[id number]]  (Normal) Collected: 01/18/24 2153    Specimen: Blood Updated: 01/18/24 2220     Magnesium 2.0 mg/dL     CBC & Differential [[id number]]  (Abnormal) Collected: 01/18/24 2153    Specimen: Blood Updated: 01/18/24 2201    Narrative:      The following orders were created for panel order CBC & Differential.  Procedure                               Abnormality         Status                     ---------                               -----------         ------                     CBC Auto Differential[[id number]]        Abnormal            Final result                 Please view results for these tests on the individual orders.    CBC Auto Differential [[id number]]  (Abnormal) Collected: 01/18/24 2153    Specimen: Blood Updated: 01/18/24 2201     WBC 7.00 10*3/mm3      RBC 4.40 10*6/mm3      Hemoglobin 12.9 g/dL      Hematocrit 39.1 %      MCV 88.7 fL      MCH 29.2 pg      MCHC 33.0 g/dL      RDW 14.1 %      RDW-SD 46.8 fl      MPV 7.8 fL      Platelets 158 10*3/mm3      Neutrophil % 73.9 %      Lymphocyte % 15.8 %      Monocyte % 7.2 %      Eosinophil % 2.2 %      Basophil % 0.9 %      Neutrophils, Absolute 5.10 10*3/mm3      Lymphocytes, Absolute 1.10 10*3/mm3      Monocytes, Absolute 0.50 10*3/mm3      Eosinophils, Absolute 0.20 10*3/mm3      Basophils, Absolute 0.10 10*3/mm3      nRBC 0.0 /100 WBC              Imaging Results (Last 24 Hours)       Procedure Component Value Units Date/Time    XR Chest 1 View [[id number]] Collected: 01/18/24 2222     Updated: 01/18/24 2228     Narrative:      XR CHEST 1 VW    Date of Exam: 1/18/2024 10:06 PM EST    Indication: General weakness    Comparison: Portable chest dated 12/31/2023    Findings:    The lungs are grossly clear except for areas of scarring. Cardiac, hilar, and mediastinal silhouettes are stable with moderate cardiomegaly.. Pulmonary vascularity is within normal limits. No pneumothorax or pleural effusions. The trachea is midline.  No   acute bony abnormality or aggressive appearing focal osseous lesions in the visualized bony thorax.           Impression:      Impression:  Stable cardiomegaly. No acute cardial pulmonary disease.      Electronically Signed: Blaisecedric Kapadia DO    1/18/2024 10:26 PM EST    Workstation ID: [id number]              Assessment & Plan          Active and Resolved Problems  Active Hospital Problems    Diagnosis  POA    **Generalized weakness [R53.1]  Yes    Foot ulcer due to secondary DM [E13.621, L97.509]  Yes    Physical debility [R53.81]  Yes    Type 2 diabetes mellitus with stage 3 chronic kidney disease, with long-term current use of insulin [E11.22, N18.30, Z79.4]  Not Applicable    Stage 3 chronic kidney disease [N18.30]  Yes      Resolved Hospital Problems   No resolved problems to display.     Generalized weakness  Physical debility   Initial evaluation has been unremarkable   PT OT consulted   He reports right  humerus fracture-initially seen at [facility] and was , placed in a sling-for 5 weeks-x-ray from 1/4/2024 shows no acute fracture, bony abnormality.  Sclerotic bone lesions within the proximal humeral diaphysis   Consult case management for discharge planning will need additional SNF/reconditioning  Has had multiple recent hospitalization 1 with COVID-19, and another with a GI bleed is chronic anticoagulation with Eliquis has been restarted.  Admit to medical surgical unit for further evaluation and treatment  Vital signs per policy  Check CBC, BMP in a.m.    Type 2 diabetes mellitus with  stage III chronic kidney disease, with long-term use of insulin  Chronic/stable  Continue home Lantus  Accu-Cheks ACHS  SSI ordered for hyperglycemia  Hypoglycemia treatment per protocol    Foot Ulcer due to secondary DM  Acute   Continue Vancomycin left great toe appears to also have a subcutaneous cellulitis involved  Blood cultures are pending   Podiatry consulted    Stage 3 chronic kidney disease  Chronic/Stable   Monitor I's and O's  Daily weights  Avoid nephrotoxic medications, hypovolemia, hypotension    Actual atrial fibrillation  AC Eliquis  RC metoprolol        DVT prophylaxis:  No DVT prophylaxis order currently exists.    The patient desires to be as follows:    CODE STATUS:    Code Status (Patient has no pulse and is not breathing): CPR (Attempt to Resuscitate)  Medical Interventions (Patient has pulse or is breathing): Full Support        Debra Mojica , who can be contacted at [phone] or at bedside, is the designated person to make medical decisions on the patient's behalf if He is incapable of doing so. This was clarified with patient and/or next of kin on 1/18/2024 during the course of this H&P.    Admission Status:  I believe this patient meets observation  status.    Expected Length of Stay: 1-2 days pending clinical course     PDMP and Medication Dispenses via Sidebar reviewed and consistent with patient reported medications.    I discussed the patient's findings and my recommendations with patient and family.    Patient was seen on 01/18/2024- dictation delay due to patient care   Signature:     This document has been electronically signed by PIPER Bejarano on January 19, 2024 01:38 [facility] Hospitalist Team

## 2024-01-19 NOTE — DISCHARGE PLACEMENT REQUEST
"Blas Mojica (73 y.o. Male)       Date of Birth   1950    Social Security Number       Address   [address] [place] [address]    Home Phone   [phone]    MRN   [id number]       Confucianism   Jain    Marital Status                               Admission Date   1/18/24    Admission Type   Emergency    Admitting Provider   Luis Angel Calderon DO    Attending Provider   Dar Rooney MD    Department, Room/Bed   [facility] EMERGENCY DEPARTMENT, 08/08       Discharge Date       Discharge Disposition       Discharge Destination                                 Attending Provider: Dar Rooney MD    Allergies: Contrast Dye (Echo Or Unknown Ct/mr), Iodinated Contrast Media, Iodine, Aspirin, Lipitor [Atorvastatin], Prednisone    Isolation: None   Infection: MRSA No Isolation this Admit (01/19/24), COVID (History) (01/19/24)   Code Status: CPR    Ht: 177.8 cm (70\")   Wt: 84.4 kg (186 lb)    Admission Cmt: None   Principal Problem: Generalized weakness [R53.1]                   Active Insurance as of 1/18/2024       Primary Coverage       Payor Plan Insurance Group Employer/Plan Group    MEDICARE MEDICARE A & B        Payor Plan Address Payor Plan Phone Number Payor Plan Fax Number Effective Dates    [address] [phone]  9/1/2014 - None Entered    [facility] [id number]         Subscriber Name Subscriber Birth Date Member ID       BLAS MOJICA 1950 [id number]               Secondary Coverage       Payor Plan Insurance Group Employer/Plan Group    HUMANA HUMANA [facility]              [id number]       Payor Plan Address Payor Plan Phone Number Payor Plan Fax Number Effective Dates    [address] [address]   9/1/2014 - None Entered    [facility] [id number]         Subscriber Name Subscriber Birth Date Member ID       BLAS MOJICA 1950 [id number]                     Emergency Contacts        (Rel.) Home Phone Work Phone Mobile Phone    " NICK TREADWELL (Spouse) [phone] -- [phone]    JOE TREADWELL (Daughter) -- -- [phone]    Pau Clifton (Daughter) -- -- --    Carie Renee (Daughter) -- -- --

## 2024-01-19 NOTE — THERAPY EVALUATION
Patient Name: Blas Mojica  : 1950    MRN: [id number]                              Today's Date: 2024       Admit Date: 2024    Visit Dx:     ICD-10-CM ICD-9-CM   1. General weakness  R53.1 780.79   2. Cellulitis of toe of left foot  L03.032 681.10     Patient Active Problem List   Diagnosis    Primary hypertension    TIA (transient ischemic attack)    Stage 3 chronic kidney disease    Dyslipidemia    History of basal cell carcinoma (BCC)    Primary osteoarthritis    Vitamin D deficiency    Acute pain of left knee    Generalized weakness    Hypokalemia    Moderate malnutrition    Physical debility    Type 2 diabetes mellitus with stage 3 chronic kidney disease, with long-term current use of insulin    Sinus tachycardia    Acute hypoxemic respiratory failure due to COVID-19    Pneumonia due to COVID-19 virus    Mixed hyperlipidemia    COVID-19 virus infection    Weakness    COVID    Foot ulcer due to secondary DM     Past Medical History:   Diagnosis Date    Diabetes mellitus     Hyperlipidemia     Hypertension     Kidney stone     TIA (transient ischemic attack)     Type 2 diabetes mellitus with stage 3 chronic kidney disease, with long-term current use of insulin 2021     Past Surgical History:   Procedure Laterality Date    ADENOIDECTOMY      ENDOSCOPY N/A 2023    Procedure: ESOPHAGOGASTRODUODENOSCOPY with removal of NJ tube from right nare;  Surgeon: Jaret Valadez MD;  Location: [facility] ENDOSCOPY;  Service: Gastroenterology;  Laterality: N/A;  esophagitis, gastric ulcer    EXTRACORPOREAL SHOCK WAVE LITHOTRIPSY (ESWL)      HERNIA REPAIR      KNEE ACL RECONSTRUCTION Left     TONSILLECTOMY        General Information       Row Name 24 1648          OT Time and Intention    Document Type evaluation  -SR     Mode of Treatment occupational therapy  -SR       Row Name 24 1648          General Information    Existing Precautions/Restrictions fall  -SR       Row  Name 01/19/24 1648          Occupational Profile    Reason for Services/Referral (Occupational Profile) Blas Mojica is a 73 y.o. male with a PMH of stage III CKD, type 2 diabetes mellitus, HTN, HLD, recent hospitalization for gastric ulcers and GI bleeding followed by another hospitalization for COVID-19 who presented to [facility] on 1/18/2024 with complaints of weakness.  Recently discharged home from [facility] and states he was using wc for mobility since then.  He has fracture of R humerus and is unable to weight bear through walker.  At baseline [facility]. Pt typically utilizes straight cane for mobility, however recently has had functional decline, only mobilizing within home and wife has increased assist with ADLs. Pt currently has a hospital bed, w/c, RW, SPC, grab bars, walk-shower, and BSC that he uses over his toilet.  -SR       Row Name 01/19/24 1648          Living Environment    People in Home spouse  -SR       Row Name 01/19/24 1648          Cognition    Orientation Status (Cognition) oriented x 4  -SR       Row Name 01/19/24 1648          Safety Issues, Functional Mobility    Impairments Affecting Function (Mobility) balance;cognition;endurance/activity tolerance;motor planning;strength  -SR               User Key  (r) = Recorded By, (t) = Taken By, (c) = Cosigned By      Initials Name Provider Type    SR Nancy Gaspar OT Occupational Therapist                     Mobility/ADL's       Row Name 01/19/24 1648          Bed Mobility    Bed Mobility supine-sit-supine  -SR     Supine-Sit-Supine [place] (Bed Mobility) minimum assist (75% patient effort);2 person assist  -SR       Row Name 01/19/24 1648          Sit-Stand Transfer    Sit-Stand [place] (Transfers) minimum assist (75% patient effort);2 person assist  -SR       Row Name 01/19/24 1641           Functional Mobility    Functional Mobility- Comment Pt very fearful of falling and requested not to stray from the bed.  He was able to take some side steps toward HOB with min assist.  -SR       Row Name 01/19/24 1648          Activities of Daily Living    BADL Assessment/Intervention lower body dressing  -SR       Row Name 01/19/24 1648          Mobility    Extremity Weight-bearing Status right upper extremity  -SR     Right Upper Extremity (Weight-bearing Status) other (see comments)  -SR       Row Name 01/19/24 1648          Lower Body Dressing Assessment/Training    [place] Level (Lower Body Dressing) don;pants/bottoms;dependent (less than 25% patient effort)  -SR               User Key  (r) = Recorded By, (t) = Taken By, (c) = Cosigned By      Initials Name Provider Type    SR Nancy Gaspar, OT Occupational Therapist                   Obj/Interventions       Row Name 01/19/24 1656 01/19/24 1653       Range of Motion Comprehensive    Comment, General Range of Motion R shoulder limited >50%, distal UE limited 25%.  LUE WFL.  -SR R shoulder limited >50%. Distal UE  -SR      Row Name 01/19/24 1649          Range of Motion Comprehensive    General Range of Motion no range of motion deficits identified  -SR       Row Name 01/19/24 1656 01/19/24 1649       Strength Comprehensive (MMT)    General Manual Muscle Testing (MMT) Assessment -- no strength deficits identified  -SR    Comment, General Manual Muscle Testing (MMT) Assessment Did not MMT RUE due to pain.  ALEKSANDR 4-/5  -SR --      Row Name 01/19/24 1653 01/19/24 1649       Balance    Static Standing Balance minimal assist  -SR minimal assist  -SR    Dynamic Standing Balance minimal assist  -SR minimal assist  -SR    Balance Interventions sitting;standing;sit to stand;supported;dynamic;static;minimal challenge  -SR sitting;sit to stand;standing;supported;static;dynamic;minimal challenge  -SR              User Key  (r) = Recorded By, (t) = Taken By, (c)  = Cosigned By      Initials Name Provider Type    SR Nancy Gaspar, OT Occupational Therapist                   Goals/Plan       Row Name 01/19/24 1657          Toileting Goal 1 (OT)    Activity/Device (Toileting Goal 1, OT) toileting skills, all  -SR     Clay Level/Cues Needed (Toileting Goal 1, OT) minimum assist (75% or more patient effort)  -SR     Time Frame (Toileting Goal 1, OT) 2 weeks  -SR       Row Name 01/19/24 1657          Grooming Goal 1 (OT)    Activity/Device (Grooming Goal 1, OT) grooming skills, all  -SR     Clay (Grooming Goal 1, OT) contact guard required  -SR     Time Frame (Grooming Goal 1, OT) 2 weeks  -SR       Row Name 01/19/24 1657          Therapy Assessment/Plan (OT)    Planned Therapy Interventions (OT) activity tolerance training;BADL retraining;IADL retraining;functional balance retraining;neuromuscular control/coordination retraining;ROM/therapeutic exercise;transfer/mobility retraining;strengthening exercise  -SR               User Key  (r) = Recorded By, (t) = Taken By, (c) = Cosigned By      Initials Name Provider Type    SR Nancy Gaspar, OT Occupational Therapist                   Clinical Impression       Row Name 01/19/24 1654 01/19/24 1653       Pain Assessment    Pretreatment Pain Rating 0/10 - no pain  -SR 0/10 - no pain  -SR    Posttreatment Pain Rating 0/10 - no pain  -SR 0/10 - no pain  -SR      Row Name 01/19/24 1654 01/19/24 1653       Plan of Care Review    Outcome Evaluation Blas Mojica is a 73 y.o. male with a PMH of stage III CKD, type 2 diabetes mellitus, HTN, HLD, recent hospitalization for gastric ulcers and GI bleeding followed by another hospitalization for COVID-19 who presented to [facility] on 1/18/2024 with complaints of weakness.  Recently discharged home from [facility] and states he was using wc for mobility since then.  He has fracture of R humerus and is unable to weight bear through walker.  At baseline pt  resides with spouse in independent living at [facility] and utilizes elevator to access apartment. Pt typically utilizes straight cane for mobility, however recently has had functional decline, only mobilizing within home and wife has increased assist with ADLs. Pt currently has a hospital bed, w/c, RW, SPC, grab bars, walk-shower, and BSC that he uses over his toilet.  This date he requires min assist for all mobility, though demos significant fear of falling. He is limited with use of RUE causing difficulty with all ADLs.  He is high risk for falls and is not safe to return home at discharge.  Recommend acue IP rehab at discharge.  -SR Blas Mojica is a 73 y.o. male with a PMH of stage III CKD, type 2 diabetes mellitus, HTN, HLD, recent hospitalization for gastric ulcers and GI bleeding followed by another hospitalization for COVID-19 who presented to [facility] on 1/18/2024 with complaints of weakness.  Recently discharged home from [facility] and states he was using wc for mobility since then.  He has fracture of R humerus and is unable to weight bear through walker.  At baseline pt resides with spouse in independent living at [facility] and utilizes elevator to access apartment. Pt typically utilizes straight cane for mobility, however recently has had functional decline, only mobilizing within home and wife has increased assist with ADLs. Pt currently has a hospital bed, w/c, RW, SPC, grab bars, walk-shower, and BSC that he uses over his toilet.  -SR      Row Name 01/19/24 1654          Therapy Assessment/Plan (OT)    Rehab Potential (OT) good, to achieve stated therapy goals  -SR     Criteria for Skilled Therapeutic Interventions Met (OT) yes  -SR     Therapy Frequency (OT) 5 times/wk  -SR     Predicted Duration of Therapy Intervention (OT) Until discharge  -SR       Row Name 01/19/24 1654          Therapy Plan Review/Discharge Plan (OT)    Anticipated  Discharge Disposition (OT) inpatient rehabilitation facility  -       Row Name 01/19/24 1654          Positioning and Restraints    Pre-Treatment Position in bed  -SR     Post Treatment Position bed  -SR               User Key  (r) = Recorded By, (t) = Taken By, (c) = Cosigned By      Initials Name Provider Type    SR Nancy Gaspar, OT Occupational Therapist                   Outcome Measures       Row Name 01/19/24 1551          How much help from another person do you currently need...    Turning from your back to your side while in flat bed without using bedrails? 3  -CL     Moving from lying on back to sitting on the side of a flat bed without bedrails? 3  -CL     Moving to and from a bed to a chair (including a wheelchair)? 3  -CL     Standing up from a chair using your arms (e.g., wheelchair, bedside chair)? 3  -CL     Climbing 3-5 steps with a railing? 2  -CL     To walk in hospital room? 2  -CL     AM-PAC 6 Clicks Score (PT) 16  -CL     Highest Level of Mobility Goal 5 --> Static standing  -CL               User Key  (r) = Recorded By, (t) = Taken By, (c) = Cosigned By      Initials Name Provider Type    CL Catalina Mg, PT Physical Therapist                    Occupational Therapy Education       Title: PT OT SLP Therapies (In Progress)       Topic: Occupational Therapy (In Progress)       Point: ADL training (In Progress)       Description:   Instruct learner(s) on proper safety adaptation and remediation techniques during self care or transfers.   Instruct in proper use of assistive devices.                  Learning Progress Summary             Patient Acceptance, E,TB, NR by SR at 1/19/2024 9462                         Point: Home exercise program (Not Started)       Description:   Instruct learner(s) on appropriate technique for monitoring, assisting and/or progressing therapeutic exercises/activities.                  Learner Progress:  Not documented in this visit.               Point: Precautions (In Progress)       Description:   Instruct learner(s) on prescribed precautions during self-care and functional transfers.                  Learning Progress Summary             Patient Acceptance, E,TB, NR by SR at 1/19/2024 7327                         Point: Body mechanics (Not Started)       Description:   Instruct learner(s) on proper positioning and spine alignment during self-care, functional mobility activities and/or exercises.                  Learner Progress:  Not documented in this visit.                              User Key       Initials Effective Dates Name Provider Type Discipline     06/16/21 -  Nancy Gaspar, OT Occupational Therapist OT                  OT Recommendation and Plan  Planned Therapy Interventions (OT): activity tolerance training, BADL retraining, IADL retraining, functional balance retraining, neuromuscular control/coordination retraining, ROM/therapeutic exercise, transfer/mobility retraining, strengthening exercise  Therapy Frequency (OT): 5 times/wk  Plan of Care Review  Outcome Evaluation: Blas Mojica is a 73 y.o. male with a PMH of stage III CKD, type 2 diabetes mellitus, HTN, HLD, recent hospitalization for gastric ulcers and GI bleeding followed by another hospitalization for COVID-19 who presented to [facility] on 1/18/2024 with complaints of weakness.  Recently discharged home from [facility] and states he was using wc for mobility since then.  He has fracture of R humerus and is unable to weight bear through walker.  At baseline [facility] and [facility] to access [facility]. Pt typically utilizes straight cane for mobility, however recently has had functional decline, only mobilizing within home and wife has increased assist with ADLs. Pt currently has a hospital bed, w/c, RW, SPC, grab bars, walk-shower, and BSC that he uses over his toilet.  This date  he requires min assist for all mobility, though demos significant fear of falling. He is limited with use of RUE causing difficulty with all ADLs.  He is high risk for falls and is not safe to return home at discharge.  Recommend acue IP rehab at discharge.     Time Calculation:         Time Calculation- OT       Row Name 01/19/24 1658             Time Calculation- OT    OT Start Time 1440  -SR      OT Stop Time 1505  -SR      OT Time Calculation (min) 25 min  -SR      Total Timed Code Minutes- OT 0 minute(s)  -SR      OT Received On 01/19/24  -SR      OT - Next Appointment 01/22/24  -SR      OT Goal Re-Cert Due Date 02/02/24  -SR                User Key  (r) = Recorded By, (t) = Taken By, (c) = Cosigned By      Initials Name Provider Type    SR Nancy Gaspar OT Occupational Therapist                  Therapy Charges for Today       Code Description Service Date Service Provider Modifiers Qty    [id number] HC OT EVAL MOD COMPLEXITY 4 1/19/2024 Nancy Gaspar OT GO 1                 Nancy Gaspar OT  1/19/2024

## 2024-01-19 NOTE — CASE MANAGEMENT/SOCIAL WORK
Continued Stay Note  [facility]     Patient Name: Blas Mojica  MRN: [id number]  Today's Date: 1/19/2024    Admit Date: 1/18/2024    Plan: SNF recommended. Pt requesting referral to [facility](answer pending). Current with [facility]   Discharge Plan       Row Name 01/19/24 1647       Plan    Plan SNF recommended. Pt requesting referral to [facility](answer pending). Current with [facility]    Plan Comments Notified by Emili with [facility] that patient is current with them.      Row Name 01/19/24 1619       Plan    Plan SNF recommended. Pt requesting referral to [facility] (answer pending). ?[facility]    Patient/Family in Agreement with Plan other (see comments)  Unable to reach wife to discuss    Plan Comments Pt reports he lives with spouse who assists him with ADLS. He reports he discharged from [facility] yesterday and would like to return. We discussed PT recommendations for SNF, but he is requesting that  check with [facility]. I have spoken with silvia Ballard and she will review. Answer pending. Pt has SNF list. He confirms pcp and pharmacy. He denies difficulty obtaining medications/transportation/food.He states wife will provide transportation at discharge. He reports he is hesitant to consider SNF because he had a bad experience at [facility] in the past. He believes [facility] had made home health arrangements with [facility],but is unsure. I have left a message with Juana at [facility] answering service to confirm and awaiting a return call. I have also left a voicemail for silvia Mock requesting a return call. Response pending. Pt did have a hospital inpatient stay 12/19/23-1/4/24. Pt requests wife to be contacted as well. I have left a message on their home voicemail requesting a return call.Response pending.                   Discharge Codes    No documentation.                 Charlene Lindo RN, [facility]  Office: [phone]  Fax: [phone]  [email or web address]      Phone communication or documentation only - no physical contact with patient  or family.      Charlene Lindo RN

## 2024-01-19 NOTE — ED NOTES
Nursing report ED to floor  Blas Mojica  73 y.o.  male    HPI:   Chief Complaint   Patient presents with    Weakness - Generalized       Admitting doctor:   Luis Angel Calderon DO    Admitting diagnosis:   The primary encounter diagnosis was General weakness. A diagnosis of Cellulitis of toe of left foot was also pertinent to this visit.    Code status:   Current Code Status       Date Active Code Status Order ID Comments User Context       1/18/2024 2322 CPR (Attempt to Resuscitate) [id number]  Lorie Calvin APRN ED        Question Answer    Code Status (Patient has no pulse and is not breathing) CPR (Attempt to Resuscitate)    Medical Interventions (Patient has pulse or is breathing) Full Support                    Allergies:   Contrast dye (echo or unknown ct/mr), Iodinated contrast media, Iodine, Aspirin, Lipitor [atorvastatin], and Prednisone    Isolation:  No active isolations     Fall Risk:  Fall Risk Assessment was completed, and patient is at high risk for falls.   Predictive Model Details         56 (High) Factor Value    Calculated 1/19/2024 17:36 Age 73    Risk of Fall Model Active Peripheral IV Present     Imaging order in this encounter Present     Number of Distinct Medication Classes administered 7     Musculoskeletal Assessment X     Magnesium 2 mg/dL     Financial Class Medicare     Efren Scale 16     Drug Use No     Diastolic BP 72     Calcium 8.9 mg/dL     Albumin 3.7 g/dL     Days after Admission 0.853     Clinically Relevant Sex Not Female     Number of administrations of Ulcer Drugs 1     Gastrointestinal Assessment WDL     Total Bilirubin 0.6 mg/dL     Cardiac Assessment WDL     Respiratory Rate 16     Number of administrations of Anti-Hypertensives 1     Number of administrations of Anti-Coagulants 1     Creatinine 1.17 mg/dL     Skin Assessment X     Peripheral Vascular Assessment X     ALT 12 U/L     Chloride 105 mmol/L     Potassium 3.9 mmol/L         Weight:        01/18/24  2106   Weight: 84.4 kg (186 lb)       Intake and Output    Intake/Output Summary (Last 24 hours) at 1/19/2024 1736  Last data filed at 1/19/2024 0110  Gross per 24 hour   Intake 500 ml   Output --   Net 500 ml       Diet:   Dietary Orders (From admission, onward)       Start     Ordered    01/18/24 2321  Diet: Cardiac Diets, Diabetic Diets; Healthy Heart (2-3 Na+); Consistent Carbohydrate; Texture: Regular Texture (IDDSI 7); Fluid Consistency: Thin (IDDSI 0)  Diet Effective Now        References:    Diet Order [facility]   Question Answer Comment   Diets: Cardiac Diets    Diets: Diabetic Diets    Cardiac Diet: Healthy Heart (2-3 Na+)    Diabetic Diet: Consistent Carbohydrate    Texture: Regular Texture (IDDSI 7)    Fluid Consistency: Thin (IDDSI 0)        01/18/24 2322                     Most recent vitals:   Vitals:    01/19/24 1252 01/19/24 1301 01/19/24 1401 01/19/24 1602   BP: 136/62 112/55 120/57 126/72   BP Location:       Patient Position:       Pulse: 68 67 60 69   Resp:       Temp:       TempSrc:       SpO2: 93% 95% 93% 97%   Weight:       Height:           Active LDAs/IV Access:   Lines, Drains & Airways       Active LDAs       Name Placement date Placement time Site Days    Peripheral IV 01/18/24 2153 Anterior;Left Forearm 01/18/24 2153  Forearm  less than 1                    Skin Condition:   Skin Assessments (last day)       Date/Time Skin WDL Sensory Perception Moisture Activity Mobility Nutrition Friction and Shear Efren Score    01/19/24 0156 -- 3-->slightly limited 3-->occasionally moist 2-->chairfast 3-->slightly limited 3-->adequate 2-->potential problem 16    01/19/24 0800 X;characteristics 3-->slightly limited 3-->occasionally moist 2-->chairfast 3-->slightly limited 3-->adequate 2-->potential problem 16             Labs (abnormal labs have a star):   Labs Reviewed   MRSA SCREEN, PCR - Abnormal; Notable for the following components:       Result Value    MRSA PCR MRSA Detected (*)      All other components within normal limits    Narrative:     The negative predictive value of this diagnostic test is high and should only be used to consider de-escalating anti-MRSA therapy. A positive result may indicate colonization with MRSA and must be correlated clinically.   COMPREHENSIVE METABOLIC PANEL - Abnormal; Notable for the following components:    Glucose 116 (*)     All other components within normal limits    Narrative:     GFR Normal >60  Chronic Kidney Disease <60  Kidney Failure <15    The GFR formula is only valid for adults with stable renal function between ages 18 and 70.   CBC WITH AUTO DIFFERENTIAL - Abnormal; Notable for the following components:    Hemoglobin 12.9 (*)     Lymphocyte % 15.8 (*)     All other components within normal limits   BASIC METABOLIC PANEL - Abnormal; Notable for the following components:    Glucose 250 (*)     All other components within normal limits    Narrative:     GFR Normal >60  Chronic Kidney Disease <60  Kidney Failure <15    The GFR formula is only valid for adults with stable renal function between ages 18 and 70.   CBC (NO DIFF) - Abnormal; Notable for the following components:    RBC 3.73 (*)     Hemoglobin 11.0 (*)     Hematocrit 33.3 (*)     Platelets 124 (*)     All other components within normal limits   POCT GLUCOSE FINGERSTICK - Abnormal; Notable for the following components:    Glucose 224 (*)     All other components within normal limits   URINALYSIS W/ MICROSCOPIC IF INDICATED (NO CULTURE) - Normal    Narrative:     Urine microscopic not indicated.   CK - Normal   MAGNESIUM - Normal   POCT GLUCOSE FINGERSTICK   POCT GLUCOSE FINGERSTICK   POCT GLUCOSE FINGERSTICK   POCT GLUCOSE FINGERSTICK   POCT GLUCOSE FINGERSTICK   POCT GLUCOSE FINGERSTICK   POCT GLUCOSE FINGERSTICK   CBC AND DIFFERENTIAL    Narrative:     The following orders were created for panel order CBC & Differential.  Procedure                               Abnormality          Status                     ---------                               -----------         ------                     CBC Auto Differential[[id number]]        Abnormal            Final result                 Please view results for these tests on the individual orders.       LOC: Person, Place, Time, and Situation    Telemetry:  Med/Surg    Cardiac Monitoring Ordered: no    EKG:   No orders to display       Medications Given in the ED:   Medications   sodium chloride 0.9 % flush 10 mL (has no administration in time range)   sodium chloride 0.9 % flush 10 mL (10 mL Intravenous Not Given 1/19/24 0905)   sodium chloride 0.9 % flush 10 mL (has no administration in time range)   sodium chloride 0.9 % infusion 40 mL (has no administration in time range)   sennosides-docusate (PERICOLACE) 8.6-50 MG per tablet 2 tablet (has no administration in time range)     And   polyethylene glycol (MIRALAX) packet 17 g (has no administration in time range)     And   bisacodyl (DULCOLAX) EC tablet 5 mg (has no administration in time range)     And   bisacodyl (DULCOLAX) suppository 10 mg (has no administration in time range)   dextrose (GLUTOSE) oral gel 15 g (has no administration in time range)   dextrose (D50W) (25 g/50 mL) IV injection 25 g (has no administration in time range)   glucagon (GLUCAGEN) injection 1 mg (has no administration in time range)   insulin lispro (HUMALOG/ADMELOG) injection 3-14 Units ( Subcutaneous Not Given 1/19/24 1251)   acetaminophen (TYLENOL) tablet 650 mg (has no administration in time range)     Or   acetaminophen (TYLENOL) 160 MG/5ML oral solution 650 mg (has no administration in time range)     Or   acetaminophen (TYLENOL) suppository 650 mg (has no administration in time range)   Pharmacy to dose vancomycin (has no administration in time range)   vancomycin IVPB 1500 mg in 0.9% NaCl (Premix) 500 mL (has no administration in time range)   apixaban (ELIQUIS) tablet 2.5 mg (2.5 mg Oral Given 1/19/24 1011)    insulin glargine (LANTUS, SEMGLEE) injection 33 Units (has no administration in time range)   lisinopril (PRINIVIL,ZESTRIL) tablet 40 mg (40 mg Oral Given 1/19/24 0858)   metoprolol succinate XL (TOPROL-XL) 24 hr tablet 100 mg (100 mg Oral Given 1/19/24 0859)   pantoprazole (PROTONIX) EC tablet 40 mg (40 mg Oral Given 1/19/24 0859)   cefTRIAXone (ROCEPHIN) 2,000 mg in sodium chloride 0.9 % 100 mL IVPB (has no administration in time range)   vancomycin IVPB 1750 mg in 0.9% Sodium Chloride (premix) 500 mL (0 mg Intravenous Stopped 1/19/24 0110)       Imaging results:  XR Foot 3+ View Left    Result Date: 1/19/2024  Impression: 1.There is a new cortical erosion involving the tuft of the first toe suggestive of osteomyelitis. Electronically Signed: Jorje Velazquez MD  1/19/2024 12:55 PM CST  Workstation ID: [id number]    XR Chest 1 View    Result Date: 1/18/2024  Impression: Stable cardiomegaly. No acute cardial pulmonary disease. Electronically Signed: Blaise Kapadia DO  1/18/2024 10:26 PM EST  Workstation ID: [id number]     Social issues:   Social History     Socioeconomic History    Marital status:    Tobacco Use    Smoking status: Never    Smokeless tobacco: Never   Vaping Use    Vaping Use: Never used   Substance and Sexual Activity    Alcohol use: Never    Drug use: Never    Sexual activity: Defer       NIH Stroke Scale:  Interval: (not recorded)  1a. Level of Consciousness: (not recorded)  1b. LOC Questions: (not recorded)  1c. LOC Commands: (not recorded)  2. Best Gaze: (not recorded)  3. Visual: (not recorded)  4. Facial Palsy: (not recorded)  5a. Motor Arm, Left: (not recorded)  5b. Motor Arm, Right: (not recorded)  6a. Motor Leg, Left: (not recorded)  6b. Motor Leg, Right: (not recorded)  7. Limb Ataxia: (not recorded)  8. Sensory: (not recorded)  9. Best Language: (not recorded)  10. Dysarthria: (not recorded)  11. Extinction and Inattention (formerly Neglect): (not recorded)    Total (NIH Stroke Scale):  (not recorded)     Additional notable assessment information:     Nursing report ED to floor:  Melissa Beth LPN   01/19/24 17:36 EST

## 2024-01-20 LAB
GLUCOSE BLDC GLUCOMTR-MCNC: 103 MG/DL (ref 70–105)
GLUCOSE BLDC GLUCOMTR-MCNC: 130 MG/DL (ref 70–105)
GLUCOSE BLDC GLUCOMTR-MCNC: 166 MG/DL (ref 70–105)
GLUCOSE BLDC GLUCOMTR-MCNC: 77 MG/DL (ref 70–105)
VANCOMYCIN PEAK SERPL-MCNC: 23.7 MCG/ML (ref 20–40)

## 2024-01-20 PROCEDURE — 25010000002 CEFTRIAXONE PER 250 MG: Performed by: INTERNAL MEDICINE

## 2024-01-20 PROCEDURE — 11730 AVULSION NAIL PLATE SIMPLE 1: CPT | Performed by: PODIATRIST

## 2024-01-20 PROCEDURE — 25010000002 VANCOMYCIN HCL IN NACL 1.5-0.9 GM/500ML-% SOLUTION: Performed by: NURSE PRACTITIONER

## 2024-01-20 PROCEDURE — G0378 HOSPITAL OBSERVATION PER HR: HCPCS

## 2024-01-20 PROCEDURE — 80202 ASSAY OF VANCOMYCIN: CPT | Performed by: NURSE PRACTITIONER

## 2024-01-20 PROCEDURE — 63710000001 INSULIN LISPRO (HUMAN) PER 5 UNITS: Performed by: NURSE PRACTITIONER

## 2024-01-20 PROCEDURE — 63710000001 INSULIN GLARGINE PER 5 UNITS: Performed by: NURSE PRACTITIONER

## 2024-01-20 PROCEDURE — 82948 REAGENT STRIP/BLOOD GLUCOSE: CPT

## 2024-01-20 PROCEDURE — 99221 1ST HOSP IP/OBS SF/LOW 40: CPT | Performed by: PODIATRIST

## 2024-01-20 RX ADMIN — Medication 1500 MG: at 22:23

## 2024-01-20 RX ADMIN — METOPROLOL SUCCINATE 100 MG: 50 TABLET, EXTENDED RELEASE ORAL at 09:35

## 2024-01-20 RX ADMIN — PANTOPRAZOLE SODIUM 40 MG: 40 TABLET, DELAYED RELEASE ORAL at 16:37

## 2024-01-20 RX ADMIN — APIXABAN 2.5 MG: 2.5 TABLET, FILM COATED ORAL at 20:34

## 2024-01-20 RX ADMIN — INSULIN GLARGINE 33 UNITS: 100 INJECTION, SOLUTION SUBCUTANEOUS at 20:34

## 2024-01-20 RX ADMIN — Medication 10 ML: at 09:37

## 2024-01-20 RX ADMIN — CEFTRIAXONE 2000 MG: 2 INJECTION, POWDER, FOR SOLUTION INTRAMUSCULAR; INTRAVENOUS at 17:22

## 2024-01-20 RX ADMIN — LISINOPRIL 40 MG: 20 TABLET ORAL at 09:35

## 2024-01-20 RX ADMIN — APIXABAN 2.5 MG: 2.5 TABLET, FILM COATED ORAL at 09:35

## 2024-01-20 RX ADMIN — PANTOPRAZOLE SODIUM 40 MG: 40 TABLET, DELAYED RELEASE ORAL at 09:35

## 2024-01-20 RX ADMIN — INSULIN LISPRO 3 UNITS: 100 INJECTION, SOLUTION INTRAVENOUS; SUBCUTANEOUS at 13:21

## 2024-01-20 NOTE — PROGRESS NOTES
"01/20/24   Foot and Ankle Surgery - Inpatient Follow-up  Provider: Dr. Isaiah Brown, MITCHELL  Location: [facility]    Chief Complaint: Left great toe discoloration    Subjective:  Blas Mojica is a 73 y.o. male.     Patient states that he continues to feel weak.  He denies any new issues involving left great toe.  No family at bedside.    Allergies   Allergen Reactions    Contrast Dye (Echo Or Unknown Ct/Mr) Anaphylaxis    Iodinated Contrast Media Shortness Of Breath    Iodine Shortness Of Breath    Aspirin Nausea And Vomiting    Lipitor [Atorvastatin] Unknown - High Severity    Prednisone Hives       No current facility-administered medications on file prior to encounter.     Current Outpatient Medications on File Prior to Encounter   Medication Sig Dispense Refill    apixaban (ELIQUIS) 2.5 MG tablet tablet Take 1 tablet by mouth Every 12 (Twelve) Hours. Indications: Atrial Fibrillation 60 tablet 0    insulin glargine (LANTUS, SEMGLEE) 100 UNIT/ML injection Inject 33 Units under the skin into the appropriate area as directed Every Night.      lisinopril (PRINIVIL,ZESTRIL) 40 MG tablet Take 1 tablet by mouth Daily.      metoprolol succinate XL (TOPROL-XL) 100 MG 24 hr tablet Take 1 tablet by mouth Daily. 30 tablet 0    pantoprazole (PROTONIX) 40 MG EC tablet Take 1 tablet by mouth 2 (Two) Times a Day Before Meals. 30 tablet 0    pioglitazone (ACTOS) 30 MG tablet Take 1 tablet by mouth Daily.         Objective   /61 (BP Location: Left arm, Patient Position: Lying)   Pulse 84   Temp 98 °F (36.7 °C) (Oral)   Resp 18   Ht 177.8 cm (70\")   Wt 84.4 kg (186 lb 1.1 oz)   SpO2 97%   BMI 26.70 kg/m²     GENERAL  Appearance:  appears stated age  Orientation:  AAOx3  Affect:  appropriate     VASCULAR      Left Foot Vascularity   Dorsalis pedis:  2+  Posterior tibial:  2+  Skin temperature:  warm  Edema grading:  None  CFT:  3  Pedal hair growth:  Absent     NEUROLOGIC      Left Foot Neurologic   Light " touch sensation: diminished  Hot/Cold sensation:  diminished  Achilles reflex:  1+     MUSCULOSKELETAL      Left Foot Musculoskeletal   Ecchymosis:  toe 1  Tenderness:  none  Arch:  Normal     MUSCLE STRENGTH      Left Foot Muscle Strength   Foot dorsiflexion:  4+  Foot plantar flexion:  4+  Foot inversion:  4+  Foot eversion:  4+     DERMATOLOGIC          Left Foot Dermatologic   Skin  Positive for abnormal color, skin changes and atrophy. Negative for cellulitis, drainage, maceration, ulcer and gangrene.   Nails  1.  Positive for elongated, abnormal thickness and ingrown toenail.     Left foot additional comments: Mild crusting involving the nasal region.  No parth open wound, purulence, or gross signs of necrosis.      Results from last 7 days   Lab Units 01/19/24  0649   WBC 10*3/mm3 5.00   HEMOGLOBIN g/dL 11.0*   HEMATOCRIT % 33.3*   PLATELETS 10*3/mm3 124*       Assessment & Plan   Paronychia, left hallux  Type 2 diabetes with peripheral neuropathy    Patient was evaluated bedside.  I discussed the plain film and MRI imaging with the patient.  Findings do represent marrow edema involving the distal phalanx which could represent reactive changes.  The toenail was removed at bedside without complication.  No purulent tunneling or tracking was identified.  I do feel that his issues are likely due to complicated ingrown toenail and mild paronychia.  I do not feel that we need to proceed with IV antibiotics or amputation at this time.  I do feel that oral antibiotic suppression would be appropriate at discharge and outpatient follow-up with me in 1 to 2 weeks.  Nursing is to proceed with bacitracin topical and Band-Aid daily while admitted.  Weightbearing and PT as needed. Attempted to discuss situation with patient's wife but no answer. Ok with discharge when appropriate.     Total Nail Avulsion:  Left Hallux    Consent and time out was performed before proceeding with the procedure.  No anesthesia was required  secondary to neuropathy.  The nail was lifted from the nail bed and nail margins with a [place]. The offending nail margins were grasped with a hemostat and removed.  The nail borders were explored with a curette to ensure adequate removal.  The nail fold and exposed nail bed were flushed with normal saline.  Band-Aid was applied.  No excessive bleeding or complications were evident.  The patient tolerated procedure and local anesthetic well.      Note is dictated utilizing voice recognition software. Unfortunately this leads to occasional typographical errors. I apologize in advance if the situation occurs. If questions occur please do not hesitate to call our office.

## 2024-01-20 NOTE — CASE MANAGEMENT/SOCIAL WORK
Continued Stay Note   Farhan     Patient Name: Blas Mojica  MRN: [id number]  Today's Date: 1/20/2024    Admit Date: 1/18/2024    Plan: [facility] accepted. Can return 1/21. Current with [facility].   Discharge Plan       Row Name 01/20/24 1750       Plan    Plan [facility] accepted. Can return 1/21. Current with [facility].    Plan Comments Verified with liaison at [facility] patient can return tomorrow.                      Expected Discharge Date and Time       Expected Discharge Date Expected Discharge Time    Jan 21, 2024               Ruth Gonzales RN

## 2024-01-20 NOTE — PROGRESS NOTES
[facility] Medicine Services       Patient Name: Blas Mojica  : 1950  MRN: [id number]  Primary Care Physician:  Sahil Read MD  Date of admission: 2024  Date and Time of Service: 2024 at 2322     Subjective       Chief Complaint: Weakness     History of Present Illness: Blas Mojica is a 73 y.o. male with a PMH of stage III CKD, type 2 diabetes mellitus, HTN, HLD, recent hospitalization for gastric ulcers and GI bleeding followed by another hospitalization for COVID-19 who presented to [facility] on 2024 with complaints of weakness. Mr Mojica report he was discharged from [facility] rehab where he went after his recent hospitalizations for Covid 19 and GI bleeding,. He states he was able to ambulate to the bathroom with a rogers this afternoon but could not get himself back up off the commode because he was too weak. He does endorse ongoing right humerus fracture, but he has seen orthopedic surgery for but is not fully sure of ongoing plans other than he is supposed to be doing exercises with his hand.His wife at bedside today states she tried to help him get back up onto his feet but was unsuccessful, so she called EMS who was able to get him lifted up and transported to the emergency room for further evaluation.  He also complains of great left toe wound that has been ongoing since his last discharge. Upon exam the toe patient did have areas of necrosis, erythematous and is also warm to touch, He denies any other acute distress chest pain, shortness of breath, palpitations, lightheadedness, dizziness, fever, chills, abdominal pain, nausea, vomiting, diarrhea, constipation or  symptoms.     Initial evaluation in the emergency room feels he is hemodynamically stable blood pressure 132/68, heart rate 62, respiratory rate 16, oxygen saturation 96% on room air, he is afebrile with Tmax 98.  He is anemic globin is stable today at 12.8 last his CBC, BMP are unremarkable.   Urinalysis unremarkable  X-ray is unremarkable for any acute cardiopulmonary processes.  There is noted stable cardiomegaly.  Review of Systems   Constitutional:  Negative for chills and fever.   HENT: Negative.  Negative for congestion and trouble swallowing.    Eyes: Negative.  Negative for visual disturbance.   Respiratory: Negative.  Negative for cough, chest tightness, shortness of breath and wheezing.    Cardiovascular: Negative.  Negative for chest pain and palpitations.   Gastrointestinal: Negative.  Negative for abdominal distention, abdominal pain, constipation, diarrhea, nausea and vomiting.   Endocrine: Negative.    Genitourinary: Negative.  Negative for dysuria, frequency and urgency.   Musculoskeletal: Negative.  Negative for arthralgias.   Skin:  Positive for wound. Negative for rash.   Neurological:  Positive for weakness. Negative for dizziness and headaches.   Psychiatric/Behavioral:  Negative for agitation and confusion.    All other systems reviewed and are negative.      1/19  Patient admitted yesterday  Patient was infection on the left big toe mostly diabetic ulcer  Awaiting podiatry evaluation  Patient with generalized weakness over the last couple weeks  Patient with stage III chronic kidney disease with diabetic nephropathy  Patient is with physical debilitation  He is currently on IV vancomycin pharmacy is dosing  Continue Lantus and sliding scale coverage  Awaiting PT evaluation    Seen per vascular ,will get a rterial doppler,may need mri  Bedside debridement in am    1/20  Awaiting debridement on his toe today per dietary  Laying in bed comfortable  On IV antibiotic with vancomycin and Rocephin  His blood sugar has been stable with last sugar 77 this morning  His last hemoglobin is 11  Discharge planning once cleared per podiatry    Personal History      Medical History        Past Medical History:   Diagnosis Date    Diabetes mellitus      Hyperlipidemia      Hypertension      Kidney  stone      TIA (transient ischemic attack) 2016    Type 2 diabetes mellitus with stage 3 chronic kidney disease, with long-term current use of insulin 12/13/2021            Surgical History         Past Surgical History:   Procedure Laterality Date    ADENOIDECTOMY        ENDOSCOPY N/A 12/13/2023     Procedure: ESOPHAGOGASTRODUODENOSCOPY with removal of NJ tube from right nare;  Surgeon: Jaret Valadez MD;  Location: [facility] ENDOSCOPY;  Service: Gastroenterology;  Laterality: N/A;  esophagitis, gastric ulcer    EXTRACORPOREAL SHOCK WAVE LITHOTRIPSY (ESWL)        HERNIA REPAIR        KNEE ACL RECONSTRUCTION Left      TONSILLECTOMY                Family History: family history includes No Known Problems in his father and mother.      Social History:  reports that he has never smoked. He has never used smokeless tobacco. He reports that he does not drink alcohol and does not use drugs.     Home Medications:  Prior to Admission Medications         Prescriptions Last Dose Informant Patient Reported? Taking?     apixaban (ELIQUIS) 2.5 MG tablet tablet     No No     Take 1 tablet by mouth Every 12 (Twelve) Hours. Indications: Atrial Fibrillation     benzonatate (TESSALON) 200 MG capsule     No No     Take 1 capsule by mouth 3 (Three) Times a Day.     budesonide-formoterol (SYMBICORT) 160-4.5 MCG/ACT inhaler     No No     Inhale 2 puffs 2 (Two) Times a Day.     insulin glargine (LANTUS, SEMGLEE) 100 UNIT/ML injection     Yes No     Inject 33 Units under the skin into the appropriate area as directed Every Night.     metoprolol succinate XL (TOPROL-XL) 100 MG 24 hr tablet     No No     Take 1 tablet by mouth Daily.     pantoprazole (PROTONIX) 40 MG EC tablet     No No     Take 1 tablet by mouth 2 (Two) Times a Day Before Meals.                   Allergies:       Allergies   Allergen Reactions    Contrast Dye (Echo Or Unknown Ct/Mr) Anaphylaxis    Iodinated Contrast Media Shortness Of Breath    Iodine Shortness Of  Breath    Aspirin Nausea And Vomiting    Lipitor [Atorvastatin] Unknown - High Severity    Prednisone Hives         Objective       Vitals:   Temp:  [98.1 °F (36.7 °C)] 98.1 °F (36.7 °C)  Heart Rate:  [62-77] 72  Resp:  [16] 16  BP: (132-152)/(65-80) 152/68  Body mass index is 26.69 kg/m².  Physical Exam  Vitals and nursing note reviewed.   Constitutional:       General: He is awake.      Appearance: He is ill-appearing.   HENT:      Mouth/Throat:      Mouth: Mucous membranes are dry.   Eyes:      Conjunctiva/sclera: Conjunctivae normal.      Pupils: Pupils are equal, round, and reactive to light.   Cardiovascular:      Rate and Rhythm: Normal rate and regular rhythm.      Pulses: Normal pulses.           Radial pulses are 2+ on the right side and 2+ on the left side.        Dorsalis pedis pulses are 1+ on the right side and 2+ on the left side.        Posterior tibial pulses are 2+ on the right side and 2+ on the left side.      Heart sounds: Normal heart sounds.   Pulmonary:      Effort: Pulmonary effort is normal.      Breath sounds: Normal breath sounds.   Abdominal:      General: Bowel sounds are normal.   Musculoskeletal:      Right lower leg: Edema present.      Left lower leg: Edema present.   Skin:     General: Skin is warm and dry.      Capillary Refill: Capillary refill takes less than 2 seconds.      Findings: Erythema and wound present.      Comments: Great left   Neurological:      Mental Status: He is alert and oriented to person, place, and time. Mental status is at baseline.   Psychiatric:         Behavior: Behavior is cooperative.            Diagnostic Data:  Lab Results (last 24 hours)         Procedure Component Value Units Date/Time     MRSA Screen, PCR (Inpatient) - Swab, Nares [[id number]] Collected: 01/19/24 0009     Specimen: Swab from Nares Updated: 01/19/24 0014     Urinalysis With Microscopic If Indicated (No Culture) - Urine, Clean Catch [[id number]]  (Normal) Collected: 01/18/24 8632      Specimen: Urine, Clean Catch Updated: 01/18/24 2244       Color, UA Yellow       Appearance, UA Clear       pH, UA <=5.0       Specific Gravity, UA 1.019       Glucose, UA Negative       Ketones, UA Negative       Bilirubin, UA Negative       Blood, UA Negative       Protein, UA Negative       Leuk Esterase, UA Negative       Nitrite, UA Negative       Urobilinogen, UA 0.2 E.U./dL     Narrative:       Urine microscopic not indicated.     Comprehensive Metabolic Panel [[id number]]  (Abnormal) Collected: 01/18/24 2153     Specimen: Blood Updated: 01/18/24 2220       Glucose 116 mg/dL         BUN 23 mg/dL         Creatinine 1.17 mg/dL         Sodium 139 mmol/L         Potassium 4.0 mmol/L         Chloride 103 mmol/L         CO2 27.0 mmol/L         Calcium 9.4 mg/dL         Total Protein 7.1 g/dL         Albumin 3.7 g/dL         ALT (SGPT) 12 U/L         AST (SGOT) 17 U/L         Alkaline Phosphatase 103 U/L         Total Bilirubin 0.6 mg/dL         Globulin 3.4 gm/dL         A/G Ratio 1.1 g/dL         BUN/Creatinine Ratio 19.7       Anion Gap 9.0 mmol/L         eGFR 65.4 mL/min/1.73       Narrative:       GFR Normal >60  Chronic Kidney Disease <60  Kidney Failure <15     The GFR formula is only valid for adults with stable renal function between ages 18 and 70.     CK [[id number]]  (Normal) Collected: 01/18/24 2153     Specimen: Blood Updated: 01/18/24 2220       Creatine Kinase 38 U/L       Magnesium [[id number]]  (Normal) Collected: 01/18/24 2153     Specimen: Blood Updated: 01/18/24 2220       Magnesium 2.0 mg/dL       CBC & Differential [[id number]]  (Abnormal) Collected: 01/18/24 2153     Specimen: Blood Updated: 01/18/24 2201     Narrative:       The following orders were created for panel order CBC & Differential.  Procedure                               Abnormality         Status                     ---------                               -----------         ------                     CBC Auto  Differential[[id number]]        Abnormal            Final result                  Please view results for these tests on the individual orders.     CBC Auto Differential [[id number]]  (Abnormal) Collected: 01/18/24 2153     Specimen: Blood Updated: 01/18/24 2201       WBC 7.00 10*3/mm3         RBC 4.40 10*6/mm3         Hemoglobin 12.9 g/dL         Hematocrit 39.1 %         MCV 88.7 fL         MCH 29.2 pg         MCHC 33.0 g/dL         RDW 14.1 %         RDW-SD 46.8 fl         MPV 7.8 fL         Platelets 158 10*3/mm3         Neutrophil % 73.9 %         Lymphocyte % 15.8 %         Monocyte % 7.2 %         Eosinophil % 2.2 %         Basophil % 0.9 %         Neutrophils, Absolute 5.10 10*3/mm3         Lymphocytes, Absolute 1.10 10*3/mm3         Monocytes, Absolute 0.50 10*3/mm3         Eosinophils, Absolute 0.20 10*3/mm3         Basophils, Absolute 0.10 10*3/mm3         nRBC 0.0 /100 WBC                             Imaging Results (Last 24 Hours)         Procedure Component Value Units Date/Time     XR Chest 1 View [[id number]] Collected: 01/18/24 2222       Updated: 01/18/24 2228     Narrative:       XR CHEST 1 VW     Date of Exam: 1/18/2024 10:06 PM EST     Indication: General weakness     Comparison: Portable chest dated 12/31/2023     Findings:     The lungs are grossly clear except for areas of scarring. Cardiac, hilar, and mediastinal silhouettes are stable with moderate cardiomegaly.. Pulmonary vascularity is within normal limits. No pneumothorax or pleural effusions. The trachea is midline.  No   acute bony abnormality or aggressive appearing focal osseous lesions in the visualized bony thorax.               Impression:       Impression:  Stable cardiomegaly. No acute cardial pulmonary disease.        Electronically Signed: Blaise Kapadia DO    1/18/2024 10:26 PM EST    Workstation ID: [id number]                   Assessment & Plan             Active and Resolved Problems        Active Hospital Problems      Diagnosis   POA    **Generalized weakness [R53.1]   Yes    Foot ulcer due to secondary DM [E13.621, L97.509]   Yes    Physical debility [R53.81]   Yes    Type 2 diabetes mellitus with stage 3 chronic kidney disease, with long-term current use of insulin [E11.22, N18.30, Z79.4]   Not Applicable    Stage 3 chronic kidney disease [N18.30]   Yes       Resolved Hospital Problems   No resolved problems to display.      Generalized weakness  Physical debility   Initial evaluation has been unremarkable   PT OT consulted   He reports right  humerus fracture-initially seen at [facility] and was , placed in a sling-for 5 weeks-x-ray from 1/4/2024 shows no acute fracture, bony abnormality.  Sclerotic bone lesions within the proximal humeral diaphysis   Consult case management for discharge planning will need additional SNF/reconditioning  Has had multiple recent hospitalization 1 with COVID-19, and another with a GI bleed is chronic anticoagulation with Eliquis has been restarted.  Admit to medical surgical unit for further evaluation and treatment  Vital signs per policy  Check CBC, BMP in a.m.     Type 2 diabetes mellitus with stage III chronic kidney disease, with long-term use of insulin  Chronic/stable  Continue home Lantus  Accu-Cheks ACHS  SSI ordered for hyperglycemia  Hypoglycemia treatment per protocol     Foot Ulcer due to secondary DM  Acute   Continue Vancomycin left great toe appears to also have a subcutaneous cellulitis involved  Blood cultures are pending   Podiatry consulted     Stage 3 chronic kidney disease  Chronic/Stable   Monitor I's and O's  Daily weights  Avoid nephrotoxic medications, hypovolemia, hypotension     Actual atrial fibrillation  AC Eliquis  RC metoprolol           DVT prophylaxis:  No DVT prophylaxis order currently exists.     The patient desires to be as follows:     CODE STATUS:    Code Status (Patient has no pulse and is not breathing): CPR (Attempt to Resuscitate)  Medical  Interventions (Patient has pulse or is breathing): Full Support           Debra Mojica , who can be contacted at [phone] or at bedside, is the designated person to make medical decisions on the patient's behalf if He is incapable of doing so. This was clarified with patient and/or next of kin on 1/18/2024 during the course of this H&P.     Admission Status:  I believe this patient meets observation  status.     Expected Length of Stay: 1-2 days pending clinical course      PDMP and Medication Dispenses via Sidebar reviewed and consistent with patient reported medications.     I discussed the patient's findings and my recommendations with patient and family.     Patient was seen on 01/18/2024- dictation delay due to patient care   Signature:

## 2024-01-21 VITALS
WEIGHT: 186.07 LBS | SYSTOLIC BLOOD PRESSURE: 157 MMHG | BODY MASS INDEX: 26.64 KG/M2 | HEIGHT: 70 IN | RESPIRATION RATE: 12 BRPM | TEMPERATURE: 97.8 F | DIASTOLIC BLOOD PRESSURE: 63 MMHG | HEART RATE: 66 BPM | OXYGEN SATURATION: 96 %

## 2024-01-21 LAB
GLUCOSE BLDC GLUCOMTR-MCNC: 51 MG/DL (ref 70–105)
GLUCOSE BLDC GLUCOMTR-MCNC: 53 MG/DL (ref 70–105)
GLUCOSE BLDC GLUCOMTR-MCNC: 66 MG/DL (ref 70–105)
GLUCOSE BLDC GLUCOMTR-MCNC: 96 MG/DL (ref 70–105)

## 2024-01-21 PROCEDURE — 82948 REAGENT STRIP/BLOOD GLUCOSE: CPT

## 2024-01-21 PROCEDURE — G0378 HOSPITAL OBSERVATION PER HR: HCPCS

## 2024-01-21 RX ORDER — DOXYCYCLINE HYCLATE 100 MG/1
100 CAPSULE ORAL 2 TIMES DAILY
Qty: 20 CAPSULE | Refills: 0 | Status: SHIPPED | OUTPATIENT
Start: 2024-01-21

## 2024-01-21 RX ADMIN — APIXABAN 2.5 MG: 2.5 TABLET, FILM COATED ORAL at 08:46

## 2024-01-21 RX ADMIN — LISINOPRIL 40 MG: 20 TABLET ORAL at 08:46

## 2024-01-21 RX ADMIN — METOPROLOL SUCCINATE 100 MG: 50 TABLET, EXTENDED RELEASE ORAL at 08:46

## 2024-01-21 RX ADMIN — PANTOPRAZOLE SODIUM 40 MG: 40 TABLET, DELAYED RELEASE ORAL at 08:47

## 2024-01-21 RX ADMIN — Medication 10 ML: at 08:47

## 2024-01-21 NOTE — SIGNIFICANT NOTE
Case Management Discharge Note      Final Note: (P) d/c to [facility]    Provided Post Acute Provider List?: Yes  Post Acute Provider List: Nursing Home  Provided Post Acute Provider Quality & Resource List?: Yes  Delivered To: Patient  Method of Delivery: In person    Selected Continued Care - Discharged on 1/21/2024 Admission date: 1/18/2024 - Discharge disposition: Skilled Nursing Facility (DC - External)                          Selected Continued Care - Prior Encounters Includes continued care and service providers with selected services from prior encounters from 10/20/2023 to 1/21/2024      Discharged on 1/4/2024 Admission date: 12/28/2023 - Discharge disposition: Rehab Facility or Unit (DC - External)      Destination       Service Provider Selected Services Address Phone Fax Patient Preferred    [facility] [address] [place] [address] [place] [address] [phone] --                      Discharged on 12/14/2023 Admission date: 12/12/2023 - Discharge disposition: Rehab Facility or Unit (DC - External)      Destination       Service Provider Selected Services Address Phone Fax Patient Preferred    [facility] [address] [place] [address] [place] [address] [phone] --                               Final Discharge Disposition Code: (P) 62 - inpatient rehab facility

## 2024-01-21 NOTE — DISCHARGE SUMMARY
[facility] Medicine Services        Patient Name: Blas Mojica  : 1950  MRN: [id number]  Primary Care Physician:  Sahil Read MD  Date of admission: 2024  Date and Time of Service: 2024 at 2322     Subjective       Chief Complaint: Weakness     History of Present Illness: Blas Mojica is a 73 y.o. male with a PMH of stage III CKD, type 2 diabetes mellitus, HTN, HLD, recent hospitalization for gastric ulcers and GI bleeding followed by another hospitalization for COVID-19 who presented to [facility] on 2024 with complaints of weakness. Mr Mojica report he was discharged from [facility] rehab where he went after his recent hospitalizations for Covid 19 and GI bleeding,. He states he was able to ambulate to the bathroom with a rogers this afternoon but could not get himself back up off the commode because he was too weak. He does endorse ongoing right humerus fracture, but he has seen orthopedic surgery for but is not fully sure of ongoing plans other than he is supposed to be doing exercises with his hand.His wife at bedside today states she tried to help him get back up onto his feet but was unsuccessful, so she called EMS who was able to get him lifted up and transported to the emergency room for further evaluation.  He also complains of great left toe wound that has been ongoing since his last discharge. Upon exam the toe patient did have areas of necrosis, erythematous and is also warm to touch, He denies any other acute distress chest pain, shortness of breath, palpitations, lightheadedness, dizziness, fever, chills, abdominal pain, nausea, vomiting, diarrhea, constipation or  symptoms.     Initial evaluation in the emergency room feels he is hemodynamically stable blood pressure 132/68, heart rate 62, respiratory rate 16, oxygen saturation 96% on room air, he is afebrile with Tmax 98.  He is anemic globin is stable today at 12.8 last his CBC, BMP are unremarkable.   Urinalysis unremarkable  X-ray is unremarkable for any acute cardiopulmonary processes.  There is noted stable cardiomegaly.  Review of Systems   Constitutional:  Negative for chills and fever.   HENT: Negative.  Negative for congestion and trouble swallowing.    Eyes: Negative.  Negative for visual disturbance.   Respiratory: Negative.  Negative for cough, chest tightness, shortness of breath and wheezing.    Cardiovascular: Negative.  Negative for chest pain and palpitations.   Gastrointestinal: Negative.  Negative for abdominal distention, abdominal pain, constipation, diarrhea, nausea and vomiting.   Endocrine: Negative.    Genitourinary: Negative.  Negative for dysuria, frequency and urgency.   Musculoskeletal: Negative.  Negative for arthralgias.   Skin:  Positive for wound. Negative for rash.   Neurological:  Positive for weakness. Negative for dizziness and headaches.   Psychiatric/Behavioral:  Negative for agitation and confusion.    All other systems reviewed and are negative.      1/19  Patient admitted yesterday  Patient was infection on the left big toe mostly diabetic ulcer  Awaiting podiatry evaluation  Patient with generalized weakness over the last couple weeks  Patient with stage III chronic kidney disease with diabetic nephropathy  Patient is with physical debilitation  He is currently on IV vancomycin pharmacy is dosing  Continue Lantus and sliding scale coverage  Awaiting PT evaluation     Seen per vascular ,will get a rterial doppler,may need mri  Bedside debridement in am     1/20  Awaiting debridement on his toe today per dietary  Laying in bed comfortable  On IV antibiotic with vancomycin and Rocephin  His blood sugar has been stable with last sugar 77 this morning  His last hemoglobin is 11  Discharge planning once cleared per podiatry    1/21  Patient seen per podiatry  Patient with paronychia of the left hallux and type 2 diabetes with peripheral neuropathy  MRI imaging done and  reviewed  There is marrow edema involving the distal phalanx which could represent reactive changes  Toenail was removed at bedside yesterday  No purulent tunneling or tracking was identified  As per podiatry mostly at complicated ingrown toenail with paronychia  Patient can be discharged on oral antibiotic with topical Bactroban patient  Discharge planning to skilled nursing facility  Patient going to rehab today  Keep patient on oral doxycycline on discharge     Personal History      Medical History           Past Medical History:   Diagnosis Date   • Diabetes mellitus     • Hyperlipidemia     • Hypertension     • Kidney stone     • TIA (transient ischemic attack) 2016   • Type 2 diabetes mellitus with stage 3 chronic kidney disease, with long-term current use of insulin 12/13/2021            Surgical History             Past Surgical History:   Procedure Laterality Date   • ADENOIDECTOMY       • ENDOSCOPY N/A 12/13/2023     Procedure: ESOPHAGOGASTRODUODENOSCOPY with removal of NJ tube from right nare;  Surgeon: Jaret Valadez MD;  Location: [facility] ENDOSCOPY;  Service: Gastroenterology;  Laterality: N/A;  esophagitis, gastric ulcer   • EXTRACORPOREAL SHOCK WAVE LITHOTRIPSY (ESWL)       • HERNIA REPAIR       • KNEE ACL RECONSTRUCTION Left     • TONSILLECTOMY                Family History: family history includes No Known Problems in his father and mother.      Social History:  reports that he has never smoked. He has never used smokeless tobacco. He reports that he does not drink alcohol and does not use drugs.     Home Medications:  Prior to Admission Medications         Prescriptions Last Dose Informant Patient Reported? Taking?     apixaban (ELIQUIS) 2.5 MG tablet tablet     No No     Take 1 tablet by mouth Every 12 (Twelve) Hours. Indications: Atrial Fibrillation     benzonatate (TESSALON) 200 MG capsule     No No     Take 1 capsule by mouth 3 (Three) Times a Day.     budesonide-formoterol (SYMBICORT)  160-4.5 MCG/ACT inhaler     No No     Inhale 2 puffs 2 (Two) Times a Day.     insulin glargine (LANTUS, SEMGLEE) 100 UNIT/ML injection     Yes No     Inject 33 Units under the skin into the appropriate area as directed Every Night.     metoprolol succinate XL (TOPROL-XL) 100 MG 24 hr tablet     No No     Take 1 tablet by mouth Daily.     pantoprazole (PROTONIX) 40 MG EC tablet     No No     Take 1 tablet by mouth 2 (Two) Times a Day Before Meals.                   Allergies:          Allergies   Allergen Reactions   • Contrast Dye (Echo Or Unknown Ct/Mr) Anaphylaxis   • Iodinated Contrast Media Shortness Of Breath   • Iodine Shortness Of Breath   • Aspirin Nausea And Vomiting   • Lipitor [Atorvastatin] Unknown - High Severity   • Prednisone Hives         Objective       Vitals:   Temp:  [98.1 °F (36.7 °C)] 98.1 °F (36.7 °C)  Heart Rate:  [62-77] 72  Resp:  [16] 16  BP: (132-152)/(65-80) 152/68  Body mass index is 26.69 kg/m².  Physical Exam  Vitals and nursing note reviewed.   Constitutional:       General: He is awake.      Appearance: He is ill-appearing.   HENT:      Mouth/Throat:      Mouth: Mucous membranes are dry.   Eyes:      Conjunctiva/sclera: Conjunctivae normal.      Pupils: Pupils are equal, round, and reactive to light.   Cardiovascular:      Rate and Rhythm: Normal rate and regular rhythm.      Pulses: Normal pulses.           Radial pulses are 2+ on the right side and 2+ on the left side.        Dorsalis pedis pulses are 1+ on the right side and 2+ on the left side.        Posterior tibial pulses are 2+ on the right side and 2+ on the left side.      Heart sounds: Normal heart sounds.   Pulmonary:      Effort: Pulmonary effort is normal.      Breath sounds: Normal breath sounds.   Abdominal:      General: Bowel sounds are normal.   Musculoskeletal:      Right lower leg: Edema present.      Left lower leg: Edema present.   Skin:     General: Skin is warm and dry.      Capillary Refill: Capillary  refill takes less than 2 seconds.      Findings: Erythema and wound present.      Comments: Great left   Neurological:      Mental Status: He is alert and oriented to person, place, and time. Mental status is at baseline.   Psychiatric:         Behavior: Behavior is cooperative.            Diagnostic Data:  Lab Results (last 24 hours)         Procedure Component Value Units Date/Time     MRSA Screen, PCR (Inpatient) - Swab, Nares [[id number]] Collected: 01/19/24 0009     Specimen: Swab from Nares Updated: 01/19/24 0014     Urinalysis With Microscopic If Indicated (No Culture) - Urine, Clean Catch [[id number]]  (Normal) Collected: 01/18/24 2237     Specimen: Urine, Clean Catch Updated: 01/18/24 2244       Color, UA Yellow       Appearance, UA Clear       pH, UA <=5.0       Specific Gravity, UA 1.019       Glucose, UA Negative       Ketones, UA Negative       Bilirubin, UA Negative       Blood, UA Negative       Protein, UA Negative       Leuk Esterase, UA Negative       Nitrite, UA Negative       Urobilinogen, UA 0.2 E.U./dL     Narrative:       Urine microscopic not indicated.     Comprehensive Metabolic Panel [[id number]]  (Abnormal) Collected: 01/18/24 2153     Specimen: Blood Updated: 01/18/24 2220       Glucose 116 mg/dL         BUN 23 mg/dL         Creatinine 1.17 mg/dL         Sodium 139 mmol/L         Potassium 4.0 mmol/L         Chloride 103 mmol/L         CO2 27.0 mmol/L         Calcium 9.4 mg/dL         Total Protein 7.1 g/dL         Albumin 3.7 g/dL         ALT (SGPT) 12 U/L         AST (SGOT) 17 U/L         Alkaline Phosphatase 103 U/L         Total Bilirubin 0.6 mg/dL         Globulin 3.4 gm/dL         A/G Ratio 1.1 g/dL         BUN/Creatinine Ratio 19.7       Anion Gap 9.0 mmol/L         eGFR 65.4 mL/min/1.73       Narrative:       GFR Normal >60  Chronic Kidney Disease <60  Kidney Failure <15     The GFR formula is only valid for adults with stable renal function between ages 18 and 70.     CK  [[id number]]  (Normal) Collected: 01/18/24 2153     Specimen: Blood Updated: 01/18/24 2220       Creatine Kinase 38 U/L       Magnesium [[id number]]  (Normal) Collected: 01/18/24 2153     Specimen: Blood Updated: 01/18/24 2220       Magnesium 2.0 mg/dL       CBC & Differential [[id number]]  (Abnormal) Collected: 01/18/24 2153     Specimen: Blood Updated: 01/18/24 2201     Narrative:       The following orders were created for panel order CBC & Differential.  Procedure                               Abnormality         Status                     ---------                               -----------         ------                     CBC Auto Differential[[id number]]        Abnormal            Final result                  Please view results for these tests on the individual orders.     CBC Auto Differential [[id number]]  (Abnormal) Collected: 01/18/24 2153     Specimen: Blood Updated: 01/18/24 2201       WBC 7.00 10*3/mm3         RBC 4.40 10*6/mm3         Hemoglobin 12.9 g/dL         Hematocrit 39.1 %         MCV 88.7 fL         MCH 29.2 pg         MCHC 33.0 g/dL         RDW 14.1 %         RDW-SD 46.8 fl         MPV 7.8 fL         Platelets 158 10*3/mm3         Neutrophil % 73.9 %         Lymphocyte % 15.8 %         Monocyte % 7.2 %         Eosinophil % 2.2 %         Basophil % 0.9 %         Neutrophils, Absolute 5.10 10*3/mm3         Lymphocytes, Absolute 1.10 10*3/mm3         Monocytes, Absolute 0.50 10*3/mm3         Eosinophils, Absolute 0.20 10*3/mm3         Basophils, Absolute 0.10 10*3/mm3         nRBC 0.0 /100 WBC                             Imaging Results (Last 24 Hours)         Procedure Component Value Units Date/Time     XR Chest 1 View [[id number]] Collected: 01/18/24 2222       Updated: 01/18/24 2228     Narrative:       XR CHEST 1 VW     Date of Exam: 1/18/2024 10:06 PM EST     Indication: General weakness     Comparison: Portable chest dated 12/31/2023     Findings:     The lungs are grossly clear except for  areas of scarring. Cardiac, hilar, and mediastinal silhouettes are stable with moderate cardiomegaly.. Pulmonary vascularity is within normal limits. No pneumothorax or pleural effusions. The trachea is midline.  No   acute bony abnormality or aggressive appearing focal osseous lesions in the visualized bony thorax.               Impression:       Impression:  Stable cardiomegaly. No acute cardial pulmonary disease.        Electronically Signed: Blaise DO Kang    1/18/2024 10:26 PM EST    Workstation ID: [id number]                   Assessment & Plan             Active and Resolved Problems            Active Hospital Problems     Diagnosis   POA   • **Generalized weakness [R53.1]   Yes   • Foot ulcer due to secondary DM [E13.621, L97.509]   Yes   • Physical debility [R53.81]   Yes   • Type 2 diabetes mellitus with stage 3 chronic kidney disease, with long-term current use of insulin [E11.22, N18.30, Z79.4]   Not Applicable   • Stage 3 chronic kidney disease [N18.30]   Yes       Resolved Hospital Problems   No resolved problems to display.      Generalized weakness  Physical debility   Initial evaluation has been unremarkable   PT OT consulted   He reports right  humerus fracture-initially seen at [facility] and was , placed in a sling-for 5 weeks-x-ray from 1/4/2024 shows no acute fracture, bony abnormality.  Sclerotic bone lesions within the proximal humeral diaphysis   Consult case management for discharge planning will need additional SNF/reconditioning  Has had multiple recent hospitalization 1 with COVID-19, and another with a GI bleed is chronic anticoagulation with Eliquis has been restarted.  Admit to medical surgical unit for further evaluation and treatment  Vital signs per policy  Check CBC, BMP in a.m.     Type 2 diabetes mellitus with stage III chronic kidney disease, with long-term use of insulin  Chronic/stable  Continue home Lantus  Accu-Cheks ACHS  SSI ordered for hyperglycemia  Hypoglycemia  treatment per protocol     Foot Ulcer due to secondary DM  Acute   Continue Vancomycin left great toe appears to also have a subcutaneous cellulitis involved  Blood cultures are pending   Podiatry consulted     Stage 3 chronic kidney disease  Chronic/Stable   Monitor I's and O's  Daily weights  Avoid nephrotoxic medications, hypovolemia, hypotension     Actual atrial fibrillation  AC Eliquis  RC metoprolol           DVT prophylaxis:  No DVT prophylaxis order currently exists.     The patient desires to be as follows:     CODE STATUS:    Code Status (Patient has no pulse and is not breathing): CPR (Attempt to Resuscitate)  Medical Interventions (Patient has pulse or is breathing): Full Support           Debra Mojica , who can be contacted at [phone] or at bedside, is the designated person to make medical decisions on the patient's behalf if He is incapable of doing so. This was clarified with patient and/or next of kin on 1/18/2024 during the course of this H&P.     Admission Status:  I believe this patient meets observation  status.     Expected Length of Stay: 1-2 days pending clinical course      PDMP and Medication Dispenses via Sidebar reviewed and consistent with patient reported medications.     I discussed the patient's findings and my recommendations with patient and family.     Patient was seen on 01/18/2024- dictation delay due to patient care   Signature:

## 2024-01-21 NOTE — CASE MANAGEMENT/SOCIAL WORK
Continued Stay Note  MARIELY Real     Patient Name: Blas Mojica  MRN: [id number]  Today's Date: 1/21/2024    Admit Date: 1/18/2024    Plan: [facility] accepted. Can return 1/21. Current with [facility].   Discharge Plan       Row Name 01/21/24 1146       Plan    Plan Comments Verified with liaison at [facility], patient can discharge to facility today, and [facility] will provide transportation. Pt nurse notified.                    Expected Discharge Date and Time       Expected Discharge Date Expected Discharge Time    Jan 21, 2024               Ruth Gonzales RN

## 2024-01-22 ENCOUNTER — TELEPHONE (OUTPATIENT)
Dept: PODIATRY | Facility: CLINIC | Age: 74
End: 2024-01-22

## 2024-01-22 NOTE — TELEPHONE ENCOUNTER
Caller: NICK TREADWELL    Relationship: Emergency Contact    Best call back number: [phone]    What is the best time to reach you: ANY     Who are you requesting to speak with (clinical staff, provider,  specific staff member): CLINICAL     Do you know the name of the person who called: YES     What was the call regarding: STATES THAT THE PATIENT HAD A CONSULT WITH  01/18/2024 AT [facility] AND WOULD LIKE A CALL TO DISCUSS THE CONSULT - NO  VERBAL ON FILE

## 2024-01-29 NOTE — TELEPHONE ENCOUNTER
Caller: NICK TREADWELL    Relationship to patient: Emergency Contact    Best call back number: [phone]    Chief complaint: INFECTED TOE     Type of visit: NEW PATIENT     Requested date: THURSDAY 02/01/24 OR FRIDAY 02/02/2024     If rescheduling, when is the original appointment: 01/30/24     Additional notes:PATIENT CURRENTLY IN REHAB      NICK TREADWELL REQUESTING CALL BACK TODAY 01/29/24 FOR RESCHEDULING SO SHE CAN WORK OUT TRANSPORTATION WITH FACILITY

## 2024-01-29 NOTE — TELEPHONE ENCOUNTER
PATIENTS WIFE NICK CALLED BACK STATING THAT SOMEONE WAS SUPPOSED TO SCHEDULE THIS PATIENT A RIDE, ATTEMPTED TO WT

## 2024-01-30 ENCOUNTER — OFFICE VISIT (OUTPATIENT)
Dept: PODIATRY | Facility: CLINIC | Age: 74
End: 2024-01-30
Payer: MEDICARE

## 2024-01-30 VITALS — BODY MASS INDEX: 26.63 KG/M2 | WEIGHT: 186 LBS | RESPIRATION RATE: 20 BRPM | HEIGHT: 70 IN

## 2024-01-30 DIAGNOSIS — L97.524 CHRONIC ULCER OF GREAT TOE OF LEFT FOOT WITH NECROSIS OF BONE: Primary | ICD-10-CM

## 2024-01-30 DIAGNOSIS — E11.42 DM TYPE 2 WITH DIABETIC PERIPHERAL NEUROPATHY: ICD-10-CM

## 2024-01-30 NOTE — PROGRESS NOTES
01/30/2024  Foot and Ankle Surgery - New Patient   Provider: Dr. Isaiah Brown DPM  Location: [facility]    Subjective:  Blas Mojica is a 73 y.o. male.     Chief Complaint   Patient presents with    Left Foot - Foot Ulcer, Diabetes     Great toe     Initial Evaluation     JAVED Read md 8/9/2023       HPI: Blas Mojica is a 73-year-old male who presents to the office today for a follow-up regarding his left great toe issues. He is accompanied by an adult female today.    The patient's wound nurse informed him that his left great toe has improved. The adult female inquires as to why his other toes are discolored. The adult female inquires if the patient should elevate his bilateral lower extremities in a recliner during the day. She inquires if a TENS unit would be beneficial for him. She also inquires if he is able to bear weight on his left foot. She states the patient was discharged from rehabilitation today, 01/30/2024. The adult female reports the patient was doing well until his right upper extremity was fractured at the end of 11/2023. She inquires if the patient can go swimming or use a foot bath. She states the wound nurse was applying iodine to his wound.    Additionally, the patient fractured his right upper extremity in 11/2023.      Allergies   Allergen Reactions    Contrast Dye (Echo Or Unknown Ct/Mr) Anaphylaxis    Iodinated Contrast Media Shortness Of Breath    Iodine Shortness Of Breath    Aspirin Nausea And Vomiting    Lipitor [Atorvastatin] Unknown - High Severity    Prednisone Hives       Past Medical History:   Diagnosis Date    Diabetes mellitus     Hyperlipidemia     Hypertension     Kidney stone     TIA (transient ischemic attack) 2016    Type 2 diabetes mellitus with stage 3 chronic kidney disease, with long-term current use of insulin 12/13/2021       Past Surgical History:   Procedure Laterality Date    ADENOIDECTOMY      ENDOSCOPY N/A 12/13/2023    Procedure:  ESOPHAGOGASTRODUODENOSCOPY with removal of NJ tube from right nare;  Surgeon: Jaret Valadez MD;  Location: [facility] END[facility];  Service: Gastroenterology;  Laterality: N/A;  esophagitis, gastric ulcer    EXTRACORPOREAL SHOCK WAVE LITHOTRIPSY (ESWL)      HERNIA REPAIR      KNEE ACL RECONSTRUCTION Left     TONSILLECTOMY         Family History   Problem Relation Age of Onset    No Known Problems Mother     No Known Problems Father        Social History     Socioeconomic History    Marital status:    Tobacco Use    Smoking status: Never     Passive exposure: Never    Smokeless tobacco: Never   Vaping Use    Vaping Use: Never used   Substance and Sexual Activity    Alcohol use: Never    Drug use: Never    Sexual activity: Defer        Current Outpatient Medications on File Prior to Visit   Medication Sig Dispense Refill    apixaban (ELIQUIS) 2.5 MG tablet tablet Take 1 tablet by mouth Every 12 (Twelve) Hours. Indications: Atrial Fibrillation 60 tablet 0    doxycycline (VIBRAMYCIN) 100 MG capsule Take 1 capsule by mouth 2 (Two) Times a Day. 20 capsule 0    insulin glargine (LANTUS, SEMGLEE) 100 UNIT/ML injection Inject 33 Units under the skin into the appropriate area as directed Every Night.      lisinopril (PRINIVIL,ZESTRIL) 40 MG tablet Take 1 tablet by mouth Daily.      metoprolol succinate XL (TOPROL-XL) 100 MG 24 hr tablet Take 1 tablet by mouth Daily. 30 tablet 0    pantoprazole (PROTONIX) 40 MG EC tablet Take 1 tablet by mouth 2 (Two) Times a Day Before Meals. 30 tablet 0    pioglitazone (ACTOS) 30 MG tablet Take 1 tablet by mouth Daily.       No current facility-administered medications on file prior to visit.       Review of Systems:  General: Denies fever, chills, fatigue, and weakness.  Eyes: Denies vision loss, blurry vision, and excessive redness.  ENT: Denies hearing issues and difficulty swallowing.  Cardiovascular: Denies palpitations, chest pain, or syncopal episodes.  Respiratory: Denies  "shortness of breath, wheezing, and coughing.  GI: Denies abdominal pain, nausea, and vomiting.   : Denies frequency, hematuria, and urgency.  Musculoskeletal: Denies muscle cramps, joint pains, and stiffness.  Derm: Denies rash, open wounds, or suspicious lesions.  Neuro: Denies headaches, numbness, loss of coordination, and tremors.  Psych: Denies anxiety and depression.  Endocrine: Denies temperature intolerance and changes in appetite.  Heme: Denies bleeding disorders or abnormal bruising.     Objective   Resp 20   Ht 177.8 cm (70\")   Wt 84.4 kg (186 lb)   BMI 26.69 kg/m²     Foot/Ankle Exam    GENERAL  Appearance:  appears stated age  Orientation:  AAOx3  Affect:  appropriate    VASCULAR     Left Foot Vascularity   Dorsalis pedis:  2+  Posterior tibial:  2+  Skin temperature:  warm  Edema grading:  None  CFT:  3  Pedal hair growth:  Absent     NEUROLOGIC     Left Foot Neurologic   Light touch sensation: diminished  Hot/Cold sensation:  diminished  Achilles reflex:  1+    MUSCULOSKELETAL     Left Foot Musculoskeletal   Ecchymosis:  toe 1  Tenderness:  none  Arch:  Normal    MUSCLE STRENGTH     Left Foot Muscle Strength   Foot dorsiflexion:  4+  Foot plantar flexion:  4+  Foot inversion:  4+  Foot eversion:  4+     Left foot additional comments: 01/18/2024: Mild crusting involving the nasal region.  No parth open wound, purulence, or gross signs of necrosis.    01/30/2024: toe has improved. The nail bed is healed with overlying scabbing. No drainage, malodor, or gross signs of concerns of infection. Eschar formation involving the distal aspect of the digit which appears to be quite stable. Mottling involving the feet with hyperpigmentations consistent with venous insufficiency.      Assessment & Plan   Diagnoses and all orders for this visit:    1. 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 DM type 2 with diabetic peripheral neuropathy      The patient is a 73-year-old male " who presents to the office today for a follow-up regarding his left great toe issues. X-rays of the left foot, taken today, were independently reviewed revealing changes to the tip of the bone, which could be consistent with infection. We discussed the etiology and biomechanics involved with his left great toe issues. I explained that he had an issue of a chronic ingrown toenail, which is why his toenail was removed in the hospital. I informed him that the wound at the tip of the toe appears dry and stable. I recommend continuing to monitor the wound to see if we can get this to modify back to a normal toe. I advised the patient he can bear weight on his left foot. I recommend a wheelchair or walker as needed. I informed the patient he can try Epsom salt soaks. I explained that he can shower, dry it off well, and apply the antibiotic ointment and a Band-Aid on it. I explained that he needs to be an open toed shoe. The patient will return to the office in 3 weeks for reevaluation.    Greater than 20 minutes was spent before, during, and after evaluation for patient care.    No orders of the defined types were placed in this encounter.       Note is dictated utilizing voice recognition software. Unfortunately this leads to occasional typographical errors. I apologize in advance if the situation occurs. If questions occur please do not hesitate to call our office.    Transcribed from ambient dictation for SHERYL Brown DPM by Edith Calvillo.  01/30/24   15:26 EST    Patient or patient representative verbalized consent to the visit recording.  I have personally performed the services described in this document as transcribed by the above individual, and it is both accurate and complete.

## 2024-02-01 ENCOUNTER — TELEPHONE (OUTPATIENT)
Dept: ORTHOPEDIC SURGERY | Facility: CLINIC | Age: 74
End: 2024-02-01
Payer: MEDICARE

## 2024-02-01 ENCOUNTER — PATIENT ROUNDING (BHMG ONLY) (OUTPATIENT)
Dept: ORTHOPEDIC SURGERY | Facility: CLINIC | Age: 74
End: 2024-02-01
Payer: MEDICARE

## 2024-02-01 NOTE — TELEPHONE ENCOUNTER
I spoke with janee. She denies redness, drainage, odor. States looks stable. She will call with any changes or take to ER

## 2024-02-01 NOTE — PROGRESS NOTES
A My-Chart message has been sent to the patient for PATIENT ROUNDING with [facility]

## 2024-02-01 NOTE — TELEPHONE ENCOUNTER
Patient will run out of antibiotic today. Patient's wife Debra called in and asked if patient needs to stay on the antibiotic and if so if a new rx can be sent in to the [facility]. Please advise Debra on what needs to be done.

## 2024-02-07 ENCOUNTER — HOSPITAL ENCOUNTER (EMERGENCY)
Facility: HOSPITAL | Age: 74
Discharge: HOME | End: 2024-02-07
Attending: EMERGENCY MEDICINE | Admitting: EMERGENCY MEDICINE
Payer: MEDICARE

## 2024-02-07 ENCOUNTER — APPOINTMENT (OUTPATIENT)
Dept: CARDIOLOGY | Facility: HOSPITAL | Age: 74
End: 2024-02-07
Payer: MEDICARE

## 2024-02-07 ENCOUNTER — TELEPHONE (OUTPATIENT)
Dept: ORTHOPEDIC SURGERY | Facility: CLINIC | Age: 74
End: 2024-02-07
Payer: MEDICARE

## 2024-02-07 VITALS
HEIGHT: 71 IN | TEMPERATURE: 98 F | WEIGHT: 160 LBS | RESPIRATION RATE: 16 BRPM | DIASTOLIC BLOOD PRESSURE: 60 MMHG | OXYGEN SATURATION: 95 % | HEART RATE: 88 BPM | BODY MASS INDEX: 22.4 KG/M2 | SYSTOLIC BLOOD PRESSURE: 110 MMHG

## 2024-02-07 DIAGNOSIS — R53.1 GENERAL WEAKNESS: Primary | ICD-10-CM

## 2024-02-07 DIAGNOSIS — I82.812 CHRONIC SUPERFICIAL VENOUS THROMBOSIS OF LEFT LOWER EXTREMITY: ICD-10-CM

## 2024-02-07 LAB
ANION GAP SERPL CALCULATED.3IONS-SCNC: 10 MMOL/L (ref 5–15)
BASOPHILS # BLD AUTO: 0 10*3/MM3 (ref 0–0.2)
BASOPHILS NFR BLD AUTO: 0.8 % (ref 0–1.5)
BH CV LOW VAS LEFT LESSER SAPH VESSEL: 1
BH CV LOWER VASCULAR LEFT COMMON FEMORAL AUGMENT: NORMAL
BH CV LOWER VASCULAR LEFT COMMON FEMORAL COMPETENT: NORMAL
BH CV LOWER VASCULAR LEFT COMMON FEMORAL COMPRESS: NORMAL
BH CV LOWER VASCULAR LEFT COMMON FEMORAL PHASIC: NORMAL
BH CV LOWER VASCULAR LEFT COMMON FEMORAL SPONT: NORMAL
BH CV LOWER VASCULAR LEFT DISTAL FEMORAL COMPRESS: NORMAL
BH CV LOWER VASCULAR LEFT GASTRONEMIUS COMPRESS: NORMAL
BH CV LOWER VASCULAR LEFT GREATER SAPH AK COMPRESS: NORMAL
BH CV LOWER VASCULAR LEFT GREATER SAPH BK COMPRESS: NORMAL
BH CV LOWER VASCULAR LEFT LESSER SAPH COMPRESS: NORMAL
BH CV LOWER VASCULAR LEFT LESSER SAPH THROMBUS: NORMAL
BH CV LOWER VASCULAR LEFT MID FEMORAL AUGMENT: NORMAL
BH CV LOWER VASCULAR LEFT MID FEMORAL COMPETENT: NORMAL
BH CV LOWER VASCULAR LEFT MID FEMORAL COMPRESS: NORMAL
BH CV LOWER VASCULAR LEFT MID FEMORAL PHASIC: NORMAL
BH CV LOWER VASCULAR LEFT MID FEMORAL SPONT: NORMAL
BH CV LOWER VASCULAR LEFT PERONEAL COMPRESS: NORMAL
BH CV LOWER VASCULAR LEFT POPLITEAL AUGMENT: NORMAL
BH CV LOWER VASCULAR LEFT POPLITEAL COMPETENT: NORMAL
BH CV LOWER VASCULAR LEFT POPLITEAL COMPRESS: NORMAL
BH CV LOWER VASCULAR LEFT POPLITEAL PHASIC: NORMAL
BH CV LOWER VASCULAR LEFT POPLITEAL SPONT: NORMAL
BH CV LOWER VASCULAR LEFT POSTERIOR TIBIAL COMPRESS: NORMAL
BH CV LOWER VASCULAR LEFT PROFUNDA FEMORAL COMPRESS: NORMAL
BH CV LOWER VASCULAR LEFT PROXIMAL FEMORAL COMPRESS: NORMAL
BH CV LOWER VASCULAR LEFT SAPHENOFEMORAL JUNCTION COMPRESS: NORMAL
BH CV LOWER VASCULAR RIGHT COMMON FEMORAL AUGMENT: NORMAL
BH CV LOWER VASCULAR RIGHT COMMON FEMORAL COMPETENT: NORMAL
BH CV LOWER VASCULAR RIGHT COMMON FEMORAL COMPRESS: NORMAL
BH CV LOWER VASCULAR RIGHT COMMON FEMORAL PHASIC: NORMAL
BH CV LOWER VASCULAR RIGHT COMMON FEMORAL SPONT: NORMAL
BUN SERPL-MCNC: 26 MG/DL (ref 8–23)
BUN/CREAT SERPL: 17.7 (ref 7–25)
CALCIUM SPEC-SCNC: 10.1 MG/DL (ref 8.6–10.5)
CHLORIDE SERPL-SCNC: 101 MMOL/L (ref 98–107)
CO2 SERPL-SCNC: 28 MMOL/L (ref 22–29)
CREAT SERPL-MCNC: 1.47 MG/DL (ref 0.76–1.27)
DEPRECATED RDW RBC AUTO: 45.9 FL (ref 37–54)
EGFRCR SERPLBLD CKD-EPI 2021: 49.7 ML/MIN/1.73
EOSINOPHIL # BLD AUTO: 0.1 10*3/MM3 (ref 0–0.4)
EOSINOPHIL NFR BLD AUTO: 3.3 % (ref 0.3–6.2)
ERYTHROCYTE [DISTWIDTH] IN BLOOD BY AUTOMATED COUNT: 14.6 % (ref 12.3–15.4)
GLUCOSE SERPL-MCNC: 85 MG/DL (ref 65–99)
HCT VFR BLD AUTO: 44.4 % (ref 37.5–51)
HGB BLD-MCNC: 14.2 G/DL (ref 13–17.7)
HOLD SPECIMEN: NORMAL
HOLD SPECIMEN: NORMAL
LYMPHOCYTES # BLD AUTO: 1.3 10*3/MM3 (ref 0.7–3.1)
LYMPHOCYTES NFR BLD AUTO: 33.2 % (ref 19.6–45.3)
MCH RBC QN AUTO: 28.9 PG (ref 26.6–33)
MCHC RBC AUTO-ENTMCNC: 32 G/DL (ref 31.5–35.7)
MCV RBC AUTO: 90.5 FL (ref 79–97)
MONOCYTES # BLD AUTO: 0.4 10*3/MM3 (ref 0.1–0.9)
MONOCYTES NFR BLD AUTO: 8.8 % (ref 5–12)
NEUTROPHILS NFR BLD AUTO: 2.2 10*3/MM3 (ref 1.7–7)
NEUTROPHILS NFR BLD AUTO: 53.9 % (ref 42.7–76)
NRBC BLD AUTO-RTO: 0.1 /100 WBC (ref 0–0.2)
PLATELET # BLD AUTO: 169 10*3/MM3 (ref 140–450)
PMV BLD AUTO: 8.4 FL (ref 6–12)
POTASSIUM SERPL-SCNC: 4.6 MMOL/L (ref 3.5–5.2)
RBC # BLD AUTO: 4.91 10*6/MM3 (ref 4.14–5.8)
SODIUM SERPL-SCNC: 139 MMOL/L (ref 136–145)
WBC NRBC COR # BLD AUTO: 4 10*3/MM3 (ref 3.4–10.8)
WHOLE BLOOD HOLD COAG: NORMAL
WHOLE BLOOD HOLD SPECIMEN: NORMAL

## 2024-02-07 PROCEDURE — 80048 BASIC METABOLIC PNL TOTAL CA: CPT | Performed by: EMERGENCY MEDICINE

## 2024-02-07 PROCEDURE — 85025 COMPLETE CBC W/AUTO DIFF WBC: CPT | Performed by: EMERGENCY MEDICINE

## 2024-02-07 PROCEDURE — 99284 EMERGENCY DEPT VISIT MOD MDM: CPT

## 2024-02-07 PROCEDURE — 93971 EXTREMITY STUDY: CPT

## 2024-02-07 RX ORDER — SODIUM CHLORIDE 0.9 % (FLUSH) 0.9 %
10 SYRINGE (ML) INJECTION AS NEEDED
Status: DISCONTINUED | OUTPATIENT
Start: 2024-02-07 | End: 2024-02-07 | Stop reason: HOSPADM

## 2024-02-07 NOTE — DISCHARGE PLACEMENT REQUEST
"Blas Mojica (73 y.o. Male)       Date of Birth   1950    Social Security Number       Address   [address] [place] [address]    Home Phone   [phone]    MRN   [id number]       Latter-day   Confucianism    Marital Status                               Admission Date   2/7/24    Admission Type   Emergency    Admitting Provider       Attending Provider   Nicolas Hairston MD    Department, Room/Bed   [facility] EMERGENCY DEPARTMENT, 08/08       Discharge Date       Discharge Disposition       Discharge Destination                                 Attending Provider: Nicolas Hairston MD    Allergies: Contrast Dye (Echo Or Unknown Ct/mr), Iodinated Contrast Media, Iodine, Aspirin, Lipitor [Atorvastatin], Prednisone    Isolation: None   Infection: MRSA No Isolation this Admit (01/19/24)   Code Status: Prior    Ht: 180.3 cm (71\")   Wt: 72.6 kg (160 lb)    Admission Cmt: None   Principal Problem: None                  Active Insurance as of 2/7/2024       Primary Coverage       Payor Plan Insurance Group Employer/Plan Group    MEDICARE MEDICARE A & B        Payor Plan Address Payor Plan Phone Number Payor Plan Fax Number Effective Dates    [address] [phone]  9/1/2014 - None Entered    [facility] [address]         Subscriber Name Subscriber Birth Date Member ID       BLAS MOJICA 1950 [id number]               Secondary Coverage       Payor Plan Insurance Group Employer/Plan Group    [employer] [facility]              [id number]       Payor Plan Address Payor Plan Phone Number Payor Plan Fax Number Effective Dates    [address] [address]   9/1/2014 - None Entered    [place] [id number]         Subscriber Name Subscriber Birth Date Member ID       BLAS MOJICA 1950 [id number]                     Emergency Contacts        (Rel.) Home Phone Work Phone Mobile Phone    NICK MOJICA (Spouse) [phone] -- [phone]    JOE MOJICA (Daughter) " -- -- [phone]    Pau Clifton (Daughter) -- -- --    Carie Renee (Daughter) -- -- --

## 2024-02-07 NOTE — TELEPHONE ENCOUNTER
I spoke with wife. She states his toe is looking really good. I explained that he would not be able to get mri in emergency room today for his toe. He will be eval at next appt.

## 2024-02-07 NOTE — ED PROVIDER NOTES
Subjective   History of Present Illness  Chief complaint: Left leg pain    73-year-old male presents with left lower leg pain.  Patient states he does have a chronic ulcer on his left big toe that is being followed by podiatry.  He states the toe actually looks much better than it has in the recent past.  He states he is now having some pain in the medial left calf.  He has not noticed any redness or swelling.  He states the pain has been intermittent.  He is concerned about possible blood clot.  He denies any chest pain or shortness of breath.  He states he has been a little weak and generally not feeling well over the past couple days.  He denies fever.  He denies any cough, congestion, vomiting, diarrhea.    History provided by:  Patient      Review of Systems   Constitutional:  Negative for fever.   HENT:  Negative for congestion.    Respiratory:  Negative for cough and shortness of breath.    Cardiovascular:  Negative for chest pain.   Gastrointestinal:  Negative for abdominal pain and vomiting.   Musculoskeletal:         Left leg pain   Skin:  Positive for wound.   Neurological:  Positive for weakness. Negative for headaches.   Psychiatric/Behavioral:  Negative for confusion.        Past Medical History:   Diagnosis Date    Diabetes mellitus     Hyperlipidemia     Hypertension     Kidney stone     TIA (transient ischemic attack) 2016    Type 2 diabetes mellitus with stage 3 chronic kidney disease, with long-term current use of insulin 12/13/2021       Allergies   Allergen Reactions    Contrast Dye (Echo Or Unknown Ct/Mr) Anaphylaxis    Iodinated Contrast Media Shortness Of Breath    Iodine Shortness Of Breath    Aspirin Nausea And Vomiting    Lipitor [Atorvastatin] Unknown - High Severity    Prednisone Hives       Past Surgical History:   Procedure Laterality Date    ADENOIDECTOMY      ENDOSCOPY N/A 12/13/2023    Procedure: ESOPHAGOGASTRODUODENOSCOPY with removal of NJ tube from right nare;  Surgeon: Allyson  "Jaret Bishop MD;  Location: [facility] ENDOSCOPY;  Service: Gastroenterology;  Laterality: N/A;  esophagitis, gastric ulcer    EXTRACORPOREAL SHOCK WAVE LITHOTRIPSY (ESWL)      HERNIA REPAIR      KNEE ACL RECONSTRUCTION Left     TONSILLECTOMY         Family History   Problem Relation Age of Onset    No Known Problems Mother     No Known Problems Father        Social History     Socioeconomic History    Marital status:    Tobacco Use    Smoking status: Never     Passive exposure: Never    Smokeless tobacco: Never   Vaping Use    Vaping Use: Never used   Substance and Sexual Activity    Alcohol use: Never    Drug use: Never    Sexual activity: Defer       /58   Pulse 97   Temp 97.7 °F (36.5 °C) (Oral)   Resp 16   Ht 180.3 cm (71\")   Wt 72.6 kg (160 lb)   SpO2 96%   BMI 22.32 kg/m²       Objective   Physical Exam  Vitals and nursing note reviewed.   Constitutional:       Appearance: Normal appearance.   HENT:      Head: Normocephalic and atraumatic.      Mouth/Throat:      Mouth: Mucous membranes are moist.   Cardiovascular:      Rate and Rhythm: Normal rate and regular rhythm.      Heart sounds: Normal heart sounds.   Pulmonary:      Effort: Pulmonary effort is normal. No respiratory distress.      Breath sounds: Normal breath sounds.   Abdominal:      Palpations: Abdomen is soft.      Tenderness: There is no abdominal tenderness.   Musculoskeletal:      Comments: There is a necrotic looking ulcer to the tip of the left great toe with mild surrounding erythema.  This is dry.  There is no purulent discharge.  Minimal swelling.  Patient has good dorsalis pedis and posterior tibial pulses on the left.  Examination of the left calf is unremarkable.  There is no tenderness on palpation.  There is no erythema, warmth, swelling.  Negative Homans' sign.   Skin:     General: Skin is warm and dry.   Neurological:      Mental Status: He is alert and oriented to person, place, and time.         Procedures     "       ED Course      Results for orders placed or performed during the hospital encounter of 02/07/24   Basic Metabolic Panel    Specimen: Blood   Result Value Ref Range    Glucose 85 65 - 99 mg/dL    BUN 26 (H) 8 - 23 mg/dL    Creatinine 1.47 (H) 0.76 - 1.27 mg/dL    Sodium 139 136 - 145 mmol/L    Potassium 4.6 3.5 - 5.2 mmol/L    Chloride 101 98 - 107 mmol/L    CO2 28.0 22.0 - 29.0 mmol/L    Calcium 10.1 8.6 - 10.5 mg/dL    BUN/Creatinine Ratio 17.7 7.0 - 25.0    Anion Gap 10.0 5.0 - 15.0 mmol/L    eGFR 49.7 (L) >60.0 mL/min/1.73   CBC Auto Differential    Specimen: Blood   Result Value Ref Range    WBC 4.00 3.40 - 10.80 10*3/mm3    RBC 4.91 4.14 - 5.80 10*6/mm3    Hemoglobin 14.2 13.0 - 17.7 g/dL    Hematocrit 44.4 37.5 - 51.0 %    MCV 90.5 79.0 - 97.0 fL    MCH 28.9 26.6 - 33.0 pg    MCHC 32.0 31.5 - 35.7 g/dL    RDW 14.6 12.3 - 15.4 %    RDW-SD 45.9 37.0 - 54.0 fl    MPV 8.4 6.0 - 12.0 fL    Platelets 169 140 - 450 10*3/mm3    Neutrophil % 53.9 42.7 - 76.0 %    Lymphocyte % 33.2 19.6 - 45.3 %    Monocyte % 8.8 5.0 - 12.0 %    Eosinophil % 3.3 0.3 - 6.2 %    Basophil % 0.8 0.0 - 1.5 %    Neutrophils, Absolute 2.20 1.70 - 7.00 10*3/mm3    Lymphocytes, Absolute 1.30 0.70 - 3.10 10*3/mm3    Monocytes, Absolute 0.40 0.10 - 0.90 10*3/mm3    Eosinophils, Absolute 0.10 0.00 - 0.40 10*3/mm3    Basophils, Absolute 0.00 0.00 - 0.20 10*3/mm3    nRBC 0.1 0.0 - 0.2 /100 WBC   Green Top (Gel)   Result Value Ref Range    Extra Tube Hold for add-ons.    Lavender Top   Result Value Ref Range    Extra Tube hold for add-on    Gold Top - SST   Result Value Ref Range    Extra Tube Hold for add-ons.    Light Blue Top   Result Value Ref Range    Extra Tube Hold for add-ons.    Duplex Venous Lower Extremity - Left   Result Value Ref Range    Left Lesser Saph Vessel 1.0     Right Common Femoral Spont Y     Right Common Femoral Competent Y     Right Common Femoral Phasic Y     Right Common Femoral Compress C     Right Common Femoral  Augment Y     Left Common Femoral Spont Y     Left Common Femoral Competent Y     Left Common Femoral Phasic Y     Left Common Femoral Compress C     Left Common Femoral Augment Y     Left Saphenofemoral Junction Compress C     Left Profunda Femoral Compress C     Left Proximal Femoral Compress C     Left Mid Femoral Spont Y     Left Mid Femoral Competent Y     Left Mid Femoral Phasic Y     Left Mid Femoral Compress C     Left Mid Femoral Augment Y     Left Distal Femoral Compress C     Left Popliteal Spont Y     Left Popliteal Competent Y     Left Popliteal Phasic Y     Left Popliteal Compress C     Left Popliteal Augment Y     Left Posterior Tibial Compress C     Left Peroneal Compress C     Left Gastronemius Compress C     Left Greater Saph AK Compress C     Left Greater Saph BK Compress C     Left Lesser Saph Compress N     Left Lesser Saph Thrombus C                                             Medical Decision Making  Amount and/or Complexity of Data Reviewed  Labs: ordered.    Risk  Prescription drug management.      Patient had the above evaluation.  Results were discussed with the patient.  White blood cell count is normal.  BMP shows borderline elevation of creatinine.  Left lower extremity Doppler was obtained which was negative for DVT.  It did show some chronic SVT.  Patient states his left great toe is improving.  He is following with podiatry on an outpatient basis for this.  I see no indication for admission at this time.  Family was concerned about patient being generally weak and they are having trouble caring for him at home.  He was recently in [facility] rehab and he is interested in going back.   was consulted and sounds like [facility] has accepted him but they will not have a bed until tomorrow or the next day.  We did offer to look into a different rehab center but family states they will just take the patient home and continue to care for him until the bed at [facility] becomes available.  Follow-up  with your primary doctor.  Return to the emergency room for any new or worsening symptoms or if you have any other questions or concerns.      Final diagnoses:   General weakness   Chronic superficial venous thrombosis of left lower extremity       ED Disposition  ED Disposition       ED Disposition   Discharge    Condition   Stable    Comment   --               Sahil Read MD  [address] [id number] [phone]    Call in 2 days           Medication List      No changes were made to your prescriptions during this visit.            Nicolas Hairston MD  02/07/24 [id number]

## 2024-02-07 NOTE — TELEPHONE ENCOUNTER
Caller: NICK   Relationship to Patient: SELF  Phone Number: [phone] (home) [phone] (work)    Reason for Call: PATIENTS WIFE CALLING ASKING IF HE SHOULD GET A MRI OPF THE TOE WHILE HE'S IN THE HOSPITAL TODAY TO SEE IF THE INFECTION IS STILL THERE

## 2024-02-07 NOTE — CASE MANAGEMENT/SOCIAL WORK
Continued Stay Note  [facility]     Patient Name: Blas Mojica  MRN: [id number]  Today's Date: 2/7/2024    Admit Date: 2/7/2024    Plan: Referral to [facility] vs [facility] no precert or PASRR required   Discharge Plan       Row Name 02/07/24 1534       Plan    Plan Comments was notified by Patient and wife they are refusing [facility] and will just go home and wait for Bed at hospitals. Per wife she requested EMS transportation but patient is able to stand and pivot with 1 assist. arranged [facility] and they have 3 runs ahead of this patient.      Row Name 02/07/24 1523       Plan    Plan Referral to [facility] vs [facility] no precert or PASRR required    Plan Comments met with patient and wife at bedside and informed that [facility] does not have a bed today, they had bed for patient yesterday but patient refused to go yesterday. they should have bed in next 2 days for patient. wife agreeable to [facility] referral, referral sent and they requested PT callie, Dr Hairston placed order and spoke with cat who will get someone to see patient. Spoke with wife at length about other alternatives including SNF placement. Wife adamently refused SNF placement only agreeable to Acute Rehab. Per Wife is [facility] and [facility] unable to take she will take him home until hospitals has bed for him. PT callie pending and Response from [facility] pending. Placed patient on Will call  [facility] list                                                       Nancy Byers, RN

## 2024-02-07 NOTE — LETTER
EMS Transport Request  For use at [facility], [place], [place], and [place] only   Patient Name: Blas Mojica : 1950   Weight:72.6 kg (160 lb) Pick-up Location:  ([facility]) BLS/ALS:    Insurance: MEDICARE Auth End Date:    Pre-Cert #: D/C Summary complete:    Destination: Home How many stairs 0, Will the patient be on the main level yes, Is there a ramp available n/a, Can the patient stand and pivot yes with , Address  [facility] [address] , and Name/contact number for who will be present Wife [phone]   Contact Precautions: None   Equipment (O2, Fluids, etc.): None   Arrive By Date/Time: Now  Stretcher/WC: Wheelchair   CM Requesting: Nancy Byers RN Ext: [phone]   Notes/Medical Necessity: Patient can Stand and Pivot with assist will need help into Home and Bed as he can only stand and pivot      ______________________________________________________________________    *Only 2 patient bags OR 1 carry-on size bag are permitted.  Wheelchairs and walkers CANNOT transported with the patient. Acknowledge: Yes

## 2024-02-07 NOTE — CASE MANAGEMENT/SOCIAL WORK
Continued Stay Note  [facility]     Patient Name: Blas Mojica  MRN: [id number]  Today's Date: 2/7/2024    Admit Date: 2/7/2024    Plan: referral to [facility] No precert or PASRR required   Discharge Plan       Row Name 02/07/24 1423       Plan    Plan referral to [facility] No precert or PASRR required    Plan Comments per wife she has been working with [facility] from home to get him placed. referral sent to [facility] to see if they can accept from ER. Per MD no reason for admission and plan is to Discharge patient. Pending response from [facility] Byers, RN

## 2024-02-12 ENCOUNTER — TELEPHONE (OUTPATIENT)
Dept: PODIATRY | Facility: CLINIC | Age: 74
End: 2024-02-12
Payer: MEDICARE

## 2024-02-20 ENCOUNTER — OFFICE VISIT (OUTPATIENT)
Dept: PODIATRY | Facility: CLINIC | Age: 74
End: 2024-02-20
Payer: MEDICARE

## 2024-02-20 VITALS
BODY MASS INDEX: 25.77 KG/M2 | RESPIRATION RATE: 16 BRPM | HEIGHT: 70 IN | OXYGEN SATURATION: 96 % | WEIGHT: 180 LBS | HEART RATE: 100 BPM

## 2024-02-20 DIAGNOSIS — L97.524 CHRONIC ULCER OF GREAT TOE OF LEFT FOOT WITH NECROSIS OF BONE: Primary | ICD-10-CM

## 2024-02-20 DIAGNOSIS — E11.42 DM TYPE 2 WITH DIABETIC PERIPHERAL NEUROPATHY: ICD-10-CM

## 2024-02-20 NOTE — PROGRESS NOTES
02/20/2024  Foot and Ankle Surgery - Established Patient/Follow-up  Provider: Dr. Isaiah Brown DPM  Location: [facility]    Subjective:  Blas Mojica is a 73 y.o. male.     Chief Complaint   Patient presents with    Left Foot - Follow-up, Foot Ulcer, Wound Check, Diabetes    Follow-up     JAVED Read md 8/9/2023       HPI: The patient is a 73-year-old male who presents to the office today for a follow-up regarding his left foot. He is accompanied by an adult female today.    He was last seen in 01/2024. The adult female states rehab did not dress the patient's wound for 7 days. She states the patient developed a scab on the dorsal aspect of his left great toe, and they removed it with scissors. The adult female states the patient was discharged from the rehab today, 02/20/2024. She states they ordered home health for the patient. The adult female states they have been applying Betadine to the wound.    The adult female reports the patient fractured his left second toe in the past.     Allergies   Allergen Reactions    Contrast Dye (Echo Or Unknown Ct/Mr) Anaphylaxis    Iodinated Contrast Media Shortness Of Breath    Iodine Shortness Of Breath    Aspirin Nausea And Vomiting    Lipitor [Atorvastatin] Unknown - High Severity    Prednisone Hives       Current Outpatient Medications on File Prior to Visit   Medication Sig Dispense Refill    apixaban (ELIQUIS) 2.5 MG tablet tablet Take 1 tablet by mouth Every 12 (Twelve) Hours. Indications: Atrial Fibrillation 60 tablet 0    doxycycline (VIBRAMYCIN) 100 MG capsule Take 1 capsule by mouth 2 (Two) Times a Day. 20 capsule 0    insulin glargine (LANTUS, SEMGLEE) 100 UNIT/ML injection Inject 33 Units under the skin into the appropriate area as directed Every Night.      lisinopril (PRINIVIL,ZESTRIL) 40 MG tablet Take 1 tablet by mouth Daily.      metoprolol succinate XL (TOPROL-XL) 100 MG 24 hr tablet Take 1 tablet by mouth Daily. 30 tablet 0    pantoprazole  "(PROTONIX) 40 MG EC tablet Take 1 tablet by mouth 2 (Two) Times a Day Before Meals. 30 tablet 0    pioglitazone (ACTOS) 30 MG tablet Take 1 tablet by mouth Daily.       No current facility-administered medications on file prior to visit.       Objective   Pulse 100   Resp 16   Ht 177.8 cm (70\")   Wt 81.6 kg (180 lb)   SpO2 96%   BMI 25.83 kg/m²     Foot/Ankle Exam    GENERAL  Appearance:  appears stated age  Orientation:  AAOx3  Affect:  appropriate    VASCULAR     Left Foot Vascularity   Dorsalis pedis:  2+  Posterior tibial:  2+  Skin temperature:  warm  Edema grading:  None  CFT:  3  Pedal hair growth:  Absent     NEUROLOGIC     Left Foot Neurologic   Light touch sensation: diminished  Hot/Cold sensation:  diminished  Achilles reflex:  1+    MUSCULOSKELETAL     Left Foot Musculoskeletal   Ecchymosis:  toe 1  Tenderness:  none  Arch:  Normal    MUSCLE STRENGTH     Left Foot Muscle Strength   Foot dorsiflexion:  4+  Foot plantar flexion:  4+  Foot inversion:  4+  Foot eversion:  4+     Left foot additional comments: 01/18/2024: Mild crusting involving the nasal region.  No parth open wound, purulence, or gross signs of necrosis.    01/30/2024: toe has improved. The nail bed is healed with overlying scabbing. No drainage, malodor, or gross signs of concerns of infection. Eschar formation involving the distal aspect of the digit which appears to be quite stable. Mottling involving the feet with hyperpigmentations consistent with venous insufficiency.    02/20/2024: Wound to the distal aspect of the left hallux is stable and granular. No progressive signs of infection or further concern.      Assessment & Plan   Diagnoses and all orders for this visit:    1. 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 DM type 2 with diabetic peripheral neuropathy      Patient presents to the office today for a follow-up regarding his left hallux. No results were obtained or interpreted today. We " discussed the etiology and biomechanics involved with his left hallux symptoms. Explained I do not see anything that would require surgical intervention at this time. Recommend Neosporin and a Band-Aid during the day and leave the wound open to air at night. He may ambulate as tolerated. The patient will return to the office in 4 weeks for reevaluation.    No orders of the defined types were placed in this encounter.         Note is dictated utilizing voice recognition software. Unfortunately this leads to occasional typographical errors. I apologize in advance if the situation occurs. If questions occur please do not hesitate to call our office.    Transcribed from ambient dictation for SHERYL Brown DPM by Blaise Mills.  02/20/24   15:57 EST    Patient or patient representative verbalized consent to the visit recording.  I have personally performed the services described in this document as transcribed by the above individual, and it is both accurate and complete.

## 2024-10-08 NOTE — PLAN OF CARE
Goal Outcome Evaluation:   Patient resting during shift. Patient is on room air during the day but requiring 1 L during the night. Patient is awaiting bed availability.                       Orthopedic attending note.  I have seen patient earlier in the morning and patient was doing well and resting comfortably eventually had some dizziness with ambulation.  Patient was then transferred to telemetry after an RRT was called.  I reviewed the note below.        S:  Pt lying comfortably in bed without complaints.  Pain controlled.  States she only ambulated to and from commode overnight due to dizziness which is now improved.  Had RRT called last evening for hypotension.     O:  Visit Vitals  /67   Pulse 86   Temp 98.2 °F (36.8 °C) (Oral)   Resp 16   Ht 5' 6.54\" (1.69 m)   Wt 78.6 kg (173 lb 4.5 oz)   SpO2 96%   BMI 27.52 kg/m²     WBC:  14.1  HGB:  8.9    PE:  Dressings C/D/I  +DF/PF  SILT L4, L5, S1  Cap refill brisk  DP 2+    A/P  63 yo F POD#1 s/p R [place]  --Ney replace Oxycodone with Tramadol due to hypotension  --May be weight bearing as tolerated on right leg  --Practice posterior hip precautions at all times (pillow between knees when in bed)  --No shower for 2 weeks; may take sponge baths   -- Aspirin every 12 hours for 30 days for DVT ppx  --Do not remove Aquacel dressing  --Follow up with Dr. Lundy in one week  --Seen and examined with Dr. Kamron Caruso PA-C

## 2025-02-25 ENCOUNTER — APPOINTMENT (OUTPATIENT)
Dept: ULTRASOUND IMAGING | Facility: HOSPITAL | Age: 75
DRG: 872 | End: 2025-02-25
Payer: MEDICARE

## 2025-02-25 ENCOUNTER — HOSPITAL ENCOUNTER (INPATIENT)
Facility: HOSPITAL | Age: 75
LOS: 5 days | Discharge: NURSING, REHAB OR LONG-TERM CARE | DRG: 872 | End: 2025-03-03
Attending: STUDENT IN AN ORGANIZED HEALTH CARE EDUCATION/TRAINING PROGRAM
Payer: MEDICARE

## 2025-02-25 ENCOUNTER — APPOINTMENT (OUTPATIENT)
Dept: CT IMAGING | Facility: HOSPITAL | Age: 75
DRG: 872 | End: 2025-02-25
Payer: MEDICARE

## 2025-02-25 ENCOUNTER — APPOINTMENT (OUTPATIENT)
Dept: GENERAL RADIOLOGY | Facility: HOSPITAL | Age: 75
DRG: 872 | End: 2025-02-25
Payer: MEDICARE

## 2025-02-25 DIAGNOSIS — R31.9 URINARY TRACT INFECTION WITH HEMATURIA, SITE UNSPECIFIED: ICD-10-CM

## 2025-02-25 DIAGNOSIS — N39.0 URINARY TRACT INFECTION WITH HEMATURIA, SITE UNSPECIFIED: ICD-10-CM

## 2025-02-25 DIAGNOSIS — N18.9 ACUTE RENAL FAILURE SUPERIMPOSED ON CHRONIC KIDNEY DISEASE, UNSPECIFIED ACUTE RENAL FAILURE TYPE, UNSPECIFIED CKD STAGE: ICD-10-CM

## 2025-02-25 DIAGNOSIS — N17.9 ACUTE RENAL FAILURE SUPERIMPOSED ON CHRONIC KIDNEY DISEASE, UNSPECIFIED ACUTE RENAL FAILURE TYPE, UNSPECIFIED CKD STAGE: ICD-10-CM

## 2025-02-25 DIAGNOSIS — R53.1 WEAKNESS: Primary | ICD-10-CM

## 2025-02-25 LAB
ALBUMIN SERPL-MCNC: 3.9 G/DL (ref 3.5–5.2)
ALBUMIN/GLOB SERPL: 1.2 G/DL
ALP SERPL-CCNC: 85 U/L (ref 39–117)
ALT SERPL W P-5'-P-CCNC: 12 U/L (ref 1–41)
ANION GAP SERPL CALCULATED.3IONS-SCNC: 13.3 MMOL/L (ref 5–15)
AST SERPL-CCNC: 19 U/L (ref 1–40)
B PARAPERT DNA SPEC QL NAA+PROBE: NOT DETECTED
B PERT DNA SPEC QL NAA+PROBE: NOT DETECTED
BACTERIA UR QL AUTO: ABNORMAL /HPF
BASOPHILS # BLD AUTO: 0.02 10*3/MM3 (ref 0–0.2)
BASOPHILS NFR BLD AUTO: 0.2 % (ref 0–1.5)
BILIRUB SERPL-MCNC: 0.7 MG/DL (ref 0–1.2)
BILIRUB UR QL STRIP: NEGATIVE
BUN SERPL-MCNC: 49 MG/DL (ref 8–23)
BUN/CREAT SERPL: 21.8 (ref 7–25)
C PNEUM DNA NPH QL NAA+NON-PROBE: NOT DETECTED
CALCIUM SPEC-SCNC: 9.4 MG/DL (ref 8.6–10.5)
CHLORIDE SERPL-SCNC: 105 MMOL/L (ref 98–107)
CLARITY UR: ABNORMAL
CO2 SERPL-SCNC: 23.7 MMOL/L (ref 22–29)
COLOR UR: YELLOW
CREAT SERPL-MCNC: 2.25 MG/DL (ref 0.76–1.27)
D-LACTATE SERPL-SCNC: 0.8 MMOL/L (ref 0.3–2)
DEPRECATED RDW RBC AUTO: 43.4 FL (ref 37–54)
EGFRCR SERPLBLD CKD-EPI 2021: 29.7 ML/MIN/1.73
EOSINOPHIL # BLD AUTO: 0.01 10*3/MM3 (ref 0–0.4)
EOSINOPHIL NFR BLD AUTO: 0.1 % (ref 0.3–6.2)
ERYTHROCYTE [DISTWIDTH] IN BLOOD BY AUTOMATED COUNT: 12.7 % (ref 12.3–15.4)
FLUAV SUBTYP SPEC NAA+PROBE: NOT DETECTED
FLUBV RNA ISLT QL NAA+PROBE: NOT DETECTED
GLOBULIN UR ELPH-MCNC: 3.2 GM/DL
GLUCOSE BLDC GLUCOMTR-MCNC: 148 MG/DL (ref 70–105)
GLUCOSE BLDC GLUCOMTR-MCNC: 222 MG/DL (ref 70–105)
GLUCOSE SERPL-MCNC: 125 MG/DL (ref 65–99)
GLUCOSE UR STRIP-MCNC: NEGATIVE MG/DL
HADV DNA SPEC NAA+PROBE: NOT DETECTED
HCOV 229E RNA SPEC QL NAA+PROBE: NOT DETECTED
HCOV HKU1 RNA SPEC QL NAA+PROBE: NOT DETECTED
HCOV NL63 RNA SPEC QL NAA+PROBE: NOT DETECTED
HCOV OC43 RNA SPEC QL NAA+PROBE: NOT DETECTED
HCT VFR BLD AUTO: 36.7 % (ref 37.5–51)
HGB BLD-MCNC: 11.8 G/DL (ref 13–17.7)
HGB UR QL STRIP.AUTO: ABNORMAL
HMPV RNA NPH QL NAA+NON-PROBE: NOT DETECTED
HPIV1 RNA ISLT QL NAA+PROBE: NOT DETECTED
HPIV2 RNA SPEC QL NAA+PROBE: NOT DETECTED
HPIV3 RNA NPH QL NAA+PROBE: NOT DETECTED
HPIV4 P GENE NPH QL NAA+PROBE: NOT DETECTED
HYALINE CASTS UR QL AUTO: ABNORMAL /LPF
IMM GRANULOCYTES # BLD AUTO: 0.07 10*3/MM3 (ref 0–0.05)
IMM GRANULOCYTES NFR BLD AUTO: 0.6 % (ref 0–0.5)
KETONES UR QL STRIP: NEGATIVE
LARGE PLATELETS: NORMAL
LEUKOCYTE ESTERASE UR QL STRIP.AUTO: ABNORMAL
LIPASE SERPL-CCNC: 29 U/L (ref 13–60)
LYMPHOCYTES # BLD AUTO: 0.34 10*3/MM3 (ref 0.7–3.1)
LYMPHOCYTES NFR BLD AUTO: 3 % (ref 19.6–45.3)
M PNEUMO IGG SER IA-ACNC: NOT DETECTED
MCH RBC QN AUTO: 29.8 PG (ref 26.6–33)
MCHC RBC AUTO-ENTMCNC: 32.2 G/DL (ref 31.5–35.7)
MCV RBC AUTO: 92.7 FL (ref 79–97)
MONOCYTES # BLD AUTO: 0.29 10*3/MM3 (ref 0.1–0.9)
MONOCYTES NFR BLD AUTO: 2.5 % (ref 5–12)
MUCOUS THREADS URNS QL MICRO: ABNORMAL /HPF
NEUTROPHILS NFR BLD AUTO: 10.76 10*3/MM3 (ref 1.7–7)
NEUTROPHILS NFR BLD AUTO: 93.6 % (ref 42.7–76)
NITRITE UR QL STRIP: NEGATIVE
NRBC BLD AUTO-RTO: 0 /100 WBC (ref 0–0.2)
PH UR STRIP.AUTO: <=5 [PH] (ref 5–8)
PLATELET # BLD AUTO: 109 10*3/MM3 (ref 140–450)
PMV BLD AUTO: 10.4 FL (ref 6–12)
POTASSIUM SERPL-SCNC: 3.7 MMOL/L (ref 3.5–5.2)
PROT SERPL-MCNC: 7.1 G/DL (ref 6–8.5)
PROT UR QL STRIP: ABNORMAL
RBC # BLD AUTO: 3.96 10*6/MM3 (ref 4.14–5.8)
RBC # UR STRIP: ABNORMAL /HPF
RBC MORPH BLD: NORMAL
REF LAB TEST METHOD: ABNORMAL
RHINOVIRUS RNA SPEC NAA+PROBE: NOT DETECTED
RSV RNA NPH QL NAA+NON-PROBE: NOT DETECTED
SARS-COV-2 RNA RESP QL NAA+PROBE: NOT DETECTED
SMALL PLATELETS BLD QL SMEAR: NORMAL
SODIUM SERPL-SCNC: 142 MMOL/L (ref 136–145)
SP GR UR STRIP: 1.01 (ref 1–1.03)
SQUAMOUS #/AREA URNS HPF: ABNORMAL /HPF
UROBILINOGEN UR QL STRIP: ABNORMAL
WBC # UR STRIP: ABNORMAL /HPF
WBC CLUMPS # UR AUTO: ABNORMAL /HPF
WBC MORPH BLD: NORMAL
WBC NRBC COR # BLD AUTO: 11.49 10*3/MM3 (ref 3.4–10.8)

## 2025-02-25 PROCEDURE — 87086 URINE CULTURE/COLONY COUNT: CPT | Performed by: NURSE PRACTITIONER

## 2025-02-25 PROCEDURE — P9612 CATHETERIZE FOR URINE SPEC: HCPCS

## 2025-02-25 PROCEDURE — 76775 US EXAM ABDO BACK WALL LIM: CPT

## 2025-02-25 PROCEDURE — 74176 CT ABD & PELVIS W/O CONTRAST: CPT

## 2025-02-25 PROCEDURE — 82948 REAGENT STRIP/BLOOD GLUCOSE: CPT

## 2025-02-25 PROCEDURE — 85007 BL SMEAR W/DIFF WBC COUNT: CPT | Performed by: NURSE PRACTITIONER

## 2025-02-25 PROCEDURE — 0202U NFCT DS 22 TRGT SARS-COV-2: CPT | Performed by: NURSE PRACTITIONER

## 2025-02-25 PROCEDURE — G0378 HOSPITAL OBSERVATION PER HR: HCPCS

## 2025-02-25 PROCEDURE — 85025 COMPLETE CBC W/AUTO DIFF WBC: CPT | Performed by: NURSE PRACTITIONER

## 2025-02-25 PROCEDURE — 99285 EMERGENCY DEPT VISIT HI MDM: CPT

## 2025-02-25 PROCEDURE — 36415 COLL VENOUS BLD VENIPUNCTURE: CPT

## 2025-02-25 PROCEDURE — 25810000003 SODIUM CHLORIDE 0.9 % SOLUTION: Performed by: NURSE PRACTITIONER

## 2025-02-25 PROCEDURE — 71045 X-RAY EXAM CHEST 1 VIEW: CPT

## 2025-02-25 PROCEDURE — 81001 URINALYSIS AUTO W/SCOPE: CPT | Performed by: NURSE PRACTITIONER

## 2025-02-25 PROCEDURE — 25010000002 CEFTRIAXONE PER 250 MG

## 2025-02-25 PROCEDURE — 63710000001 INSULIN LISPRO (HUMAN) PER 5 UNITS

## 2025-02-25 PROCEDURE — 87186 SC STD MICRODIL/AGAR DIL: CPT | Performed by: NURSE PRACTITIONER

## 2025-02-25 PROCEDURE — 83605 ASSAY OF LACTIC ACID: CPT

## 2025-02-25 PROCEDURE — 87040 BLOOD CULTURE FOR BACTERIA: CPT | Performed by: NURSE PRACTITIONER

## 2025-02-25 PROCEDURE — 93005 ELECTROCARDIOGRAM TRACING: CPT | Performed by: NURSE PRACTITIONER

## 2025-02-25 PROCEDURE — 83690 ASSAY OF LIPASE: CPT | Performed by: NURSE PRACTITIONER

## 2025-02-25 PROCEDURE — 87077 CULTURE AEROBIC IDENTIFY: CPT | Performed by: NURSE PRACTITIONER

## 2025-02-25 PROCEDURE — 25810000003 SODIUM CHLORIDE 0.9 % SOLUTION

## 2025-02-25 PROCEDURE — 63710000001 INSULIN GLARGINE PER 5 UNITS

## 2025-02-25 PROCEDURE — 80053 COMPREHEN METABOLIC PANEL: CPT | Performed by: NURSE PRACTITIONER

## 2025-02-25 PROCEDURE — 87154 CUL TYP ID BLD PTHGN 6+ TRGT: CPT | Performed by: NURSE PRACTITIONER

## 2025-02-25 RX ORDER — ONDANSETRON 4 MG/1
4 TABLET, ORALLY DISINTEGRATING ORAL EVERY 6 HOURS PRN
Status: DISCONTINUED | OUTPATIENT
Start: 2025-02-25 | End: 2025-03-03 | Stop reason: HOSPADM

## 2025-02-25 RX ORDER — ACETAMINOPHEN 160 MG/5ML
650 SOLUTION ORAL EVERY 4 HOURS PRN
Status: DISCONTINUED | OUTPATIENT
Start: 2025-02-25 | End: 2025-03-03 | Stop reason: HOSPADM

## 2025-02-25 RX ORDER — CLOPIDOGREL BISULFATE 75 MG/1
75 TABLET ORAL DAILY
Status: DISCONTINUED | OUTPATIENT
Start: 2025-02-25 | End: 2025-03-03 | Stop reason: HOSPADM

## 2025-02-25 RX ORDER — ACETAMINOPHEN 500 MG
1000 TABLET ORAL ONCE
Status: COMPLETED | OUTPATIENT
Start: 2025-02-25 | End: 2025-02-25

## 2025-02-25 RX ORDER — ROSUVASTATIN CALCIUM 40 MG/1
40 TABLET, COATED ORAL DAILY
Status: ON HOLD | COMMUNITY
End: 2025-03-15

## 2025-02-25 RX ORDER — TRIAMTERENE AND HYDROCHLOROTHIAZIDE 75; 50 MG/1; MG/1
1 TABLET ORAL DAILY
Status: DISCONTINUED | OUTPATIENT
Start: 2025-02-25 | End: 2025-02-27

## 2025-02-25 RX ORDER — POLYETHYLENE GLYCOL 3350 17 G/17G
17 POWDER, FOR SOLUTION ORAL DAILY PRN
Status: DISCONTINUED | OUTPATIENT
Start: 2025-02-25 | End: 2025-03-03 | Stop reason: HOSPADM

## 2025-02-25 RX ORDER — ROSUVASTATIN CALCIUM 10 MG/1
40 TABLET, COATED ORAL DAILY
Status: DISCONTINUED | OUTPATIENT
Start: 2025-02-25 | End: 2025-03-03 | Stop reason: HOSPADM

## 2025-02-25 RX ORDER — ONDANSETRON 2 MG/ML
4 INJECTION INTRAMUSCULAR; INTRAVENOUS EVERY 6 HOURS PRN
Status: DISCONTINUED | OUTPATIENT
Start: 2025-02-25 | End: 2025-03-03 | Stop reason: HOSPADM

## 2025-02-25 RX ORDER — CEFTRIAXONE 1 G/1
2 INJECTION, POWDER, FOR SOLUTION INTRAMUSCULAR; INTRAVENOUS ONCE
Qty: 2 G | Refills: 0 | Status: SHIPPED | OUTPATIENT
Start: 2025-02-25 | End: 2025-02-25

## 2025-02-25 RX ORDER — NICOTINE POLACRILEX 4 MG
15 LOZENGE BUCCAL
Status: DISCONTINUED | OUTPATIENT
Start: 2025-02-25 | End: 2025-03-03 | Stop reason: HOSPADM

## 2025-02-25 RX ORDER — PIOGLITAZONE 30 MG/1
30 TABLET ORAL DAILY
Status: DISCONTINUED | OUTPATIENT
Start: 2025-02-25 | End: 2025-03-03 | Stop reason: HOSPADM

## 2025-02-25 RX ORDER — INSULIN LISPRO 100 [IU]/ML
2-7 INJECTION, SOLUTION INTRAVENOUS; SUBCUTANEOUS
Status: DISCONTINUED | OUTPATIENT
Start: 2025-02-25 | End: 2025-03-03 | Stop reason: HOSPADM

## 2025-02-25 RX ORDER — TRIAMTERENE AND HYDROCHLOROTHIAZIDE 75; 50 MG/1; MG/1
1 TABLET ORAL DAILY
COMMUNITY
End: 2025-03-03 | Stop reason: HOSPADM

## 2025-02-25 RX ORDER — ACETAMINOPHEN 650 MG/1
650 SUPPOSITORY RECTAL EVERY 4 HOURS PRN
Status: DISCONTINUED | OUTPATIENT
Start: 2025-02-25 | End: 2025-03-03 | Stop reason: HOSPADM

## 2025-02-25 RX ORDER — DEXTROSE MONOHYDRATE 25 G/50ML
25 INJECTION, SOLUTION INTRAVENOUS
Status: DISCONTINUED | OUTPATIENT
Start: 2025-02-25 | End: 2025-03-03 | Stop reason: HOSPADM

## 2025-02-25 RX ORDER — SODIUM CHLORIDE 9 MG/ML
100 INJECTION, SOLUTION INTRAVENOUS CONTINUOUS
Status: DISCONTINUED | OUTPATIENT
Start: 2025-02-25 | End: 2025-02-26

## 2025-02-25 RX ORDER — ACETAMINOPHEN 325 MG/1
650 TABLET ORAL EVERY 4 HOURS PRN
Status: DISCONTINUED | OUTPATIENT
Start: 2025-02-25 | End: 2025-03-03 | Stop reason: HOSPADM

## 2025-02-25 RX ORDER — AMOXICILLIN 250 MG
2 CAPSULE ORAL 2 TIMES DAILY PRN
Status: DISCONTINUED | OUTPATIENT
Start: 2025-02-25 | End: 2025-03-03 | Stop reason: HOSPADM

## 2025-02-25 RX ORDER — IBUPROFEN 600 MG/1
1 TABLET ORAL
Status: DISCONTINUED | OUTPATIENT
Start: 2025-02-25 | End: 2025-03-03 | Stop reason: HOSPADM

## 2025-02-25 RX ORDER — BISACODYL 5 MG/1
5 TABLET, DELAYED RELEASE ORAL DAILY PRN
Status: DISCONTINUED | OUTPATIENT
Start: 2025-02-25 | End: 2025-03-03 | Stop reason: HOSPADM

## 2025-02-25 RX ORDER — BISACODYL 10 MG
10 SUPPOSITORY, RECTAL RECTAL DAILY PRN
Status: DISCONTINUED | OUTPATIENT
Start: 2025-02-25 | End: 2025-03-03 | Stop reason: HOSPADM

## 2025-02-25 RX ORDER — LISINOPRIL 20 MG/1
40 TABLET ORAL DAILY
Status: DISCONTINUED | OUTPATIENT
Start: 2025-02-25 | End: 2025-03-03 | Stop reason: HOSPADM

## 2025-02-25 RX ORDER — INSULIN GLARGINE 100 [IU]/ML
15 INJECTION, SOLUTION SUBCUTANEOUS NIGHTLY
Status: DISCONTINUED | OUTPATIENT
Start: 2025-02-25 | End: 2025-02-26

## 2025-02-25 RX ORDER — CLOPIDOGREL BISULFATE 75 MG/1
75 TABLET ORAL DAILY
COMMUNITY

## 2025-02-25 RX ADMIN — ACETAMINOPHEN 1000 MG: 500 TABLET, FILM COATED ORAL at 11:49

## 2025-02-25 RX ADMIN — INSULIN LISPRO 3 UNITS: 100 INJECTION, SOLUTION INTRAVENOUS; SUBCUTANEOUS at 21:34

## 2025-02-25 RX ADMIN — CLOPIDOGREL BISULFATE 75 MG: 75 TABLET ORAL at 20:08

## 2025-02-25 RX ADMIN — SODIUM CHLORIDE 100 ML/HR: 9 INJECTION, SOLUTION INTRAVENOUS at 17:49

## 2025-02-25 RX ADMIN — ROSUVASTATIN CALCIUM 40 MG: 10 TABLET, FILM COATED ORAL at 20:08

## 2025-02-25 RX ADMIN — CEFTRIAXONE 2000 MG: 2 INJECTION, POWDER, FOR SOLUTION INTRAMUSCULAR; INTRAVENOUS at 17:50

## 2025-02-25 RX ADMIN — INSULIN GLARGINE 15 UNITS: 100 INJECTION, SOLUTION SUBCUTANEOUS at 21:34

## 2025-02-25 RX ADMIN — SODIUM CHLORIDE 500 ML: 900 INJECTION, SOLUTION INTRAVENOUS at 11:07

## 2025-02-25 NOTE — H&P
[facility]  History & Physical    Patient Name: Blas Mojica  : 1950  MRN: [id number]  Primary Care Physician:  Sahil Read MD  Date of admission: 2025  Date and Time of Service: 2025 at 1330    Subjective      Chief Complaint: malaise    History of Present Illness: Blas Mojica is a 74 y.o. male with a CMH of T2DM, HTN, HLD, PUD with h/o gastric ulcers, history of A-fib who presented to [facility] on 2025 with reports of generalized malaise and fatigue x 1 week.  Wife is at bedside who assist with history.  Per wife, patient has not been active as he usually is.  Wife reports that patient then since developed bloody urine that began yesterday.  Patient endorses suprapubic abdominal pain dysuria and urinary frequency and urgency changes.  Denies flank pain.  Denies fever and chills.  Patient reports that he follows with Dr. Pinto  for nephrology services.    On ED evaluation, patient was noted to be tachycardic with heart rate in the 110s.  CMP was remarkable for creatinine of 2.25 with a BUN of 49.  CBC with mild leukocytosis with WBC of 11.4.  Lactic acid was 0.8.  UA with TNTC WBCs and 1+ bacteria. Patient was given tylenol and 500ml IVF bolus. Hospitalist service to admit for further management.      Review of Systems   Constitutional:  Positive for fatigue. Negative for chills and fever.   Respiratory:  Negative for cough and shortness of breath.    Cardiovascular:  Negative for chest pain and palpitations.   Gastrointestinal:  Positive for abdominal pain. Negative for nausea and vomiting.   Genitourinary:  Positive for dysuria, frequency and urgency.   All other systems reviewed and are negative.      Personal History     Past Medical History:   Diagnosis Date    Diabetes mellitus     Hyperlipidemia     Hypertension     Kidney stone     TIA (transient ischemic attack)     Type 2 diabetes mellitus with stage 3 chronic kidney  disease, with long-term current use of insulin 12/13/2021       Past Surgical History:   Procedure Laterality Date    ADENOIDECTOMY      ENDOSCOPY N/A 12/13/2023    Procedure: ESOPHAGOGASTRODUODENOSCOPY with removal of NJ tube from right nare;  Surgeon: Jaret Valadez MD;  Location: [facility];  Service: Gastroenterology;  Laterality: N/A;  esophagitis, gastric ulcer    EXTRACORPOREAL SHOCK WAVE LITHOTRIPSY (ESWL)      HERNIA REPAIR      KNEE ACL RECONSTRUCTION Left     TONSILLECTOMY         Family History: family history includes No Known Problems in his father and mother. Otherwise pertinent FHx was reviewed and not pertinent to current issue.    Social History:  reports that he has never smoked. He has never been exposed to tobacco smoke. He has never used smokeless tobacco. He reports that he does not drink alcohol and does not use drugs.    Home Medications:  Prior to Admission Medications       Prescriptions Last Dose Informant Patient Reported? Taking?    clopidogrel (PLAVIX) 75 MG tablet 2/24/2025  Yes Yes    Take 1 tablet by mouth Daily.    insulin glargine (LANTUS, SEMGLEE) 100 UNIT/ML injection 2/24/2025  Yes Yes    Inject 30 Units under the skin into the appropriate area as directed Every Night.    lisinopril (PRINIVIL,ZESTRIL) 40 MG tablet 2/24/2025  Yes Yes    Take 1 tablet by mouth Daily.    pioglitazone (ACTOS) 30 MG tablet 2/24/2025  Yes Yes    Take 1 tablet by mouth Daily.    rosuvastatin (CRESTOR) 40 MG tablet 2/24/2025  Yes Yes    Take 1 tablet by mouth Daily.    triamterene-hydrochlorothiazide (MAXZIDE) 75-50 MG per tablet 2/24/2025  Yes Yes    Take 1 tablet by mouth Daily.              Allergies:  Allergies   Allergen Reactions    Contrast Dye (Echo Or Unknown Ct/Mr) Anaphylaxis    Iodinated Contrast Media Shortness Of Breath    Iodine Shortness Of Breath    Aspirin Nausea And Vomiting    Lipitor [Atorvastatin] Unknown - High Severity    Prednisone Hives       Objective       Vitals:   Temp:  [100.1 °F (37.8 °C)-100.4 °F (38 °C)] 100.4 °F (38 °C)  Heart Rate:  [] 94  Resp:  [16] 16  BP: ()/(52-79) 99/59  Body mass index is 34.44 kg/m².    Physical Exam  General: 75 yo M, Alert and oriented, well nourished, no acute distress.  HENT: Normocephalic, normal hearing, moist oral mucosa, no scleral icterus.  Neck: Supple, nontender, no carotid bruits, no JVD, no LAD.  Lungs: Clear to auscultation, nonlabored respiration.  Heart: RRR, no murmur, gallop or edema.  Abdomen: Soft, nontender, nondistended, + bowel sounds.  Musculoskeletal: Normal range of motion and strength, no tenderness or swelling.  Skin: Skin is warm, dry and pink, no rashes or lesions.  Psychiatric: Cooperative, appropriate mood and affect.      Diagnostic Data:  Lab Results (last 24 hours)       Procedure Component Value Units Date/Time    Urinalysis, Microscopic Only - Straight Cath [[id number]]  (Abnormal) Collected: 02/25/25 1226    Specimen: Urine from Straight Cath Updated: 02/25/25 1253     RBC, UA 0-2 /HPF      WBC, UA Too Numerous to Count /HPF      Bacteria, UA 1+ /HPF      Squamous Epithelial Cells, UA 0-2 /HPF      Hyaline Casts, UA 0-2 /LPF      Mucus, UA Small/1+ /HPF      WBC Clumps, UA Moderate/2+ /HPF      Methodology Manual Light Microscopy    Urine Culture - Urine, Straight Cath [[id number]] Collected: 02/25/25 1226    Specimen: Urine from Straight Cath Updated: 02/25/25 1253    Urinalysis With Culture If Indicated - Straight Cath [[id number]]  (Abnormal) Collected: 02/25/25 1226    Specimen: Urine from Straight Cath Updated: 02/25/25 1239     Color, UA Yellow     Appearance, UA Cloudy     Comment: Result checked          pH, UA <=5.0     Specific Gravity, UA 1.011     Glucose, UA Negative     Ketones, UA Negative     Bilirubin, UA Negative     Blood, UA Large (3+)     Protein, UA 30 mg/dL (1+)     Leuk Esterase, UA Moderate (2+)     Nitrite, UA Negative     Urobilinogen, UA 0.2 E.U./dL     Narrative:      In absence of clinical symptoms, the presence of pyuria, bacteria, and/or nitrites on the urinalysis result does not correlate with infection.    Respiratory Panel PCR w/COVID-19(SARS-CoV-2) KEVIN/HORTENSIA/SLOAN/PAD/COR/JOHNNY In-House, NP Swab in UTM/VTM, 2 HR TAT - Swab, Nasopharynx [[id number]]  (Normal) Collected: 02/25/25 1037    Specimen: Swab from Nasopharynx Updated: 02/25/25 1143     ADENOVIRUS, PCR Not Detected     Coronavirus 229E Not Detected     Coronavirus HKU1 Not Detected     Coronavirus NL63 Not Detected     Coronavirus OC43 Not Detected     COVID19 Not Detected     Human Metapneumovirus Not Detected     Human Rhinovirus/Enterovirus Not Detected     Influenza A PCR Not Detected     Influenza B PCR Not Detected     Parainfluenza Virus 1 Not Detected     Parainfluenza Virus 2 Not Detected     Parainfluenza Virus 3 Not Detected     Parainfluenza Virus 4 Not Detected     RSV, PCR Not Detected     Bordetella pertussis pcr Not Detected     Bordetella parapertussis PCR Not Detected     Chlamydophila pneumoniae PCR Not Detected     Mycoplasma pneumo by PCR Not Detected    Narrative:      In the setting of a positive respiratory panel with a viral infection PLUS a negative procalcitonin without other underlying concern for bacterial infection, consider observing off antibiotics or discontinuation of antibiotics and continue supportive care. If the respiratory panel is positive for atypical bacterial infection (Bordetella pertussis, Chlamydophila pneumoniae, or Mycoplasma pneumoniae), consider antibiotic de-escalation to target atypical bacterial infection.    CBC & Differential [[id number]]  (Abnormal) Collected: 02/25/25 1029    Specimen: Blood Updated: 02/25/25 1105    Narrative:      The following orders were created for panel order CBC & Differential.  Procedure                               Abnormality         Status                     ---------                               -----------          ------                     CBC Auto Differential[[id number]]        Abnormal            Final result               Scan Slide[[id number]]                                       Final result                 Please view results for these tests on the individual orders.    CBC Auto Differential [[id number]]  (Abnormal) Collected: 02/25/25 1029    Specimen: Blood Updated: 02/25/25 1105     WBC 11.49 10*3/mm3      RBC 3.96 10*6/mm3      Hemoglobin 11.8 g/dL      Hematocrit 36.7 %      MCV 92.7 fL      MCH 29.8 pg      MCHC 32.2 g/dL      RDW 12.7 %      RDW-SD 43.4 fl      MPV 10.4 fL      Platelets 109 10*3/mm3      Neutrophil % 93.6 %      Lymphocyte % 3.0 %      Monocyte % 2.5 %      Eosinophil % 0.1 %      Basophil % 0.2 %      Immature Grans % 0.6 %      Neutrophils, Absolute 10.76 10*3/mm3      Lymphocytes, Absolute 0.34 10*3/mm3      Monocytes, Absolute 0.29 10*3/mm3      Eosinophils, Absolute 0.01 10*3/mm3      Basophils, Absolute 0.02 10*3/mm3      Immature Grans, Absolute 0.07 10*3/mm3      nRBC 0.0 /100 WBC     Scan Slide [[id number]] Collected: 02/25/25 1029    Specimen: Blood Updated: 02/25/25 1105     RBC Morphology Normal     WBC Morphology Normal     Platelet Estimate Decreased     Large Platelets Slight/1+    Comprehensive Metabolic Panel [[id number]]  (Abnormal) Collected: 02/25/25 1029    Specimen: Blood Updated: 02/25/25 1057     Glucose 125 mg/dL      BUN 49 mg/dL      Creatinine 2.25 mg/dL      Sodium 142 mmol/L      Potassium 3.7 mmol/L      Chloride 105 mmol/L      CO2 23.7 mmol/L      Calcium 9.4 mg/dL      Total Protein 7.1 g/dL      Albumin 3.9 g/dL      ALT (SGPT) 12 U/L      AST (SGOT) 19 U/L      Alkaline Phosphatase 85 U/L      Total Bilirubin 0.7 mg/dL      Globulin 3.2 gm/dL      A/G Ratio 1.2 g/dL      BUN/Creatinine Ratio 21.8     Anion Gap 13.3 mmol/L      eGFR 29.7 mL/min/1.73     Narrative:      GFR Categories in Chronic Kidney Disease (CKD)      GFR Category          GFR (mL/min/1.73)     Interpretation  G1                     90 or greater         Normal or high (1)  G2                      60-89                Mild decrease (1)  G3a                   45-59                Mild to moderate decrease  G3b                   30-44                Moderate to severe decrease  G4                    15-29                Severe decrease  G5                    14 or less           Kidney failure          (1)In the absence of evidence of kidney disease, neither GFR category G1 or G2 fulfill the criteria for CKD.    eGFR calculation 2021 CKD-EPI creatinine equation, which does not include race as a factor    Lipase [[id number]]  (Normal) Collected: 02/25/25 1029    Specimen: Blood Updated: 02/25/25 1057     Lipase 29 U/L     POC Lactate [[id number]]  (Normal) Collected: 02/25/25 1034    Specimen: Blood Updated: 02/25/25 1036     Lactate 0.8 mmol/L      Comment: Serial Number: [id number]:  [id number]       Blood Culture - Blood, Arm, Left [[id number]] Collected: 02/25/25 1029    Specimen: Blood from Arm, Left Updated: 02/25/25 1033    Blood Culture - Blood, Arm, Right [[id number]] Collected: 02/25/25 1024    Specimen: Blood from Arm, Right Updated: 02/25/25 1028             Imaging Results (Last 24 Hours)       Procedure Component Value Units Date/Time    XR Chest 1 View [[id number]] Collected: 02/25/25 1105     Updated: 02/25/25 1108    Narrative:      XR CHEST 1 VW    Date of Exam: 2/25/2025 10:50 AM EST    Indication: tachycardic and febrile    Comparison: 1/18/2024 chest radiograph, abdominal CT 2/25/2025    Findings:  Heart mildly enlarged. Mild linear left basilar atelectasis. No pneumothorax. No focal consolidation or effusion. Osseous structures appear grossly intact.      Impression:      Impression:  1. Mild linear left basilar atelectasis.  2. Mild cardiomegaly.          Electronically Signed: Vitor Pena MD    2/25/2025 11:06 AM EST    Workstation ID: [id number]    CT Abdomen Pelvis Without Contrast  [[id number]] Collected: 02/25/25 1055     Updated: 02/25/25 1103    Narrative:      CT ABDOMEN PELVIS WO CONTRAST    Date of Exam: 2/25/2025 10:43 AM EST    Indication: abdominal pain and distention.    Comparison: CT abdomen pelvis dated 12/28/2023    Technique: Axial CT images were obtained of the abdomen and pelvis without the administration of contrast. Sagittal and coronal reconstructions were performed.  Automated exposure control and iterative reconstruction methods were used.      Findings:  Lung Bases:    Bibasilar discoid atelectasis.      Liver: There is minimal nodularity of the liver surface. No focal liver lesions are seen      Biliary/Gallbladder: The gallbladder is without evidence of radiopaque stones. The biliary tree is nondilated.      Spleen:Spleen is normal in size and CT density.    Pancreas:   Diffuse pancreatic atrophy. No peripancreatic inflammatory changes    There is mild mesenteric panniculitis      Kidneys: Kidneys are normal in size. There are no stones or hydronephrosis. Again noticed small bilateral hyperdense renal lesions likely representing hemorrhagic/proteinaceous cysts      Adrenals: Adrenal glands are unremarkable.      Retroperitoneal/Lymph Nodes/Vasculature: No retroperitoneal adenopathy is identified by size criteria.      Gastrointestinal/Mesentery: The bowel loops are non-dilated without definite wall thickening or mass. The appendix appears within normal limits. No evidence of obstruction. No free air.      Bladder: The bladder is unremarkable    Mild enlargement of the prostate        Bony Structures:  Visualized bony structures are consistent with the patient's age. Grade 1 spondylolisthesis L5 on S1          Impression:      Impression:  1.No acute findings in the abdomen or pelvis.  2.Stable chronic ancillary findings as above        Electronically Signed: Will Flores MD    2/25/2025 11:01 AM EST    Workstation ID: [id number]              Assessment & Plan         This is a 74 y.o. male with:    Active and Resolved Problems  Active Hospital Problems    Diagnosis  POA    **Acute UTI (urinary tract infection) [N39.0]  Unknown    Acute kidney injury [N17.9]  Yes      Resolved Hospital Problems   No resolved problems to display.       Urinary tract infection  Acute kidney injury superimposed CKD 3  Hematuria  CT A/P wo: No acute findings  Renal US pending   UA with TNTC WBCs, 1+ bacteria, urine cultures pending  Cr 2.25, BUN 49, previously ~1.4  Hemoglobin stable, continue to monitor CBC  Start Rocephin, IVFs  Avoid nephrotoxic medications, holding lisinopril and triamterene/HCTZ  Nephrology consulted  May need urology consult if hematuria persists    Diabetes mellitus type 2- A1c pending, SSI, continue Lantus, decrease dose to 15 units nightly, hold Actos, consistent carb diet, hypoglycemia protocol  Hypertension-BP low normotensive, holding lisinopril and triamterene/HCTZ  Hyperlipidemia-continue statin      VTE Prophylaxis:  Mechanical VTE prophylaxis orders are present.        The patient desires to be as follows:    CODE STATUS:    Code Status (Patient has no pulse and is not breathing): CPR (Attempt to Resuscitate)  Medical Interventions (Patient has pulse or is breathing): Full Support        Debra Mojica, who can be contacted at [phone], is the designated person to make medical decisions on the patient's behalf if He is incapable of doing so. This was clarified with patient and/or next of kin on 2/25/2025 during the course of this H&P.    Admission Status:  I believe this patient meets observation status.    Expected Length of Stay: 1-2 days    PDMP and Medication Dispenses via Sidebar reviewed and consistent with patient reported medications.    I discussed the patient's findings and my recommendations with patient.      Signature:     This document has been electronically signed by Shirley Wick PA-C on February 25, 2025 16:21 ROSEANN Real  Hospitalist Team

## 2025-02-25 NOTE — Clinical Note
Level of Care: Med/Surg [1]   Diagnosis: Acute kidney injury [616861]   Admitting Physician: ANAID JOLLY [[id number]]   Attending Physician: ANAID JOLLY [[id number]]

## 2025-02-25 NOTE — PLAN OF CARE
Problem: Adult Inpatient Plan of Care  Goal: Plan of Care Review  2/25/2025 1825 by Katelin Vaughan, RN  Outcome: Progressing  2/25/2025 1824 by Katelin Vaughan, RN  Outcome: Progressing   Goal Outcome Evaluation:

## 2025-02-25 NOTE — ED PROVIDER NOTES
Subjective     Chief Complaint   Patient presents with    Illness       History of Present Illness  Patient is 74-year-old male brought to the emergency department by EMS.  Patient's wife reported to EMS that he has had 3 days of general malaise and weakness, she also states that he has had blood in his urine.  Patient does have history of kidney failure.  Per EMS patient was nauseous and had an episode of vomiting and was tachycardic in the 140s prior to arrival in the hospital.  Patient states he has no complaints and no pain at this time; denies chest pain, shortness of breath, headache.  Patient is poor historian.  Will discuss his current presentation and recent history with his wife when she arrives.        Review of Systems   Gastrointestinal:  Positive for abdominal distention.       Past Medical History:   Diagnosis Date    Diabetes mellitus     Hyperlipidemia     Hypertension     Kidney stone     TIA (transient ischemic attack) 2016    Type 2 diabetes mellitus with stage 3 chronic kidney disease, with long-term current use of insulin 12/13/2021       Allergies   Allergen Reactions    Contrast Dye (Echo Or Unknown Ct/Mr) Anaphylaxis    Iodinated Contrast Media Shortness Of Breath    Iodine Shortness Of Breath    Aspirin Nausea And Vomiting    Lipitor [Atorvastatin] Unknown - High Severity    Prednisone Hives       Past Surgical History:   Procedure Laterality Date    ADENOIDECTOMY      ENDOSCOPY N/A 12/13/2023    Procedure: ESOPHAGOGASTRODUODENOSCOPY with removal of NJ tube from right nare;  Surgeon: Jaret Valadez MD;  Location: [facility] ENDOSCOPY;  Service: Gastroenterology;  Laterality: N/A;  esophagitis, gastric ulcer    EXTRACORPOREAL SHOCK WAVE LITHOTRIPSY (ESWL)      HERNIA REPAIR      KNEE ACL RECONSTRUCTION Left     TONSILLECTOMY         Family History   Problem Relation Age of Onset    No Known Problems Mother     No Known Problems Father        Social History     Socioeconomic History     Marital status:    Tobacco Use    Smoking status: Never     Passive exposure: Never    Smokeless tobacco: Never   Vaping Use    Vaping status: Never Used   Substance and Sexual Activity    Alcohol use: Never    Drug use: Never    Sexual activity: Defer           Objective   Physical Exam  Vitals and nursing note reviewed.   Constitutional:       General: He is not in acute distress.     Appearance: He is obese. He is ill-appearing. He is not toxic-appearing.   HENT:      Head: Normocephalic and atraumatic.      Nose: No congestion or rhinorrhea.      Mouth/Throat:      Mouth: Mucous membranes are dry.      Pharynx: Oropharynx is clear.   Eyes:      Extraocular Movements: Extraocular movements intact.      Pupils: Pupils are equal, round, and reactive to light.   Cardiovascular:      Rate and Rhythm: Regular rhythm. Tachycardia present.      Pulses: Normal pulses.      Heart sounds: Normal heart sounds. No murmur heard.     No friction rub. No gallop.   Pulmonary:      Effort: Pulmonary effort is normal. No respiratory distress.   Abdominal:      General: Bowel sounds are normal. There is distension.      Tenderness: There is abdominal tenderness.   Musculoskeletal:         General: No swelling or tenderness.      Cervical back: No tenderness.      Right lower leg: No edema.      Left lower leg: No edema.   Skin:     General: Skin is dry.      Capillary Refill: Capillary refill takes less than 2 seconds.      Coloration: Skin is not jaundiced or pale.   Neurological:      Mental Status: He is alert. Mental status is at baseline.         Procedures           ED Course  ED Course as of 02/25/25 1352   Tue Feb 25, 2025   1058 Spoke with patient's wife.  Only addition to the HPI that she offered was that the patient has been sleeping most of the day for 6 days.  She states the hematuria began last night. [LB]   1207 Tech is going to cath pt for urine [LB]   1314 Spoke with hospitalist BARBARA for admission [LB]       ED Course User Index  [LB] Emili Vega APRN     Vitals:    02/25/25 1305   BP:    Pulse: 104   Resp:    Temp:    SpO2: 93%     Medications   sodium chloride 0.9 % bolus 500 mL (0 mL Intravenous Stopped 2/25/25 1314)   acetaminophen (TYLENOL) tablet 1,000 mg (1,000 mg Oral Given 2/25/25 1149)                                                         Medical Decision Making  Patient presents to the emergency department for 1 week of weakness.  See full HPI above.    Primary assessment and initial physical exam noted above.    Patient placed in ED bed and IV access established for labs and medication administration as indicated.  Differentials for patient include viral illness, UTI, electrolyte derangement.    XR Chest 1 View  Result Date: 2/25/2025  Impression: 1. Mild linear left basilar atelectasis. 2. Mild cardiomegaly. Electronically Signed: Vitor Pena MD  2/25/2025 11:06 AM EST  Workstation ID: [id number]    CT Abdomen Pelvis Without Contrast  Result Date: 2/25/2025  Impression: 1.No acute findings in the abdomen or pelvis. 2.Stable chronic ancillary findings as above Electronically Signed: Will Flores MD  2/25/2025 11:01 AM EST  Workstation ID: [id number]     Imaging reviewed by radiologist, Dr. Willson, and myself.    EKG interpreted by Dr. Willson and myself as sinus tachycardia; rate 115, , QTc 445.    Labs remarkable for urine consistent with UTI with hematuria, WBC of 11.5, acute on chronic kidney injury with no electrolyte derangement on CMP, and all negative respiratory panel.    Patient informed of all results.  Repeat physical exam completed at this time; unchanged from previous.  Patient informed of plan to admit to the hospital for continuing hydration and evaluation for other chronic issues.  Patient and wife verbalized understanding of and support for this plan of care.  Patient will be admitted to the hospitalist attending Dr. Tejada.  Full report to be given to hospitalist BARBARA  prior to patient leaving the emergency department.    Problems Addressed:  Acute renal failure superimposed on chronic kidney disease, unspecified acute renal failure type, unspecified CKD stage: complicated acute illness or injury  Urinary tract infection with hematuria, site unspecified: complicated acute illness or injury  Weakness: complicated acute illness or injury    Amount and/or Complexity of Data Reviewed  Labs: ordered.  Radiology: ordered.  ECG/medicine tests: ordered.    Risk  OTC drugs.  Prescription drug management.  Decision regarding hospitalization.        Final diagnoses:   Weakness   Urinary tract infection with hematuria, site unspecified   Acute renal failure superimposed on chronic kidney disease, unspecified acute renal failure type, unspecified CKD stage       ED Disposition  ED Disposition       ED Disposition   Decision to Admit    Condition   --    Comment   Level of Care: Telemetry [5]   Admitting Physician: ANAID JOLLY [[id number]]   Attending Physician: ANAID JOLLY [[id number]]                 No follow-up provider specified.       Medication List        New Prescriptions      cefTRIAXone 1 g injection  Commonly known as: ROCEPHIN  Infuse 2 g into a venous catheter 1 (One) Time for 1 dose.               Where to Get Your Medications        These medications were sent to [facility] [id number] - [place], [place] - [address] AT [facility] - [phone]  - [phone]  [address] [place] [address]      Phone: [phone]   cefTRIAXone 1 g injection            Emili Vega, APRN  02/25/25 8854

## 2025-02-26 LAB
ANION GAP SERPL CALCULATED.3IONS-SCNC: 12.7 MMOL/L (ref 5–15)
BACTERIA BLD CULT: ABNORMAL
BASOPHILS # BLD AUTO: 0.02 10*3/MM3 (ref 0–0.2)
BASOPHILS NFR BLD AUTO: 0.2 % (ref 0–1.5)
BOTTLE TYPE: ABNORMAL
BUN SERPL-MCNC: 47 MG/DL (ref 8–23)
BUN/CREAT SERPL: 18.3 (ref 7–25)
C3 SERPL-MCNC: 163 MG/DL (ref 82–167)
C4 SERPL-MCNC: 30 MG/DL (ref 14–44)
CALCIUM SPEC-SCNC: 9 MG/DL (ref 8.6–10.5)
CHLORIDE SERPL-SCNC: 103 MMOL/L (ref 98–107)
CK SERPL-CCNC: 103 U/L (ref 20–200)
CO2 SERPL-SCNC: 23.3 MMOL/L (ref 22–29)
CREAT SERPL-MCNC: 2.57 MG/DL (ref 0.76–1.27)
DEPRECATED RDW RBC AUTO: 45.8 FL (ref 37–54)
EGFRCR SERPLBLD CKD-EPI 2021: 25.3 ML/MIN/1.73
EOSINOPHIL # BLD AUTO: 0 10*3/MM3 (ref 0–0.4)
EOSINOPHIL NFR BLD AUTO: 0 % (ref 0.3–6.2)
ERYTHROCYTE [DISTWIDTH] IN BLOOD BY AUTOMATED COUNT: 13.1 % (ref 12.3–15.4)
GLUCOSE BLDC GLUCOMTR-MCNC: 153 MG/DL (ref 70–105)
GLUCOSE BLDC GLUCOMTR-MCNC: 155 MG/DL (ref 70–105)
GLUCOSE BLDC GLUCOMTR-MCNC: 209 MG/DL (ref 70–105)
GLUCOSE SERPL-MCNC: 198 MG/DL (ref 65–99)
HAPTOGLOB SERPL-MCNC: 283 MG/DL (ref 30–200)
HBA1C MFR BLD: 8.52 % (ref 4.8–5.6)
HCT VFR BLD AUTO: 36.9 % (ref 37.5–51)
HGB BLD-MCNC: 11.7 G/DL (ref 13–17.7)
IMM GRANULOCYTES # BLD AUTO: 0.1 10*3/MM3 (ref 0–0.05)
IMM GRANULOCYTES NFR BLD AUTO: 0.8 % (ref 0–0.5)
LDH SERPL-CCNC: 261 U/L (ref 135–225)
LYMPHOCYTES # BLD AUTO: 0.55 10*3/MM3 (ref 0.7–3.1)
LYMPHOCYTES NFR BLD AUTO: 4.4 % (ref 19.6–45.3)
MAGNESIUM SERPL-MCNC: 1.9 MG/DL (ref 1.6–2.4)
MCH RBC QN AUTO: 30.2 PG (ref 26.6–33)
MCHC RBC AUTO-ENTMCNC: 31.7 G/DL (ref 31.5–35.7)
MCV RBC AUTO: 95.1 FL (ref 79–97)
MONOCYTES # BLD AUTO: 0.71 10*3/MM3 (ref 0.1–0.9)
MONOCYTES NFR BLD AUTO: 5.7 % (ref 5–12)
NEUTROPHILS NFR BLD AUTO: 11.14 10*3/MM3 (ref 1.7–7)
NEUTROPHILS NFR BLD AUTO: 88.9 % (ref 42.7–76)
NRBC BLD AUTO-RTO: 0 /100 WBC (ref 0–0.2)
PLATELET # BLD AUTO: 97 10*3/MM3 (ref 140–450)
PMV BLD AUTO: 10.4 FL (ref 6–12)
POTASSIUM SERPL-SCNC: 3.5 MMOL/L (ref 3.5–5.2)
QT INTERVAL: 321 MS
QTC INTERVAL: 445 MS
RBC # BLD AUTO: 3.88 10*6/MM3 (ref 4.14–5.8)
SODIUM SERPL-SCNC: 139 MMOL/L (ref 136–145)
WBC NRBC COR # BLD AUTO: 12.52 10*3/MM3 (ref 3.4–10.8)

## 2025-02-26 PROCEDURE — 83735 ASSAY OF MAGNESIUM: CPT | Performed by: INTERNAL MEDICINE

## 2025-02-26 PROCEDURE — 86334 IMMUNOFIX E-PHORESIS SERUM: CPT | Performed by: INTERNAL MEDICINE

## 2025-02-26 PROCEDURE — 85025 COMPLETE CBC W/AUTO DIFF WBC: CPT

## 2025-02-26 PROCEDURE — 85007 BL SMEAR W/DIFF WBC COUNT: CPT

## 2025-02-26 PROCEDURE — 84100 ASSAY OF PHOSPHORUS: CPT | Performed by: INTERNAL MEDICINE

## 2025-02-26 PROCEDURE — 25010000002 CEFTRIAXONE PER 250 MG

## 2025-02-26 PROCEDURE — 86038 ANTINUCLEAR ANTIBODIES: CPT | Performed by: INTERNAL MEDICINE

## 2025-02-26 PROCEDURE — 83880 ASSAY OF NATRIURETIC PEPTIDE: CPT | Performed by: INTERNAL MEDICINE

## 2025-02-26 PROCEDURE — 25810000003 SODIUM CHLORIDE 0.9 % SOLUTION

## 2025-02-26 PROCEDURE — 83615 LACTATE (LD) (LDH) ENZYME: CPT | Performed by: INTERNAL MEDICINE

## 2025-02-26 PROCEDURE — 86037 ANCA TITER EACH ANTIBODY: CPT | Performed by: INTERNAL MEDICINE

## 2025-02-26 PROCEDURE — 83516 IMMUNOASSAY NONANTIBODY: CPT | Performed by: INTERNAL MEDICINE

## 2025-02-26 PROCEDURE — 25810000003 SODIUM CHLORIDE 0.9 % SOLUTION: Performed by: INTERNAL MEDICINE

## 2025-02-26 PROCEDURE — 63710000001 INSULIN GLARGINE PER 5 UNITS: Performed by: INTERNAL MEDICINE

## 2025-02-26 PROCEDURE — 86160 COMPLEMENT ANTIGEN: CPT | Performed by: INTERNAL MEDICINE

## 2025-02-26 PROCEDURE — 83010 ASSAY OF HAPTOGLOBIN QUANT: CPT | Performed by: INTERNAL MEDICINE

## 2025-02-26 PROCEDURE — 82948 REAGENT STRIP/BLOOD GLUCOSE: CPT

## 2025-02-26 PROCEDURE — 84165 PROTEIN E-PHORESIS SERUM: CPT | Performed by: INTERNAL MEDICINE

## 2025-02-26 PROCEDURE — 82784 ASSAY IGA/IGD/IGG/IGM EACH: CPT | Performed by: INTERNAL MEDICINE

## 2025-02-26 PROCEDURE — 63710000001 INSULIN LISPRO (HUMAN) PER 5 UNITS

## 2025-02-26 PROCEDURE — 83036 HEMOGLOBIN GLYCOSYLATED A1C: CPT

## 2025-02-26 PROCEDURE — 82550 ASSAY OF CK (CPK): CPT | Performed by: INTERNAL MEDICINE

## 2025-02-26 PROCEDURE — 83735 ASSAY OF MAGNESIUM: CPT

## 2025-02-26 PROCEDURE — 80053 COMPREHEN METABOLIC PANEL: CPT

## 2025-02-26 RX ORDER — INSULIN GLARGINE 100 [IU]/ML
18 INJECTION, SOLUTION SUBCUTANEOUS NIGHTLY
Status: DISCONTINUED | OUTPATIENT
Start: 2025-02-26 | End: 2025-02-28

## 2025-02-26 RX ORDER — SODIUM CHLORIDE 9 MG/ML
50 INJECTION, SOLUTION INTRAVENOUS CONTINUOUS
Status: DISCONTINUED | OUTPATIENT
Start: 2025-02-26 | End: 2025-02-27

## 2025-02-26 RX ADMIN — ACETAMINOPHEN 650 MG: 325 TABLET, FILM COATED ORAL at 04:29

## 2025-02-26 RX ADMIN — SODIUM CHLORIDE 50 ML/HR: 9 INJECTION, SOLUTION INTRAVENOUS at 15:20

## 2025-02-26 RX ADMIN — CLOPIDOGREL BISULFATE 75 MG: 75 TABLET ORAL at 08:24

## 2025-02-26 RX ADMIN — ROSUVASTATIN CALCIUM 40 MG: 10 TABLET, FILM COATED ORAL at 08:24

## 2025-02-26 RX ADMIN — INSULIN LISPRO 2 UNITS: 100 INJECTION, SOLUTION INTRAVENOUS; SUBCUTANEOUS at 07:29

## 2025-02-26 RX ADMIN — CEFTRIAXONE 2000 MG: 2 INJECTION, POWDER, FOR SOLUTION INTRAMUSCULAR; INTRAVENOUS at 08:24

## 2025-02-26 RX ADMIN — INSULIN LISPRO 2 UNITS: 100 INJECTION, SOLUTION INTRAVENOUS; SUBCUTANEOUS at 11:53

## 2025-02-26 RX ADMIN — SODIUM CHLORIDE 100 ML/HR: 9 INJECTION, SOLUTION INTRAVENOUS at 04:16

## 2025-02-26 RX ADMIN — INSULIN GLARGINE 18 UNITS: 100 INJECTION, SOLUTION SUBCUTANEOUS at 20:31

## 2025-02-26 RX ADMIN — INSULIN LISPRO 3 UNITS: 100 INJECTION, SOLUTION INTRAVENOUS; SUBCUTANEOUS at 20:31

## 2025-02-26 RX ADMIN — ACETAMINOPHEN 650 MG: 325 TABLET, FILM COATED ORAL at 20:31

## 2025-02-26 NOTE — CONSULTS
Patient Name: Blas Mojica  : 1950  MRN: [id number]  Primary Care Physician: Sahil Read MD  Date of admission: 2025    Patient Care Team:  Sahil Read MD as PCP - General        Reason for Consult:       Acute kidney injury  Subjective   History of Present Illness:   Chief Complaint:   Chief Complaint   Patient presents with    Illness     HISTORY:  Blas Mojica is a 74 y.o. male with past medical history of diastolic heart failure, hypertension, hyperlipidemia, TIA, type 2 diabetes, resident of assisted living. who presents with fatigue malaise not feeling well.  Patient was in usual state of health but the for the last few days he was having a hematuria not doing good.  His creatinine slowly worsening from baseline of 1.2-2.25 at the time of admission now 2.5.  Significant active sediment in the urine with hematuria without any significant RBCs but significant leukocytosis.  Blood cultures positive for Klebsiella urine culture still not done.  Patient is still fatigue malaise.  Otherwise denies any other urinary complaint.  Still making urine          Review of systems:  All other review of system unremarkable  Constitutional: No fever, malaise malaise  HEENT:  No headache, otalgia, itchy eyes, nasal discharge or sore throat.  Cardiac:  No chest pain, dyspnea, orthopnea or PND.  Chest:              No cough, phlegm or wheezing.  Abdomen:  No abdominal pain, nausea or vomiting.  Neuro:  No focal weakness, abnormal movements or seizure-like activity.  :   Complaint of blood in the urine no pyuria, no dysuria, no flank pain.  ROS was otherwise negative except as mentioned in the Council.       Personal History:     Past Medical History:   Past Medical History:   Diagnosis Date    Diabetes mellitus     Hyperlipidemia     Hypertension     Kidney stone     TIA (transient ischemic attack)     Type 2 diabetes mellitus with stage 3 chronic kidney disease, with long-term current  use of insulin 12/13/2021       Surgical History:      Past Surgical History:   Procedure Laterality Date    ADENOIDECTOMY      ENDOSCOPY N/A 12/13/2023    Procedure: ESOPHAGOGASTRODUODENOSCOPY with removal of NJ tube from right nare;  Surgeon: Jaret Valadez MD;  Location: [facility] ENDOSCOPY;  Service: Gastroenterology;  Laterality: N/A;  esophagitis, gastric ulcer    EXTRACORPOREAL SHOCK WAVE LITHOTRIPSY (ESWL)      HERNIA REPAIR      KNEE ACL RECONSTRUCTION Left     TONSILLECTOMY         Family History: family history includes No Known Problems in his father and mother. Otherwise pertinent FHx was reviewed and unremarkable.     Social History:  reports that he has never smoked. He has never been exposed to tobacco smoke. He has never used smokeless tobacco. He reports that he does not drink alcohol and does not use drugs.    Medications:  Prior to Admission medications    Medication Sig Start Date End Date Taking? Authorizing Provider   clopidogrel (PLAVIX) 75 MG tablet Take 1 tablet by mouth Daily.   Yes Isai Canales MD   insulin glargine (LANTUS, SEMGLEE) 100 UNIT/ML injection Inject 30 Units under the skin into the appropriate area as directed Every Night.   Yes Isai Canales MD   lisinopril (PRINIVIL,ZESTRIL) 40 MG tablet Take 1 tablet by mouth Daily.   Yes ProviderIsai MD   pioglitazone (ACTOS) 30 MG tablet Take 1 tablet by mouth Daily.   Yes Isai Canales MD   rosuvastatin (CRESTOR) 40 MG tablet Take 1 tablet by mouth Daily.   Yes Isai Canales MD   triamterene-hydrochlorothiazide (MAXZIDE) 75-50 MG per tablet Take 1 tablet by mouth Daily.   Yes ProviderIsai MD   cefTRIAXone (ROCEPHIN) 1 g injection Infuse 2 g into a venous catheter 1 (One) Time for 1 dose. 2/25/25 2/25/25  Emili Vega, PIPER     Scheduled Meds:cefTRIAXone, 2,000 mg, Intravenous, Q24H  clopidogrel, 75 mg, Oral, Daily  insulin glargine, 15 Units, Subcutaneous,  Nightly  insulin lispro, 2-7 Units, Subcutaneous, 4x Daily AC & at Bedtime  [Held by provider] lisinopril, 40 mg, Oral, Daily  [Held by provider] pioglitazone, 30 mg, Oral, Daily  rosuvastatin, 40 mg, Oral, Daily  [Held by provider] triamterene-hydrochlorothiazide, 1 tablet, Oral, Daily      Continuous Infusions:sodium chloride, 100 mL/hr, Last Rate: 100 mL/hr (02/26/25 0215)      PRN Meds:  acetaminophen **OR** acetaminophen **OR** acetaminophen    senna-docusate sodium **AND** polyethylene glycol **AND** bisacodyl **AND** bisacodyl    Calcium Replacement - Follow Nurse / BPA Driven Protocol    dextrose    dextrose    glucagon (human recombinant)    Magnesium Standard Dose Replacement - Follow Nurse / BPA Driven Protocol    melatonin    ondansetron ODT **OR** ondansetron    Phosphorus Replacement - Follow Nurse / BPA Driven Protocol    Potassium Replacement - Follow Nurse / BPA Driven Protocol  Allergies:    Allergies   Allergen Reactions    Contrast Dye (Echo Or Unknown Ct/Mr) Anaphylaxis    Iodinated Contrast Media Shortness Of Breath    Iodine Shortness Of Breath    Aspirin Nausea And Vomiting    Lipitor [Atorvastatin] Unknown - High Severity    Prednisone Hives       Objective   Exam:     Vital Signs  Temp:  [98.2 °F (36.8 °C)-102 °F (38.9 °C)] 100.8 °F (38.2 °C)  Heart Rate:  [] 109  Resp:  [20-22] 22  BP: ()/(52-79) 119/73  SpO2:  [90 %-99 %] 97 %  on  Flow (L/min) (Oxygen Therapy):  [1-2] 1;   Device (Oxygen Therapy): nasal cannula  Body mass index is 28.25 kg/m².  EXAM  General: Elderly white male in no acute distress.    Head:      Normocephalic and atraumatic.    Eyes:      PERRL/EOM intact, conjunctivae and sclerae clear without nystagmus.    Neck:      few masses, thyromegaly,  trachea central   Lungs:    No rhonchi bilaterally to auscultation.    Heart:      Regular rate and rhythm, no murmur no gallop  Abd:        Soft, nontender, not distended, bowel sounds positive, no shifting  dullness.  Msk:        No deformity or scoliosis noted of thoracic or lumbar spine.    Pulses:   Pulses normal in all 4 extremities.    Extremities:        No cyanosis or clubbing--mild furuncle edema.    Neuro:    No focal deficits.   alert oriented x3  Skin:       Intact without lesions or rashes.    Psych:    Alert and cooperative; normal mood and affect; normal attention span       Results Review:  I have personally reviewed most recent Data :  [facility]  CBC    Results from last 7 days   Lab Units 02/26/25  0433 02/25/25  1029   WBC 10*3/mm3 12.52* 11.49*   HEMOGLOBIN g/dL 11.7* 11.8*   PLATELETS 10*3/mm3 97* 109*     CMP   Results from last 7 days   Lab Units 02/26/25  0433 02/25/25  1029   SODIUM mmol/L 139 142   POTASSIUM mmol/L 3.5 3.7   CHLORIDE mmol/L 103 105   CO2 mmol/L 23.3 23.7   BUN mg/dL 47* 49*   CREATININE mg/dL 2.57* 2.25*   GLUCOSE mg/dL 198* 125*   ALBUMIN g/dL  --  3.9   BILIRUBIN mg/dL  --  0.7   ALK PHOS U/L  --  85   AST (SGOT) U/L  --  19   ALT (SGPT) U/L  --  12   LIPASE U/L  --  29     ABG      US Renal Bilateral    Result Date: 2/25/2025  Impression: No acute renal findings. No hydronephrosis. Electronically Signed: Isaiah Wheatley MD  2/25/2025 9:52 PM EST  Workstation ID: [id number]    XR Chest 1 View    Result Date: 2/25/2025  Impression: 1. Mild linear left basilar atelectasis. 2. Mild cardiomegaly. Electronically Signed: Vitor Pena MD  2/25/2025 11:06 AM EST  Workstation ID: [id number]    CT Abdomen Pelvis Without Contrast    Result Date: 2/25/2025  Impression: 1.No acute findings in the abdomen or pelvis. 2.Stable chronic ancillary findings as above Electronically Signed: Will Flores MD  2/25/2025 11:01 AM EST  Workstation ID: [id number]     Results for orders placed during the hospital encounter of 12/28/23    Adult Transthoracic Echo Complete w/ Color, Spectral and Contrast if Necessary Per Protocol    Interpretation Summary    Left ventricular systolic  function is normal. Left ventricular ejection fraction appears to be 56 - 60%.    Left ventricular diastolic function is consistent with (grade I) impaired relaxation.    There is calcification of the aortic valve mainly affecting the left coronary cusp(s).    Estimated right ventricular systolic pressure from tricuspid regurgitation is mildly elevated (35-45 mmHg).        Assessment & Plan   Assessment and Plan:         Acute UTI (urinary tract infection)    Acute kidney injury    ASSESSMENT:  Acute kidney injury likely multifactorial including presence of bacteremia with some hematuria and proteinuria although renal ultrasound is unremarkable, worsening creatinine might be related to underlying bacteremia.  Also contributed by ACE inhibitors and multiple diuretic  Chronic kidney disease stage IIIa with most recent renal ultrasound right kidney 10.8 left kidney 10.2 cm  Proteinuria and hematuria need to be quantified  Bacteremia with Klebsiella pneumoniae  Urosepsis  History of diabetes mellitus type 2  History of hypertension  Congestive heart failure with grade 1 diastolic dysfunction last echo done in 2023 EF normal          PLAN :     Follow-up with a complete urine studies  Ordered and complete serology at this time including complements  Rule out postinfectious glomerulonephritis  Follow-up with repeat labs  Status acceptable start some low-dose IV fluid  Will need a repeat echocardiogram and may be MIKIE  Start low-dose IV fluid  Continue to hold lisinopril and triamterene hydrochlorothiazide at this time      Mark Handy MD  [facility]  2/26/2025  09:59 EST

## 2025-02-26 NOTE — PROGRESS NOTES
Patient Name: Blas Mojica  : 1950  MRN: [id number]  Primary Care Physician: Sahil Read MD  Date of admission: 2025    Patient Care Team:  Sahil Read MD as PCP - General        Reason for Consult:       Acute kidney injury  Subjective   History of Present Illness:   Chief Complaint:   Chief Complaint   Patient presents with    Illness     HISTORY:  Blas Mojica is a 74 y.o. male with past medical history of diastolic heart failure, hypertension, hyperlipidemia, TIA, type 2 diabetes, resident of assisted living. who presents with fatigue malaise not feeling well.  Patient was in usual state of health but the for the last few days he was having a hematuria not doing good.  His creatinine slowly worsening from baseline of 1.2-2.25 at the time of admission now 2.5.  Significant active sediment in the urine with hematuria without any significant RBCs but significant leukocytosis.  Blood cultures positive for Klebsiella urine culture still not done.  Patient is still fatigue malaise.  Otherwise denies any other urinary complaint.  Still making urine          Review of systems:  All other review of system unremarkable  Constitutional: No fever, malaise malaise  HEENT:  No headache, otalgia, itchy eyes, nasal discharge or sore throat.  Cardiac:  No chest pain, dyspnea, orthopnea or PND.  Chest:              No cough, phlegm or wheezing.  Abdomen:  No abdominal pain, nausea or vomiting.  Neuro:  No focal weakness, abnormal movements or seizure-like activity.  :   Complaint of blood in the urine no pyuria, no dysuria, no flank pain.  ROS was otherwise negative except as mentioned in the [place].       Personal History:     Past Medical History:   Past Medical History:   Diagnosis Date    Diabetes mellitus     Hyperlipidemia     Hypertension     Kidney stone     TIA (transient ischemic attack)     Type 2 diabetes mellitus with stage 3 chronic kidney disease, with long-term current  use of insulin 12/13/2021       Surgical History:      Past Surgical History:   Procedure Laterality Date    ADENOIDECTOMY      ENDOSCOPY N/A 12/13/2023    Procedure: ESOPHAGOGASTRODUODENOSCOPY with removal of NJ tube from right nare;  Surgeon: Jaret Valadez MD;  Location: [facility] ENDOSCOPY;  Service: Gastroenterology;  Laterality: N/A;  esophagitis, gastric ulcer    EXTRACORPOREAL SHOCK WAVE LITHOTRIPSY (ESWL)      HERNIA REPAIR      KNEE ACL RECONSTRUCTION Left     TONSILLECTOMY         Family History: family history includes No Known Problems in his father and mother. Otherwise pertinent FHx was reviewed and unremarkable.     Social History:  reports that he has never smoked. He has never been exposed to tobacco smoke. He has never used smokeless tobacco. He reports that he does not drink alcohol and does not use drugs.    Medications:  Prior to Admission medications    Medication Sig Start Date End Date Taking? Authorizing Provider   clopidogrel (PLAVIX) 75 MG tablet Take 1 tablet by mouth Daily.   Yes Isai Canales MD   insulin glargine (LANTUS, SEMGLEE) 100 UNIT/ML injection Inject 30 Units under the skin into the appropriate area as directed Every Night.   Yes Isai Canales MD   lisinopril (PRINIVIL,ZESTRIL) 40 MG tablet Take 1 tablet by mouth Daily.   Yes ProviderIsai MD   pioglitazone (ACTOS) 30 MG tablet Take 1 tablet by mouth Daily.   Yes Isai Canales MD   rosuvastatin (CRESTOR) 40 MG tablet Take 1 tablet by mouth Daily.   Yes Isai Canales MD   triamterene-hydrochlorothiazide (MAXZIDE) 75-50 MG per tablet Take 1 tablet by mouth Daily.   Yes ProviderIsai MD   cefTRIAXone (ROCEPHIN) 1 g injection Infuse 2 g into a venous catheter 1 (One) Time for 1 dose. 2/25/25 2/25/25  Emili Vega, PIPER     Scheduled Meds:cefTRIAXone, 2,000 mg, Intravenous, Q24H  clopidogrel, 75 mg, Oral, Daily  insulin glargine, 15 Units, Subcutaneous,  Nightly  insulin lispro, 2-7 Units, Subcutaneous, 4x Daily AC & at Bedtime  [Held by provider] lisinopril, 40 mg, Oral, Daily  [Held by provider] pioglitazone, 30 mg, Oral, Daily  rosuvastatin, 40 mg, Oral, Daily  [Held by provider] triamterene-hydrochlorothiazide, 1 tablet, Oral, Daily      Continuous Infusions:sodium chloride, 100 mL/hr, Last Rate: 100 mL/hr (02/26/25 5468)      PRN Meds:  acetaminophen **OR** acetaminophen **OR** acetaminophen    senna-docusate sodium **AND** polyethylene glycol **AND** bisacodyl **AND** bisacodyl    Calcium Replacement - Follow Nurse / BPA Driven Protocol    dextrose    dextrose    glucagon (human recombinant)    Magnesium Standard Dose Replacement - Follow Nurse / BPA Driven Protocol    melatonin    ondansetron ODT **OR** ondansetron    Phosphorus Replacement - Follow Nurse / BPA Driven Protocol    Potassium Replacement - Follow Nurse / BPA Driven Protocol  Allergies:    Allergies   Allergen Reactions    Contrast Dye (Echo Or Unknown Ct/Mr) Anaphylaxis    Iodinated Contrast Media Shortness Of Breath    Iodine Shortness Of Breath    Aspirin Nausea And Vomiting    Lipitor [Atorvastatin] Unknown - High Severity    Prednisone Hives       Objective   Exam:     Vital Signs  Temp:  [98.2 °F (36.8 °C)-102 °F (38.9 °C)] 100.8 °F (38.2 °C)  Heart Rate:  [] 109  Resp:  [16-22] 22  BP: ()/(52-79) 119/73  SpO2:  [90 %-99 %] 97 %  on  Flow (L/min) (Oxygen Therapy):  [1-2] 1;   Device (Oxygen Therapy): nasal cannula  Body mass index is 28.25 kg/m².  EXAM  General: Elderly white male in no acute distress.    Head:      Normocephalic and atraumatic.    Eyes:      PERRL/EOM intact, conjunctivae and sclerae clear without nystagmus.    Neck:      few masses, thyromegaly,  trachea central   Lungs:    No rhonchi bilaterally to auscultation.    Heart:      Regular rate and rhythm, no murmur no gallop  Abd:        Soft, nontender, not distended, bowel sounds positive, no shifting  dullness.  Msk:        No deformity or scoliosis noted of thoracic or lumbar spine.    Pulses:   Pulses normal in all 4 extremities.    Extremities:        No cyanosis or clubbing--mild furuncle edema.    Neuro:    No focal deficits.   alert oriented x3  Skin:       Intact without lesions or rashes.    Psych:    Alert and cooperative; normal mood and affect; normal attention span       Results Review:  I have personally reviewed most recent Data :  [facility]  CBC    Results from last 7 days   Lab Units 02/26/25  0433 02/25/25  1029   WBC 10*3/mm3 12.52* 11.49*   HEMOGLOBIN g/dL 11.7* 11.8*   PLATELETS 10*3/mm3 97* 109*     CMP   Results from last 7 days   Lab Units 02/26/25  0433 02/25/25  1029   SODIUM mmol/L 139 142   POTASSIUM mmol/L 3.5 3.7   CHLORIDE mmol/L 103 105   CO2 mmol/L 23.3 23.7   BUN mg/dL 47* 49*   CREATININE mg/dL 2.57* 2.25*   GLUCOSE mg/dL 198* 125*   ALBUMIN g/dL  --  3.9   BILIRUBIN mg/dL  --  0.7   ALK PHOS U/L  --  85   AST (SGOT) U/L  --  19   ALT (SGPT) U/L  --  12   LIPASE U/L  --  29     ABG      US Renal Bilateral    Result Date: 2/25/2025  Impression: No acute renal findings. No hydronephrosis. Electronically Signed: Isaiah Wheatley MD  2/25/2025 9:52 PM EST  Workstation ID: [id number]    XR Chest 1 View    Result Date: 2/25/2025  Impression: 1. Mild linear left basilar atelectasis. 2. Mild cardiomegaly. Electronically Signed: Vitor Pena MD  2/25/2025 11:06 AM EST  Workstation ID: [id number]    CT Abdomen Pelvis Without Contrast    Result Date: 2/25/2025  Impression: 1.No acute findings in the abdomen or pelvis. 2.Stable chronic ancillary findings as above Electronically Signed: Will Flores MD  2/25/2025 11:01 AM EST  Workstation ID: [id number]     Results for orders placed during the hospital encounter of 12/28/23    Adult Transthoracic Echo Complete w/ Color, Spectral and Contrast if Necessary Per Protocol    Interpretation Summary    Left ventricular systolic  function is normal. Left ventricular ejection fraction appears to be 56 - 60%.    Left ventricular diastolic function is consistent with (grade I) impaired relaxation.    There is calcification of the aortic valve mainly affecting the left coronary cusp(s).    Estimated right ventricular systolic pressure from tricuspid regurgitation is mildly elevated (35-45 mmHg).        Assessment & Plan   Assessment and Plan:         Acute UTI (urinary tract infection)    Acute kidney injury    ASSESSMENT:  Acute kidney injury likely multifactorial including presence of bacteremia with some hematuria and proteinuria although renal ultrasound is unremarkable, worsening creatinine might be related to underlying bacteremia  Chronic kidney disease stage IIIa with most recent renal ultrasound right kidney 10.8 left kidney 10.2 cm  Proteinuria and hematuria need to be quantified  Bacteremia with Klebsiella pneumoniae  Urosepsis  History of diabetes mellitus type 2  History of hypertension  Congestive heart failure with grade 1 diastolic dysfunction last echo done in 2023 EF normal          PLAN :     Follow-up with a complete urine studies  Ordered and complete serology at this time including complements  Rule out postinfectious glomerulonephritis  Follow-up with repeat labs  Status acceptable start some low-dose IV fluid  Will need a repeat echocardiogram and may be MIKIE      Mark Handy MD  [facility]  2/26/2025  09:36 EST

## 2025-02-26 NOTE — CONSULTS
Infectious Diseases Consult Note    Referring Provider: Daniel Nelson MD    Reason for Consultation:  bacteremia    Patient Care Team:  Sahil Read MD as PCP - General    Chief complaint fatigue, weakness, suprapubic pain, dysuria and frequency, fever, chills    Subjective     History of present illness:      This is a 74-year-old male presents to the hospital on 2/25/2025 with complaints of malaise fatigue, suprapubic pain dysuria frequency and fever and chills that been worsening over the last week.  Also endorses hematuria with a past history of kidney stones.  Patient denies shortness of breath, cough, GI symptoms.    Review of Systems   Review of Systems   Constitutional:  Positive for chills, fatigue and fever.   HENT: Negative.     Eyes: Negative.    Respiratory: Negative.     Cardiovascular: Negative.    Gastrointestinal:  Positive for abdominal pain.   Endocrine: Negative.    Genitourinary:  Positive for dysuria and frequency.   Musculoskeletal: Negative.    Skin: Negative.    Neurological:  Positive for weakness.   Psychiatric/Behavioral: Negative.     All other systems reviewed and are negative.      Medications  Medications Prior to Admission   Medication Sig Dispense Refill Last Dose/Taking    clopidogrel (PLAVIX) 75 MG tablet Take 1 tablet by mouth Daily.   2/24/2025    insulin glargine (LANTUS, SEMGLEE) 100 UNIT/ML injection Inject 30 Units under the skin into the appropriate area as directed Every Night.   2/24/2025    lisinopril (PRINIVIL,ZESTRIL) 40 MG tablet Take 1 tablet by mouth Daily.   2/24/2025    pioglitazone (ACTOS) 30 MG tablet Take 1 tablet by mouth Daily.   2/24/2025    rosuvastatin (CRESTOR) 40 MG tablet Take 1 tablet by mouth Daily.   2/24/2025    triamterene-hydrochlorothiazide (MAXZIDE) 75-50 MG per tablet Take 1 tablet by mouth Daily.   2/24/2025       History  Past Medical History:   Diagnosis Date    Diabetes mellitus     Hyperlipidemia     Hypertension     Kidney  stone     TIA (transient ischemic attack) 2016    Type 2 diabetes mellitus with stage 3 chronic kidney disease, with long-term current use of insulin 12/13/2021     Past Surgical History:   Procedure Laterality Date    ADENOIDECTOMY      ENDOSCOPY N/A 12/13/2023    Procedure: ESOPHAGOGASTRODUODENOSCOPY with removal of NJ tube from right nare;  Surgeon: Jaret Valadez MD;  Location: [facility] ENDOSCOPY;  Service: Gastroenterology;  Laterality: N/A;  esophagitis, gastric ulcer    EXTRACORPOREAL SHOCK WAVE LITHOTRIPSY (ESWL)      HERNIA REPAIR      KNEE ACL RECONSTRUCTION Left     TONSILLECTOMY         Family History  Family History   Problem Relation Age of Onset    No Known Problems Mother     No Known Problems Father        Social History   reports that he has never smoked. He has never been exposed to tobacco smoke. He has never used smokeless tobacco. He reports that he does not drink alcohol and does not use drugs.    Allergies  Contrast dye (echo or unknown ct/mr), Iodinated contrast media, Iodine, Aspirin, Lipitor [atorvastatin], and Prednisone    Objective     Vital Signs   Vital Signs (last 24 hours)         02/25 0700  02/26 0659 02/26 0700  02/26 1520   Most Recent      Temp (°F) 98.2 -  102    99.2 -  100.8     99.2 (37.3) 02/26 1510    Heart Rate 76 -  117    107 -  112     107 02/26 1510    Resp 16 -  22    20 -  22     22 02/26 1510    BP 98/56 -  154/78    113/56 -  123/74     123/74 02/26 1510    SpO2 (%) 90 -  99    95 -  98     97 02/26 1510    Flow (L/min) (Oxygen Therapy)   2      1     1 02/26 1510            Physical Exam:  Physical Exam  Vitals and nursing note reviewed.   Constitutional:       General: He is not in acute distress.     Appearance: He is well-developed and normal weight. He is ill-appearing. He is not diaphoretic.   HENT:      Head: Normocephalic and atraumatic.   Eyes:      Conjunctiva/sclera: Conjunctivae normal.      Pupils: Pupils are equal, round, and reactive to  light.   Cardiovascular:      Rate and Rhythm: Normal rate and regular rhythm.      Heart sounds: Normal heart sounds, S1 normal and S2 normal.   Pulmonary:      Effort: Pulmonary effort is normal. No respiratory distress.      Breath sounds: Normal breath sounds. No stridor. No wheezing or rales.   Abdominal:      General: Bowel sounds are normal. There is no distension.      Palpations: Abdomen is soft. There is no mass.      Tenderness: There is no abdominal tenderness. There is no guarding.   Musculoskeletal:         General: No deformity.      Cervical back: Neck supple.   Skin:     General: Skin is warm and dry.      Coloration: Skin is not pale.      Findings: No erythema or rash.   Neurological:      Mental Status: He is alert and oriented to person, place, and time.   Psychiatric:         Mood and Affect: Mood normal.         Microbiology  Microbiology Results (last 10 days)       Procedure Component Value - Date/Time    Urine Culture - Urine, Straight Cath [[id number]]  (Abnormal) Collected: 02/25/25 1226    Lab Status: Preliminary result Specimen: Urine from Straight Cath Updated: 02/26/25 1402     Urine Culture >100,000 CFU/mL Gram Negative Bacilli    Narrative:      Colonization of the urinary tract without infection is common. Treatment is discouraged unless the patient is symptomatic, pregnant, or undergoing an invasive urologic procedure.    Respiratory Panel PCR w/COVID-19(SARS-CoV-2) KEVIN/HORTENSIA/SLOAN/PAD/COR/JOHNNY In-House, NP Swab in UTM/VTM, 2 HR TAT - Swab, Nasopharynx [[id number]]  (Normal) Collected: 02/25/25 1037    Lab Status: Final result Specimen: Swab from Nasopharynx Updated: 02/25/25 1143     ADENOVIRUS, PCR Not Detected     Coronavirus 229E Not Detected     Coronavirus HKU1 Not Detected     Coronavirus NL63 Not Detected     Coronavirus OC43 Not Detected     COVID19 Not Detected     Human Metapneumovirus Not Detected     Human Rhinovirus/Enterovirus Not Detected     Influenza A PCR Not Detected      Influenza B PCR Not Detected     Parainfluenza Virus 1 Not Detected     Parainfluenza Virus 2 Not Detected     Parainfluenza Virus 3 Not Detected     Parainfluenza Virus 4 Not Detected     RSV, PCR Not Detected     Bordetella pertussis pcr Not Detected     Bordetella parapertussis PCR Not Detected     Chlamydophila pneumoniae PCR Not Detected     Mycoplasma pneumo by PCR Not Detected    Narrative:      In the setting of a positive respiratory panel with a viral infection PLUS a negative procalcitonin without other underlying concern for bacterial infection, consider observing off antibiotics or discontinuation of antibiotics and continue supportive care. If the respiratory panel is positive for atypical bacterial infection (Bordetella pertussis, Chlamydophila pneumoniae, or Mycoplasma pneumoniae), consider antibiotic de-escalation to target atypical bacterial infection.    Blood Culture - Blood, Arm, Left [[id number]]  (Abnormal) Collected: 02/25/25 1029    Lab Status: Preliminary result Specimen: Blood from Arm, Left Updated: 02/26/25 1348     Blood Culture Abnormal Stain     Gram Stain Aerobic Bottle Gram negative bacilli    Narrative:      Less than seven (7) mL's of blood was collected.  Insufficient quantity may yield false negative results.    Blood Culture - Blood, Arm, Right [[id number]]  (Abnormal) Collected: 02/25/25 1024    Lab Status: Preliminary result Specimen: Blood from Arm, Right Updated: 02/26/25 1338     Blood Culture Abnormal Stain     Gram Stain Anaerobic Bottle Gram negative bacilli, tiny      Aerobic Bottle Gram negative bacilli    Blood Culture ID, PCR - Blood, Arm, Right [[id number]]  (Abnormal) Collected: 02/25/25 1024    Lab Status: Final result Specimen: Blood from Arm, Right Updated: 02/26/25 0412     BCID, PCR Klebsiella pneumoniae group. Identification by BCID2 PCR.     BOTTLE TYPE Anaerobic Bottle    Narrative:      No resistance genes detected.            Laboratory  Results from  last 7 days   Lab Units 02/26/25  0433   WBC 10*3/mm3 12.52*   HEMOGLOBIN g/dL 11.7*   HEMATOCRIT % 36.9*   PLATELETS 10*3/mm3 97*     Results from last 7 days   Lab Units 02/26/25  0433   SODIUM mmol/L 139   POTASSIUM mmol/L 3.5   CHLORIDE mmol/L 103   CO2 mmol/L 23.3   BUN mg/dL 47*   CREATININE mg/dL 2.57*   GLUCOSE mg/dL 198*   CALCIUM mg/dL 9.0     Results from last 7 days   Lab Units 02/26/25  0433   SODIUM mmol/L 139   POTASSIUM mmol/L 3.5   CHLORIDE mmol/L 103   CO2 mmol/L 23.3   BUN mg/dL 47*   CREATININE mg/dL 2.57*   GLUCOSE mg/dL 198*   CALCIUM mg/dL 9.0     Results from last 7 days   Lab Units 02/26/25  1025   CK TOTAL U/L 103               Radiology  Imaging Results (Last 72 Hours)       Procedure Component Value Units Date/Time    US Renal Bilateral [[id number]] Collected: 02/25/25 2150     Updated: 02/25/25 2154    Narrative:      US RENAL BILATERAL    Date of Exam: 2/25/2025 9:07 PM EST    Indication: BRIGIDO hematuria.    Comparison: CT abdomen pelvis 2/25/2025    Technique: Grayscale and color Doppler ultrasound evaluation of the kidneys and urinary bladder was performed.      Findings:  Right kidney measures 10.8 cm in length and the left 10.2 cm in length. Symmetric mild likely senescent related renal cortical thinning. No hydronephrosis. Small renal cystic lesions have been without significant change since 2023 favoring simple and/or   mildly complex hemorrhagic/proteinaceous cyst based on intrinsic density on recent CT. Nonspecific circumferential bladder wall thickening without layering debris potentially chronic sequela of urinary bladder outlet obstruction in the setting of mild   prostamegaly.      Impression:      Impression:  No acute renal findings. No hydronephrosis.        Electronically Signed: Isaiah Wheatley MD    2/25/2025 9:52 PM EST    Workstation ID: [id number]    XR Chest 1 View [[id number]] Collected: 02/25/25 1105     Updated: 02/25/25 1108    Narrative:      XR CHEST 1 VW    Date  of Exam: 2/25/2025 10:50 AM EST    Indication: tachycardic and febrile    Comparison: 1/18/2024 chest radiograph, abdominal CT 2/25/2025    Findings:  Heart mildly enlarged. Mild linear left basilar atelectasis. No pneumothorax. No focal consolidation or effusion. Osseous structures appear grossly intact.      Impression:      Impression:  1. Mild linear left basilar atelectasis.  2. Mild cardiomegaly.          Electronically Signed: Vitro Pena MD    2/25/2025 11:06 AM EST    Workstation ID: [id number]    CT Abdomen Pelvis Without Contrast [[id number]] Collected: 02/25/25 1055     Updated: 02/25/25 1103    Narrative:      CT ABDOMEN PELVIS WO CONTRAST    Date of Exam: 2/25/2025 10:43 AM EST    Indication: abdominal pain and distention.    Comparison: CT abdomen pelvis dated 12/28/2023    Technique: Axial CT images were obtained of the abdomen and pelvis without the administration of contrast. Sagittal and coronal reconstructions were performed.  Automated exposure control and iterative reconstruction methods were used.      Findings:  Lung Bases:    Bibasilar discoid atelectasis.      Liver: There is minimal nodularity of the liver surface. No focal liver lesions are seen      Biliary/Gallbladder: The gallbladder is without evidence of radiopaque stones. The biliary tree is nondilated.      Spleen:Spleen is normal in size and CT density.    Pancreas:   Diffuse pancreatic atrophy. No peripancreatic inflammatory changes    There is mild mesenteric panniculitis      Kidneys: Kidneys are normal in size. There are no stones or hydronephrosis. Again noticed small bilateral hyperdense renal lesions likely representing hemorrhagic/proteinaceous cysts      Adrenals: Adrenal glands are unremarkable.      Retroperitoneal/Lymph Nodes/Vasculature: No retroperitoneal adenopathy is identified by size criteria.      Gastrointestinal/Mesentery: The bowel loops are non-dilated without definite wall thickening or mass. The appendix  appears within normal limits. No evidence of obstruction. No free air.      Bladder: The bladder is unremarkable    Mild enlargement of the prostate        Bony Structures:  Visualized bony structures are consistent with the patient's age. Grade 1 spondylolisthesis L5 on S1          Impression:      Impression:  1.No acute findings in the abdomen or pelvis.  2.Stable chronic ancillary findings as above        Electronically Signed: Will Flores MD    2/25/2025 11:01 AM EST    Workstation ID: [id number]            Cardiology      Results Review:  I have reviewed all clinical data, test, lab, and imaging results.       Schedule Meds  cefTRIAXone, 2,000 mg, Intravenous, Q24H  clopidogrel, 75 mg, Oral, Daily  insulin glargine, 18 Units, Subcutaneous, Nightly  insulin lispro, 2-7 Units, Subcutaneous, 4x Daily AC & at Bedtime  [Held by provider] lisinopril, 40 mg, Oral, Daily  [Held by provider] pioglitazone, 30 mg, Oral, Daily  rosuvastatin, 40 mg, Oral, Daily  [Held by provider] triamterene-hydrochlorothiazide, 1 tablet, Oral, Daily        Infusion Meds  sodium chloride, 50 mL/hr, Last Rate: 50 mL/hr (02/26/25 1012)        PRN Meds    acetaminophen **OR** acetaminophen **OR** acetaminophen    senna-docusate sodium **AND** polyethylene glycol **AND** bisacodyl **AND** bisacodyl    Calcium Replacement - Follow Nurse / BPA Driven Protocol    dextrose    dextrose    glucagon (human recombinant)    Magnesium Standard Dose Replacement - Follow Nurse / BPA Driven Protocol    melatonin    ondansetron ODT **OR** ondansetron    Phosphorus Replacement - Follow Nurse / BPA Driven Protocol    Potassium Replacement - Follow Nurse / BPA Driven Protocol      Assessment & Plan       Assessment    Klebsiella pneumoniae bacteremia.  secondary to complicated UTI.    Urinary tract infection with pyuria and bacteriuria with cultures growing gram-negative bacilli so far.  CT of the abdomen and pelvis did not show any obstructive uropathy.   Patient endorsed hematuria at admission-possibly passed a stone.  Does have a history of nephrolithiasis    Febrile illness-likely related to the above.      Acute kidney injury on chronic kidney disease-nephrology following    Type 2 diabetes-A1c is 8.52    Plan    Continue IV ceftriaxone 2 g every 24 hours  Will need 2 weeks of antimicrobial therapy from  Waiting on blood and urine cultures to finalize  Continue supportive care  A.m. labs  The case was discussed with the patient's wife at bedside    The above note was transcribed for Dr. Andrew-physical exam and review of systems were performed by him    Alexa Reid, PIPER  02/26/25  15:20 EST    Note is dictated utilizing voice recognition software/Dragon

## 2025-02-26 NOTE — PROGRESS NOTES
[facility] MEDICINE SERVICE  DAILY PROGRESS NOTE    NAME: Blas Mojica  : 1950  MRN: [id number]      LOS: 0 days     PROVIDER OF SERVICE: Daniel Nelson MD    Chief Complaint: Acute UTI (urinary tract infection)    Subjective:     Interval History:  History taken from: patient    No new overnight events, no new complaints        Review of Systems:   Review of Systems   Constitutional:  Negative for chills and fever.   HENT:  Negative for congestion, ear discharge, ear pain, sinus pain and sore throat.    Respiratory:  Negative for apnea, cough, chest tightness, shortness of breath and wheezing.    Cardiovascular:  Negative for chest pain and palpitations.   Gastrointestinal:  Negative for abdominal pain, blood in stool, constipation, diarrhea, nausea and vomiting.   Endocrine: Negative for cold intolerance and heat intolerance.   Genitourinary:  Negative for dysuria, flank pain, hematuria and urgency.   Musculoskeletal:  Negative for arthralgias, back pain, joint swelling, myalgias and neck pain.   Skin: Negative.    Neurological: Negative.    Hematological:  Does not bruise/bleed easily.   Psychiatric/Behavioral:  Negative for agitation, confusion, hallucinations, sleep disturbance and suicidal ideas. The patient is not nervous/anxious.        Objective:     Vital Signs  Temp:  [98.2 °F (36.8 °C)-102 °F (38.9 °C)] 100.8 °F (38.2 °C)  Heart Rate:  [] 109  Resp:  [20-22] 22  BP: ()/(52-79) 119/73  Flow (L/min) (Oxygen Therapy):  [1-2] 1   Body mass index is 28.25 kg/m².    Physical Exam  Physical Exam  Constitutional:       General: He is not in acute distress.     Appearance: Normal appearance.   HENT:      Head: Normocephalic and atraumatic.      Nose: Nose normal.      Mouth/Throat:      Mouth: Mucous membranes are moist.   Eyes:      Extraocular Movements: Extraocular movements intact.      Conjunctiva/sclera: Conjunctivae normal.   Cardiovascular:      Rate and Rhythm:  Normal rate and regular rhythm.   Pulmonary:      Effort: Pulmonary effort is normal.      Breath sounds: Normal breath sounds.   Abdominal:      General: Abdomen is flat. Bowel sounds are normal.      Palpations: Abdomen is soft.   Musculoskeletal:         General: Normal range of motion.      Cervical back: Normal range of motion and neck supple.   Skin:     General: Skin is warm and dry.   Neurological:      General: No focal deficit present.      Mental Status: He is alert.   Psychiatric:         Mood and Affect: Mood normal.            Diagnostic Data    Results from last 7 days   Lab Units 02/26/25  0433 02/25/25  1029   WBC 10*3/mm3 12.52* 11.49*   HEMOGLOBIN g/dL 11.7* 11.8*   HEMATOCRIT % 36.9* 36.7*   PLATELETS 10*3/mm3 97* 109*   GLUCOSE mg/dL 198* 125*   CREATININE mg/dL 2.57* 2.25*   BUN mg/dL 47* 49*   SODIUM mmol/L 139 142   POTASSIUM mmol/L 3.5 3.7   AST (SGOT) U/L  --  19   ALT (SGPT) U/L  --  12   ALK PHOS U/L  --  85   BILIRUBIN mg/dL  --  0.7   ANION GAP mmol/L 12.7 13.3       US Renal Bilateral    Result Date: 2/25/2025  Impression: No acute renal findings. No hydronephrosis. Electronically Signed: Isaiah Wheatley MD  2/25/2025 9:52 PM EST  Workstation ID: [id number]    XR Chest 1 View    Result Date: 2/25/2025  Impression: 1. Mild linear left basilar atelectasis. 2. Mild cardiomegaly. Electronically Signed: Vitor Pena MD  2/25/2025 11:06 AM EST  Workstation ID: [id number]    CT Abdomen Pelvis Without Contrast    Result Date: 2/25/2025  Impression: 1.No acute findings in the abdomen or pelvis. 2.Stable chronic ancillary findings as above Electronically Signed: Will Flores MD  2/25/2025 11:01 AM EST  Workstation ID: [id number]       I reviewed the patient's new clinical results.    Assessment/Plan:     Active and Resolved Problems  Active Hospital Problems    Diagnosis  POA    **Acute UTI (urinary tract infection) [N39.0]  Unknown    Acute kidney injury [N17.9]  Yes      Resolved Hospital  Problems   No resolved problems to display.     Urinary tract infection  Acute kidney injury superimposed CKD 3  Hematuria  Ceftriaxone  Urine and blood culture final results pending  Klebsiella on blood culture  Per nephrology, need to r/o post infectious glomerulonephritis    Bacteremia-Klebsiella, likely urine as source, possible need for MIKIE  Will consult ID  Diabetes mellitus type 2-  increase Lantus for better control  SSI    Hypertension-hold antihypertensives for now, bp borderline low on admission  Hyperlipidemia-continue statin      VTE Prophylaxis:  Mechanical VTE prophylaxis orders are present.             Disposition Planning:     Barriers to Discharge:nephrology work up, response to treatment  Anticipated Date of Discharge: 2/28/25  Place of Discharge: pending course      Time: 35 minutes     Code Status and Medical Interventions: CPR (Attempt to Resuscitate); Full Support   Ordered at: 02/25/25 1559     Code Status (Patient has no pulse and is not breathing):    CPR (Attempt to Resuscitate)     Medical Interventions (Patient has pulse or is breathing):    Full Support       Signature: Electronically signed by Daniel Nelson MD, 02/26/25, 09:59 EST.  [facility]ist Team

## 2025-02-26 NOTE — NURSING NOTE
Notified Dr Martinez pt Blood Cx (+) Klebsiella Pneumoniae. Pt temp 102. PRN Tylenol given. Pt on IV Abt with IVF NS@100ml/hr. No new orders per MD. Informed to notify lance CONTI. No s/s of distress noted.

## 2025-02-26 NOTE — CASE MANAGEMENT/SOCIAL WORK
Discharge Planning Assessment   Farhan     Patient Name: Blas Mojica  MRN: [id number]  Today's Date: 2/26/2025    Admit Date: 2/25/2025    Plan: D/C Plan: Return home with spouse. Family transport.   Discharge Needs Assessment       Row Name 02/26/25 0903       Living Environment    People in Home spouse    Name(s) of People in Home Debra    Current Living Arrangements apartment    Potentially Unsafe Housing Conditions none    In the past 12 months has the electric, gas, oil, or water company threatened to shut off services in your home? No    Primary Care Provided by self    Provides Primary Care For no one    Family Caregiver if Needed spouse;child(miriam), adult    Family Caregiver Names Wife, Debra. Daughters: Alexa, April, Carie    Quality of Family Relationships helpful;involved;supportive    Able to Return to Prior Arrangements yes       Resource/Environmental Concerns    Resource/Environmental Concerns none    Transportation Concerns none       Transportation Needs    In the past 12 months, has lack of transportation kept you from medical appointments or from getting medications? no    In the past 12 months, has lack of transportation kept you from meetings, work, or from getting things needed for daily living? No       Food Insecurity    Within the past 12 months, you worried that your food would run out before you got the money to buy more. Never true    Within the past 12 months, the food you bought just didn't last and you didn't have money to get more. Never true       Transition Planning    Patient/Family Anticipates Transition to home with family    Patient/Family Anticipated Services at Transition none    Transportation Anticipated family or friend will provide       Discharge Needs Assessment    Readmission Within the Last 30 Days no previous admission in last 30 days    Equipment Currently Used at Home rollator    Concerns to be Addressed denies needs/concerns at this time    Do you want  help finding or keeping work or a job? I do not need or want help    Do you want help with school or training? For example, starting or completing job training or getting a high school diploma, GED or equivalent No    Anticipated Changes Related to Illness none    Equipment Needed After Discharge none    Provided Post Acute Provider List? N/A    Provided Post Acute Provider Quality & Resource List? N/A    Current Discharge Risk dependent with mobility/activities of daily living                   Discharge Plan       Row Name 02/26/25 0904       Plan    Plan D/C Plan: Return home with spouse. Family transport.    Provided Post Acute Provider List? N/A    Provided Post Acute Provider Quality & Resource List? N/A    Plan Comments CM spoke with patient's wife, Debra, on the phone to discuss admission assessment and discharge planning. Debra confirms PCP and pharmacy. Debra has declined meds to bed program at this time. Debra denies any difficulty affording medications at this time. Debra denies any additional needs for services or DME at this time. Debra reported that the patient is able to get out of bed, go to the bathroom, and get into the livingroom by himself. Otherwise, she does everything else. She said she feels comfortable with him returning home as long as he isn't as weak as he was when he went to the hospital. CM asked for PT/OT to be ordered for this patient. CM reviewed the SHIELDS with Debra and she denied a copy. D/C barriers: NC 1L O2, Nephrology consult pending, IV antibiotics, IVF, pending PT/OT eval.             Expected Discharge Date and Time       Expected Discharge Date Expected Discharge Time    Feb 27, 2025            Demographic Summary       Row Name 02/26/25 0902       General Information    Admission Type observation    Arrived From home    Required Notices Provided Observation Status Notice    Referral Source admission list    Reason for Consult discharge planning    Preferred Language  English       Contact Information    Permission Granted to Share Info With                    Functional Status       Row Name 02/26/25 0902       Functional Status    Usual Activity Tolerance fair    Current Activity Tolerance fair       Functional Status, IADL    Medications assistive equipment and person    Meal Preparation assistive equipment and person    Housekeeping assistive equipment and person    Laundry assistive equipment and person    Shopping assistive equipment and person    If for any reason you need help with day-to-day activities such as bathing, preparing meals, shopping, managing finances, etc., do you get the help you need? I get all the help I need       Mental Status    General Appearance WDL WDL       Mental Status Summary    Recent Changes in Mental Status/Cognitive Functioning no changes       Employment/    Employment Status retired;, previous service           Current or Previous  Service none              Patient Forms       Row Name 02/26/25 0901       Patient Forms    Important Message from Medicare (IMM) Delivered  SHIELDS delivered by CM    Delivered to Support person    Method of delivery Telephone                  Shelley Murray RN      [address] [address]  Phone: [phone]  Fax: [phone]

## 2025-02-27 ENCOUNTER — APPOINTMENT (OUTPATIENT)
Dept: CT IMAGING | Facility: HOSPITAL | Age: 75
DRG: 872 | End: 2025-02-27
Payer: MEDICARE

## 2025-02-27 LAB
ALBUMIN SERPL-MCNC: 2.9 G/DL (ref 3.5–5.2)
ALBUMIN/GLOB SERPL: 1 G/DL
ALP SERPL-CCNC: 73 U/L (ref 39–117)
ALT SERPL W P-5'-P-CCNC: 10 U/L (ref 1–41)
ANION GAP SERPL CALCULATED.3IONS-SCNC: 11.5 MMOL/L (ref 5–15)
AST SERPL-CCNC: 22 U/L (ref 1–40)
BACTERIA SPEC AEROBE CULT: ABNORMAL
BILIRUB SERPL-MCNC: 0.5 MG/DL (ref 0–1.2)
BUN SERPL-MCNC: 44 MG/DL (ref 8–23)
BUN/CREAT SERPL: 18.2 (ref 7–25)
CALCIUM SPEC-SCNC: 8.3 MG/DL (ref 8.6–10.5)
CHLORIDE SERPL-SCNC: 103 MMOL/L (ref 98–107)
CO2 SERPL-SCNC: 21.5 MMOL/L (ref 22–29)
CREAT SERPL-MCNC: 2.42 MG/DL (ref 0.76–1.27)
DEPRECATED RDW RBC AUTO: 46.4 FL (ref 37–54)
EGFRCR SERPLBLD CKD-EPI 2021: 27.2 ML/MIN/1.73
EOSINOPHIL # BLD MANUAL: 0.09 10*3/MM3 (ref 0–0.4)
EOSINOPHIL NFR BLD MANUAL: 1 % (ref 0.3–6.2)
ERYTHROCYTE [DISTWIDTH] IN BLOOD BY AUTOMATED COUNT: 13.4 % (ref 12.3–15.4)
GLOBULIN UR ELPH-MCNC: 2.8 GM/DL
GLUCOSE BLDC GLUCOMTR-MCNC: 116 MG/DL (ref 70–105)
GLUCOSE BLDC GLUCOMTR-MCNC: 214 MG/DL (ref 70–105)
GLUCOSE BLDC GLUCOMTR-MCNC: 237 MG/DL (ref 70–105)
GLUCOSE BLDC GLUCOMTR-MCNC: 274 MG/DL (ref 70–105)
GLUCOSE SERPL-MCNC: 182 MG/DL (ref 65–99)
HCT VFR BLD AUTO: 30.6 % (ref 37.5–51)
HGB BLD-MCNC: 9.6 G/DL (ref 13–17.7)
LYMPHOCYTES # BLD MANUAL: 0.45 10*3/MM3 (ref 0.7–3.1)
LYMPHOCYTES NFR BLD MANUAL: 4 % (ref 5–12)
MAGNESIUM SERPL-MCNC: 1.9 MG/DL (ref 1.6–2.4)
MCH RBC QN AUTO: 29.7 PG (ref 26.6–33)
MCHC RBC AUTO-ENTMCNC: 31.4 G/DL (ref 31.5–35.7)
MCV RBC AUTO: 94.7 FL (ref 79–97)
METAMYELOCYTES NFR BLD MANUAL: 1 % (ref 0–0)
MONOCYTES # BLD: 0.36 10*3/MM3 (ref 0.1–0.9)
NEUTROPHILS # BLD AUTO: 7.98 10*3/MM3 (ref 1.7–7)
NEUTROPHILS NFR BLD MANUAL: 80 % (ref 42.7–76)
NEUTS BAND NFR BLD MANUAL: 9 % (ref 0–5)
NT-PROBNP SERPL-MCNC: 1340 PG/ML (ref 0–1800)
PHOSPHATE SERPL-MCNC: 3.1 MG/DL (ref 2.5–4.5)
PLATELET # BLD AUTO: 79 10*3/MM3 (ref 140–450)
PMV BLD AUTO: 10.2 FL (ref 6–12)
POTASSIUM SERPL-SCNC: 2.8 MMOL/L (ref 3.5–5.2)
POTASSIUM SERPL-SCNC: 3.3 MMOL/L (ref 3.5–5.2)
PROT SERPL-MCNC: 5.7 G/DL (ref 6–8.5)
RBC # BLD AUTO: 3.23 10*6/MM3 (ref 4.14–5.8)
RBC MORPH BLD: NORMAL
SCAN SLIDE: NORMAL
SMALL PLATELETS BLD QL SMEAR: ABNORMAL
SODIUM SERPL-SCNC: 136 MMOL/L (ref 136–145)
VARIANT LYMPHS NFR BLD MANUAL: 5 % (ref 19.6–45.3)
WBC MORPH BLD: NORMAL
WBC NRBC COR # BLD AUTO: 8.97 10*3/MM3 (ref 3.4–10.8)

## 2025-02-27 PROCEDURE — 70450 CT HEAD/BRAIN W/O DYE: CPT

## 2025-02-27 PROCEDURE — 25010000002 POTASSIUM CHLORIDE 10 MEQ/100ML SOLUTION: Performed by: INTERNAL MEDICINE

## 2025-02-27 PROCEDURE — 25010000002 CEFTRIAXONE PER 250 MG: Performed by: NURSE PRACTITIONER

## 2025-02-27 PROCEDURE — 97162 PT EVAL MOD COMPLEX 30 MIN: CPT

## 2025-02-27 PROCEDURE — 84132 ASSAY OF SERUM POTASSIUM: CPT | Performed by: INTERNAL MEDICINE

## 2025-02-27 PROCEDURE — 63710000001 INSULIN LISPRO (HUMAN) PER 5 UNITS

## 2025-02-27 PROCEDURE — 82948 REAGENT STRIP/BLOOD GLUCOSE: CPT

## 2025-02-27 PROCEDURE — 63710000001 INSULIN GLARGINE PER 5 UNITS: Performed by: INTERNAL MEDICINE

## 2025-02-27 PROCEDURE — 97166 OT EVAL MOD COMPLEX 45 MIN: CPT

## 2025-02-27 RX ORDER — HYDROCHLOROTHIAZIDE 12.5 MG/1
12.5 TABLET ORAL DAILY
Status: DISCONTINUED | OUTPATIENT
Start: 2025-02-27 | End: 2025-02-28

## 2025-02-27 RX ORDER — POTASSIUM CHLORIDE 1500 MG/1
20 TABLET, EXTENDED RELEASE ORAL ONCE
Status: COMPLETED | OUTPATIENT
Start: 2025-02-27 | End: 2025-02-27

## 2025-02-27 RX ORDER — POTASSIUM CHLORIDE 7.45 MG/ML
10 INJECTION INTRAVENOUS
Status: DISCONTINUED | OUTPATIENT
Start: 2025-02-27 | End: 2025-02-27

## 2025-02-27 RX ADMIN — INSULIN GLARGINE 18 UNITS: 100 INJECTION, SOLUTION SUBCUTANEOUS at 20:52

## 2025-02-27 RX ADMIN — POTASSIUM CHLORIDE 10 MEQ: 7.46 INJECTION, SOLUTION INTRAVENOUS at 02:13

## 2025-02-27 RX ADMIN — HYDROCHLOROTHIAZIDE 12.5 MG: 12.5 TABLET ORAL at 12:28

## 2025-02-27 RX ADMIN — INSULIN LISPRO 3 UNITS: 100 INJECTION, SOLUTION INTRAVENOUS; SUBCUTANEOUS at 12:28

## 2025-02-27 RX ADMIN — POTASSIUM CHLORIDE 20 MEQ: 1500 TABLET, EXTENDED RELEASE ORAL at 14:02

## 2025-02-27 RX ADMIN — CLOPIDOGREL BISULFATE 75 MG: 75 TABLET ORAL at 08:01

## 2025-02-27 RX ADMIN — POTASSIUM CHLORIDE 10 MEQ: 7.46 INJECTION, SOLUTION INTRAVENOUS at 05:26

## 2025-02-27 RX ADMIN — INSULIN LISPRO 4 UNITS: 100 INJECTION, SOLUTION INTRAVENOUS; SUBCUTANEOUS at 20:52

## 2025-02-27 RX ADMIN — ROSUVASTATIN CALCIUM 40 MG: 10 TABLET, FILM COATED ORAL at 08:01

## 2025-02-27 RX ADMIN — POTASSIUM CHLORIDE 20 MEQ: 20 TABLET, EXTENDED RELEASE ORAL at 08:01

## 2025-02-27 RX ADMIN — POTASSIUM CHLORIDE 10 MEQ: 7.46 INJECTION, SOLUTION INTRAVENOUS at 03:55

## 2025-02-27 RX ADMIN — INSULIN LISPRO 3 UNITS: 100 INJECTION, SOLUTION INTRAVENOUS; SUBCUTANEOUS at 17:14

## 2025-02-27 RX ADMIN — CEFTRIAXONE SODIUM 2000 MG: 2 INJECTION, POWDER, FOR SOLUTION INTRAMUSCULAR; INTRAVENOUS at 08:01

## 2025-02-27 NOTE — PROGRESS NOTES
[facility] MEDICINE SERVICE  DAILY PROGRESS NOTE    NAME: Blas Mojica  : 1950  MRN: [id number]      LOS: 1 day     PROVIDER OF SERVICE: Shreyas Schultz MD    Chief Complaint: Acute UTI (urinary tract infection)    Subjective:     Interval History:  History taken from: patient    Denies any new complaints.         Review of Systems:   Review of Systems   Constitutional:  Negative for chills and fever.   HENT:  Negative for congestion, ear discharge, ear pain, sinus pain and sore throat.    Respiratory:  Negative for apnea, cough, chest tightness, shortness of breath and wheezing.    Cardiovascular:  Negative for chest pain and palpitations.   Gastrointestinal:  Negative for abdominal pain, blood in stool, constipation, diarrhea, nausea and vomiting.   Endocrine: Negative for cold intolerance and heat intolerance.   Genitourinary:  Negative for dysuria, flank pain, hematuria and urgency.   Musculoskeletal:  Negative for arthralgias, back pain, joint swelling, myalgias and neck pain.   Skin: Negative.    Neurological: Negative.    Hematological:  Does not bruise/bleed easily.   Psychiatric/Behavioral:  Negative for agitation, confusion, hallucinations, sleep disturbance and suicidal ideas. The patient is not nervous/anxious.        Objective:     Vital Signs  Temp:  [98.7 °F (37.1 °C)-100.5 °F (38.1 °C)] 99.8 °F (37.7 °C)  Heart Rate:  [] 101  Resp:  [16-22] 22  BP: (112-136)/(57-79) 136/79  Flow (L/min) (Oxygen Therapy):  [1-2] 2   Body mass index is 28.25 kg/m².    Physical Exam  General: Alert and oriented, no acute distress.  Lungs: Clear to auscultation, nonlabored respiration.  Heart: RRR, no murmur, gallop or edema.  Abdomen: Soft, nontender, nondistended, + bowel sounds.  Neuro: alert and awake, moving all 4 extremities   Skin: Skin is warm, dry and pink, no rashes or lesions.  Psychiatric: Cooperative, appropriate mood and affect.           Diagnostic Data    Results from  last 7 days   Lab Units 02/26/25  2331   WBC 10*3/mm3 8.97   HEMOGLOBIN g/dL 9.6*   HEMATOCRIT % 30.6*   PLATELETS 10*3/mm3 79*   GLUCOSE mg/dL 182*   CREATININE mg/dL 2.42*   BUN mg/dL 44*   SODIUM mmol/L 136   POTASSIUM mmol/L 2.8*   AST (SGOT) U/L 22   ALT (SGPT) U/L 10   ALK PHOS U/L 73   BILIRUBIN mg/dL 0.5   ANION GAP mmol/L 11.5       CT Head Without Contrast    Result Date: 2/27/2025  Impression: 1.No acute intracranial abnormality identified. 2.Diffuse brain atrophy with chronic microvascular ischemic changes 3.Mild amount of fluid in the right mastoids. Electronically Signed: Will Flores MD  2/27/2025 11:29 AM EST  Workstation ID: [id number]    US Renal Bilateral    Result Date: 2/25/2025  Impression: No acute renal findings. No hydronephrosis. Electronically Signed: Isaiah Wheatley MD  2/25/2025 9:52 PM EST  Workstation ID: [id number]       I reviewed the patient's new clinical results.    Assessment/Plan:     Active and Resolved Problems  Active Hospital Problems    Diagnosis  POA    **Acute UTI (urinary tract infection) [N39.0]  Unknown    Acute kidney injury [N17.9]  Yes      Resolved Hospital Problems   No resolved problems to display.       Acute kidney injury superimposed CKD 3  Likely multifactorial including insetting of bacteremia   Renal following  Monitor BMP  Avoid nephrotoxic drugs       Bacteremia  UTI  ID recommendations appreciated  Continue IV ceftriaxone 2 g every 24 hours  Will need 2 weeks of antimicrobial therapy from  Waiting on blood and urine cultures to finalize    Diabetes mellitus type 2-  increase Lantus for better control  SSI    Hypertension- on HCTZ 12.5 mg  Hyperlipidemia-continue statin      VTE Prophylaxis:  Mechanical VTE prophylaxis orders are present.             Disposition Planning:     Barriers to Discharge:nephrology work up, response to treatment  Anticipated Date of Discharge: 2-3 days  Place of Discharge: pending course      Time: 35 minutes     Code Status and  Medical Interventions: CPR (Attempt to Resuscitate); Full Support   Ordered at: 02/25/25 1559     Code Status (Patient has no pulse and is not breathing):    CPR (Attempt to Resuscitate)     Medical Interventions (Patient has pulse or is breathing):    Full Support       Signature: Electronically signed by Shreyas Schultz MD, 02/27/25, 12:57 EST.  [facility]

## 2025-02-27 NOTE — PROGRESS NOTES
Infectious Diseases Progress Note      LOS: 1 day   Patient Care Team:  Sahil Read MD as PCP - General    Chief Complaint: Fatigue, weakness, suprapubic pain, dysuria frequent fever and chills at admission    Subjective       The patient has been afebrile for the last 24 hours.  The patient is on room air, hemodynamically stable, and is tolerating antimicrobial therapy.  Patient  is having less urinary symptoms but still having significant weakness      Review of Systems:   Review of Systems   Constitutional:  Positive for chills and fatigue.   HENT: Negative.     Eyes: Negative.    Respiratory: Negative.     Cardiovascular: Negative.    Gastrointestinal:  Positive for abdominal pain.        Improved   Endocrine: Negative.    Genitourinary:  Positive for dysuria and frequency.        Improved   Musculoskeletal: Negative.    Skin: Negative.    Neurological:  Positive for weakness.   Psychiatric/Behavioral: Negative.     All other systems reviewed and are negative.       Objective     Vital Signs  Temp:  [98.7 °F (37.1 °C)-100.3 °F (37.9 °C)] 99.3 °F (37.4 °C)  Heart Rate:  [] 99  Resp:  [16-23] 23  BP: (112-136)/(57-79) 134/75    Physical Exam:  Physical Exam  Vitals and nursing note reviewed.   Constitutional:       General: He is not in acute distress.     Appearance: He is well-developed and normal weight. He is ill-appearing. He is not diaphoretic.   HENT:      Head: Normocephalic and atraumatic.   Eyes:      Conjunctiva/sclera: Conjunctivae normal.      Pupils: Pupils are equal, round, and reactive to light.   Cardiovascular:      Rate and Rhythm: Normal rate and regular rhythm.      Heart sounds: Normal heart sounds, S1 normal and S2 normal.   Pulmonary:      Effort: Pulmonary effort is normal. No respiratory distress.      Breath sounds: Normal breath sounds. No stridor. No wheezing or rales.   Abdominal:      General: Bowel sounds are normal. There is no distension.      Palpations: Abdomen is  soft. There is no mass.      Tenderness: There is no abdominal tenderness. There is no guarding.   Musculoskeletal:         General: No deformity.      Cervical back: Neck supple.   Skin:     General: Skin is warm and dry.      Coloration: Skin is not pale.      Findings: No erythema or rash.   Neurological:      Mental Status: He is alert and oriented to person, place, and time.      Motor: Weakness present.          Results Review:    I have reviewed all clinical data, test, lab, and imaging results.     Radiology  CT Head Without Contrast    Result Date: 2/27/2025  CT HEAD WO CONTRAST Date of Exam: 2/27/2025 11:03 AM EST Indication: very slow to initiate movement and speech, requiring more help for ambulation, right arm is very weak and he is leaning to the left. Comparison: None available. Technique: Axial CT images were obtained of the head without contrast administration.  Coronal reconstructions were performed.  Automated exposure control and iterative reconstruction methods were used. Findings: There is no evidence of hemorrhage. There is no mass effect or midline shift. Diffuse brain atrophy. Periventricular hypodensities compatible with chronic small vessel ischemia There is no extracerebral collection. Ventricles are normal in size and configuration for patient's stated age. Posterior fossa is within normal limits. Calvarium and skull base appear intact.  Visualized sinuses show no air fluid levels. Visualized orbits are unremarkable. There is mild amount of fluid in the right mastoids     Impression: 1.No acute intracranial abnormality identified. 2.Diffuse brain atrophy with chronic microvascular ischemic changes 3.Mild amount of fluid in the right mastoids. Electronically Signed: Will Flores MD  2/27/2025 11:29 AM EST  Workstation ID: [id number]     Cardiology    Laboratory    Results from last 7 days   Lab Units 02/26/25  2331 02/26/25  0433 02/25/25  1029   WBC 10*3/mm3 8.97 12.52* 11.49*    HEMOGLOBIN g/dL 9.6* 11.7* 11.8*   HEMATOCRIT % 30.6* 36.9* 36.7*   PLATELETS 10*3/mm3 79* 97* 109*     Results from last 7 days   Lab Units 02/27/25  1218 02/26/25  2331 02/26/25  0433 02/25/25  1029   SODIUM mmol/L  --  136 139 142   POTASSIUM mmol/L 3.3* 2.8* 3.5 3.7   CHLORIDE mmol/L  --  103 103 105   CO2 mmol/L  --  21.5* 23.3 23.7   BUN mg/dL  --  44* 47* 49*   CREATININE mg/dL  --  2.42* 2.57* 2.25*   GLUCOSE mg/dL  --  182* 198* 125*   ALBUMIN g/dL  --  2.9*  --  3.9   BILIRUBIN mg/dL  --  0.5  --  0.7   ALK PHOS U/L  --  73  --  85   AST (SGOT) U/L  --  22  --  19   ALT (SGPT) U/L  --  10  --  12   LIPASE U/L  --   --   --  29   CALCIUM mg/dL  --  8.3* 9.0 9.4     Results from last 7 days   Lab Units 02/26/25  1025   CK TOTAL U/L 103             Microbiology   Microbiology Results (last 10 days)       Procedure Component Value - Date/Time    Urine Culture - Urine, Straight Cath [[id number]]  (Abnormal)  (Susceptibility) Collected: 02/25/25 1226    Lab Status: Final result Specimen: Urine from Straight Cath Updated: 02/27/25 0906     Urine Culture >100,000 CFU/mL Klebsiella pneumoniae ssp pneumoniae    Narrative:      Colonization of the urinary tract without infection is common. Treatment is discouraged unless the patient is symptomatic, pregnant, or undergoing an invasive urologic procedure.    Susceptibility        Klebsiella pneumoniae ssp pneumoniae      HARRY      Amoxicillin + Clavulanate Susceptible      Ampicillin Resistant      Ampicillin + Sulbactam Susceptible      Cefazolin (Urine) Susceptible      Cefepime Susceptible      Ceftazidime Susceptible      Ceftriaxone Susceptible      Gentamicin Susceptible      Levofloxacin Susceptible      Nitrofurantoin Intermediate      Piperacillin + Tazobactam Susceptible      Trimethoprim + Sulfamethoxazole Susceptible                           Respiratory Panel PCR w/COVID-19(SARS-CoV-2) KEVIN/HORTENSIA/SLOAN/PAD/COR/JOHNNY In-House, NP Swab in UTM/VTM, 2 HR TAT - Swab,  Nasopharynx [[id number]]  (Normal) Collected: 02/25/25 1037    Lab Status: Final result Specimen: Swab from Nasopharynx Updated: 02/25/25 1143     ADENOVIRUS, PCR Not Detected     Coronavirus 229E Not Detected     Coronavirus HKU1 Not Detected     Coronavirus NL63 Not Detected     Coronavirus OC43 Not Detected     COVID19 Not Detected     Human Metapneumovirus Not Detected     Human Rhinovirus/Enterovirus Not Detected     Influenza A PCR Not Detected     Influenza B PCR Not Detected     Parainfluenza Virus 1 Not Detected     Parainfluenza Virus 2 Not Detected     Parainfluenza Virus 3 Not Detected     Parainfluenza Virus 4 Not Detected     RSV, PCR Not Detected     Bordetella pertussis pcr Not Detected     Bordetella parapertussis PCR Not Detected     Chlamydophila pneumoniae PCR Not Detected     Mycoplasma pneumo by PCR Not Detected    Narrative:      In the setting of a positive respiratory panel with a viral infection PLUS a negative procalcitonin without other underlying concern for bacterial infection, consider observing off antibiotics or discontinuation of antibiotics and continue supportive care. If the respiratory panel is positive for atypical bacterial infection (Bordetella pertussis, Chlamydophila pneumoniae, or Mycoplasma pneumoniae), consider antibiotic de-escalation to target atypical bacterial infection.    Blood Culture - Blood, Arm, Left [[id number]]  (Abnormal) Collected: 02/25/25 1029    Lab Status: Preliminary result Specimen: Blood from Arm, Left Updated: 02/27/25 0625     Blood Culture Gram Negative Bacilli     Isolated from Aerobic Bottle     Gram Stain Aerobic Bottle Gram negative bacilli    Narrative:      Less than seven (7) mL's of blood was collected.  Insufficient quantity may yield false negative results.    Blood Culture - Blood, Arm, Right [[id number]]  (Abnormal) Collected: 02/25/25 1024    Lab Status: Preliminary result Specimen: Blood from Arm, Right Updated: 02/27/25 0625     Blood  Culture Gram Negative Bacilli     Isolated from Aerobic and Anaerobic Bottles     Gram Stain Anaerobic Bottle Gram negative bacilli, tiny      Aerobic Bottle Gram negative bacilli    Blood Culture ID, PCR - Blood, Arm, Right [[id number]]  (Abnormal) Collected: 02/25/25 1024    Lab Status: Final result Specimen: Blood from Arm, Right Updated: 02/26/25 0412     BCID, PCR Klebsiella pneumoniae group. Identification by BCID2 PCR.     BOTTLE TYPE Anaerobic Bottle    Narrative:      No resistance genes detected.            Medication Review:       Schedule Meds  cefTRIAXone, 2,000 mg, Intravenous, Q24H  clopidogrel, 75 mg, Oral, Daily  hydroCHLOROthiazide, 12.5 mg, Oral, Daily  insulin glargine, 18 Units, Subcutaneous, Nightly  insulin lispro, 2-7 Units, Subcutaneous, 4x Daily AC & at Bedtime  [Held by provider] lisinopril, 40 mg, Oral, Daily  [Held by provider] pioglitazone, 30 mg, Oral, Daily  rosuvastatin, 40 mg, Oral, Daily        Infusion Meds       PRN Meds    acetaminophen **OR** acetaminophen **OR** acetaminophen    senna-docusate sodium **AND** polyethylene glycol **AND** bisacodyl **AND** bisacodyl    Calcium Replacement - Follow Nurse / BPA Driven Protocol    dextrose    dextrose    glucagon (human recombinant)    Magnesium Standard Dose Replacement - Follow Nurse / BPA Driven Protocol    melatonin    ondansetron ODT **OR** ondansetron    Phosphorus Replacement - Follow Nurse / BPA Driven Protocol    Potassium Replacement - Follow Nurse / BPA Driven Protocol        Assessment & Plan       Antimicrobial Therapy   1.  IV ceftriaxone        2.        3.        4.        5.            Assessment     Klebsiella pneumoniae bacteremia.  secondary to complicated UTI.     Urinary tract infection with pyuria and bacteriuria with cultures growing Klebsiella pneumoniae.  CT of the abdomen and pelvis did not show any obstructive uropathy.  Patient endorsed hematuria at admission-possibly passed a stone.  Does have a history  of nephrolithiasis     Febrile illness-likely related to the above.       Acute kidney injury on chronic kidney disease-nephrology following     Type 2 diabetes-A1c is 8.52     Plan     Continue IV ceftriaxone 2 g every 24 hours  Will need 2 weeks of antimicrobial therapy from  Waiting on blood  cultures to finalize  The patient will probably switch to p.o. levofloxacin 750 mg every 48 hours  QTc interval was appropriate for quinolone use  Continue supportive care  A.mEleazar labs        Alexa Reid, APRN  02/27/25  15:58 EST    Note is dictated utilizing voice recognition software/Dragon

## 2025-02-27 NOTE — PLAN OF CARE
Goal Outcome Evaluation:  Plan of Care Reviewed With: patient        Progress: no change  Outcome Evaluation: Pt is a 75 y/o M admitted to [facility] 2/25/25 with generalized weakness, hematuria, vomiting, and tachycardic; hospital dx acute UTI and found to be septic. At baseline pt resides with spouse in 2nd floor apartment with elevator access. Pt typically (I) with ADLs and (I) with mobility with use of RW. Pt cleared for OT by nursing, oriented x4 on RA in supine upon arrival. Pt c/o no pain. Pt requiring max A x2 supine to sit, demo difficulty initiating movement and bradykinesia. Pt sitting edge of bed requiring min-mod A unsupported balance with lateral lean to L. Pt requiring max A x2 to come to standing with RW, demo forward flexed posture and poor balance requiring max A x2 to assist with static standing. Pt demo poor motor planning, unable to shift weight or take lateral steps towards L. Pt noted to have smear on brief when returned to supine, requiring total assist for brief change and perineal hygiene. Pt functioning far below baseline, demonstrating deficits with self care, functional mobility and balance, appears to be high risk for falls. OT recommending SNF when medically appropriate for dc, will follow within acute setting.    Anticipated Discharge Disposition (OT): skilled nursing facility

## 2025-02-27 NOTE — THERAPY EVALUATION
Patient Name: Blas Mojica  : 1950    MRN: [id number]                              Today's Date: 2025       Admit Date: 2025    Visit Dx:     ICD-10-CM ICD-9-CM   1. Weakness  R53.1 780.79   2. Urinary tract infection with hematuria, site unspecified  N39.0 599.0    R31.9 599.70   3. Acute renal failure superimposed on chronic kidney disease, unspecified acute renal failure type, unspecified CKD stage  N17.9 584.9    N18.9 585.9     Patient Active Problem List   Diagnosis    Primary hypertension    TIA (transient ischemic attack)    Stage 3 chronic kidney disease    Dyslipidemia    History of basal cell carcinoma (BCC)    Primary osteoarthritis    Vitamin D deficiency    Acute pain of left knee    Generalized weakness    Hypokalemia    Moderate malnutrition    Physical debility    Type 2 diabetes mellitus with stage 3 chronic kidney disease, with long-term current use of insulin    Sinus tachycardia    Acute hypoxemic respiratory failure due to COVID-19    Pneumonia due to COVID-19 virus    Mixed hyperlipidemia    COVID-19 virus infection    Weakness    COVID    Foot ulcer due to secondary DM    Acute kidney injury    Acute UTI (urinary tract infection)     Past Medical History:   Diagnosis Date    Diabetes mellitus     Hyperlipidemia     Hypertension     Kidney stone     TIA (transient ischemic attack)     Type 2 diabetes mellitus with stage 3 chronic kidney disease, with long-term current use of insulin 2021     Past Surgical History:   Procedure Laterality Date    ADENOIDECTOMY      ENDOSCOPY N/A 2023    Procedure: ESOPHAGOGASTRODUODENOSCOPY with removal of NJ tube from right nare;  Surgeon: Jaret Valadez MD;  Location: [facility] ENDOSCOPY;  Service: Gastroenterology;  Laterality: N/A;  esophagitis, gastric ulcer    EXTRACORPOREAL SHOCK WAVE LITHOTRIPSY (ESWL)      HERNIA REPAIR      KNEE ACL RECONSTRUCTION Left     TONSILLECTOMY        General Information       Row  Name 02/27/25 1331          Physical Therapy Time and Intention    Document Type evaluation  -JV     Mode of Treatment physical therapy  -JV       Row Name 02/27/25 1331          General Information    Patient Profile Reviewed yes  -JV     Prior Level of Function independent:;all household mobility;ADL's  -JV     Existing Precautions/Restrictions fall  -JV     Barriers to Rehab medically complex;previous functional deficit;cognitive status;hearing deficit  -JV       Row Name 02/27/25 1331          Living Environment    People in Home spouse  -JV       Row Name 02/27/25 1331          Home Main Entrance    Number of Stairs, Main Entrance none  -JV       Row Name 02/27/25 1331          Stairs Within Home, Primary    Stairs, Within Home, Primary elevator access to apartment  -JV     Number of Stairs, Within Home, Primary none  -JV       Row Name 02/27/25 1331          Cognition    Orientation Status (Cognition) oriented x 4  -JV       Row Name 02/27/25 1331          Safety Issues/Impairments Affecting Functional Mobility    Safety Issues Affecting Function (Mobility) judgment;problem-solving;safety precautions follow-through/compliance;sequencing abilities  -JV     Impairments Affecting Function (Mobility) balance;cognition;endurance/activity tolerance;postural/trunk control;strength  -JV     Cognitive Impairments, Mobility Safety/Performance attention;judgment;problem-solving/reasoning;safety precaution awareness;safety precaution follow-through;sequencing abilities;other (see comments)  poor initiation  -JV               User Key  (r) = Recorded By, (t) = Taken By, (c) = Cosigned By      Initials Name Provider Type    JV Lakesha Marshall Physical Therapist                   Mobility       Row Name 02/27/25 1336          Bed Mobility    Bed Mobility supine-sit  -JV     Supine-Sit [place] (Bed Mobility) maximum assist (25% patient effort);2 person assist  -JV     Assistive Device (Bed Mobility) bed rails;head of  bed elevated;repositioning sheet  -JV       Row Name 02/27/25 1336          Bed-Chair Transfer    Bed-Chair [place] (Transfers) maximum assist (25% patient effort);2 person assist;verbal cues;nonverbal cues (demo/gesture)  -JV     Assistive Device (Bed-Chair Transfers) walker, front-wheeled  -JV       Row Name 02/27/25 1336          Sit-Stand Transfer    Sit-Stand [place] (Transfers) verbal cues;nonverbal cues (demo/gesture);maximum assist (25% patient effort);2 person assist  -JV     Assistive Device (Sit-Stand Transfers) walker, front-wheeled  -JV       Row Name 02/27/25 1336          Gait/Stairs (Locomotion)    [place] Level (Gait) unable to assess  -JV     Patient was able to Ambulate no, other medical factors prevent ambulation  -JV     Reason Patient was unable to Ambulate Excessive Weakness  -JV               User Key  (r) = Recorded By, (t) = Taken By, (c) = Cosigned By      Initials Name Provider Type    Lakesha Barnes Physical Therapist                   Obj/Interventions       Row Name 02/27/25 1338          Range of Motion Comprehensive    General Range of Motion bilateral lower extremity ROM WFL  -JV       Row Name 02/27/25 1338          Strength Comprehensive (MMT)    Comment, General Manual Muscle Testing (MMT) Assessment BLE grossly 3+/5  -JV       Row Name 02/27/25 1338          Balance    Balance Assessment sitting static balance;standing static balance;standing dynamic balance  -JV     Static Sitting Balance moderate assist  -JV     Position, Sitting Balance sitting edge of bed  -JV     Static Standing Balance maximum assist;2-person assist  -JV     Dynamic Standing Balance maximum assist;2-person assist  -JV     Position/Device Used, Standing Balance supported;walker, rolling  -JV       Row Name 02/27/25 1338          Sensory Assessment (Somatosensory)    Sensory Assessment (Somatosensory) LE sensation intact  -JV               User Key  (r) = Recorded By, (t) = Taken By,  (c) = Cosigned By      Initials Name Provider Type    Lakesha Barnes Physical Therapist                   Goals/Plan       Row Name 02/27/25 1346          Bed Mobility Goal 1 (PT)    Activity/Assistive Device (Bed Mobility Goal 1, PT) bed mobility activities, all  -JV     [place] Level/Cues Needed (Bed Mobility Goal 1, PT) minimum assist (75% or more patient effort)  -JV     Time Frame (Bed Mobility Goal 1, PT) long term goal (LTG);2 weeks  -JV       Row Name 02/27/25 1346          Transfer Goal 1 (PT)    Activity/Assistive Device (Transfer Goal 1, PT) sit-to-stand/stand-to-sit;bed-to-chair/chair-to-bed;walker, rolling  -JV     [place] Level/Cues Needed (Transfer Goal 1, PT) minimum assist (75% or more patient effort)  -JV     Time Frame (Transfer Goal 1, PT) long term goal (LTG);2 weeks  -JV       Row Name 02/27/25 1346          Gait Training Goal 1 (PT)    Activity/Assistive Device (Gait Training Goal 1, PT) gait (walking locomotion);assistive device use;walker, rolling;increase endurance/gait distance;improve balance and speed  -JV     [place] Level (Gait Training Goal 1, PT) minimum assist (75% or more patient effort)  -JV     Distance (Gait Training Goal 1, PT) 30 ft  -JV     Time Frame (Gait Training Goal 1, PT) long term goal (LTG);2 weeks  -JV       Row Name 02/27/25 1346          Therapy Assessment/Plan (PT)    Planned Therapy Interventions (PT) balance training;bed mobility training;gait training;home exercise program;strengthening;patient/family education;transfer training  -JV               User Key  (r) = Recorded By, (t) = Taken By, (c) = Cosigned By      Initials Name Provider Type    Lakesha Barnes Physical Therapist                   Clinical Impression       Row Name 02/27/25 1339          Pain    Pretreatment Pain Rating 0/10 - no pain  -JV     Posttreatment Pain Rating 0/10 - no pain  -JV       Row Name 02/27/25 1339          Plan of Care Review    Outcome Evaluation  Pt is a 75 y/o male presenting to [facility] on 2/25 with reports of generalized malaise and fatigue x1 week. Wife reports that patient then since developed bloody urine that began yesterday.  Patient endorses suprapubic abdominal pain dysuria and urinary frequency and urgency changes. Diagnosed with UTI.   CMH of T2DM, HTN, HLD, PUD with h/o gastric ulcers, history of A-fib.  Pt A&Ox4, very hard of hearing. Pt reports PLOF of living in [facility] apartment at [facility] with elevator access, living with wife; reports PLOF Mod(I) with rollator for household/community mobility/ambulation. Pt no longer drives. Pt denies recent fall history.  At time of therapy eval, pt requires MAX A x2 with supine to sit, MOD/MAX A for sitting balance due to L lateral lean, MOD A x2 with sit to stand, MAX A x2 with Rwx for SPS with pt with L lateral/posterior lean, poor initiation, and poor weight shifting ability and limited LE advancement. Drastic functional decline discussed with nurse. Head CT post PT eval (-) for  acute intracranial abnormality; diffuse brain atrophy with chronic microvascular ischemic changes.Due to significant functional decline, fall risk, and balance impairment, follow-up SNF recommended at time of d/c from [facility].  -JV       Row Name 02/27/25 6503          Therapy Assessment/Plan (PT)    Criteria for Skilled Interventions Met (PT) yes;meets criteria;skilled treatment is necessary  -JV     Therapy Frequency (PT) 5 times/wk  -JV     Predicted Duration of Therapy Intervention (PT) until d/c  -JV       Row Name 02/27/25 1339          Vital Signs    Pretreatment Heart Rate (beats/min) 84  -JV     Intratreatment Heart Rate (beats/min) 122  -JV     Posttreatment Heart Rate (beats/min) 102  -JV     Pre Patient Position Supine  -JV     Intra Patient Position Standing  -JV     Post Patient Position Sitting  -JV       Row Name 02/27/25 1339          Positioning and Restraints    Pre-Treatment Position in bed  -JV     Post Treatment  Position chair  -JV     In Chair notified nsg;sitting;call light within reach;encouraged to call for assist;exit alarm on  -JV               User Key  (r) = Recorded By, (t) = Taken By, (c) = Cosigned By      Initials Name Provider Type    Lakesha Barnes Physical Therapist                   Outcome Measures       Row Name 02/27/25 0800          How much help from another person do you currently need...    Turning from your back to your side while in flat bed without using bedrails? 3  -BT     Moving from lying on back to sitting on the side of a flat bed without bedrails? 2  -BT     Moving to and from a bed to a chair (including a wheelchair)? 2  -BT     Standing up from a chair using your arms (e.g., wheelchair, bedside chair)? 2  -BT     Climbing 3-5 steps with a railing? 1  -BT     To walk in hospital room? 2  -BT     AM-PAC 6 Clicks Score (PT) 12  -BT               User Key  (r) = Recorded By, (t) = Taken By, (c) = Cosigned By      Initials Name Provider Type    BT Marissa aCballero, RN Registered Nurse                                 Physical Therapy Education       Title: PT OT SLP Therapies (Done)       Topic: Physical Therapy (Done)       Point: Mobility training (Done)       Learning Progress Summary            Patient Acceptance, E,TB, VU by JONATHAN at 2/27/2025 1347                                      User Key       Initials Effective Dates Name Provider Type Discipline     03/30/21 -  Lakesha Marshall Physical Therapist PT                  PT Recommendation and Plan  Planned Therapy Interventions (PT): balance training, bed mobility training, gait training, home exercise program, strengthening, patient/family education, transfer training  Outcome Evaluation: Pt is a 75 y/o male presenting to [facility] on 2/25 with reports of generalized malaise and fatigue x1 week. Wife reports that patient then since developed bloody urine that began yesterday.  Patient endorses suprapubic abdominal pain dysuria and  urinary frequency and urgency changes. Diagnosed with UTI.   CMH of T2DM, HTN, HLD, PUD with h/o gastric ulcers, history of A-fib.  Pt A&Ox4, very hard of hearing. Pt reports PLOF of living in [facility] [place] apartment at [facility] with elevator access, living with wife; reports PLOF Mod(I) with rollator for household/community mobility/ambulation. Pt no longer drives. Pt denies recent fall history.  At time of therapy eval, pt requires MAX A x2 with supine to sit, MOD/MAX A for sitting balance due to L lateral lean, MOD A x2 with sit to stand, MAX A x2 with Rwx for SPS with pt with L lateral/posterior lean, poor initiation, and poor weight shifting ability and limited LE advancement. Drastic functional decline discussed with nurse. Head CT post PT eval (-) for  acute intracranial abnormality; diffuse brain atrophy with chronic microvascular ischemic changes.Due to significant functional decline, fall risk, and balance impairment, follow-up SNF recommended at time of d/c from [facility].     Time Calculation:         PT Charges       Row Name 02/27/25 1348             Time Calculation    Start Time 0939  -JV      Stop Time 1004  -JV      Time Calculation (min) 25 min  -JV      PT Received On 02/27/25  -JV      PT - Next Appointment 02/28/25  -JV      PT Goal Re-Cert Due Date 03/13/25  -JV         Time Calculation- PT    Total Timed Code Minutes- PT 0 minute(s)  -JV                User Key  (r) = Recorded By, (t) = Taken By, (c) = Cosigned By      Initials Name Provider Type    Lakesha Barnes Physical Therapist                  Therapy Charges for Today       Code Description Service Date Service Provider Modifiers Qty    [id number]  PT EVAL MOD COMPLEXITY 4 2/27/2025 Lakesha Marshall GP 1            PT G-Codes  AM-PAC 6 Clicks Score (PT): 12  PT Discharge Summary  Anticipated Discharge Disposition (PT): skilled nursing facility    Lakesha Marshall  2/27/2025

## 2025-02-27 NOTE — THERAPY EVALUATION
Patient Name: Blas Mojica  : 1950    MRN: [id number]                              Today's Date: 2025       Admit Date: 2025    Visit Dx:     ICD-10-CM ICD-9-CM   1. Weakness  R53.1 780.79   2. Urinary tract infection with hematuria, site unspecified  N39.0 599.0    R31.9 599.70   3. Acute renal failure superimposed on chronic kidney disease, unspecified acute renal failure type, unspecified CKD stage  N17.9 584.9    N18.9 585.9     Patient Active Problem List   Diagnosis    Primary hypertension    TIA (transient ischemic attack)    Stage 3 chronic kidney disease    Dyslipidemia    History of basal cell carcinoma (BCC)    Primary osteoarthritis    Vitamin D deficiency    Acute pain of left knee    Generalized weakness    Hypokalemia    Moderate malnutrition    Physical debility    Type 2 diabetes mellitus with stage 3 chronic kidney disease, with long-term current use of insulin    Sinus tachycardia    Acute hypoxemic respiratory failure due to COVID-19    Pneumonia due to COVID-19 virus    Mixed hyperlipidemia    COVID-19 virus infection    Weakness    COVID    Foot ulcer due to secondary DM    Acute kidney injury    Acute UTI (urinary tract infection)     Past Medical History:   Diagnosis Date    Diabetes mellitus     Hyperlipidemia     Hypertension     Kidney stone     TIA (transient ischemic attack)     Type 2 diabetes mellitus with stage 3 chronic kidney disease, with long-term current use of insulin 2021     Past Surgical History:   Procedure Laterality Date    ADENOIDECTOMY      ENDOSCOPY N/A 2023    Procedure: ESOPHAGOGASTRODUODENOSCOPY with removal of NJ tube from right nare;  Surgeon: Jaret Valadez MD;  Location: [facility] ENDOSCOPY;  Service: Gastroenterology;  Laterality: N/A;  esophagitis, gastric ulcer    EXTRACORPOREAL SHOCK WAVE LITHOTRIPSY (ESWL)      HERNIA REPAIR      KNEE ACL RECONSTRUCTION Left     TONSILLECTOMY        General Information       Row  Name 02/27/25 1448          OT Time and Intention    Subjective Information complains of;weakness  -MS     Document Type evaluation  -MS     Mode of Treatment occupational therapy  -MS     Patient Effort good  -MS       Row Name 02/27/25 1448          General Information    Patient Profile Reviewed yes  -MS     Prior Level of Function independent:;ADL's;all household mobility  -MS     Existing Precautions/Restrictions fall  -MS     Barriers to Rehab medically complex;previous functional deficit;hearing deficit  -MS       Row Name 02/27/25 1448          Occupational Profile    Reason for Services/Referral (Occupational Profile) Pt is a 73 y/o M admitted to [facility] 2/25/25 with generalized weakness, hematuria, vomiting, and tachycardic; hospital dx acute UTI and found to be septic. CT A/P (-) acute. CXR indicates mild linear L basilar atelectasis, mild cardiomegaly. US renal luis fernando (-) acute. CT head (-) acute. PMHx significant for HTN, TIA, CKD. DMII and physical debility. At baseline pt resides with spouse in 2nd floor apartment with elevator access. Pt typically (I) with ADLs and (I) with mobility with use of RW.  -MS     Environmental Supports and Barriers (Occupational Profile) supportive family  -MS       Row Name 02/27/25 1448          Living Environment    People in Home spouse  -MS       Row Name 02/27/25 1448          Home Main Entrance    Number of Stairs, Main Entrance none  elevator access  -MS       Row Name 02/27/25 1448          Stairs Within Home, Primary    Number of Stairs, Within Home, Primary none  -MS       Row Name 02/27/25 1448          Cognition    Orientation Status (Cognition) oriented x 4  -MS       Row Name 02/27/25 1448          Safety Issues/Impairments Affecting Functional Mobility    Safety Issues Affecting Function (Mobility) awareness of need for assistance;insight into deficits/self-awareness;judgment;positioning of assistive device;problem-solving;safety precaution awareness  -MS      Impairments Affecting Function (Mobility) balance;endurance/activity tolerance;motor planning;range of motion (ROM);strength  -MS               User Key  (r) = Recorded By, (t) = Taken By, (c) = Cosigned By      Initials Name Provider Type    MS Chey Hernandez, OT Occupational Therapist                     Mobility/ADL's       Row Name 02/27/25 1449          Bed Mobility    Bed Mobility supine-sit;sit-supine;scooting/bridging  -MS     Scooting/Bridging [place] (Bed Mobility) maximum assist (25% patient effort);2 person assist  -MS     Supine-Sit [place] (Bed Mobility) maximum assist (25% patient effort);2 person assist  -MS     Sit-Supine [place] (Bed Mobility) minimum assist (75% patient effort)  -MS     Bed Mobility, Safety Issues decreased use of arms for pushing/pulling;decreased use of legs for bridging/pushing;impaired trunk control for bed mobility  -MS     Assistive Device (Bed Mobility) bed rails;head of bed elevated;repositioning sheet  -MS     Comment, (Bed Mobility) difficulty initiating movement, demo bradykinesia  -MS       Row Name 02/27/25 1449          Transfers    Transfers sit-stand transfer  -MS       Row Name 02/27/25 1449          Sit-Stand Transfer    Sit-Stand [place] (Transfers) verbal cues;maximum assist (25% patient effort);2 person assist  -MS     Assistive Device (Sit-Stand Transfers) walker, front-wheeled  -MS     Comment, (Sit-Stand Transfer) forward flexed posture, difficulty coming fully erect  -MS       Row Name 02/27/25 1449          Functional Mobility    Patient was able to Ambulate no, other medical factors prevent ambulation  -MS     Reason Patient was unable to Ambulate Excessive Weakness  -MS       Row Name 02/27/25 1449          Activities of Daily Living    BADL Assessment/Intervention lower body dressing;toileting  -MS       Row Name 02/27/25 1449          Lower Body Dressing Assessment/Training    [place] Level (Lower Body Dressing)  doff;pants/bottoms;dependent (less than 25% patient effort)  -MS     Position (Lower Body Dressing) supine  -MS       Row Name 02/27/25 1449          Toileting Assessment/Training    [place] Level (Toileting) dependent (less than 25% patient effort);adjust/manage clothing;perform perineal hygiene;change pad/brief  -MS     Position (Toileting) supine  -MS     Comment, (Toileting) smear noted in brief, requiring perineal hygiene and brief change for thoroughness  -MS               User Key  (r) = Recorded By, (t) = Taken By, (c) = Cosigned By      Initials Name Provider Type    Chey Loving OT Occupational Therapist                   Obj/Interventions       Row Name 02/27/25 1451          Sensory Assessment (Somatosensory)    Sensory Assessment (Somatosensory) UE sensation intact  -MS       Row Name 02/27/25 1451          Vision Assessment/Intervention    Visual Impairment/Limitations WFL  -MS       Row Name 02/27/25 1451          Range of Motion Comprehensive    General Range of Motion bilateral upper extremity ROM WFL  -MS       Row Name 02/27/25 1451          Strength Comprehensive (MMT)    Comment, General Manual Muscle Testing (MMT) Assessment BUE grossly 3+/5  -MS       Row Name 02/27/25 1451          Balance    Balance Assessment sitting static balance;sitting dynamic balance;standing static balance;standing dynamic balance  -MS     Static Sitting Balance minimal assist  -MS     Dynamic Sitting Balance moderate assist  -MS     Position, Sitting Balance unsupported;sitting edge of bed  -MS     Static Standing Balance maximum assist;2-person assist  -MS     Dynamic Standing Balance maximum assist;2-person assist  -MS     Position/Device Used, Standing Balance supported;walker, front-wheeled  -MS     Comment, Balance significant lean to L, posterior lean in standing  -MS               User Key  (r) = Recorded By, (t) = Taken By, (c) = Cosigned By      Initials Name Provider Type    Chey Loving, OT  Occupational Therapist                   Goals/Plan       Row Name 02/27/25 1455          Bed Mobility Goal 1 (OT)    Activity/Assistive Device (Bed Mobility Goal 1, OT) bed mobility activities, all  -MS     [place] Level/Cues Needed (Bed Mobility Goal 1, OT) minimum assist (75% or more patient effort)  -MS     Time Frame (Bed Mobility Goal 1, OT) long term goal (LTG);2 weeks  -MS     Progress/Outcomes (Bed Mobility Goal 1, OT) new goal  -MS       Row Name 02/27/25 1455          Transfer Goal 1 (OT)    Activity/Assistive Device (Transfer Goal 1, OT) transfers, all  -MS     [place] Level/Cues Needed (Transfer Goal 1, OT) minimum assist (75% or more patient effort)  -MS     Time Frame (Transfer Goal 1, OT) long term goal (LTG);2 weeks  -MS     Progress/Outcome (Transfer Goal 1, OT) new goal  -MS       Row Name 02/27/25 1455          Dressing Goal 1 (OT)    Activity/Device (Dressing Goal 1, OT) dressing skills, all  -MS     [place]/Cues Needed (Dressing Goal 1, OT) minimum assist (75% or more patient effort)  -MS     Time Frame (Dressing Goal 1, OT) long term goal (LTG);2 weeks  -MS     Progress/Outcome (Dressing Goal 1, OT) new goal  -MS       Row Name 02/27/25 1455          Toileting Goal 1 (OT)    Activity/Device (Toileting Goal 1, OT) toileting skills, all  -MS     [place] Level/Cues Needed (Toileting Goal 1, OT) minimum assist (75% or more patient effort)  -MS     Time Frame (Toileting Goal 1, OT) long term goal (LTG);2 weeks  -MS     Progress/Outcome (Toileting Goal 1, OT) new goal  -MS       Row Name 02/27/25 1455          Therapy Assessment/Plan (OT)    Planned Therapy Interventions (OT) activity tolerance training;adaptive equipment training;BADL retraining;functional balance retraining;IADL retraining;neuromuscular control/coordination retraining;occupation/activity based interventions;passive ROM/stretching;patient/caregiver education/training;ROM/therapeutic exercise;strengthening  exercise;transfer/mobility retraining  -MS               User Key  (r) = Recorded By, (t) = Taken By, (c) = Cosigned By      Initials Name Provider Type    MS Chey Hernandez, OT Occupational Therapist                   Clinical Impression       Row Name 02/27/25 1456          Pain Assessment    Pretreatment Pain Rating 0/10 - no pain  -MS     Posttreatment Pain Rating 0/10 - no pain  -MS       Row Name 02/27/25 1450          Plan of Care Review    Plan of Care Reviewed With patient  -MS     Progress no change  -MS     Outcome Evaluation Pt is a 75 y/o M admitted to [facility] 2/25/25 with generalized weakness, hematuria, vomiting, and tachycardic; hospital dx acute UTI and found to be septic. At baseline pt resides with spouse in 2nd floor apartment with elevator access. Pt typically (I) with ADLs and (I) with mobility with use of RW. Pt cleared for OT by nursing, oriented x4 on RA in supine upon arrival. Pt c/o no pain. Pt requiring max A x2 supine to sit, demo difficulty initiating movement and bradykinesia. Pt sitting edge of bed requiring min-mod A unsupported balance with lateral lean to L. Pt requiring max A x2 to come to standing with RW, demo forward flexed posture and poor balance requiring max A x2 to assist with static standing. Pt demo poor motor planning, unable to shift weight or take lateral steps towards L. Pt noted to have smear on brief when returned to supine, requiring total assist for brief change and perineal hygiene. Pt functioning far below baseline, demonstrating deficits with self care, functional mobility and balance, appears to be high risk for falls. OT recommending SNF when medically appropriate for dc, will follow within acute setting.  -MS       Row Name 02/27/25 1456          Therapy Assessment/Plan (OT)    Rehab Potential (OT) good  -MS     Criteria for Skilled Therapeutic Interventions Met (OT) yes;meets criteria;skilled treatment is necessary  -MS     Therapy Frequency (OT) 5 times/wk   -MS     Predicted Duration of Therapy Intervention (OT) until d/c  -MS       Row Name 02/27/25 1451          Therapy Plan Review/Discharge Plan (OT)    Anticipated Discharge Disposition (OT) skilled nursing facility  -MS       Row Name 02/27/25 1451          Vital Signs    Pre Systolic BP Rehab 136  -MS     Pre Treatment Diastolic BP 79  -MS     Pretreatment Heart Rate (beats/min) 103  -MS     Intratreatment Heart Rate (beats/min) 128  -MS     Pretreatment Resp Rate (breaths/min) 23  -MS     Pre SpO2 (%) 97  -MS     O2 Delivery Pre Treatment room air  -MS     O2 Delivery Intra Treatment room air  -MS     O2 Delivery Post Treatment room air  -MS     Pre Patient Position Supine  -MS     Intra Patient Position Standing  -MS     Post Patient Position Supine  -MS       Row Name 02/27/25 1451          Positioning and Restraints    Pre-Treatment Position in bed  -MS     Post Treatment Position bed  -MS     In Bed notified nsg;supine;call light within reach;encouraged to call for assist;exit alarm on  -MS               User Key  (r) = Recorded By, (t) = Taken By, (c) = Cosigned By      Initials Name Provider Type    Chey Loving, OT Occupational Therapist                   Outcome Measures       Row Name 02/27/25 1456          How much help from another is currently needed...    Putting on and taking off regular lower body clothing? 2  -MS     Bathing (including washing, rinsing, and drying) 2  -MS     Toileting (which includes using toilet bed pan or urinal) 2  -MS     Putting on and taking off regular upper body clothing 2  -MS     Taking care of personal grooming (such as brushing teeth) 3  -MS     Eating meals 3  -MS     AM-PAC 6 Clicks Score (OT) 14  -MS       Row Name 02/27/25 0800          How much help from another person do you currently need...    Turning from your back to your side while in flat bed without using bedrails? 3  -BT     Moving from lying on back to sitting on the side of a flat bed without  bedrails? 2  -BT     Moving to and from a bed to a chair (including a wheelchair)? 2  -BT     Standing up from a chair using your arms (e.g., wheelchair, bedside chair)? 2  -BT     Climbing 3-5 steps with a railing? 1  -BT     To walk in hospital room? 2  -BT     AM-PAC 6 Clicks Score (PT) 12  -BT       Row Name 02/27/25 1456          Functional Assessment    Outcome Measure Options AM-PAC 6 Clicks Daily Activity (OT)  -MS               User Key  (r) = Recorded By, (t) = Taken By, (c) = Cosigned By      Initials Name Provider Type    MS Chey Hernandez OT Occupational Therapist    Marissa Samson RN Registered Nurse                    Occupational Therapy Education       Title: PT OT SLP Therapies (Done)       Topic: Occupational Therapy (Done)       Point: ADL training (Done)       Description:   Instruct learner(s) on proper safety adaptation and remediation techniques during self care or transfers.   Instruct in proper use of assistive devices.                  Learning Progress Summary            Patient Acceptance, E,TB, VU by MS at 2/27/2025 1456                      Point: Precautions (Done)       Description:   Instruct learner(s) on prescribed precautions during self-care and functional transfers.                  Learning Progress Summary            Patient Acceptance, E,TB, VU by MS at 2/27/2025 1456                      Point: Body mechanics (Done)       Description:   Instruct learner(s) on proper positioning and spine alignment during self-care, functional mobility activities and/or exercises.                  Learning Progress Summary            Patient Acceptance, E,TB, VU by MS at 2/27/2025 1456                                      User Key       Initials Effective Dates Name Provider Type Discipline    MS 07/13/22 -  Chey Hernandez OT Occupational Therapist OT                  OT Recommendation and Plan  Planned Therapy Interventions (OT): activity tolerance training, adaptive equipment  training, BADL retraining, functional balance retraining, IADL retraining, neuromuscular control/coordination retraining, occupation/activity based interventions, passive ROM/stretching, patient/caregiver education/training, ROM/therapeutic exercise, strengthening exercise, transfer/mobility retraining  Therapy Frequency (OT): 5 times/wk  Plan of Care Review  Plan of Care Reviewed With: patient  Progress: no change  Outcome Evaluation: Pt is a 73 y/o M admitted to [facility] 2/25/25 with generalized weakness, hematuria, vomiting, and tachycardic; hospital dx acute UTI and found to be septic. At baseline pt resides with spouse in 2nd floor apartment with elevator access. Pt typically (I) with ADLs and (I) with mobility with use of RW. Pt cleared for OT by nursing, oriented x4 on RA in supine upon arrival. Pt c/o no pain. Pt requiring max A x2 supine to sit, demo difficulty initiating movement and bradykinesia. Pt sitting edge of bed requiring min-mod A unsupported balance with lateral lean to L. Pt requiring max A x2 to come to standing with RW, demo forward flexed posture and poor balance requiring max A x2 to assist with static standing. Pt demo poor motor planning, unable to shift weight or take lateral steps towards L. Pt noted to have smear on brief when returned to supine, requiring total assist for brief change and perineal hygiene. Pt functioning far below baseline, demonstrating deficits with self care, functional mobility and balance, appears to be high risk for falls. OT recommending SNF when medically appropriate for dc, will follow within acute setting.     Time Calculation:         Time Calculation- OT       Row Name 02/27/25 1456 02/27/25 1136          Time Calculation- OT    OT Start Time 1414  -MS --     OT Stop Time 1437  -MS --     OT Time Calculation (min) 23 min  -MS --     Total Timed Code Minutes- OT 0 minute(s)  -MS --     OT Received On 02/27/25  -MS --     OT - Next Appointment 02/28/25  -MS  02/28/25  -MS     OT Goal Re-Cert Due Date 03/13/25  -MS --               User Key  (r) = Recorded By, (t) = Taken By, (c) = Cosigned By      Initials Name Provider Type    Chey Loving OT Occupational Therapist                  Therapy Charges for Today       Code Description Service Date Service Provider Modifiers Qty    74046328831 HC OT EVAL MOD COMPLEXITY 4 2/27/2025 Chey Hernandez OT GO 1                 Chey Hernandez OT  2/27/2025

## 2025-02-27 NOTE — SIGNIFICANT NOTE
02/27/25 1136   OTHER   Discipline occupational therapist   Rehab Time/Intention   Session Not Performed other (see comments)  (THIEN for imaging, will follow up)   Recommendation   OT - Next Appointment 02/28/25

## 2025-02-27 NOTE — NURSING NOTE
"Notified MD of concerns from physical therapist who saw the patient this morning. Those concerns include \"patient is very slow to initiate movement and speech, he is requiring more help for ambulation, his right arm is very weak and he is leaning to the left.\" MD aware. Also notified MD of positive sepsis screening.    "

## 2025-02-27 NOTE — PLAN OF CARE
Goal Outcome Evaluation:                 Patient resting well this shift, no complaints or discomfort noted, call light in reach, potassium is low at 2.8, potassium IV given.

## 2025-02-27 NOTE — DISCHARGE PLACEMENT REQUEST
"Blas Mojica (74 y.o. Male)       Date of Birth   1950    Social Security Number       Address   [address] [place] [address]    Home Phone   [phone]    MRN   [id number]       Jew   Quaker    Marital Status                               Admission Date   2/25/25    Admission Type   Emergency    Admitting Provider   Daniel Nelson MD    Attending Provider   Shreyas Schultz MD    Department, Room/Bed   [facility] INPATIENT, 208/1       Discharge Date       Discharge Disposition       Discharge Destination                                 Attending Provider: Shreyas Schultz MD    Allergies: Contrast Dye (Echo Or Unknown Ct/mr), Iodinated Contrast Media, Iodine, Aspirin, Lipitor [Atorvastatin], Prednisone    Isolation: None   Infection: MRSA No Isolation this Admit (01/19/24)   Code Status: CPR    Ht: 177.8 cm (70\")   Wt: 89.3 kg (196 lb 13.9 oz)    Admission Cmt: None   Principal Problem: Acute UTI (urinary tract infection) [N39.0]                   Active Insurance as of 2/25/2025       Primary Coverage       Payor Plan Insurance Group Employer/Plan Group    MEDICARE MEDICARE A & B        Payor Plan Address Payor Plan Phone Number Payor Plan Fax Number Effective Dates    [address] [phone]  9/1/2014 - None Entered    [facility] [id number]         Subscriber Name Subscriber Birth Date Member ID       BLAS MOJICA 1950 [id number]               Secondary Coverage       Payor Plan Insurance Group Employer/Plan Group    HUMANA HUMANA [facility]              [id number]       Payor Plan Address Payor Plan Phone Number Payor Plan Fax Number Effective Dates    [address] [address]   9/1/2014 - None Entered    [facility] [address]         Subscriber Name Subscriber Birth Date Member ID       BLAS MOJICA 1950 [id number]                     Emergency Contacts        (Rel.) Home Phone Work Phone Mobile " Phone    NICK TREADWELL (Spouse) [phone] -- [phone]    JOE TREADWELL (Daughter) -- -- [phone]    Pau Clifton (Daughter) -- -- --    Carie Renee (Daughter) -- -- --

## 2025-02-27 NOTE — PLAN OF CARE
Goal Outcome Evaluation:              Outcome Evaluation: Pt is a 75 y/o male presenting to [facility] on 2/25 with reports of generalized malaise and fatigue x1 week. Wife reports that patient then since developed bloody urine that began yesterday.  Patient endorses suprapubic abdominal pain dysuria and urinary frequency and urgency changes. Diagnosed with UTI.   CMH of T2DM, HTN, HLD, PUD with h/o gastric ulcers, history of A-fib.  Pt A&Ox4, very hard of hearing. Pt reports PLOF of living in [facility] apartment at [facility] with elevator access, living with wife; reports PLOF Mod(I) with rollator for household/community mobility/ambulation. Pt no longer drives. Pt denies recent fall history.  At time of therapy eval, pt requires MAX A x2 with supine to sit, MOD/MAX A for sitting balance due to L lateral lean, MOD A x2 with sit to stand, MAX A x2 with Rwx for SPS with pt with L lateral/posterior lean, poor initiation, and poor weight shifting ability and limited LE advancement. Drastic functional decline discussed with nurse. Head CT post PT eval (-) for  acute intracranial abnormality; diffuse brain atrophy with chronic microvascular ischemic changes.Due to significant functional decline, fall risk, and balance impairment, follow-up SNF recommended at time of d/c from [facility].    Anticipated Discharge Disposition (PT): skilled nursing facility

## 2025-02-27 NOTE — CASE MANAGEMENT/SOCIAL WORK
Continued Stay Note  [facility]     Patient Name: Blas Mojica  MRN: [id number]  Today's Date: 2/27/2025    Admit Date: 2/25/2025    Plan: From [place] with spouse. Pending SNF choices from wife.   Discharge Plan       Row Name 02/27/25 1617       Plan    Plan From [place] with spouse. Pending SNF choices from wife.    Plan Comments CM called pt's wife to discuss SNF choices due to PT/OT recommendations. Wife is undecided on which facility at time of call and would like a CM follow-up tomorrow.                   Kristin Tinajero RN     Office phone: [phone]  Office fax: [phone]

## 2025-02-27 NOTE — PROGRESS NOTES
Patient Name: Blas Mojica  : 1950  MRN: [id number]  Primary Care Physician: Sahil Read MD  Date of admission: 2025    Patient Care Team:  Sahil Read MD as PCP - General        Subjective   Subjective:     Acute kidney injury patient is feeling better than before:  Review of systems:  All other review of system unremarkable      Allergies:    Allergies   Allergen Reactions    Contrast Dye (Echo Or Unknown Ct/Mr) Anaphylaxis    Iodinated Contrast Media Shortness Of Breath    Iodine Shortness Of Breath    Aspirin Nausea And Vomiting    Lipitor [Atorvastatin] Unknown - High Severity    Prednisone Hives       Objective   Exam:     Vital Signs  Temp:  [98.7 °F (37.1 °C)-100.5 °F (38.1 °C)] 98.7 °F (37.1 °C)  Heart Rate:  [] 98  Resp:  [16-22] 22  BP: (112-135)/(56-74) 135/70  SpO2:  [95 %-99 %] 98 %  on  Flow (L/min) (Oxygen Therapy):  [1-2] 2;   Device (Oxygen Therapy): room air  Body mass index is 28.25 kg/m².    General: Elderly white male in no acute distress.    Head:      Normocephalic and atraumatic.    Eyes:      PERRL/EOM intact, conjunctivae and sclerae clear without nystagmus.    Neck:      No masses, thyromegaly,  trachea central with normal respiratory effort   Lungs:    Clear bilaterally to auscultation.    Heart:      Regular rate and rhythm, no murmur no gallop  Abd:        Soft, nontender, not distended, bowel sounds positive, no shifting dullness   Pulses:   Pulses palpable  Extr:        No cyanosis or clubbing--mild edema.    Neuro:    No focal deficits.   alert oriented x3  Skin:       Intact without lesions or rashes.    Psych:    Alert and cooperative; normal mood and affect; .      Results Review:  I have personally reviewed most recent Data :  CBC    Results from last 7 days   Lab Units 25  2331 25  0433 25  1029   WBC 10*3/mm3 8.97 12.52* 11.49*   HEMOGLOBIN g/dL 9.6* 11.7* 11.8*   PLATELETS 10*3/mm3 79* 97* 109*     CMP   Results  from last 7 days   Lab Units 02/26/25  2331 02/26/25  0433 02/25/25  1029   SODIUM mmol/L 136 139 142   POTASSIUM mmol/L 2.8* 3.5 3.7   CHLORIDE mmol/L 103 103 105   CO2 mmol/L 21.5* 23.3 23.7   BUN mg/dL 44* 47* 49*   CREATININE mg/dL 2.42* 2.57* 2.25*   GLUCOSE mg/dL 182* 198* 125*   ALBUMIN g/dL 2.9*  --  3.9   BILIRUBIN mg/dL 0.5  --  0.7   ALK PHOS U/L 73  --  85   AST (SGOT) U/L 22  --  19   ALT (SGPT) U/L 10  --  12   LIPASE U/L  --   --  29     ABG      US Renal Bilateral    Result Date: 2/25/2025  Impression: No acute renal findings. No hydronephrosis. Electronically Signed: Isaiah Wheatley MD  2/25/2025 9:52 PM EST  Workstation ID: [id number]    XR Chest 1 View    Result Date: 2/25/2025  Impression: 1. Mild linear left basilar atelectasis. 2. Mild cardiomegaly. Electronically Signed: Vitor Pena MD  2/25/2025 11:06 AM EST  Workstation ID: [id number]    CT Abdomen Pelvis Without Contrast    Result Date: 2/25/2025  Impression: 1.No acute findings in the abdomen or pelvis. 2.Stable chronic ancillary findings as above Electronically Signed: Will Flores MD  2/25/2025 11:01 AM EST  Workstation ID: [id number]     Results for orders placed during the hospital encounter of 12/28/23    Adult Transthoracic Echo Complete w/ Color, Spectral and Contrast if Necessary Per Protocol    Interpretation Summary    Left ventricular systolic function is normal. Left ventricular ejection fraction appears to be 56 - 60%.    Left ventricular diastolic function is consistent with (grade I) impaired relaxation.    There is calcification of the aortic valve mainly affecting the left coronary cusp(s).    Estimated right ventricular systolic pressure from tricuspid regurgitation is mildly elevated (35-45 mmHg).    Scheduled Meds:cefTRIAXone, 2,000 mg, Intravenous, Q24H  clopidogrel, 75 mg, Oral, Daily  insulin glargine, 18 Units, Subcutaneous, Nightly  insulin lispro, 2-7 Units, Subcutaneous, 4x Daily AC & at Bedtime  [Held by provider]  lisinopril, 40 mg, Oral, Daily  [Held by provider] pioglitazone, 30 mg, Oral, Daily  rosuvastatin, 40 mg, Oral, Daily  [Held by provider] triamterene-hydrochlorothiazide, 1 tablet, Oral, Daily      Continuous Infusions:   PRN Meds:  acetaminophen **OR** acetaminophen **OR** acetaminophen    senna-docusate sodium **AND** polyethylene glycol **AND** bisacodyl **AND** bisacodyl    Calcium Replacement - Follow Nurse / BPA Driven Protocol    dextrose    dextrose    glucagon (human recombinant)    Magnesium Standard Dose Replacement - Follow Nurse / BPA Driven Protocol    melatonin    ondansetron ODT **OR** ondansetron    Phosphorus Replacement - Follow Nurse / BPA Driven Protocol    Potassium Replacement - Follow Nurse / BPA Driven Protocol    Assessment & Plan   Assessment and Plan:         Acute UTI (urinary tract infection)    Acute kidney injury    ASSESSMENT:  Acute kidney injury likely multifactorial including presence of bacteremia with some hematuria and proteinuria although renal ultrasound is unremarkable, worsening creatinine might be related to underlying bacteremia  Chronic kidney disease stage IIIa with most recent renal ultrasound right kidney 10.8 left kidney 10.2 cm  Proteinuria and hematuria need to be quantified still pending complements normal,  Complements normal, Bacteremia with Klebsiella pneumoniae  Urosepsis  History of diabetes mellitus type 2  History of hypertension  Congestive heart failure with grade 1 diastolic dysfunction last echo done in 2023 EF normal BNP level is elevated, 1340    PLAN :      Follow-up with a complete urine studies still pending  Complete serology, complements are normal still pending  Continue diuretics will increase dose  Control blood sugar aggressively  Follow-up with repeat labs  Discontinue IV fluid  Will start diuretics today  Will need a repeat echocardiogram and may be MIKIE  Replace potassium  Will not start Maxide at the time of discharge we will only continue  hydrochlorothiazide  ACE inhibitors once creatinine back to baseline okay to be started at the time of discharge            Electronically signed by Mark Handy MD,   [facility] kidney consultant  [phone]  2/27/2025  10:26 EST

## 2025-02-28 LAB
ALBUMIN SERPL ELPH-MCNC: 3.2 G/DL (ref 2.9–4.4)
ALBUMIN/GLOB SERPL: 1 {RATIO} (ref 0.7–1.7)
ALPHA1 GLOB SERPL ELPH-MCNC: 0.5 G/DL (ref 0–0.4)
ALPHA2 GLOB SERPL ELPH-MCNC: 1 G/DL (ref 0.4–1)
AMORPH URATE CRY URNS QL MICRO: ABNORMAL /HPF
ANA SER QL IF: NEGATIVE
ANION GAP SERPL CALCULATED.3IONS-SCNC: 12.2 MMOL/L (ref 5–15)
B-GLOBULIN SERPL ELPH-MCNC: 1 G/DL (ref 0.7–1.3)
BACTERIA SPEC AEROBE CULT: ABNORMAL
BACTERIA SPEC AEROBE CULT: ABNORMAL
BACTERIA UR QL AUTO: ABNORMAL /HPF
BASOPHILS # BLD AUTO: 0.01 10*3/MM3 (ref 0–0.2)
BASOPHILS NFR BLD AUTO: 0.2 % (ref 0–1.5)
BILIRUB UR QL STRIP: NEGATIVE
BUN SERPL-MCNC: 45 MG/DL (ref 8–23)
BUN/CREAT SERPL: 17.6 (ref 7–25)
CALCIUM SPEC-SCNC: 8.9 MG/DL (ref 8.6–10.5)
CHLORIDE SERPL-SCNC: 101 MMOL/L (ref 98–107)
CLARITY UR: ABNORMAL
CO2 SERPL-SCNC: 21.8 MMOL/L (ref 22–29)
COARSE GRAN CASTS URNS QL MICRO: ABNORMAL /LPF
COLOR UR: YELLOW
CREAT SERPL-MCNC: 2.55 MG/DL (ref 0.76–1.27)
CREAT UR-MCNC: 46 MG/DL
DEPRECATED RDW RBC AUTO: 45.2 FL (ref 37–54)
EGFRCR SERPLBLD CKD-EPI 2021: 25.5 ML/MIN/1.73
EOSINOPHIL # BLD AUTO: 0.08 10*3/MM3 (ref 0–0.4)
EOSINOPHIL NFR BLD AUTO: 1.5 % (ref 0.3–6.2)
ERYTHROCYTE [DISTWIDTH] IN BLOOD BY AUTOMATED COUNT: 13.2 % (ref 12.3–15.4)
GAMMA GLOB SERPL ELPH-MCNC: 1.1 G/DL (ref 0.4–1.8)
GLOBULIN SER-MCNC: 3.5 G/DL (ref 2.2–3.9)
GLUCOSE BLDC GLUCOMTR-MCNC: 286 MG/DL (ref 70–105)
GLUCOSE BLDC GLUCOMTR-MCNC: 303 MG/DL (ref 70–105)
GLUCOSE BLDC GLUCOMTR-MCNC: 333 MG/DL (ref 70–105)
GLUCOSE BLDC GLUCOMTR-MCNC: 345 MG/DL (ref 70–105)
GLUCOSE BLDC GLUCOMTR-MCNC: 388 MG/DL (ref 70–105)
GLUCOSE BLDC GLUCOMTR-MCNC: 405 MG/DL (ref 70–105)
GLUCOSE BLDC GLUCOMTR-MCNC: 409 MG/DL (ref 70–105)
GLUCOSE SERPL-MCNC: 374 MG/DL (ref 65–99)
GLUCOSE UR STRIP-MCNC: ABNORMAL MG/DL
GRAM STN SPEC: ABNORMAL
GRAN CASTS URNS QL MICRO: ABNORMAL /LPF
HCT VFR BLD AUTO: 32.4 % (ref 37.5–51)
HGB BLD-MCNC: 10.3 G/DL (ref 13–17.7)
HGB UR QL STRIP.AUTO: ABNORMAL
HYALINE CASTS UR QL AUTO: ABNORMAL /LPF
IGA SERPL-MCNC: 315 MG/DL (ref 61–437)
IGG SERPL-MCNC: 1178 MG/DL (ref 603–1613)
IGM SERPL-MCNC: 89 MG/DL (ref 15–143)
IMM GRANULOCYTES # BLD AUTO: 0.02 10*3/MM3 (ref 0–0.05)
IMM GRANULOCYTES NFR BLD AUTO: 0.4 % (ref 0–0.5)
INTERPRETATION SERPL IEP-IMP: ABNORMAL
ISOLATED FROM: ABNORMAL
ISOLATED FROM: ABNORMAL
KETONES UR QL STRIP: NEGATIVE
LABORATORY COMMENT REPORT: ABNORMAL
LABORATORY COMMENT REPORT: NORMAL
LEUKOCYTE ESTERASE UR QL STRIP.AUTO: ABNORMAL
LYMPHOCYTES # BLD AUTO: 0.63 10*3/MM3 (ref 0.7–3.1)
LYMPHOCYTES NFR BLD AUTO: 11.5 % (ref 19.6–45.3)
M PROTEIN SERPL ELPH-MCNC: ABNORMAL G/DL
MCH RBC QN AUTO: 29.6 PG (ref 26.6–33)
MCHC RBC AUTO-ENTMCNC: 31.8 G/DL (ref 31.5–35.7)
MCV RBC AUTO: 93.1 FL (ref 79–97)
MONOCYTES # BLD AUTO: 0.42 10*3/MM3 (ref 0.1–0.9)
MONOCYTES NFR BLD AUTO: 7.7 % (ref 5–12)
NEUTROPHILS NFR BLD AUTO: 4.31 10*3/MM3 (ref 1.7–7)
NEUTROPHILS NFR BLD AUTO: 78.7 % (ref 42.7–76)
NITRITE UR QL STRIP: NEGATIVE
NRBC BLD AUTO-RTO: 0 /100 WBC (ref 0–0.2)
OSMOLALITY UR: 367 MOSM/KG (ref 300–800)
PH UR STRIP.AUTO: <=5 [PH] (ref 5–8)
PLATELET # BLD AUTO: 97 10*3/MM3 (ref 140–450)
PMV BLD AUTO: 11.3 FL (ref 6–12)
POTASSIUM SERPL-SCNC: 3 MMOL/L (ref 3.5–5.2)
PROT ?TM UR-MCNC: 41.7 MG/DL
PROT SERPL-MCNC: 6.7 G/DL (ref 6–8.5)
PROT UR QL STRIP: ABNORMAL
RBC # BLD AUTO: 3.48 10*6/MM3 (ref 4.14–5.8)
RBC # UR STRIP: ABNORMAL /HPF
REF LAB TEST METHOD: ABNORMAL
SODIUM SERPL-SCNC: 135 MMOL/L (ref 136–145)
SODIUM UR-SCNC: 38 MMOL/L
SP GR UR STRIP: 1.01 (ref 1–1.03)
SQUAMOUS #/AREA URNS HPF: ABNORMAL /HPF
UROBILINOGEN UR QL STRIP: ABNORMAL
WBC # UR STRIP: ABNORMAL /HPF
WBC NRBC COR # BLD AUTO: 5.47 10*3/MM3 (ref 3.4–10.8)

## 2025-02-28 PROCEDURE — 80048 BASIC METABOLIC PNL TOTAL CA: CPT | Performed by: STUDENT IN AN ORGANIZED HEALTH CARE EDUCATION/TRAINING PROGRAM

## 2025-02-28 PROCEDURE — 82570 ASSAY OF URINE CREATININE: CPT | Performed by: INTERNAL MEDICINE

## 2025-02-28 PROCEDURE — 81001 URINALYSIS AUTO W/SCOPE: CPT | Performed by: INTERNAL MEDICINE

## 2025-02-28 PROCEDURE — 84132 ASSAY OF SERUM POTASSIUM: CPT | Performed by: STUDENT IN AN ORGANIZED HEALTH CARE EDUCATION/TRAINING PROGRAM

## 2025-02-28 PROCEDURE — 83935 ASSAY OF URINE OSMOLALITY: CPT | Performed by: INTERNAL MEDICINE

## 2025-02-28 PROCEDURE — 82948 REAGENT STRIP/BLOOD GLUCOSE: CPT

## 2025-02-28 PROCEDURE — 84156 ASSAY OF PROTEIN URINE: CPT | Performed by: INTERNAL MEDICINE

## 2025-02-28 PROCEDURE — 63710000001 INSULIN LISPRO (HUMAN) PER 5 UNITS

## 2025-02-28 PROCEDURE — 63710000001 INSULIN GLARGINE PER 5 UNITS: Performed by: STUDENT IN AN ORGANIZED HEALTH CARE EDUCATION/TRAINING PROGRAM

## 2025-02-28 PROCEDURE — 84300 ASSAY OF URINE SODIUM: CPT | Performed by: INTERNAL MEDICINE

## 2025-02-28 PROCEDURE — 25010000002 CEFTRIAXONE PER 250 MG: Performed by: NURSE PRACTITIONER

## 2025-02-28 PROCEDURE — 85025 COMPLETE CBC W/AUTO DIFF WBC: CPT | Performed by: STUDENT IN AN ORGANIZED HEALTH CARE EDUCATION/TRAINING PROGRAM

## 2025-02-28 RX ORDER — LEVOFLOXACIN 750 MG/1
750 TABLET, FILM COATED ORAL EVERY OTHER DAY
Status: DISCONTINUED | OUTPATIENT
Start: 2025-03-01 | End: 2025-03-03 | Stop reason: HOSPADM

## 2025-02-28 RX ORDER — BUMETANIDE 1 MG/1
1 TABLET ORAL
Status: DISCONTINUED | OUTPATIENT
Start: 2025-02-28 | End: 2025-03-03 | Stop reason: HOSPADM

## 2025-02-28 RX ORDER — POTASSIUM CHLORIDE 1500 MG/1
20 TABLET, EXTENDED RELEASE ORAL ONCE
Status: COMPLETED | OUTPATIENT
Start: 2025-02-28 | End: 2025-02-28

## 2025-02-28 RX ORDER — POTASSIUM CHLORIDE 1500 MG/1
40 TABLET, EXTENDED RELEASE ORAL ONCE
Status: COMPLETED | OUTPATIENT
Start: 2025-02-28 | End: 2025-02-28

## 2025-02-28 RX ORDER — INSULIN GLARGINE 100 [IU]/ML
30 INJECTION, SOLUTION SUBCUTANEOUS NIGHTLY
Status: DISCONTINUED | OUTPATIENT
Start: 2025-02-28 | End: 2025-03-03 | Stop reason: HOSPADM

## 2025-02-28 RX ADMIN — BUMETANIDE 1 MG: 1 TABLET ORAL at 17:00

## 2025-02-28 RX ADMIN — INSULIN LISPRO 7 UNITS: 100 INJECTION, SOLUTION INTRAVENOUS; SUBCUTANEOUS at 20:15

## 2025-02-28 RX ADMIN — INSULIN LISPRO 6 UNITS: 100 INJECTION, SOLUTION INTRAVENOUS; SUBCUTANEOUS at 16:56

## 2025-02-28 RX ADMIN — POTASSIUM CHLORIDE 40 MEQ: 1500 TABLET, EXTENDED RELEASE ORAL at 05:40

## 2025-02-28 RX ADMIN — POTASSIUM CHLORIDE 40 MEQ: 1500 TABLET, EXTENDED RELEASE ORAL at 22:02

## 2025-02-28 RX ADMIN — CEFTRIAXONE SODIUM 2000 MG: 2 INJECTION, POWDER, FOR SOLUTION INTRAMUSCULAR; INTRAVENOUS at 08:55

## 2025-02-28 RX ADMIN — INSULIN LISPRO 5 UNITS: 100 INJECTION, SOLUTION INTRAVENOUS; SUBCUTANEOUS at 10:40

## 2025-02-28 RX ADMIN — ROSUVASTATIN CALCIUM 40 MG: 10 TABLET, FILM COATED ORAL at 08:54

## 2025-02-28 RX ADMIN — HYDROCHLOROTHIAZIDE 12.5 MG: 12.5 TABLET ORAL at 08:54

## 2025-02-28 RX ADMIN — ACETAMINOPHEN 650 MG: 325 TABLET, FILM COATED ORAL at 08:54

## 2025-02-28 RX ADMIN — CLOPIDOGREL BISULFATE 75 MG: 75 TABLET ORAL at 08:54

## 2025-02-28 RX ADMIN — BUMETANIDE 1 MG: 1 TABLET ORAL at 10:40

## 2025-02-28 RX ADMIN — POTASSIUM CHLORIDE 20 MEQ: 1500 TABLET, EXTENDED RELEASE ORAL at 12:22

## 2025-02-28 RX ADMIN — INSULIN GLARGINE 30 UNITS: 100 INJECTION, SOLUTION SUBCUTANEOUS at 20:15

## 2025-02-28 RX ADMIN — INSULIN LISPRO 5 UNITS: 100 INJECTION, SOLUTION INTRAVENOUS; SUBCUTANEOUS at 08:55

## 2025-02-28 NOTE — PLAN OF CARE
Problem: Skin Injury Risk Increased  Goal: Skin Health and Integrity  Outcome: Progressing  Intervention: Optimize Skin Protection  Recent Flowsheet Documentation  Taken 2/28/2025 0845 by Marissa Caballero RN  Activity Management: activity encouraged  Pressure Reduction Techniques: frequent weight shift encouraged  Head of Bed (HOB) Positioning: HOB elevated  Pressure Reduction Devices: pressure-redistributing mattress utilized  Skin Protection: transparent dressing maintained     Problem: Fall Injury Risk  Goal: Absence of Fall and Fall-Related Injury  Outcome: Progressing  Intervention: Identify and Manage Contributors  Recent Flowsheet Documentation  Taken 2/28/2025 0845 by Marissa Caballero RN  Medication Review/Management: medications reviewed  Self-Care Promotion: independence encouraged  Intervention: Promote Injury-Free Environment  Recent Flowsheet Documentation  Taken 2/28/2025 1000 by Marissa Caballero RN  Safety Promotion/Fall Prevention:   fall prevention program maintained   safety round/check completed  Taken 2/28/2025 0845 by Marissa Caballero RN  Safety Promotion/Fall Prevention:   fall prevention program maintained   safety round/check completed     Problem: Sepsis/Septic Shock  Goal: Optimal Coping  Outcome: Progressing  Intervention: Support Patient and Family Response  Recent Flowsheet Documentation  Taken 2/28/2025 0845 by Marissa Caballero RN  Family/Support System Care: self-care encouraged  Goal: Absence of Bleeding  Outcome: Progressing  Goal: Blood Glucose Level Within Target Range  Outcome: Progressing  Intervention: Optimize Glycemic Control  Recent Flowsheet Documentation  Taken 2/28/2025 0845 by Marissa Caballero RN  Hyperglycemia Management:   blood glucose monitored   correctional insulin given  Hypoglycemia Management: blood glucose monitored  Goal: Absence of Infection Signs and Symptoms  Outcome: Progressing  Intervention: Initiate Sepsis Management  Recent Flowsheet  Documentation  Taken 2/28/2025 0845 by Marissa Caballero, RN  Infection Prevention: single patient room provided  Intervention: Promote Recovery  Recent Flowsheet Documentation  Taken 2/28/2025 0845 by Marissa Caballero, RN  Activity Management: activity encouraged  Goal: Optimal Nutrition Delivery  Outcome: Progressing   Goal Outcome Evaluation:   Patient had a temperature of 100.4 this morning and he was given tylenol. The recheck was 99.4. He has been resting comfortably today. Wife was at bedside for part of the shift. His potassium is being replaced today. A urine was collected. No concerns and call light within reach.

## 2025-02-28 NOTE — PROGRESS NOTES
Infectious Diseases Progress Note      LOS: 2 days   Patient Care Team:  Sahil Read MD as PCP - General    Chief Complaint: Fatigue, weakness, suprapubic pain, dysuria frequent fever and chills at admission    Subjective       The patient has been afebrile for the last 24 hours.  The patient is on room air, hemodynamically stable, and is tolerating antimicrobial therapy.  No new complaint      Review of Systems:   Review of Systems   Constitutional:  Positive for chills and fatigue.   HENT: Negative.     Eyes: Negative.    Respiratory: Negative.     Cardiovascular: Negative.    Gastrointestinal:  Positive for abdominal pain.        Improved   Endocrine: Negative.    Genitourinary:  Positive for dysuria and frequency.        Improved   Musculoskeletal: Negative.    Skin: Negative.    Neurological:  Positive for weakness.   Psychiatric/Behavioral: Negative.     All other systems reviewed and are negative.       Objective     Vital Signs  Temp:  [97.9 °F (36.6 °C)-100.4 °F (38 °C)] 97.9 °F (36.6 °C)  Heart Rate:  [] 95  Resp:  [18-26] 18  BP: (126-160)/(70-83) 126/72    Physical Exam:  Physical Exam  Vitals and nursing note reviewed.   Constitutional:       General: He is not in acute distress.     Appearance: He is well-developed and normal weight. He is ill-appearing. He is not diaphoretic.   HENT:      Head: Normocephalic and atraumatic.   Eyes:      Conjunctiva/sclera: Conjunctivae normal.      Pupils: Pupils are equal, round, and reactive to light.   Cardiovascular:      Rate and Rhythm: Normal rate and regular rhythm.      Heart sounds: Normal heart sounds, S1 normal and S2 normal.   Pulmonary:      Effort: Pulmonary effort is normal. No respiratory distress.      Breath sounds: Normal breath sounds. No stridor. No wheezing or rales.   Abdominal:      General: Bowel sounds are normal. There is no distension.      Palpations: Abdomen is soft. There is no mass.      Tenderness: There is no abdominal  tenderness. There is no guarding.   Musculoskeletal:         General: No deformity.      Cervical back: Neck supple.   Skin:     General: Skin is warm and dry.      Coloration: Skin is not pale.      Findings: No erythema or rash.   Neurological:      Mental Status: He is alert and oriented to person, place, and time.      Motor: Weakness present.          Results Review:    I have reviewed all clinical data, test, lab, and imaging results.     Radiology  No Radiology Exams Resulted Within Past 24 Hours    Cardiology    Laboratory    Results from last 7 days   Lab Units 02/28/25  0248 02/26/25  2331 02/26/25  0433 02/25/25  1029   WBC 10*3/mm3 5.47 8.97 12.52* 11.49*   HEMOGLOBIN g/dL 10.3* 9.6* 11.7* 11.8*   HEMATOCRIT % 32.4* 30.6* 36.9* 36.7*   PLATELETS 10*3/mm3 97* 79* 97* 109*     Results from last 7 days   Lab Units 02/28/25  1031 02/28/25  0248 02/27/25  1218 02/26/25  2331 02/26/25  1129 02/26/25  0433 02/25/25  1029   SODIUM mmol/L  --  135*  --  136  --  139 142   POTASSIUM mmol/L 3.0* 3.0* 3.3* 2.8*  --  3.5 3.7   CHLORIDE mmol/L  --  101  --  103  --  103 105   CO2 mmol/L  --  21.8*  --  21.5*  --  23.3 23.7   BUN mg/dL  --  45*  --  44*  --  47* 49*   CREATININE mg/dL  --  2.55*  --  2.42*  --  2.57* 2.25*   GLUCOSE mg/dL  --  374*  --  182*  --  198* 125*   ALBUMIN g/dL  --   --   --  2.9* 3.2  --  3.9   BILIRUBIN mg/dL  --   --   --  0.5  --   --  0.7   ALK PHOS U/L  --   --   --  73  --   --  85   AST (SGOT) U/L  --   --   --  22  --   --  19   ALT (SGPT) U/L  --   --   --  10  --   --  12   LIPASE U/L  --   --   --   --   --   --  29   CALCIUM mg/dL  --  8.9  --  8.3*  --  9.0 9.4     Results from last 7 days   Lab Units 02/26/25  1025   CK TOTAL U/L 103             Microbiology   Microbiology Results (last 10 days)       Procedure Component Value - Date/Time    Urine Culture - Urine, Straight Cath [[id number]]  (Abnormal)  (Susceptibility) Collected: 02/25/25 1226    Lab Status: Final result  Specimen: Urine from Straight Cath Updated: 02/27/25 0906     Urine Culture >100,000 CFU/mL Klebsiella pneumoniae ssp pneumoniae    Narrative:      Colonization of the urinary tract without infection is common. Treatment is discouraged unless the patient is symptomatic, pregnant, or undergoing an invasive urologic procedure.    Susceptibility        Klebsiella pneumoniae ssp pneumoniae      HARRY      Amoxicillin + Clavulanate Susceptible      Ampicillin Resistant      Ampicillin + Sulbactam Susceptible      Cefazolin (Urine) Susceptible      Cefepime Susceptible      Ceftazidime Susceptible      Ceftriaxone Susceptible      Gentamicin Susceptible      Levofloxacin Susceptible      Nitrofurantoin Intermediate      Piperacillin + Tazobactam Susceptible      Trimethoprim + Sulfamethoxazole Susceptible                           Respiratory Panel PCR w/COVID-19(SARS-CoV-2) KEVIN/HORTENSIA/SLOAN/PAD/COR/JOHNNY In-House, NP Swab in UTM/VTM, 2 HR TAT - Swab, Nasopharynx [[id number]]  (Normal) Collected: 02/25/25 1037    Lab Status: Final result Specimen: Swab from Nasopharynx Updated: 02/25/25 1143     ADENOVIRUS, PCR Not Detected     Coronavirus 229E Not Detected     Coronavirus HKU1 Not Detected     Coronavirus NL63 Not Detected     Coronavirus OC43 Not Detected     COVID19 Not Detected     Human Metapneumovirus Not Detected     Human Rhinovirus/Enterovirus Not Detected     Influenza A PCR Not Detected     Influenza B PCR Not Detected     Parainfluenza Virus 1 Not Detected     Parainfluenza Virus 2 Not Detected     Parainfluenza Virus 3 Not Detected     Parainfluenza Virus 4 Not Detected     RSV, PCR Not Detected     Bordetella pertussis pcr Not Detected     Bordetella parapertussis PCR Not Detected     Chlamydophila pneumoniae PCR Not Detected     Mycoplasma pneumo by PCR Not Detected    Narrative:      In the setting of a positive respiratory panel with a viral infection PLUS a negative procalcitonin without other underlying concern  for bacterial infection, consider observing off antibiotics or discontinuation of antibiotics and continue supportive care. If the respiratory panel is positive for atypical bacterial infection (Bordetella pertussis, Chlamydophila pneumoniae, or Mycoplasma pneumoniae), consider antibiotic de-escalation to target atypical bacterial infection.    Blood Culture - Blood, Arm, Left [[id number]]  (Abnormal) Collected: 02/25/25 1029    Lab Status: Final result Specimen: Blood from Arm, Left Updated: 02/28/25 0616     Blood Culture Klebsiella pneumoniae ssp pneumoniae     Isolated from Aerobic Bottle     Gram Stain Aerobic Bottle Gram negative bacilli    Narrative:      Refer to previous blood culture collected on 02/25/2025 1024 for MICs    Less than seven (7) mL's of blood was collected.  Insufficient quantity may yield false negative results.    Blood Culture - Blood, Arm, Right [[id number]]  (Abnormal)  (Susceptibility) Collected: 02/25/25 1024    Lab Status: Final result Specimen: Blood from Arm, Right Updated: 02/28/25 0616     Blood Culture Klebsiella pneumoniae ssp pneumoniae     Isolated from Aerobic and Anaerobic Bottles     Gram Stain Anaerobic Bottle Gram negative bacilli, tiny      Aerobic Bottle Gram negative bacilli    Susceptibility        Klebsiella pneumoniae ssp pneumoniae      HARRY      Amoxicillin + Clavulanate Susceptible      Ampicillin Resistant      Ampicillin + Sulbactam Susceptible      Cefazolin (Non Urine) Susceptible      Cefepime Susceptible      Ceftazidime Susceptible      Ceftriaxone Susceptible      Gentamicin Susceptible      Levofloxacin Susceptible      Piperacillin + Tazobactam Susceptible      Trimethoprim + Sulfamethoxazole Susceptible                       Susceptibility Comments       Klebsiella pneumoniae ssp pneumoniae    With the exception of urinary-sourced infections, aminoglycosides should not be used as monotherapy.               Blood Culture ID, PCR - Blood, Arm, Right  [[id number]]  (Abnormal) Collected: 02/25/25 1024    Lab Status: Final result Specimen: Blood from Arm, Right Updated: 02/26/25 0412     BCID, PCR Klebsiella pneumoniae group. Identification by BCID2 PCR.     BOTTLE TYPE Anaerobic Bottle    Narrative:      No resistance genes detected.            Medication Review:       Schedule Meds  bumetanide, 1 mg, Oral, BID Diuretics  cefTRIAXone, 2,000 mg, Intravenous, Q24H  clopidogrel, 75 mg, Oral, Daily  insulin glargine, 30 Units, Subcutaneous, Nightly  insulin lispro, 2-7 Units, Subcutaneous, 4x Daily AC & at Bedtime  [Held by provider] lisinopril, 40 mg, Oral, Daily  [Held by provider] pioglitazone, 30 mg, Oral, Daily  rosuvastatin, 40 mg, Oral, Daily        Infusion Meds       PRN Meds    acetaminophen **OR** acetaminophen **OR** acetaminophen    senna-docusate sodium **AND** polyethylene glycol **AND** bisacodyl **AND** bisacodyl    Calcium Replacement - Follow Nurse / BPA Driven Protocol    dextrose    dextrose    glucagon (human recombinant)    Magnesium Standard Dose Replacement - Follow Nurse / BPA Driven Protocol    melatonin    ondansetron ODT **OR** ondansetron    Phosphorus Replacement - Follow Nurse / BPA Driven Protocol    Potassium Replacement - Follow Nurse / BPA Driven Protocol        Assessment & Plan       Antimicrobial Therapy   1.  P.o. Levaquin        2.        3.        4.        5.            Assessment     Klebsiella pneumoniae bacteremia.  secondary to complicated UTI.     Urinary tract infection with pyuria and bacteriuria with cultures growing Klebsiella pneumoniae.  CT of the abdomen and pelvis did not show any obstructive uropathy.  Patient endorsed hematuria at admission-possibly passed a stone.  Does have a history of nephrolithiasis     Febrile illness-likely related to the above.       Acute kidney injury on chronic kidney disease-nephrology following     Type 2 diabetes-A1c is 8.52     Plan     Discontinue IV ceftriaxone   Tomorrow, start  p.o. Levaquin 750 mg every other day for 10 days for 14 days of treatment  QTc interval was appropriate for quinolone use  Continue supportive care  Okay to discharge from Infectious Disease standpoint  Not much more to add from infectious disease standpoint-we will sign off at this time-please call with any questions.        Alexa Reid, APRN  02/28/25  13:27 EST    Note is dictated utilizing voice recognition software/Dragon

## 2025-02-28 NOTE — PROGRESS NOTES
[facility] MEDICINE SERVICE  DAILY PROGRESS NOTE    NAME: Blas Mojica  : 1950  MRN: [id number]      LOS: 2 days     PROVIDER OF SERVICE: Shreyas Schultz MD    Chief Complaint: Acute UTI (urinary tract infection)    Subjective:     Interval History:  History taken from: patient    Denies any new complaints.         Review of Systems:   Review of Systems   Constitutional:  Negative for chills and fever.   HENT:  Negative for congestion, ear discharge, ear pain, sinus pain and sore throat.    Respiratory:  Negative for apnea, cough, chest tightness, shortness of breath and wheezing.    Cardiovascular:  Negative for chest pain and palpitations.   Gastrointestinal:  Negative for abdominal pain, blood in stool, constipation, diarrhea, nausea and vomiting.   Endocrine: Negative for cold intolerance and heat intolerance.   Genitourinary:  Negative for dysuria, flank pain, hematuria and urgency.   Musculoskeletal:  Negative for arthralgias, back pain, joint swelling, myalgias and neck pain.   Skin: Negative.    Neurological: Negative.    Hematological:  Does not bruise/bleed easily.   Psychiatric/Behavioral:  Negative for agitation, confusion, hallucinations, sleep disturbance and suicidal ideas. The patient is not nervous/anxious.        Objective:     Vital Signs  Temp:  [98.2 °F (36.8 °C)-99.8 °F (37.7 °C)] 98.2 °F (36.8 °C)  Heart Rate:  [] 107  Resp:  [18-26] 26  BP: (134-160)/(70-83) 139/70  Flow (L/min) (Oxygen Therapy):  [2] 2   Body mass index is 28.25 kg/m².    Physical Exam  General: Alert and oriented, no acute distress.  Lungs: Clear to auscultation, nonlabored respiration.  Heart: RRR, no murmur, gallop or edema.  Abdomen: Soft, nontender, nondistended, + bowel sounds.  Neuro: alert and awake, moving all 4 extremities   Skin: Skin is warm, dry and pink, no rashes or lesions.  Psychiatric: Cooperative, appropriate mood and affect.           Diagnostic Data    Results from last  7 days   Lab Units 02/28/25  0248 02/27/25  1218 02/26/25  2331   WBC 10*3/mm3 5.47  --  8.97   HEMOGLOBIN g/dL 10.3*  --  9.6*   HEMATOCRIT % 32.4*  --  30.6*   PLATELETS 10*3/mm3 97*  --  79*   GLUCOSE mg/dL 374*  --  182*   CREATININE mg/dL 2.55*  --  2.42*   BUN mg/dL 45*  --  44*   SODIUM mmol/L 135*  --  136   POTASSIUM mmol/L 3.0*   < > 2.8*   AST (SGOT) U/L  --   --  22   ALT (SGPT) U/L  --   --  10   ALK PHOS U/L  --   --  73   BILIRUBIN mg/dL  --   --  0.5   ANION GAP mmol/L 12.2  --  11.5    < > = values in this interval not displayed.       CT Head Without Contrast    Result Date: 2/27/2025  Impression: 1.No acute intracranial abnormality identified. 2.Diffuse brain atrophy with chronic microvascular ischemic changes 3.Mild amount of fluid in the right mastoids. Electronically Signed: Will Flores MD  2/27/2025 11:29 AM EST  Workstation ID: [id number]       I reviewed the patient's new clinical results.    Assessment/Plan:     Active and Resolved Problems  Active Hospital Problems    Diagnosis  POA    **Acute UTI (urinary tract infection) [N39.0]  Unknown    Acute kidney injury [N17.9]  Yes      Resolved Hospital Problems   No resolved problems to display.       Acute kidney injury superimposed CKD 3  Likely multifactorial including insetting of bacteremia   Renal following  Monitor BMP  Avoid nephrotoxic drugs   Cr is trending up      Bacteremia  UTI  ID recommendations appreciated  Continue IV ceftriaxone 2 g every 24 hours  Will need 2 weeks of antimicrobial therapy from  Waiting on blood  cultures to finalize  likely switch to p.o. Levaquin 750 every other day today for 2 weeks of treatment    Diabetes mellitus type 2-  increase Lantus for better control  SSI  Increase insulin glargine to 18-->30 units nightly  Monitor POC glucose     Hypertension- on HCTZ 12.5 mg  Hyperlipidemia-continue statin    Monitor and replace goal K>4, Mg>2 and PO4>3.5    VTE Prophylaxis:  Mechanical VTE prophylaxis orders  are present.             Disposition Planning:     Barriers to Discharge:nephrology work up, response to treatment  Anticipated Date of Discharge: 2-3 days  Place of Discharge: pending course      Time: 35 minutes     Code Status and Medical Interventions: CPR (Attempt to Resuscitate); Full Support   Ordered at: 02/25/25 1559     Code Status (Patient has no pulse and is not breathing):    CPR (Attempt to Resuscitate)     Medical Interventions (Patient has pulse or is breathing):    Full Support       Signature: Electronically signed by Shreyas Schultz MD, 02/28/25, 07:05 EST.  [facility] Hospitalist Team

## 2025-02-28 NOTE — CASE MANAGEMENT/SOCIAL WORK
Continued Stay Note   Farhan     Patient Name: Blas Mojica  MRN: [id number]  Today's Date: 2/28/2025    Admit Date: 2/25/2025    Plan: From [place] w/sp[place]. Referral to [facility] (pending) No precert. PASRR requested.   Discharge Plan       Row Name 02/28/25 1336       Plan    Plan From [place] w/sp[place]. Referral to [facility] (pending) No precert. PASRR requested.    Patient/Family in Agreement with Plan yes    Plan Comments CM called wife to disuss SNF options. She is refusing SNF. Very persistent on sending patient to [facility]. CM discussed with supervisor, JAS Romero, and RN. Referral made to [facility], pending. [facility] to eval today. CM to follow up.             Gayatri Trivedi RN     Office: [phone]

## 2025-02-28 NOTE — PLAN OF CARE
Goal Outcome Evaluation:               Patient has been resting comfortable this shift, no complaints of pain or discomfort. Patient A+O x4, potassium low this morning at 3.0, electrolytes protocol has been ordered.

## 2025-02-28 NOTE — PROGRESS NOTES
Patient Name: Blas Mojica  : 1950  MRN: [id number]  Primary Care Physician: Sahil Read MD  Date of admission: 2025    Patient Care Team:  Saihl Read MD as PCP - General        Subjective   Subjective:     Acute kidney injury patient is feeling better than before:  Review of systems:  All other review of system unremarkable      Allergies:    Allergies   Allergen Reactions    Contrast Dye (Echo Or Unknown Ct/Mr) Anaphylaxis    Iodinated Contrast Media Shortness Of Breath    Iodine Shortness Of Breath    Aspirin Nausea And Vomiting    Lipitor [Atorvastatin] Unknown - High Severity    Prednisone Hives       Objective   Exam:     Vital Signs  Temp:  [98.2 °F (36.8 °C)-100.4 °F (38 °C)] 100.4 °F (38 °C)  Heart Rate:  [] 104  Resp:  [18-26] 26  BP: (134-160)/(70-83) 153/83  SpO2:  [93 %-95 %] 95 %  on  Flow (L/min) (Oxygen Therapy):  [2] 2;   Device (Oxygen Therapy): room air  Body mass index is 28.25 kg/m².    General: Elderly white male in no acute distress.    Head:      Normocephalic and atraumatic.    Eyes:      PERRL/EOM intact, conjunctivae and sclerae clear without nystagmus.    Neck:      No masses, thyromegaly,  trachea central with normal respiratory effort   Lungs:    Clear bilaterally to auscultation.    Heart:      Regular rate and rhythm, no murmur no gallop  Abd:        Soft, nontender, not distended, bowel sounds positive, no shifting dullness   Pulses:   Pulses palpable  Extr:        No cyanosis or clubbing--mild edema.    Neuro:    No focal deficits.   alert oriented x3  Skin:       Intact without lesions or rashes.    Psych:    Alert and cooperative; normal mood and affect; .      Results Review:  I have personally reviewed most recent Data :  CBC    Results from last 7 days   Lab Units 25  0248 25  2331 25  0433 25  1029   WBC 10*3/mm3 5.47 8.97 12.52* 11.49*   HEMOGLOBIN g/dL 10.3* 9.6* 11.7* 11.8*   PLATELETS 10*3/mm3 97* 79*  97* 109*     CMP   Results from last 7 days   Lab Units 02/28/25  0248 02/27/25  1218 02/26/25  2331 02/26/25  0433 02/25/25  1029   SODIUM mmol/L 135*  --  136 139 142   POTASSIUM mmol/L 3.0* 3.3* 2.8* 3.5 3.7   CHLORIDE mmol/L 101  --  103 103 105   CO2 mmol/L 21.8*  --  21.5* 23.3 23.7   BUN mg/dL 45*  --  44* 47* 49*   CREATININE mg/dL 2.55*  --  2.42* 2.57* 2.25*   GLUCOSE mg/dL 374*  --  182* 198* 125*   ALBUMIN g/dL  --   --  2.9*  --  3.9   BILIRUBIN mg/dL  --   --  0.5  --  0.7   ALK PHOS U/L  --   --  73  --  85   AST (SGOT) U/L  --   --  22  --  19   ALT (SGPT) U/L  --   --  10  --  12   LIPASE U/L  --   --   --   --  29     ABG      CT Head Without Contrast    Result Date: 2/27/2025  Impression: 1.No acute intracranial abnormality identified. 2.Diffuse brain atrophy with chronic microvascular ischemic changes 3.Mild amount of fluid in the right mastoids. Electronically Signed: Will Flores MD  2/27/2025 11:29 AM EST  Workstation ID: [id number]     Results for orders placed during the hospital encounter of 12/28/23    Adult Transthoracic Echo Complete w/ Color, Spectral and Contrast if Necessary Per Protocol    Interpretation Summary    Left ventricular systolic function is normal. Left ventricular ejection fraction appears to be 56 - 60%.    Left ventricular diastolic function is consistent with (grade I) impaired relaxation.    There is calcification of the aortic valve mainly affecting the left coronary cusp(s).    Estimated right ventricular systolic pressure from tricuspid regurgitation is mildly elevated (35-45 mmHg).    Scheduled Meds:cefTRIAXone, 2,000 mg, Intravenous, Q24H  clopidogrel, 75 mg, Oral, Daily  hydroCHLOROthiazide, 12.5 mg, Oral, Daily  insulin glargine, 18 Units, Subcutaneous, Nightly  insulin lispro, 2-7 Units, Subcutaneous, 4x Daily AC & at Bedtime  [Held by provider] lisinopril, 40 mg, Oral, Daily  [Held by provider] pioglitazone, 30 mg, Oral, Daily  rosuvastatin, 40 mg, Oral,  Daily      Continuous Infusions:   PRN Meds:  acetaminophen **OR** acetaminophen **OR** acetaminophen    senna-docusate sodium **AND** polyethylene glycol **AND** bisacodyl **AND** bisacodyl    Calcium Replacement - Follow Nurse / BPA Driven Protocol    dextrose    dextrose    glucagon (human recombinant)    Magnesium Standard Dose Replacement - Follow Nurse / BPA Driven Protocol    melatonin    ondansetron ODT **OR** ondansetron    Phosphorus Replacement - Follow Nurse / BPA Driven Protocol    Potassium Replacement - Follow Nurse / BPA Driven Protocol    Assessment & Plan   Assessment and Plan:         Acute UTI (urinary tract infection)    Acute kidney injury    ASSESSMENT:  Acute kidney injury likely multifactorial including presence of bacteremia with some hematuria and proteinuria although renal ultrasound is unremarkable, worsening creatinine might be related to underlying bacteremia  Chronic kidney disease stage IIIa with most recent renal ultrasound right kidney 10.8 left kidney 10.2 cm  Proteinuria and hematuria need to be quantified still pending complements normal,  Complements normal, Bacteremia with Klebsiella pneumoniae  Urosepsis  History of diabetes mellitus type 2  History of hypertension  Congestive heart failure with grade 1 diastolic dysfunction last echo done in 2023 EF normal BNP level is elevated, 1340    PLAN :      Follow-up with a complete urine studies still pending  Complete serology, complements are normal still pending  Continue diuretics will increase dose  Control blood sugar aggressively  Follow-up with repeat labs  Blood pressure is acceptable no need for extra IV fluid  I will get echocardiogram as last echocardiogram was 2023 that may help me to readjust diuretics  Mild edema present will follow-up closely   significant abdominal distention but CAT scan of the abdomen was unremarkablearge   Start low-dose Bumex  ACE inhibitors once creatinine back to baseline okay to be started at  the time of discharge            Electronically signed by Mark Handy MD,   [facility] kidney consultant  [phone]  2/28/2025  09:49 EST

## 2025-03-01 LAB
ANION GAP SERPL CALCULATED.3IONS-SCNC: 12.4 MMOL/L (ref 5–15)
BASOPHILS # BLD AUTO: 0.02 10*3/MM3 (ref 0–0.2)
BASOPHILS NFR BLD AUTO: 0.4 % (ref 0–1.5)
BUN SERPL-MCNC: 43 MG/DL (ref 8–23)
BUN/CREAT SERPL: 18.1 (ref 7–25)
CALCIUM SPEC-SCNC: 8.9 MG/DL (ref 8.6–10.5)
CHLORIDE SERPL-SCNC: 96 MMOL/L (ref 98–107)
CO2 SERPL-SCNC: 26.6 MMOL/L (ref 22–29)
CREAT SERPL-MCNC: 2.38 MG/DL (ref 0.76–1.27)
DEPRECATED RDW RBC AUTO: 42.5 FL (ref 37–54)
EGFRCR SERPLBLD CKD-EPI 2021: 27.7 ML/MIN/1.73
EOSINOPHIL # BLD AUTO: 0.1 10*3/MM3 (ref 0–0.4)
EOSINOPHIL NFR BLD AUTO: 2.1 % (ref 0.3–6.2)
ERYTHROCYTE [DISTWIDTH] IN BLOOD BY AUTOMATED COUNT: 12.7 % (ref 12.3–15.4)
GLUCOSE BLDC GLUCOMTR-MCNC: 155 MG/DL (ref 70–105)
GLUCOSE BLDC GLUCOMTR-MCNC: 209 MG/DL (ref 70–105)
GLUCOSE BLDC GLUCOMTR-MCNC: 279 MG/DL (ref 70–105)
GLUCOSE BLDC GLUCOMTR-MCNC: 299 MG/DL (ref 70–105)
GLUCOSE SERPL-MCNC: 332 MG/DL (ref 65–99)
HCT VFR BLD AUTO: 33.3 % (ref 37.5–51)
HGB BLD-MCNC: 10.8 G/DL (ref 13–17.7)
IMM GRANULOCYTES # BLD AUTO: 0.03 10*3/MM3 (ref 0–0.05)
IMM GRANULOCYTES NFR BLD AUTO: 0.6 % (ref 0–0.5)
LYMPHOCYTES # BLD AUTO: 0.61 10*3/MM3 (ref 0.7–3.1)
LYMPHOCYTES NFR BLD AUTO: 12.6 % (ref 19.6–45.3)
MAGNESIUM SERPL-MCNC: 1.8 MG/DL (ref 1.6–2.4)
MCH RBC QN AUTO: 29.3 PG (ref 26.6–33)
MCHC RBC AUTO-ENTMCNC: 32.4 G/DL (ref 31.5–35.7)
MCV RBC AUTO: 90.5 FL (ref 79–97)
MONOCYTES # BLD AUTO: 0.43 10*3/MM3 (ref 0.1–0.9)
MONOCYTES NFR BLD AUTO: 8.9 % (ref 5–12)
NEUTROPHILS NFR BLD AUTO: 3.64 10*3/MM3 (ref 1.7–7)
NEUTROPHILS NFR BLD AUTO: 75.4 % (ref 42.7–76)
NRBC BLD AUTO-RTO: 0 /100 WBC (ref 0–0.2)
PLATELET # BLD AUTO: 101 10*3/MM3 (ref 140–450)
PMV BLD AUTO: 10.9 FL (ref 6–12)
POTASSIUM SERPL-SCNC: 3.1 MMOL/L (ref 3.5–5.2)
POTASSIUM SERPL-SCNC: 4 MMOL/L (ref 3.5–5.2)
RBC # BLD AUTO: 3.68 10*6/MM3 (ref 4.14–5.8)
SODIUM SERPL-SCNC: 135 MMOL/L (ref 136–145)
WBC NRBC COR # BLD AUTO: 4.83 10*3/MM3 (ref 3.4–10.8)

## 2025-03-01 PROCEDURE — 84132 ASSAY OF SERUM POTASSIUM: CPT | Performed by: STUDENT IN AN ORGANIZED HEALTH CARE EDUCATION/TRAINING PROGRAM

## 2025-03-01 PROCEDURE — 83735 ASSAY OF MAGNESIUM: CPT | Performed by: STUDENT IN AN ORGANIZED HEALTH CARE EDUCATION/TRAINING PROGRAM

## 2025-03-01 PROCEDURE — 63710000001 INSULIN LISPRO (HUMAN) PER 5 UNITS: Performed by: STUDENT IN AN ORGANIZED HEALTH CARE EDUCATION/TRAINING PROGRAM

## 2025-03-01 PROCEDURE — 85025 COMPLETE CBC W/AUTO DIFF WBC: CPT | Performed by: STUDENT IN AN ORGANIZED HEALTH CARE EDUCATION/TRAINING PROGRAM

## 2025-03-01 PROCEDURE — 63710000001 INSULIN LISPRO (HUMAN) PER 5 UNITS

## 2025-03-01 PROCEDURE — 80048 BASIC METABOLIC PNL TOTAL CA: CPT | Performed by: STUDENT IN AN ORGANIZED HEALTH CARE EDUCATION/TRAINING PROGRAM

## 2025-03-01 PROCEDURE — 82948 REAGENT STRIP/BLOOD GLUCOSE: CPT

## 2025-03-01 PROCEDURE — 63710000001 INSULIN GLARGINE PER 5 UNITS: Performed by: STUDENT IN AN ORGANIZED HEALTH CARE EDUCATION/TRAINING PROGRAM

## 2025-03-01 RX ORDER — INSULIN LISPRO 100 [IU]/ML
8 INJECTION, SOLUTION INTRAVENOUS; SUBCUTANEOUS
Status: DISCONTINUED | OUTPATIENT
Start: 2025-03-01 | End: 2025-03-03 | Stop reason: HOSPADM

## 2025-03-01 RX ORDER — POTASSIUM CHLORIDE 1500 MG/1
40 TABLET, EXTENDED RELEASE ORAL ONCE
Status: DISCONTINUED | OUTPATIENT
Start: 2025-03-01 | End: 2025-03-03 | Stop reason: HOSPADM

## 2025-03-01 RX ORDER — POTASSIUM CHLORIDE 1.5 G/1.58G
40 POWDER, FOR SOLUTION ORAL EVERY 4 HOURS
Status: COMPLETED | OUTPATIENT
Start: 2025-03-01 | End: 2025-03-01

## 2025-03-01 RX ADMIN — BUMETANIDE 1 MG: 1 TABLET ORAL at 08:31

## 2025-03-01 RX ADMIN — LEVOFLOXACIN 750 MG: 750 TABLET, FILM COATED ORAL at 08:31

## 2025-03-01 RX ADMIN — INSULIN LISPRO 8 UNITS: 100 INJECTION, SOLUTION INTRAVENOUS; SUBCUTANEOUS at 08:36

## 2025-03-01 RX ADMIN — POTASSIUM CHLORIDE 40 MEQ: 1.5 FOR SOLUTION ORAL at 14:13

## 2025-03-01 RX ADMIN — CLOPIDOGREL BISULFATE 75 MG: 75 TABLET ORAL at 08:31

## 2025-03-01 RX ADMIN — INSULIN LISPRO 3 UNITS: 100 INJECTION, SOLUTION INTRAVENOUS; SUBCUTANEOUS at 17:26

## 2025-03-01 RX ADMIN — ROSUVASTATIN CALCIUM 40 MG: 10 TABLET, FILM COATED ORAL at 08:31

## 2025-03-01 RX ADMIN — INSULIN LISPRO 8 UNITS: 100 INJECTION, SOLUTION INTRAVENOUS; SUBCUTANEOUS at 17:26

## 2025-03-01 RX ADMIN — INSULIN LISPRO 8 UNITS: 100 INJECTION, SOLUTION INTRAVENOUS; SUBCUTANEOUS at 12:08

## 2025-03-01 RX ADMIN — INSULIN LISPRO 2 UNITS: 100 INJECTION, SOLUTION INTRAVENOUS; SUBCUTANEOUS at 21:27

## 2025-03-01 RX ADMIN — INSULIN GLARGINE 30 UNITS: 100 INJECTION, SOLUTION SUBCUTANEOUS at 21:27

## 2025-03-01 RX ADMIN — POTASSIUM CHLORIDE 40 MEQ: 1.5 FOR SOLUTION ORAL at 10:22

## 2025-03-01 RX ADMIN — INSULIN LISPRO 4 UNITS: 100 INJECTION, SOLUTION INTRAVENOUS; SUBCUTANEOUS at 12:07

## 2025-03-01 RX ADMIN — POTASSIUM CHLORIDE 40 MEQ: 1.5 FOR SOLUTION ORAL at 05:45

## 2025-03-01 NOTE — NURSING NOTE
Patient resting abed this shift patient awakes easily very pleasant and cooperative. Plan of care reviewed with patient along with medication review. Patient was able to verbalize understanding and is agreeable to plan of care. RN and Aide provided incontinence care changed bed linens moisture barrier cream applied with new sacral dressing. Patient states he did not even realize that he had urinated. Patient had been refusing to let aide check prior to this time.

## 2025-03-01 NOTE — PLAN OF CARE
Problem: Adult Inpatient Plan of Care  Goal: Absence of Hospital-Acquired Illness or Injury  Intervention: Identify and Manage Fall Risk  Recent Flowsheet Documentation  Taken 3/1/2025 1617 by Anisa Haile RN  Safety Promotion/Fall Prevention: safety round/check completed  Taken 3/1/2025 1415 by Anisa Haile RN  Safety Promotion/Fall Prevention: safety round/check completed  Intervention: Prevent Skin Injury  Recent Flowsheet Documentation  Taken 3/1/2025 1617 by Anisa Haile RN  Body Position:   left   head facing, left   lower extremity elevated  Taken 3/1/2025 1415 by Anisa Haile RN  Skin Protection: transparent dressing maintained  Intervention: Prevent and Manage VTE (Venous Thromboembolism) Risk  Recent Flowsheet Documentation  Taken 3/1/2025 1415 by Anisa Haile RN  VTE Prevention/Management: SCDs (sequential compression devices) off  Intervention: Prevent Infection  Recent Flowsheet Documentation  Taken 3/1/2025 1617 by Anisa Haile RN  Infection Prevention: single patient room provided  Goal: Optimal Comfort and Wellbeing  Intervention: Provide Person-Centered Care  Recent Flowsheet Documentation  Taken 3/1/2025 1415 by Anisa Haile RN  Trust Relationship/Rapport: care explained   Goal Outcome Evaluation:

## 2025-03-01 NOTE — PROGRESS NOTES
[facility] MEDICINE SERVICE  DAILY PROGRESS NOTE    NAME: Blas Mojica  : 1950  MRN: [id number]      LOS: 3 days     PROVIDER OF SERVICE: Shreyas Schultz MD    Chief Complaint: Acute UTI (urinary tract infection)    Subjective:     Interval History:  History taken from: patient    Denies any new complaints. Noted to have elevated blood glucose, he had a pie last night.         Review of Systems:   Review of Systems   Constitutional:  Negative for chills and fever.   HENT:  Negative for congestion, ear discharge, ear pain, sinus pain and sore throat.    Respiratory:  Negative for apnea, cough, chest tightness, shortness of breath and wheezing.    Cardiovascular:  Negative for chest pain and palpitations.   Gastrointestinal:  Negative for abdominal pain, blood in stool, constipation, diarrhea, nausea and vomiting.   Endocrine: Negative for cold intolerance and heat intolerance.   Genitourinary:  Negative for dysuria, flank pain, hematuria and urgency.   Musculoskeletal:  Negative for arthralgias, back pain, joint swelling, myalgias and neck pain.   Skin: Negative.    Neurological: Negative.    Hematological:  Does not bruise/bleed easily.   Psychiatric/Behavioral:  Negative for agitation, confusion, hallucinations, sleep disturbance and suicidal ideas. The patient is not nervous/anxious.        Objective:     Vital Signs  Temp:  [97.8 °F (36.6 °C)-98.6 °F (37 °C)] 97.8 °F (36.6 °C)  Heart Rate:  [] 84  Resp:  [16-22] 22  BP: (118-162)/(69-95) 118/74  Flow (L/min) (Oxygen Therapy):  [2] 2   Body mass index is 28.25 kg/m².    Physical Exam  General: Alert and oriented, no acute distress.  Lungs: Clear to auscultation, nonlabored respiration.  Heart: RRR, no murmur, gallop or edema.  Abdomen: Soft, nontender, nondistended, + bowel sounds.  Neuro: alert and awake, moving all 4 extremities   Skin: Skin is warm, dry and pink, no rashes or lesions.  Psychiatric: Cooperative, appropriate  mood and affect.           Diagnostic Data    Results from last 7 days   Lab Units 03/01/25  0318 02/27/25  1218 02/26/25  2331   WBC 10*3/mm3 4.83   < > 8.97   HEMOGLOBIN g/dL 10.8*   < > 9.6*   HEMATOCRIT % 33.3*   < > 30.6*   PLATELETS 10*3/mm3 101*   < > 79*   GLUCOSE mg/dL 332*   < > 182*   CREATININE mg/dL 2.38*   < > 2.42*   BUN mg/dL 43*   < > 44*   SODIUM mmol/L 135*   < > 136   POTASSIUM mmol/L 3.1*   < > 2.8*   AST (SGOT) U/L  --   --  22   ALT (SGPT) U/L  --   --  10   ALK PHOS U/L  --   --  73   BILIRUBIN mg/dL  --   --  0.5   ANION GAP mmol/L 12.4   < > 11.5    < > = values in this interval not displayed.       No radiology results for the last day      I reviewed the patient's new clinical results.    Assessment/Plan:     Active and Resolved Problems  Active Hospital Problems    Diagnosis  POA    **Acute UTI (urinary tract infection) [N39.0]  Unknown    Acute kidney injury [N17.9]  Yes      Resolved Hospital Problems   No resolved problems to display.       Acute kidney injury superimposed CKD 3  Likely multifactorial including insetting of bacteremia   Renal following  Monitor BMP  Avoid nephrotoxic drugs   Cr is trending down      Bacteremia  UTI  ID recommendations appreciated  Discontinue IV ceftriaxone   Today start p.o. Levaquin 750 mg every other day for 10 days for 14 days of treatment  QTc interval was appropriate for quinolone use  Continue supportive care  Okay to discharge from Infectious Disease standpoint     Diabetes mellitus type 2-  increase Lantus for better control  SSI  continue insulin glargine to 30 units nightly  Add insulin lispro 8 units TID with meals  Continue ISS  Monitor POC glucose     Hypertension- on HCTZ 12.5 mg  Hyperlipidemia-continue statin    Monitor and replace goal K>4, Mg>2 and PO4>3.5    VTE Prophylaxis:  Mechanical VTE prophylaxis orders are present.             Disposition Planning:     Barriers to Discharge:nephrology work up, response to  treatment  Anticipated Date of Discharge: 2-3 days  Place of Discharge: pending course      Time: 35 minutes     Code Status and Medical Interventions: CPR (Attempt to Resuscitate); Full Support   Ordered at: 02/25/25 1559     Code Status (Patient has no pulse and is not breathing):    CPR (Attempt to Resuscitate)     Medical Interventions (Patient has pulse or is breathing):    Full Support       Signature: Electronically signed by Shreyas Schultz MD, 03/01/25, 12:07 EST.  [facility]

## 2025-03-01 NOTE — CASE MANAGEMENT/SOCIAL WORK
Continued Stay Note   Farhan     Patient Name: Blas Mojica  MRN: [id number]  Today's Date: 3/1/2025    Admit Date: 2/25/2025    [place] Accepted to [facility]. PASRR approved. No pre-cert required.   Discharge Plan       Row Name 03/01/25 1326       Plan    [place]. Accepted to [facility]. PASRR approved. No pre-cert required.    Plan Comments Per [facility] liaison, bed available today.  updated her with [facility] and plan.             Alysha Nava RN    Office: [phone]  Fax: [phone]

## 2025-03-01 NOTE — PLAN OF CARE
Goal Outcome Evaluation:            Patient potassium being replaced per protocol. Patient blood sugars elevated. Provider aware and acknowledged blood glucose level above 400. Patient in bed with call light in reach, able to make needs known.

## 2025-03-01 NOTE — PLAN OF CARE
Goal Outcome Evaluation:      Pt alert and able to make needs known.call light in reach.

## 2025-03-01 NOTE — PROGRESS NOTES
Patient Name: Blas Mojica  : 1950  MRN: [id number]  Primary Care Physician: Sahil Read MD  Date of admission: 2025    Patient Care Team:  Sahil Read MD as PCP - General        Subjective   Subjective:   Patient seen and examined, sleepy no distress  Review of systems:  All other review of system unremarkable      Allergies:    Allergies   Allergen Reactions    Contrast Dye (Echo Or Unknown Ct/Mr) Anaphylaxis    Iodinated Contrast Media Shortness Of Breath    Iodine Shortness Of Breath    Aspirin Nausea And Vomiting    Lipitor [Atorvastatin] Unknown - High Severity    Prednisone Hives       Objective   Exam:     Vital Signs  Temp:  [97.8 °F (36.6 °C)-98.6 °F (37 °C)] 97.8 °F (36.6 °C)  Heart Rate:  [] 84  Resp:  [16-22] 22  BP: (118-162)/(69-95) 118/74  SpO2:  [96 %-98 %] 98 %  on  Flow (L/min) (Oxygen Therapy):  [2] 2;   Device (Oxygen Therapy): room air  Body mass index is 28.25 kg/m².    General: Elderly white male in no acute distress.    Head:      Normocephalic and atraumatic.    Eyes:      PERRL/EOM intact, conjunctivae and sclerae clear without nystagmus.    Neck:      No masses, thyromegaly,  trachea central with normal respiratory effort   Lungs:    Clear bilaterally to auscultation.    Heart:      Regular rate and rhythm, no murmur no gallop  Abd:        Soft, nontender, not distended, bowel sounds positive, no shifting dullness   Pulses:   Pulses palpable  Extr:        No cyanosis or clubbing--mild edema.    Neuro:    No focal deficits.   alert oriented x3  Skin:       Intact without lesions or rashes.    Psych:    Alert and cooperative; normal mood and affect; .      Results Review:  I have personally reviewed most recent Data :  CBC    Results from last 7 days   Lab Units 25  0318 25  0248 25  2331 25  0433 25  1029   WBC 10*3/mm3 4.83 5.47 8.97 12.52* 11.49*   HEMOGLOBIN g/dL 10.8* 10.3* 9.6* 11.7* 11.8*   PLATELETS 10*3/mm3  101* 97* 79* 97* 109*     CMP   Results from last 7 days   Lab Units 03/01/25  0318 02/28/25  1703 02/28/25  1031 02/28/25  0248 02/27/25  1218 02/26/25  2331 02/26/25  1129 02/26/25  0433 02/25/25  1029   SODIUM mmol/L 135*  --   --  135*  --  136  --  139 142   POTASSIUM mmol/L 3.1* 3.0* 3.0* 3.0* 3.3* 2.8*  --  3.5 3.7   CHLORIDE mmol/L 96*  --   --  101  --  103  --  103 105   CO2 mmol/L 26.6  --   --  21.8*  --  21.5*  --  23.3 23.7   BUN mg/dL 43*  --   --  45*  --  44*  --  47* 49*   CREATININE mg/dL 2.38*  --   --  2.55*  --  2.42*  --  2.57* 2.25*   GLUCOSE mg/dL 332*  --   --  374*  --  182*  --  198* 125*   ALBUMIN g/dL  --   --   --   --   --  2.9* 3.2  --  3.9   BILIRUBIN mg/dL  --   --   --   --   --  0.5  --   --  0.7   ALK PHOS U/L  --   --   --   --   --  73  --   --  85   AST (SGOT) U/L  --   --   --   --   --  22  --   --  19   ALT (SGPT) U/L  --   --   --   --   --  10  --   --  12   LIPASE U/L  --   --   --   --   --   --   --   --  29     ABG      No radiology results for the last day    Results for orders placed during the hospital encounter of 12/28/23    Adult Transthoracic Echo Complete w/ Color, Spectral and Contrast if Necessary Per Protocol    Interpretation Summary    Left ventricular systolic function is normal. Left ventricular ejection fraction appears to be 56 - 60%.    Left ventricular diastolic function is consistent with (grade I) impaired relaxation.    There is calcification of the aortic valve mainly affecting the left coronary cusp(s).    Estimated right ventricular systolic pressure from tricuspid regurgitation is mildly elevated (35-45 mmHg).    Scheduled Meds:bumetanide, 1 mg, Oral, BID Diuretics  clopidogrel, 75 mg, Oral, Daily  insulin glargine, 30 Units, Subcutaneous, Nightly  insulin lispro, 2-7 Units, Subcutaneous, 4x Daily AC & at Bedtime  insulin lispro, 8 Units, Subcutaneous, TID With Meals  levoFLOXacin, 750 mg, Oral, Every Other Day  [Held by provider]  lisinopril, 40 mg, Oral, Daily  [Held by provider] pioglitazone, 30 mg, Oral, Daily  potassium chloride, 40 mEq, Oral, Q4H  rosuvastatin, 40 mg, Oral, Daily      Continuous Infusions:   PRN Meds:  acetaminophen **OR** acetaminophen **OR** acetaminophen    senna-docusate sodium **AND** polyethylene glycol **AND** bisacodyl **AND** bisacodyl    Calcium Replacement - Follow Nurse / BPA Driven Protocol    dextrose    dextrose    glucagon (human recombinant)    Magnesium Standard Dose Replacement - Follow Nurse / BPA Driven Protocol    melatonin    ondansetron ODT **OR** ondansetron    Phosphorus Replacement - Follow Nurse / BPA Driven Protocol    Potassium Replacement - Follow Nurse / BPA Driven Protocol    Assessment & Plan   Assessment and Plan:         Acute UTI (urinary tract infection)    Acute kidney injury    ASSESSMENT:  Acute kidney injury likely multifactorial including presence of bacteremia with some hematuria and proteinuria although renal ultrasound is unremarkable, worsening creatinine might be related to underlying bacteremia  Chronic kidney disease stage IIIa with most recent renal ultrasound right kidney 10.8 left kidney 10.2 cm  Proteinuria and hematuria need to be quantified still pending complements normal,  Complements normal, Bacteremia with Klebsiella pneumoniae  Urosepsis  History of diabetes mellitus type 2  History of hypertension  Congestive heart failure with grade 1 diastolic dysfunction last echo done in 2023 EF normal BNP level is elevated, 1340    PLAN :   Renal function is stable  Complete serology, complements are normal still pending  Continue Bumex 1 mg twice a day  Control blood sugar aggressively  Supplement potassium  Echo pending  Volume better  significant abdominal distention but CAT scan of the abdomen was unremarkablearge   ACE inhibitors once creatinine back to baseline okay to be started at the time of discharge            Electronically signed by Dang Pinto MD,    [facility] kidney consultant  [phone]  3/1/2025  14:05 EST

## 2025-03-02 ENCOUNTER — APPOINTMENT (OUTPATIENT)
Dept: CARDIOLOGY | Facility: HOSPITAL | Age: 75
DRG: 872 | End: 2025-03-02
Payer: MEDICARE

## 2025-03-02 LAB
ANION GAP SERPL CALCULATED.3IONS-SCNC: 13.7 MMOL/L (ref 5–15)
AORTIC DIMENSIONLESS INDEX: 0.59 (DI)
AV MEAN PRESS GRAD SYS DOP V1V2: 4 MMHG
AV VMAX SYS DOP: 150 CM/SEC
BASOPHILS # BLD AUTO: 0.01 10*3/MM3 (ref 0–0.2)
BASOPHILS NFR BLD AUTO: 0.2 % (ref 0–1.5)
BH CV ECHO MEAS - ACS: 1.7 CM
BH CV ECHO MEAS - AO MAX PG: 9 MMHG
BH CV ECHO MEAS - AO V2 VTI: 31.4 CM
BH CV ECHO MEAS - AVA(I,D): 1.95 CM2
BH CV ECHO MEAS - EDV(CUBED): 46.7 ML
BH CV ECHO MEAS - EDV(MOD-SP2): 61.3 ML
BH CV ECHO MEAS - EDV(MOD-SP4): 103 ML
BH CV ECHO MEAS - EF(MOD-SP2): 64.3 %
BH CV ECHO MEAS - EF(MOD-SP4): 63.2 %
BH CV ECHO MEAS - ESV(CUBED): 19.7 ML
BH CV ECHO MEAS - ESV(MOD-SP2): 21.9 ML
BH CV ECHO MEAS - ESV(MOD-SP4): 37.9 ML
BH CV ECHO MEAS - FS: 25 %
BH CV ECHO MEAS - IVS/LVPW: 1.17 CM
BH CV ECHO MEAS - IVSD: 1.4 CM
BH CV ECHO MEAS - LA DIMENSION: 3.2 CM
BH CV ECHO MEAS - LAT PEAK E' VEL: 8.5 CM/SEC
BH CV ECHO MEAS - LV DIASTOLIC VOL/BSA (35-75): 49.8 CM2
BH CV ECHO MEAS - LV MASS(C)D: 160.1 GRAMS
BH CV ECHO MEAS - LV MAX PG: 3.1 MMHG
BH CV ECHO MEAS - LV MEAN PG: 2 MMHG
BH CV ECHO MEAS - LV SYSTOLIC VOL/BSA (12-30): 18.3 CM2
BH CV ECHO MEAS - LV V1 MAX: 88.6 CM/SEC
BH CV ECHO MEAS - LV V1 VTI: 19.5 CM
BH CV ECHO MEAS - LVIDD: 3.6 CM
BH CV ECHO MEAS - LVIDS: 2.7 CM
BH CV ECHO MEAS - LVOT AREA: 3.1 CM2
BH CV ECHO MEAS - LVOT DIAM: 2 CM
BH CV ECHO MEAS - LVPWD: 1.2 CM
BH CV ECHO MEAS - MED PEAK E' VEL: 4.6 CM/SEC
BH CV ECHO MEAS - MR MAX PG: 30.9 MMHG
BH CV ECHO MEAS - MR MAX VEL: 278 CM/SEC
BH CV ECHO MEAS - MR MEAN PG: 25 MMHG
BH CV ECHO MEAS - MR MEAN VEL: 244 CM/SEC
BH CV ECHO MEAS - MR VTI: 72 CM
BH CV ECHO MEAS - MV A MAX VEL: 108 CM/SEC
BH CV ECHO MEAS - MV DEC SLOPE: 331 CM/SEC2
BH CV ECHO MEAS - MV DEC TIME: 0.21 SEC
BH CV ECHO MEAS - MV E MAX VEL: 37.1 CM/SEC
BH CV ECHO MEAS - MV E/A: 0.34
BH CV ECHO MEAS - MV MAX PG: 3.6 MMHG
BH CV ECHO MEAS - MV MEAN PG: 2 MMHG
BH CV ECHO MEAS - MV P1/2T: 80.8 MSEC
BH CV ECHO MEAS - MV V2 VTI: 21.6 CM
BH CV ECHO MEAS - MVA(P1/2T): 2.7 CM2
BH CV ECHO MEAS - MVA(VTI): 2.8 CM2
BH CV ECHO MEAS - PA ACC TIME: 0.15 SEC
BH CV ECHO MEAS - PA V2 MAX: 101 CM/SEC
BH CV ECHO MEAS - RAP SYSTOLE: 3 MMHG
BH CV ECHO MEAS - RV MAX PG: 2.25 MMHG
BH CV ECHO MEAS - RV V1 MAX: 75 CM/SEC
BH CV ECHO MEAS - RV V1 VTI: 12.8 CM
BH CV ECHO MEAS - RVSP: 22 MMHG
BH CV ECHO MEAS - SV(LVOT): 61.3 ML
BH CV ECHO MEAS - SV(MOD-SP2): 39.4 ML
BH CV ECHO MEAS - SV(MOD-SP4): 65.1 ML
BH CV ECHO MEAS - SVI(LVOT): 29.6 ML/M2
BH CV ECHO MEAS - SVI(MOD-SP2): 19 ML/M2
BH CV ECHO MEAS - SVI(MOD-SP4): 31.5 ML/M2
BH CV ECHO MEAS - TAPSE (>1.6): 2.24 CM
BH CV ECHO MEAS - TR MAX PG: 19 MMHG
BH CV ECHO MEAS - TR MAX VEL: 218 CM/SEC
BH CV ECHO MEASUREMENTS AVERAGE E/E' RATIO: 5.66
BH CV XLRA - RV BASE: 4.5 CM
BH CV XLRA - RV LENGTH: 8.4 CM
BH CV XLRA - RV MID: 3.8 CM
BH CV XLRA - TDI S': 7.3 CM/SEC
BUN SERPL-MCNC: 42 MG/DL (ref 8–23)
BUN/CREAT SERPL: 17.7 (ref 7–25)
CALCIUM SPEC-SCNC: 9.5 MG/DL (ref 8.6–10.5)
CHLORIDE SERPL-SCNC: 97 MMOL/L (ref 98–107)
CO2 SERPL-SCNC: 28.3 MMOL/L (ref 22–29)
CREAT SERPL-MCNC: 2.37 MG/DL (ref 0.76–1.27)
DEPRECATED RDW RBC AUTO: 41.7 FL (ref 37–54)
EGFRCR SERPLBLD CKD-EPI 2021: 27.9 ML/MIN/1.73
EOSINOPHIL # BLD AUTO: 0.18 10*3/MM3 (ref 0–0.4)
EOSINOPHIL NFR BLD AUTO: 3.1 % (ref 0.3–6.2)
ERYTHROCYTE [DISTWIDTH] IN BLOOD BY AUTOMATED COUNT: 12.6 % (ref 12.3–15.4)
GLUCOSE BLDC GLUCOMTR-MCNC: 139 MG/DL (ref 70–105)
GLUCOSE BLDC GLUCOMTR-MCNC: 172 MG/DL (ref 70–105)
GLUCOSE BLDC GLUCOMTR-MCNC: 207 MG/DL (ref 70–105)
GLUCOSE BLDC GLUCOMTR-MCNC: 220 MG/DL (ref 70–105)
GLUCOSE SERPL-MCNC: 142 MG/DL (ref 65–99)
HCT VFR BLD AUTO: 36.5 % (ref 37.5–51)
HGB BLD-MCNC: 11.9 G/DL (ref 13–17.7)
IMM GRANULOCYTES # BLD AUTO: 0.02 10*3/MM3 (ref 0–0.05)
IMM GRANULOCYTES NFR BLD AUTO: 0.3 % (ref 0–0.5)
LEFT ATRIUM VOLUME INDEX: 18.6 ML/M2
LV EF BIPLANE MOD: 64.5 %
LYMPHOCYTES # BLD AUTO: 1.36 10*3/MM3 (ref 0.7–3.1)
LYMPHOCYTES NFR BLD AUTO: 23.4 % (ref 19.6–45.3)
MCH RBC QN AUTO: 29.5 PG (ref 26.6–33)
MCHC RBC AUTO-ENTMCNC: 32.6 G/DL (ref 31.5–35.7)
MCV RBC AUTO: 90.6 FL (ref 79–97)
MONOCYTES # BLD AUTO: 0.53 10*3/MM3 (ref 0.1–0.9)
MONOCYTES NFR BLD AUTO: 9.1 % (ref 5–12)
NEUTROPHILS NFR BLD AUTO: 3.72 10*3/MM3 (ref 1.7–7)
NEUTROPHILS NFR BLD AUTO: 63.9 % (ref 42.7–76)
NRBC BLD AUTO-RTO: 0 /100 WBC (ref 0–0.2)
PLATELET # BLD AUTO: 129 10*3/MM3 (ref 140–450)
PMV BLD AUTO: 10.6 FL (ref 6–12)
POTASSIUM SERPL-SCNC: 3 MMOL/L (ref 3.5–5.2)
RBC # BLD AUTO: 4.03 10*6/MM3 (ref 4.14–5.8)
SINUS: 3.2 CM
SODIUM SERPL-SCNC: 139 MMOL/L (ref 136–145)
STJ: 2.7 CM
WBC NRBC COR # BLD AUTO: 5.82 10*3/MM3 (ref 3.4–10.8)

## 2025-03-02 PROCEDURE — 93306 TTE W/DOPPLER COMPLETE: CPT | Performed by: INTERNAL MEDICINE

## 2025-03-02 PROCEDURE — 63710000001 INSULIN LISPRO (HUMAN) PER 5 UNITS

## 2025-03-02 PROCEDURE — 82948 REAGENT STRIP/BLOOD GLUCOSE: CPT

## 2025-03-02 PROCEDURE — 63710000001 INSULIN GLARGINE PER 5 UNITS: Performed by: STUDENT IN AN ORGANIZED HEALTH CARE EDUCATION/TRAINING PROGRAM

## 2025-03-02 PROCEDURE — 80048 BASIC METABOLIC PNL TOTAL CA: CPT | Performed by: STUDENT IN AN ORGANIZED HEALTH CARE EDUCATION/TRAINING PROGRAM

## 2025-03-02 PROCEDURE — 93306 TTE W/DOPPLER COMPLETE: CPT

## 2025-03-02 PROCEDURE — 85025 COMPLETE CBC W/AUTO DIFF WBC: CPT | Performed by: STUDENT IN AN ORGANIZED HEALTH CARE EDUCATION/TRAINING PROGRAM

## 2025-03-02 PROCEDURE — 25010000002 SULFUR HEXAFLUORIDE MICROSPH 60.7-25 MG RECONSTITUTED SUSPENSION: Performed by: STUDENT IN AN ORGANIZED HEALTH CARE EDUCATION/TRAINING PROGRAM

## 2025-03-02 PROCEDURE — 63710000001 INSULIN LISPRO (HUMAN) PER 5 UNITS: Performed by: STUDENT IN AN ORGANIZED HEALTH CARE EDUCATION/TRAINING PROGRAM

## 2025-03-02 RX ORDER — POTASSIUM CHLORIDE 1.5 G/1.58G
40 POWDER, FOR SOLUTION ORAL EVERY 4 HOURS
Status: COMPLETED | OUTPATIENT
Start: 2025-03-02 | End: 2025-03-02

## 2025-03-02 RX ADMIN — INSULIN GLARGINE 30 UNITS: 100 INJECTION, SOLUTION SUBCUTANEOUS at 21:57

## 2025-03-02 RX ADMIN — BUMETANIDE 1 MG: 1 TABLET ORAL at 08:53

## 2025-03-02 RX ADMIN — POTASSIUM CHLORIDE 40 MEQ: 1.5 FOR SOLUTION ORAL at 09:25

## 2025-03-02 RX ADMIN — INSULIN LISPRO 3 UNITS: 100 INJECTION, SOLUTION INTRAVENOUS; SUBCUTANEOUS at 21:57

## 2025-03-02 RX ADMIN — INSULIN LISPRO 8 UNITS: 100 INJECTION, SOLUTION INTRAVENOUS; SUBCUTANEOUS at 13:25

## 2025-03-02 RX ADMIN — INSULIN LISPRO 8 UNITS: 100 INJECTION, SOLUTION INTRAVENOUS; SUBCUTANEOUS at 18:15

## 2025-03-02 RX ADMIN — POTASSIUM CHLORIDE 40 MEQ: 1.5 FOR SOLUTION ORAL at 15:10

## 2025-03-02 RX ADMIN — INSULIN LISPRO 8 UNITS: 100 INJECTION, SOLUTION INTRAVENOUS; SUBCUTANEOUS at 08:53

## 2025-03-02 RX ADMIN — SULFUR HEXAFLUORIDE 2 ML: KIT at 14:25

## 2025-03-02 RX ADMIN — POTASSIUM CHLORIDE 40 MEQ: 1.5 FOR SOLUTION ORAL at 18:17

## 2025-03-02 RX ADMIN — ROSUVASTATIN CALCIUM 40 MG: 10 TABLET, FILM COATED ORAL at 08:53

## 2025-03-02 RX ADMIN — CLOPIDOGREL BISULFATE 75 MG: 75 TABLET ORAL at 08:53

## 2025-03-02 RX ADMIN — INSULIN LISPRO 2 UNITS: 100 INJECTION, SOLUTION INTRAVENOUS; SUBCUTANEOUS at 13:23

## 2025-03-02 NOTE — PROGRESS NOTES
Patient Name: Blas Mojica  : 1950  MRN: [id number]  Primary Care Physician: Sahil Read MD  Date of admission: 2025    Patient Care Team:  Sahil Read MD as PCP - General        Subjective   Subjective:   Patient seen and examined, sleepy no distress  Review of systems:  All other review of system unremarkable      Allergies:    Allergies   Allergen Reactions    Contrast Dye (Echo Or Unknown Ct/Mr) Anaphylaxis     Not Lumason    Iodinated Contrast Media Shortness Of Breath    Iodine Shortness Of Breath    Aspirin Nausea And Vomiting    Lipitor [Atorvastatin] Unknown - High Severity    Prednisone Hives       Objective   Exam:     Vital Signs  Temp:  [97.7 °F (36.5 °C)-98.1 °F (36.7 °C)] 98 °F (36.7 °C)  Heart Rate:  [] 90  Resp:  [15-22] 19  BP: (112-138)/(66-87) 138/87  SpO2:  [91 %-99 %] 91 %  on   ;   Device (Oxygen Therapy): room air  Body mass index is 28.25 kg/m².    General: Elderly white male in no acute distress.    Head:      Normocephalic and atraumatic.    Eyes:      PERRL/EOM intact, conjunctivae and sclerae clear without nystagmus.    Neck:      No masses, thyromegaly,  trachea central with normal respiratory effort   Lungs:    Clear bilaterally to auscultation.    Heart:      Regular rate and rhythm, no murmur no gallop  Abd:        Soft, nontender, not distended, bowel sounds positive, no shifting dullness   Pulses:   Pulses palpable  Extr:        No cyanosis or clubbing--mild edema.    Neuro:    No focal deficits.   alert oriented x3  Skin:       Intact without lesions or rashes.    Psych:    Alert and cooperative; normal mood and affect; .      Results Review:  I have personally reviewed most recent Data :  CBC    Results from last 7 days   Lab Units 25  0515 25  0318 25  0248 25  2331 25  0433 25  1029   WBC 10*3/mm3 5.82 4.83 5.47 8.97 12.52* 11.49*   HEMOGLOBIN g/dL 11.9* 10.8* 10.3* 9.6* 11.7* 11.8*   PLATELETS  10*3/mm3 129* 101* 97* 79* 97* 109*     CMP   Results from last 7 days   Lab Units 03/02/25  0515 03/01/25  1720 03/01/25  0318 02/28/25  1703 02/28/25  1031 02/28/25  0248 02/27/25  1218 02/26/25  2331 02/26/25  1129 02/26/25  0433 02/25/25  1029   SODIUM mmol/L 139  --  135*  --   --  135*  --  136  --  139 142   POTASSIUM mmol/L 3.0* 4.0 3.1* 3.0* 3.0* 3.0* 3.3* 2.8*  --  3.5 3.7   CHLORIDE mmol/L 97*  --  96*  --   --  101  --  103  --  103 105   CO2 mmol/L 28.3  --  26.6  --   --  21.8*  --  21.5*  --  23.3 23.7   BUN mg/dL 42*  --  43*  --   --  45*  --  44*  --  47* 49*   CREATININE mg/dL 2.37*  --  2.38*  --   --  2.55*  --  2.42*  --  2.57* 2.25*   GLUCOSE mg/dL 142*  --  332*  --   --  374*  --  182*  --  198* 125*   ALBUMIN g/dL  --   --   --   --   --   --   --  2.9* 3.2  --  3.9   BILIRUBIN mg/dL  --   --   --   --   --   --   --  0.5  --   --  0.7   ALK PHOS U/L  --   --   --   --   --   --   --  73  --   --  85   AST (SGOT) U/L  --   --   --   --   --   --   --  22  --   --  19   ALT (SGPT) U/L  --   --   --   --   --   --   --  10  --   --  12   LIPASE U/L  --   --   --   --   --   --   --   --   --   --  29     ABG      No radiology results for the last day    Results for orders placed during the hospital encounter of 02/25/25    Adult Transthoracic Echo Complete W/ Cont if Necessary Per Protocol    Interpretation Summary    Left ventricular systolic function is normal. Left ventricular ejection fraction appears to be 61 - 65%.    Left ventricular diastolic function is consistent with (grade I) impaired relaxation.    Estimated right ventricular systolic pressure from tricuspid regurgitation is normal (<35 mmHg).    Scheduled Meds:bumetanide, 1 mg, Oral, BID Diuretics  clopidogrel, 75 mg, Oral, Daily  insulin glargine, 30 Units, Subcutaneous, Nightly  insulin lispro, 2-7 Units, Subcutaneous, 4x Daily AC & at Bedtime  insulin lispro, 8 Units, Subcutaneous, TID With Meals  levoFLOXacin, 750 mg, Oral,  Every Other Day  [Held by provider] lisinopril, 40 mg, Oral, Daily  [Held by provider] pioglitazone, 30 mg, Oral, Daily  potassium chloride ER, 40 mEq, Oral, Once  potassium chloride, 40 mEq, Oral, Q4H  rosuvastatin, 40 mg, Oral, Daily      Continuous Infusions:   PRN Meds:  acetaminophen **OR** acetaminophen **OR** acetaminophen    senna-docusate sodium **AND** polyethylene glycol **AND** bisacodyl **AND** bisacodyl    Calcium Replacement - Follow Nurse / BPA Driven Protocol    dextrose    dextrose    glucagon (human recombinant)    Magnesium Standard Dose Replacement - Follow Nurse / BPA Driven Protocol    melatonin    ondansetron ODT **OR** ondansetron    Phosphorus Replacement - Follow Nurse / BPA Driven Protocol    Potassium Replacement - Follow Nurse / BPA Driven Protocol    Assessment & Plan   Assessment and Plan:         Acute UTI (urinary tract infection)    Acute kidney injury    ASSESSMENT:  Acute kidney injury likely multifactorial including presence of bacteremia with some hematuria and proteinuria although renal ultrasound is unremarkable, worsening creatinine might be related to underlying bacteremia  Chronic kidney disease stage IIIa with most recent renal ultrasound right kidney 10.8 left kidney 10.2 cm  Proteinuria and hematuria need to be quantified still pending complements normal,  Complements normal, Bacteremia with Klebsiella pneumoniae  Urosepsis  History of diabetes mellitus type 2  History of hypertension  Congestive heart failure with grade 1 diastolic dysfunction last echo done in 2023 EF normal BNP level is elevated, 1340    PLAN :   Renal function is stable  Complete serology, complements are normal still pending  Continue Bumex 1 mg twice a day  Control blood sugar aggressively  Potassium being supplemented  Echo pending  Volume better  significant abdominal distention but CAT scan of the abdomen was unremarkablearge   ACE inhibitors once creatinine back to baseline okay to be started  at the time of discharge  Discussed with family at bedside  Discussed with patient's nurse            Electronically signed by Dang Pinto MD,   [facility] kidney consultant  [phone]  3/2/2025  15:16 EST

## 2025-03-02 NOTE — PLAN OF CARE
Problem: Adult Inpatient Plan of Care  Goal: Absence of Hospital-Acquired Illness or Injury  Intervention: Identify and Manage Fall Risk  Recent Flowsheet Documentation  Taken 3/2/2025 1410 by Anisa Haile RN  Safety Promotion/Fall Prevention: safety round/check completed  Taken 3/2/2025 1230 by Anisa Haile RN  Safety Promotion/Fall Prevention: safety round/check completed  Intervention: Prevent Skin Injury  Recent Flowsheet Documentation  Taken 3/2/2025 1230 by Anisa Haile RN  Skin Protection: transparent dressing maintained  Intervention: Prevent and Manage VTE (Venous Thromboembolism) Risk  Recent Flowsheet Documentation  Taken 3/2/2025 1230 by Anisa Haile RN  VTE Prevention/Management: SCDs (sequential compression devices) off  Intervention: Prevent Infection  Recent Flowsheet Documentation  Taken 3/2/2025 1230 by Anisa Haile RN  Infection Prevention: hand hygiene promoted  Goal: Optimal Comfort and Wellbeing  Intervention: Provide Person-Centered Care  Recent Flowsheet Documentation  Taken 3/2/2025 1230 by Anisa Haile RN  Trust Relationship/Rapport: care explained   Goal Outcome Evaluation:            Patient easily aroused. Patient pleasant and cooperative. Patient encouraged to increase activity patient refused. Patient has had incontinence this shift patient does not help much with turn or personal care even with encouragement. Plan of care reviewed with patient and spouse both verbalize understanding and agreeable plan of care.

## 2025-03-02 NOTE — PLAN OF CARE
Goal Outcome Evaluation:         Patient blood glucose and potassium improved. Patient in bed with call light in reach, able to make needs known

## 2025-03-02 NOTE — PROGRESS NOTES
Enter Query Response Below      Query Response: Sepsis, with evidence of organ failure BRIGIDO ruled i              If applicable, please update the problem list.

## 2025-03-02 NOTE — PROGRESS NOTES
[facility] MEDICINE SERVICE  DAILY PROGRESS NOTE    NAME: Blas Mojica  : 1950  MRN: [id number]      LOS: 4 days     PROVIDER OF SERVICE: Shreyas Schultz MD    Chief Complaint: Acute UTI (urinary tract infection)    Subjective:     Interval History:  History taken from: patient    Denies any new complaints.       Review of Systems:   Review of Systems   Constitutional:  Negative for chills and fever.   HENT:  Negative for congestion, ear discharge, ear pain, sinus pain and sore throat.    Respiratory:  Negative for apnea, cough, chest tightness, shortness of breath and wheezing.    Cardiovascular:  Negative for chest pain and palpitations.   Gastrointestinal:  Negative for abdominal pain, blood in stool, constipation, diarrhea, nausea and vomiting.   Endocrine: Negative for cold intolerance and heat intolerance.   Genitourinary:  Negative for dysuria, flank pain, hematuria and urgency.   Musculoskeletal:  Negative for arthralgias, back pain, joint swelling, myalgias and neck pain.   Skin: Negative.    Neurological: Negative.    Hematological:  Does not bruise/bleed easily.   Psychiatric/Behavioral:  Negative for agitation, confusion, hallucinations, sleep disturbance and suicidal ideas. The patient is not nervous/anxious.        Objective:     Vital Signs  Temp:  [97.7 °F (36.5 °C)-98.1 °F (36.7 °C)] 98 °F (36.7 °C)  Heart Rate:  [] 90  Resp:  [15-22] 19  BP: (112-138)/(66-87) 138/87  Flow (L/min) (Oxygen Therapy):  [2] 2   Body mass index is 28.25 kg/m².    Physical Exam  General: Alert and oriented, no acute distress.  Lungs: Clear to auscultation, nonlabored respiration.  Heart: RRR, no murmur, gallop or edema.  Abdomen: Soft, nontender, nondistended, + bowel sounds.  Neuro: alert and awake, moving all 4 extremities   Skin: Skin is warm, dry and pink, no rashes or lesions.  Psychiatric: Cooperative, appropriate mood and affect.           Diagnostic Data    Results from last 7  days   Lab Units 03/02/25  0515 02/27/25  1218 02/26/25  2331   WBC 10*3/mm3 5.82   < > 8.97   HEMOGLOBIN g/dL 11.9*   < > 9.6*   HEMATOCRIT % 36.5*   < > 30.6*   PLATELETS 10*3/mm3 129*   < > 79*   GLUCOSE mg/dL 142*   < > 182*   CREATININE mg/dL 2.37*   < > 2.42*   BUN mg/dL 42*   < > 44*   SODIUM mmol/L 139   < > 136   POTASSIUM mmol/L 3.0*   < > 2.8*   AST (SGOT) U/L  --   --  22   ALT (SGPT) U/L  --   --  10   ALK PHOS U/L  --   --  73   BILIRUBIN mg/dL  --   --  0.5   ANION GAP mmol/L 13.7   < > 11.5    < > = values in this interval not displayed.       No radiology results for the last day      I reviewed the patient's new clinical results.    Assessment/Plan:     Active and Resolved Problems  Active Hospital Problems    Diagnosis  POA    **Acute UTI (urinary tract infection) [N39.0]  Unknown    Acute kidney injury [N17.9]  Yes      Resolved Hospital Problems   No resolved problems to display.       Acute kidney injury superimposed CKD 3  Likely multifactorial including insetting of bacteremia   Renal following  Monitor BMP  Avoid nephrotoxic drugs   Cr is trending down  Echo pending       Bacteremia  UTI  ID recommendations appreciated  Discontinue IV ceftriaxone   continue p.o. Levaquin 750 mg every other day for 10 days for 14 days of treatment  QTc interval was appropriate for quinolone use  Continue supportive care  Okay to discharge from Infectious Disease standpoint     Diabetes mellitus type 2-  increase Lantus for better control  SSI  continue insulin glargine to 30 units nightly  Add insulin lispro 8 units TID with meals  Continue ISS  Monitor POC glucose     Hypertension- on HCTZ 12.5 mg  Hyperlipidemia-continue statin    Monitor and replace goal K>4, Mg>2 and PO4>3.5    VTE Prophylaxis:  Mechanical VTE prophylaxis orders are present.             Disposition Planning:     Barriers to Discharge:nephrology work up, response to treatment  Anticipated Date of Discharge: 2-3 days  Place of Discharge:  pending course      Time: 35 minutes     Code Status and Medical Interventions: CPR (Attempt to Resuscitate); Full Support   Ordered at: 02/25/25 1559     Code Status (Patient has no pulse and is not breathing):    CPR (Attempt to Resuscitate)     Medical Interventions (Patient has pulse or is breathing):    Full Support       Signature: Electronically signed by Shreyas Schultz MD, 03/02/25, 12:03 EST.  [facility]ist Team

## 2025-03-03 VITALS
SYSTOLIC BLOOD PRESSURE: 120 MMHG | RESPIRATION RATE: 14 BRPM | BODY MASS INDEX: 28.18 KG/M2 | WEIGHT: 196.87 LBS | HEIGHT: 70 IN | DIASTOLIC BLOOD PRESSURE: 67 MMHG | TEMPERATURE: 98.3 F | OXYGEN SATURATION: 96 % | HEART RATE: 94 BPM

## 2025-03-03 LAB
ANION GAP SERPL CALCULATED.3IONS-SCNC: 11.8 MMOL/L (ref 5–15)
BASOPHILS # BLD AUTO: 0.01 10*3/MM3 (ref 0–0.2)
BASOPHILS NFR BLD AUTO: 0.2 % (ref 0–1.5)
BUN SERPL-MCNC: 46 MG/DL (ref 8–23)
BUN/CREAT SERPL: 20.4 (ref 7–25)
C-ANCA TITR SER IF: NORMAL TITER
CALCIUM SPEC-SCNC: 9.2 MG/DL (ref 8.6–10.5)
CHLORIDE SERPL-SCNC: 98 MMOL/L (ref 98–107)
CO2 SERPL-SCNC: 28.2 MMOL/L (ref 22–29)
CREAT SERPL-MCNC: 2.25 MG/DL (ref 0.76–1.27)
DEPRECATED RDW RBC AUTO: 42.7 FL (ref 37–54)
EGFRCR SERPLBLD CKD-EPI 2021: 29.7 ML/MIN/1.73
EOSINOPHIL # BLD AUTO: 0.19 10*3/MM3 (ref 0–0.4)
EOSINOPHIL NFR BLD AUTO: 3.3 % (ref 0.3–6.2)
ERYTHROCYTE [DISTWIDTH] IN BLOOD BY AUTOMATED COUNT: 12.8 % (ref 12.3–15.4)
GLUCOSE BLDC GLUCOMTR-MCNC: 184 MG/DL (ref 70–105)
GLUCOSE BLDC GLUCOMTR-MCNC: 270 MG/DL (ref 70–105)
GLUCOSE SERPL-MCNC: 202 MG/DL (ref 65–99)
HCT VFR BLD AUTO: 35.6 % (ref 37.5–51)
HGB BLD-MCNC: 11.2 G/DL (ref 13–17.7)
IMM GRANULOCYTES # BLD AUTO: 0.02 10*3/MM3 (ref 0–0.05)
IMM GRANULOCYTES NFR BLD AUTO: 0.4 % (ref 0–0.5)
LYMPHOCYTES # BLD AUTO: 1.31 10*3/MM3 (ref 0.7–3.1)
LYMPHOCYTES NFR BLD AUTO: 23 % (ref 19.6–45.3)
MCH RBC QN AUTO: 28.9 PG (ref 26.6–33)
MCHC RBC AUTO-ENTMCNC: 31.5 G/DL (ref 31.5–35.7)
MCV RBC AUTO: 92 FL (ref 79–97)
MONOCYTES # BLD AUTO: 0.54 10*3/MM3 (ref 0.1–0.9)
MONOCYTES NFR BLD AUTO: 9.5 % (ref 5–12)
MYELOPEROXIDASE AB SER IA-ACNC: <0.2 UNITS (ref 0–0.9)
NEUTROPHILS NFR BLD AUTO: 3.63 10*3/MM3 (ref 1.7–7)
NEUTROPHILS NFR BLD AUTO: 63.6 % (ref 42.7–76)
NRBC BLD AUTO-RTO: 0 /100 WBC (ref 0–0.2)
P-ANCA ATYPICAL TITR SER IF: NORMAL TITER
P-ANCA TITR SER IF: NORMAL TITER
PLATELET # BLD AUTO: 140 10*3/MM3 (ref 140–450)
PMV BLD AUTO: 10.4 FL (ref 6–12)
POTASSIUM SERPL-SCNC: 3.4 MMOL/L (ref 3.5–5.2)
PROTEINASE3 AB SER IA-ACNC: <0.2 UNITS (ref 0–0.9)
RBC # BLD AUTO: 3.87 10*6/MM3 (ref 4.14–5.8)
SODIUM SERPL-SCNC: 138 MMOL/L (ref 136–145)
WBC NRBC COR # BLD AUTO: 5.7 10*3/MM3 (ref 3.4–10.8)

## 2025-03-03 PROCEDURE — 85025 COMPLETE CBC W/AUTO DIFF WBC: CPT | Performed by: STUDENT IN AN ORGANIZED HEALTH CARE EDUCATION/TRAINING PROGRAM

## 2025-03-03 PROCEDURE — 82948 REAGENT STRIP/BLOOD GLUCOSE: CPT

## 2025-03-03 PROCEDURE — 80048 BASIC METABOLIC PNL TOTAL CA: CPT | Performed by: STUDENT IN AN ORGANIZED HEALTH CARE EDUCATION/TRAINING PROGRAM

## 2025-03-03 PROCEDURE — 63710000001 INSULIN LISPRO (HUMAN) PER 5 UNITS: Performed by: STUDENT IN AN ORGANIZED HEALTH CARE EDUCATION/TRAINING PROGRAM

## 2025-03-03 PROCEDURE — 63710000001 INSULIN LISPRO (HUMAN) PER 5 UNITS

## 2025-03-03 RX ORDER — POTASSIUM CHLORIDE 1500 MG/1
20 TABLET, EXTENDED RELEASE ORAL DAILY
Qty: 30 TABLET | Refills: 0 | Status: SHIPPED | OUTPATIENT
Start: 2025-03-03 | End: 2025-04-02

## 2025-03-03 RX ORDER — INSULIN LISPRO 100 [IU]/ML
2-7 INJECTION, SOLUTION INTRAVENOUS; SUBCUTANEOUS
Start: 2025-03-03 | End: 2025-04-02

## 2025-03-03 RX ORDER — INSULIN LISPRO 100 [IU]/ML
8 INJECTION, SOLUTION INTRAVENOUS; SUBCUTANEOUS
Start: 2025-03-03 | End: 2025-04-02

## 2025-03-03 RX ORDER — LEVOFLOXACIN 750 MG/1
750 TABLET, FILM COATED ORAL EVERY OTHER DAY
Qty: 3 TABLET | Refills: 0 | Status: SHIPPED | OUTPATIENT
Start: 2025-03-05 | End: 2025-03-10

## 2025-03-03 RX ORDER — BUMETANIDE 1 MG/1
1 TABLET ORAL 2 TIMES DAILY
Qty: 60 TABLET | Refills: 0 | Status: SHIPPED | OUTPATIENT
Start: 2025-03-03 | End: 2025-04-02

## 2025-03-03 RX ADMIN — LEVOFLOXACIN 750 MG: 750 TABLET, FILM COATED ORAL at 08:52

## 2025-03-03 RX ADMIN — INSULIN LISPRO 4 UNITS: 100 INJECTION, SOLUTION INTRAVENOUS; SUBCUTANEOUS at 12:31

## 2025-03-03 RX ADMIN — INSULIN LISPRO 8 UNITS: 100 INJECTION, SOLUTION INTRAVENOUS; SUBCUTANEOUS at 08:52

## 2025-03-03 RX ADMIN — INSULIN LISPRO 2 UNITS: 100 INJECTION, SOLUTION INTRAVENOUS; SUBCUTANEOUS at 08:52

## 2025-03-03 RX ADMIN — INSULIN LISPRO 8 UNITS: 100 INJECTION, SOLUTION INTRAVENOUS; SUBCUTANEOUS at 12:31

## 2025-03-03 RX ADMIN — ROSUVASTATIN CALCIUM 40 MG: 10 TABLET, FILM COATED ORAL at 08:52

## 2025-03-03 RX ADMIN — CLOPIDOGREL BISULFATE 75 MG: 75 TABLET ORAL at 08:52

## 2025-03-03 NOTE — PLAN OF CARE
Problem: Skin Injury Risk Increased  Goal: Skin Health and Integrity  Outcome: Met  Intervention: Optimize Skin Protection  Recent Flowsheet Documentation  Taken 3/3/2025 0845 by Marissa Caballero RN  Activity Management: activity encouraged  Pressure Reduction Techniques: frequent weight shift encouraged  Head of Bed (HOB) Positioning: HOB elevated  Pressure Reduction Devices: pressure-redistributing mattress utilized  Skin Protection:   incontinence pads utilized   silicone foam dressing in place     Problem: Fall Injury Risk  Goal: Absence of Fall and Fall-Related Injury  Outcome: Met  Intervention: Identify and Manage Contributors  Recent Flowsheet Documentation  Taken 3/3/2025 0845 by Marissa Caballero RN  Medication Review/Management: medications reviewed  Self-Care Promotion: independence encouraged  Intervention: Promote Injury-Free Environment  Recent Flowsheet Documentation  Taken 3/3/2025 1200 by Marissa Caballero RN  Safety Promotion/Fall Prevention:   fall prevention program maintained   safety round/check completed  Taken 3/3/2025 1015 by Marissa Caballero RN  Safety Promotion/Fall Prevention:   fall prevention program maintained   safety round/check completed  Taken 3/3/2025 0845 by Marissa Caballero RN  Safety Promotion/Fall Prevention:   fall prevention program maintained   safety round/check completed     Problem: Sepsis/Septic Shock  Goal: Optimal Coping  Outcome: Met  Intervention: Support Patient and Family Response  Recent Flowsheet Documentation  Taken 3/3/2025 0845 by Marissa Caballero RN  Family/Support System Care: self-care encouraged  Goal: Absence of Bleeding  Outcome: Met  Goal: Blood Glucose Level Within Target Range  Outcome: Met  Intervention: Optimize Glycemic Control  Recent Flowsheet Documentation  Taken 3/3/2025 0845 by Marissa Caballero RN  Hyperglycemia Management: blood glucose monitored  Hypoglycemia Management: blood glucose monitored  Goal: Absence of Infection  Signs and Symptoms  Outcome: Met  Intervention: Initiate Sepsis Management  Recent Flowsheet Documentation  Taken 3/3/2025 0845 by Marissa Caballero, RN  Infection Prevention: single patient room provided  Intervention: Promote Recovery  Recent Flowsheet Documentation  Taken 3/3/2025 0845 by Marissa Caballero, RN  Activity Management: activity encouraged  Goal: Optimal Nutrition Delivery  Outcome: Met   Goal Outcome Evaluation:   Pt to discharge to [facility] rehab facility, transporting via their wheelchair van. His IV's were removed. Discharge paperwork discussed and sent home with patient. Paperwork sent with  of van to give to STEPHANIE. Wife was at bedside. No concerns. Attempted to call report to STEPHANIE and left a voicemail, will re attempt.

## 2025-03-03 NOTE — PLAN OF CARE
Goal Outcome Evaluation:      Patient pleasant and cooperative. [place]. Refused Bumex this evening. Encouraged to turn every two hours, but patient refuses at times. Educated. Currently resting.

## 2025-03-03 NOTE — PROGRESS NOTES
Patient Name: Blas Mojica  : 1950  MRN: [id number]  Primary Care Physician: Sahil Read MD  Date of admission: 2025    Patient Care Team:  Sahil Read MD as PCP - General        Subjective   Subjective:   Patient seen and examined, sleepy no distress  Review of systems:  All other review of system unremarkable      Allergies:    Allergies   Allergen Reactions    Contrast Dye (Echo Or Unknown Ct/Mr) Anaphylaxis     Not Lumason    Iodinated Contrast Media Shortness Of Breath    Iodine Shortness Of Breath    Aspirin Nausea And Vomiting    Lipitor [Atorvastatin] Unknown - High Severity    Prednisone Hives       Objective   Exam:     Vital Signs  Temp:  [97.9 °F (36.6 °C)-98.6 °F (37 °C)] 98.3 °F (36.8 °C)  Heart Rate:  [86-99] 94  Resp:  [14-22] 14  BP: (110-121)/(59-75) 120/67  SpO2:  [95 %-98 %] 96 %  on  Flow (L/min) (Oxygen Therapy):  [2] 2;   Device (Oxygen Therapy): room air  Body mass index is 28.25 kg/m².    General: Elderly white male in no acute distress.    Head:      Normocephalic and atraumatic.    Eyes:      PERRL/EOM intact, conjunctivae and sclerae clear without nystagmus.    Neck:      No masses, thyromegaly,  trachea central with normal respiratory effort   Lungs:    Clear bilaterally to auscultation.    Heart:      Regular rate and rhythm, no murmur no gallop  Abd:        Soft, nontender, not distended, bowel sounds positive, no shifting dullness   Pulses:   Pulses palpable  Extr:        No cyanosis or clubbing--mild edema.    Neuro:    No focal deficits.   alert oriented x3  Skin:       Intact without lesions or rashes.    Psych:    Alert and cooperative; normal mood and affect; .      Results Review:  I have personally reviewed most recent Data :  CBC    Results from last 7 days   Lab Units 25  0232 25  0515 25  0318 25  0248 25  2331 25  0433 25  1029   WBC 10*3/mm3 5.70 5.82 4.83 5.47 8.97 12.52* 11.49*   HEMOGLOBIN  g/dL 11.2* 11.9* 10.8* 10.3* 9.6* 11.7* 11.8*   PLATELETS 10*3/mm3 140 129* 101* 97* 79* 97* 109*     CMP   Results from last 7 days   Lab Units 03/03/25  0232 03/02/25  0515 03/01/25  1720 03/01/25  0318 02/28/25  1703 02/28/25  1031 02/28/25  0248 02/27/25  1218 02/26/25  2331 02/26/25  1129 02/26/25  0433 02/25/25  1029   SODIUM mmol/L 138 139  --  135*  --   --  135*  --  136  --  139 142   POTASSIUM mmol/L 3.4* 3.0* 4.0 3.1* 3.0* 3.0* 3.0*   < > 2.8*  --  3.5 3.7   CHLORIDE mmol/L 98 97*  --  96*  --   --  101  --  103  --  103 105   CO2 mmol/L 28.2 28.3  --  26.6  --   --  21.8*  --  21.5*  --  23.3 23.7   BUN mg/dL 46* 42*  --  43*  --   --  45*  --  44*  --  47* 49*   CREATININE mg/dL 2.25* 2.37*  --  2.38*  --   --  2.55*  --  2.42*  --  2.57* 2.25*   GLUCOSE mg/dL 202* 142*  --  332*  --   --  374*  --  182*  --  198* 125*   ALBUMIN g/dL  --   --   --   --   --   --   --   --  2.9* 3.2  --  3.9   BILIRUBIN mg/dL  --   --   --   --   --   --   --   --  0.5  --   --  0.7   ALK PHOS U/L  --   --   --   --   --   --   --   --  73  --   --  85   AST (SGOT) U/L  --   --   --   --   --   --   --   --  22  --   --  19   ALT (SGPT) U/L  --   --   --   --   --   --   --   --  10  --   --  12   LIPASE U/L  --   --   --   --   --   --   --   --   --   --   --  29    < > = values in this interval not displayed.     ABG      No radiology results for the last day    Results for orders placed during the hospital encounter of 02/25/25    Adult Transthoracic Echo Complete W/ Cont if Necessary Per Protocol    Interpretation Summary    Left ventricular systolic function is normal. Left ventricular ejection fraction appears to be 61 - 65%.    Left ventricular diastolic function is consistent with (grade I) impaired relaxation.    Estimated right ventricular systolic pressure from tricuspid regurgitation is normal (<35 mmHg).    Scheduled Meds:bumetanide, 1 mg, Oral, BID Diuretics  clopidogrel, 75 mg, Oral, Daily  insulin  glargine, 30 Units, Subcutaneous, Nightly  insulin lispro, 2-7 Units, Subcutaneous, 4x Daily AC & at Bedtime  insulin lispro, 8 Units, Subcutaneous, TID With Meals  levoFLOXacin, 750 mg, Oral, Every Other Day  [Held by provider] lisinopril, 40 mg, Oral, Daily  [Held by provider] pioglitazone, 30 mg, Oral, Daily  potassium chloride ER, 40 mEq, Oral, Once  rosuvastatin, 40 mg, Oral, Daily      Continuous Infusions:   PRN Meds:  acetaminophen **OR** acetaminophen **OR** acetaminophen    senna-docusate sodium **AND** polyethylene glycol **AND** bisacodyl **AND** bisacodyl    Calcium Replacement - Follow Nurse / BPA Driven Protocol    dextrose    dextrose    glucagon (human recombinant)    Magnesium Standard Dose Replacement - Follow Nurse / BPA Driven Protocol    melatonin    ondansetron ODT **OR** ondansetron    Phosphorus Replacement - Follow Nurse / BPA Driven Protocol    Potassium Replacement - Follow Nurse / BPA Driven Protocol    Assessment & Plan   Assessment and Plan:         Acute UTI (urinary tract infection)    Acute kidney injury    ASSESSMENT:  Acute kidney injury likely multifactorial including presence of bacteremia with some hematuria and proteinuria although renal ultrasound is unremarkable, worsening creatinine might be related to underlying bacteremia  Chronic kidney disease stage IIIa with most recent renal ultrasound right kidney 10.8 left kidney 10.2 cm  Proteinuria and hematuria need to be quantified still pending complements normal,  Complements normal, Bacteremia with Klebsiella pneumoniae  Urosepsis  History of diabetes mellitus type 2  History of hypertension  Congestive heart failure with grade 1 diastolic dysfunction last echo done in 2023 EF normal BNP level is elevated, 1340    PLAN :   Renal function is stable  Complete serology and ANCA still pending  Complements are normal, MARILU is negative  Serum protein electrophoresis acceptable  Continue Bumex 1 mg twice a day  Control blood sugar  aggressively  Potassium being supplemented  Echo result appreciated EF is 60% with some diastolic dysfunctions grade 1  significant abdominal distention but CAT scan of the abdomen was unremarkablearge   ACE inhibitors once creatinine back to baseline okay to be started at the time of discharge  Discussed with family at bedside  Discussed with patient's nurse            Electronically signed by Mark Handy MD,   [facility] kidney consultant  [phone]  3/3/2025  14:36 EST

## 2025-03-03 NOTE — SIGNIFICANT NOTE
03/03/25 1309   OTHER   Discipline physical therapist   Rehab Time/Intention   Session Not Performed other (see comments)  (d/c orders signed, will f/u if pt remains admitted)   Therapy Assessment/Plan (PT)   Criteria for Skilled Interventions Met (PT) yes;meets criteria;skilled treatment is necessary

## 2025-03-03 NOTE — DISCHARGE SUMMARY
"             [facility] Medicine Services  Discharge Summary    Date of Service: 3/3/2025  Patient Name: Blas Mojcia  : 1950  MRN: [id number]    Date of Admission: 2025  Discharge Diagnosis: Acute UTI (urinary tract infection)  Date of Discharge: 3/3/2025  Primary Care Physician: Sahil Read MD      Presenting Problem:   Weakness [R53.1]  Acute kidney injury [N17.9]  Urinary tract infection with hematuria, site unspecified [N39.0, R31.9]  Acute renal failure superimposed on chronic kidney disease, unspecified acute renal failure type, unspecified CKD stage [N17.9, N18.9]    Active and Resolved Hospital Problems:  Active Hospital Problems    Diagnosis POA    **Acute UTI (urinary tract infection) [N39.0] Unknown    Acute kidney injury [N17.9] Yes      Resolved Hospital Problems   No resolved problems to display.         Hospital Course     HPI:    \"Blas Mojica is a 74 y.o. male with a CMH of T2DM, HTN, HLD, PUD with h/o gastric ulcers, history of A-fib who presented to [facility] on 2025 with reports of generalized malaise and fatigue x 1 week.  Wife is at bedside who assist with history.  Per wife, patient has not been active as he usually is.  Wife reports that patient then since developed bloody urine that began yesterday.  Patient endorses suprapubic abdominal pain dysuria and urinary frequency and urgency changes.  Denies flank pain.  Denies fever and chills.  Patient reports that he follows with Dr. Pinto  for nephrology services.     On ED evaluation, patient was noted to be tachycardic with heart rate in the 110s.  CMP was remarkable for creatinine of 2.25 with a BUN of 49.  CBC with mild leukocytosis with WBC of 11.4.  Lactic acid was 0.8.  UA with TNTC WBCs and 1+ bacteria. Patient was given tylenol and 500ml IVF bolus. Hospitalist service to admit for further management.   \"    Hospital Course:  Acute kidney injury superimposed CKD 3  Likely multifactorial " including insetting of bacteremia   Renal following  Monitor BMP  Avoid nephrotoxic drugs   Cr is trending down  Discussed with renal, okay to discharge patient with outpatient follow up in 1 week.   Recommended to continue Bumex and add oral K supplement      Bacteremia  UTI  ID recommendations appreciated  Discontinue IV ceftriaxone   continue p.o. Levaquin 750 mg every other day to complete 14 days of treatment  QTc interval was appropriate for quinolone use  Continue supportive care  Okay to discharge from Infectious Disease standpoint      Diabetes mellitus type 2-  increase Lantus for better control  SSI  continue insulin glargine to 30 units nightly  Add insulin lispro 8 units TID with meals  Continue ISS  Monitor POC glucose      Hypertension- on bumex , add oral potassium supplement while on Bumex  Hyperlipidemia-continue statin           DISCHARGE Follow Up Recommendations for labs and diagnostics:   Follow up with primary care provider within 3 days of this discharge.   Follow up Renal in 1  week to recheck BMP.  continue p.o. Levaquin 750 mg every other day to complete 14 days of treatment        Day of Discharge     Vital Signs:  Temp:  [97.9 °F (36.6 °C)-98.6 °F (37 °C)] 98.5 °F (36.9 °C)  Heart Rate:  [86-99] 86  Resp:  [18-22] 22  BP: (110-138)/(59-87) 118/59  Flow (L/min) (Oxygen Therapy):  [2] 2          Pertinent  and/or Most Recent Results     LAB RESULTS:      Lab 03/03/25  0232 03/02/25  0515 03/01/25  0318 02/28/25  0248 02/26/25  2331 02/26/25  1025 02/26/25  0433 02/25/25  1034   WBC 5.70 5.82 4.83 5.47 8.97  --  12.52*  --    HEMOGLOBIN 11.2* 11.9* 10.8* 10.3* 9.6*  --  11.7*  --    HEMATOCRIT 35.6* 36.5* 33.3* 32.4* 30.6*  --  36.9*  --    PLATELETS 140 129* 101* 97* 79*  --  97*  --    NEUTROS ABS 3.63 3.72 3.64 4.31 7.98*  --  11.14*  --    IMMATURE GRANS (ABS) 0.02 0.02 0.03 0.02  --   --  0.10*  --    LYMPHS ABS 1.31 1.36 0.61* 0.63*  --   --  0.55*  --    MONOS ABS 0.54 0.53 0.43 0.42   --   --  0.71  --    EOS ABS 0.19 0.18 0.10 0.08 0.09  --  0.00  --    MCV 92.0 90.6 90.5 93.1 94.7  --  95.1  --    LACTATE  --   --   --   --   --   --   --  0.8   LDH  --   --   --   --   --  261*  --   --          Lab 03/03/25  0232 03/02/25  0515 03/01/25  1720 03/01/25  0318 02/28/25  1703 02/28/25  1031 02/28/25  0248 02/27/25  1218 02/26/25  2331 02/26/25  0433   SODIUM 138 139  --  135*  --   --  135*  --  136 139   POTASSIUM 3.4* 3.0* 4.0 3.1* 3.0*   < > 3.0*   < > 2.8* 3.5   CHLORIDE 98 97*  --  96*  --   --  101  --  103 103   CO2 28.2 28.3  --  26.6  --   --  21.8*  --  21.5* 23.3   ANION GAP 11.8 13.7  --  12.4  --   --  12.2  --  11.5 12.7   BUN 46* 42*  --  43*  --   --  45*  --  44* 47*   CREATININE 2.25* 2.37*  --  2.38*  --   --  2.55*  --  2.42* 2.57*   EGFR 29.7* 27.9*  --  27.7*  --   --  25.5*  --  27.2* 25.3*   GLUCOSE 202* 142*  --  332*  --   --  374*  --  182* 198*   CALCIUM 9.2 9.5  --  8.9  --   --  8.9  --  8.3* 9.0   MAGNESIUM  --   --   --  1.8  --   --   --   --  1.9 1.9   PHOSPHORUS  --   --   --   --   --   --   --   --  3.1  --    HEMOGLOBIN A1C  --   --   --   --   --   --   --   --   --  8.52*    < > = values in this interval not displayed.         Lab 02/26/25  2331 02/26/25  1129 02/25/25  1029   TOTAL PROTEIN 5.7* 6.7 7.1   ALBUMIN 2.9* 3.2 3.9   GLOBULIN 2.8  --  3.2   GLOBULINREF  --  3.5  --    ALT (SGPT) 10  --  12   AST (SGOT) 22  --  19   BILIRUBIN 0.5  --  0.7   ALK PHOS 73  --  85   LIPASE  --   --  29         Lab 02/26/25  2331   PROBNP 1,340.0                 Brief Urine Lab Results  (Last result in the past 365 days)        Color   Clarity   Blood   Leuk Est   Nitrite   Protein   CREAT   Urine HCG        02/28/25 1040             46.0         02/28/25 1040 Yellow   Cloudy   Moderate (2+)   Trace   Negative   100 mg/dL (2+)                 Microbiology Results (last 10 days)       Procedure Component Value - Date/Time    Urine Culture - Urine, Straight Cath  [[id number]]  (Abnormal)  (Susceptibility) Collected: 02/25/25 1226    Lab Status: Final result Specimen: Urine from Straight Cath Updated: 02/27/25 0906     Urine Culture >100,000 CFU/mL Klebsiella pneumoniae ssp pneumoniae    Narrative:      Colonization of the urinary tract without infection is common. Treatment is discouraged unless the patient is symptomatic, pregnant, or undergoing an invasive urologic procedure.    Susceptibility        Klebsiella pneumoniae ssp pneumoniae      HARRY      Amoxicillin + Clavulanate Susceptible      Ampicillin Resistant      Ampicillin + Sulbactam Susceptible      Cefazolin (Urine) Susceptible      Cefepime Susceptible      Ceftazidime Susceptible      Ceftriaxone Susceptible      Gentamicin Susceptible      Levofloxacin Susceptible      Nitrofurantoin Intermediate      Piperacillin + Tazobactam Susceptible      Trimethoprim + Sulfamethoxazole Susceptible                           Respiratory Panel PCR w/COVID-19(SARS-CoV-2) KEVIN/HORTENSIA/SLOAN/PAD/COR/JOHNNY In-House, NP Swab in UTM/VTM, 2 HR TAT - Swab, Nasopharynx [[id number]]  (Normal) Collected: 02/25/25 1037    Lab Status: Final result Specimen: Swab from Nasopharynx Updated: 02/25/25 1143     ADENOVIRUS, PCR Not Detected     Coronavirus 229E Not Detected     Coronavirus HKU1 Not Detected     Coronavirus NL63 Not Detected     Coronavirus OC43 Not Detected     COVID19 Not Detected     Human Metapneumovirus Not Detected     Human Rhinovirus/Enterovirus Not Detected     Influenza A PCR Not Detected     Influenza B PCR Not Detected     Parainfluenza Virus 1 Not Detected     Parainfluenza Virus 2 Not Detected     Parainfluenza Virus 3 Not Detected     Parainfluenza Virus 4 Not Detected     RSV, PCR Not Detected     Bordetella pertussis pcr Not Detected     Bordetella parapertussis PCR Not Detected     Chlamydophila pneumoniae PCR Not Detected     Mycoplasma pneumo by PCR Not Detected    Narrative:      In the setting of a positive  respiratory panel with a viral infection PLUS a negative procalcitonin without other underlying concern for bacterial infection, consider observing off antibiotics or discontinuation of antibiotics and continue supportive care. If the respiratory panel is positive for atypical bacterial infection (Bordetella pertussis, Chlamydophila pneumoniae, or Mycoplasma pneumoniae), consider antibiotic de-escalation to target atypical bacterial infection.    Blood Culture - Blood, Arm, Left [[id number]]  (Abnormal) Collected: 02/25/25 1029    Lab Status: Final result Specimen: Blood from Arm, Left Updated: 02/28/25 0616     Blood Culture Klebsiella pneumoniae ssp pneumoniae     Isolated from Aerobic Bottle     Gram Stain Aerobic Bottle Gram negative bacilli    Narrative:      Refer to previous blood culture collected on 02/25/2025 1024 for MICs    Less than seven (7) mL's of blood was collected.  Insufficient quantity may yield false negative results.    Blood Culture - Blood, Arm, Right [[id number]]  (Abnormal)  (Susceptibility) Collected: 02/25/25 1024    Lab Status: Final result Specimen: Blood from Arm, Right Updated: 02/28/25 0616     Blood Culture Klebsiella pneumoniae ssp pneumoniae     Isolated from Aerobic and Anaerobic Bottles     Gram Stain Anaerobic Bottle Gram negative bacilli, tiny      Aerobic Bottle Gram negative bacilli    Susceptibility        Klebsiella pneumoniae ssp pneumoniae      HARRY      Amoxicillin + Clavulanate Susceptible      Ampicillin Resistant      Ampicillin + Sulbactam Susceptible      Cefazolin (Non Urine) Susceptible      Cefepime Susceptible      Ceftazidime Susceptible      Ceftriaxone Susceptible      Gentamicin Susceptible      Levofloxacin Susceptible      Piperacillin + Tazobactam Susceptible      Trimethoprim + Sulfamethoxazole Susceptible                       Susceptibility Comments       Klebsiella pneumoniae ssp pneumoniae    With the exception of urinary-sourced infections,  aminoglycosides should not be used as monotherapy.               Blood Culture ID, PCR - Blood, Arm, Right [[id number]]  (Abnormal) Collected: 02/25/25 1024    Lab Status: Final result Specimen: Blood from Arm, Right Updated: 02/26/25 0412     BCID, PCR Klebsiella pneumoniae group. Identification by BCID2 PCR.     BOTTLE TYPE Anaerobic Bottle    Narrative:      No resistance genes detected.            CT Head Without Contrast    Result Date: 2/27/2025  Impression: Impression: 1.No acute intracranial abnormality identified. 2.Diffuse brain atrophy with chronic microvascular ischemic changes 3.Mild amount of fluid in the right mastoids. Electronically Signed: Will Flores MD  2/27/2025 11:29 AM EST  Workstation ID: [id number]    US Renal Bilateral    Result Date: 2/25/2025  Impression: Impression: No acute renal findings. No hydronephrosis. Electronically Signed: Isaiah Wheatley MD  2/25/2025 9:52 PM EST  Workstation ID: [id number]    XR Chest 1 View    Result Date: 2/25/2025  Impression: Impression: 1. Mild linear left basilar atelectasis. 2. Mild cardiomegaly. Electronically Signed: Vitor Pena MD  2/25/2025 11:06 AM EST  Workstation ID: [id number]    CT Abdomen Pelvis Without Contrast    Result Date: 2/25/2025  Impression: Impression: 1.No acute findings in the abdomen or pelvis. 2.Stable chronic ancillary findings as above Electronically Signed: Will Flores MD  2/25/2025 11:01 AM EST  Workstation ID: [id number]     Results for orders placed during the hospital encounter of 02/07/24    Duplex Venous Lower Extremity - Left    Interpretation Summary    Chronic left lower extremity superficial thrombophlebitis noted in the small saphenous.    All other left sided veins appeared normal.      Results for orders placed during the hospital encounter of 02/07/24    Duplex Venous Lower Extremity - Left    Interpretation Summary    Chronic left lower extremity superficial thrombophlebitis noted in the small saphenous.    All  other left sided veins appeared normal.      Results for orders placed during the hospital encounter of 02/25/25    Adult Transthoracic Echo Complete W/ Cont if Necessary Per Protocol    Interpretation Summary    Left ventricular systolic function is normal. Left ventricular ejection fraction appears to be 61 - 65%.    Left ventricular diastolic function is consistent with (grade I) impaired relaxation.    Estimated right ventricular systolic pressure from tricuspid regurgitation is normal (<35 mmHg).      Labs Pending at Discharge:  Pending Results       Procedure [Order ID] Specimen - Date/Time    ANCA Panel [[id number]] Collected: 02/26/25 2331    Specimen: Blood Updated: 02/26/25 2337    Glomerular Basement Membrane Antibodies [[id number]] Collected: 02/26/25 1129    Specimen: Blood Updated: 02/26/25 1130            Procedures Performed           Consults:   Consults       Date and Time Order Name Status Description    2/26/2025 10:11 AM Inpatient Infectious Diseases Consult Completed     2/25/2025  4:18 PM Inpatient Nephrology Consult                Discharge Details        Discharge Medications        New Medications        Instructions Start Date   bumetanide 1 MG tablet  Commonly known as: BUMEX   1 mg, Oral, 2 Times Daily      insulin lispro 100 UNIT/ML injection  Commonly known as: HUMALOG/ADMELOG   2-7 Units, Subcutaneous, 4 Times Daily Before Meals & Nightly, Blood Glucose 150-199 mg/dL - 2 units Blood Glucose 200-249 mg/dL - 3 units Blood Glucose 250-299 mg/dL - 4 units Blood Glucose 300-349 mg/dL - 5 units Blood Glucose 350-400 mg/dL - 6 units Blood Glucose Greater Than 400 mg/dL - 7 units      insulin lispro 100 UNIT/ML injection  Commonly known as: HUMALOG/ADMELOG   8 Units, Subcutaneous, 3 Times Daily With Meals      levoFLOXacin 750 MG tablet  Commonly known as: LEVAQUIN   750 mg, Oral, Every Other Day   Start Date: March 5, 2025     potassium chloride 20 MEQ CR tablet  Commonly known as: KLOR-CON  M20   20 mEq, Oral, Daily             Continue These Medications        Instructions Start Date   clopidogrel 75 MG tablet  Commonly known as: PLAVIX   75 mg, Daily      insulin glargine 100 UNIT/ML injection  Commonly known as: LANTUS, SEMGLEE   30 Units, Nightly      rosuvastatin 40 MG tablet  Commonly known as: CRESTOR   40 mg, Daily             Stop These Medications      lisinopril 40 MG tablet  Commonly known as: PRINIVIL,ZESTRIL     pioglitazone 30 MG tablet  Commonly known as: ACTOS     triamterene-hydrochlorothiazide 75-50 MG per tablet  Commonly known as: MAXZIDE            ASK your doctor about these medications        Instructions Start Date   cefTRIAXone 1 g injection  Commonly known as: ROCEPHIN  Ask about: Should I take this medication?   2 g, Intravenous, Once               Allergies   Allergen Reactions    Contrast Dye (Echo Or Unknown Ct/Mr) Anaphylaxis     Not Lumason    Iodinated Contrast Media Shortness Of Breath    Iodine Shortness Of Breath    Aspirin Nausea And Vomiting    Lipitor [Atorvastatin] Unknown - High Severity    Prednisone Hives         Discharge Disposition:   Rehab Facility or Unit (DC - External)    Diet:  Hospital:  Diet Order   Procedures    Diet: Cardiac, Diabetic; Healthy Heart (2-3 Na+); Consistent Carbohydrate; Fluid Consistency: Thin (IDDSI 0)         Discharge Activity:   as tolerated       CODE STATUS:  Code Status and Medical Interventions: CPR (Attempt to Resuscitate); Full Support   Ordered at: 02/25/25 1559     Code Status (Patient has no pulse and is not breathing):    CPR (Attempt to Resuscitate)     Medical Interventions (Patient has pulse or is breathing):    Full Support         No future appointments.        Time spent on Discharge including face to face service:  >35 minutes    Signature: Electronically signed by Shreyas Schultz MD, 03/03/25, 10:55 EST.  [facility] Hospitalist Team

## 2025-03-04 LAB — GBM AB SER IA-ACNC: <0.2 UNITS (ref 0–0.9)

## 2025-03-04 NOTE — CASE MANAGEMENT/SOCIAL WORK
Case Management Discharge Note      Final Note: [facility]    Provided Post Acute Provider List?: N/A  Provided Post Acute Provider Quality & Resource List?: N/A    Selected Continued Care - Discharged on 3/3/2025 Admission date: 2/25/2025 - Discharge disposition: Rehab Facility or Unit (DC - External)      Destination Coordination complete.      Service Provider Services Address Phone Fax Patient Preferred    [facility] [address] [place] [address] [place] [address] [phone] --                 Transportation Services  W/C Van: Other (STEPHANIE jones)    Final Discharge Disposition Code: 62 - inpatient rehab facility

## 2025-03-08 ENCOUNTER — LAB REQUISITION (OUTPATIENT)
Dept: LAB | Facility: HOSPITAL | Age: 75
End: 2025-03-08
Payer: MEDICARE

## 2025-03-08 DIAGNOSIS — Z00.00 ENCOUNTER FOR GENERAL ADULT MEDICAL EXAMINATION WITHOUT ABNORMAL FINDINGS: ICD-10-CM

## 2025-03-08 LAB
BACTERIA UR QL AUTO: ABNORMAL /HPF
BASOPHILS # BLD AUTO: 0.02 10*3/MM3 (ref 0–0.2)
BASOPHILS NFR BLD AUTO: 0.2 % (ref 0–1.5)
BILIRUB UR QL STRIP: NEGATIVE
CLARITY UR: CLEAR
COLOR UR: YELLOW
CRP SERPL-MCNC: 1.62 MG/DL (ref 0–0.5)
DEPRECATED RDW RBC AUTO: 42 FL (ref 37–54)
EOSINOPHIL # BLD AUTO: 0.01 10*3/MM3 (ref 0–0.4)
EOSINOPHIL NFR BLD AUTO: 0.1 % (ref 0.3–6.2)
ERYTHROCYTE [DISTWIDTH] IN BLOOD BY AUTOMATED COUNT: 12.5 % (ref 12.3–15.4)
GLUCOSE UR STRIP-MCNC: ABNORMAL MG/DL
HCT VFR BLD AUTO: 39.3 % (ref 37.5–51)
HGB BLD-MCNC: 12.6 G/DL (ref 13–17.7)
HGB UR QL STRIP.AUTO: NEGATIVE
HYALINE CASTS UR QL AUTO: ABNORMAL /LPF
IMM GRANULOCYTES # BLD AUTO: 0.04 10*3/MM3 (ref 0–0.05)
IMM GRANULOCYTES NFR BLD AUTO: 0.3 % (ref 0–0.5)
KETONES UR QL STRIP: NEGATIVE
LEUKOCYTE ESTERASE UR QL STRIP.AUTO: NEGATIVE
LYMPHOCYTES # BLD AUTO: 0.57 10*3/MM3 (ref 0.7–3.1)
LYMPHOCYTES NFR BLD AUTO: 4.9 % (ref 19.6–45.3)
MCH RBC QN AUTO: 29.2 PG (ref 26.6–33)
MCHC RBC AUTO-ENTMCNC: 32.1 G/DL (ref 31.5–35.7)
MCV RBC AUTO: 91.2 FL (ref 79–97)
MONOCYTES # BLD AUTO: 0.58 10*3/MM3 (ref 0.1–0.9)
MONOCYTES NFR BLD AUTO: 5 % (ref 5–12)
NEUTROPHILS NFR BLD AUTO: 10.41 10*3/MM3 (ref 1.7–7)
NEUTROPHILS NFR BLD AUTO: 89.5 % (ref 42.7–76)
NITRITE UR QL STRIP: NEGATIVE
NRBC BLD AUTO-RTO: 0 /100 WBC (ref 0–0.2)
PH UR STRIP.AUTO: 7 [PH] (ref 5–8)
PLATELET # BLD AUTO: 202 10*3/MM3 (ref 140–450)
PMV BLD AUTO: 10.5 FL (ref 6–12)
PROT UR QL STRIP: ABNORMAL
RBC # BLD AUTO: 4.31 10*6/MM3 (ref 4.14–5.8)
RBC # UR STRIP: ABNORMAL /HPF
REF LAB TEST METHOD: ABNORMAL
SP GR UR STRIP: 1.01 (ref 1–1.03)
SQUAMOUS #/AREA URNS HPF: ABNORMAL /HPF
UROBILINOGEN UR QL STRIP: ABNORMAL
WBC # UR STRIP: ABNORMAL /HPF
WBC NRBC COR # BLD AUTO: 11.63 10*3/MM3 (ref 3.4–10.8)

## 2025-03-08 PROCEDURE — 81001 URINALYSIS AUTO W/SCOPE: CPT

## 2025-03-08 PROCEDURE — 85025 COMPLETE CBC W/AUTO DIFF WBC: CPT

## 2025-03-08 PROCEDURE — 86140 C-REACTIVE PROTEIN: CPT

## 2025-03-11 ENCOUNTER — LAB REQUISITION (OUTPATIENT)
Dept: LAB | Facility: HOSPITAL | Age: 75
DRG: 445 | End: 2025-03-11
Payer: MEDICARE

## 2025-03-11 DIAGNOSIS — Z00.00 ENCOUNTER FOR GENERAL ADULT MEDICAL EXAMINATION WITHOUT ABNORMAL FINDINGS: ICD-10-CM

## 2025-03-11 LAB
FLUAV RNA RESP QL NAA+PROBE: NOT DETECTED
FLUBV RNA RESP QL NAA+PROBE: NOT DETECTED
RSV RNA RESP QL NAA+PROBE: NOT DETECTED
SARS-COV-2 RNA RESP QL NAA+PROBE: NOT DETECTED

## 2025-03-11 PROCEDURE — 87637 SARSCOV2&INF A&B&RSV AMP PRB: CPT

## 2025-03-12 ENCOUNTER — APPOINTMENT (OUTPATIENT)
Dept: GENERAL RADIOLOGY | Facility: HOSPITAL | Age: 75
DRG: 445 | End: 2025-03-12
Payer: MEDICARE

## 2025-03-12 ENCOUNTER — HOSPITAL ENCOUNTER (INPATIENT)
Facility: HOSPITAL | Age: 75
LOS: 2 days | Discharge: NURSING, REHAB OR LONG-TERM CARE | DRG: 445 | End: 2025-03-15
Attending: STUDENT IN AN ORGANIZED HEALTH CARE EDUCATION/TRAINING PROGRAM | Admitting: STUDENT IN AN ORGANIZED HEALTH CARE EDUCATION/TRAINING PROGRAM
Payer: MEDICARE

## 2025-03-12 ENCOUNTER — LAB REQUISITION (OUTPATIENT)
Dept: LAB | Facility: HOSPITAL | Age: 75
DRG: 445 | End: 2025-03-12
Payer: MEDICARE

## 2025-03-12 DIAGNOSIS — A41.9 SEPSIS WITHOUT ACUTE ORGAN DYSFUNCTION, DUE TO UNSPECIFIED ORGANISM: Primary | ICD-10-CM

## 2025-03-12 DIAGNOSIS — Z00.00 ENCOUNTER FOR GENERAL ADULT MEDICAL EXAMINATION WITHOUT ABNORMAL FINDINGS: ICD-10-CM

## 2025-03-12 DIAGNOSIS — K81.0 ACUTE CHOLECYSTITIS: ICD-10-CM

## 2025-03-12 DIAGNOSIS — R73.9 HYPERGLYCEMIA: ICD-10-CM

## 2025-03-12 LAB
ALBUMIN SERPL-MCNC: 3.1 G/DL (ref 3.5–5.2)
ALBUMIN/GLOB SERPL: 0.8 G/DL
ALP SERPL-CCNC: 182 U/L (ref 39–117)
ALT SERPL W P-5'-P-CCNC: 44 U/L (ref 1–41)
ANION GAP SERPL CALCULATED.3IONS-SCNC: 11.7 MMOL/L (ref 5–15)
AST SERPL-CCNC: 97 U/L (ref 1–40)
B PARAPERT DNA SPEC QL NAA+PROBE: NOT DETECTED
B PERT DNA SPEC QL NAA+PROBE: NOT DETECTED
BASOPHILS # BLD AUTO: 0.03 10*3/MM3 (ref 0–0.2)
BASOPHILS NFR BLD AUTO: 0.2 % (ref 0–1.5)
BILIRUB SERPL-MCNC: 1.2 MG/DL (ref 0–1.2)
BUN SERPL-MCNC: 33 MG/DL (ref 8–23)
BUN/CREAT SERPL: 16.1 (ref 7–25)
C PNEUM DNA NPH QL NAA+NON-PROBE: NOT DETECTED
CALCIUM SPEC-SCNC: 9 MG/DL (ref 8.6–10.5)
CHLORIDE SERPL-SCNC: 90 MMOL/L (ref 98–107)
CO2 SERPL-SCNC: 33.3 MMOL/L (ref 22–29)
CREAT SERPL-MCNC: 2.05 MG/DL (ref 0.76–1.27)
DEPRECATED RDW RBC AUTO: 45 FL (ref 37–54)
EGFRCR SERPLBLD CKD-EPI 2021: 33.2 ML/MIN/1.73
EOSINOPHIL # BLD AUTO: 0.04 10*3/MM3 (ref 0–0.4)
EOSINOPHIL NFR BLD AUTO: 0.3 % (ref 0.3–6.2)
ERYTHROCYTE [DISTWIDTH] IN BLOOD BY AUTOMATED COUNT: 13.2 % (ref 12.3–15.4)
FLUAV SUBTYP SPEC NAA+PROBE: NOT DETECTED
FLUBV RNA ISLT QL NAA+PROBE: NOT DETECTED
GLOBULIN UR ELPH-MCNC: 3.7 GM/DL
GLUCOSE SERPL-MCNC: 440 MG/DL (ref 65–99)
HADV DNA SPEC NAA+PROBE: NOT DETECTED
HCOV 229E RNA SPEC QL NAA+PROBE: NOT DETECTED
HCOV HKU1 RNA SPEC QL NAA+PROBE: NOT DETECTED
HCOV NL63 RNA SPEC QL NAA+PROBE: NOT DETECTED
HCOV OC43 RNA SPEC QL NAA+PROBE: NOT DETECTED
HCT VFR BLD AUTO: 31.8 % (ref 37.5–51)
HEMOCCULT STL QL IA: POSITIVE
HGB BLD-MCNC: 10 G/DL (ref 13–17.7)
HMPV RNA NPH QL NAA+NON-PROBE: NOT DETECTED
HOLD SPECIMEN: NORMAL
HOLD SPECIMEN: NORMAL
HPIV1 RNA ISLT QL NAA+PROBE: NOT DETECTED
HPIV2 RNA SPEC QL NAA+PROBE: NOT DETECTED
HPIV3 RNA NPH QL NAA+PROBE: NOT DETECTED
HPIV4 P GENE NPH QL NAA+PROBE: NOT DETECTED
IMM GRANULOCYTES # BLD AUTO: 0.06 10*3/MM3 (ref 0–0.05)
IMM GRANULOCYTES NFR BLD AUTO: 0.5 % (ref 0–0.5)
LYMPHOCYTES # BLD AUTO: 0.63 10*3/MM3 (ref 0.7–3.1)
LYMPHOCYTES NFR BLD AUTO: 4.8 % (ref 19.6–45.3)
M PNEUMO IGG SER IA-ACNC: NOT DETECTED
MCH RBC QN AUTO: 29.2 PG (ref 26.6–33)
MCHC RBC AUTO-ENTMCNC: 31.4 G/DL (ref 31.5–35.7)
MCV RBC AUTO: 92.7 FL (ref 79–97)
MONOCYTES # BLD AUTO: 0.76 10*3/MM3 (ref 0.1–0.9)
MONOCYTES NFR BLD AUTO: 5.8 % (ref 5–12)
NEUTROPHILS NFR BLD AUTO: 11.62 10*3/MM3 (ref 1.7–7)
NEUTROPHILS NFR BLD AUTO: 88.4 % (ref 42.7–76)
NRBC BLD AUTO-RTO: 0 /100 WBC (ref 0–0.2)
PLATELET # BLD AUTO: 164 10*3/MM3 (ref 140–450)
PMV BLD AUTO: 10.3 FL (ref 6–12)
POTASSIUM SERPL-SCNC: 3.5 MMOL/L (ref 3.5–5.2)
PROT SERPL-MCNC: 6.8 G/DL (ref 6–8.5)
RBC # BLD AUTO: 3.43 10*6/MM3 (ref 4.14–5.8)
RHINOVIRUS RNA SPEC NAA+PROBE: NOT DETECTED
RSV RNA NPH QL NAA+NON-PROBE: NOT DETECTED
SARS-COV-2 RNA RESP QL NAA+PROBE: NOT DETECTED
SODIUM SERPL-SCNC: 135 MMOL/L (ref 136–145)
WBC NRBC COR # BLD AUTO: 13.14 10*3/MM3 (ref 3.4–10.8)
WHOLE BLOOD HOLD COAG: NORMAL
WHOLE BLOOD HOLD SPECIMEN: NORMAL

## 2025-03-12 PROCEDURE — 83605 ASSAY OF LACTIC ACID: CPT

## 2025-03-12 PROCEDURE — 71045 X-RAY EXAM CHEST 1 VIEW: CPT

## 2025-03-12 PROCEDURE — 85025 COMPLETE CBC W/AUTO DIFF WBC: CPT | Performed by: EMERGENCY MEDICINE

## 2025-03-12 PROCEDURE — 36415 COLL VENOUS BLD VENIPUNCTURE: CPT

## 2025-03-12 PROCEDURE — 86901 BLOOD TYPING SEROLOGIC RH(D): CPT | Performed by: EMERGENCY MEDICINE

## 2025-03-12 PROCEDURE — 87040 BLOOD CULTURE FOR BACTERIA: CPT | Performed by: EMERGENCY MEDICINE

## 2025-03-12 PROCEDURE — 86900 BLOOD TYPING SEROLOGIC ABO: CPT | Performed by: EMERGENCY MEDICINE

## 2025-03-12 PROCEDURE — 86850 RBC ANTIBODY SCREEN: CPT | Performed by: EMERGENCY MEDICINE

## 2025-03-12 PROCEDURE — 99285 EMERGENCY DEPT VISIT HI MDM: CPT

## 2025-03-12 PROCEDURE — 80053 COMPREHEN METABOLIC PANEL: CPT | Performed by: EMERGENCY MEDICINE

## 2025-03-12 PROCEDURE — 93005 ELECTROCARDIOGRAM TRACING: CPT | Performed by: NURSE PRACTITIONER

## 2025-03-12 PROCEDURE — 0202U NFCT DS 22 TRGT SARS-COV-2: CPT | Performed by: PHYSICAL MEDICINE & REHABILITATION

## 2025-03-12 PROCEDURE — 82274 ASSAY TEST FOR BLOOD FECAL: CPT | Performed by: PHYSICAL MEDICINE & REHABILITATION

## 2025-03-12 RX ORDER — SODIUM CHLORIDE 0.9 % (FLUSH) 0.9 %
10 SYRINGE (ML) INJECTION AS NEEDED
Status: DISCONTINUED | OUTPATIENT
Start: 2025-03-12 | End: 2025-03-15 | Stop reason: HOSPADM

## 2025-03-12 RX ORDER — ACETAMINOPHEN 500 MG
1000 TABLET ORAL ONCE
Status: DISCONTINUED | OUTPATIENT
Start: 2025-03-13 | End: 2025-03-13

## 2025-03-12 NOTE — LETTER
EMS Transport Request  For use at [facility] [place], [place], [place], and [place] only   Patient Name: Blas Mojica : 1950   Weight:96.6 kg (212 lb 15.4 oz) Pick-up Location: [facility] BLS/ALS: BLS/ALS: BLS   Insurance: MEDICARE Auth End Date: n/a   Pre-Cert #: D/C Summary complete:    Destination: Other [facility]   Contact Precautions: None   Equipment (O2, Fluids, etc.): O2, settings 2LPM    Arrive By Date/Time: any time Stretcher/WC: Stretcher   CM Requesting: Alysha Nava RN Ext: [phone]   Notes/Medical Necessity: 2LPM oxygen, biliary drain, DTI to coccyx. Per nursing, patient unable to sit up due to pain from drain. Facility cannot transport. Report called to facility.      ______________________________________________________________________    *Only 2 patient bags OR 1 carry-on size bag are permitted.  Wheelchairs and walkers CANNOT transported with the patient. Acknowledge: Yes

## 2025-03-12 NOTE — Clinical Note
Level of Care: Med/Surg [1]   Diagnosis: Acute cholecystitis [575.0.ICD-9-CM]   Admitting Physician: NILES HARRIS [[id number]]   Certification: I Certify That Inpatient Hospital Services Are Medically Necessary For Greater Than 2 Midnights

## 2025-03-13 ENCOUNTER — APPOINTMENT (OUTPATIENT)
Dept: CT IMAGING | Facility: HOSPITAL | Age: 75
DRG: 445 | End: 2025-03-13
Payer: MEDICARE

## 2025-03-13 ENCOUNTER — APPOINTMENT (OUTPATIENT)
Dept: NUCLEAR MEDICINE | Facility: HOSPITAL | Age: 75
DRG: 445 | End: 2025-03-13
Payer: MEDICARE

## 2025-03-13 ENCOUNTER — APPOINTMENT (OUTPATIENT)
Dept: ULTRASOUND IMAGING | Facility: HOSPITAL | Age: 75
DRG: 445 | End: 2025-03-13
Payer: MEDICARE

## 2025-03-13 PROBLEM — K81.0 ACUTE CHOLECYSTITIS: Status: ACTIVE | Noted: 2025-03-13

## 2025-03-13 LAB
ABO GROUP BLD: NORMAL
ALBUMIN SERPL-MCNC: 2.7 G/DL (ref 3.5–5.2)
ALBUMIN/GLOB SERPL: 0.8 G/DL
ALP SERPL-CCNC: 154 U/L (ref 39–117)
ALT SERPL W P-5'-P-CCNC: 41 U/L (ref 1–41)
AMMONIA BLD-SCNC: 35 UMOL/L (ref 16–60)
ANION GAP SERPL CALCULATED.3IONS-SCNC: 11 MMOL/L (ref 5–15)
APTT PPP: 43.5 SECONDS (ref 22.7–35.4)
AST SERPL-CCNC: 98 U/L (ref 1–40)
B PARAPERT DNA SPEC QL NAA+PROBE: NOT DETECTED
B PERT DNA SPEC QL NAA+PROBE: NOT DETECTED
BACTERIA UR QL AUTO: NORMAL /HPF
BASOPHILS # BLD AUTO: 0.02 10*3/MM3 (ref 0–0.2)
BASOPHILS NFR BLD AUTO: 0.2 % (ref 0–1.5)
BILIRUB SERPL-MCNC: 0.8 MG/DL (ref 0–1.2)
BILIRUB UR QL STRIP: NEGATIVE
BLD GP AB SCN SERPL QL: NEGATIVE
BUN SERPL-MCNC: 30 MG/DL (ref 8–23)
BUN/CREAT SERPL: 16.8 (ref 7–25)
C PNEUM DNA NPH QL NAA+NON-PROBE: NOT DETECTED
CALCIUM SPEC-SCNC: 8.5 MG/DL (ref 8.6–10.5)
CHLORIDE SERPL-SCNC: 95 MMOL/L (ref 98–107)
CLARITY UR: CLEAR
CO2 SERPL-SCNC: 32 MMOL/L (ref 22–29)
COLOR UR: YELLOW
CREAT SERPL-MCNC: 1.79 MG/DL (ref 0.76–1.27)
D-LACTATE SERPL-SCNC: 1.3 MMOL/L (ref 0.5–2)
D-LACTATE SERPL-SCNC: 2.2 MMOL/L (ref 0.3–2)
DEPRECATED RDW RBC AUTO: 45.4 FL (ref 37–54)
EGFRCR SERPLBLD CKD-EPI 2021: 39 ML/MIN/1.73
EOSINOPHIL # BLD AUTO: 0.1 10*3/MM3 (ref 0–0.4)
EOSINOPHIL NFR BLD AUTO: 0.9 % (ref 0.3–6.2)
ERYTHROCYTE [DISTWIDTH] IN BLOOD BY AUTOMATED COUNT: 13.2 % (ref 12.3–15.4)
FLUAV SUBTYP SPEC NAA+PROBE: NOT DETECTED
FLUBV RNA ISLT QL NAA+PROBE: NOT DETECTED
GLOBULIN UR ELPH-MCNC: 3.3 GM/DL
GLUCOSE BLDC GLUCOMTR-MCNC: 185 MG/DL (ref 70–105)
GLUCOSE BLDC GLUCOMTR-MCNC: 261 MG/DL (ref 70–105)
GLUCOSE BLDC GLUCOMTR-MCNC: 85 MG/DL (ref 70–105)
GLUCOSE BLDC GLUCOMTR-MCNC: 96 MG/DL (ref 70–105)
GLUCOSE SERPL-MCNC: 231 MG/DL (ref 65–99)
GLUCOSE UR STRIP-MCNC: ABNORMAL MG/DL
HADV DNA SPEC NAA+PROBE: NOT DETECTED
HBA1C MFR BLD: 8.59 % (ref 4.8–5.6)
HCOV 229E RNA SPEC QL NAA+PROBE: NOT DETECTED
HCOV HKU1 RNA SPEC QL NAA+PROBE: NOT DETECTED
HCOV NL63 RNA SPEC QL NAA+PROBE: NOT DETECTED
HCOV OC43 RNA SPEC QL NAA+PROBE: NOT DETECTED
HCT VFR BLD AUTO: 30.2 % (ref 37.5–51)
HGB BLD-MCNC: 9.3 G/DL (ref 13–17.7)
HGB UR QL STRIP.AUTO: ABNORMAL
HMPV RNA NPH QL NAA+NON-PROBE: NOT DETECTED
HPIV1 RNA ISLT QL NAA+PROBE: NOT DETECTED
HPIV2 RNA SPEC QL NAA+PROBE: NOT DETECTED
HPIV3 RNA NPH QL NAA+PROBE: NOT DETECTED
HPIV4 P GENE NPH QL NAA+PROBE: NOT DETECTED
HYALINE CASTS UR QL AUTO: NORMAL /LPF
IMM GRANULOCYTES # BLD AUTO: 0.05 10*3/MM3 (ref 0–0.05)
IMM GRANULOCYTES NFR BLD AUTO: 0.4 % (ref 0–0.5)
INR PPP: 1.28 (ref 0.9–1.1)
KETONES UR QL STRIP: NEGATIVE
LEUKOCYTE ESTERASE UR QL STRIP.AUTO: NEGATIVE
LYMPHOCYTES # BLD AUTO: 0.95 10*3/MM3 (ref 0.7–3.1)
LYMPHOCYTES NFR BLD AUTO: 8.3 % (ref 19.6–45.3)
M PNEUMO IGG SER IA-ACNC: NOT DETECTED
MAGNESIUM SERPL-MCNC: 2 MG/DL (ref 1.6–2.4)
MCH RBC QN AUTO: 28.9 PG (ref 26.6–33)
MCHC RBC AUTO-ENTMCNC: 30.8 G/DL (ref 31.5–35.7)
MCV RBC AUTO: 93.8 FL (ref 79–97)
MONOCYTES # BLD AUTO: 0.65 10*3/MM3 (ref 0.1–0.9)
MONOCYTES NFR BLD AUTO: 5.7 % (ref 5–12)
NEUTROPHILS NFR BLD AUTO: 84.5 % (ref 42.7–76)
NEUTROPHILS NFR BLD AUTO: 9.69 10*3/MM3 (ref 1.7–7)
NITRITE UR QL STRIP: NEGATIVE
NRBC BLD AUTO-RTO: 0 /100 WBC (ref 0–0.2)
PH UR STRIP.AUTO: 7 [PH] (ref 5–8)
PHOSPHATE SERPL-MCNC: 2.1 MG/DL (ref 2.5–4.5)
PLATELET # BLD AUTO: 152 10*3/MM3 (ref 140–450)
PMV BLD AUTO: 10.6 FL (ref 6–12)
POTASSIUM SERPL-SCNC: 3.6 MMOL/L (ref 3.5–5.2)
PROT SERPL-MCNC: 6 G/DL (ref 6–8.5)
PROT UR QL STRIP: ABNORMAL
PROTHROMBIN TIME: 16 SECONDS (ref 11.7–14.2)
QT INTERVAL: 333 MS
QTC INTERVAL: 448 MS
RBC # BLD AUTO: 3.22 10*6/MM3 (ref 4.14–5.8)
RBC # UR STRIP: NORMAL /HPF
REF LAB TEST METHOD: NORMAL
RH BLD: NEGATIVE
RHINOVIRUS RNA SPEC NAA+PROBE: NOT DETECTED
RSV RNA NPH QL NAA+NON-PROBE: NOT DETECTED
SARS-COV-2 RNA RESP QL NAA+PROBE: NOT DETECTED
SODIUM SERPL-SCNC: 138 MMOL/L (ref 136–145)
SP GR UR STRIP: 1.02 (ref 1–1.03)
SQUAMOUS #/AREA URNS HPF: NORMAL /HPF
T&S EXPIRATION DATE: NORMAL
TSH SERPL DL<=0.05 MIU/L-ACNC: 1.37 UIU/ML (ref 0.27–4.2)
UROBILINOGEN UR QL STRIP: ABNORMAL
WBC # UR STRIP: NORMAL /HPF
WBC NRBC COR # BLD AUTO: 11.46 10*3/MM3 (ref 3.4–10.8)

## 2025-03-13 PROCEDURE — 25010000002 METRONIDAZOLE 500 MG/100ML SOLUTION: Performed by: STUDENT IN AN ORGANIZED HEALTH CARE EDUCATION/TRAINING PROGRAM

## 2025-03-13 PROCEDURE — 25010000002 CEFTRIAXONE PER 250 MG: Performed by: NURSE PRACTITIONER

## 2025-03-13 PROCEDURE — 82140 ASSAY OF AMMONIA: CPT | Performed by: STUDENT IN AN ORGANIZED HEALTH CARE EDUCATION/TRAINING PROGRAM

## 2025-03-13 PROCEDURE — 85025 COMPLETE CBC W/AUTO DIFF WBC: CPT | Performed by: STUDENT IN AN ORGANIZED HEALTH CARE EDUCATION/TRAINING PROGRAM

## 2025-03-13 PROCEDURE — 83036 HEMOGLOBIN GLYCOSYLATED A1C: CPT | Performed by: STUDENT IN AN ORGANIZED HEALTH CARE EDUCATION/TRAINING PROGRAM

## 2025-03-13 PROCEDURE — 83735 ASSAY OF MAGNESIUM: CPT | Performed by: STUDENT IN AN ORGANIZED HEALTH CARE EDUCATION/TRAINING PROGRAM

## 2025-03-13 PROCEDURE — 63710000001 INSULIN REGULAR HUMAN PER 5 UNITS: Performed by: NURSE PRACTITIONER

## 2025-03-13 PROCEDURE — 94799 UNLISTED PULMONARY SVC/PX: CPT

## 2025-03-13 PROCEDURE — 25810000003 LACTATED RINGERS PER 1000 ML: Performed by: STUDENT IN AN ORGANIZED HEALTH CARE EDUCATION/TRAINING PROGRAM

## 2025-03-13 PROCEDURE — 83605 ASSAY OF LACTIC ACID: CPT

## 2025-03-13 PROCEDURE — 34310000005 TECHNETIUM TC 99M MEBROFENIN KIT: Performed by: INTERNAL MEDICINE

## 2025-03-13 PROCEDURE — 99221 1ST HOSP IP/OBS SF/LOW 40: CPT | Performed by: STUDENT IN AN ORGANIZED HEALTH CARE EDUCATION/TRAINING PROGRAM

## 2025-03-13 PROCEDURE — 63710000001 INSULIN GLARGINE PER 5 UNITS: Performed by: STUDENT IN AN ORGANIZED HEALTH CARE EDUCATION/TRAINING PROGRAM

## 2025-03-13 PROCEDURE — 74176 CT ABD & PELVIS W/O CONTRAST: CPT

## 2025-03-13 PROCEDURE — 78226 HEPATOBILIARY SYSTEM IMAGING: CPT

## 2025-03-13 PROCEDURE — 81001 URINALYSIS AUTO W/SCOPE: CPT | Performed by: NURSE PRACTITIONER

## 2025-03-13 PROCEDURE — 92610 EVALUATE SWALLOWING FUNCTION: CPT

## 2025-03-13 PROCEDURE — 25010000002 POTASSIUM CHLORIDE 10 MEQ/100ML SOLUTION: Performed by: STUDENT IN AN ORGANIZED HEALTH CARE EDUCATION/TRAINING PROGRAM

## 2025-03-13 PROCEDURE — 76705 ECHO EXAM OF ABDOMEN: CPT

## 2025-03-13 PROCEDURE — 82948 REAGENT STRIP/BLOOD GLUCOSE: CPT | Performed by: STUDENT IN AN ORGANIZED HEALTH CARE EDUCATION/TRAINING PROGRAM

## 2025-03-13 PROCEDURE — 80053 COMPREHEN METABOLIC PANEL: CPT | Performed by: STUDENT IN AN ORGANIZED HEALTH CARE EDUCATION/TRAINING PROGRAM

## 2025-03-13 PROCEDURE — 82948 REAGENT STRIP/BLOOD GLUCOSE: CPT

## 2025-03-13 PROCEDURE — A9537 TC99M MEBROFENIN: HCPCS | Performed by: INTERNAL MEDICINE

## 2025-03-13 PROCEDURE — 84100 ASSAY OF PHOSPHORUS: CPT | Performed by: STUDENT IN AN ORGANIZED HEALTH CARE EDUCATION/TRAINING PROGRAM

## 2025-03-13 PROCEDURE — 94761 N-INVAS EAR/PLS OXIMETRY MLT: CPT

## 2025-03-13 PROCEDURE — 84443 ASSAY THYROID STIM HORMONE: CPT | Performed by: STUDENT IN AN ORGANIZED HEALTH CARE EDUCATION/TRAINING PROGRAM

## 2025-03-13 PROCEDURE — 85610 PROTHROMBIN TIME: CPT | Performed by: RADIOLOGY

## 2025-03-13 PROCEDURE — 85730 THROMBOPLASTIN TIME PARTIAL: CPT | Performed by: RADIOLOGY

## 2025-03-13 PROCEDURE — 25810000003 SODIUM CHLORIDE 0.9 % SOLUTION: Performed by: NURSE PRACTITIONER

## 2025-03-13 PROCEDURE — 63710000001 INSULIN LISPRO (HUMAN) PER 5 UNITS: Performed by: STUDENT IN AN ORGANIZED HEALTH CARE EDUCATION/TRAINING PROGRAM

## 2025-03-13 RX ORDER — POLYETHYLENE GLYCOL 3350 17 G/17G
17 POWDER, FOR SOLUTION ORAL DAILY PRN
Status: DISCONTINUED | OUTPATIENT
Start: 2025-03-13 | End: 2025-03-15 | Stop reason: HOSPADM

## 2025-03-13 RX ORDER — BISACODYL 10 MG
10 SUPPOSITORY, RECTAL RECTAL DAILY PRN
Status: DISCONTINUED | OUTPATIENT
Start: 2025-03-13 | End: 2025-03-15 | Stop reason: HOSPADM

## 2025-03-13 RX ORDER — METRONIDAZOLE 500 MG/100ML
500 INJECTION, SOLUTION INTRAVENOUS ONCE
Status: DISCONTINUED | OUTPATIENT
Start: 2025-03-13 | End: 2025-03-13

## 2025-03-13 RX ORDER — SODIUM CHLORIDE 9 MG/ML
40 INJECTION, SOLUTION INTRAVENOUS AS NEEDED
Status: DISCONTINUED | OUTPATIENT
Start: 2025-03-13 | End: 2025-03-15 | Stop reason: HOSPADM

## 2025-03-13 RX ORDER — NITROGLYCERIN 0.4 MG/1
0.4 TABLET SUBLINGUAL
Status: DISCONTINUED | OUTPATIENT
Start: 2025-03-13 | End: 2025-03-15 | Stop reason: HOSPADM

## 2025-03-13 RX ORDER — METRONIDAZOLE 500 MG/100ML
500 INJECTION, SOLUTION INTRAVENOUS EVERY 8 HOURS
Status: DISCONTINUED | OUTPATIENT
Start: 2025-03-13 | End: 2025-03-15 | Stop reason: HOSPADM

## 2025-03-13 RX ORDER — KIT FOR THE PREPARATION OF TECHNETIUM TC 99M MEBROFENIN 45 MG/10ML
1 INJECTION, POWDER, LYOPHILIZED, FOR SOLUTION INTRAVENOUS
Status: COMPLETED | OUTPATIENT
Start: 2025-03-13 | End: 2025-03-13

## 2025-03-13 RX ORDER — ACETAMINOPHEN 650 MG/1
650 SUPPOSITORY RECTAL ONCE
Status: COMPLETED | OUTPATIENT
Start: 2025-03-13 | End: 2025-03-13

## 2025-03-13 RX ORDER — INSULIN LISPRO 100 [IU]/ML
2-9 INJECTION, SOLUTION INTRAVENOUS; SUBCUTANEOUS EVERY 6 HOURS SCHEDULED
Status: DISCONTINUED | OUTPATIENT
Start: 2025-03-13 | End: 2025-03-15 | Stop reason: HOSPADM

## 2025-03-13 RX ORDER — SODIUM CHLORIDE 0.9 % (FLUSH) 0.9 %
10 SYRINGE (ML) INJECTION EVERY 12 HOURS SCHEDULED
Status: DISCONTINUED | OUTPATIENT
Start: 2025-03-13 | End: 2025-03-15 | Stop reason: HOSPADM

## 2025-03-13 RX ORDER — IPRATROPIUM BROMIDE AND ALBUTEROL SULFATE 2.5; .5 MG/3ML; MG/3ML
3 SOLUTION RESPIRATORY (INHALATION)
Status: DISCONTINUED | OUTPATIENT
Start: 2025-03-13 | End: 2025-03-15 | Stop reason: HOSPADM

## 2025-03-13 RX ORDER — IBUPROFEN 600 MG/1
1 TABLET ORAL
Status: DISCONTINUED | OUTPATIENT
Start: 2025-03-13 | End: 2025-03-15 | Stop reason: HOSPADM

## 2025-03-13 RX ORDER — POTASSIUM CHLORIDE 7.45 MG/ML
10 INJECTION INTRAVENOUS
Status: DISCONTINUED | OUTPATIENT
Start: 2025-03-13 | End: 2025-03-13

## 2025-03-13 RX ORDER — BISACODYL 5 MG/1
5 TABLET, DELAYED RELEASE ORAL DAILY PRN
Status: DISCONTINUED | OUTPATIENT
Start: 2025-03-13 | End: 2025-03-15 | Stop reason: HOSPADM

## 2025-03-13 RX ORDER — SODIUM CHLORIDE, SODIUM LACTATE, POTASSIUM CHLORIDE, CALCIUM CHLORIDE 600; 310; 30; 20 MG/100ML; MG/100ML; MG/100ML; MG/100ML
100 INJECTION, SOLUTION INTRAVENOUS CONTINUOUS
Status: DISPENSED | OUTPATIENT
Start: 2025-03-13 | End: 2025-03-14

## 2025-03-13 RX ORDER — SODIUM CHLORIDE 0.9 % (FLUSH) 0.9 %
10 SYRINGE (ML) INJECTION AS NEEDED
Status: DISCONTINUED | OUTPATIENT
Start: 2025-03-13 | End: 2025-03-15 | Stop reason: HOSPADM

## 2025-03-13 RX ORDER — NICOTINE POLACRILEX 4 MG
15 LOZENGE BUCCAL
Status: DISCONTINUED | OUTPATIENT
Start: 2025-03-13 | End: 2025-03-15 | Stop reason: HOSPADM

## 2025-03-13 RX ORDER — DEXTROSE MONOHYDRATE 25 G/50ML
25 INJECTION, SOLUTION INTRAVENOUS
Status: DISCONTINUED | OUTPATIENT
Start: 2025-03-13 | End: 2025-03-15 | Stop reason: HOSPADM

## 2025-03-13 RX ORDER — POTASSIUM CHLORIDE 7.45 MG/ML
10 INJECTION INTRAVENOUS
Status: COMPLETED | OUTPATIENT
Start: 2025-03-13 | End: 2025-03-13

## 2025-03-13 RX ORDER — AMOXICILLIN 250 MG
2 CAPSULE ORAL 2 TIMES DAILY PRN
Status: DISCONTINUED | OUTPATIENT
Start: 2025-03-13 | End: 2025-03-15 | Stop reason: HOSPADM

## 2025-03-13 RX ORDER — POTASSIUM CHLORIDE 1500 MG/1
40 TABLET, EXTENDED RELEASE ORAL EVERY 4 HOURS
Status: DISCONTINUED | OUTPATIENT
Start: 2025-03-13 | End: 2025-03-13

## 2025-03-13 RX ADMIN — INSULIN LISPRO 6 UNITS: 100 INJECTION, SOLUTION INTRAVENOUS; SUBCUTANEOUS at 03:23

## 2025-03-13 RX ADMIN — POTASSIUM CHLORIDE 10 MEQ: 7.46 INJECTION, SOLUTION INTRAVENOUS at 06:22

## 2025-03-13 RX ADMIN — POTASSIUM CHLORIDE 10 MEQ: 7.46 INJECTION, SOLUTION INTRAVENOUS at 05:26

## 2025-03-13 RX ADMIN — METRONIDAZOLE 500 MG: 500 INJECTION, SOLUTION INTRAVENOUS at 11:16

## 2025-03-13 RX ADMIN — INSULIN GLARGINE 30 UNITS: 100 INJECTION, SOLUTION SUBCUTANEOUS at 03:23

## 2025-03-13 RX ADMIN — POTASSIUM CHLORIDE 10 MEQ: 7.46 INJECTION, SOLUTION INTRAVENOUS at 07:50

## 2025-03-13 RX ADMIN — IPRATROPIUM BROMIDE AND ALBUTEROL SULFATE 3 ML: .5; 3 SOLUTION RESPIRATORY (INHALATION) at 16:08

## 2025-03-13 RX ADMIN — POTASSIUM CHLORIDE 10 MEQ: 7.46 INJECTION, SOLUTION INTRAVENOUS at 04:31

## 2025-03-13 RX ADMIN — CEFTRIAXONE 2 G: 2 INJECTION, POWDER, FOR SOLUTION INTRAMUSCULAR; INTRAVENOUS at 00:20

## 2025-03-13 RX ADMIN — METRONIDAZOLE 500 MG: 500 INJECTION, SOLUTION INTRAVENOUS at 17:59

## 2025-03-13 RX ADMIN — Medication 10 ML: at 03:15

## 2025-03-13 RX ADMIN — MEBROFENIN 1 DOSE: 45 INJECTION, POWDER, LYOPHILIZED, FOR SOLUTION INTRAVENOUS at 12:46

## 2025-03-13 RX ADMIN — SODIUM CHLORIDE, SODIUM LACTATE, POTASSIUM CHLORIDE, CALCIUM CHLORIDE 100 ML/HR: 20; 30; 600; 310 INJECTION, SOLUTION INTRAVENOUS at 14:02

## 2025-03-13 RX ADMIN — SODIUM CHLORIDE 500 ML: 9 INJECTION, SOLUTION INTRAVENOUS at 00:20

## 2025-03-13 RX ADMIN — IPRATROPIUM BROMIDE AND ALBUTEROL SULFATE 3 ML: .5; 3 SOLUTION RESPIRATORY (INHALATION) at 11:27

## 2025-03-13 RX ADMIN — METRONIDAZOLE 500 MG: 500 INJECTION, SOLUTION INTRAVENOUS at 03:14

## 2025-03-13 RX ADMIN — INSULIN HUMAN 8 UNITS: 100 INJECTION, SOLUTION PARENTERAL at 00:18

## 2025-03-13 RX ADMIN — IPRATROPIUM BROMIDE AND ALBUTEROL SULFATE 3 ML: .5; 3 SOLUTION RESPIRATORY (INHALATION) at 21:07

## 2025-03-13 RX ADMIN — SODIUM CHLORIDE, SODIUM LACTATE, POTASSIUM CHLORIDE, CALCIUM CHLORIDE 100 ML/HR: 20; 30; 600; 310 INJECTION, SOLUTION INTRAVENOUS at 03:14

## 2025-03-13 RX ADMIN — Medication 10 ML: at 07:51

## 2025-03-13 RX ADMIN — ACETAMINOPHEN 650 MG: 650 SUPPOSITORY RECTAL at 00:20

## 2025-03-13 NOTE — PLAN OF CARE
Goal Outcome Evaluation:                    Abd ultrasound and HIDA done today both indicated acute cholecystitis. IR consulted for drain placement.

## 2025-03-13 NOTE — CONSULTS
"General Surgery Consult Note  Requesting Provider:  PIPER Gilman     Reason for Consult:  cholecystitis     Chief Complaint:  RUQ pain     History of Present Illness:  Blas Mojica is a 74 y.o. male, history of CVA, CKD, and type 2 diabetes, who presents today with complaints of acute on chronic right upper quadrant abdominal pain associated with nausea.  Patient reports has been ongoing for months perhaps years.  Denies being jaundice, but reports that his wife is under the impression that he has been \"turning yellow\".  In addition, patient reports darkening urine.  Denies h/o prior abdominal surgery.  Of note, patient reports his last CVA was approximately 3 years ago which she is currently on Plavix - last dose was yesterday.    ED, patient was afebrile and hemodynamically stable.  ED examination was remarkable for mild diffuse tenderness.  Leukocytosis (WBC 13) with left shift (ANC 11.6) chronic kidney disease (Cr 2.05 - baseline 2.3-2.5), lactic acidemia (LA 2.2), and hyperglycemia (Glu 440).  CT abdomen/pelvis demonstrate evidence of gallbladder distention with fat stranding concerning for cholecystitis.  Patient was empirically started on IV antibiotics and general surgery was consulted for further recommendations.    Past Medical History:   Diagnosis Date    Diabetes mellitus     Hyperlipidemia     Hypertension     Kidney stone     TIA (transient ischemic attack) 2016    Type 2 diabetes mellitus with stage 3 chronic kidney disease, with long-term current use of insulin 12/13/2021     Past Surgical History:   Procedure Laterality Date    ADENOIDECTOMY      ENDOSCOPY N/A 12/13/2023    Procedure: ESOPHAGOGASTRODUODENOSCOPY with removal of NJ tube from right nare;  Surgeon: Jaret Valadez MD;  Location: [facility] ENDOSCOPY;  Service: Gastroenterology;  Laterality: N/A;  esophagitis, gastric ulcer    EXTRACORPOREAL SHOCK WAVE LITHOTRIPSY (ESWL)      HERNIA REPAIR      KNEE ACL RECONSTRUCTION Left  "    TONSILLECTOMY       Family History   Problem Relation Age of Onset    No Known Problems Mother     No Known Problems Father      Social History     Socioeconomic History    Marital status:    Tobacco Use    Smoking status: Never     Passive exposure: Never    Smokeless tobacco: Never   Vaping Use    Vaping status: Never Used   Substance and Sexual Activity    Alcohol use: Never    Drug use: Never    Sexual activity: Defer       Medications:  (Not in a hospital admission)     Allergies   Allergen Reactions    Contrast Dye (Echo Or Unknown Ct/Mr) Anaphylaxis     Not Lumason    Iodinated Contrast Media Shortness Of Breath    Iodine Shortness Of Breath    Aspirin Nausea And Vomiting    Lipitor [Atorvastatin] Unknown - High Severity    Prednisone Hives       Review of Systems:  Negative in a 12 point ROS except for as above described.    Physical Examination:  Temp:  [98.2 °F (36.8 °C)-100.8 °F (38.2 °C)] 98.2 °F (36.8 °C)  Heart Rate:  [] 98  Resp:  [18-28] 18  BP: (126-134)/(70-91) 134/76  [unfilled]  I/O last 3 completed shifts:  In: 600 [IV Piggyback:600]  Out: -     General: No acute distress, conversant  Eyes: Anicteric sclerae, EOMs intact  Lungs: normal respiratory effort, bilateral chest rise with inspiration  Abdomen: soft, mild right upper quadrant tenderness but negative Kumar sign, ND, dull to percussion; no guarding or rigidity  Skin: Warm; no rash.  No jaundice.    Labs:  WBC   Date Value Ref Range Status   03/13/2025 11.46 (H) 3.40 - 10.80 10*3/mm3 Final     RBC   Date Value Ref Range Status   03/13/2025 3.22 (L) 4.14 - 5.80 10*6/mm3 Final     Hemoglobin   Date Value Ref Range Status   03/13/2025 9.3 (L) 13.0 - 17.7 g/dL Final     Hematocrit   Date Value Ref Range Status   03/13/2025 30.2 (L) 37.5 - 51.0 % Final     MCV   Date Value Ref Range Status   03/13/2025 93.8 79.0 - 97.0 fL Final     MCH   Date Value Ref Range Status   03/13/2025 28.9 26.6 - 33.0 pg Final     MCHC   Date Value Ref  Range Status   03/13/2025 30.8 (L) 31.5 - 35.7 g/dL Final     RDW   Date Value Ref Range Status   03/13/2025 13.2 12.3 - 15.4 % Final     RDW-SD   Date Value Ref Range Status   03/13/2025 45.4 37.0 - 54.0 fl Final     MPV   Date Value Ref Range Status   03/13/2025 10.6 6.0 - 12.0 fL Final     Platelets   Date Value Ref Range Status   03/13/2025 152 140 - 450 10*3/mm3 Final     Neutrophil %   Date Value Ref Range Status   03/13/2025 84.5 (H) 42.7 - 76.0 % Final     Lymphocyte %   Date Value Ref Range Status   03/13/2025 8.3 (L) 19.6 - 45.3 % Final     Monocyte %   Date Value Ref Range Status   03/13/2025 5.7 5.0 - 12.0 % Final     Eosinophil %   Date Value Ref Range Status   03/13/2025 0.9 0.3 - 6.2 % Final     Basophil %   Date Value Ref Range Status   03/13/2025 0.2 0.0 - 1.5 % Final     Immature Grans %   Date Value Ref Range Status   03/13/2025 0.4 0.0 - 0.5 % Final     Neutrophils, Absolute   Date Value Ref Range Status   03/13/2025 9.69 (H) 1.70 - 7.00 10*3/mm3 Final     Lymphocytes, Absolute   Date Value Ref Range Status   03/13/2025 0.95 0.70 - 3.10 10*3/mm3 Final     Monocytes, Absolute   Date Value Ref Range Status   03/13/2025 0.65 0.10 - 0.90 10*3/mm3 Final     Eosinophils, Absolute   Date Value Ref Range Status   03/13/2025 0.10 0.00 - 0.40 10*3/mm3 Final     Basophils, Absolute   Date Value Ref Range Status   03/13/2025 0.02 0.00 - 0.20 10*3/mm3 Final     Immature Grans, Absolute   Date Value Ref Range Status   03/13/2025 0.05 0.00 - 0.05 10*3/mm3 Final     nRBC   Date Value Ref Range Status   03/13/2025 0.0 0.0 - 0.2 /100 WBC Final     Lab Results   Component Value Date    GLUCOSE 231 (H) 03/13/2025    BUN 30 (H) 03/13/2025    CREATININE 1.79 (H) 03/13/2025     03/13/2025    K 3.6 03/13/2025    CL 95 (L) 03/13/2025    CALCIUM 8.5 (L) 03/13/2025    PROTEINTOT 6.0 03/13/2025    ALBUMIN 2.7 (L) 03/13/2025    ALT 41 03/13/2025    AST 98 (H) 03/13/2025    ALKPHOS 154 (H) 03/13/2025    BILITOT 0.8  "03/13/2025    GLOB 3.3 03/13/2025    AGRATIO 0.8 03/13/2025    BCR 16.8 03/13/2025    ANIONGAP 11.0 03/13/2025    EGFR 39.0 (L) 03/13/2025     Lab Results   Component Value Date    INR 0.95 12/12/2023    INR 1.0 01/02/2023    INR 0.96 10/19/2022    PROTIME 10.4 12/12/2023    PROTIME 11.5 01/02/2023    PROTIME 9.9 10/19/2022         Lab 03/13/25  0315 03/12/25  2309   LACTATE 1.3 2.2*     No results found for: \"BLOODCX\"    Images:  US Abdomen Limited  Narrative: US ABDOMEN LIMITED    Date of Exam: 3/13/2025 8:16 AM EDT    Indication: RUQ pain.    Comparison: CT abdomen and pelvis 3/13/2025    Technique: Grayscale and color Doppler ultrasound evaluation of the right upper quadrant was performed.    Findings:  The liver has a normal sonographic appearance with a sagittal length of 15.1 cm. The right kidney is normal measuring 10.8 cm. The portal vein is patent. There is sludge dependently in the gallbladder. There is no obvious gallbladder wall thickening or   para cholecystic fluid. There is no biliary dilatation.  Impression: Impression:  There is sludge dependently in the gallbladder. There is no obvious gallbladder wall thickening or para cholecystic fluid. There is no biliary dilatation.    Electronically Signed: Pito Shelby MD    3/13/2025 8:48 AM EDT    Workstation ID: [id number]  CT Abdomen Pelvis Without Contrast  Narrative: CT ABDOMEN PELVIS WO CONTRAST    Date of Exam: 3/13/2025 12:25 AM EDT    Indication: abd distention/pain.    Comparison: CT abdomen pelvis 2/25/2025    Technique: Axial CT images were obtained of the abdomen and pelvis without the administration of contrast. Sagittal and coronal reconstructions were performed.  Automated exposure control and iterative reconstruction methods were used.    Findings:  Lung Bases:     There is bilateral basilar atelectasis. There is mild coronary artery calcific atherosclerosis.    Liver:  Liver is normal in size and CT density. No focal " lesions.    Biliary/Gallbladder:    There is distention of the gallbladder. There is fat stranding surrounding the distended gallbladder which may indicate inflammation and acute cholecystitis. There is no biliary ductal dilatation.    Spleen:  Spleen is normal in size and CT density.    Pancreas:    Pancreas is normal. There is no evidence of pancreatic mass or peripancreatic fluid.    Kidneys:    Kidneys are normal in size. There are no stones or hydronephrosis.    Adrenals:    Adrenal glands are unremarkable.    Retroperitoneal/Lymph Nodes/Vasculature:    No retroperitoneal adenopathy is identified.    Gastrointestinal/Mesentery:    The bowel loops are non-dilated without wall thickening or mass. The appendix appears within normal limits. No evidence of obstruction. No free air. No mesenteric fluid collections identified.    Bladder:    The bladder is normal.    Genital:     Unremarkable          Bony Structures:     Visualized bony structures are consistent with the patient's age.  Impression: Impression:  Distended gallbladder with surrounding fat stranding. Findings are suspicious for acute cholecystitis.    Electronically Signed: Vazquez Thorpe MD    3/13/2025 1:05 AM EDT    Workstation ID: [id number]       I personally reviewed the available laboratory values and radiographic studies.      Assessment:  74 y.o. male with right upper quadrant pain.  Patient does have a leukocytosis, but imaging and examination are not entirely consistent with cholecystitis.  Thus far, there is no evidence of gallstone disease.  However, given his numerous morbidities, it is possible the patient has acalculous cholecystitis.    Plan:  F/u RUQ US  if patient has no evidence of gallstones, will proceed with HIDA scan  The patient is found to have evidence of a gallstones, will consult cardiology for preoperative clearance.  If patient is deemed to be an appropriate candidate for general anesthesia, would recommend interventional  radiology consultation for cholecystostomy tube placement  Agree with IV antibiotics  Ok for diet  Rest of care per primary team     Please do not hesitate to call me with any questions or concerns.  Thank you for involving me in the care of your patient, and I look forward to taking care of the patient with you.     Abimael Oconnell MD   General Surgery  03/13/25   10:41 EDT     Much of this encounter note is an electronic transcription/translation of spoken language to printed text.  The electronic translation of spoken language may permit erroneous, or at times, nonsensical words or phrases to be inadvertently transcribed.  Although I have reviewed the note for such errors, some may still exist.

## 2025-03-13 NOTE — THERAPY EVALUATION
Acute Care - Speech Language Pathology   Swallow Initial Evaluation [facility]     Patient Name: Blas Mojica  : 1950  MRN: [id number]  Today's Date: 3/13/2025               Admit Date: 3/12/2025    Visit Dx:     ICD-10-CM ICD-9-CM   1. Sepsis without acute organ dysfunction, due to unspecified organism  A41.9 038.9     995.91   2. Acute cholecystitis  K81.0 575.0   3. Hyperglycemia  R73.9 790.29     Patient Active Problem List   Diagnosis    Primary hypertension    TIA (transient ischemic attack)    Stage 3 chronic kidney disease    Dyslipidemia    History of basal cell carcinoma (BCC)    Primary osteoarthritis    Vitamin D deficiency    Acute pain of left knee    Generalized weakness    Hypokalemia    Moderate malnutrition    Physical debility    Type 2 diabetes mellitus with stage 3 chronic kidney disease, with long-term current use of insulin    Sinus tachycardia    Acute hypoxemic respiratory failure due to COVID-19    Pneumonia due to COVID-19 virus    Mixed hyperlipidemia    COVID-19 virus infection    Weakness    COVID    Foot ulcer due to secondary DM    Acute kidney injury    Acute UTI (urinary tract infection)    Acute cholecystitis     Past Medical History:   Diagnosis Date    Diabetes mellitus     Hyperlipidemia     Hypertension     Kidney stone     TIA (transient ischemic attack)     Type 2 diabetes mellitus with stage 3 chronic kidney disease, with long-term current use of insulin 2021     Past Surgical History:   Procedure Laterality Date    ADENOIDECTOMY      ENDOSCOPY N/A 2023    Procedure: ESOPHAGOGASTRODUODENOSCOPY with removal of NJ tube from right nare;  Surgeon: Jaret Valadez MD;  Location: [facility];  Service: Gastroenterology;  Laterality: N/A;  esophagitis, gastric ulcer    EXTRACORPOREAL SHOCK WAVE LITHOTRIPSY (ESWL)      HERNIA REPAIR      KNEE ACL RECONSTRUCTION Left     TONSILLECTOMY         SLP Recommendation and Plan  SLP Swallowing Diagnosis:  R/O pharyngeal dysphagia (03/13/25 1000)  SLP Diet Recommendation: NPO, other (see comments) (unable to make safe diet recommendation at bedside, pt declining ST intervention. Defer diet rec to MD.) (03/13/25 1000)              Recommended Diagnostics: VFSS ([facility]) (Pt declined) (03/13/25 1000)  Swallow Criteria for Skilled Therapeutic Interventions Met: patient/family declined skilled intervention at this time (03/13/25 1000)        Therapy Frequency (Swallow): evaluation only (03/13/25 1000)     Oral Care Recommendations: Oral Care BID/PRN (03/13/25 1000)    Pt seen on this date for clinical bedside swallow evaluation per failing dysphagia screening. Upon entry, pt asleep in bed, easily awoken. Pt very [place]. Pt sat up as tolerated approx 70-80 degrees. Pt denies hx of swallowing difficulty, reporting BL regular/thin, however pt at one point stated he only drinks liquids because it's easier. Oral mech revealed generalized weakness, natural dentition/implants. Pt able to self feed. Po observed: water by straw, fig rios, applesauce. Pt with good oral prep, transit, and clearance. Mildly prolonged mastication with fig rios. Pt with cough following each trial. Discussion with pt about concern of cough following each trial and that it is a symptom of possible penetration/aspiration which can lead to asp pna. Pt reports his swallow is fine, he is not concerned, and said SLP should not be concerned either. Pt encouraged to participate in VFSS as safe diet recommendation could not be made at bedside d/t s/s of aspiration - pt declined both VFSS and ST services. Safe diet recommendation unable to be made at bedside, ST recommending NPO w/ VFSS. Pt declining our services, ST signing off. Defer diet order to MD SUSHILA and nursing messaged.     SWALLOW EVALUATION (Last 72 Hours)       SLP Adult Swallow Evaluation       Row Name 03/13/25 1000       Rehab Evaluation    Document Type evaluation  -AA    Subjective Information no  complaints  -AA    Patient Observations alert;cooperative;agree to therapy  -AA    Patient Effort adequate  -AA    Symptoms Noted During/After Treatment none  -AA       General Information    Patient Profile Reviewed yes  -AA    Pertinent History Of Current Problem Blas Mojica is a 74 y.o. male with a CMH of CVA, type 2 diabetes, hypertension, hyperlipidemia, recently admitted at [facility] found to have UTI, as well as bacteremia, discharged to [facility] rehab with Levaquin for 14 which was started on 2/29/2025, now being sent back to [facility] on 3/12/2025 for tenderness associated with vomiting and nausea.     Patient is currently awake however very hard of hearing unable to obtain any orientation or any further history from the patient himself.  Called wife over the phone she stated patient was recently admitted here for UTI was sent to the [facility] rehab over the manage patient developed abdominal pain associate with vomiting and nausea, unable to keep anything down due to nausea and vomiting.  She stated patient at baseline prior to this hospitalization in February, he was awake alert oriented x 3, was independently doing his all daily activities.  -AA    Current Method of Nutrition NPO  -AA    Precautions/Limitations, Vision WFL;for purposes of eval  -AA    Precautions/Limitations, Hearing hearing impairment, bilaterally  -AA    Prior Level of Function-Communication WFL  -AA    Prior Level of Function-Swallowing no diet consistency restrictions;safe, efficient swallowing in all situations;other (see comments)  pt denies hx of swallowing difficulty and states he eats anything, but at one point said he only drinks liquids.  -AA    Plans/Goals Discussed with patient;agreed upon  -AA       Oral Motor Structure and Function    Dentition Assessment natural, present and adequate  implants  -AA    Secretion Management WNL/WFL  -AA    Mucosal Quality dry  -AA    Volitional Swallow WFL  -AA    Volitional Cough  weak  -AA       Oral Musculature and Cranial Nerve Assessment    Oral Motor General Assessment generalized oral motor weakness  -AA       General Eating/Swallowing Observations    Respiratory Support Currently in Use nasal cannula  -AA    O2 Liters 1L  -AA    Eating/Swallowing Skills self-fed  -AA    Positioning During Eating semi-reclined;upright in bed;other (see comments)  agreeable to being raised approx 70-80 degrees  -AA    Utensils Used spoon;straw  -AA    Consistencies Trialed thin liquids;soft to chew textures;pureed  -AA    Pre SpO2 (%) 96  -AA    Post SpO2 (%) 96  -AA       Respiratory    Respiratory Status WFL  -AA       Clinical Swallow Eval    Oral Prep Phase WFL  -AA    Oral Transit WFL  -AA    Oral Residue WFL  -AA    Pharyngeal Phase suspected pharyngeal impairment  -AA       SLP Evaluation Clinical Impression    SLP Swallowing Diagnosis R/O pharyngeal dysphagia  -AA    Functional Impact risk of aspiration/pneumonia;risk of malnutrition;risk of dehydration  -AA    Swallow Criteria for Skilled Therapeutic Interventions Met patient/family declined skilled intervention at this time  -AA       Recommendations    Therapy Frequency (Swallow) evaluation only  -AA    SLP Diet Recommendation NPO;other (see comments)  unable to make safe diet recommendation at bedside, pt declining ST intervention. Defer diet rec to MD.  -AA    Recommended Diagnostics VFSS ([facility])  Pt declined  -AA    Oral Care Recommendations Oral Care BID/PRN  -AA              User Key  (r) = Recorded By, (t) = Taken By, (c) = Cosigned By      Initials Name Effective Dates    Ruthy Masters SLP 06/18/24 -                     EDUCATION  The patient has been educated in the following areas:   Dysphagia (Swallowing Impairment).                Time Calculation:                JENIFER Parrish  3/13/2025

## 2025-03-13 NOTE — CASE MANAGEMENT/SOCIAL WORK
Discharge Planning Assessment  MARIELY Real     Patient Name: Blas Mojica  MRN: [id number]  Today's Date: 3/13/2025    Admit Date: 3/12/2025    Plan: Return to [facility], follow O2 needs.   Discharge Needs Assessment       Row Name 03/13/25 1636       Living Environment    People in Home other (see comments)    Unique Family Situation Stating at [facility]    Current Living Arrangements other (see comments)  Inpt rehab-[facility]    Potentially Unsafe Housing Conditions none    In the past 12 months has the electric, gas, oil, or water company threatened to shut off services in your home? No    Primary Care Provided by self    Provides Primary Care For no one    Family Caregiver if Needed spouse    Family Caregiver Names Wife Debra, Daughters: Pau Liu, Carie    Quality of Family Relationships helpful;involved;supportive    Able to Return to Prior Arrangements yes  Accepted by back to [facility] by Nellie Real per text at 1641.       Resource/Environmental Concerns    Resource/Environmental Concerns none    Transportation Concerns none       Transportation Needs    In the past 12 months, has lack of transportation kept you from medical appointments or from getting medications? no    In the past 12 months, has lack of transportation kept you from meetings, work, or from getting things needed for daily living? No       Food Insecurity    Within the past 12 months, you worried that your food would run out before you got the money to buy more. Never true    Within the past 12 months, the food you bought just didn't last and you didn't have money to get more. Never true       Transition Planning    Patient/Family Anticipates Transition to home with family    Patient/Family Anticipated Services at Transition none    Transportation Anticipated family or friend will provide  Pt. states wife can drive him.  Depends on mobility of the patient.  May need to go by  van.       Discharge Needs Assessment    Equipment  Currently Used at Home rollator;walker, rolling;wheelchair;shower chair;grab bar;other (see comments)  Dexacom 7    Concerns to be Addressed discharge planning    Anticipated Changes Related to Illness none    Equipment Needed After Discharge oxygen  No home O2.  On O2 currently              Discharge Plan       Row Name 03/13/25 1705       Plan    Plan Return to [facility], follow O2 needs.    Patient/Family in Agreement with Plan yes    Plan Comments CM met with patient at the bedside to review discharge planning. Patient lives at [facility] and is dependent with all IADLs. Pt. states wife can transport patient at d/c but may need to consider [facility] transport or w/c van. Confirmed PCP, insurance, and pharmacy. Patient not elgible for M2B due to transfering back to [facility]. Patient denies any difficulty affording food, utilities, or medications. Patient is with inpt. rehab at [facility]  Sent DCP report and referal in EPIC.  Sent text to Hillary and Nellie liasons for [facility] and asked if patient could return when medically stable and Nellie accepted patient back to [facility].  Updated in EPIC.  DC Barriers: IVF-LR, Rocephin IV, Flagyl IV, O2, CT PercutaneousCholecystostomy, Telemetry.              Destination       Service Provider Request Status Services Address Phone Fax Patient Preferred    [facility] Accepted -- [address] [address] [facility] [place] [address] [phone] --                Demographic Summary       Row Name 03/13/25 1613       General Information    Admission Type inpatient    Arrived From other (see comments)  [facility]    Required Notices Provided Important Message from Medicare    Referral Source admission list    Reason for Consult discharge planning    Preferred Language English       Contact Information    Permission Granted to Share Info With               Functional Status       Row Name 03/13/25 9960       Functional Status    Usual Activity  Tolerance fair    Current Activity Tolerance fair       Functional Status, IADL    Medications assistive person  From [facility]    Meal Preparation completely dependent  From [facility]    Housekeeping completely dependent  From [facility]    Laundry completely dependent  From [facility]    Shopping completely dependent  From [facility]    If for any reason you need help with day-to-day activities such as bathing, preparing meals, shopping, managing finances, etc., do you get the help you need? I get all the help I need              Patient Forms       Row Name 03/13/25 1716       Patient Forms    Important Message from Medicare (IMM) Delivered  3/13 IMM per registration    Delivered to Patient    Method of delivery In person             Elda Burleson RN    [place] [id number]  Phone: [phone]  Fax: [phone]

## 2025-03-13 NOTE — PROGRESS NOTES
HIDA positive. IR consult placed for aguila tube. Plan for tube cholangiogram in 6 weeks. If no cystic duct obstruction, can plan for aguila tube removal. In such case, pt will not need cholecystectomy. Otherwise, if pt has persistent cyst duct obstruction, will plan for interval cholecystectomy once cleared by cardiology as an outpatient.     Abimael Oconnell MD   General Surgery  03/13/25   14:11 EDT

## 2025-03-13 NOTE — ED PROVIDER NOTES
Subjective   History of Present Illness  Patient is a 74-year-old white male with history of hypertension, hyperlipidemia, diabetes.  Who presents today from [facility] by EMS with reports of feeling weak and generally ill.  He complains of bodyaches loss of appetite and some nausea.  Complains of some abdominal pain.  He reports they have been giving him stool softeners for constipation and he did finally have a bowel movement but now he has pain in his abdomen.  Reports fever today as well.  He states he had a slight cough.  He denies any chest pain or shortness of breath.  He was just discharged from the hospital here a couple weeks ago after he was admitted with urosepsis.  Reportedly finished antibiotics within the last week.      Review of Systems   Constitutional:  Positive for appetite change, chills and fever.   Respiratory:  Positive for cough. Negative for shortness of breath.    Cardiovascular:  Negative for chest pain.   Gastrointestinal:  Positive for abdominal pain and nausea. Negative for vomiting.   Genitourinary:  Negative for dysuria.   Musculoskeletal:  Positive for myalgias.   Neurological:  Positive for weakness.       Past Medical History:   Diagnosis Date    Diabetes mellitus     Hyperlipidemia     Hypertension     Kidney stone     TIA (transient ischemic attack) 2016    Type 2 diabetes mellitus with stage 3 chronic kidney disease, with long-term current use of insulin 12/13/2021       Allergies   Allergen Reactions    Contrast Dye (Echo Or Unknown Ct/Mr) Anaphylaxis     Not Lumason    Iodinated Contrast Media Shortness Of Breath    Iodine Shortness Of Breath    Aspirin Nausea And Vomiting    Lipitor [Atorvastatin] Unknown - High Severity    Prednisone Hives       Past Surgical History:   Procedure Laterality Date    ADENOIDECTOMY      ENDOSCOPY N/A 12/13/2023    Procedure: ESOPHAGOGASTRODUODENOSCOPY with removal of NJ tube from right nare;  Surgeon: Jaret Valadez MD;   Location: [facility];  Service: Gastroenterology;  Laterality: N/A;  esophagitis, gastric ulcer    EXTRACORPOREAL SHOCK WAVE LITHOTRIPSY (ESWL)      HERNIA REPAIR      KNEE ACL RECONSTRUCTION Left     TONSILLECTOMY         Family History   Problem Relation Age of Onset    No Known Problems Mother     No Known Problems Father        Social History     Socioeconomic History    Marital status:    Tobacco Use    Smoking status: Never     Passive exposure: Never    Smokeless tobacco: Never   Vaping Use    Vaping status: Never Used   Substance and Sexual Activity    Alcohol use: Never    Drug use: Never    Sexual activity: Defer           Objective   Physical Exam  Vital signs and triage nurse note reviewed.  Constitutional: Awake, alert; well-developed and well-nourished. No acute distress is noted.  Ill-appearing.  HEENT: Normocephalic, atraumatic; pupils are PERRL with intact EOM; oropharynx is pink and moist without exudate or erythema.  No drooling or pooling of oral secretions.  Neck: Supple  Cardiovascular: Mildly tachycardic rate and rhythm, normal S1-S2.  No murmur noted.  Pulmonary: Respiratory effort regular nonlabored, breath sounds clear to auscultation all fields.  Abdomen: Soft, mildly tender diffusely, nondistended with normoactive bowel sounds; no rebound or guarding.  Musculoskeletal: Independent range of motion of all extremities with no palpable tenderness or edema.  Neuro: Alert oriented x3, speech is clear and appropriate, GCS 15.    Skin: Flesh tone, warm, dry    Procedures           ED Course      Labs Reviewed   COMPREHENSIVE METABOLIC PANEL - Abnormal; Notable for the following components:       Result Value    Glucose 440 (*)     BUN 33 (*)     Creatinine 2.05 (*)     Sodium 135 (*)     Chloride 90 (*)     CO2 33.3 (*)     Albumin 3.1 (*)     ALT (SGPT) 44 (*)     AST (SGOT) 97 (*)     Alkaline Phosphatase 182 (*)     eGFR 33.2 (*)     All other components within normal limits     Narrative:     GFR Categories in Chronic Kidney Disease (CKD)      GFR Category          GFR (mL/min/1.73)    Interpretation  G1                     90 or greater         Normal or high (1)  G2                      60-89                Mild decrease (1)  G3a                   45-59                Mild to moderate decrease  G3b                   30-44                Moderate to severe decrease  G4                    15-29                Severe decrease  G5                    14 or less           Kidney failure          (1)In the absence of evidence of kidney disease, neither GFR category G1 or G2 fulfill the criteria for CKD.    eGFR calculation 2021 CKD-EPI creatinine equation, which does not include race as a factor   CBC WITH AUTO DIFFERENTIAL - Abnormal; Notable for the following components:    WBC 13.14 (*)     RBC 3.43 (*)     Hemoglobin 10.0 (*)     Hematocrit 31.8 (*)     MCHC 31.4 (*)     Neutrophil % 88.4 (*)     Lymphocyte % 4.8 (*)     Neutrophils, Absolute 11.62 (*)     Lymphocytes, Absolute 0.63 (*)     Immature Grans, Absolute 0.06 (*)     All other components within normal limits   URINALYSIS W/ CULTURE IF INDICATED - Abnormal; Notable for the following components:    Glucose, UA >=1000 mg/dL (3+) (*)     Blood, UA Moderate (2+) (*)     Protein,  mg/dL (2+) (*)     Urobilinogen, UA 2.0 E.U./dL (*)     All other components within normal limits    Narrative:     In absence of clinical symptoms, the presence of pyuria, bacteria, and/or nitrites on the urinalysis result does not correlate with infection.   POC LACTATE - Abnormal; Notable for the following components:    Lactate 2.2 (*)     All other components within normal limits   RESPIRATORY PANEL PCR W/ COVID-19 (SARS-COV-2), NP SWAB IN UTM/VTP, 2 HR TAT - Normal    Narrative:     In the setting of a positive respiratory panel with a viral infection PLUS a negative procalcitonin without other underlying concern for bacterial infection, consider  observing off antibiotics or discontinuation of antibiotics and continue supportive care. If the respiratory panel is positive for atypical bacterial infection (Bordetella pertussis, Chlamydophila pneumoniae, or Mycoplasma pneumoniae), consider antibiotic de-escalation to target atypical bacterial infection.   BLOOD CULTURE   BLOOD CULTURE   RAINBOW DRAW    Narrative:     The following orders were created for panel order [place] Draw.  Procedure                               Abnormality         Status                     ---------                               -----------         ------                     Green Top (Gel)[[id number]]                                  Final result               Lavender Top[[id number]]                                     Final result               Gold Top - SST[[id number]]                                   Final result               Light Blue Top[[id number]]                                   Final result                 Please view results for these tests on the individual orders.   URINALYSIS, MICROSCOPIC ONLY   LACTIC ACID, REFLEX   POC LACTATE   POCT GLUCOSE FINGERSTICK   POCT GLUCOSE FINGERSTICK   POCT GLUCOSE FINGERSTICK   POCT GLUCOSE FINGERSTICK   POCT GLUCOSE FINGERSTICK   POCT GLUCOSE FINGERSTICK   POCT GLUCOSE FINGERSTICK   TYPE AND SCREEN   CBC AND DIFFERENTIAL    Narrative:     The following orders were created for panel order CBC & Differential.  Procedure                               Abnormality         Status                     ---------                               -----------         ------                     CBC Auto Differential[[id number]]        Abnormal            Final result                 Please view results for these tests on the individual orders.   GREEN TOP   LAVENDER TOP   GOLD TOP - SST   LIGHT BLUE TOP     CT Abdomen Pelvis Without Contrast  Result Date: 3/13/2025  Impression: Distended gallbladder with surrounding fat stranding. Findings are  suspicious for acute cholecystitis. Electronically Signed: Vazquez Thorpe MD  3/13/2025 1:05 AM EDT  Workstation ID: [id number]    XR Chest 1 View  Result Date: 3/12/2025  Impression: Cardiomegaly. Mild right basilar atelectasis. Electronically Signed: Vazquez Thorpe MD  3/12/2025 11:52 PM EDT  Workstation ID: [id number]    Medications   sodium chloride 0.9 % flush 10 mL (has no administration in time range)   cefTRIAXone (ROCEPHIN) 2,000 mg in sodium chloride 0.9 % 100 mL MBP (has no administration in time range)   metroNIDAZOLE (FLAGYL) IVPB 500 mg (has no administration in time range)   dextrose (GLUTOSE) oral gel 15 g (has no administration in time range)   dextrose (D50W) (25 g/50 mL) IV injection 25 g (has no administration in time range)   glucagon (GLUCAGEN) injection 1 mg (has no administration in time range)   insulin lispro (HUMALOG/ADMELOG) injection 2-9 Units (has no administration in time range)   insulin glargine (LANTUS, SEMGLEE) injection 30 Units (has no administration in time range)   sodium chloride 0.9 % flush 10 mL (has no administration in time range)   sodium chloride 0.9 % flush 10 mL (has no administration in time range)   sodium chloride 0.9 % infusion 40 mL (has no administration in time range)   nitroglycerin (NITROSTAT) SL tablet 0.4 mg (has no administration in time range)   Potassium Replacement - Follow Nurse / BPA Driven Protocol (has no administration in time range)   Magnesium Standard Dose Replacement - Follow Nurse / BPA Driven Protocol (has no administration in time range)   Phosphorus Replacement - Follow Nurse / BPA Driven Protocol (has no administration in time range)   Calcium Replacement - Follow Nurse / BPA Driven Protocol (has no administration in time range)   sennosides-docusate (PERICOLACE) 8.6-50 MG per tablet 2 tablet (has no administration in time range)     And   polyethylene glycol (MIRALAX) packet 17 g (has no administration in time range)     And   bisacodyl (DULCOLAX)  EC tablet 5 mg (has no administration in time range)     And   bisacodyl (DULCOLAX) suppository 10 mg (has no administration in time range)   lactated ringers infusion (has no administration in time range)   cefTRIAXone (ROCEPHIN) 2 g in sodium chloride 0.9 % 100 mL MBP (0 g Intravenous Stopped 3/13/25 0134)   sodium chloride 0.9 % bolus 500 mL (0 mL Intravenous Stopped 3/13/25 0134)   insulin regular (humuLIN R,novoLIN R) injection 8 Units (8 Units Intravenous Given 3/13/25 0018)   acetaminophen (TYLENOL) suppository 650 mg (650 mg Rectal Given 3/13/25 0020)                                                      Medical Decision Making  Patient presents today with the above complaint.    He had above exam and evaluation.  He is placed on continuous cardiac monitor.  IV was established.  Rectal temperature was obtained and found to be 100.8 degrees.  He triggered positive sepsis screen.  Sepsis orders were initiated.  Blood cultures were obtained as well as a POC lactate this found be elevated at 2.2.  He was given a dose of IV Rocephin.  Was given a small fluid bolus.    Workup: EKG was independently interpreted by Dr. Linares and reviewed by myself shows sinus tachycardia with ventricular rate of 108, no acute ST or T wave changes, no ectopy.  CBC significant for WBC of 13.1, 88% neutrophils, no bands, hemoglobin 10.0.  Metabolic panel significant for glucose of 440, BUN 33, creatinine appears stable at 2.05, ALT 44, AST 97, alk phos 182.  Viral respiratory panel was negative.  Urinalysis is significant for greater than 1000 glucose, moderate blood, 100 protein.  Chest x-ray shows mild right basilar atelectasis.  CT of the abdomen pelvis was obtained that shows distended gallbladder with surrounding fat stranding and findings suspicious for acute cholecystitis.    Patient was given a dose of IV Flagyl.  He was given IV insulin.    On reexamination he is resting quietly and in no distress.  He has no new complaints.   He is hemodynamically stable.  Findings were discussed with him and wife at bedside.  He will be admitted to the hospital for further management.  Case was discussed with the hospitalist.  General surgery consult order was also placed.    Problems Addressed:  Acute cholecystitis: complicated acute illness or injury  Hyperglycemia: complicated acute illness or injury  Sepsis without acute organ dysfunction, due to unspecified organism: complicated acute illness or injury    Amount and/or Complexity of Data Reviewed  Radiology: ordered.  ECG/medicine tests: ordered.    Risk  OTC drugs.  Prescription drug management.  Decision regarding hospitalization.        Final diagnoses:   Sepsis without acute organ dysfunction, due to unspecified organism   Acute cholecystitis   Hyperglycemia       ED Disposition  ED Disposition       ED Disposition   Decision to Admit    Condition   --    Comment   Level of Care: Telemetry [5]   Admitting Physician: NILES HARRIS [[id number]]   Attending Physician: NILES HARRIS [[id number]]                 No follow-up provider specified.       Medication List      No changes were made to your prescriptions during this visit.            Bernadette Kline, PIPER  03/13/25 0213

## 2025-03-13 NOTE — DISCHARGE PLACEMENT REQUEST
"Blas Mojica (74 y.o. Male)       Date of Birth   1950    Social Security Number       [address]   [address] [place] [address]    Home Phone   [phone]    MRN   [id number]       Hindu   Anglican    Marital Status                               Admission Date   3/12/2025    Admission Type   Emergency    Admitting Provider   Panchito Hopson MD    Attending Provider   Nathan Posey MD    Department, Room/Bed   [facility] EMERGENCY DEPARTMENT, 30/30       Discharge Date       Discharge Disposition       Discharge Destination                                 Attending Provider: Nathan Posey MD    Allergies: Contrast Dye (Echo Or Unknown Ct/mr), Iodinated Contrast Media, Iodine, Aspirin, Lipitor [Atorvastatin], Prednisone    Isolation: None   Infection: MRSA No Isolation this Admit (01/19/24)   Code Status: CPR    Ht: 177.8 cm (70\")   Wt: 96.6 kg (212 lb 15.4 oz)    Admission Cmt: None   Principal Problem: Acute cholecystitis [K81.0]                   Active Insurance as of 3/12/2025       Primary Coverage       Payor Plan Insurance Group Employer/Plan Group    MEDICARE MEDICARE A & B        Payor Plan Address Payor Plan Phone Number Payor Plan Fax Number Effective Dates    [address] [phone]  9/1/2014 - None Entered    [facility] [id number]         Subscriber Name Subscriber Birth Date Member ID       BLAS MOJICA 1950 [id number]               Secondary Coverage       Payor Plan Insurance Group Employer/Plan Group    HUMANA HUMANA [facility]              [id number]       Payor Plan Address Payor Plan Phone Number Payor Plan Fax Number Effective Dates    [address] [address]   9/1/2014 - None Entered    [facility] [id number]         Subscriber Name Subscriber Birth Date Member ID       BLAS MOJICA 1950 [id number]                     Emergency Contacts        (Rel.) Home Phone Work Phone Mobile Phone    NICK MOJICA " (Spouse) [phone] -- [phone]    JOE TREADWELL (Daughter) -- -- [phone]    Pau Clifton (Daughter) -- -- --    Carie Renee (Daughter) -- -- --

## 2025-03-13 NOTE — H&P
[facility] Medicine Services  History & Physical    Patient Name: Blas Mojica  : 1950  MRN: [id number]  Primary Care Physician:  Sahil Read MD  Date of admission: 3/12/2025  Date and Time of Service: 3/12/2025 at 0146    Subjective      Chief Complaint: abdominal pain    History of Present Illness: Blas Mojica is a 74 y.o. male with a CMH of CVA, type 2 diabetes, hypertension, hyperlipidemia, recently admitted at [facility] found to have UTI, as well as bacteremia, discharged to [facility] rehab with Levaquin for 14 which was started on , now being sent back to [facility] on 3/12/2025 for tenderness associated with vomiting and nausea.    Patient is currently awake however very hard of hearing unable to obtain any orientation or any further history from the patient himself.  Called wife over the phone she stated patient was recently admitted here for UTI was sent to the [facility] rehab over the manage patient developed abdominal pain associate with vomiting and nausea, unable to keep anything down due to nausea and vomiting.  She stated patient at baseline prior to this hospitalization in February, he was awake alert oriented x 3, was independently doing his all daily activities.    In the ED, patient's vital stable, slight tachycardia with heart rate of 108, sodium 135, creatinine of 2.05, elevated AST ALT 97/44, T. bili normal, lactic acid 2.2, CBC showing WBC of 13, hemoglobin 10, blood cultures pending, respiratory panel negative, CT abdomen showing distended gallbladder with surrounding fat stranding suspicious for acute cholecystitis.    Review of Systems negative unless mentioned above    Personal History     Past Medical History:   Diagnosis Date    Diabetes mellitus     Hyperlipidemia     Hypertension     Kidney stone     TIA (transient ischemic attack)     Type 2 diabetes mellitus with stage 3 chronic kidney disease, with long-term current use of insulin  12/13/2021       Past Surgical History:   Procedure Laterality Date    ADENOIDECTOMY      ENDOSCOPY N/A 12/13/2023    Procedure: ESOPHAGOGASTRODUODENOSCOPY with removal of NJ tube from right nare;  Surgeon: Jaret Valadez MD;  Location: [facility] ENDOSCOPY;  Service: Gastroenterology;  Laterality: N/A;  esophagitis, gastric ulcer    EXTRACORPOREAL SHOCK WAVE LITHOTRIPSY (ESWL)      HERNIA REPAIR      KNEE ACL RECONSTRUCTION Left     TONSILLECTOMY         Family History: family history includes No Known Problems in his father and mother. Otherwise pertinent FHx was reviewed and not pertinent to current issue.    Social History:  reports that he has never smoked. He has never been exposed to tobacco smoke. He has never used smokeless tobacco. He reports that he does not drink alcohol and does not use drugs.    Home Medications:  Prior to Admission Medications       Prescriptions Last Dose Informant Patient Reported? Taking?    bumetanide (BUMEX) 1 MG tablet   No No    Take 1 tablet by mouth 2 (Two) Times a Day for 30 days.    clopidogrel (PLAVIX) 75 MG tablet   Yes No    Take 1 tablet by mouth Daily.    insulin glargine (LANTUS, SEMGLEE) 100 UNIT/ML injection   Yes No    Inject 30 Units under the skin into the appropriate area as directed Every Night.    insulin lispro (HUMALOG/ADMELOG) 100 UNIT/ML injection   No No    Inject 2-7 Units under the skin into the appropriate area as directed 4 (Four) Times a Day Before Meals & at Bedtime for 30 days. Blood Glucose 150-199 mg/dL - 2 units Blood Glucose 200-249 mg/dL - 3 units Blood Glucose 250-299 mg/dL - 4 units Blood Glucose 300-349 mg/dL - 5 units Blood Glucose 350-400 mg/dL - 6 units Blood Glucose Greater Than 400 mg/dL - 7 units    insulin lispro (HUMALOG/ADMELOG) 100 UNIT/ML injection   No No    Inject 8 Units under the skin into the appropriate area as directed 3 (Three) Times a Day With Meals for 30 days.    potassium chloride (KLOR-CON M20) 20 MEQ CR tablet    No No    Take 1 tablet by mouth Daily for 30 days.    rosuvastatin (CRESTOR) 40 MG tablet   Yes No    Take 1 tablet by mouth Daily.              Allergies:  Allergies   Allergen Reactions    Contrast Dye (Echo Or Unknown Ct/Mr) Anaphylaxis     Not Lumason    Iodinated Contrast Media Shortness Of Breath    Iodine Shortness Of Breath    Aspirin Nausea And Vomiting    Lipitor [Atorvastatin] Unknown - High Severity    Prednisone Hives       Objective      Vitals:   Temp:  [99.5 °F (37.5 °C)-100.8 °F (38.2 °C)] 99.5 °F (37.5 °C)  Heart Rate:  [107-110] 108  Resp:  [18-28] 24  BP: (131-133)/(74-91) 133/77  Body mass index is 28.15 kg/m².  Physical Exam  General: Awake, very hard of hearing, unable to obtain any further information from the patient in terms of orientation  HEENT: Atraumatic normocephalic  Respiratory: Decreased breath sound lung bases, nonlabored breathing,  Cardio: Heart rate normal, rhythm regular  Abdomen: Soft, tender to palpation in the right upper quadrant, no rebound or guarding on my exam  Extremities: No remarkable edema the bilateral lower extremities  Neuro: Awake, questionable disorientation, patient is hard of hearing, moves all extremities    Diagnostic Data:  Lab Results (last 24 hours)       Procedure Component Value Units Date/Time    Respiratory Panel PCR w/COVID-19(SARS-CoV-2) KEVIN/HORTENSIA/SLOAN/PAD/COR/JOHNNY In-House, NP Swab in UTM/VTM, 2 HR TAT - Swab, Nasopharynx [[id number]]  (Normal) Collected: 03/12/25 2341    Specimen: Swab from Nasopharynx Updated: 03/13/25 0033     ADENOVIRUS, PCR Not Detected     Coronavirus 229E Not Detected     Coronavirus HKU1 Not Detected     Coronavirus NL63 Not Detected     Coronavirus OC43 Not Detected     COVID19 Not Detected     Human Metapneumovirus Not Detected     Human Rhinovirus/Enterovirus Not Detected     Influenza A PCR Not Detected     Influenza B PCR Not Detected     Parainfluenza Virus 1 Not Detected     Parainfluenza Virus 2 Not Detected      Parainfluenza Virus 3 Not Detected     Parainfluenza Virus 4 Not Detected     RSV, PCR Not Detected     Bordetella pertussis pcr Not Detected     Bordetella parapertussis PCR Not Detected     Chlamydophila pneumoniae PCR Not Detected     Mycoplasma pneumo by PCR Not Detected    Narrative:      In the setting of a positive respiratory panel with a viral infection PLUS a negative procalcitonin without other underlying concern for bacterial infection, consider observing off antibiotics or discontinuation of antibiotics and continue supportive care. If the respiratory panel is positive for atypical bacterial infection (Bordetella pertussis, Chlamydophila pneumoniae, or Mycoplasma pneumoniae), consider antibiotic de-escalation to target atypical bacterial infection.    Urinalysis With Culture If Indicated - Urine, Clean Catch [[id number]]  (Abnormal) Collected: 03/13/25 0003    Specimen: Urine, Clean Catch Updated: 03/13/25 0018     Color, UA Yellow     Comment: Result checked          Appearance, UA Clear     pH, UA 7.0     Specific Gravity, UA 1.019     Glucose, UA >=1000 mg/dL (3+)     Ketones, UA Negative     Bilirubin, UA Negative     Blood, UA Moderate (2+)     Protein,  mg/dL (2+)     Leuk Esterase, UA Negative     Nitrite, UA Negative     Urobilinogen, UA 2.0 E.U./dL    Narrative:      In absence of clinical symptoms, the presence of pyuria, bacteria, and/or nitrites on the urinalysis result does not correlate with infection.    Urinalysis, Microscopic Only - Urine, Clean Catch [[id number]] Collected: 03/13/25 0003    Specimen: Urine, Clean Catch Updated: 03/13/25 0015     RBC, UA 0-2 /HPF      WBC, UA 0-2 /HPF      Comment: Urine culture not indicated.        Bacteria, UA None Seen /HPF      Squamous Epithelial Cells, UA 0-2 /HPF      Hyaline Casts, UA 0-2 /LPF      Methodology Automated Microscopy    Blood Culture - Blood, Arm, Left [[id number]] Collected: 03/12/25 8616    Specimen: Blood from Arm, Left  Updated: 03/12/25 2347    Comprehensive Metabolic Panel [[id number]]  (Abnormal) Collected: 03/12/25 2259    Specimen: Blood Updated: 03/12/25 2332     Glucose 440 mg/dL      BUN 33 mg/dL      Creatinine 2.05 mg/dL      Sodium 135 mmol/L      Potassium 3.5 mmol/L      Chloride 90 mmol/L      CO2 33.3 mmol/L      Calcium 9.0 mg/dL      Total Protein 6.8 g/dL      Albumin 3.1 g/dL      ALT (SGPT) 44 U/L      AST (SGOT) 97 U/L      Alkaline Phosphatase 182 U/L      Total Bilirubin 1.2 mg/dL      Globulin 3.7 gm/dL      A/G Ratio 0.8 g/dL      BUN/Creatinine Ratio 16.1     Anion Gap 11.7 mmol/L      eGFR 33.2 mL/min/1.73     Narrative:      GFR Categories in Chronic Kidney Disease (CKD)      GFR Category          GFR (mL/min/1.73)    Interpretation  G1                     90 or greater         Normal or high (1)  G2                      60-89                Mild decrease (1)  G3a                   45-59                Mild to moderate decrease  G3b                   30-44                Moderate to severe decrease  G4                    15-29                Severe decrease  G5                    14 or less           Kidney failure          (1)In the absence of evidence of kidney disease, neither GFR category G1 or G2 fulfill the criteria for CKD.    eGFR calculation 2021 CKD-EPI creatinine equation, which does not include race as a factor    [place] Draw [[id number]] Collected: 03/12/25 2259    Specimen: Blood Updated: 03/12/25 2315    Narrative:      The following orders were created for panel order [place] Draw.  Procedure                               Abnormality         Status                     ---------                               -----------         ------                     Green Top (Gel)[[id number]]                                  Final result               Lavender Top[[id number]]                                     Final result               Gold Top - SST[[id number]]                                   Final  result               Light Blue Top[[id number]]                                   Final result                 Please view results for these tests on the individual orders.    Green Top (Gel) [[id number]] Collected: 03/12/25 2259    Specimen: Blood Updated: 03/12/25 2315     Extra Tube Hold for add-ons.     Comment: Auto resulted.       Lavender Top [[id number]] Collected: 03/12/25 2259    Specimen: Blood Updated: 03/12/25 2315     Extra Tube hold for add-on     Comment: Auto resulted       Gold Top - SST [[id number]] Collected: 03/12/25 2259    Specimen: Blood Updated: 03/12/25 2315     Extra Tube Hold for add-ons.     Comment: Auto resulted.       Light Blue Top [[id number]] Collected: 03/12/25 2259    Specimen: Blood Updated: 03/12/25 2315     Extra Tube Hold for add-ons.     Comment: Auto resulted       CBC & Differential [[id number]]  (Abnormal) Collected: 03/12/25 2259    Specimen: Blood Updated: 03/12/25 2309    Narrative:      The following orders were created for panel order CBC & Differential.  Procedure                               Abnormality         Status                     ---------                               -----------         ------                     CBC Auto Differential[[id number]]        Abnormal            Final result                 Please view results for these tests on the individual orders.    CBC Auto Differential [[id number]]  (Abnormal) Collected: 03/12/25 2259    Specimen: Blood Updated: 03/12/25 2309     WBC 13.14 10*3/mm3      RBC 3.43 10*6/mm3      Hemoglobin 10.0 g/dL      Hematocrit 31.8 %      MCV 92.7 fL      MCH 29.2 pg      MCHC 31.4 g/dL      RDW 13.2 %      RDW-SD 45.0 fl      MPV 10.3 fL      Platelets 164 10*3/mm3      Neutrophil % 88.4 %      Lymphocyte % 4.8 %      Monocyte % 5.8 %      Eosinophil % 0.3 %      Basophil % 0.2 %      Immature Grans % 0.5 %      Neutrophils, Absolute 11.62 10*3/mm3      Lymphocytes, Absolute 0.63 10*3/mm3      Monocytes, Absolute 0.76  10*3/mm3      Eosinophils, Absolute 0.04 10*3/mm3      Basophils, Absolute 0.03 10*3/mm3      Immature Grans, Absolute 0.06 10*3/mm3      nRBC 0.0 /100 WBC     Blood Culture - Blood, Arm, Left [[id number]] Collected: 03/12/25 2259    Specimen: Blood from Arm, Left Updated: 03/12/25 2307             Imaging Results (Last 24 Hours)       Procedure Component Value Units Date/Time    CT Abdomen Pelvis Without Contrast [[id number]] Collected: 03/13/25 0100     Updated: 03/13/25 0107    Narrative:      CT ABDOMEN PELVIS WO CONTRAST    Date of Exam: 3/13/2025 12:25 AM EDT    Indication: abd distention/pain.    Comparison: CT abdomen pelvis 2/25/2025    Technique: Axial CT images were obtained of the abdomen and pelvis without the administration of contrast. Sagittal and coronal reconstructions were performed.  Automated exposure control and iterative reconstruction methods were used.    Findings:  Lung Bases:     There is bilateral basilar atelectasis. There is mild coronary artery calcific atherosclerosis.    Liver:  Liver is normal in size and CT density. No focal lesions.    Biliary/Gallbladder:    There is distention of the gallbladder. There is fat stranding surrounding the distended gallbladder which may indicate inflammation and acute cholecystitis. There is no biliary ductal dilatation.    Spleen:  Spleen is normal in size and CT density.    Pancreas:    Pancreas is normal. There is no evidence of pancreatic mass or peripancreatic fluid.    Kidneys:    Kidneys are normal in size. There are no stones or hydronephrosis.    Adrenals:    Adrenal glands are unremarkable.    Retroperitoneal/Lymph Nodes/Vasculature:    No retroperitoneal adenopathy is identified.    Gastrointestinal/Mesentery:    The bowel loops are non-dilated without wall thickening or mass. The appendix appears within normal limits. No evidence of obstruction. No free air. No mesenteric fluid collections identified.    Bladder:    The bladder is  normal.    Genital:     Unremarkable          Bony Structures:     Visualized bony structures are consistent with the patient's age.        Impression:      Impression:  Distended gallbladder with surrounding fat stranding. Findings are suspicious for acute cholecystitis.                Electronically Signed: Vazquez Thorpe MD    3/13/2025 1:05 AM EDT    Workstation ID: [id number]    XR Chest 1 View [[id number]] Collected: 03/12/25 2349     Updated: 03/12/25 2354    Narrative:      XR CHEST 1 VW    Date of Exam: 3/12/2025 11:25 PM EDT    Indication: fever/weakness    Comparison: Chest radiograph 2/25/2025    Findings:  The heart is enlarged. The pulmonary vascular markings are normal. There is mild right basilar atelectasis. The left lung is clear. The osseous structures are normal.      Impression:      Impression:  Cardiomegaly. Mild right basilar atelectasis.          Electronically Signed: Vazquez Thorpe MD    3/12/2025 11:52 PM EDT    Workstation ID: [id number]              Assessment & Plan    Blas Mojica is a 74 y.o. male with a CMH of CVA, type 2 diabetes, hypertension, hyperlipidemia, recently admitted at [facility] found to have UTI, as well as bacteremia, discharged to [facility] rehab with Levaquin for 14 which was started on 2/29/2025, now being sent back to [facility] on 3/12/2025 for tenderness associated with vomiting and nausea.    Assessments  Possible Acute cholecystitis  Recent Bacteremia, dc on 3/3/25 on Levaquin, Klebsiella: Susceptible to ceftriaxone  Bibasilar atelectasis on x-ray  Type 2 diabetes  Severe hyperglycemia, uncontrolled diabetes, check A1c  Hypertension  Hyperlipidemia  Elevated AST ALT 97/44, likely statins induced    Plan  -Start ceftriaxone, Flagyl for possible acute cholecystitis, follow blood cultures, consult surgery    -Start  cc an hour, start DuoNebs for bibasilar atelectasis, spirometry monitor respiratory status closely    -Patient is very hard of hearing,  unable to get any information from the patient, will check ammonia and TSH levels, if he has encephalopathy likely due to underlying infection, follow blood cultures    -SSI for type 2 diabetes, hold his Plavix pending surgery eval, hold statins given elevated AST ALT    Resume other home medications once reconciled per pharmacy or medically appropriate    -AM labs      VTE Prophylaxis:  No VTE prophylaxis order currently exists.    CODE STATUS:       I discussed the patient's findings and my recommendations with patient.      This document has been electronically signed by Panchito Hopson MD on March 13, 2025 01:47 EDT   [facility] Hospitalist Team

## 2025-03-14 ENCOUNTER — APPOINTMENT (OUTPATIENT)
Dept: CT IMAGING | Facility: HOSPITAL | Age: 75
DRG: 445 | End: 2025-03-14
Payer: MEDICARE

## 2025-03-14 LAB
ANION GAP SERPL CALCULATED.3IONS-SCNC: 7.6 MMOL/L (ref 5–15)
BASOPHILS # BLD AUTO: 0.02 10*3/MM3 (ref 0–0.2)
BASOPHILS NFR BLD AUTO: 0.2 % (ref 0–1.5)
BUN SERPL-MCNC: 24 MG/DL (ref 8–23)
BUN/CREAT SERPL: 16.3 (ref 7–25)
CALCIUM SPEC-SCNC: 9 MG/DL (ref 8.6–10.5)
CHLORIDE SERPL-SCNC: 99 MMOL/L (ref 98–107)
CO2 SERPL-SCNC: 33.4 MMOL/L (ref 22–29)
CREAT SERPL-MCNC: 1.47 MG/DL (ref 0.76–1.27)
DEPRECATED RDW RBC AUTO: 45.2 FL (ref 37–54)
EGFRCR SERPLBLD CKD-EPI 2021: 49.4 ML/MIN/1.73
EOSINOPHIL # BLD AUTO: 0.13 10*3/MM3 (ref 0–0.4)
EOSINOPHIL NFR BLD AUTO: 1.4 % (ref 0.3–6.2)
ERYTHROCYTE [DISTWIDTH] IN BLOOD BY AUTOMATED COUNT: 12.8 % (ref 12.3–15.4)
GLUCOSE BLDC GLUCOMTR-MCNC: 102 MG/DL (ref 70–105)
GLUCOSE BLDC GLUCOMTR-MCNC: 155 MG/DL (ref 70–105)
GLUCOSE BLDC GLUCOMTR-MCNC: 95 MG/DL (ref 70–105)
GLUCOSE BLDC GLUCOMTR-MCNC: 96 MG/DL (ref 70–105)
GLUCOSE SERPL-MCNC: 106 MG/DL (ref 65–99)
HCT VFR BLD AUTO: 28.5 % (ref 37.5–51)
HGB BLD-MCNC: 8.7 G/DL (ref 13–17.7)
IMM GRANULOCYTES # BLD AUTO: 0.07 10*3/MM3 (ref 0–0.05)
IMM GRANULOCYTES NFR BLD AUTO: 0.7 % (ref 0–0.5)
INR PPP: 1.22 (ref 0.9–1.1)
LYMPHOCYTES # BLD AUTO: 1.02 10*3/MM3 (ref 0.7–3.1)
LYMPHOCYTES NFR BLD AUTO: 10.7 % (ref 19.6–45.3)
MCH RBC QN AUTO: 29.1 PG (ref 26.6–33)
MCHC RBC AUTO-ENTMCNC: 30.5 G/DL (ref 31.5–35.7)
MCV RBC AUTO: 95.3 FL (ref 79–97)
MONOCYTES # BLD AUTO: 0.57 10*3/MM3 (ref 0.1–0.9)
MONOCYTES NFR BLD AUTO: 6 % (ref 5–12)
NEUTROPHILS NFR BLD AUTO: 7.73 10*3/MM3 (ref 1.7–7)
NEUTROPHILS NFR BLD AUTO: 81 % (ref 42.7–76)
NRBC BLD AUTO-RTO: 0 /100 WBC (ref 0–0.2)
PLATELET # BLD AUTO: 134 10*3/MM3 (ref 140–450)
PMV BLD AUTO: 10.2 FL (ref 6–12)
POTASSIUM SERPL-SCNC: 3.5 MMOL/L (ref 3.5–5.2)
PROTHROMBIN TIME: 15.3 SECONDS (ref 11.7–14.2)
RBC # BLD AUTO: 2.99 10*6/MM3 (ref 4.14–5.8)
SODIUM SERPL-SCNC: 140 MMOL/L (ref 136–145)
WBC NRBC COR # BLD AUTO: 9.54 10*3/MM3 (ref 3.4–10.8)

## 2025-03-14 PROCEDURE — C1729 CATH, DRAINAGE: HCPCS

## 2025-03-14 PROCEDURE — 82948 REAGENT STRIP/BLOOD GLUCOSE: CPT

## 2025-03-14 PROCEDURE — 99232 SBSQ HOSP IP/OBS MODERATE 35: CPT | Performed by: STUDENT IN AN ORGANIZED HEALTH CARE EDUCATION/TRAINING PROGRAM

## 2025-03-14 PROCEDURE — 80048 BASIC METABOLIC PNL TOTAL CA: CPT | Performed by: NURSE PRACTITIONER

## 2025-03-14 PROCEDURE — C1769 GUIDE WIRE: HCPCS

## 2025-03-14 PROCEDURE — 25010000002 FENTANYL CITRATE (PF) 50 MCG/ML SOLUTION

## 2025-03-14 PROCEDURE — 25010000002 ONDANSETRON PER 1 MG

## 2025-03-14 PROCEDURE — 25010000002 METRONIDAZOLE 500 MG/100ML SOLUTION: Performed by: STUDENT IN AN ORGANIZED HEALTH CARE EDUCATION/TRAINING PROGRAM

## 2025-03-14 PROCEDURE — 94761 N-INVAS EAR/PLS OXIMETRY MLT: CPT

## 2025-03-14 PROCEDURE — 85025 COMPLETE CBC W/AUTO DIFF WBC: CPT | Performed by: NURSE PRACTITIONER

## 2025-03-14 PROCEDURE — 0F9430Z DRAINAGE OF GALLBLADDER WITH DRAINAGE DEVICE, PERCUTANEOUS APPROACH: ICD-10-PCS | Performed by: RADIOLOGY

## 2025-03-14 PROCEDURE — 25010000002 MORPHINE PER 10 MG: Performed by: NURSE PRACTITIONER

## 2025-03-14 PROCEDURE — 25010000002 MIDAZOLAM PER 1 MG

## 2025-03-14 PROCEDURE — 82948 REAGENT STRIP/BLOOD GLUCOSE: CPT | Performed by: STUDENT IN AN ORGANIZED HEALTH CARE EDUCATION/TRAINING PROGRAM

## 2025-03-14 PROCEDURE — 77002 NEEDLE LOCALIZATION BY XRAY: CPT

## 2025-03-14 PROCEDURE — 25010000002 CEFTRIAXONE PER 250 MG: Performed by: STUDENT IN AN ORGANIZED HEALTH CARE EDUCATION/TRAINING PROGRAM

## 2025-03-14 PROCEDURE — 87070 CULTURE OTHR SPECIMN AEROBIC: CPT | Performed by: STUDENT IN AN ORGANIZED HEALTH CARE EDUCATION/TRAINING PROGRAM

## 2025-03-14 PROCEDURE — 87205 SMEAR GRAM STAIN: CPT | Performed by: STUDENT IN AN ORGANIZED HEALTH CARE EDUCATION/TRAINING PROGRAM

## 2025-03-14 PROCEDURE — 25010000002 LIDOCAINE 1 % SOLUTION

## 2025-03-14 PROCEDURE — 94799 UNLISTED PULMONARY SVC/PX: CPT

## 2025-03-14 PROCEDURE — 85610 PROTHROMBIN TIME: CPT | Performed by: NURSE PRACTITIONER

## 2025-03-14 PROCEDURE — 99152 MOD SED SAME PHYS/QHP 5/>YRS: CPT

## 2025-03-14 PROCEDURE — 25810000003 LACTATED RINGERS PER 1000 ML: Performed by: STUDENT IN AN ORGANIZED HEALTH CARE EDUCATION/TRAINING PROGRAM

## 2025-03-14 PROCEDURE — 94664 DEMO&/EVAL PT USE INHALER: CPT

## 2025-03-14 RX ORDER — ONDANSETRON 2 MG/ML
INJECTION INTRAMUSCULAR; INTRAVENOUS AS NEEDED
Status: COMPLETED | OUTPATIENT
Start: 2025-03-14 | End: 2025-03-14

## 2025-03-14 RX ORDER — MIDAZOLAM HYDROCHLORIDE 1 MG/ML
INJECTION, SOLUTION INTRAMUSCULAR; INTRAVENOUS AS NEEDED
Status: COMPLETED | OUTPATIENT
Start: 2025-03-14 | End: 2025-03-14

## 2025-03-14 RX ORDER — MORPHINE SULFATE 2 MG/ML
2 INJECTION, SOLUTION INTRAMUSCULAR; INTRAVENOUS
Status: DISPENSED | OUTPATIENT
Start: 2025-03-14 | End: 2025-03-15

## 2025-03-14 RX ORDER — POTASSIUM CHLORIDE 1500 MG/1
40 TABLET, EXTENDED RELEASE ORAL EVERY 4 HOURS
Status: DISPENSED | OUTPATIENT
Start: 2025-03-14 | End: 2025-03-14

## 2025-03-14 RX ORDER — FENTANYL CITRATE 50 UG/ML
INJECTION, SOLUTION INTRAMUSCULAR; INTRAVENOUS AS NEEDED
Status: COMPLETED | OUTPATIENT
Start: 2025-03-14 | End: 2025-03-14

## 2025-03-14 RX ORDER — LIDOCAINE HYDROCHLORIDE 10 MG/ML
INJECTION, SOLUTION INFILTRATION; PERINEURAL AS NEEDED
Status: COMPLETED | OUTPATIENT
Start: 2025-03-14 | End: 2025-03-14

## 2025-03-14 RX ADMIN — IPRATROPIUM BROMIDE AND ALBUTEROL SULFATE 3 ML: .5; 3 SOLUTION RESPIRATORY (INHALATION) at 12:21

## 2025-03-14 RX ADMIN — MIDAZOLAM 0.5 MG: 1 INJECTION INTRAMUSCULAR; INTRAVENOUS at 10:30

## 2025-03-14 RX ADMIN — SODIUM CHLORIDE, SODIUM LACTATE, POTASSIUM CHLORIDE, CALCIUM CHLORIDE 100 ML/HR: 20; 30; 600; 310 INJECTION, SOLUTION INTRAVENOUS at 00:21

## 2025-03-14 RX ADMIN — Medication 10 ML: at 00:21

## 2025-03-14 RX ADMIN — CEFTRIAXONE 2000 MG: 2 INJECTION, POWDER, FOR SOLUTION INTRAMUSCULAR; INTRAVENOUS at 20:31

## 2025-03-14 RX ADMIN — MORPHINE SULFATE 2 MG: 2 INJECTION, SOLUTION INTRAMUSCULAR; INTRAVENOUS at 00:41

## 2025-03-14 RX ADMIN — IPRATROPIUM BROMIDE AND ALBUTEROL SULFATE 3 ML: .5; 3 SOLUTION RESPIRATORY (INHALATION) at 07:39

## 2025-03-14 RX ADMIN — IPRATROPIUM BROMIDE AND ALBUTEROL SULFATE 3 ML: .5; 3 SOLUTION RESPIRATORY (INHALATION) at 15:43

## 2025-03-14 RX ADMIN — LIDOCAINE HYDROCHLORIDE 8 ML: 10 INJECTION, SOLUTION INFILTRATION; PERINEURAL at 10:39

## 2025-03-14 RX ADMIN — SENNOSIDES AND DOCUSATE SODIUM 2 TABLET: 50; 8.6 TABLET ORAL at 20:31

## 2025-03-14 RX ADMIN — METRONIDAZOLE 500 MG: 500 INJECTION, SOLUTION INTRAVENOUS at 12:03

## 2025-03-14 RX ADMIN — FENTANYL CITRATE 50 MCG: 50 INJECTION, SOLUTION INTRAMUSCULAR; INTRAVENOUS at 10:30

## 2025-03-14 RX ADMIN — METRONIDAZOLE 500 MG: 500 INJECTION, SOLUTION INTRAVENOUS at 05:29

## 2025-03-14 RX ADMIN — IPRATROPIUM BROMIDE AND ALBUTEROL SULFATE 3 ML: .5; 3 SOLUTION RESPIRATORY (INHALATION) at 21:08

## 2025-03-14 RX ADMIN — MORPHINE SULFATE 2 MG: 2 INJECTION, SOLUTION INTRAMUSCULAR; INTRAVENOUS at 20:30

## 2025-03-14 RX ADMIN — ONDANSETRON 4 MG: 2 INJECTION INTRAMUSCULAR; INTRAVENOUS at 10:27

## 2025-03-14 RX ADMIN — METRONIDAZOLE 500 MG: 500 INJECTION, SOLUTION INTRAVENOUS at 20:07

## 2025-03-14 RX ADMIN — CEFTRIAXONE 2000 MG: 2 INJECTION, POWDER, FOR SOLUTION INTRAMUSCULAR; INTRAVENOUS at 00:20

## 2025-03-14 RX ADMIN — FENTANYL CITRATE 50 MCG: 50 INJECTION, SOLUTION INTRAMUSCULAR; INTRAVENOUS at 10:39

## 2025-03-14 NOTE — PROGRESS NOTES
"General Surgery Progress Note    SUBJECTIVE:   Patient seen at bedside on floor. No acute events o/n.     VITALS:   Temp:  [97.5 °F (36.4 °C)-99.1 °F (37.3 °C)] 97.5 °F (36.4 °C)  Heart Rate:  [] 99  Resp:  [15-24] 21  BP: (110-145)/(58-78) 110/58    I & O:  I/O last 3 completed shifts:  In: 1600 [I.V.:1000; IV Piggyback:600]  Out: 500 [Urine:500]  No intake/output data recorded.    PHYSICAL EXAM:  General: NAD  Respiratory: no respiratory distress  Abdomen: soft, RUQ TTP, ND, dull to percussion     LABS:  No results found for: \"CBCDIF\"  Lab Results   Component Value Date    GLUCOSE 106 (H) 03/14/2025    BUN 24 (H) 03/14/2025    CREATININE 1.47 (H) 03/14/2025     03/14/2025    K 3.5 03/14/2025    CL 99 03/14/2025    CALCIUM 9.0 03/14/2025    PROTEINTOT 6.0 03/13/2025    ALBUMIN 2.7 (L) 03/13/2025    ALT 41 03/13/2025    AST 98 (H) 03/13/2025    ALKPHOS 154 (H) 03/13/2025    BILITOT 0.8 03/13/2025    GLOB 3.3 03/13/2025    AGRATIO 0.8 03/13/2025    BCR 16.3 03/14/2025    ANIONGAP 7.6 03/14/2025    EGFR 49.4 (L) 03/14/2025     Lab Results   Component Value Date    INR 1.22 (H) 03/14/2025    INR 1.28 (H) 03/13/2025    INR 0.95 12/12/2023    PROTIME 15.3 (H) 03/14/2025    PROTIME 16.0 (H) 03/13/2025    PROTIME 10.4 12/12/2023       RADIOLOGY:  NM HIDA SCAN WITHOUT PHARMACOLOGICAL INTERVENTION  Result Date: 3/13/2025  Impression: Nonvisualization of the gallbladder at 90 minutes concerning for acute cholecystitis given findings on prior CT and ultrasound. Electronically Signed: Vitor Pena MD  3/13/2025 1:58 PM EDT  Workstation ID: [id number]    US Abdomen Limited  Result Date: 3/13/2025  Impression: There is sludge dependently in the gallbladder. There is no obvious gallbladder wall thickening or para cholecystic fluid. There is no biliary dilatation. Electronically Signed: Pito Shelby MD  3/13/2025 8:48 AM EDT  Workstation ID: [id number]    CT Abdomen Pelvis Without Contrast  Result Date: " 3/13/2025  Impression: Distended gallbladder with surrounding fat stranding. Findings are suspicious for acute cholecystitis. Electronically Signed: Vazquez Thorpe MD  3/13/2025 1:05 AM EDT  Workstation ID: [id number]    XR Chest 1 View  Result Date: 3/12/2025  Impression: Cardiomegaly. Mild right basilar atelectasis. Electronically Signed: Vazquez Thorpe MD  3/12/2025 11:52 PM EDT  Workstation ID: [id number]        ASSESSMENT:   74 y.o. male with acalculous cholecystitis.  Given his multiple comorbidities and deconditioning, patient is an inappropriate candidate for surgery at this time.    PLAN:   Appreciate interventional radiology recommendations -plan for cholecystostomy tube placement today  Plan for tube cholangiogram in 6 weeks  If patient has cystic duct patency, okay to remove cholecystostomy tube at that time  If patient continues evidence of cystic duct obstruction, will need to discuss outpatient cholecystectomy once patient has been optimized  Continue antibiotics; would recommend 7-day course of oral antibiotics upon discharge  Rest of care per primary team     Abimael Oconnell MD   General Surgery  03/14/25   08:52 EDT

## 2025-03-14 NOTE — CASE MANAGEMENT/SOCIAL WORK
Continued Stay Note   Farhan     Patient Name: Blas Mojica  MRN: [id number]  Today's Date: 3/14/2025    Admit Date: 3/12/2025    Plan: Return to [facility].  no precert or PASRR  required.   Discharge Plan       Row Name 03/14/25 1258       Plan    Plan Return to [facility].  no precert or PASRR  required.    Plan Comments dc barriers: 3.14.25 ct guided percutaneous cholecystostomy placement , IV fluids, IV antibiotics,  cultures pending.  Per liasons at [facility] , pateint is able to return to [facility]  when ready.                     Anna Lewis RN    SIPS 1  [email or web address]  Office [phone]  Cell [phone]

## 2025-03-14 NOTE — PLAN OF CARE
Goal Outcome Evaluation:              Outcome Evaluation: pt went to IR today for drain placement. Will stay and have IV abx and will discharge with drain and have gallbladder removed at a later time.

## 2025-03-14 NOTE — PLAN OF CARE
Goal Outcome Evaluation:      Patient resting most of the shift. Patient was turned every 2 hours. Waffle boots in place. Patient does have external catheter on due to incontinence. Patient NPO for aguila drain placement today.

## 2025-03-14 NOTE — PROGRESS NOTES
[facility] MEDICINE SERVICE  DAILY PROGRESS NOTE    NAME: Blas Mojica  : 1950  MRN: [id number]      LOS: 1 day     PROVIDER OF SERVICE: Nathan Posey MD    Chief Complaint: Acute cholecystitis    Subjective:     History of Present Illness: Blas Mojica is a 74 y.o. male with a CMH of CVA, type 2 diabetes, hypertension, hyperlipidemia, recently admitted at [facility] found to have UTI, as well as bacteremia, discharged to [facility] rehab with Levaquin for 14 which was started on , now being sent back to [facility] on 3/12/2025 for tenderness associated with vomiting and nausea.     Patient is currently awake however very hard of hearing unable to obtain any orientation or any further history from the patient himself.  Called wife over the phone she stated patient was recently admitted here for UTI was sent to the [facility] rehab over the manage patient developed abdominal pain associate with vomiting and nausea, unable to keep anything down due to nausea and vomiting.  She stated patient at baseline prior to this hospitalization in February, he was awake alert oriented x 3, was independently doing his all daily activities.     In the ED, patient's vital stable, slight tachycardia with heart rate of 108, sodium 135, creatinine of 2.05, elevated AST ALT 97/44, T. bili normal, lactic acid 2.2, CBC showing WBC of 13, hemoglobin 10, blood cultures pending, respiratory panel negative, CT abdomen showing distended gallbladder with surrounding fat stranding suspicious for acute cholecystitis.     Review of Systems negative unless mentioned above    Interval History:  History taken from: patient      3/14 seen in bed NAD, VSS, tlerating abx, Underwent CT guided percutaneous cholecystostomy placement.     Review of Systems    Objective:     Vital Signs  Temp:  [97.5 °F (36.4 °C)-99.1 °F (37.3 °C)] 97.7 °F (36.5 °C)  Heart Rate:  [] 101  Resp:  [15-24] 21  BP: (110-145)/(58-78)  117/61  Flow (L/min) (Oxygen Therapy):  [1-2] 1   Body mass index is 30.56 kg/m².      Physical Exam  General: Awake, very hard of hearing, unable to obtain any further information from the patient in terms of orientation  HEENT: Atraumatic normocephalic  Respiratory: Decreased breath sound lung bases, nonlabored breathing,  Cardio: Heart rate normal, rhythm regular  Abdomen: Soft, tender to palpation in the right upper quadrant, no rebound or guarding on my exam  Extremities: No remarkable edema the bilateral lower extremities  Neuro: Awake, questionable disorientation, patient is hard of hearing, moves all extremities     Diagnostic Data    Results from last 7 days   Lab Units 03/14/25  0545 03/13/25  0438   WBC 10*3/mm3 9.54 11.46*   HEMOGLOBIN g/dL 8.7* 9.3*   HEMATOCRIT % 28.5* 30.2*   PLATELETS 10*3/mm3 134* 152   GLUCOSE mg/dL 106* 231*   CREATININE mg/dL 1.47* 1.79*   BUN mg/dL 24* 30*   SODIUM mmol/L 140 138   POTASSIUM mmol/L 3.5 3.6   AST (SGOT) U/L  --  98*   ALT (SGPT) U/L  --  41   ALK PHOS U/L  --  154*   BILIRUBIN mg/dL  --  0.8   ANION GAP mmol/L 7.6 11.0       NM HIDA SCAN WITHOUT PHARMACOLOGICAL INTERVENTION  Result Date: 3/13/2025  Impression: Nonvisualization of the gallbladder at 90 minutes concerning for acute cholecystitis given findings on prior CT and ultrasound. Electronically Signed: Vitor Pena MD  3/13/2025 1:58 PM EDT  Workstation ID: [id number]    US Abdomen Limited  Result Date: 3/13/2025  Impression: There is sludge dependently in the gallbladder. There is no obvious gallbladder wall thickening or para cholecystic fluid. There is no biliary dilatation. Electronically Signed: Pito Shelby MD  3/13/2025 8:48 AM EDT  Workstation ID: [id number]    CT Abdomen Pelvis Without Contrast  Result Date: 3/13/2025  Impression: Distended gallbladder with surrounding fat stranding. Findings are suspicious for acute cholecystitis. Electronically Signed: Vazquez Thorpe MD  3/13/2025 1:05 AM EDT   Workstation ID: [id number]    XR Chest 1 View  Result Date: 3/12/2025  Impression: Cardiomegaly. Mild right basilar atelectasis. Electronically Signed: Vazquez Thorpe MD  3/12/2025 11:52 PM EDT  Workstation ID: [id number]        I reviewed the patient's new clinical results.  Scheduled Meds:cefTRIAXone, 2,000 mg, Intravenous, Q24H  insulin glargine, 30 Units, Subcutaneous, Nightly  insulin lispro, 2-9 Units, Subcutaneous, Q6H  ipratropium-albuterol, 3 mL, Nebulization, 4x Daily - RT  metroNIDAZOLE, 500 mg, Intravenous, Q8H  potassium chloride ER, 40 mEq, Oral, Q4H  sodium chloride, 10 mL, Intravenous, Q12H      Continuous Infusions:   PRN Meds:.  senna-docusate sodium **AND** polyethylene glycol **AND** bisacodyl **AND** bisacodyl    Calcium Replacement - Follow Nurse / BPA Driven Protocol    dextrose    dextrose    glucagon (human recombinant)    Magnesium Standard Dose Replacement - Follow Nurse / BPA Driven Protocol    Morphine    nitroglycerin    Phosphorus Replacement - Follow Nurse / BPA Driven Protocol    Potassium Replacement - Follow Nurse / BPA Driven Protocol    [COMPLETED] Insert Peripheral IV **AND** sodium chloride    sodium chloride    sodium chloride   Assessment/Plan:     Active and Resolved Problems  Active Hospital Problems    Diagnosis  POA    **Acute cholecystitis [K81.0]  Yes      Resolved Hospital Problems   No resolved problems to display.     Acute cholecystitis  Recent Bacteremia, dc on 3/3/25 on Levaquin, Klebsiella: Susceptible to ceftriaxone  Bibasilar atelectasis on x-ray  Type 2 diabetes  Severe hyperglycemia, uncontrolled diabetes, check A1c  Hypertension  Hyperlipidemia  Elevated AST ALT 97/44, likely statins induced     Plan  -ceftriaxone, Flagyl for possible acute cholecystitis, follow blood cultures, consult surgery     - cc an hour, start DuoNebs for bibasilar atelectasis, spirometry monitor respiratory status closely     -Patient is very hard of hearing, unable to get any  information from the patient, will check ammonia and TSH levels, if he has encephalopathy likely due to underlying infection, follow blood cultures     -SSI for type 2 diabetes, hold his Plavix pending surgery eval, hold statins given elevated AST ALT     Resume other home medications once reconciled per pharmacy or medically appropriate     plan for cholecystostomy tube placement today     VTE Prophylaxis:  No VTE prophylaxis order currently exists.    Disposition Planning:   Barriers to Discharge:abd pain  Anticipated Date of Discharge: 3/16  Place of Discharge: home      Time: 35 minutes     Code Status and Medical Interventions: CPR (Attempt to Resuscitate); Full Support   Ordered at: 03/13/25 0434     Code Status (Patient has no pulse and is not breathing):    CPR (Attempt to Resuscitate)     Medical Interventions (Patient has pulse or is breathing):    Full Support       Signature: Electronically signed by Nathan Posey MD, 03/14/25, 12:00 EDT.  [facility] Hospitalist Team

## 2025-03-14 NOTE — CASE MANAGEMENT/SOCIAL WORK
Case Management Readmission Assessment Note    Case Management Readmission Assessment (all recorded)       Readmission Interview       Row Name 03/14/25 1247             Readmission Indications    Is the patient and/or family able to complete the readmission assessment questions? Yes      Is this hospitalization related to the prior hospital diagnosis? Yes  yes weakness with other issues        [facility] Name 03/14/25 1247             Recommendation for rehospitalization    Did you speak with your physician prior to coming to the hospital No  came from [facility] Name 03/14/25 1247             Follow-up Appointments    Do you have a PCP? Yes      Did you have an appointment with PCP after your hospitalization? No  came from [facility]      Are you current with the Pulmonary Clinic? No      Are you current with the CHF Clinic? No        [facility] Name 03/14/25 1247             Medications    Did you have newly prescribed medications at discharge? Yes  came from [facility]      Did you understand the side effects of your medications? --  NA      Are you taking all of you prescribed medications? Yes  came from [facility]      What pharmacy was used to fill prescription(s)? [facility] Name 03/14/25 1249             Discharge Instructions    Did you understand your discharge instructions? Yes  came from [facility] Name 03/14/25 1247             Index discharge location/services    Where did you go upon discharge? Acute Rehabilitation      Do you have supportive family or friends in the home? Yes      Was the provider seen at the facility? Yes  [facility]      What actions were taken to avoid a readmit? labs were drawn, respiratory panel      Which Acute Rehabiliation Facility were your admitted from? [facility] Name 03/14/25 1241             Discharge Readiness    On a scale of 1-5 (5 being well prepared), how ready were you for discharge 5         Row Name 03/14/25 1247             Palliative Care/Hospice    Are you current with Palliative Care? No      Are you current with Hospice Care? No        Row Name 03/13/25 0222             Advance Directives (For Healthcare)    Pre-existing AND/MOST/POLST Order No      Advance Directive Status Patient does not have advance directive      Have you reviewed your Advance Directive and is it valid for this stay? No      Literature Provided on Advance Directives No        Row Name 03/14/25 1247             Readmission Assessment Final Comments    Final Comments last admit 2/25-3/3/25 uti chilo,tx with fluids, [facility]  Current admit 3/12/25 sent over due to fever, possible sepsis, treating for acute cholecystisis, drain place cultures pending

## 2025-03-14 NOTE — NURSING NOTE
Spoke with spouse in regards to IR procedure. Explained everything to her and the pt. Both parties agree that placing a drain in IR is ok.

## 2025-03-14 NOTE — H&P
[facility]   Interventional Radiology H&P    Patient Name: Blas Mojica  : 1950  MRN: [id number]  Primary Care Physician:  Sahil Read MD  Referring Physician: No Known Provider  Date of admission: 3/12/2025    Subjective   Subjective     HPI:  Blas Mojica is a 74 y.o. male with acute cholecystitis.     Review of Systems:   Constitutional no fever,  no weight loss       Otolaryngeal no difficulty swallowing   Cardiovascular no chest pain   Pulmonary no cough, no sputum production   Gastrointestinal no constipation, no diarrhea                         Personal History       Past Medical/Surgical History:   Past Medical History:   Diagnosis Date    Diabetes mellitus     Hyperlipidemia     Hypertension     Kidney stone     TIA (transient ischemic attack)     Type 2 diabetes mellitus with stage 3 chronic kidney disease, with long-term current use of insulin 2021     Past Surgical History:   Procedure Laterality Date    ADENOIDECTOMY      ENDOSCOPY N/A 2023    Procedure: ESOPHAGOGASTRODUODENOSCOPY with removal of NJ tube from right nare;  Surgeon: Jaret Valadez MD;  Location: [facility] ENDOSCOPY;  Service: Gastroenterology;  Laterality: N/A;  esophagitis, gastric ulcer    EXTRACORPOREAL SHOCK WAVE LITHOTRIPSY (ESWL)      HERNIA REPAIR      KNEE ACL RECONSTRUCTION Left     TONSILLECTOMY         Social History:  reports that he has never smoked. He has never been exposed to tobacco smoke. He has never used smokeless tobacco. He reports that he does not drink alcohol and does not use drugs.    Medications:  Medications Prior to Admission   Medication Sig Dispense Refill Last Dose/Taking    bumetanide (BUMEX) 1 MG tablet Take 1 tablet by mouth 2 (Two) Times a Day for 30 days. 60 tablet 0 Taking    clopidogrel (PLAVIX) 75 MG tablet Take 1 tablet by mouth Daily.   Taking    insulin glargine (LANTUS, SEMGLEE) 100 UNIT/ML injection Inject 30 Units under the skin into the  appropriate area as directed Every Night.   Taking    insulin lispro (HUMALOG/ADMELOG) 100 UNIT/ML injection Inject 2-7 Units under the skin into the appropriate area as directed 4 (Four) Times a Day Before Meals & at Bedtime for 30 days. Blood Glucose 150-199 mg/dL - 2 units Blood Glucose 200-249 mg/dL - 3 units Blood Glucose 250-299 mg/dL - 4 units Blood Glucose 300-349 mg/dL - 5 units Blood Glucose 350-400 mg/dL - 6 units Blood Glucose Greater Than 400 mg/dL - 7 units   Taking    insulin lispro (HUMALOG/ADMELOG) 100 UNIT/ML injection Inject 8 Units under the skin into the appropriate area as directed 3 (Three) Times a Day With Meals for 30 days.   Taking    potassium chloride (KLOR-CON M20) 20 MEQ CR tablet Take 1 tablet by mouth Daily for 30 days. 30 tablet 0 Taking    rosuvastatin (CRESTOR) 40 MG tablet Take 1 tablet by mouth Daily.   Taking     Current medications:  cefTRIAXone, 2,000 mg, Intravenous, Q24H  insulin glargine, 30 Units, Subcutaneous, Nightly  insulin lispro, 2-9 Units, Subcutaneous, Q6H  ipratropium-albuterol, 3 mL, Nebulization, 4x Daily - RT  metroNIDAZOLE, 500 mg, Intravenous, Q8H  potassium chloride ER, 40 mEq, Oral, Q4H  sodium chloride, 10 mL, Intravenous, Q12H      Current IV drips:       Allergies:  Allergies   Allergen Reactions    Contrast Dye (Echo Or Unknown Ct/Mr) Anaphylaxis     Not Lumason    Iodinated Contrast Media Shortness Of Breath    Iodine Shortness Of Breath    Aspirin Nausea And Vomiting    Lipitor [Atorvastatin] Unknown - High Severity    Prednisone Hives       Objective    Objective     Vitals:   Temp:  [97.5 °F (36.4 °C)-99.1 °F (37.3 °C)] 97.5 °F (36.4 °C)  Heart Rate:  [] 99  Resp:  [15-24] 21  BP: (110-145)/(58-78) 110/58  Flow (L/min) (Oxygen Therapy):  [1-2] 1      Physical Exam:   Constitutional: Awake, alert, No acute distress    Respiratory: Clear to auscultation bilaterally, nonlabored respirations    Cardiovascular: RRR, no murmurs, rubs, or gallops,  "palpable pedal pulses bilaterally   Gastrointestinal: Positive bowel sounds, soft, nontender, nondistended        ASA SCALE ASSESSMENT:  2-Mild to moderate systemic disease, medically well controlled, with no functional limitation    MALLAMPATI CLASSIFICATION:  2-Able to visualize the soft palate, fauces, uvula. The anterior & posterior tonsilar pillars are hidden by the tongue.       Result Review        Result Review:     Sodium   Date Value Ref Range Status   03/14/2025 140 136 - 145 mmol/L Final   03/13/2025 138 136 - 145 mmol/L Final   03/12/2025 135 (L) 136 - 145 mmol/L Final       Potassium   Date Value Ref Range Status   03/14/2025 3.5 3.5 - 5.2 mmol/L Final   03/13/2025 3.6 3.5 - 5.2 mmol/L Final     Comment:     Slight hemolysis detected by analyzer. Result may be falsely elevated.   03/12/2025 3.5 3.5 - 5.2 mmol/L Final       Chloride   Date Value Ref Range Status   03/14/2025 99 98 - 107 mmol/L Final   03/13/2025 95 (L) 98 - 107 mmol/L Final   03/12/2025 90 (L) 98 - 107 mmol/L Final       No results found for: \"PLASMABICARB\"    BUN   Date Value Ref Range Status   03/14/2025 24 (H) 8 - 23 mg/dL Final   03/13/2025 30 (H) 8 - 23 mg/dL Final   03/12/2025 33 (H) 8 - 23 mg/dL Final       Creatinine   Date Value Ref Range Status   03/14/2025 1.47 (H) 0.76 - 1.27 mg/dL Final   03/13/2025 1.79 (H) 0.76 - 1.27 mg/dL Final   03/12/2025 2.05 (H) 0.76 - 1.27 mg/dL Final       Calcium   Date Value Ref Range Status   03/14/2025 9.0 8.6 - 10.5 mg/dL Final   03/13/2025 8.5 (L) 8.6 - 10.5 mg/dL Final   03/12/2025 9.0 8.6 - 10.5 mg/dL Final           No components found for: \"GLUCOSE.*\"  Results from last 7 days   Lab Units 03/14/25  0545   WBC 10*3/mm3 9.54   HEMOGLOBIN g/dL 8.7*   HEMATOCRIT % 28.5*   PLATELETS 10*3/mm3 134*      Results from last 7 days   Lab Units 03/14/25  0545   INR  1.22*           Assessment / Plan     Assesment:   Acute cholecystitis.       Plan:   CT guided percutaneous cholecystostomy placement. "     The risks and benefits of the procedure were discussed with the patient.    Electronically signed by PIPER Way, 03/14/25, 10:29 AM EDT.

## 2025-03-15 VITALS
HEART RATE: 108 BPM | DIASTOLIC BLOOD PRESSURE: 72 MMHG | BODY MASS INDEX: 30.49 KG/M2 | SYSTOLIC BLOOD PRESSURE: 129 MMHG | WEIGHT: 212.96 LBS | TEMPERATURE: 98.1 F | OXYGEN SATURATION: 98 % | HEIGHT: 70 IN | RESPIRATION RATE: 22 BRPM

## 2025-03-15 LAB
ALBUMIN SERPL-MCNC: 2.6 G/DL (ref 3.5–5.2)
ALBUMIN/GLOB SERPL: 0.7 G/DL
ALP SERPL-CCNC: 145 U/L (ref 39–117)
ALT SERPL W P-5'-P-CCNC: 29 U/L (ref 1–41)
ANION GAP SERPL CALCULATED.3IONS-SCNC: 9.1 MMOL/L (ref 5–15)
AST SERPL-CCNC: 49 U/L (ref 1–40)
BASOPHILS # BLD AUTO: 0.01 10*3/MM3 (ref 0–0.2)
BASOPHILS NFR BLD AUTO: 0.2 % (ref 0–1.5)
BILIRUB SERPL-MCNC: 0.4 MG/DL (ref 0–1.2)
BUN SERPL-MCNC: 26 MG/DL (ref 8–23)
BUN/CREAT SERPL: 18.4 (ref 7–25)
CALCIUM SPEC-SCNC: 8.9 MG/DL (ref 8.6–10.5)
CHLORIDE SERPL-SCNC: 98 MMOL/L (ref 98–107)
CO2 SERPL-SCNC: 31.9 MMOL/L (ref 22–29)
CREAT SERPL-MCNC: 1.41 MG/DL (ref 0.76–1.27)
DEPRECATED RDW RBC AUTO: 44.4 FL (ref 37–54)
EGFRCR SERPLBLD CKD-EPI 2021: 52 ML/MIN/1.73
EOSINOPHIL # BLD AUTO: 0.09 10*3/MM3 (ref 0–0.4)
EOSINOPHIL NFR BLD AUTO: 1.9 % (ref 0.3–6.2)
ERYTHROCYTE [DISTWIDTH] IN BLOOD BY AUTOMATED COUNT: 12.7 % (ref 12.3–15.4)
GLOBULIN UR ELPH-MCNC: 3.6 GM/DL
GLUCOSE BLDC GLUCOMTR-MCNC: 189 MG/DL (ref 70–105)
GLUCOSE BLDC GLUCOMTR-MCNC: 199 MG/DL (ref 70–105)
GLUCOSE BLDC GLUCOMTR-MCNC: 250 MG/DL (ref 70–105)
GLUCOSE BLDC GLUCOMTR-MCNC: 279 MG/DL (ref 70–105)
GLUCOSE BLDC GLUCOMTR-MCNC: 297 MG/DL (ref 70–105)
GLUCOSE SERPL-MCNC: 203 MG/DL (ref 65–99)
HCT VFR BLD AUTO: 32 % (ref 37.5–51)
HGB BLD-MCNC: 9.8 G/DL (ref 13–17.7)
IMM GRANULOCYTES # BLD AUTO: 0.02 10*3/MM3 (ref 0–0.05)
IMM GRANULOCYTES NFR BLD AUTO: 0.4 % (ref 0–0.5)
LYMPHOCYTES # BLD AUTO: 0.76 10*3/MM3 (ref 0.7–3.1)
LYMPHOCYTES NFR BLD AUTO: 15.7 % (ref 19.6–45.3)
MCH RBC QN AUTO: 29 PG (ref 26.6–33)
MCHC RBC AUTO-ENTMCNC: 30.6 G/DL (ref 31.5–35.7)
MCV RBC AUTO: 94.7 FL (ref 79–97)
MONOCYTES # BLD AUTO: 0.39 10*3/MM3 (ref 0.1–0.9)
MONOCYTES NFR BLD AUTO: 8 % (ref 5–12)
NEUTROPHILS NFR BLD AUTO: 3.58 10*3/MM3 (ref 1.7–7)
NEUTROPHILS NFR BLD AUTO: 73.8 % (ref 42.7–76)
NRBC BLD AUTO-RTO: 0 /100 WBC (ref 0–0.2)
PLATELET # BLD AUTO: 131 10*3/MM3 (ref 140–450)
PMV BLD AUTO: 9.7 FL (ref 6–12)
POTASSIUM SERPL-SCNC: 3.5 MMOL/L (ref 3.5–5.2)
PROT SERPL-MCNC: 6.2 G/DL (ref 6–8.5)
RBC # BLD AUTO: 3.38 10*6/MM3 (ref 4.14–5.8)
SODIUM SERPL-SCNC: 139 MMOL/L (ref 136–145)
WBC NRBC COR # BLD AUTO: 4.85 10*3/MM3 (ref 3.4–10.8)

## 2025-03-15 PROCEDURE — 82948 REAGENT STRIP/BLOOD GLUCOSE: CPT | Performed by: STUDENT IN AN ORGANIZED HEALTH CARE EDUCATION/TRAINING PROGRAM

## 2025-03-15 PROCEDURE — 99232 SBSQ HOSP IP/OBS MODERATE 35: CPT

## 2025-03-15 PROCEDURE — 63710000001 INSULIN GLARGINE PER 5 UNITS: Performed by: STUDENT IN AN ORGANIZED HEALTH CARE EDUCATION/TRAINING PROGRAM

## 2025-03-15 PROCEDURE — 85025 COMPLETE CBC W/AUTO DIFF WBC: CPT

## 2025-03-15 PROCEDURE — 94799 UNLISTED PULMONARY SVC/PX: CPT

## 2025-03-15 PROCEDURE — 80053 COMPREHEN METABOLIC PANEL: CPT

## 2025-03-15 PROCEDURE — 82948 REAGENT STRIP/BLOOD GLUCOSE: CPT

## 2025-03-15 PROCEDURE — 63710000001 INSULIN LISPRO (HUMAN) PER 5 UNITS: Performed by: STUDENT IN AN ORGANIZED HEALTH CARE EDUCATION/TRAINING PROGRAM

## 2025-03-15 PROCEDURE — 25010000002 METRONIDAZOLE 500 MG/100ML SOLUTION: Performed by: STUDENT IN AN ORGANIZED HEALTH CARE EDUCATION/TRAINING PROGRAM

## 2025-03-15 PROCEDURE — 94664 DEMO&/EVAL PT USE INHALER: CPT

## 2025-03-15 PROCEDURE — 94761 N-INVAS EAR/PLS OXIMETRY MLT: CPT

## 2025-03-15 PROCEDURE — 94640 AIRWAY INHALATION TREATMENT: CPT

## 2025-03-15 RX ORDER — HYDROCODONE BITARTRATE AND ACETAMINOPHEN 5; 325 MG/1; MG/1
1 TABLET ORAL ONCE AS NEEDED
Refills: 0 | Status: COMPLETED | OUTPATIENT
Start: 2025-03-15 | End: 2025-03-15

## 2025-03-15 RX ORDER — OXYCODONE AND ACETAMINOPHEN 10; 325 MG/1; MG/1
1 TABLET ORAL EVERY 6 HOURS PRN
Start: 2025-03-15

## 2025-03-15 RX ORDER — ROSUVASTATIN CALCIUM 40 MG/1
40 TABLET, COATED ORAL DAILY
Qty: 90 TABLET | Refills: 0 | Status: SHIPPED | OUTPATIENT
Start: 2025-03-29

## 2025-03-15 RX ORDER — OXYCODONE HYDROCHLORIDE 5 MG/1
10 TABLET ORAL ONCE
Refills: 0 | Status: COMPLETED | OUTPATIENT
Start: 2025-03-15 | End: 2025-03-15

## 2025-03-15 RX ADMIN — INSULIN GLARGINE 30 UNITS: 100 INJECTION, SOLUTION SUBCUTANEOUS at 19:58

## 2025-03-15 RX ADMIN — HYDROCODONE BITARTRATE AND ACETAMINOPHEN 1 TABLET: 5; 325 TABLET ORAL at 19:52

## 2025-03-15 RX ADMIN — INSULIN LISPRO 6 UNITS: 100 INJECTION, SOLUTION INTRAVENOUS; SUBCUTANEOUS at 00:30

## 2025-03-15 RX ADMIN — METRONIDAZOLE 500 MG: 500 INJECTION, SOLUTION INTRAVENOUS at 11:19

## 2025-03-15 RX ADMIN — IPRATROPIUM BROMIDE AND ALBUTEROL SULFATE 3 ML: .5; 3 SOLUTION RESPIRATORY (INHALATION) at 06:40

## 2025-03-15 RX ADMIN — IPRATROPIUM BROMIDE AND ALBUTEROL SULFATE 3 ML: .5; 3 SOLUTION RESPIRATORY (INHALATION) at 11:48

## 2025-03-15 RX ADMIN — IPRATROPIUM BROMIDE AND ALBUTEROL SULFATE 3 ML: .5; 3 SOLUTION RESPIRATORY (INHALATION) at 15:13

## 2025-03-15 RX ADMIN — METRONIDAZOLE 500 MG: 500 INJECTION, SOLUTION INTRAVENOUS at 03:00

## 2025-03-15 RX ADMIN — METRONIDAZOLE 500 MG: 500 INJECTION, SOLUTION INTRAVENOUS at 19:29

## 2025-03-15 RX ADMIN — OXYCODONE 10 MG: 5 TABLET ORAL at 16:54

## 2025-03-15 RX ADMIN — IPRATROPIUM BROMIDE AND ALBUTEROL SULFATE 3 ML: .5; 3 SOLUTION RESPIRATORY (INHALATION) at 20:02

## 2025-03-15 NOTE — PLAN OF CARE
Problem: Adult Inpatient Plan of Care  Goal: Plan of Care Review  Outcome: Progressing  Flowsheets (Taken 3/15/2025 0201)  Plan of Care Reviewed With: patient  Goal: Patient-Specific Goal (Individualized)  Outcome: Progressing  Goal: Absence of Hospital-Acquired Illness or Injury  Outcome: Progressing  Intervention: Identify and Manage Fall Risk  Recent Flowsheet Documentation  Taken 3/14/2025 2000 by Debra Gagnon RN  Safety Promotion/Fall Prevention:   safety round/check completed   fall prevention program maintained  Intervention: Prevent Skin Injury  Recent Flowsheet Documentation  Taken 3/14/2025 2000 by Debra Gagnon RN  Skin Protection:   drying agents applied   skin sealant/moisture barrier applied  Intervention: Prevent Infection  Recent Flowsheet Documentation  Taken 3/14/2025 2000 by Debra Gagnon RN  Infection Prevention:   single patient room provided   hand hygiene promoted  Goal: Optimal Comfort and Wellbeing  Outcome: Progressing  Intervention: Monitor Pain and Promote Comfort  Recent Flowsheet Documentation  Taken 3/14/2025 2030 by Debra Gagnon RN  Pain Management Interventions: pain medication given  Goal: Readiness for Transition of Care  Outcome: Progressing     Problem: Pain Acute  Goal: Optimal Pain Control and Function  Outcome: Progressing  Intervention: Develop Pain Management Plan  Recent Flowsheet Documentation  Taken 3/14/2025 2030 by Debra Gagnon RN  Pain Management Interventions: pain medication given     Problem: Skin Injury Risk Increased  Goal: Skin Health and Integrity  Outcome: Progressing  Intervention: Optimize Skin Protection  Recent Flowsheet Documentation  Taken 3/14/2025 2000 by Debra Gagnon RN  Pressure Reduction Techniques: weight shift assistance provided  Pressure Reduction Devices: pressure-redistributing mattress utilized  Skin Protection:   drying agents applied   skin sealant/moisture barrier applied     Problem: Fall Injury Risk  Goal: Absence of Fall and  Fall-Related Injury  Outcome: Progressing  Intervention: Promote Injury-Free Environment  Recent Flowsheet Documentation  Taken 3/14/2025 2000 by Debra Gagnon RN  Safety Promotion/Fall Prevention:   safety round/check completed   fall prevention program maintained     Problem: Sepsis/Septic Shock  Goal: Optimal Coping  Outcome: Progressing  Goal: Absence of Bleeding  Outcome: Progressing  Goal: Blood Glucose Level Within Target Range  Outcome: Progressing  Intervention: Optimize Glycemic Control  Recent Flowsheet Documentation  Taken 3/14/2025 2000 by Debra Gagnon RN  Hyperglycemia Management: blood glucose monitored  Hypoglycemia Management: blood glucose monitored  Goal: Absence of Infection Signs and Symptoms  Outcome: Progressing  Intervention: Initiate Sepsis Management  Recent Flowsheet Documentation  Taken 3/14/2025 2000 by Debra Gagnon RN  Infection Prevention:   single patient room provided   hand hygiene promoted  Intervention: Promote Stabilization  Recent Flowsheet Documentation  Taken 3/14/2025 2000 by Debra Gagnon RN  Fluid/Electrolyte Management: fluids provided  Goal: Optimal Nutrition Delivery  Outcome: Progressing   Goal Outcome Evaluation:  Plan of Care Reviewed With: patient

## 2025-03-15 NOTE — PROGRESS NOTES
[facility] MEDICINE SERVICE  DAILY PROGRESS NOTE    NAME: Blas Mojica  : 1950  MRN: [id number]      LOS: 2 days     PROVIDER OF SERVICE: Nathan Posey MD    Chief Complaint: Acute cholecystitis    Subjective:     History of Present Illness: Blas Mojica is a 74 y.o. male with a CMH of CVA, type 2 diabetes, hypertension, hyperlipidemia, recently admitted at [facility] found to have UTI, as well as bacteremia, discharged to [facility] rehab with Levaquin for 14 which was started on , now being sent back to [facility] on 3/12/2025 for tenderness associated with vomiting and nausea.     Patient is currently awake however very hard of hearing unable to obtain any orientation or any further history from the patient himself.  Called wife over the phone she stated patient was recently admitted here for UTI was sent to the [facility] rehab over the manage patient developed abdominal pain associate with vomiting and nausea, unable to keep anything down due to nausea and vomiting.  She stated patient at baseline prior to this hospitalization in February, he was awake alert oriented x 3, was independently doing his all daily activities.     In the ED, patient's vital stable, slight tachycardia with heart rate of 108, sodium 135, creatinine of 2.05, elevated AST ALT 97/44, T. bili normal, lactic acid 2.2, CBC showing WBC of 13, hemoglobin 10, blood cultures pending, respiratory panel negative, CT abdomen showing distended gallbladder with surrounding fat stranding suspicious for acute cholecystitis.     Review of Systems negative unless mentioned above    Interval History:  History taken from: patient      3/14 seen in bed NAD, VSS, tlerating abx, Underwent CT guided percutaneous cholecystostomy placement.   3/15/2025 patient seen and examined in bed acute distress, vital signs stable, tolerating antibiotics, he was cleared by surgery for discharge.  Discussed with RN.    Review of  Systems    Objective:     Vital Signs  Temp:  [98.4 °F (36.9 °C)-99.5 °F (37.5 °C)] 99.5 °F (37.5 °C)  Heart Rate:  [] 105  Resp:  [14-25] 18  BP: (103-128)/(59-68) 108/59  Flow (L/min) (Oxygen Therapy):  [1-2] 2   Body mass index is 30.56 kg/m².      Physical Exam  General: Awake, very hard of hearing, unable to obtain any further information from the patient in terms of orientation  HEENT: Atraumatic normocephalic  Respiratory: Decreased breath sound lung bases, nonlabored breathing,  Cardio: Heart rate normal, rhythm regular  Abdomen: Soft, tender to palpation in the right upper quadrant, no rebound or guarding on my exam  Extremities: No remarkable edema the bilateral lower extremities  Neuro: Awake, questionable disorientation, patient is hard of hearing, moves all extremities     Diagnostic Data    Results from last 7 days   Lab Units 03/15/25  0913   WBC 10*3/mm3 4.85   HEMOGLOBIN g/dL 9.8*   HEMATOCRIT % 32.0*   PLATELETS 10*3/mm3 131*   GLUCOSE mg/dL 203*   CREATININE mg/dL 1.41*   BUN mg/dL 26*   SODIUM mmol/L 139   POTASSIUM mmol/L 3.5   AST (SGOT) U/L 49*   ALT (SGPT) U/L 29   ALK PHOS U/L 145*   BILIRUBIN mg/dL 0.4   ANION GAP mmol/L 9.1       CT Percutaneous Cholecystostomy  Result Date: 3/14/2025  Successful placement of 8 Korean percutaneous cholecystostomy drainage catheter utilizing CT fluoroscopic guidance. Report dictated by: Iávn Lerner DNP  I have personally reviewed this case and agree with the findings above: Electronically Signed: Pito Shelby MD  3/14/2025 4:06 PM EDT  Workstation ID: [id number]        I reviewed the patient's new clinical results.  Scheduled Meds:cefTRIAXone, 2,000 mg, Intravenous, Q24H  insulin glargine, 30 Units, Subcutaneous, Nightly  insulin lispro, 2-9 Units, Subcutaneous, Q6H  ipratropium-albuterol, 3 mL, Nebulization, 4x Daily - RT  metroNIDAZOLE, 500 mg, Intravenous, Q8H  sodium chloride, 10 mL, Intravenous, Q12H      Continuous Infusions:   PRN Meds:.   senna-docusate sodium **AND** polyethylene glycol **AND** bisacodyl **AND** bisacodyl    Calcium Replacement - Follow Nurse / BPA Driven Protocol    dextrose    dextrose    glucagon (human recombinant)    Magnesium Standard Dose Replacement - Follow Nurse / BPA Driven Protocol    nitroglycerin    Phosphorus Replacement - Follow Nurse / BPA Driven Protocol    Potassium Replacement - Follow Nurse / BPA Driven Protocol    [COMPLETED] Insert Peripheral IV **AND** sodium chloride    sodium chloride    sodium chloride   Assessment/Plan:     Active and Resolved Problems  Active Hospital Problems    Diagnosis  POA    **Acute cholecystitis [K81.0]  Yes    Type 2 diabetes mellitus with stage 3 chronic kidney disease, with long-term current use of insulin [E11.22, N18.30, Z79.4]  Not Applicable    Dyslipidemia [E78.5]  Yes    Primary osteoarthritis [M19.91]  Yes    Generalized weakness [R53.1]  Yes    Stage 3 chronic kidney disease [N18.30]  Yes    Primary hypertension [I10]  Yes    Vitamin D deficiency [E55.9]  Yes      Resolved Hospital Problems   No resolved problems to display.     Acute cholecystitis  Recent Bacteremia, dc on 3/3/25 on Levaquin, Klebsiella: Susceptible to ceftriaxone  Bibasilar atelectasis on x-ray  Type 2 diabetes  Severe hyperglycemia, uncontrolled diabetes, check A1c  Hypertension  Hyperlipidemia  Elevated AST ALT 97/44, likely statins induced     Plan  -ceftriaxone, Flagyl for possible acute cholecystitis, follow blood cultures, consult surgery     - cc an hour, start DuoNebs for bibasilar atelectasis, spirometry monitor respiratory status closely     -Patient is very hard of hearing, unable to get any information from the patient, will check ammonia and TSH levels, if he has encephalopathy likely due to underlying infection, follow blood cultures     -SSI for type 2 diabetes, hold his Plavix pending surgery eval, hold statins given elevated AST ALT     Resume other home medications once  reconciled per pharmacy or medically appropriate     plan for cholecystostomy tube placement today     VTE Prophylaxis:  No VTE prophylaxis order currently exists.    Disposition Planning:   Barriers to Discharge:abd pain  Anticipated Date of Discharge: 3/16  Place of Discharge: home      Time: 35 minutes     Code Status and Medical Interventions: CPR (Attempt to Resuscitate); Full Support   Ordered at: 03/13/25 0434     Code Status (Patient has no pulse and is not breathing):    CPR (Attempt to Resuscitate)     Medical Interventions (Patient has pulse or is breathing):    Full Support       Signature: Electronically signed by Nathan Posey MD, 03/15/25, 15:21 EDT.  [facility] Hospitalist Team

## 2025-03-15 NOTE — CASE MANAGEMENT/SOCIAL WORK
"Physicians Statement of Medical Necessity for  Ambulance Transportation    GENERAL INFORMATION     Name: Blas Mojica  YOB: 1950  Medicare #: [id number]   Transport Date: 3/15/25 (Valid for round trips this date, or for scheduled repetitive trips for 60 days from the date signed below.)  Origin: [facility]  Destination: [facility]  Is the Patient's stay covered under Medicare Part A (PPS/DRG?)Yes  Closest appropriate facility? Yes  If this a hosp-hosp transfer? No  Is this a hospice patient? No    MEDICAL NECESSITY QUESTIONAIRE    Ambulance Transportation is medically necessary only if other means of transportation are contraindicated or would be potentially harmful to the patient.  To meet this requirement, the patient must be either \"bed confined\" or suffer from a condition such that transport by means other than an ambulance is contraindicated by the patient's condition.  The following questions must be answered by the healthcare professional signing below for this form to be valid:     1) Describe the MEDICAL CONDITION (physical and/or mental) of this patient AT THE TIME OF AMBULANCE TRANSPORT that requires the patient to be transported in an ambulance, and why transport by other means is contraindicated by the patient's condition: 2LPM oxygen, biliary drain, DTI to coccyx. Per nursing, patient unable to sit up due to pain from drain. Facility cannot transport. Report called to facility.   Past Medical History:   Diagnosis Date    Diabetes mellitus     Hyperlipidemia     Hypertension     Kidney stone     TIA (transient ischemic attack) 2016    Type 2 diabetes mellitus with stage 3 chronic kidney disease, with long-term current use of insulin 12/13/2021      Past Surgical History:   Procedure Laterality Date    ADENOIDECTOMY      ENDOSCOPY N/A 12/13/2023    Procedure: ESOPHAGOGASTRODUODENOSCOPY with removal of NJ tube from right nare;  Surgeon: Jaret Valadez MD;  Location: [facility]" "ENDOSCOPY;  Service: Gastroenterology;  Laterality: N/A;  esophagitis, gastric ulcer    EXTRACORPOREAL SHOCK WAVE LITHOTRIPSY (ESWL)      HERNIA REPAIR      KNEE ACL RECONSTRUCTION Left     TONSILLECTOMY        2) Is this patient \"bed confined\" as defined below?Yes   To be \"bed confined\" the patient must satisfy all three of the following criteria:  (1) unable to get up from bed without assistance; AND (2) unable to ambulate;  AND (3) unable to sit in a chair or wheelchair.  3) Can this patient safely be transported by car or wheelchair van (I.e., may safely sit during transport, without an attendant or monitoring?)No   4. In addition to completing questions 1-3 above, please check any of the following conditions that apply*:          *Note: supporting documentation for any boxes checked must be maintained in the patient's medical records Medical attendant required, Requires oxygen - unable to self administer, Unable to tolerate seated position for time needed to transport, and Unable to sit in a chair or wheelchair due to decubitus ulcers or other wounds      SIGNATURE OF PHYSICIAN OR OTHER AUTHORIZED HEALTHCARE PROFESSIONAL    I certify that the above information is true and correct based on my evaluation of this patient, and represent that the patient requires transport by ambulance and that other forms of transport are contraindicated.  I understand that this information will be used by the Centers for Medicare and Medicaid Services (CMS) to support the determiniation of medical necessity for ambulance services, and I represent that I have personal knowledge of the patient's condition at the time of transport.    x   If this box is checked, I also certify that the patient is physically or mentally incapable of signing the ambulance service's claim form and that the institution with which I am affiliated has furnished care, services or assistance to the patient.  My signature below is made on behalf of the " patient pursuant to 42 .36(b)(4). In accordance with 42 .37, the specific reason(s) that the patient is physically or mentally incapable of signing the claim for is as follows:     Signature of Physician or Healthcare Professional Alysha Nava RN,  Date/Time:   3/15/25 6671     (For Scheduled repetitive transport, this form is not valid for transports performed more than 60 days after this date).                            Alysha Nava RN                                                                                                                --------------------------------------------------------------------------------------------  Printed Name and Credentials of Physician or Authorized Healthcare Professional     *Form must be signed by patient's attending physician for scheduled, repetitive transports,.  For non-repetitive ambulance transports, if unable to obtain the signature of the attending physician, any of the following may sign (please select below):     Physician  Clinical Nurse Specialist  Registered Nurse x    Physician Assistant  Discharge Planner  Licensed Practical Nurse     Nurse Practitioner   x

## 2025-03-15 NOTE — DISCHARGE INSTR - LAB
Please call [phone] on Monday to make a 2 week follow up appointment with the surgeon- Dr Oconnell

## 2025-03-15 NOTE — PROGRESS NOTES
General Surgery Progress Note    Name: Blas Mojica ADMIT: 3/12/2025   : 1950  PCP: Sahil Read MD    MRN: [id number] LOS: 2 days   AGE/SEX: 74 y.o. male  ROOM: [address] [place] [address]    Chief Complaint   Patient presents with    Abnormal Lab     Subjective     Patient seen examined.  Vital signs stable, temp 99.5 overnight.  Had PCT placed yesterday.  Doing okay this morning, does report some abdominal pain.  Denies nausea or vomiting.    Objective     Scheduled Medications:   cefTRIAXone, 2,000 mg, Intravenous, Q24H  insulin glargine, 30 Units, Subcutaneous, Nightly  insulin lispro, 2-9 Units, Subcutaneous, Q6H  ipratropium-albuterol, 3 mL, Nebulization, 4x Daily - RT  metroNIDAZOLE, 500 mg, Intravenous, Q8H  sodium chloride, 10 mL, Intravenous, Q12H        Active Infusions:       As Needed Medications:    senna-docusate sodium **AND** polyethylene glycol **AND** bisacodyl **AND** bisacodyl    Calcium Replacement - Follow Nurse / BPA Driven Protocol    dextrose    dextrose    glucagon (human recombinant)    Magnesium Standard Dose Replacement - Follow Nurse / BPA Driven Protocol    nitroglycerin    Phosphorus Replacement - Follow Nurse / BPA Driven Protocol    Potassium Replacement - Follow Nurse / BPA Driven Protocol    [COMPLETED] Insert Peripheral IV **AND** sodium chloride    sodium chloride    sodium chloride    Vital Signs  Vital Signs Patient Vitals for the past 24 hrs:   BP Temp Temp src Pulse Resp SpO2   03/15/25 1152 -- -- -- 84 18 98 %   03/15/25 1148 -- -- -- 93 18 98 %   03/15/25 0754 108/59 -- -- 93 25 95 %   03/15/25 0644 -- -- -- 90 20 96 %   03/15/25 0640 -- -- -- 93 20 95 %   25 2351 103/62 99.5 °F (37.5 °C) Axillary 98 20 96 %   25 2115 -- -- -- 99 24 100 %   25 2108 -- -- -- 99 14 97 %   25 1613 -- -- -- 106 -- 94 %   25 1551 128/68 98.4 °F (36.9 °C) Oral 101 22 96 %   25 1546 -- -- -- 98 22 100 %   25 1543 -- -- -- 92 17 97 %   25  1224 -- -- -- 89 20 100 %   03/14/25 1221 -- -- -- 96 20 100 %     I/O:  I/O last 3 completed shifts:  In: 1320 [P.O.:1320]  Out: 1900 [Urine:1600; Emesis/NG output:300]    Physical Exam:  Physical Exam  Constitutional:       General: He is not in acute distress.  Cardiovascular:      Rate and Rhythm: Normal rate.   Pulmonary:      Effort: Pulmonary effort is normal. No respiratory distress.   Abdominal:      Tenderness: There is no abdominal tenderness. There is no guarding.      Comments: Soft, large abdomen.  Nontender to palpation.  PCT in place with brown-tinged output.   Neurological:      Mental Status: He is alert. Mental status is at baseline.   Psychiatric:         Mood and Affect: Mood normal.         Behavior: Behavior normal.         Results Review:     CBC    Results from last 7 days   Lab Units 03/15/25  0913 03/14/25  0545 03/13/25  0438 03/12/25  2259   WBC 10*3/mm3 4.85 9.54 11.46* 13.14*   HEMOGLOBIN g/dL 9.8* 8.7* 9.3* 10.0*   PLATELETS 10*3/mm3 131* 134* 152 164     BMP   Results from last 7 days   Lab Units 03/15/25  0913 03/14/25  0545 03/13/25  0438 03/12/25  2259   SODIUM mmol/L 139 140 138 135*   POTASSIUM mmol/L 3.5 3.5 3.6 3.5   CHLORIDE mmol/L 98 99 95* 90*   CO2 mmol/L 31.9* 33.4* 32.0* 33.3*   BUN mg/dL 26* 24* 30* 33*   CREATININE mg/dL 1.41* 1.47* 1.79* 2.05*   GLUCOSE mg/dL 203* 106* 231* 440*   MAGNESIUM mg/dL  --   --  2.0  --    PHOSPHORUS mg/dL  --   --  2.1*  --      Radiology(recent) CT Percutaneous Cholecystostomy  Result Date: 3/14/2025  Successful placement of 8 Spanish percutaneous cholecystostomy drainage catheter utilizing CT fluoroscopic guidance. Report dictated by: Iván Lerner DNP  I have personally reviewed this case and agree with the findings above: Electronically Signed: Pito Shelby MD  3/14/2025 4:06 PM EDT  Workstation ID: [id number]    NM HIDA SCAN WITHOUT PHARMACOLOGICAL INTERVENTION  Result Date: 3/13/2025  Impression: Nonvisualization of the gallbladder at 90  minutes concerning for acute cholecystitis given findings on prior CT and ultrasound. Electronically Signed: Vitor Pena MD  3/13/2025 1:58 PM EDT  Workstation ID: [id number]     I reviewed the patient's new clinical results.    Assessment & Plan       Acute cholecystitis      74 y.o. male admitted 3/12 with a calculus cholecystitis    Diet as tolerated  Pain medication as needed  Continue percutaneous cholecystostomy tube  Continue antibiotics, recommend 7-day course of oral antibiotics upon discharge  Okay to discharge from general surgery perspective once medically cleared.  Follow-up with Dr. Oconnell as outpatient in 6 weeks for PCT cholangiogram.  Will see as needed.  Please call for any questions, concerns, or acute changes.        This note was created using Dragon Voice Recognition software.    PANFILO Álvarez  03/15/25  12:10 EDT

## 2025-03-15 NOTE — CASE MANAGEMENT/SOCIAL WORK
Continued Stay Note  [facility]     Patient Name: Blas Mojica  MRN: [id number]  Today's Date: 3/15/2025    Admit Date: 3/12/2025    [facility]  no precert or PASRR  required.   Discharge Plan       Row Name 03/15/25 1656       Plan    Plan Comments Facility liaison notified CM that patient is able to return to facility however, they are unable to transport. Patient requiring stretcher for transport. EMS requested. Nursing notified. Per nurse, report called to facility.             Expected Discharge Date and Time       Expected Discharge Date Expected Discharge Time    Mar 15, 2025           Alysha Nava RN    Office: [phone]  Fax: [phone]

## 2025-03-15 NOTE — PLAN OF CARE
Goal Outcome Evaluation:      Pt alert and able to make needs known. Minimal complaints of pain but denies needing any pain medication at this time. Possible return to [facility] this evening. Plan of care ongoing.

## 2025-03-16 NOTE — NURSING NOTE
Called Mirna at [facility] and notified her of what night time medications pt had and of bs. Wife has been at bedside and req the lantus insulin given. Emt aware of medications and sugar in report to them.

## 2025-03-17 LAB
BACTERIA FLD CULT: NORMAL
BACTERIA SPEC AEROBE CULT: NORMAL
GRAM STN SPEC: NORMAL
GRAM STN SPEC: NORMAL

## 2025-03-17 NOTE — DISCHARGE SUMMARY
[facility]  Discharge Summary    Date of Service: 3/17/2025  Patient Name: Blas Mojica  : 1950  MRN: [id number]    Date of Admission: 3/12/2025  Discharge Diagnosis: Acute cholecystitis  Date of Discharge: 3/17/2025  Primary Care Physician: Sahil Read MD      Presenting Problem:   Acute cholecystitis [K81.0]  Hyperglycemia [R73.9]  Sepsis without acute organ dysfunction, due to unspecified organism [A41.9]    Active and Resolved Hospital Problems:  Active Hospital Problems    Diagnosis POA    **Acute cholecystitis [K81.0] Yes    Type 2 diabetes mellitus with stage 3 chronic kidney disease, with long-term current use of insulin [E11.22, N18.30, Z79.4] Not Applicable    Dyslipidemia [E78.5] Yes    Primary osteoarthritis [M19.91] Yes    Generalized weakness [R53.1] Yes    Stage 3 chronic kidney disease [N18.30] Yes    Primary hypertension [I10] Yes    Vitamin D deficiency [E55.9] Yes      Resolved Hospital Problems   No resolved problems to display.       Acute cholecystitis  Recent Bacteremia, dc on 3/3/25 on Levaquin, Klebsiella: Susceptible to ceftriaxone  Bibasilar atelectasis on x-ray  Type 2 diabetes  Severe hyperglycemia, uncontrolled diabetes, check A1c  Hypertension  Hyperlipidemia  Elevated AST ALT 97/44, likely statins induced     Plan  -ceftriaxone, Flagyl for possible acute cholecystitis, follow blood cultures, consult surgery     - cc an hour, start DuoNebs for bibasilar atelectasis, spirometry monitor respiratory status closely     -Patient is very hard of hearing, unable to get any information from the patient, will check ammonia and TSH levels, if he has encephalopathy likely due to underlying infection, follow blood cultures     -SSI for type 2 diabetes, hold his Plavix pending surgery eval, hold statins given elevated AST ALT     Resume other home medications once reconciled per pharmacy or medically appropriate     S/p cholecystostomy  tube placement      Hospital Course     History of Present Illness: Blas Mojica is a 74 y.o. male with a CMH of CVA, type 2 diabetes, hypertension, hyperlipidemia, recently admitted at [facility] found to have UTI, as well as bacteremia, discharged to [facility] rehab with Levaquin for 14 which was started on 2/29/2025, now being sent back to [facility] on 3/12/2025 for tenderness associated with vomiting and nausea.     Patient is currently awake however very hard of hearing unable to obtain any orientation or any further history from the patient himself.  Called wife over the phone she stated patient was recently admitted here for UTI was sent to the [facility] rehab over the manage patient developed abdominal pain associate with vomiting and nausea, unable to keep anything down due to nausea and vomiting.  She stated patient at baseline prior to this hospitalization in February, he was awake alert oriented x 3, was independently doing his all daily activities.     In the ED, patient's vital stable, slight tachycardia with heart rate of 108, sodium 135, creatinine of 2.05, elevated AST ALT 97/44, T. bili normal, lactic acid 2.2, CBC showing WBC of 13, hemoglobin 10, blood cultures pending, respiratory panel negative, CT abdomen showing distended gallbladder with surrounding fat stranding suspicious for acute cholecystitis.     Review of Systems negative unless mentioned above     Interval History:  History taken from: patient     3/14 seen in bed NAD, VSS, tlerating abx, Underwent CT guided percutaneous cholecystostomy placement.   3/15/2025 patient seen and examined in bed acute distress, vital signs stable, tolerating antibiotics, he was cleared by surgery for discharge.  Discussed with RN.  : [facility]     DISCHARGE Follow Up Recommendations for labs and diagnostics: Follow-up with PCP in a week    Day of Discharge     Vital Signs:       Physical Exam   General: Awake, very hard of hearing, unable to  obtain any further information from the patient in terms of orientation  HEENT: Atraumatic normocephalic  Respiratory: Decreased breath sound lung bases, nonlabored breathing,  Cardio: Heart rate normal, rhythm regular  Abdomen: Soft, tender to palpation in the right upper quadrant, no rebound or guarding on my exam  Extremities: No remarkable edema the bilateral lower extremities  Neuro: Awake, questionable disorientation, patient is hard of hearing, moves all extremities          Pertinent  and/or Most Recent Results     LAB RESULTS:      Lab 03/15/25  0913 03/14/25  0545 03/13/25  1538 03/13/25 0438 03/13/25  0315 03/12/25  2309 03/12/25  2259   WBC 4.85 9.54  --  11.46*  --   --  13.14*   HEMOGLOBIN 9.8* 8.7*  --  9.3*  --   --  10.0*   HEMATOCRIT 32.0* 28.5*  --  30.2*  --   --  31.8*   PLATELETS 131* 134*  --  152  --   --  164   NEUTROS ABS 3.58 7.73*  --  9.69*  --   --  11.62*   IMMATURE GRANS (ABS) 0.02 0.07*  --  0.05  --   --  0.06*   LYMPHS ABS 0.76 1.02  --  0.95  --   --  0.63*   MONOS ABS 0.39 0.57  --  0.65  --   --  0.76   EOS ABS 0.09 0.13  --  0.10  --   --  0.04   MCV 94.7 95.3  --  93.8  --   --  92.7   LACTATE  --   --   --   --  1.3 2.2*  --    PROTIME  --  15.3* 16.0*  --   --   --   --    APTT  --   --  43.5*  --   --   --   --          Lab 03/15/25  0913 03/14/25  0545 03/13/25 0438 03/12/25  2259   SODIUM 139 140 138 135*   POTASSIUM 3.5 3.5 3.6 3.5   CHLORIDE 98 99 95* 90*   CO2 31.9* 33.4* 32.0* 33.3*   ANION GAP 9.1 7.6 11.0 11.7   BUN 26* 24* 30* 33*   CREATININE 1.41* 1.47* 1.79* 2.05*   EGFR 52.0* 49.4* 39.0* 33.2*   GLUCOSE 203* 106* 231* 440*   CALCIUM 8.9 9.0 8.5* 9.0   MAGNESIUM  --   --  2.0  --    PHOSPHORUS  --   --  2.1*  --    HEMOGLOBIN A1C  --   --  8.59*  --    TSH  --   --  1.370  --          Lab 03/15/25  0913 03/13/25  0438 03/12/25  2789   TOTAL PROTEIN 6.2 6.0 6.8   ALBUMIN 2.6* 2.7* 3.1*   GLOBULIN 3.6 3.3 3.7   ALT (SGPT) 29 41 44*   AST (SGOT) 49* 98* 97*    BILIRUBIN 0.4 0.8 1.2   ALK PHOS 145* 154* 182*         Lab 03/14/25  0545 03/13/25  1538   PROTIME 15.3* 16.0*   INR 1.22* 1.28*             Lab 03/12/25  2259   ABO TYPING A   RH TYPING Negative   ANTIBODY SCREEN Negative         Brief Urine Lab Results  (Last result in the past 365 days)        Color   Clarity   Blood   Leuk Est   Nitrite   Protein   CREAT   Urine HCG        03/13/25 0003 Yellow  Comment: Result checked     Clear   Moderate (2+)   Negative   Negative   100 mg/dL (2+)                 Microbiology Results (last 10 days)       Procedure Component Value - Date/Time    Body Fluid Culture - Body Fluid, Gallbladder [[id number]] Collected: 03/14/25 1048    Lab Status: Final result Specimen: Body Fluid from Gallbladder Updated: 03/17/25 0851     Body Fluid Culture No growth at 3 days     Gram Stain Rare (1+) WBCs seen      No organisms seen    Blood Culture - Blood, Arm, Left [[id number]]  (Normal) Collected: 03/12/25 2343    Lab Status: Preliminary result Specimen: Blood from Arm, Left Updated: 03/17/25 0000     Blood Culture No growth at 4 days    Respiratory Panel PCR w/COVID-19(SARS-CoV-2) KEVIN/HORTENSIA/SLOAN/PAD/COR/JOHNNY In-House, NP Swab in UTM/VTM, 2 HR TAT - Swab, Nasopharynx [[id number]]  (Normal) Collected: 03/12/25 2341    Lab Status: Final result Specimen: Swab from Nasopharynx Updated: 03/13/25 0033     ADENOVIRUS, PCR Not Detected     Coronavirus 229E Not Detected     Coronavirus HKU1 Not Detected     Coronavirus NL63 Not Detected     Coronavirus OC43 Not Detected     COVID19 Not Detected     Human Metapneumovirus Not Detected     Human Rhinovirus/Enterovirus Not Detected     Influenza A PCR Not Detected     Influenza B PCR Not Detected     Parainfluenza Virus 1 Not Detected     Parainfluenza Virus 2 Not Detected     Parainfluenza Virus 3 Not Detected     Parainfluenza Virus 4 Not Detected     RSV, PCR Not Detected     Bordetella pertussis pcr Not Detected     Bordetella parapertussis PCR Not Detected      Chlamydophila pneumoniae PCR Not Detected     Mycoplasma pneumo by PCR Not Detected    Narrative:      In the setting of a positive respiratory panel with a viral infection PLUS a negative procalcitonin without other underlying concern for bacterial infection, consider observing off antibiotics or discontinuation of antibiotics and continue supportive care. If the respiratory panel is positive for atypical bacterial infection (Bordetella pertussis, Chlamydophila pneumoniae, or Mycoplasma pneumoniae), consider antibiotic de-escalation to target atypical bacterial infection.    Blood Culture - Blood, Arm, Left [[id number]]  (Normal) Collected: 03/12/25 6764    Lab Status: Preliminary result Specimen: Blood from Arm, Left Updated: 03/16/25 2316     Blood Culture No growth at 4 days    Respiratory Panel PCR w/COVID-19(SARS-CoV-2) KEVIN/HORTENSIA/SLOAN/PAD/COR/JOHNNY In-House, NP Swab in UTM/VTM, 2 HR TAT - Swab, Nasopharynx [[id number]]  (Normal) Collected: 03/12/25 0910    Lab Status: Final result Specimen: Swab from Nasopharynx Updated: 03/12/25 1306     ADENOVIRUS, PCR Not Detected     Coronavirus 229E Not Detected     Coronavirus HKU1 Not Detected     Coronavirus NL63 Not Detected     Coronavirus OC43 Not Detected     COVID19 Not Detected     Human Metapneumovirus Not Detected     Human Rhinovirus/Enterovirus Not Detected     Influenza A PCR Not Detected     Influenza B PCR Not Detected     Parainfluenza Virus 1 Not Detected     Parainfluenza Virus 2 Not Detected     Parainfluenza Virus 3 Not Detected     Parainfluenza Virus 4 Not Detected     RSV, PCR Not Detected     Bordetella pertussis pcr Not Detected     Bordetella parapertussis PCR Not Detected     Chlamydophila pneumoniae PCR Not Detected     Mycoplasma pneumo by PCR Not Detected    Narrative:      In the setting of a positive respiratory panel with a viral infection PLUS a negative procalcitonin without other underlying concern for bacterial infection, consider  observing off antibiotics or discontinuation of antibiotics and continue supportive care. If the respiratory panel is positive for atypical bacterial infection (Bordetella pertussis, Chlamydophila pneumoniae, or Mycoplasma pneumoniae), consider antibiotic de-escalation to target atypical bacterial infection.    COVID-19, FLU A/B, RSV PCR 1 HR TAT - Swab, Nasopharynx [[id number]]  (Normal) Collected: 03/11/25 1126    Lab Status: Final result Specimen: Swab from Nasopharynx Updated: 03/11/25 1410     COVID19 Not Detected     Influenza A PCR Not Detected     Influenza B PCR Not Detected     RSV, PCR Not Detected    Narrative:      Fact sheet for providers: https://www.fda.gov/media/105994/download    Fact sheet for patients: https://www.fda.gov/media/193190/download    Test performed by PCR.            CT Percutaneous Cholecystostomy  Result Date: 3/14/2025  Impression: Successful placement of 8 Congolese percutaneous cholecystostomy drainage catheter utilizing CT fluoroscopic guidance. Report dictated by: Iván Lerner DNP  I have personally reviewed this case and agree with the findings above: Electronically Signed: Pito Shelby MD  3/14/2025 4:06 PM EDT  Workstation ID: [id number]    NM HIDA SCAN WITHOUT PHARMACOLOGICAL INTERVENTION  Result Date: 3/13/2025  Impression: Impression: Nonvisualization of the gallbladder at 90 minutes concerning for acute cholecystitis given findings on prior CT and ultrasound. Electronically Signed: Vitor Pena MD  3/13/2025 1:58 PM EDT  Workstation ID: [id number]    US Abdomen Limited  Result Date: 3/13/2025  Impression: Impression: There is sludge dependently in the gallbladder. There is no obvious gallbladder wall thickening or para cholecystic fluid. There is no biliary dilatation. Electronically Signed: Pito Shelby MD  3/13/2025 8:48 AM EDT  Workstation ID: [id number]    CT Abdomen Pelvis Without Contrast  Result Date: 3/13/2025  Impression: Impression: Distended gallbladder with  surrounding fat stranding. Findings are suspicious for acute cholecystitis. Electronically Signed: Vazquez Thorpe MD  3/13/2025 1:05 AM EDT  Workstation ID: [id number]    XR Chest 1 View  Result Date: 3/12/2025  Impression: Impression: Cardiomegaly. Mild right basilar atelectasis. Electronically Signed: Vazquez Thorpe MD  3/12/2025 11:52 PM EDT  Workstation ID: [id number]    CT Head Without Contrast  Result Date: 2/27/2025  Impression: Impression: 1.No acute intracranial abnormality identified. 2.Diffuse brain atrophy with chronic microvascular ischemic changes 3.Mild amount of fluid in the right mastoids. Electronically Signed: Will Flores MD  2/27/2025 11:29 AM EST  Workstation ID: [id number]    US Renal Bilateral  Result Date: 2/25/2025  Impression: Impression: No acute renal findings. No hydronephrosis. Electronically Signed: Isaiah Wheatley MD  2/25/2025 9:52 PM EST  Workstation ID: [id number]    XR Chest 1 View  Result Date: 2/25/2025  Impression: Impression: 1. Mild linear left basilar atelectasis. 2. Mild cardiomegaly. Electronically Signed: Vitor Pena MD  2/25/2025 11:06 AM EST  Workstation ID: [id number]    CT Abdomen Pelvis Without Contrast  Result Date: 2/25/2025  Impression: Impression: 1.No acute findings in the abdomen or pelvis. 2.Stable chronic ancillary findings as above Electronically Signed: Will Flores MD  2/25/2025 11:01 AM EST  Workstation ID: [id number]      Results for orders placed during the hospital encounter of 02/07/24    Duplex Venous Lower Extremity - Left    Interpretation Summary    Chronic left lower extremity superficial thrombophlebitis noted in the small saphenous.    All other left sided veins appeared normal.      Results for orders placed during the hospital encounter of 02/07/24    Duplex Venous Lower Extremity - Left    Interpretation Summary    Chronic left lower extremity superficial thrombophlebitis noted in the small saphenous.    All other left sided veins appeared  normal.      Results for orders placed during the hospital encounter of 02/25/25    Adult Transthoracic Echo Complete W/ Cont if Necessary Per Protocol    Interpretation Summary    Left ventricular systolic function is normal. Left ventricular ejection fraction appears to be 61 - 65%.    Left ventricular diastolic function is consistent with (grade I) impaired relaxation.    Estimated right ventricular systolic pressure from tricuspid regurgitation is normal (<35 mmHg).      Labs Pending at Discharge:  Pending Results       None            Procedures Performed           Consults:   Consults       Date and Time Order Name Status Description    3/13/2025  1:52 AM Inpatient General Surgery Consult Completed     2/26/2025 10:11 AM Inpatient Infectious Diseases Consult Completed               Discharge Details        Discharge Medications        New Medications        Instructions Start Date   amoxicillin-clavulanate 875-125 MG per tablet  Commonly known as: AUGMENTIN   1 tablet, Oral, 2 Times Daily      oxyCODONE-acetaminophen  MG per tablet  Commonly known as: Percocet   1 tablet, Oral, Every 6 Hours PRN             Changes to Medications        Instructions Start Date   rosuvastatin 40 MG tablet  Commonly known as: CRESTOR  What changed: These instructions start on March 29, 2025. If you are unsure what to do until then, ask your doctor or other care provider.   40 mg, Oral, Daily   Start Date: March 29, 2025            Continue These Medications        Instructions Start Date   bumetanide 1 MG tablet  Commonly known as: BUMEX   1 mg, Oral, 2 Times Daily      clopidogrel 75 MG tablet  Commonly known as: PLAVIX   75 mg, Daily      insulin glargine 100 UNIT/ML injection  Commonly known as: LANTUS, SEMGLEE   30 Units, Nightly      insulin lispro 100 UNIT/ML injection  Commonly known as: HUMALOG/ADMELOG   2-7 Units, Subcutaneous, 4 Times Daily Before Meals & Nightly, Blood Glucose 150-199 mg/dL - 2 units Blood  Glucose 200-249 mg/dL - 3 units Blood Glucose 250-299 mg/dL - 4 units Blood Glucose 300-349 mg/dL - 5 units Blood Glucose 350-400 mg/dL - 6 units Blood Glucose Greater Than 400 mg/dL - 7 units      insulin lispro 100 UNIT/ML injection  Commonly known as: HUMALOG/ADMELOG   8 Units, Subcutaneous, 3 Times Daily With Meals      potassium chloride 20 MEQ CR tablet  Commonly known as: KLOR-CON M20   20 mEq, Oral, Daily               Allergies   Allergen Reactions    Contrast Dye (Echo Or Unknown Ct/Mr) Anaphylaxis     Not Lumason    Iodinated Contrast Media Shortness Of Breath    Iodine Shortness Of Breath    Aspirin Nausea And Vomiting    Lipitor [Atorvastatin] Unknown - High Severity    Prednisone Hives         Discharge Disposition:   Rehab Facility or Unit (DC - External)    Diet:  Hospital:No active diet order        Discharge Activity:   Activity Instructions       Gradually Increase Activity Until at Pre-Hospitalization Level                CODE STATUS:  Code Status and Medical Interventions: CPR (Attempt to Resuscitate); Full Support   Ordered at: 03/13/25 0434     Code Status (Patient has no pulse and is not breathing):    CPR (Attempt to Resuscitate)     Medical Interventions (Patient has pulse or is breathing):    Full Support         No future appointments.    Additional Instructions for the Follow-ups that You Need to Schedule       Discharge Follow-up with PCP   As directed       Currently Documented PCP:    Sahil Read MD    PCP Phone Number:    [phone]     Follow Up Details: 1`week        Discharge Follow-up with Specified Provider: surgery; 2 Weeks   As directed      To: surgery   Follow Up: 2 Weeks        [place] cholangiogram t tube   Apr 26, 2025      if tube study negative for cystic duct obstruction, ok to remove aguila tube    Order Comments: if tube study negative for cystic duct obstruction, ok to remove aguila tube    Exam reason: acalculous cholecystitis s/p aguila tube   Release to patient:  Routine Release       Additional information on Labs and Follow-ups:    Please call [phone] on Monday to make a 2 week follow up appointment with the surgeon- Dr Oconnell           Time spent on Discharge including face to face service:  34 minutes    Signature: Electronically signed by Nathan Posey MD, 03/17/25, 13:33 EDT.  [facility] Hospitalist Team

## 2025-03-17 NOTE — CASE MANAGEMENT/SOCIAL WORK
Case Management Discharge Note      Final Note: [facility]         Selected Continued Care - Discharged on 3/15/2025 Admission date: 3/12/2025 - Discharge disposition: Rehab Facility or Unit (DC - External)      Destination Coordination complete.      Service Provider Services Address Phone Fax Patient Preferred    [facility] [address] [place] [address] [place] [address] [phone] --                    Transportation Services  Ambulance: [facility]    Final Discharge Disposition Code: 62 - inpatient rehab facility

## 2025-03-18 LAB — BACTERIA SPEC AEROBE CULT: NORMAL

## 2025-03-25 ENCOUNTER — TELEPHONE (OUTPATIENT)
Dept: SURGERY | Facility: CLINIC | Age: 75
End: 2025-03-25
Payer: MEDICARE

## 2025-03-27 ENCOUNTER — TELEPHONE (OUTPATIENT)
Dept: SURGERY | Facility: CLINIC | Age: 75
End: 2025-03-27
Payer: MEDICARE

## 2025-03-27 NOTE — TELEPHONE ENCOUNTER
WIFE CALLED WITH SEVERAL QUESTIONS. I ADVISED SHE ASK THE FACILITY TO HELP ANSWER FOR NOW AND IF SHE STILL HAS QUESTIONS AFTER DISCHARGE TO CALL THE OFFICE AND I CAN BOOK AN APPT TO CHECK HERB OUT.

## 2025-04-14 ENCOUNTER — LAB REQUISITION (OUTPATIENT)
Dept: LAB | Facility: HOSPITAL | Age: 75
DRG: 683 | End: 2025-04-14
Payer: MEDICARE

## 2025-04-14 ENCOUNTER — APPOINTMENT (OUTPATIENT)
Dept: GENERAL RADIOLOGY | Facility: HOSPITAL | Age: 75
DRG: 683 | End: 2025-04-14
Payer: MEDICARE

## 2025-04-14 ENCOUNTER — HOSPITAL ENCOUNTER (INPATIENT)
Facility: HOSPITAL | Age: 75
LOS: 7 days | Discharge: NURSING, REHAB OR LONG-TERM CARE | DRG: 683 | End: 2025-04-21
Attending: EMERGENCY MEDICINE | Admitting: STUDENT IN AN ORGANIZED HEALTH CARE EDUCATION/TRAINING PROGRAM
Payer: MEDICARE

## 2025-04-14 ENCOUNTER — APPOINTMENT (OUTPATIENT)
Dept: CT IMAGING | Facility: HOSPITAL | Age: 75
DRG: 683 | End: 2025-04-14
Payer: MEDICARE

## 2025-04-14 DIAGNOSIS — I50.23 ACUTE ON CHRONIC SYSTOLIC (CONGESTIVE) HEART FAILURE: ICD-10-CM

## 2025-04-14 DIAGNOSIS — N17.9 ACUTE RENAL FAILURE, UNSPECIFIED ACUTE RENAL FAILURE TYPE: Primary | ICD-10-CM

## 2025-04-14 LAB
ALBUMIN SERPL-MCNC: 3.5 G/DL (ref 3.5–5.2)
ALBUMIN/GLOB SERPL: 1.1 G/DL
ALP SERPL-CCNC: 100 U/L (ref 39–117)
ALT SERPL W P-5'-P-CCNC: 26 U/L (ref 1–41)
ANION GAP SERPL CALCULATED.3IONS-SCNC: 14.3 MMOL/L (ref 5–15)
ANION GAP SERPL CALCULATED.3IONS-SCNC: 19.1 MMOL/L (ref 5–15)
AST SERPL-CCNC: 23 U/L (ref 1–40)
BACTERIA UR QL AUTO: NORMAL /HPF
BASOPHILS # BLD AUTO: 0.02 10*3/MM3 (ref 0–0.2)
BASOPHILS NFR BLD AUTO: 0.4 % (ref 0–1.5)
BILIRUB SERPL-MCNC: 0.5 MG/DL (ref 0–1.2)
BILIRUB UR QL STRIP: NEGATIVE
BUN SERPL-MCNC: 73 MG/DL (ref 8–23)
BUN SERPL-MCNC: 74 MG/DL (ref 8–23)
BUN/CREAT SERPL: 9.1 (ref 7–25)
BUN/CREAT SERPL: 9.2 (ref 7–25)
CALCIUM SPEC-SCNC: 8.9 MG/DL (ref 8.6–10.5)
CALCIUM SPEC-SCNC: 9.1 MG/DL (ref 8.6–10.5)
CHLORIDE SERPL-SCNC: 102 MMOL/L (ref 98–107)
CHLORIDE SERPL-SCNC: 102 MMOL/L (ref 98–107)
CK SERPL-CCNC: 46 U/L (ref 20–200)
CLARITY UR: CLEAR
CO2 SERPL-SCNC: 13.9 MMOL/L (ref 22–29)
CO2 SERPL-SCNC: 17.7 MMOL/L (ref 22–29)
COLOR UR: YELLOW
CREAT SERPL-MCNC: 8.03 MG/DL (ref 0.76–1.27)
CREAT SERPL-MCNC: 8.04 MG/DL (ref 0.76–1.27)
DEPRECATED RDW RBC AUTO: 54.2 FL (ref 37–54)
EGFRCR SERPLBLD CKD-EPI 2021: 6.4 ML/MIN/1.73
EGFRCR SERPLBLD CKD-EPI 2021: 6.4 ML/MIN/1.73
EOSINOPHIL # BLD AUTO: 0.25 10*3/MM3 (ref 0–0.4)
EOSINOPHIL NFR BLD AUTO: 4.5 % (ref 0.3–6.2)
ERYTHROCYTE [DISTWIDTH] IN BLOOD BY AUTOMATED COUNT: 15.9 % (ref 12.3–15.4)
GLOBULIN UR ELPH-MCNC: 3.2 GM/DL
GLUCOSE SERPL-MCNC: 131 MG/DL (ref 65–99)
GLUCOSE SERPL-MCNC: 175 MG/DL (ref 65–99)
GLUCOSE UR STRIP-MCNC: NEGATIVE MG/DL
HCT VFR BLD AUTO: 29.6 % (ref 37.5–51)
HGB BLD-MCNC: 9.5 G/DL (ref 13–17.7)
HGB UR QL STRIP.AUTO: ABNORMAL
HYALINE CASTS UR QL AUTO: NORMAL /LPF
IMM GRANULOCYTES # BLD AUTO: 0.01 10*3/MM3 (ref 0–0.05)
IMM GRANULOCYTES NFR BLD AUTO: 0.2 % (ref 0–0.5)
KETONES UR QL STRIP: NEGATIVE
LEUKOCYTE ESTERASE UR QL STRIP.AUTO: NEGATIVE
LYMPHOCYTES # BLD AUTO: 1.2 10*3/MM3 (ref 0.7–3.1)
LYMPHOCYTES NFR BLD AUTO: 21.8 % (ref 19.6–45.3)
MAGNESIUM SERPL-MCNC: 2.6 MG/DL (ref 1.6–2.4)
MCH RBC QN AUTO: 29.5 PG (ref 26.6–33)
MCHC RBC AUTO-ENTMCNC: 32.1 G/DL (ref 31.5–35.7)
MCV RBC AUTO: 91.9 FL (ref 79–97)
MONOCYTES # BLD AUTO: 0.5 10*3/MM3 (ref 0.1–0.9)
MONOCYTES NFR BLD AUTO: 9.1 % (ref 5–12)
NEUTROPHILS NFR BLD AUTO: 3.52 10*3/MM3 (ref 1.7–7)
NEUTROPHILS NFR BLD AUTO: 64 % (ref 42.7–76)
NITRITE UR QL STRIP: NEGATIVE
NRBC BLD AUTO-RTO: 0 /100 WBC (ref 0–0.2)
PH UR STRIP.AUTO: <=5 [PH] (ref 5–8)
PLATELET # BLD AUTO: 137 10*3/MM3 (ref 140–450)
PMV BLD AUTO: 10.3 FL (ref 6–12)
POTASSIUM SERPL-SCNC: 4.6 MMOL/L (ref 3.5–5.2)
POTASSIUM SERPL-SCNC: 4.6 MMOL/L (ref 3.5–5.2)
PROT SERPL-MCNC: 6.7 G/DL (ref 6–8.5)
PROT UR QL STRIP: NEGATIVE
RBC # BLD AUTO: 3.22 10*6/MM3 (ref 4.14–5.8)
RBC # UR STRIP: NORMAL /HPF
REF LAB TEST METHOD: NORMAL
SODIUM SERPL-SCNC: 134 MMOL/L (ref 136–145)
SODIUM SERPL-SCNC: 135 MMOL/L (ref 136–145)
SP GR UR STRIP: 1.01 (ref 1–1.03)
SQUAMOUS #/AREA URNS HPF: NORMAL /HPF
UROBILINOGEN UR QL STRIP: ABNORMAL
WBC # UR STRIP: NORMAL /HPF
WBC NRBC COR # BLD AUTO: 5.5 10*3/MM3 (ref 3.4–10.8)

## 2025-04-14 PROCEDURE — 83735 ASSAY OF MAGNESIUM: CPT | Performed by: EMERGENCY MEDICINE

## 2025-04-14 PROCEDURE — 84156 ASSAY OF PROTEIN URINE: CPT | Performed by: STUDENT IN AN ORGANIZED HEALTH CARE EDUCATION/TRAINING PROGRAM

## 2025-04-14 PROCEDURE — 84443 ASSAY THYROID STIM HORMONE: CPT | Performed by: STUDENT IN AN ORGANIZED HEALTH CARE EDUCATION/TRAINING PROGRAM

## 2025-04-14 PROCEDURE — 25810000003 SODIUM CHLORIDE 0.9 % SOLUTION: Performed by: EMERGENCY MEDICINE

## 2025-04-14 PROCEDURE — 80053 COMPREHEN METABOLIC PANEL: CPT | Performed by: PHYSICAL MEDICINE & REHABILITATION

## 2025-04-14 PROCEDURE — 82550 ASSAY OF CK (CPK): CPT | Performed by: EMERGENCY MEDICINE

## 2025-04-14 PROCEDURE — 85025 COMPLETE CBC W/AUTO DIFF WBC: CPT | Performed by: EMERGENCY MEDICINE

## 2025-04-14 PROCEDURE — 84300 ASSAY OF URINE SODIUM: CPT | Performed by: STUDENT IN AN ORGANIZED HEALTH CARE EDUCATION/TRAINING PROGRAM

## 2025-04-14 PROCEDURE — 74176 CT ABD & PELVIS W/O CONTRAST: CPT

## 2025-04-14 PROCEDURE — 82570 ASSAY OF URINE CREATININE: CPT | Performed by: STUDENT IN AN ORGANIZED HEALTH CARE EDUCATION/TRAINING PROGRAM

## 2025-04-14 PROCEDURE — 71045 X-RAY EXAM CHEST 1 VIEW: CPT

## 2025-04-14 PROCEDURE — 81001 URINALYSIS AUTO W/SCOPE: CPT | Performed by: EMERGENCY MEDICINE

## 2025-04-14 PROCEDURE — 51798 US URINE CAPACITY MEASURE: CPT

## 2025-04-14 PROCEDURE — 99285 EMERGENCY DEPT VISIT HI MDM: CPT

## 2025-04-14 RX ORDER — SODIUM CHLORIDE 9 MG/ML
40 INJECTION, SOLUTION INTRAVENOUS AS NEEDED
Status: DISCONTINUED | OUTPATIENT
Start: 2025-04-14 | End: 2025-04-21 | Stop reason: HOSPADM

## 2025-04-14 RX ORDER — SODIUM CHLORIDE 0.9 % (FLUSH) 0.9 %
10 SYRINGE (ML) INJECTION EVERY 12 HOURS SCHEDULED
Status: DISCONTINUED | OUTPATIENT
Start: 2025-04-14 | End: 2025-04-21 | Stop reason: HOSPADM

## 2025-04-14 RX ORDER — MIDODRINE HYDROCHLORIDE 5 MG/1
5 TABLET ORAL EVERY 6 HOURS PRN
COMMUNITY

## 2025-04-14 RX ORDER — SODIUM CHLORIDE 0.9 % (FLUSH) 0.9 %
10 SYRINGE (ML) INJECTION AS NEEDED
Status: DISCONTINUED | OUTPATIENT
Start: 2025-04-14 | End: 2025-04-21 | Stop reason: HOSPADM

## 2025-04-14 RX ORDER — POTASSIUM CHLORIDE 1500 MG/1
20 TABLET, EXTENDED RELEASE ORAL DAILY
COMMUNITY

## 2025-04-14 RX ORDER — AMOXICILLIN 250 MG
2 CAPSULE ORAL 2 TIMES DAILY PRN
Status: DISCONTINUED | OUTPATIENT
Start: 2025-04-14 | End: 2025-04-21 | Stop reason: HOSPADM

## 2025-04-14 RX ORDER — POLYETHYLENE GLYCOL 3350 17 G/17G
17 POWDER, FOR SOLUTION ORAL DAILY PRN
Status: DISCONTINUED | OUTPATIENT
Start: 2025-04-14 | End: 2025-04-21 | Stop reason: HOSPADM

## 2025-04-14 RX ORDER — BISACODYL 5 MG/1
5 TABLET, DELAYED RELEASE ORAL DAILY PRN
Status: DISCONTINUED | OUTPATIENT
Start: 2025-04-14 | End: 2025-04-21 | Stop reason: HOSPADM

## 2025-04-14 RX ORDER — ATORVASTATIN CALCIUM 40 MG/1
80 TABLET, FILM COATED ORAL DAILY
COMMUNITY

## 2025-04-14 RX ORDER — TORSEMIDE 20 MG/1
20 TABLET ORAL 2 TIMES DAILY
COMMUNITY
End: 2025-04-21 | Stop reason: HOSPADM

## 2025-04-14 RX ORDER — BISACODYL 10 MG
10 SUPPOSITORY, RECTAL RECTAL DAILY PRN
Status: DISCONTINUED | OUTPATIENT
Start: 2025-04-14 | End: 2025-04-21 | Stop reason: HOSPADM

## 2025-04-14 RX ORDER — INSULIN GLARGINE-YFGN 100 [IU]/ML
30 INJECTION, SOLUTION SUBCUTANEOUS NIGHTLY
COMMUNITY

## 2025-04-14 RX ORDER — LIDOCAINE HYDROCHLORIDE 20 MG/ML
JELLY TOPICAL AS NEEDED
Status: DISCONTINUED | OUTPATIENT
Start: 2025-04-14 | End: 2025-04-21 | Stop reason: HOSPADM

## 2025-04-14 RX ORDER — NICOTINE POLACRILEX 4 MG
37.5 LOZENGE BUCCAL AS NEEDED
COMMUNITY

## 2025-04-14 RX ORDER — NITROGLYCERIN 0.4 MG/1
0.4 TABLET SUBLINGUAL
Status: DISCONTINUED | OUTPATIENT
Start: 2025-04-14 | End: 2025-04-21 | Stop reason: HOSPADM

## 2025-04-14 RX ORDER — IBUPROFEN 600 MG/1
1 TABLET ORAL ONCE AS NEEDED
COMMUNITY

## 2025-04-14 RX ADMIN — SODIUM CHLORIDE 125 MEQ: 450 INJECTION, SOLUTION INTRAVENOUS at 21:14

## 2025-04-14 RX ADMIN — SODIUM CHLORIDE 1000 ML: 9 INJECTION, SOLUTION INTRAVENOUS at 21:01

## 2025-04-14 NOTE — Clinical Note
Level of Care: Telemetry [5]   Admitting Physician: NILES HARRIS [[id number]]   Attending Physician: NILES HARRIS [[id number]]

## 2025-04-15 ENCOUNTER — APPOINTMENT (OUTPATIENT)
Dept: ULTRASOUND IMAGING | Facility: HOSPITAL | Age: 75
DRG: 683 | End: 2025-04-15
Payer: MEDICARE

## 2025-04-15 LAB
ALBUMIN SERPL-MCNC: 3.3 G/DL (ref 3.5–5.2)
ALBUMIN/GLOB SERPL: 1.1 G/DL
ALP SERPL-CCNC: 95 U/L (ref 39–117)
ALT SERPL W P-5'-P-CCNC: 22 U/L (ref 1–41)
ANION GAP SERPL CALCULATED.3IONS-SCNC: 16 MMOL/L (ref 5–15)
AST SERPL-CCNC: 22 U/L (ref 1–40)
BASOPHILS # BLD AUTO: 0.02 10*3/MM3 (ref 0–0.2)
BASOPHILS NFR BLD AUTO: 0.4 % (ref 0–1.5)
BILIRUB SERPL-MCNC: 0.4 MG/DL (ref 0–1.2)
BUN SERPL-MCNC: 72 MG/DL (ref 8–23)
BUN/CREAT SERPL: 9.3 (ref 7–25)
CALCIUM SPEC-SCNC: 8.8 MG/DL (ref 8.6–10.5)
CHLORIDE SERPL-SCNC: 106 MMOL/L (ref 98–107)
CK SERPL-CCNC: 42 U/L (ref 20–200)
CO2 SERPL-SCNC: 19 MMOL/L (ref 22–29)
CREAT SERPL-MCNC: 7.74 MG/DL (ref 0.76–1.27)
CREAT UR-MCNC: 56 MG/DL
DEPRECATED RDW RBC AUTO: 53.4 FL (ref 37–54)
EGFRCR SERPLBLD CKD-EPI 2021: 6.7 ML/MIN/1.73
EOSINOPHIL # BLD AUTO: 0.33 10*3/MM3 (ref 0–0.4)
EOSINOPHIL NFR BLD AUTO: 5.8 % (ref 0.3–6.2)
ERYTHROCYTE [DISTWIDTH] IN BLOOD BY AUTOMATED COUNT: 15.6 % (ref 12.3–15.4)
FLUAV RNA RESP QL NAA+PROBE: NOT DETECTED
FLUBV RNA RESP QL NAA+PROBE: NOT DETECTED
GLOBULIN UR ELPH-MCNC: 3 GM/DL
GLUCOSE BLDC GLUCOMTR-MCNC: 111 MG/DL (ref 70–105)
GLUCOSE BLDC GLUCOMTR-MCNC: 111 MG/DL (ref 70–105)
GLUCOSE BLDC GLUCOMTR-MCNC: 153 MG/DL (ref 70–105)
GLUCOSE BLDC GLUCOMTR-MCNC: 187 MG/DL (ref 70–105)
GLUCOSE SERPL-MCNC: 102 MG/DL (ref 65–99)
HCT VFR BLD AUTO: 29.2 % (ref 37.5–51)
HGB BLD-MCNC: 9.3 G/DL (ref 13–17.7)
IMM GRANULOCYTES # BLD AUTO: 0.01 10*3/MM3 (ref 0–0.05)
IMM GRANULOCYTES NFR BLD AUTO: 0.2 % (ref 0–0.5)
LYMPHOCYTES # BLD AUTO: 1.42 10*3/MM3 (ref 0.7–3.1)
LYMPHOCYTES NFR BLD AUTO: 24.9 % (ref 19.6–45.3)
MAGNESIUM SERPL-MCNC: 2.5 MG/DL (ref 1.6–2.4)
MCH RBC QN AUTO: 29.3 PG (ref 26.6–33)
MCHC RBC AUTO-ENTMCNC: 31.8 G/DL (ref 31.5–35.7)
MCV RBC AUTO: 92.1 FL (ref 79–97)
MONOCYTES # BLD AUTO: 0.44 10*3/MM3 (ref 0.1–0.9)
MONOCYTES NFR BLD AUTO: 7.7 % (ref 5–12)
NEUTROPHILS NFR BLD AUTO: 3.48 10*3/MM3 (ref 1.7–7)
NEUTROPHILS NFR BLD AUTO: 61 % (ref 42.7–76)
NRBC BLD AUTO-RTO: 0 /100 WBC (ref 0–0.2)
PHOSPHATE SERPL-MCNC: 7.8 MG/DL (ref 2.5–4.5)
PLATELET # BLD AUTO: 127 10*3/MM3 (ref 140–450)
PMV BLD AUTO: 10.8 FL (ref 6–12)
POTASSIUM SERPL-SCNC: 4.4 MMOL/L (ref 3.5–5.2)
PROT ?TM UR-MCNC: 5.4 MG/DL
PROT SERPL-MCNC: 6.3 G/DL (ref 6–8.5)
PROT/CREAT UR: 96.4 MG/G CREA (ref 0–200)
RBC # BLD AUTO: 3.17 10*6/MM3 (ref 4.14–5.8)
RSV RNA RESP QL NAA+PROBE: NOT DETECTED
SARS-COV-2 RNA RESP QL NAA+PROBE: NOT DETECTED
SODIUM SERPL-SCNC: 141 MMOL/L (ref 136–145)
SODIUM UR-SCNC: 50 MMOL/L
TSH SERPL DL<=0.05 MIU/L-ACNC: 2.82 UIU/ML (ref 0.27–4.2)
WBC NRBC COR # BLD AUTO: 5.7 10*3/MM3 (ref 3.4–10.8)

## 2025-04-15 PROCEDURE — 80053 COMPREHEN METABOLIC PANEL: CPT | Performed by: STUDENT IN AN ORGANIZED HEALTH CARE EDUCATION/TRAINING PROGRAM

## 2025-04-15 PROCEDURE — 25010000002 HEPARIN (PORCINE) PER 1000 UNITS: Performed by: FAMILY MEDICINE

## 2025-04-15 PROCEDURE — 63710000001 INSULIN LISPRO (HUMAN) PER 5 UNITS: Performed by: STUDENT IN AN ORGANIZED HEALTH CARE EDUCATION/TRAINING PROGRAM

## 2025-04-15 PROCEDURE — 82948 REAGENT STRIP/BLOOD GLUCOSE: CPT | Performed by: STUDENT IN AN ORGANIZED HEALTH CARE EDUCATION/TRAINING PROGRAM

## 2025-04-15 PROCEDURE — 76775 US EXAM ABDO BACK WALL LIM: CPT

## 2025-04-15 PROCEDURE — 82550 ASSAY OF CK (CPK): CPT | Performed by: STUDENT IN AN ORGANIZED HEALTH CARE EDUCATION/TRAINING PROGRAM

## 2025-04-15 PROCEDURE — 84100 ASSAY OF PHOSPHORUS: CPT | Performed by: STUDENT IN AN ORGANIZED HEALTH CARE EDUCATION/TRAINING PROGRAM

## 2025-04-15 PROCEDURE — 87637 SARSCOV2&INF A&B&RSV AMP PRB: CPT | Performed by: STUDENT IN AN ORGANIZED HEALTH CARE EDUCATION/TRAINING PROGRAM

## 2025-04-15 PROCEDURE — 82948 REAGENT STRIP/BLOOD GLUCOSE: CPT

## 2025-04-15 PROCEDURE — 36415 COLL VENOUS BLD VENIPUNCTURE: CPT

## 2025-04-15 PROCEDURE — 83735 ASSAY OF MAGNESIUM: CPT | Performed by: STUDENT IN AN ORGANIZED HEALTH CARE EDUCATION/TRAINING PROGRAM

## 2025-04-15 PROCEDURE — 85025 COMPLETE CBC W/AUTO DIFF WBC: CPT | Performed by: STUDENT IN AN ORGANIZED HEALTH CARE EDUCATION/TRAINING PROGRAM

## 2025-04-15 RX ORDER — HEPARIN SODIUM 5000 [USP'U]/ML
5000 INJECTION, SOLUTION INTRAVENOUS; SUBCUTANEOUS EVERY 8 HOURS SCHEDULED
Status: DISCONTINUED | OUTPATIENT
Start: 2025-04-15 | End: 2025-04-21 | Stop reason: HOSPADM

## 2025-04-15 RX ORDER — IBUPROFEN 600 MG/1
1 TABLET ORAL
Status: DISCONTINUED | OUTPATIENT
Start: 2025-04-15 | End: 2025-04-21 | Stop reason: HOSPADM

## 2025-04-15 RX ORDER — INSULIN LISPRO 100 [IU]/ML
2-7 INJECTION, SOLUTION INTRAVENOUS; SUBCUTANEOUS
Status: DISCONTINUED | OUTPATIENT
Start: 2025-04-15 | End: 2025-04-21 | Stop reason: HOSPADM

## 2025-04-15 RX ORDER — DEXTROSE MONOHYDRATE 25 G/50ML
25 INJECTION, SOLUTION INTRAVENOUS
Status: DISCONTINUED | OUTPATIENT
Start: 2025-04-15 | End: 2025-04-21 | Stop reason: HOSPADM

## 2025-04-15 RX ORDER — SEVELAMER CARBONATE 800 MG/1
800 TABLET, FILM COATED ORAL
Status: DISCONTINUED | OUTPATIENT
Start: 2025-04-15 | End: 2025-04-21

## 2025-04-15 RX ORDER — NICOTINE POLACRILEX 4 MG
15 LOZENGE BUCCAL
Status: DISCONTINUED | OUTPATIENT
Start: 2025-04-15 | End: 2025-04-21 | Stop reason: HOSPADM

## 2025-04-15 RX ADMIN — INSULIN LISPRO 2 UNITS: 100 INJECTION, SOLUTION INTRAVENOUS; SUBCUTANEOUS at 21:16

## 2025-04-15 RX ADMIN — HEPARIN SODIUM 5000 UNITS: 5000 INJECTION INTRAVENOUS; SUBCUTANEOUS at 21:16

## 2025-04-15 RX ADMIN — SEVELAMER CARBONATE 800 MG: 800 TABLET, FILM COATED ORAL at 12:54

## 2025-04-15 RX ADMIN — Medication 10 ML: at 08:49

## 2025-04-15 RX ADMIN — SODIUM CHLORIDE 75 MEQ: 450 INJECTION, SOLUTION INTRAVENOUS at 12:54

## 2025-04-15 RX ADMIN — SODIUM CHLORIDE 75 MEQ: 450 INJECTION, SOLUTION INTRAVENOUS at 23:15

## 2025-04-15 RX ADMIN — Medication 10 ML: at 21:17

## 2025-04-15 RX ADMIN — SEVELAMER CARBONATE 800 MG: 800 TABLET, FILM COATED ORAL at 18:30

## 2025-04-15 RX ADMIN — HEPARIN SODIUM 5000 UNITS: 5000 INJECTION INTRAVENOUS; SUBCUTANEOUS at 16:13

## 2025-04-15 NOTE — ED PROVIDER NOTES
Subjective   History of Present Illness  Chief complaint sent to the hospital for abnormal kidney function    History of present 74-year-old gentleman history of diabetes hypertension who is currently in [facility] after being in the hospital last month for initially a UTI treated with Levaquin and then subsequently an infected gallbladder treated with a drain.  Patient has been on several antibiotics.  The patient had some lab work done today that showed he had a kidney failure and he was sent to the hospital.  He states that he is incontinent of urine but he feels like he is making urine he denies any pain.  No vomiting no diarrhea.  He states that he has had no chest pain or shortness of breath or cough or any other complaints at this time.      Review of Systems   Constitutional:  Negative for chills and fever.   Respiratory:  Negative for chest tightness and shortness of breath.    Cardiovascular:  Negative for chest pain.   Gastrointestinal:  Negative for abdominal pain and vomiting.   Genitourinary:  Negative for decreased urine volume, dysuria and testicular pain.   Skin:  Negative for rash.   Neurological:  Positive for weakness.   Psychiatric/Behavioral:  Negative for confusion.        Past Medical History:   Diagnosis Date    Diabetes mellitus     Hyperlipidemia     Hypertension     Kidney stone     TIA (transient ischemic attack) 2016    Type 2 diabetes mellitus with stage 3 chronic kidney disease, with long-term current use of insulin 12/13/2021       Allergies   Allergen Reactions    Contrast Dye (Echo Or Unknown Ct/Mr) Anaphylaxis     Not Lumason    Iodinated Contrast Media Shortness Of Breath    Iodine Shortness Of Breath    Aspirin Nausea And Vomiting    Lipitor [Atorvastatin] Unknown - High Severity    Prednisone Hives       Past Surgical History:   Procedure Laterality Date    ADENOIDECTOMY      ENDOSCOPY N/A 12/13/2023    Procedure: ESOPHAGOGASTRODUODENOSCOPY with removal of NJ tube from  right nare;  Surgeon: Jaret Valadez MD;  Location: [facility] ENDOSCOPY;  Service: Gastroenterology;  Laterality: N/A;  esophagitis, gastric ulcer    EXTRACORPOREAL SHOCK WAVE LITHOTRIPSY (ESWL)      HERNIA REPAIR      KNEE ACL RECONSTRUCTION Left     TONSILLECTOMY         Family History   Problem Relation Age of Onset    No Known Problems Mother     No Known Problems Father        Social History     Socioeconomic History    Marital status:    Tobacco Use    Smoking status: Never     Passive exposure: Never    Smokeless tobacco: Never   Vaping Use    Vaping status: Never Used   Substance and Sexual Activity    Alcohol use: Never    Drug use: Never    Sexual activity: Defer     Prior to Admission medications    Medication Sig Start Date End Date Taking? Authorizing Provider   atorvastatin (LIPITOR) 40 MG tablet Take 2 tablets by mouth Daily.   Yes Isai Canales MD   clopidogrel (PLAVIX) 75 MG tablet Take 1 tablet by mouth Daily.   Yes Isai Canales MD   dextrose (GLUTOSE) 40 % gel Take 37.5 g by mouth As Needed for Low Blood Sugar.   Yes Isai Canales MD   glucagon (GLUCAGEN) 1 MG injection Inject 1 mg under the skin into the appropriate area as directed 1 (One) Time As Needed.   Yes Isai Canales MD   Insulin Glargine-yfgn (SEMGLEE-YFGN) 100 UNIT/ML Inject 30 Units under the skin into the appropriate area as directed Every Night.   Yes Isai Canales MD   insulin lispro (HUMALOG/ADMELOG) 100 UNIT/ML injection Inject 2-7 Units under the skin into the appropriate area as directed 4 (Four) Times a Day Before Meals & at Bedtime for 30 days. Blood Glucose 150-199 mg/dL - 2 units Blood Glucose 200-249 mg/dL - 3 units Blood Glucose 250-299 mg/dL - 4 units Blood Glucose 300-349 mg/dL - 5 units Blood Glucose 350-400 mg/dL - 6 units Blood Glucose Greater Than 400 mg/dL - 7 units 3/3/25 4/14/26 Yes Shreyas Schultz MD   insulin lispro (HUMALOG/ADMELOG) 100 UNIT/ML  injection Inject 8 Units under the skin into the appropriate area as directed 3 (Three) Times a Day With Meals for 30 days. 3/3/25 4/14/26 Yes Shreyas Schultz MD   midodrine (PROAMATINE) 5 MG tablet Take 1 tablet by mouth Every 6 (Six) Hours As Needed.   Yes Isai Canales MD   potassium chloride (KLOR-CON M20) 20 MEQ CR tablet Take 1 tablet by mouth Daily.   Yes Isai Canales MD   torsemide (DEMADEX) 20 MG tablet Take 1 tablet by mouth 2 (Two) Times a Day.   Yes ProviderIsai MD          Objective   Physical Exam  Constitutional is a 74-year-old gentleman awake alert chronic ill-appearing but no acute distress triage vital signs reviewed.  HEENT extraocular muscles intact pupils equal round reactive sclera clear the mouth is otherwise clear neck was supple no adenopathy no JV no bruits lungs were clear no retraction no use accessory heart was regular without murmur rub abdomen soft nontender good bowel sounds no peritoneal findings I do not feel distended bladder  examination unremarkable extremities no edema cords or Homans' sign or evidence of DVT pulses are equal.  Neurologic he is awake alert he follows commands no facial asymmetry speech normal without focal weakness he is got a drain in the right side but no signs of infection.  Procedures           ED Course      Results for orders placed or performed during the hospital encounter of 04/14/25   CK    Collection Time: 04/14/25  7:24 PM    Specimen: Arm, Left; Blood   Result Value Ref Range    Creatine Kinase 46 20 - 200 U/L   Magnesium    Collection Time: 04/14/25  7:24 PM    Specimen: Arm, Left; Blood   Result Value Ref Range    Magnesium 2.6 (H) 1.6 - 2.4 mg/dL   Comprehensive Metabolic Panel    Collection Time: 04/14/25  7:24 PM    Specimen: Arm, Left; Blood   Result Value Ref Range    Glucose 175 (H) 65 - 99 mg/dL    BUN 74 (H) 8 - 23 mg/dL    Creatinine 8.04 (H) 0.76 - 1.27 mg/dL    Sodium 134 (L) 136 - 145 mmol/L     Potassium 4.6 3.5 - 5.2 mmol/L    Chloride 102 98 - 107 mmol/L    CO2 17.7 (L) 22.0 - 29.0 mmol/L    Calcium 8.9 8.6 - 10.5 mg/dL    Total Protein 6.7 6.0 - 8.5 g/dL    Albumin 3.5 3.5 - 5.2 g/dL    ALT (SGPT) 26 1 - 41 U/L    AST (SGOT) 23 1 - 40 U/L    Alkaline Phosphatase 100 39 - 117 U/L    Total Bilirubin 0.5 0.0 - 1.2 mg/dL    Globulin 3.2 gm/dL    A/G Ratio 1.1 g/dL    BUN/Creatinine Ratio 9.2 7.0 - 25.0    Anion Gap 14.3 5.0 - 15.0 mmol/L    eGFR 6.4 (L) >60.0 mL/min/1.73   CBC Auto Differential    Collection Time: 04/14/25  7:24 PM    Specimen: Arm, Left; Blood   Result Value Ref Range    WBC 5.50 3.40 - 10.80 10*3/mm3    RBC 3.22 (L) 4.14 - 5.80 10*6/mm3    Hemoglobin 9.5 (L) 13.0 - 17.7 g/dL    Hematocrit 29.6 (L) 37.5 - 51.0 %    MCV 91.9 79.0 - 97.0 fL    MCH 29.5 26.6 - 33.0 pg    MCHC 32.1 31.5 - 35.7 g/dL    RDW 15.9 (H) 12.3 - 15.4 %    RDW-SD 54.2 (H) 37.0 - 54.0 fl    MPV 10.3 6.0 - 12.0 fL    Platelets 137 (L) 140 - 450 10*3/mm3    Neutrophil % 64.0 42.7 - 76.0 %    Lymphocyte % 21.8 19.6 - 45.3 %    Monocyte % 9.1 5.0 - 12.0 %    Eosinophil % 4.5 0.3 - 6.2 %    Basophil % 0.4 0.0 - 1.5 %    Immature Grans % 0.2 0.0 - 0.5 %    Neutrophils, Absolute 3.52 1.70 - 7.00 10*3/mm3    Lymphocytes, Absolute 1.20 0.70 - 3.10 10*3/mm3    Monocytes, Absolute 0.50 0.10 - 0.90 10*3/mm3    Eosinophils, Absolute 0.25 0.00 - 0.40 10*3/mm3    Basophils, Absolute 0.02 0.00 - 0.20 10*3/mm3    Immature Grans, Absolute 0.01 0.00 - 0.05 10*3/mm3    nRBC 0.0 0.0 - 0.2 /100 WBC   Urinalysis With Culture If Indicated - Straight Cath    Collection Time: 04/14/25  8:14 PM    Specimen: Straight Cath; Urine   Result Value Ref Range    Color, UA Yellow Yellow, Straw    Appearance, UA Clear Clear    pH, UA <=5.0 5.0 - 8.0    Specific Gravity, UA 1.008 1.005 - 1.030    Glucose, UA Negative Negative    Ketones, UA Negative Negative    Bilirubin, UA Negative Negative    Blood, UA Trace (A) Negative    Protein, UA Negative Negative     Leuk Esterase, UA Negative Negative    Nitrite, UA Negative Negative    Urobilinogen, UA 0.2 E.U./dL 0.2 - 1.0 E.U./dL   Urinalysis, Microscopic Only - Straight Cath    Collection Time: 04/14/25  8:14 PM    Specimen: Straight Cath; Urine   Result Value Ref Range    RBC, UA None Seen None Seen, 0-2 /HPF    WBC, UA 0-2 None Seen, 0-2 /HPF    Bacteria, UA None Seen None Seen /HPF    Squamous Epithelial Cells, UA 0-2 None Seen, 0-2 /HPF    Hyaline Casts, UA 0-2 None Seen /LPF    Methodology Automated Microscopy      XR Chest 1 View  Result Date: 4/14/2025  Impression: No acute findings. Electronically Signed: Murali Cervantes MD  4/14/2025 11:03 PM EDT  Workstation ID: [id number]    CT Abdomen Pelvis Without Contrast  Result Date: 4/14/2025  Impression: No acute findings in the abdomen/pelvis. Percutaneous cholecystostomy tube in place. Interval decreased distention of the gallbladder. Electronically Signed: Umang Hills  4/14/2025 8:21 PM EDT  Workstation ID: [id number]    Medications   sodium chloride 0.9 % flush 10 mL (has no administration in time range)   Lidocaine HCl gel (XYLOCAINE) urethral/mucosal syringe (has no administration in time range)   sodium chloride 0.9 % flush 10 mL (has no administration in time range)   sodium chloride 0.9 % flush 10 mL (has no administration in time range)   sodium chloride 0.9 % infusion 40 mL (has no administration in time range)   nitroglycerin (NITROSTAT) SL tablet 0.4 mg (has no administration in time range)   sennosides-docusate (PERICOLACE) 8.6-50 MG per tablet 2 tablet (has no administration in time range)     And   polyethylene glycol (MIRALAX) packet 17 g (has no administration in time range)     And   bisacodyl (DULCOLAX) EC tablet 5 mg (has no administration in time range)     And   bisacodyl (DULCOLAX) suppository 10 mg (has no administration in time range)   sodium bicarbonate 8.4 % 125 mEq in sodium chloride 0.45 % 1,000 mL infusion (greater than 100 mEq) (125 mEq  Intravenous New Bag 4/14/25 2114)   sodium chloride 0.9 % bolus 1,000 mL (0 mL Intravenous Stopped 4/14/25 9676)                                                        Medical Decision Making  Medical decision making.  IV established monitor placed my review shows sinus rhythm.  Patient had a bladder scan which only showed about 140 cc of urine in his bladder.  Labs obtained my independent review patient had a comprehensive metabolic profile showed a glucose of 175 a BUN of 74 and a creatinine of 8 CO2 was 17 potassium was 4.6 CBC unremarkable hemoglobin 9.5 and urine was unremarkable.  The patient underwent a CT and pelvis without contrast Monday.  In view I do not see any evidence of hydronephrosis or obstructive uropathy.  Radiology shows no acute findings in the abdomen pelvis he has a drain in the gallbladder which has decreased distention of the gallbladder since previous CT.  The patient repeat exam is resting company his labs reviewed he was in the hospital back the first part of March he had a UTI had some Levaquin he was sent back on he had some sepsis and then subsequently back in with an infected gallbladder with a drain placed.  And this is all within this March 2025.  At that time he had creatinines that were little bit elevated nothing over the 2 range he was seen by the nephrologist at that time and this is a lot worse today than it has been.  I do not see any evidence of urinary retention or urosepsis do not see any evidence of a perforation or free air aneurysm or ischemic bowel or sepsis or bacteremia based on my history and physical clinical findings although not a complete list of all possibilities.  I talked to the on-call nephrologist for Dr. Handy.  Patient had been started in the ER and a liter normal saline bolus.  I talked to the hospitalist we discussed the case and he will see the patient.  Family is aware of the findings and he will be admitted for further care currently stable and  nontoxic-appearing.    Problems Addressed:  Acute renal failure, unspecified acute renal failure type: complicated acute illness or injury    Amount and/or Complexity of Data Reviewed  External Data Reviewed: notes.  Labs: ordered. Decision-making details documented in ED Course.  Radiology: ordered and independent interpretation performed. Decision-making details documented in ED Course.    Risk  Decision regarding hospitalization.        Final diagnoses:   Acute renal failure, unspecified acute renal failure type       ED Disposition  ED Disposition       ED Disposition   Decision to Admit    Condition   --    Comment   Level of Care: Med/Surg [1]   Diagnosis: Acute renal failure [777186]   Admitting Physician: NILES HARRIS [[id number]]   Certification: I Certify That Inpatient Hospital Services Are Medically Necessary For Greater Than 2 Midnights                 No follow-up provider specified.       Medication List      No changes were made to your prescriptions during this visit.            Pito Tiwari MD  04/15/25 0007       Pito Tiwari MD  04/15/25 0018

## 2025-04-15 NOTE — CONSULTS
"Diabetes Education  Assessment/Teaching    Patient Name:  Blas Mojica  YOB: 1950  MRN: [id number]  Admit Date:  4/14/2025      Assessment Date:  4/15/2025  Flowsheet Row Most Recent Value   General Information     Referral From: MD order  [Consult received due to database referral. Last A1c in [facility] was from 3/13/2025 and result was 8.59%.]   Height 177.8 cm (70\")   Height Method Stated   Weight 86.6 kg (191 lb)   Pregnancy Assessment    Diabetes History    What type of diabetes do you have? Type 2   Length of Diabetes Diagnosis --  [Dx 20+ years ago.]   Have you had diabetes education/teaching in the past? yes   When and where was your diabetes education? Last seen by inpatient diabetes educator at [facility] on 12/29/2023.   Do you test your blood sugar at home? yes   Frequency of checks wears continuous glucose sensor   Meter type Dexcom G7, pt also has bs meter for backup   Who performs the test? self   Have you had low blood sugar? (<70mg/dl) yes   How often do you have low blood sugar? occasionally   Education Preferences    Nutrition Information    Assessment Topics    DM Goals             Flowsheet Row Most Recent Value   DM Education Needs    Meter Has own   Frequency of Testing --  [Discussed importance of sharing sensor sugars wtih PCP if sugars are running outside healthy range.]   Blood Glucose Target Range Reviewed most recent A1c result of 8.59%. Discussed healthy bs range and healthy A1c target. Discussed importance of bs control.   Medication Insulin, Pen  [Per home med list, pt taking Lantus 30 units hs and Humalog 8 units ac plus low sliding scale. Pt states he has been at rehab. At home, he was taking Lantus 30 units hs. He does not have mealtime insulin at home.]   Problem Solving Hypoglycemia, Hyperglycemia, Signs, Symptoms, Treatment  [Discussed importance of always keeping low bs tx available.]   Reducing Risks A1C testing  [Gave A1c info sheet.]   Healthy Eating --  [Pt usually " eating 2-3 meals/day.]   Motivation Engaged   Teaching Method Explanation, Discussion, Handouts   Patient Response Verbalized understanding              Other Comments:  Met with pt at bedside. Pt states he has PCP and sees at least 2 times/year. Pt has been at [facility] and will be returning to [facility] at discharge. Pt states he does have his Lantus, pen needles and monitoring supplies for when he returned home. Discussed with pt that he has been receiving mealtime insulin at [facility] and he may be discharged from [facility] with rx for mealtime insulin. Discussed importance of taking as prescribed. Pt states feels comfortable using insulin pens. He states not needing diabetes education at this time. No further follow up needed.       Electronically signed by:  Kaitlin Burr RN  04/15/25 17:32 EDT

## 2025-04-15 NOTE — PLAN OF CARE
Goal Outcome Evaluation:         Pt was able to rest during majority of shift.    Very hard of hearing.    No major complaints.

## 2025-04-15 NOTE — H&P
[facility] Medicine Services  History & Physical    Patient Name: Blas Mojica  : 1950  MRN: [id number]  Primary Care Physician:  Sahil Read MD  Date of admission: 2025  Date and Time of Service: 2025 at 2038    Subjective      Chief Complaint: Generalized weakness, fatigue    History of Present Illness: Blas Mojica is a 74 y.o. male with Past medical history of bacteremia in early 2025, was treated with Levaquin for 14 days, discharged to [facility] rehab and came back with abdominal pain found to have acute cholecystitis  status cholecystotomy drain currently in place, type 2 diabetes, hypertension, hyperlipidemia, CVA presented to [facility] from home on 2025 with generalized weakness and fatigue.    Patient currently awake, alert, oriented x3, conversational. Reports generalized fatigue and weakness, able to ambulate with walker but has been feeling worse recently, prompting hospital visit. Denies chest pain, shortness of breath, fever, chills, or dysuria. Has cholecystostomy tube in place draining mostly bilious fluid. Denies NSAID use, confirmed with wife. He takes diuretics daily as per reported by his wife over the phone, denies hx of CHF, unsure of the reason.    On presentation, vital stable, afebrile, CMP remarkable for sodium of 134, bicarb of 13.9, anion gap 19.1, creatinine of 8.03, previously creatinine 1's, consistent with BRIGIDO, AST ALT normal, WBC 5.5, hemoglobin 9.5, chest x-ray showing no acute findings.  UA negative for bacteria, CT abdomen of the pelvis showing mildly enlarged prostate, under distended bladder, 2 hyperattenuating left upper pole renal lesions appear similar to the prior measuring 1.5 cm, will further characterize per renal ultrasound on 2025, most likely representing hemorrhagic or proteinaceous cyst, no hydronephrosis noted.    Patient was given liter of normal saline bolus fluids, nephrology was consulted for acute  renal failure, further recs to follow, admitted to medicine team further inpatient management.    Review of Systems negative unless mentioned above    Personal History     Past Medical History:   Diagnosis Date    Diabetes mellitus     Hyperlipidemia     Hypertension     Kidney stone     TIA (transient ischemic attack) 2016    Type 2 diabetes mellitus with stage 3 chronic kidney disease, with long-term current use of insulin 12/13/2021       Past Surgical History:   Procedure Laterality Date    ADENOIDECTOMY      ENDOSCOPY N/A 12/13/2023    Procedure: ESOPHAGOGASTRODUODENOSCOPY with removal of NJ tube from right nare;  Surgeon: Jaret Valadez MD;  Location: [facility] ENDOSCOPY;  Service: Gastroenterology;  Laterality: N/A;  esophagitis, gastric ulcer    EXTRACORPOREAL SHOCK WAVE LITHOTRIPSY (ESWL)      HERNIA REPAIR      KNEE ACL RECONSTRUCTION Left     TONSILLECTOMY         Family History: family history includes No Known Problems in his father and mother. Otherwise pertinent FHx was reviewed and not pertinent to current issue.    Social History:  reports that he has never smoked. He has never been exposed to tobacco smoke. He has never used smokeless tobacco. He reports that he does not drink alcohol and does not use drugs.    Home Medications:  Prior to Admission Medications       Prescriptions Last Dose Informant Patient Reported? Taking?    atorvastatin (LIPITOR) 40 MG tablet   Yes Yes    Take 2 tablets by mouth Daily.    clopidogrel (PLAVIX) 75 MG tablet   Yes Yes    Take 1 tablet by mouth Daily.    dextrose (GLUTOSE) 40 % gel   Yes Yes    Take 37.5 g by mouth As Needed for Low Blood Sugar.    glucagon (GLUCAGEN) 1 MG injection   Yes Yes    Inject 1 mg under the skin into the appropriate area as directed 1 (One) Time As Needed.    Insulin Glargine-yfgn (SEMGLEE-YFGN) 100 UNIT/ML   Yes Yes    Inject 30 Units under the skin into the appropriate area as directed Every Night.    insulin lispro  (HUMALOG/ADMELOG) 100 UNIT/ML injection   No Yes    Inject 2-7 Units under the skin into the appropriate area as directed 4 (Four) Times a Day Before Meals & at Bedtime for 30 days. Blood Glucose 150-199 mg/dL - 2 units Blood Glucose 200-249 mg/dL - 3 units Blood Glucose 250-299 mg/dL - 4 units Blood Glucose 300-349 mg/dL - 5 units Blood Glucose 350-400 mg/dL - 6 units Blood Glucose Greater Than 400 mg/dL - 7 units    insulin lispro (HUMALOG/ADMELOG) 100 UNIT/ML injection   No Yes    Inject 8 Units under the skin into the appropriate area as directed 3 (Three) Times a Day With Meals for 30 days.    midodrine (PROAMATINE) 5 MG tablet   Yes Yes    Take 1 tablet by mouth Every 6 (Six) Hours As Needed.    potassium chloride (KLOR-CON M20) 20 MEQ CR tablet   Yes Yes    Take 1 tablet by mouth Daily.    torsemide (DEMADEX) 20 MG tablet   Yes Yes    Take 1 tablet by mouth 2 (Two) Times a Day.              Allergies:  Allergies   Allergen Reactions    Contrast Dye (Echo Or Unknown Ct/Mr) Anaphylaxis     Not Lumason    Iodinated Contrast Media Shortness Of Breath    Iodine Shortness Of Breath    Aspirin Nausea And Vomiting    Lipitor [Atorvastatin] Unknown - High Severity    Prednisone Hives       Objective      Vitals:   Temp:  [98.5 °F (36.9 °C)] 98.5 °F (36.9 °C)  Heart Rate:  [80-94] 80  Resp:  [18] 18  BP: (100-122)/(53-68) 107/56  Body mass index is 27.41 kg/m².  Physical Exam  General: Awake alert [place] x 3, conversational, appears to be ill  HEENT: Atraumatic normocephalic  Cardio: Heart rate normal, rhythm regular  Respiratory: Clear to auscultation my exam  Abdomen: Soft, nontender, no rebound or guarding, right sided cholecystotomy drain in place draining bilious fluid  Extremities: No remarkable edema in the bilateral extremities  Neuro: Awake alert [place] x 3, conversational, moves all extremities, generalized weakness appreciated    Diagnostic Data:  Lab Results (last 24 hours)       Procedure Component Value  Units Date/Time    Urinalysis With Culture If Indicated - Straight Cath [[id number]]  (Abnormal) Collected: 04/14/25 2014    Specimen: Urine from Straight Cath Updated: 04/14/25 2026     Color, UA Yellow     Appearance, UA Clear     pH, UA <=5.0     Specific Gravity, UA 1.008     Glucose, UA Negative     Ketones, UA Negative     Bilirubin, UA Negative     Blood, UA Trace     Protein, UA Negative     Leuk Esterase, UA Negative     Nitrite, UA Negative     Urobilinogen, UA 0.2 E.U./dL    Narrative:      In absence of clinical symptoms, the presence of pyuria, bacteria, and/or nitrites on the urinalysis result does not correlate with infection.    Urinalysis, Microscopic Only - Straight Cath [[id number]] Collected: 04/14/25 2014    Specimen: Urine from Straight Cath Updated: 04/14/25 2026     RBC, UA None Seen /HPF      WBC, UA 0-2 /HPF      Comment: Urine culture not indicated.        Bacteria, UA None Seen /HPF      Squamous Epithelial Cells, UA 0-2 /HPF      Hyaline Casts, UA 0-2 /LPF      Methodology Automated Microscopy    CK [[id number]]  (Normal) Collected: 04/14/25 1924    Specimen: Blood from Arm, Left Updated: 04/14/25 1953     Creatine Kinase 46 U/L     Magnesium [[id number]]  (Abnormal) Collected: 04/14/25 1924    Specimen: Blood from Arm, Left Updated: 04/14/25 1953     Magnesium 2.6 mg/dL     Comprehensive Metabolic Panel [[id number]]  (Abnormal) Collected: 04/14/25 1924    Specimen: Blood from Arm, Left Updated: 04/14/25 1953     Glucose 175 mg/dL      BUN 74 mg/dL      Creatinine 8.04 mg/dL      Sodium 134 mmol/L      Potassium 4.6 mmol/L      Chloride 102 mmol/L      CO2 17.7 mmol/L      Calcium 8.9 mg/dL      Total Protein 6.7 g/dL      Albumin 3.5 g/dL      ALT (SGPT) 26 U/L      AST (SGOT) 23 U/L      Alkaline Phosphatase 100 U/L      Total Bilirubin 0.5 mg/dL      Globulin 3.2 gm/dL      A/G Ratio 1.1 g/dL      BUN/Creatinine Ratio 9.2     Anion Gap 14.3 mmol/L      eGFR 6.4 mL/min/1.73     Narrative:       GFR Categories in Chronic Kidney Disease (CKD)      GFR Category          GFR (mL/min/1.73)    Interpretation  G1                     90 or greater         Normal or high (1)  G2                      60-89                Mild decrease (1)  G3a                   45-59                Mild to moderate decrease  G3b                   30-44                Moderate to severe decrease  G4                    15-29                Severe decrease  G5                    14 or less           Kidney failure          (1)In the absence of evidence of kidney disease, neither GFR category G1 or G2 fulfill the criteria for CKD.    eGFR calculation 2021 CKD-EPI creatinine equation, which does not include race as a factor    CBC & Differential [[id number]]  (Abnormal) Collected: 04/14/25 1924    Specimen: Blood from Arm, Left Updated: 04/14/25 1931    Narrative:      The following orders were created for panel order CBC & Differential.  Procedure                               Abnormality         Status                     ---------                               -----------         ------                     CBC Auto Differential[[id number]]        Abnormal            Final result                 Please view results for these tests on the individual orders.    CBC Auto Differential [[id number]]  (Abnormal) Collected: 04/14/25 1924    Specimen: Blood from Arm, Left Updated: 04/14/25 1931     WBC 5.50 10*3/mm3      RBC 3.22 10*6/mm3      Hemoglobin 9.5 g/dL      Hematocrit 29.6 %      MCV 91.9 fL      MCH 29.5 pg      MCHC 32.1 g/dL      RDW 15.9 %      RDW-SD 54.2 fl      MPV 10.3 fL      Platelets 137 10*3/mm3      Neutrophil % 64.0 %      Lymphocyte % 21.8 %      Monocyte % 9.1 %      Eosinophil % 4.5 %      Basophil % 0.4 %      Immature Grans % 0.2 %      Neutrophils, Absolute 3.52 10*3/mm3      Lymphocytes, Absolute 1.20 10*3/mm3      Monocytes, Absolute 0.50 10*3/mm3      Eosinophils, Absolute 0.25 10*3/mm3      Basophils, Absolute  0.02 10*3/mm3      Immature Grans, Absolute 0.01 10*3/mm3      nRBC 0.0 /100 WBC              Imaging Results (Last 24 Hours)       Procedure Component Value Units Date/Time    XR Chest 1 View [[id number]] Collected: 04/14/25 2303     Updated: 04/14/25 2305    Narrative:      XR CHEST 1 VW    Date of Exam: 4/14/2025 9:05 PM EDT    Indication: renal failue , check for fluid overload, previously on diuretics, will start on fluids    Comparison: Chest radiograph 3/12/2025.    Findings:  Cardiomediastinal silhouette is unchanged. No focal consolidation. No overt pulmonary edema. No pleural effusion or pneumothorax. Osseous structures are unchanged.      Impression:      Impression:  No acute findings.       Electronically Signed: Murali Cervantes MD    4/14/2025 11:03 PM EDT    Workstation ID: [id number]    CT Abdomen Pelvis Without Contrast [[id number]] Collected: 04/14/25 2014     Updated: 04/14/25 2023    Narrative:      CT ABDOMEN PELVIS WO CONTRAST    Date of Exam: 4/14/2025 7:48 PM EDT    Indication: Acute renal failure.    Comparison: CT abdomen pelvis 3/13/2025. Renal ultrasound 2/25/2025    Technique: Axial CT images were obtained of the abdomen and pelvis without the administration of contrast. Sagittal and coronal reconstructions were performed.  Automated exposure control and iterative reconstruction methods were used.      Findings:  Included Chest: Bibasal atelectasis. Calcified granulomas. Coronary artery calcifications.    Liver: No significant abnormality.     Gallbladder and biliary tree: Percutaneous cholecystostomy tube. Interval decreased distention of the gallbladder.     Spleen: Calcified granulomas     Pancreas: No significant abnormality.     Adrenal glands: No significant abnormality.     Kidneys and ureters: Two hyperattenuating left upper pole renal lesions appears similar to prior and measure up to 1.5 cm and were further characterized by renal ultrasound on 2/25/2025, most likely representing  hemorrhagic or proteinaceous cysts..   Simple appearing right renal cyst. No hydroureteronephrosis.     Stomach and duodenum:No significant abnormality.    Small and large bowel: Prior appendectomy. No evidence of bowel obstruction. No significant pericolonic inflammatory changes seen.    Peritoneal cavity: No free fluid or free air. Similar-appearing mild mesenteric panniculitis.    Bladder: Under distended and incompletely evaluated.     Pelvic organs: Mildly enlarged prostate gland     Vasculature: Atherosclerotic calcifications.     Lymph nodes: No pathologic appearing lymph nodes by imaging criteria.     Bones and soft tissues: Degenerative changes of the imaged spine. No acute osseous abnormality.      Impression:      Impression:  No acute findings in the abdomen/pelvis.    Percutaneous cholecystostomy tube in place. Interval decreased distention of the gallbladder.      Electronically Signed: Umang Hills    4/14/2025 8:21 PM EDT    Workstation ID: [id number]              Assessment & Plan    Blas Mojica is a 74 y.o. male with Past medical history of bacteremia in early March 2025, was treated with Levaquin for 14 days, discharged to [facility] rehab and came back with abdominal pain found to have acute cholecystitis  status cholecystotomy drain currently in place, type 2 diabetes, hypertension, hyperlipidemia, CVA on Plavix presented to [facility] from home on 4/14/2025 with generalized weakness and fatigue.    Assessments  Acute renal failure, possibly due to ?over diuresis  BRIGIDO on CKD  Left upper pole renal hemorrhagic or proteinaceous cyst Per US on 2/2025  History of cholecystitis status post cholecystotomy drain in place  Recent treated for bacteremia in early March 2025  Type 2 diabetes  Hypertension  Hyperlipidemia  History of prior CVA    Plan  -Status post 1L normal saline on admission; start ½ NS + bicarbonate drip at 100 cc/hr  -Bladder scan shows no retention; CT abdomen shows no  hydronephrosis  -Chest X-ray unremarkable; no peripheral edema; monitor strict intake and output  -Hold diuretics for now; avoid nephrotoxic agents; nephrology consulted, follow up with recommendations  -Patient afebrile; bilious drainage from tube appears non-infectious; off antibiotics for now; UA negative for bacteria  -Start SSI; monitor blood glucose closely; adjust insulin regimen as needed  -Resume home medications once reconciled by pharmacy and deemed medically appropriate  -AM labs        VTE Prophylaxis:  No VTE prophylaxis order currently exists.    CODE STATUS:    Code Status (Patient has no pulse and is not breathing): CPR (Attempt to Resuscitate)  Medical Interventions (Patient has pulse or is breathing): Full Support      I discussed the patient's findings and my recommendations with patient, family, and nursing staff.      This document has been electronically signed by Panchito Hopson MD on April 14, 2025 23:41 EDT   [facility] Hospitalist Team

## 2025-04-15 NOTE — PLAN OF CARE
Problem: Adult Inpatient Plan of Care  Goal: Plan of Care Review  Outcome: Progressing  Flowsheets (Taken 4/15/2025 2283)  Progress: no change  Plan of Care Reviewed With: patient  Goal: Patient-Specific Goal (Individualized)  Outcome: Progressing  Goal: Absence of Hospital-Acquired Illness or Injury  Outcome: Progressing  Goal: Optimal Comfort and Wellbeing  Outcome: Progressing  Goal: Readiness for Transition of Care  Outcome: Progressing     Problem: Electrolyte Imbalance  Goal: Electrolyte Balance  Outcome: Progressing     Problem: Fluid Volume Excess  Goal: Fluid Balance  Outcome: Progressing     Problem: Fall Injury Risk  Goal: Absence of Fall and Fall-Related Injury  Outcome: Progressing     Problem: Skin Injury Risk Increased  Goal: Skin Health and Integrity  Outcome: Progressing   Goal Outcome Evaluation:  Plan of Care Reviewed With: patient        Progress: no change

## 2025-04-15 NOTE — PROGRESS NOTES
[facility] MEDICINE SERVICE  DAILY PROGRESS NOTE    NAME: Blas Mojica  : 1950  MRN: [id number]   Sex: male  Age: 74 y.o.    PROVIDER OF SERVICE: Hong Harris MD, MD    Chief Complaint: Acute renal failure    Subjective:     Interval History:  Blas Mojica was seen and examined at bedside following hospital admission due to renal failure.  This is a very pleasant 74-year-old  gentleman who presented to the hospital due to complaints of generalized weakness and fatigue.  The patient was treated with Levaquin for 14 days, discharged to [facility] rehab came back to the hospital with abdominal pain, and was found to have acute cholecystitis.  The patient was hospitalized, found to have a creatinine of 8, which is now downtrending with IV fluids.  He is feeling fairly well, and does not have any complaints at this point in time.    Objective:     Vital Signs  Temp:  [98.3 °F (36.8 °C)-98.5 °F (36.9 °C)] 98.3 °F (36.8 °C)  Heart Rate:  [80-94] 82  Resp:  [10-18] 10  BP: (100-124)/(51-68) 116/51   Body mass index is 27.41 kg/m².    Intake/Output    Intake/Output Summary (Last 24 hours) at 4/15/2025 1111  Last data filed at 2025 2339  Gross per 24 hour   Intake 1000 ml   Output 200 ml   Net 800 ml        Physical Exam  General: Alert, cooperative, no distress, AAOx3  Lungs: Clear to auscultation bilaterally, respirations unlabored  Heart: Regular rate and rhythm, S1 and S2 normal, no murmurs, no rub or gallop.  Trace edema.  Abdomen: Soft, non-tender, bowel sounds active, no masses, no organomegaly  Psychiatric: Appropriate mood and affect.     Diagnostic Data    Results from last 7 days   Lab Units 04/15/25  0442   WBC 10*3/mm3 5.70   HEMOGLOBIN g/dL 9.3*   HEMATOCRIT % 29.2*   PLATELETS 10*3/mm3 127*   GLUCOSE mg/dL 102*   CREATININE mg/dL 7.74*   BUN mg/dL 72*   SODIUM mmol/L 141   POTASSIUM mmol/L 4.4   AST (SGOT) U/L 22   ALT (SGPT) U/L 22   ALK PHOS U/L 95   BILIRUBIN mg/dL 0.4   ANION  GAP mmol/L 16.0*       XR Chest 1 View  Result Date: 4/14/2025  Impression: No acute findings. Electronically Signed: Murali Cervantes MD  4/14/2025 11:03 PM EDT  Workstation ID: [id number]    CT Abdomen Pelvis Without Contrast  Result Date: 4/14/2025  Impression: No acute findings in the abdomen/pelvis. Percutaneous cholecystostomy tube in place. Interval decreased distention of the gallbladder. Electronically Signed: Umang Hills  4/14/2025 8:21 PM EDT  Workstation ID: [id number]      I reviewed the patient's new clinical results.    Assessment/Plan:       Acute renal failure      Plan:  1.  Nephrology has been consulted, their consultation note is pending.  Patient's creatinine is downtrending slowly with IV fluids.  Continue IV fluid rehydration.  Hold nephrotoxic medications.  2.  Ultrasound of kidneys has been ordered and is pending at this time.  3.  Further recommendations to follow pending hospital course.    VTE Prophylaxis:  No VTE prophylaxis order currently exists.     Disposition Planning:     Barriers to Discharge: Renal failure  Anticipated Date of Discharge: TBD  Place of Discharge: TBD    Signature: Electronically signed by Hong Harris MD, MD, 04/15/25, 11:11 EDT.  [facility] [place], operated by [facility] Hospitalist Team

## 2025-04-15 NOTE — CASE MANAGEMENT/SOCIAL WORK
Discharge Planning Assessment   Farhan     Patient Name: Blas Mojica  MRN: [id number]  Today's Date: 4/15/2025    Admit Date: 4/14/2025    Plan: Return to [facility]. Precert not required. PASRR not required.   Discharge Needs Assessment       Row Name 04/15/25 1023       Living Environment    People in Home facility resident  [facility]    Current Living Arrangements other (see comments)  [facility]    Potentially Unsafe Housing Conditions none    In the past 12 months has the electric, gas, oil, or water company threatened to shut off services in your home? No    Primary Care Provided by self    Provides Primary Care For no one    Family Caregiver if Needed spouse    Family Caregiver Names Wife Debra Daughters Alexa, April, Carie    Quality of Family Relationships helpful;involved;supportive    Able to Return to Prior Arrangements yes       Resource/Environmental Concerns    Resource/Environmental Concerns none    Transportation Concerns none       Transportation Needs    In the past 12 months, has lack of transportation kept you from medical appointments or from getting medications? no    In the past 12 months, has lack of transportation kept you from meetings, work, or from getting things needed for daily living? No       Food Insecurity    Within the past 12 months, you worried that your food would run out before you got the money to buy more. Never true    Within the past 12 months, the food you bought just didn't last and you didn't have money to get more. Never true       Transition Planning    Patient/Family Anticipates Transition to inpatient rehabilitation facility    Patient/Family Anticipated Services at Transition     Transportation Anticipated health plan transportation       Discharge Needs Assessment    Readmission Within the Last 30 Days no previous admission in last 30 days    Equipment Currently Used at Home grab bar;shower chair;walker, standard;wheelchair     Concerns to be Addressed discharge planning    Anticipated Changes Related to Illness none    Equipment Needed After Discharge none                   Discharge Plan       Row Name 04/15/25 1026       Plan    Plan Return to [facility]. Precert not required. PASRR not required.    Patient/Family in Agreement with Plan yes    Plan Comments Patient has been staying at [facility] since his last admission at [facility]. Patient does not drive and will need [facility] to transport at [place]. Patient relies on [facility] staff to do ADL. PCP (Jacek) and pharmacy does not have to be updated due to patient going back to [facility]. Patient denies finanical assistance needs with medications and/or food. Patient is agreeable to go back to [facility] at [place]. CM reached out to Kaley and Cherri with [facility] asking if patient was okay to return to [facility] once medically stable for [place], per Kaley, patient is allowed to come back. Discharge barriers: Inpt diabetes educator and nephro following, trending CRE.                  Continued Care and Services - Admitted Since 4/14/2025       Destination       Service Provider Request Status Services Address Phone Fax Patient Preferred    [facility] Pending - Request Sent -- [address] [place] [address] [place] [address] [phone] --                     Demographic Summary       Row Name 04/15/25 1021       General Information    Admission Type inpatient    Arrived From emergency department    Required Notices Provided Important Message from Medicare    Referral Source admission list    Reason for Consult discharge planning    Preferred Language English       Contact Information    Permission Granted to Share Info With                    Functional Status       Row Name 04/15/25 1023       Functional Status    Usual Activity Tolerance fair    Current Activity Tolerance fair       Functional Status, IADL    Medications completely dependent    Meal  Preparation completely dependent    Housekeeping completely dependent    Laundry completely dependent    Shopping completely dependent                Patient Forms       Row Name 04/15/25 1031       Patient Forms    Important Message from Medicare (IMM) Delivered  IMM given by reg.                  Met with patient in room wearing PPE: mask.    Maintained distance greater than six feet and spent less than 15 minutes in the room.       Nancy Castaneda RN

## 2025-04-15 NOTE — SIGNIFICANT NOTE
04/15/25 1102   Readmission Indications   Is the patient and/or family able to complete the readmission assessment questions? Yes  (Patient only at bedside.)   Is this hospitalization related to the prior hospital diagnosis? No  (Previous acute cholesystitis New abnormal lab referred by provider (acute renal failure))   Recommendation for rehospitalization   Did you speak with your physician prior to coming to the hospital No  (Automatic call out labs done at [facility] and [facility] lab has increased.)   Follow-up Appointments   Do you have a PCP? Yes  (Sahil Read)   Did you have an appointment with PCP after your hospitalization?   (Patient saw Laura Krunal provider)   Did you have an appointment with a Specialist? Yes   When was your appointment scheduled? 05/06/25   Did you go to appointment?   (N/A)   Are you current with the Pulmonary Clinic? No   Are you current with the CHF Clinic? No   Medications   Did you have newly prescribed medications at discharge? Yes  (Amoxcillin)   Did you understand the reasons for your medications at discharge and how to take them? Yes   Did you understand the side effects of your medications?   (N/A)   Are you taking all of you prescribed medications? Yes   What pharmacy was used to fill prescription(s)? [facility] filled script   Were medications picked up? Yes   Discharge Instructions   Did you understand your discharge instructions? Yes   Did your family/caregiver hear your instructions? No  (Patient only at bedside.)   Were you told to eat a special diet? No   Did you adhere to the diet?   (N/A)   Were you given a number of someone to call if you had questions or concerns? Yes   Index discharge location/services   Where did you go upon discharge? Acute Rehabilitation   Do you have supportive family or friends in the home? Yes  (Wife- Debra)   Was the provider seen at the facility? Yes   What actions were taken to avoid a readmit? N/A   Which Acute Rehabiliation Facility were  your admitted from? [facility]   Palliative Care/Hospice   Are you current with Palliative Care? No   Are you current with Hospice Care? No   Advance Directives (For Healthcare)   Pre-existing AND/MOST/POLST Order No   Advance Directive Status Patient does not have advance directive   Have you reviewed your Advance Directive and is it valid for this stay? Not applicable   Literature Provided on Advance Directives No   Patient Requests Assistance on Advance Directives Patient Declined   Readmission Assessment Final Comments   Final Comments Previous 3/12/25-3/15/25 acute cholecystitis   Arrived from [facility] to [facility] ED w c/o abdominal pain, body aches, nausea. ED workup showing rectal temp 100.8. Triggered sepsis screen. Blood cx obtained. Lactate 2.2. IV abx started. IV fluids started. WBC13.1. Glucose 440. BUN 33. Viral resp panel negative. CXR shows mild right basilar atelectasis. CT abdomen/pelvis showing distended gallbladder rafaela for acute cholecystitis. IV Flagyl and IV insulin started. Surgery consulted. Hida scan pos. IR consult placed for aguila tube. Cholangiogram in 6 weeks.  DC back to [facility].   New 4/14/25-present   Arrived from [facility] to [facility] ED w c/o some labs completed that were showing worsening kidney function. BUN of 74. CRE 8. K 4.6. CT pelvis neg for hydronephrosis or obstructive uropathy. IV fluids started. Nephro consulted.

## 2025-04-15 NOTE — CONSULTS
Name: Blas Mojica ADMIT: 2025   : 1950  PCP: Sahil Read MD    MRN: [id number] LOS: 1 days   AGE/SEX: 74 y.o. male  ROOM:      Date of Service: 4/15/2025                        Reason for Consult:         Acute kidney injury    History of Present Illness:       Patient is a very pleasant 74-year-old gentleman with past medical history of CKD stage III, recent episode of bacteremia, recent cholecystitis s/p cholecystostomy tube, HTN, DM, CVA multiple other complaints presented after being discovered with abnormal labs as outpatient.  Says he has been feeling okay.  Reports some generalized weakness.  Reports good urine output, bladder emptying.  No sense of incomplete bladder emptying, urgency or bladder fullness.  Has not been taking any NSAIDs.  Has been taking Maxide per his report although home med list suggests different diuretics.  Never has had a prostate issue to his knowledge.  During recent admission, he had negative serum hypocomplementemia and it seems glomerular process was ruled out.  At this time, on admission his creatinine was found to be nearly 8 mg/dL with mild metabolic acidosis and hyponatremia.  Vital signs were largely stable.  Started on IVF's.    CT abdomen and pelvis without contrast: No acute findings  Chest x-ray: No acute findings.  Recent  echo in 2025: LVEF 61 to 65% with grade 1 diastolic dysfunction  Nephrology consulted for BRIGIDO management.      Review of Systems:         Constitutional: No fever, no chills, no lethargy, no weakness.  HEENT:  No headache, otalgia, itchy eyes, nasal discharge or sore throat.  Cardiac:  No chest pain, dyspnea, orthopnea or PND.  Chest:  No cough, phlegm or wheezing.  Abdomen:  No abdominal pain, nausea or vomiting.  Neuro:  No focal weakness, abnormal movements or seizure-like activity.  :   No hematuria, no pyuria, no dysuria, no flank pain.  ROS was otherwise negative except as mentioned in the [facility].        Past medical history, surgical history, social history, family history, allergies and all medications reviewed and agreed.      Past History/Allergies?Social History:     Past Medical History:   Diagnosis Date    Diabetes mellitus     Hyperlipidemia     Hypertension     Kidney stone     TIA (transient ischemic attack)     Type 2 diabetes mellitus with stage 3 chronic kidney disease, with long-term current use of insulin 2021         Past Surgical History :     Past Surgical History:   Procedure Laterality Date    ADENOIDECTOMY      ENDOSCOPY N/A 2023    Procedure: ESOPHAGOGASTRODUODENOSCOPY with removal of NJ tube from right nare;  Surgeon: Jaret Valadez MD;  Location: [facility] ENDOSCOPY;  Service: Gastroenterology;  Laterality: N/A;  esophagitis, gastric ulcer    EXTRACORPOREAL SHOCK WAVE LITHOTRIPSY (ESWL)      HERNIA REPAIR      KNEE ACL RECONSTRUCTION Left     TONSILLECTOMY            Family History:     Family History   Problem Relation Age of Onset    No Known Problems Mother     No Known Problems Father           Social History:     Social History     Tobacco Use    Smoking status: Never     Passive exposure: Never    Smokeless tobacco: Never   Substance Use Topics    Alcohol use: Never          Allergies:     Allergies   Allergen Reactions    Contrast Dye (Echo Or Unknown Ct/Mr) Anaphylaxis     Not Lumason    Iodinated Contrast Media Shortness Of Breath    Iodine Shortness Of Breath    Aspirin Nausea And Vomiting    Lipitor [Atorvastatin] Unknown - High Severity    Prednisone Hives         Home meds:     (Not in a hospital admission)        Scheduled meds:   heparin (porcine), 5,000 Units, Subcutaneous, Q8H  insulin lispro, 2-7 Units, Subcutaneous, 4x Daily AC & at Bedtime  sevelamer, 800 mg, Oral, TID With Meals  sodium chloride, 10 mL, Intravenous, Q12H          Objectives:     tMax 24 hrs: Temp (24hrs), Av.3 °F (36.8 °C), Min:98 °F (36.7 °C), Max:98.5 °F (36.9  "°C)      Vitals Ranges:   Temp:  [98 °F (36.7 °C)-98.5 °F (36.9 °C)] 98 °F (36.7 °C)  Heart Rate:  [80-94] 90  Resp:  [10-22] 22  BP: (100-124)/(51-68) 110/56    Intake and Output Last 3 Shifts:   I/O last 3 completed shifts:  In: 1000 [IV Piggyback:1000]  Out: 200 [Urine:200]      Exam:     /56 (BP Location: Left arm, Patient Position: Lying)   Pulse 90   Temp 98 °F (36.7 °C) (Oral)   Resp 22   Ht 177.8 cm (70\")   Wt 86.6 kg (191 lb)   SpO2 98%   BMI 27.41 kg/m²     GEN: Pleasant chronically ill elderly gentleman in no acute distress  EYES: PERRLA  HEENT: no abnormality  MOUTH: Dry mucous membranes  NECK: no visible JVD  RESP: clear to auscultation bilaterally, no crackles/wheezing  CVS: RRR, S1, S2+  ABD: soft  NEURO: AAOX3  EXT: no edemas  SKIN: no visible rash  JOINT: no visible effusions  PSYCH: pleasant conversational affect        Data Review:       Labs reviewed    Imaging:      Radiology reviewed   Results for orders placed during the hospital encounter of 02/25/25    Adult Transthoracic Echo Complete W/ Cont if Necessary Per Protocol    Interpretation Summary    Left ventricular systolic function is normal. Left ventricular ejection fraction appears to be 61 - 65%.    Left ventricular diastolic function is consistent with (grade I) impaired relaxation.    Estimated right ventricular systolic pressure from tricuspid regurgitation is normal (<35 mmHg).       Assessment:        Acute renal failure    BRIGIDO with peak serum creatinine 98 mg/dL with baseline creatinine lately around 1.4 to 1.7 mg/dL.  Etiology likely multifactorial with suspected ATN, possibly due to profound volume depletion, relative hypotension, cannot rule out obstructive uropathy.  Excess diuretics may be playing a role.  Noted reassuringly unremarkable urinalysis.  Mild metabolic acidosis  Mild azotemia  Mild hyponatremia  Hyperphosphatemia  CKD stage III      Plan:     Agree with Elias catheter.  Will keep on half-normal bicarb " for another 24 hours.  Check UPCR/ACR and urine eosinophils for completion.  Follow-up renal ultrasound.  Start Renvela for hyperphosphatemia in the short-term.    D/W Dr. Harris.  Thanks for consultation.    Nydia Jara MD  [facility]  4/15/2025  12:04 EDT

## 2025-04-15 NOTE — DISCHARGE PLACEMENT REQUEST
"Blas Mojica (74 y.o. Male)       Date of Birth   1950    Social Security Number       [address]   [address] [place] [address] [place] [address]    Home Phone   [phone]    MRN   [id number]       Rastafari   Mandaen    Marital Status                               Admission Date   4/14/2025    Admission Type   Emergency    Admitting Provider   Panchito Hopson MD    Attending Provider   Hong Harris MD    Department, Room/Bed   [facility] EMERGENCY DEPARTMENT, 22/22       Discharge Date       Discharge Disposition       Discharge Destination                                 Attending Provider: Hong Harris MD    Allergies: Contrast Dye (Echo Or Unknown Ct/mr), Iodinated Contrast Media, Iodine, Aspirin, Lipitor [Atorvastatin], Prednisone    Isolation: None   Infection: MRSA No Isolation this Admit (01/19/24)   Code Status: CPR    Ht: 177.8 cm (70\")   Wt: 86.6 kg (191 lb)    Admission Cmt: None   Principal Problem: Acute renal failure [N17.9]                   Active Insurance as of 4/14/2025       Primary Coverage       Payor Plan Insurance Group Employer/Plan Group    MEDICARE MEDICARE A & B        Payor Plan Address Payor Plan Phone Number Payor Plan Fax Number Effective Dates    [address] [phone]  9/1/2014 - None Entered    [facility] [id number]         Subscriber Name Subscriber Birth Date Member ID       BLAS MOJICA 1950 [id number]               Secondary Coverage       Payor Plan Insurance Group Employer/Plan Group    HUMANA HUMANA [facility]              [id number]       Payor Plan Address Payor Plan Phone Number Payor Plan Fax Number Effective Dates    [address] [address]   9/1/2014 - None Entered    [facility] [id number]         Subscriber Name Subscriber Birth Date Member ID       BLAS MOJICA 1950 [id number]                     Emergency Contacts        (Rel.) Home Phone Work Phone Mobile Phone    NICK MOJICA (Spouse) [phone] -- " [phone]    JOE TREADWELL (Daughter) -- -- [phone]    Pau Clifton (Daughter) -- -- --    Craie Renee (Daughter) -- -- --

## 2025-04-16 LAB
ANION GAP SERPL CALCULATED.3IONS-SCNC: 13 MMOL/L (ref 5–15)
BUN SERPL-MCNC: 66 MG/DL (ref 8–23)
BUN/CREAT SERPL: 9.9 (ref 7–25)
CALCIUM SPEC-SCNC: 8.3 MG/DL (ref 8.6–10.5)
CHLORIDE SERPL-SCNC: 102 MMOL/L (ref 98–107)
CO2 SERPL-SCNC: 23 MMOL/L (ref 22–29)
CREAT SERPL-MCNC: 6.68 MG/DL (ref 0.76–1.27)
EGFRCR SERPLBLD CKD-EPI 2021: 8 ML/MIN/1.73
EOSINOPHIL SPEC QL MICRO: 0 % EOS/100 CELLS (ref 0–0)
GLUCOSE BLDC GLUCOMTR-MCNC: 151 MG/DL (ref 70–105)
GLUCOSE BLDC GLUCOMTR-MCNC: 162 MG/DL (ref 70–105)
GLUCOSE BLDC GLUCOMTR-MCNC: 175 MG/DL (ref 70–105)
GLUCOSE BLDC GLUCOMTR-MCNC: 199 MG/DL (ref 70–105)
GLUCOSE SERPL-MCNC: 200 MG/DL (ref 65–99)
PHOSPHATE SERPL-MCNC: 6.4 MG/DL (ref 2.5–4.5)
POTASSIUM SERPL-SCNC: 3.7 MMOL/L (ref 3.5–5.2)
SODIUM SERPL-SCNC: 138 MMOL/L (ref 136–145)

## 2025-04-16 PROCEDURE — 80048 BASIC METABOLIC PNL TOTAL CA: CPT | Performed by: STUDENT IN AN ORGANIZED HEALTH CARE EDUCATION/TRAINING PROGRAM

## 2025-04-16 PROCEDURE — 84100 ASSAY OF PHOSPHORUS: CPT | Performed by: STUDENT IN AN ORGANIZED HEALTH CARE EDUCATION/TRAINING PROGRAM

## 2025-04-16 PROCEDURE — 87205 SMEAR GRAM STAIN: CPT | Performed by: STUDENT IN AN ORGANIZED HEALTH CARE EDUCATION/TRAINING PROGRAM

## 2025-04-16 PROCEDURE — 25010000002 HEPARIN (PORCINE) PER 1000 UNITS: Performed by: FAMILY MEDICINE

## 2025-04-16 PROCEDURE — 82948 REAGENT STRIP/BLOOD GLUCOSE: CPT

## 2025-04-16 PROCEDURE — 82948 REAGENT STRIP/BLOOD GLUCOSE: CPT | Performed by: STUDENT IN AN ORGANIZED HEALTH CARE EDUCATION/TRAINING PROGRAM

## 2025-04-16 PROCEDURE — 63710000001 INSULIN LISPRO (HUMAN) PER 5 UNITS: Performed by: STUDENT IN AN ORGANIZED HEALTH CARE EDUCATION/TRAINING PROGRAM

## 2025-04-16 PROCEDURE — 97162 PT EVAL MOD COMPLEX 30 MIN: CPT

## 2025-04-16 RX ADMIN — SODIUM CHLORIDE 75 MEQ: 450 INJECTION, SOLUTION INTRAVENOUS at 20:44

## 2025-04-16 RX ADMIN — Medication 10 ML: at 20:05

## 2025-04-16 RX ADMIN — HEPARIN SODIUM 5000 UNITS: 5000 INJECTION INTRAVENOUS; SUBCUTANEOUS at 17:05

## 2025-04-16 RX ADMIN — HEPARIN SODIUM 5000 UNITS: 5000 INJECTION INTRAVENOUS; SUBCUTANEOUS at 20:05

## 2025-04-16 RX ADMIN — INSULIN LISPRO 2 UNITS: 100 INJECTION, SOLUTION INTRAVENOUS; SUBCUTANEOUS at 20:05

## 2025-04-16 RX ADMIN — SEVELAMER CARBONATE 800 MG: 800 TABLET, FILM COATED ORAL at 17:06

## 2025-04-16 RX ADMIN — Medication 10 ML: at 10:30

## 2025-04-16 RX ADMIN — Medication 10 ML: at 10:31

## 2025-04-16 RX ADMIN — HEPARIN SODIUM 5000 UNITS: 5000 INJECTION INTRAVENOUS; SUBCUTANEOUS at 05:06

## 2025-04-16 RX ADMIN — SODIUM CHLORIDE 75 MEQ: 450 INJECTION, SOLUTION INTRAVENOUS at 10:34

## 2025-04-16 RX ADMIN — INSULIN LISPRO 2 UNITS: 100 INJECTION, SOLUTION INTRAVENOUS; SUBCUTANEOUS at 17:05

## 2025-04-16 RX ADMIN — SEVELAMER CARBONATE 800 MG: 800 TABLET, FILM COATED ORAL at 10:30

## 2025-04-16 NOTE — PLAN OF CARE
Goal Outcome Evaluation:  Plan of Care Reviewed With: patient        Progress: no change  Outcome Evaluation: Pt A/o x 4, able to make needs known, Pt strict I/O, pt remains a falls risk with bed alarm in use, Pt is bed mobile.  Will continue with current orders and care plans.

## 2025-04-16 NOTE — PLAN OF CARE
Goal Outcome Evaluation:  Plan of Care Reviewed With: patient        Progress: no change  Outcome Evaluation: Pt is a 75 y/o M w/ multiple recent hospitalizations and rehab stays w/ hx of bacteremia in early March 2025, was treated with Levaquin for 14 days, discharged to [facility] rehab and presented back with abdominal pain found to have acute cholecystitis, s/p cholecystotomy drain currently in place, DM type II, HTN, HLD, and CVA, UTI,PUD with h/o gastric ulcers, history of A-fib. Pt presented to [facility]  on 4/14/2025 with generalized weakness and fatigue. Pt reports he had recently ambulated 150-175 ft with RW with assist at [facility], reports at baseline, he is typically able to ambulate household distances w/ use of Rollator walker vs. RW, denies falls, some assist for ADLs, and lives w/ very supportive wife in an independent living apartment at [facility]. During eval, pt agreeable to bed level only. Pt requires MOD A for rolling, MAX A for supine to/from sit transfer, and MIN/MOD A for 10 minute static/dynamic sitting bout EOB with posterior LOB episodes requiring verbal and tactile cues to complete. Pt with vitals stable and WNL throughout on RA. At this time, recommending return to IRF, as pt is far from baseline and not safe for home. Plan to see 5x/week at [facility] for progression of mobility. PPE: gloves, mask    Anticipated Discharge Disposition (PT): inpatient rehabilitation facility

## 2025-04-16 NOTE — THERAPY EVALUATION
Patient Name: Blas Mojica  : 1950    MRN: [id number]                              Today's Date: 2025       Admit Date: 2025    Visit Dx:     ICD-10-CM ICD-9-CM   1. Acute renal failure, unspecified acute renal failure type  N17.9 584.9     Patient Active Problem List   Diagnosis    Primary hypertension    TIA (transient ischemic attack)    Stage 3 chronic kidney disease    Dyslipidemia    History of basal cell carcinoma (BCC)    Primary osteoarthritis    Vitamin D deficiency    Acute pain of left knee    Generalized weakness    Hypokalemia    Moderate malnutrition    Physical debility    Type 2 diabetes mellitus with stage 3 chronic kidney disease, with long-term current use of insulin    Sinus tachycardia    Acute hypoxemic respiratory failure due to COVID-19    Pneumonia due to COVID-19 virus    Mixed hyperlipidemia    COVID-19 virus infection    Weakness    COVID    Foot ulcer due to secondary DM    Acute kidney injury    Acute UTI (urinary tract infection)    Acute cholecystitis    Acute renal failure     Past Medical History:   Diagnosis Date    Diabetes mellitus     Hyperlipidemia     Hypertension     Kidney stone     TIA (transient ischemic attack)     Type 2 diabetes mellitus with stage 3 chronic kidney disease, with long-term current use of insulin 2021     Past Surgical History:   Procedure Laterality Date    ADENOIDECTOMY      ENDOSCOPY N/A 2023    Procedure: ESOPHAGOGASTRODUODENOSCOPY with removal of NJ tube from right nare;  Surgeon: Jaret Valadez MD;  Location: [facility] ENDOSCOPY;  Service: Gastroenterology;  Laterality: N/A;  esophagitis, gastric ulcer    EXTRACORPOREAL SHOCK WAVE LITHOTRIPSY (ESWL)      HERNIA REPAIR      KNEE ACL RECONSTRUCTION Left     TONSILLECTOMY        General Information       Row Name 25 1630          Physical Therapy Time and Intention    Document Type evaluation  -AO     Mode of Treatment physical therapy  -AO       Row  Name 04/16/25 1630          General Information    Patient Profile Reviewed yes  -AO     Prior Level of Function --  Pt reports he had recently ambulated 150-175 ft with RW with assist and [facility], reports at baseline, he is typically able to ambulate household distances w/ use of Rollator walker vs. RW, denies falls, some assist for ADLs  -AO     Existing Precautions/Restrictions fall  Cholecystostomy drain  -AO     Barriers to Rehab previous functional deficit;medically complex;impaired sensation  -AO       Row Name 04/16/25 1630          Living Environment    Current Living Arrangements other (see comments)  [facility]  -AO     People in Home other (see comments)  [facility] (typically lives w/ wife in independent living apartment at [facility])  -AO       Row Name 04/16/25 1630          Home Main Entrance    Number of Stairs, Main Entrance none  -AO       Row Name 04/16/25 1630          Stairs Within Home, Primary    Number of Stairs, Within Home, Primary none  -AO       Row Name 04/16/25 1630          Cognition    Orientation Status (Cognition) oriented x 4  -AO       Row Name 04/16/25 1630          Safety Issues/Impairments Affecting Functional Mobility    Impairments Affecting Function (Mobility) endurance/activity tolerance;motor control;pain;postural/trunk control;range of motion (ROM);sensation/sensory awareness;strength  -AO               User Key  (r) = Recorded By, (t) = Taken By, (c) = Cosigned By      Initials Name Provider Type    Hayley Sorenson, PT Physical Therapist                   Mobility       Row Name 04/16/25 1630          Bed Mobility    Bed Mobility supine-sit;sit-supine;rolling right;rolling left  -AO     Rolling Left [place] (Bed Mobility) moderate assist (50% patient effort)  -AO     Rolling Right [place] (Bed Mobility) moderate assist (50% patient effort)  -AO     Supine-Sit [place] (Bed Mobility) maximum assist (25% patient effort)  -AO      Sit-Supine [place] (Bed Mobility) maximum assist (25% patient effort)  -AO     Assistive Device (Bed Mobility) bed rails;repositioning sheet  -AO     Comment, (Bed Mobility) Pt able to advance to EOB during supine to sit transfer, required assist at trunk  -AO       Row Name 04/16/25 1630          Transfers    Comment, (Transfers) Unable to assess (pt declined)  -AO       Row Name 04/16/25 1630          Bed-Chair Transfer    Bed-Chair [place] (Transfers) unable to assess  -AO       Row Name 04/16/25 1630          Sit-Stand Transfer    Sit-Stand [place] (Transfers) unable to assess  -AO       Row Name 04/16/25 1630          Gait/Stairs (Locomotion)    [place] Level (Gait) unable to assess  -AO               User Key  (r) = Recorded By, (t) = Taken By, (c) = Cosigned By      Initials Name Provider Type    Hayley Sorenson, PT Physical Therapist                   Obj/Interventions       Row Name 04/16/25 1630          Range of Motion Comprehensive    Comment, General Range of Motion B knee extension impaired ~25%  -AO       Row Name 04/16/25 1630          Strength Comprehensive (MMT)    General Manual Muscle Testing (MMT) Assessment lower extremity strength deficits identified  -AO     Comment, General Manual Muscle Testing (MMT) Assessment B hip flexion: 3/5, B knee flexion/extension; 2+/5, B dorsiflexion/plantarflexion; 4/5  -AO       Row Name 04/16/25 1630          Balance    Balance Assessment sitting static balance;sitting dynamic balance  -AO     Static Sitting Balance minimal assist  -AO     Dynamic Sitting Balance moderate assist  -AO     Position, Sitting Balance unsupported;sitting edge of bed  -AO     Comment, Balance Pt completed 10 minute static/dynamic sitting bout EOB with poster LOB requiring MIN/MOD A to correct  -AO       Row Name 04/16/25 1630          Sensory Assessment (Somatosensory)    Sensory Assessment (Somatosensory) other (see comments)  Pt reports BLE upper thigh  parasthesia  -AO               User Key  (r) = Recorded By, (t) = Taken By, (c) = Cosigned By      Initials Name Provider Type    Hayley Sorenson, PT Physical Therapist                   Goals/Plan       Row Name 04/16/25 1630          Bed Mobility Goal 1 (PT)    Activity/Assistive Device (Bed Mobility Goal 1, PT) bed mobility activities, all  -AO     [place] Level/Cues Needed (Bed Mobility Goal 1, PT) minimum assist (75% or more patient effort)  -AO     Time Frame (Bed Mobility Goal 1, PT) long term goal (LTG);2 weeks  -AO     Progress/Outcomes (Bed Mobility Goal 1, PT) goal not met  -AO       [facility] Name 04/16/25 1630          Transfer Goal 1 (PT)    Activity/Assistive Device (Transfer Goal 1, PT) transfers, all  -AO     [place] Level/Cues Needed (Transfer Goal 1, PT) minimum assist (75% or more patient effort)  -AO     Time Frame (Transfer Goal 1, PT) long term goal (LTG);2 weeks  -AO     Progress/Outcome (Transfer Goal 1, PT) goal not met  -AO       Row Name 04/16/25 1630          Gait Training Goal 1 (PT)    Activity/Assistive Device (Gait Training Goal 1, PT) gait (walking locomotion);walker, rolling  -AO     [place] Level (Gait Training Goal 1, PT) minimum assist (75% or more patient effort)  -AO     Distance (Gait Training Goal 1, PT) 10 ft  -AO     Time Frame (Gait Training Goal 1, PT) long term goal (LTG);2 weeks  -AO       Row Name 04/16/25 1630          Therapy Assessment/Plan (PT)    Planned Therapy Interventions (PT) balance training;bed mobility training;gait training;home exercise program;neuromuscular re-education;patient/family education;ROM (range of motion);strengthening;transfer training  -AO               User Key  (r) = Recorded By, (t) = Taken By, (c) = Cosigned By      Initials Name Provider Type    Hayley Sorenson, PT Physical Therapist                   Clinical Impression       Row Name 04/16/25 1630          Pain    Pretreatment Pain Rating 0/10 - no pain  -AO      Posttreatment Pain Rating 0/10 - no pain  -AO       Row Name 04/16/25 1630          Plan of Care Review    Plan of Care Reviewed With patient  -AO     Progress no change  -AO     Outcome Evaluation Pt is a 73 y/o M w/ multiple recent hospitalizations and rehab stays w/ hx of bacteremia in early March 2025, was treated with Levaquin for 14 days, discharged to [facility] rehab and presented back with abdominal pain found to have acute cholecystitis, s/p cholecystotomy drain currently in place, DM type II, HTN, HLD, and CVA, UTI,PUD with h/o gastric ulcers, history of A-fib. Pt presented to [facility]  on 4/14/2025 with generalized weakness and fatigue. Pt reports he had recently ambulated 150-175 ft with RW with assist at [facility], reports at baseline, he is typically able to ambulate household distances w/ use of Rollator walker vs. RW, denies falls, some assist for ADLs, and lives w/ very supportive wife in an independent living apartment at [facility]. During eval, pt agreeable to bed level only. Pt requires MOD A for rolling, MAX A for supine to/from sit transfer, and MIN/MOD A for 10 minute static/dynamic sitting bout EOB with posterior LOB episodes requiring verbal and tactile cues to complete. Pt with vitals stable and WNL throughout on RA. At this time, recommending return to IRF, as pt is far from baseline and not safe for home. Plan to see 5x/week at [facility] for progression of mobility. PPE: gloves, mask  -AO       Row Name 04/16/25 1630          Therapy Assessment/Plan (PT)    Rehab Potential (PT) good  -AO     Criteria for Skilled Interventions Met (PT) yes;meets criteria;skilled treatment is necessary  -AO     Therapy Frequency (PT) 5 times/wk  -AO     Predicted Duration of Therapy Intervention (PT) until d/c  -AO       Row Name 04/16/25 1630          Vital Signs    Recovery Time vitals stable and WNL on RA throughout session  -AO       Row Name 04/16/25 1630          Positioning and Restraints     Pre-Treatment Position in bed  -AO     Post Treatment Position bed  -AO     In Bed supine;call light within reach;exit alarm on;encouraged to call for assist  -AO               User Key  (r) = Recorded By, (t) = Taken By, (c) = Cosigned By      Initials Name Provider Type    Hayley Sorenson, PT Physical Therapist                   Outcome Measures       Row Name 04/16/25 1630 04/16/25 1232       How much help from another person do you currently need...    Turning from your back to your side while in flat bed without using bedrails? 3  -AO 3  -SC    Moving from lying on back to sitting on the side of a flat bed without bedrails? 2  -AO 3  -SC    Moving to and from a bed to a chair (including a wheelchair)? 2  -AO 2  -SC    Standing up from a chair using your arms (e.g., wheelchair, bedside chair)? 1  -AO 2  -SC    Climbing 3-5 steps with a railing? 1  -AO 1  -SC    To walk in hospital room? 1  -AO 1  -SC    AM-PAC 6 Clicks Score (PT) 10  -AO 12  -SC    Highest Level of Mobility Goal 4 --> Transfer to chair/commode  -AO 4 --> Transfer to chair/commode  -SC      Row Name 04/16/25 1630          Functional Assessment    Outcome Measure Options AM-PAC 6 Clicks Basic Mobility (PT)  -AO               User Key  (r) = Recorded By, (t) = Taken By, (c) = Cosigned By      Initials Name Provider Type    Hayley Sorenson, PT Physical Therapist    SC Cande Hebert, RN Registered Nurse                                 Physical Therapy Education       Title: PT OT SLP Therapies (Done)       Topic: Physical Therapy (Done)       Point: Mobility training (Done)       Learning Progress Summary            Patient Acceptance, E,TB, VU by AO at 4/16/2025 1713                      Point: Body mechanics (Done)       Learning Progress Summary            Patient Acceptance, E,TB, VU by AO at 4/16/2025 1713                                      User Key       Initials Effective Dates Name Provider Type Discipline    AO 06/16/21 -  Yaneli  Hayley KINSEY, PT Physical Therapist PT                  PT Recommendation and Plan  Planned Therapy Interventions (PT): balance training, bed mobility training, gait training, home exercise program, neuromuscular re-education, patient/family education, ROM (range of motion), strengthening, transfer training  Progress: no change  Outcome Evaluation: Pt is a 73 y/o M w/ multiple recent hospitalizations and rehab stays w/ hx of bacteremia in early March 2025, was treated with Levaquin for 14 days, discharged to [facility] rehab and presented back with abdominal pain found to have acute cholecystitis, s/p cholecystotomy drain currently in place, DM type II, HTN, HLD, and CVA, UTI,PUD with h/o gastric ulcers, history of A-fib. Pt presented to [facility]  on 4/14/2025 with generalized weakness and fatigue. Pt reports he had recently ambulated 150-175 ft with RW with assist at [facility], reports at baseline, he is typically able to ambulate household distances w/ use of Rollator walker vs. RW, denies falls, some assist for ADLs, and lives w/ very supportive wife in an independent living apartment at [facility]. During eval, pt agreeable to bed level only. Pt requires MOD A for rolling, MAX A for supine to/from sit transfer, and MIN/MOD A for 10 minute static/dynamic sitting bout EOB with posterior LOB episodes requiring verbal and tactile cues to complete. Pt with vitals stable and WNL throughout on RA. At this time, recommending return to IRF, as pt is far from baseline and not safe for home. Plan to see 5x/week at [facility] for progression of mobility. PPE: gloves, mask     Time Calculation:   PT Evaluation Complexity  History, PT Evaluation Complexity: 3 or more personal factors and/or comorbidities  Examination of Body Systems (PT Eval Complexity): total of 3 or more elements  Clinical Presentation (PT Evaluation Complexity): evolving  Clinical Decision Making (PT Evaluation Complexity): moderate complexity  Overall  Complexity (PT Evaluation Complexity): moderate complexity     PT Charges       Row Name 04/16/25 1714             Time Calculation    Start Time 1630  -AO      Stop Time 1648  -AO      Time Calculation (min) 18 min  -AO      PT Received On 04/16/25  -AO      PT - Next Appointment 04/17/25  -AO      PT Goal Re-Cert Due Date 04/30/25  -AO         Time Calculation- PT    Total Timed Code Minutes- PT 0 minute(s)  -AO                User Key  (r) = Recorded By, (t) = Taken By, (c) = Cosigned By      Initials Name Provider Type    AO Hayley Groves, PT Physical Therapist                  Therapy Charges for Today       Code Description Service Date Service Provider Modifiers Qty    72411121923 HC PT EVAL MOD COMPLEXITY 3 4/16/2025 Hayley Groves, PT GP 1            PT G-Codes  Outcome Measure Options: AM-PAC 6 Clicks Basic Mobility (PT)  AM-PAC 6 Clicks Score (PT): 10  PT Discharge Summary  Anticipated Discharge Disposition (PT): inpatient rehabilitation facility    Hayley Groves, PT  4/16/2025

## 2025-04-16 NOTE — CASE MANAGEMENT/SOCIAL WORK
Continued Stay Note  [facility]     Patient Name: Blas Mojica  MRN: [id number]  Today's Date: 4/16/2025    Admit Date: 4/14/2025    Plan: Return to [facility]. No precert or PASRR required.   Discharge Plan       Row Name 04/16/25 1520       Plan    Plan Return to [facility]. No precert or PASRR required.    Patient/Family in Agreement with Plan yes    Plan Comments DC barriers: Elevated creatinine and Renal following.             Megan Naegele, RN     Office Phone: [phone]  Office Cell: [phone]

## 2025-04-16 NOTE — PLAN OF CARE
Goal Outcome Evaluation: Plan is for patient to return to [facility] when medically stable.

## 2025-04-16 NOTE — PROGRESS NOTES
[facility] MEDICINE SERVICE  DAILY PROGRESS NOTE    NAME: Blas Mojica  : 1950  MRN: [id number]      LOS: 2 days     PROVIDER OF SERVICE: Alysha Ramos MD    Chief Complaint: Acute renal failure    Subjective:     Interval History:    Patient seen and evaluated at bedside. No new complaints. Today's labs pending. Nephrology following.     Treatment plan discussed with patient. All questions addressed.     Review of Systems:   Denies fevers, chills  Denies chest pain, edema  Denies shortness of breath, cough  Denies nausea, vomiting, diarrhea  Denies dysuria, hematuria    Objective:     Vital Signs  Temp:  [98 °F (36.7 °C)-98.7 °F (37.1 °C)] 98.5 °F (36.9 °C)  Heart Rate:  [90-98] 98  Resp:  [16-22] 18  BP: (110-121)/(56-64) 117/59   Body mass index is 27.41 kg/m².    Physical Exam   General: No acute distress, alert and oriented, [place]  CV: RRR, no peripheral edema  Pulm: CTAB, no increased work of breathing  Abd: Soft, nontender, nondistended  Skin: No rashes or lesions on exposed skin  Psych: Appropriate mood and affect    Scheduled Meds   heparin (porcine), 5,000 Units, Subcutaneous, Q8H  insulin lispro, 2-7 Units, Subcutaneous, 4x Daily AC & at Bedtime  sevelamer, 800 mg, Oral, TID With Meals  sodium chloride, 10 mL, Intravenous, Q12H       PRN Meds     senna-docusate sodium **AND** polyethylene glycol **AND** bisacodyl **AND** bisacodyl    dextrose    dextrose    glucagon (human recombinant)    Lidocaine HCl gel    nitroglycerin    [COMPLETED] Insert Peripheral IV **AND** sodium chloride    sodium chloride    sodium chloride   Infusions  sodium bicarbonate 8.4 % 75 mEq in sodium chloride 0.45 % 1,000 mL infusion (less than/equal to 100 mEq), 75 mEq, Last Rate: 75 mEq (04/15/25 9344)          Diagnostic Data    Results from last 7 days   Lab Units 04/15/25  0442   WBC 10*3/mm3 5.70   HEMOGLOBIN g/dL 9.3*   HEMATOCRIT % 29.2*   PLATELETS 10*3/mm3 127*   GLUCOSE mg/dL 102*   CREATININE mg/dL  7.74*   BUN mg/dL 72*   SODIUM mmol/L 141   POTASSIUM mmol/L 4.4   AST (SGOT) U/L 22   ALT (SGPT) U/L 22   ALK PHOS U/L 95   BILIRUBIN mg/dL 0.4   ANION GAP mmol/L 16.0*       US Renal Bilateral  Result Date: 4/15/2025  Impression: Bilateral anechoic renal cysts. Electronically Signed: Kristin Rowland MD  4/15/2025 3:36 PM EDT  Workstation ID: [id number]    XR Chest 1 View  Result Date: 4/14/2025  Impression: No acute findings. Electronically Signed: Murali Cervantes MD  4/14/2025 11:03 PM EDT  Workstation ID: [id number]    CT Abdomen Pelvis Without Contrast  Result Date: 4/14/2025  Impression: No acute findings in the abdomen/pelvis. Percutaneous cholecystostomy tube in place. Interval decreased distention of the gallbladder. Electronically Signed: Umang [place]  4/14/2025 8:21 PM EDT  Workstation ID: [id number]      Interval results reviewed.    Assessment/Plan:     Acute renal failure    PLAN:  Nephrology consulted, appreciate recs.   Fluids per nephrology, avoid nephrotoxins.   Repeat bmp in morning to monitor renal function.    Treatment plan discussed with nursing staff, case management and pharmacy.     VTE Prophylaxis:  Pharmacologic & mechanical VTE prophylaxis orders are present.    Code status is   Code Status and Medical Interventions: CPR (Attempt to Resuscitate); Full Support   Ordered at: 04/14/25 2340     Code Status (Patient has no pulse and is not breathing):    CPR (Attempt to Resuscitate)     Medical Interventions (Patient has pulse or is breathing):    Full Support       Plan for disposition: Pending clinical course    Barriers to discharge: Renal dysfunction, nephrology following    Time: 35+ minutes     Signature: Electronically signed by Alysha Ramos MD, 04/16/25, 07:58 EDT.  [facility] Hospitalist Team

## 2025-04-16 NOTE — PROGRESS NOTES
"      FOLLOW UP NOTE      Name: Blas Mojica ADMIT: 2025   : 1950  PCP: Sahil Read MD    MRN: [id number] LOS: 2 days   AGE/SEX: 74 y.o. male  ROOM: [facility]     Date of Service: 2025                           CHIEF COMPLIANTS / REASON FOR FOLLOW UP                Subjective:        Feels better today.  No new complaints.  Urine output 2.7 L.  No dyspnea.             Review of Systems:       Negative except as above       OBJECTIVE                                                                        Exam:  /58 (BP Location: Right arm, Patient Position: Lying)   Pulse 90   Temp 98.5 °F (36.9 °C) (Oral)   Resp 16   Ht 177.8 cm (70\")   Wt 86.6 kg (191 lb)   SpO2 98%   BMI 27.41 kg/m²   Intake/Output last 3 shifts:  I/O last 3 completed shifts:  In: 2490 [P.O.:240; I.V.:1250; IV Piggyback:1000]  Out: 2950 [Urine:2300; Other:650]  Intake/Output this shift:  No intake/output data recorded.    General Appearance: Resting flat in bed in no acute distress     Lungs:   Clear to auscultation bilaterally, respirations unlabored  Heart: RRR  Abdomen:  Soft, nontender percutaneous cholecystostomy tube note draining bilious fluid.  Extremities:  no edema  Neurologic:   Alert and oriented    Scheduled Meds:heparin (porcine), 5,000 Units, Subcutaneous, Q8H  insulin lispro, 2-7 Units, Subcutaneous, 4x Daily AC & at Bedtime  sevelamer, 800 mg, Oral, TID With Meals  sodium chloride, 10 mL, Intravenous, Q12H      Continuous Infusions:sodium bicarbonate 8.4 % 75 mEq in sodium chloride 0.45 % 1,000 mL infusion (less than/equal to 100 mEq), 75 mEq, Last Rate: 75 mEq (25 1034)      PRN Meds:  senna-docusate sodium **AND** polyethylene glycol **AND** bisacodyl **AND** bisacodyl    dextrose    dextrose    glucagon (human recombinant)    Lidocaine HCl gel    nitroglycerin    [COMPLETED] Insert Peripheral IV **AND** sodium chloride    sodium chloride    sodium chloride         Data Review:           "                                                                 Labs reviewed        Imaging:                                                                           Radiology reviewed           ASSESSMENT:                                                                                Acute renal failure         BRIGIDO with peak serum creatinine 98 mg/dL with baseline creatinine lately around 1.4 to 1.7 mg/dL.  Etiology likely multifactorial with suspected ATN, possibly due to profound volume depletion, relative hypotension, cannot rule out obstructive uropathy.  Excess diuretics may be playing a role.  Noted reassuringly unremarkable urinalysis.  Mild metabolic acidosis  Mild azotemia  Mild hyponatremia  Hyperphosphatemia  CKD stage III      PLAN:                                                                            Stat labs ordered for today.  Remarkable urine output noted.  Hold diuretics and continue IVF's.  BRIGIDO workup reviewed: No hematuria/proteinuria, no hydronephrosis.       Nydia Jara MD  [facility]  4/16/2025  11:23 EDT

## 2025-04-17 LAB
ANION GAP SERPL CALCULATED.3IONS-SCNC: 14.6 MMOL/L (ref 5–15)
BUN SERPL-MCNC: 71 MG/DL (ref 8–23)
BUN/CREAT SERPL: 11.1 (ref 7–25)
CALCIUM SPEC-SCNC: 8.2 MG/DL (ref 8.6–10.5)
CHLORIDE SERPL-SCNC: 103 MMOL/L (ref 98–107)
CO2 SERPL-SCNC: 23.4 MMOL/L (ref 22–29)
CREAT SERPL-MCNC: 6.37 MG/DL (ref 0.76–1.27)
EGFRCR SERPLBLD CKD-EPI 2021: 8.5 ML/MIN/1.73
GLUCOSE BLDC GLUCOMTR-MCNC: 126 MG/DL (ref 70–105)
GLUCOSE BLDC GLUCOMTR-MCNC: 128 MG/DL (ref 70–105)
GLUCOSE BLDC GLUCOMTR-MCNC: 145 MG/DL (ref 70–105)
GLUCOSE BLDC GLUCOMTR-MCNC: 195 MG/DL (ref 70–105)
GLUCOSE SERPL-MCNC: 212 MG/DL (ref 65–99)
PHOSPHATE SERPL-MCNC: 6.4 MG/DL (ref 2.5–4.5)
POTASSIUM SERPL-SCNC: 3.7 MMOL/L (ref 3.5–5.2)
SODIUM SERPL-SCNC: 141 MMOL/L (ref 136–145)
WHOLE BLOOD HOLD SPECIMEN: NORMAL

## 2025-04-17 PROCEDURE — 82948 REAGENT STRIP/BLOOD GLUCOSE: CPT | Performed by: STUDENT IN AN ORGANIZED HEALTH CARE EDUCATION/TRAINING PROGRAM

## 2025-04-17 PROCEDURE — 25010000002 HEPARIN (PORCINE) PER 1000 UNITS: Performed by: FAMILY MEDICINE

## 2025-04-17 PROCEDURE — 97112 NEUROMUSCULAR REEDUCATION: CPT

## 2025-04-17 PROCEDURE — 84100 ASSAY OF PHOSPHORUS: CPT | Performed by: STUDENT IN AN ORGANIZED HEALTH CARE EDUCATION/TRAINING PROGRAM

## 2025-04-17 PROCEDURE — 63710000001 INSULIN LISPRO (HUMAN) PER 5 UNITS: Performed by: STUDENT IN AN ORGANIZED HEALTH CARE EDUCATION/TRAINING PROGRAM

## 2025-04-17 PROCEDURE — 80048 BASIC METABOLIC PNL TOTAL CA: CPT | Performed by: STUDENT IN AN ORGANIZED HEALTH CARE EDUCATION/TRAINING PROGRAM

## 2025-04-17 PROCEDURE — 97530 THERAPEUTIC ACTIVITIES: CPT

## 2025-04-17 PROCEDURE — 82948 REAGENT STRIP/BLOOD GLUCOSE: CPT

## 2025-04-17 RX ORDER — CLOPIDOGREL BISULFATE 75 MG/1
75 TABLET ORAL DAILY
Status: DISCONTINUED | OUTPATIENT
Start: 2025-04-17 | End: 2025-04-21 | Stop reason: HOSPADM

## 2025-04-17 RX ORDER — ROSUVASTATIN CALCIUM 10 MG/1
40 TABLET, COATED ORAL NIGHTLY
Status: DISCONTINUED | OUTPATIENT
Start: 2025-04-17 | End: 2025-04-17

## 2025-04-17 RX ORDER — ATORVASTATIN CALCIUM 40 MG/1
80 TABLET, FILM COATED ORAL DAILY
Status: DISCONTINUED | OUTPATIENT
Start: 2025-04-17 | End: 2025-04-17

## 2025-04-17 RX ORDER — ROSUVASTATIN CALCIUM 10 MG/1
10 TABLET, COATED ORAL NIGHTLY
Status: DISCONTINUED | OUTPATIENT
Start: 2025-04-17 | End: 2025-04-21 | Stop reason: HOSPADM

## 2025-04-17 RX ADMIN — SODIUM CHLORIDE 75 MEQ: 450 INJECTION, SOLUTION INTRAVENOUS at 23:08

## 2025-04-17 RX ADMIN — HEPARIN SODIUM 5000 UNITS: 5000 INJECTION INTRAVENOUS; SUBCUTANEOUS at 05:10

## 2025-04-17 RX ADMIN — INSULIN LISPRO 2 UNITS: 100 INJECTION, SOLUTION INTRAVENOUS; SUBCUTANEOUS at 12:39

## 2025-04-17 RX ADMIN — ROSUVASTATIN CALCIUM 10 MG: 10 TABLET, FILM COATED ORAL at 20:43

## 2025-04-17 RX ADMIN — Medication 10 ML: at 09:14

## 2025-04-17 RX ADMIN — SEVELAMER CARBONATE 800 MG: 800 TABLET, FILM COATED ORAL at 09:13

## 2025-04-17 RX ADMIN — HEPARIN SODIUM 5000 UNITS: 5000 INJECTION INTRAVENOUS; SUBCUTANEOUS at 20:44

## 2025-04-17 RX ADMIN — SEVELAMER CARBONATE 800 MG: 800 TABLET, FILM COATED ORAL at 17:06

## 2025-04-17 RX ADMIN — Medication 10 ML: at 20:44

## 2025-04-17 RX ADMIN — SODIUM CHLORIDE 75 MEQ: 450 INJECTION, SOLUTION INTRAVENOUS at 12:40

## 2025-04-17 RX ADMIN — HEPARIN SODIUM 5000 UNITS: 5000 INJECTION INTRAVENOUS; SUBCUTANEOUS at 15:39

## 2025-04-17 RX ADMIN — CLOPIDOGREL BISULFATE 75 MG: 75 TABLET, FILM COATED ORAL at 15:39

## 2025-04-17 RX ADMIN — SEVELAMER CARBONATE 800 MG: 800 TABLET, FILM COATED ORAL at 12:40

## 2025-04-17 NOTE — PLAN OF CARE
Assessment: Blas Mojica presents with functional mobility impairments which indicate the need for skilled intervention. Tolerating session today without incident.  Pt progressed with mobility including transfer bed to chair; pt with posterior lean in sitting and standing and verbalizes fear of falling throughout; Ax2 with SPS transfer for pt safety. Will continue to follow and progress as tolerated.     Plan/Recommendations:   If medically appropriate, High Intensity Therapy recommended post-acute care. This is recommended as therapy feels the patient would require 5-6 days per week, 2-3 hours per day. At this time, inpatient rehabilitation (acute rehab) would be the first choice and SNF would be second. Pt requires no DME at discharge.     Pt desires Inpatient Rehabilitation placement at discharge. Pt cooperative; agreeable to therapeutic recommendations and plan of care.

## 2025-04-17 NOTE — PLAN OF CARE
Goal Outcome Evaluation:  Plan of Care Reviewed With: patient        Progress: improving  Outcome Evaluation: Pt resting in bed/ A/o x 4 able to make needs known.  Pt remains a falls risk with bed alarm in use. Pt able to assist with bed mobility and weight shifting.  Will continue with current orders and care plans

## 2025-04-17 NOTE — CASE MANAGEMENT/SOCIAL WORK
Continued Stay Note  [facility]IELY Real     Patient Name: Blas Mojica  MRN: [id number]  Today's Date: 4/17/2025    Admit Date: 4/14/2025    Plan: Return to [facility]. No precert or PASRR required.   Discharge Plan       Row Name 04/17/25 1334       Plan    Patient/Family in Agreement with Plan yes    Plan Comments DC Barriers: Bicarb gtt, BUN 71, Cr 6.37, elevated blood sugars, Neph/Diabetes Educator following             Expected Discharge Date and Time       Expected Discharge Date Expected Discharge Time    Apr 19, 2025        Katie Bull RN    [phone]  [email or web address]

## 2025-04-17 NOTE — THERAPY TREATMENT NOTE
Subjective: Pt agreeable to therapeutic plan of care.    Objective:     Precautions - falls, very [place].    Bed mobility - Max-A; MOD A for sitting balance EOB with cues for forward reach to promote anterior weight shift.   Transfers -  MAX A + MIN A from second person for safety, Rwx; cues for upright posture and LE advancement.   Ambulation -  N/A or Not attempted.    Vitals: WNL    Pain: 4 VAS Location: generalized  Intervention for pain: Repositioned and Therapeutic Presence    Education: Provided education on the importance of mobility in the acute care setting, Verbal/Tactile Cues, Transfer Training, and Energy conservation strategies    Assessment: Blas Mojica presents with functional mobility impairments which indicate the need for skilled intervention. Tolerating session today without incident.  Pt progressed with mobility including transfer bed to chair; pt with posterior lean in sitting and standing and verbalizes fear of falling throughout; Ax2 with SPS transfer for pt safety. Will continue to follow and progress as tolerated.     Plan/Recommendations:   If medically appropriate, High Intensity Therapy recommended post-acute care. This is recommended as therapy feels the patient would require 5-6 days per week, 2-3 hours per day. At this time, inpatient rehabilitation (acute rehab) would be the first choice and SNF would be second. Pt requires no DME at discharge.     Pt desires Inpatient Rehabilitation placement at discharge. Pt cooperative; agreeable to therapeutic recommendations and plan of care.       Post-Tx Position: Up in Chair, Alarms activated, and Call light and personal items within reach  PPE: gloves, surgical mask, and gown    Therapy Charges for Today       Code Description Service Date Service Provider Modifiers Qty    13484083384 HC PT THERAPEUTIC ACT EA 15 MIN 4/17/2025 Lakesha Marshall GP 1    [id number]  PT NEUROMUSC RE EDUCATION EA 15 MIN 4/17/2025 Lakesha Marshall GP 1            PT Charges       Row Name 04/17/25 1320             Time Calculation    Start Time 1026  -JV      Stop Time 1044  -JV      Time Calculation (min) 18 min  -JV      PT Received On 04/17/25  -JV      PT - Next Appointment 04/18/25  -JARTUR         Time Calculation- PT    Total Timed Code Minutes- PT 18 minute(s)  -JV                User Key  (r) = Recorded By, (t) = Taken By, (c) = Cosigned By      Initials Name Provider Type    Lakesha Barnes Physical Therapist

## 2025-04-17 NOTE — PROGRESS NOTES
"      FOLLOW UP NOTE      Name: Blas Mojica ADMIT: 2025   : 1950  PCP: Sahil Read MD    MRN: [id number] LOS: 3 days   AGE/SEX: 74 y.o. male  ROOM: [facility]     Date of Service: 2025                           CHIEF COMPLIANTS / REASON FOR FOLLOW UP                Subjective:        OOB to chair.  Feels better no new complaints.  Good urine output.     Review of Systems:       Negative except as above       OBJECTIVE                                                                        Exam:  /66 (BP Location: Right arm, Patient Position: Lying)   Pulse 87   Temp 98.1 °F (36.7 °C) (Oral)   Resp 16   Ht 177.8 cm (70\")   Wt 86.6 kg (191 lb)   SpO2 97%   BMI 27.41 kg/m²   Intake/Output last 3 shifts:  I/O last 3 completed shifts:  In: 7392 [P.O.:1440; I.V.:5952]  Out: 3350 [Urine:2900; Other:450]  Intake/Output this shift:  I/O this shift:  In: 1291 [I.V.:1291]  Out: 700 [Urine:700]    General Appearance: Resting flat in bed in no acute distress     Lungs:   Clear to auscultation bilaterally, respirations unlabored  Heart: RRR  Abdomen:  Soft, nontender percutaneous cholecystostomy tube note draining bilious fluid.  Extremities:  no edema  Neurologic:   Alert and oriented    Scheduled Meds:heparin (porcine), 5,000 Units, Subcutaneous, Q8H  insulin lispro, 2-7 Units, Subcutaneous, 4x Daily AC & at Bedtime  sevelamer, 800 mg, Oral, TID With Meals  sodium chloride, 10 mL, Intravenous, Q12H      Continuous Infusions:sodium bicarbonate 8.4 % 75 mEq in sodium chloride 0.45 % 1,000 mL infusion (less than/equal to 100 mEq), 75 mEq, Last Rate: 75 mEq (25)      PRN Meds:  senna-docusate sodium **AND** polyethylene glycol **AND** bisacodyl **AND** bisacodyl    dextrose    dextrose    glucagon (human recombinant)    Lidocaine HCl gel    nitroglycerin    [COMPLETED] Insert Peripheral IV **AND** sodium chloride    sodium chloride    sodium chloride         Data Review:                "                                                            Labs reviewed        Imaging:                                                                           Radiology reviewed           ASSESSMENT:                                                                                Acute renal failure         BRIGIDO with peak serum creatinine 98 mg/dL with baseline creatinine lately around 1.4 to 1.7 mg/dL.  Etiology likely multifactorial with suspected ATN, possibly due to profound volume depletion, relative hypotension, cannot rule out obstructive uropathy.  Excess diuretics may be playing a role.  Noted reassuringly unremarkable urinalysis.  Mild metabolic acidosis  Mild azotemia  Mild hyponatremia  Hyperphosphatemia  CKD stage III      PLAN:                                                                            Creatinine improving although slowly.  Seems like improving ATN.   Keep IVF's today, likely discontinue tomorrow     Nydia Jara MD  [facility]  4/17/2025  11:36 EDT

## 2025-04-17 NOTE — PROGRESS NOTES
[facility] MEDICINE SERVICE  DAILY PROGRESS NOTE    NAME: Blas Mojica  : 1950  MRN: [id number]      LOS: 3 days     PROVIDER OF SERVICE: Alysha Ramos MD    Chief Complaint: Acute renal failure    Subjective:     Interval History:    Patient seen and evaluated at bedside. No new complaints. Renal function with minimal improvement. Nephrology following.    Treatment plan discussed with patient. All questions addressed.     Review of Systems:   Denies fevers, chills  Denies chest pain, edema  Denies shortness of breath, cough  Denies nausea, vomiting, diarrhea  Denies dysuria, hematuria    Objective:     Vital Signs  Temp:  [98 °F (36.7 °C)-99.1 °F (37.3 °C)] 98.1 °F (36.7 °C)  Heart Rate:  [87-95] 88  Resp:  [16-19] 18  BP: (114-132)/(58-70) 118/60   Body mass index is 27.41 kg/m².    Physical Exam   General: No acute distress, alert and oriented, [place]  CV: RRR, no peripheral edema  Pulm: CTAB, no increased work of breathing  Abd: Soft, nontender, nondistended  Skin: No rashes or lesions on exposed skin  Psych: Appropriate mood and affect    Scheduled Meds   heparin (porcine), 5,000 Units, Subcutaneous, Q8H  insulin lispro, 2-7 Units, Subcutaneous, 4x Daily AC & at Bedtime  sevelamer, 800 mg, Oral, TID With Meals  sodium chloride, 10 mL, Intravenous, Q12H       PRN Meds     senna-docusate sodium **AND** polyethylene glycol **AND** bisacodyl **AND** bisacodyl    dextrose    dextrose    glucagon (human recombinant)    Lidocaine HCl gel    nitroglycerin    [COMPLETED] Insert Peripheral IV **AND** sodium chloride    sodium chloride    sodium chloride   Infusions  sodium bicarbonate 8.4 % 75 mEq in sodium chloride 0.45 % 1,000 mL infusion (less than/equal to 100 mEq), 75 mEq, Last Rate: 75 mEq (25)          Diagnostic Data    Results from last 7 days   Lab Units 25  2357 25  1151 04/15/25  0442   WBC 10*3/mm3  --   --  5.70   HEMOGLOBIN g/dL  --   --  9.3*   HEMATOCRIT %  --    --  29.2*   PLATELETS 10*3/mm3  --   --  127*   GLUCOSE mg/dL 212*   < > 102*   CREATININE mg/dL 6.37*   < > 7.74*   BUN mg/dL 71*   < > 72*   SODIUM mmol/L 141   < > 141   POTASSIUM mmol/L 3.7   < > 4.4   AST (SGOT) U/L  --   --  22   ALT (SGPT) U/L  --   --  22   ALK PHOS U/L  --   --  95   BILIRUBIN mg/dL  --   --  0.4   ANION GAP mmol/L 14.6   < > 16.0*    < > = values in this interval not displayed.       US Renal Bilateral  Result Date: 4/15/2025  Impression: Bilateral anechoic renal cysts. Electronically Signed: Kristin Rowland MD  4/15/2025 3:36 PM EDT  Workstation ID: [id number]      Interval results reviewed.    Assessment/Plan:     Acute renal failure  Chronic kidney disease  Metabolic acidosis  Hyponatremia  Hyperphosphatemia    PLAN:  Nephrology consulted, appreciate recs.   Fluids per nephrology, avoid nephrotoxins.   Repeat bmp in morning to monitor renal function.    Treatment plan discussed with nursing staff, case management and pharmacy.     VTE Prophylaxis:  Pharmacologic & mechanical VTE prophylaxis orders are present.    Code status is   Code Status and Medical Interventions: CPR (Attempt to Resuscitate); Full Support   Ordered at: 04/14/25 2340     Code Status (Patient has no pulse and is not breathing):    CPR (Attempt to Resuscitate)     Medical Interventions (Patient has pulse or is breathing):    Full Support       Plan for disposition: Pending clinical course    Barriers to discharge: Renal dysfunction, nephrology following    Time: 35+ minutes     Signature: Electronically signed by Alysha Ramos MD, 04/17/25, 07:48 EDT.  [facility] Hospitalist Team

## 2025-04-17 NOTE — PROGRESS NOTES
Blas Mojica is a 74 y.o. male admitted with acute renal failure.     Recent Labs     04/14/25  1540 04/14/25  1924 04/15/25  0442 04/16/25  1151 04/16/25  2357   CREATININE 8.03* 8.04* 7.74* 6.68* 6.37*   BUN 73* 74* 72* 66* 71*   * 134* 141 138 141   K 4.6 4.6 4.4 3.7 3.7    102 106 102 103   CO2 13.9* 17.7* 19.0* 23.0 23.4     Estimated Creatinine Clearance: 12.3 mL/min (A) (by C-G formula based on SCr of 6.37 mg/dL (H)).    Assessment/Plan  Rosuvastatin renally adjusted from 40 mg qhs to 10 mg qhs per the Adult Renal Dosing of Medication policy for estCrCl < 30 mL/min    aJck Acevedo, [facility]  4/17/2025

## 2025-04-18 LAB
ANION GAP SERPL CALCULATED.3IONS-SCNC: 14.6 MMOL/L (ref 5–15)
BUN SERPL-MCNC: 58 MG/DL (ref 8–23)
BUN/CREAT SERPL: 10.7 (ref 7–25)
CALCIUM SPEC-SCNC: 8.5 MG/DL (ref 8.6–10.5)
CHLORIDE SERPL-SCNC: 100 MMOL/L (ref 98–107)
CO2 SERPL-SCNC: 26.4 MMOL/L (ref 22–29)
CREAT SERPL-MCNC: 5.43 MG/DL (ref 0.76–1.27)
EGFRCR SERPLBLD CKD-EPI 2021: 10.3 ML/MIN/1.73
GLUCOSE BLDC GLUCOMTR-MCNC: 140 MG/DL (ref 70–105)
GLUCOSE BLDC GLUCOMTR-MCNC: 143 MG/DL (ref 70–105)
GLUCOSE BLDC GLUCOMTR-MCNC: 179 MG/DL (ref 70–105)
GLUCOSE BLDC GLUCOMTR-MCNC: 226 MG/DL (ref 70–105)
GLUCOSE SERPL-MCNC: 215 MG/DL (ref 65–99)
PHOSPHATE SERPL-MCNC: 5.7 MG/DL (ref 2.5–4.5)
POTASSIUM SERPL-SCNC: 3.5 MMOL/L (ref 3.5–5.2)
SODIUM SERPL-SCNC: 141 MMOL/L (ref 136–145)
WHOLE BLOOD HOLD SPECIMEN: NORMAL

## 2025-04-18 PROCEDURE — 25010000002 HEPARIN (PORCINE) PER 1000 UNITS: Performed by: FAMILY MEDICINE

## 2025-04-18 PROCEDURE — 63710000001 INSULIN LISPRO (HUMAN) PER 5 UNITS: Performed by: STUDENT IN AN ORGANIZED HEALTH CARE EDUCATION/TRAINING PROGRAM

## 2025-04-18 PROCEDURE — 97110 THERAPEUTIC EXERCISES: CPT

## 2025-04-18 PROCEDURE — 82948 REAGENT STRIP/BLOOD GLUCOSE: CPT

## 2025-04-18 PROCEDURE — 97112 NEUROMUSCULAR REEDUCATION: CPT

## 2025-04-18 PROCEDURE — 82948 REAGENT STRIP/BLOOD GLUCOSE: CPT | Performed by: STUDENT IN AN ORGANIZED HEALTH CARE EDUCATION/TRAINING PROGRAM

## 2025-04-18 PROCEDURE — 97530 THERAPEUTIC ACTIVITIES: CPT

## 2025-04-18 RX ADMIN — SEVELAMER CARBONATE 800 MG: 800 TABLET, FILM COATED ORAL at 08:52

## 2025-04-18 RX ADMIN — CLOPIDOGREL BISULFATE 75 MG: 75 TABLET, FILM COATED ORAL at 08:52

## 2025-04-18 RX ADMIN — HEPARIN SODIUM 5000 UNITS: 5000 INJECTION INTRAVENOUS; SUBCUTANEOUS at 21:27

## 2025-04-18 RX ADMIN — HEPARIN SODIUM 5000 UNITS: 5000 INJECTION INTRAVENOUS; SUBCUTANEOUS at 13:17

## 2025-04-18 RX ADMIN — SEVELAMER CARBONATE 800 MG: 800 TABLET, FILM COATED ORAL at 17:23

## 2025-04-18 RX ADMIN — ROSUVASTATIN CALCIUM 10 MG: 10 TABLET, FILM COATED ORAL at 21:27

## 2025-04-18 RX ADMIN — Medication 10 ML: at 21:00

## 2025-04-18 RX ADMIN — Medication 10 ML: at 08:52

## 2025-04-18 RX ADMIN — HEPARIN SODIUM 5000 UNITS: 5000 INJECTION INTRAVENOUS; SUBCUTANEOUS at 05:00

## 2025-04-18 RX ADMIN — INSULIN LISPRO 2 UNITS: 100 INJECTION, SOLUTION INTRAVENOUS; SUBCUTANEOUS at 12:42

## 2025-04-18 RX ADMIN — SEVELAMER CARBONATE 800 MG: 800 TABLET, FILM COATED ORAL at 12:43

## 2025-04-18 RX ADMIN — INSULIN LISPRO 3 UNITS: 100 INJECTION, SOLUTION INTRAVENOUS; SUBCUTANEOUS at 21:27

## 2025-04-18 NOTE — PROGRESS NOTES
[facility] MEDICINE SERVICE  DAILY PROGRESS NOTE    NAME: Blas Mojica  : 1950  MRN: [id number]      LOS: 4 days     PROVIDER OF SERVICE: Daniel Nelson MD    Chief Complaint: Acute renal failure    Subjective:     Interval History:  History taken from: patient    No new complaints. No overnight events.        Review of Systems:   Review of Systems  Neg cp,sob,fever,chills. Abd symptoms  Objective:     Vital Signs  Temp:  [97.3 °F (36.3 °C)-98.7 °F (37.1 °C)] 98.5 °F (36.9 °C)  Heart Rate:  [79-89] 87  Resp:  [16-20] 19  BP: (114-131)/(59-70) 114/60  Flow (L/min) (Oxygen Therapy):  [1] 1   Body mass index is 27.41 kg/m².    Physical Exam  Physical Exam  Constitutional:       General: He is not in acute distress.     Appearance: Normal appearance.   HENT:      Head: Normocephalic and atraumatic.      Nose: Nose normal.      Mouth/Throat:      Mouth: Mucous membranes are moist.   Eyes:      Extraocular Movements: Extraocular movements intact.      Conjunctiva/sclera: Conjunctivae normal.   Cardiovascular:      Rate and Rhythm: Normal rate and regular rhythm.   Pulmonary:      Effort: Pulmonary effort is normal.      Breath sounds: Normal breath sounds.   Abdominal:      General: Abdomen is flat. Bowel sounds are normal.      Palpations: Abdomen is soft.   Musculoskeletal:         General: Normal range of motion.      Cervical back: Normal range of motion and neck supple.   Skin:     General: Skin is warm and dry.   Neurological:      General: No focal deficit present.      Mental Status: He is alert.   Psychiatric:         Mood and Affect: Mood normal.            Diagnostic Data    Results from last 7 days   Lab Units 25  2319 25  1151 04/15/25  0442   WBC 10*3/mm3  --   --  5.70   HEMOGLOBIN g/dL  --   --  9.3*   HEMATOCRIT %  --   --  29.2*   PLATELETS 10*3/mm3  --   --  127*   GLUCOSE mg/dL 215*   < > 102*   CREATININE mg/dL 5.43*   < > 7.74*   BUN mg/dL 58*   < > 72*   SODIUM  mmol/L 141   < > 141   POTASSIUM mmol/L 3.5   < > 4.4   AST (SGOT) U/L  --   --  22   ALT (SGPT) U/L  --   --  22   ALK PHOS U/L  --   --  95   BILIRUBIN mg/dL  --   --  0.4   ANION GAP mmol/L 14.6   < > 16.0*    < > = values in this interval not displayed.       No radiology results for the last day      I reviewed the patient's new clinical results.    Assessment/Plan:     Active and Resolved Problems  Active Hospital Problems    Diagnosis  POA    **Acute renal failure [N17.9]  Yes      Resolved Hospital Problems   No resolved problems to display.           VTE Prophylaxis:  Pharmacologic & mechanical VTE prophylaxis orders are present.       Acute renal failure  Chronic kidney disease  Metabolic acidosis  Hyponatremia  Hyperphosphatemia     Nephrology following  Mulifactorial- AtN +/- vol depletion, ?obstructive uropathy   Improved today  Hyponatremia resolved      Disposition Planning:     Barriers to Discharge:clinical improvement  Anticipated Date of Discharge: 2 days  Place of Discharge: [facility]      Time: 35 minutes     Code Status and Medical Interventions: CPR (Attempt to Resuscitate); Full Support   Ordered at: 04/14/25 2340     Code Status (Patient has no pulse and is not breathing):    CPR (Attempt to Resuscitate)     Medical Interventions (Patient has pulse or is breathing):    Full Support       Signature: Electronically signed by Daniel Nelson MD, 04/18/25, 10:54 EDT.  [facility] Hospitalist Team

## 2025-04-18 NOTE — CASE MANAGEMENT/SOCIAL WORK
Continued Stay Note  MARIELY Real     Patient Name: Blas Mojica  MRN: [id number]  Today's Date: 4/18/2025    Admit Date: 4/14/2025    Plan: Return to [facility]. No precert or PASRR required.   Discharge Plan       Row Name 04/18/25 1418       Plan    Plan Comments DC Barriers: Diabetes Educator/Neph following, Bicarb gtt, BUN 58/Cr 5.43 still elevated, elevated blood sugars, 1L NC             Expected Discharge Date and Time       Expected Discharge Date Expected Discharge Time    Apr 20, 2025        Katie Bull RN     [phone]  [email or web address]

## 2025-04-18 NOTE — NURSING NOTE
Pt was having periods of apnea during sleep dropping his O2 sat to 79%.  Pt placed on O2 via N/C at 1 lpm increasing his O2 sat to WNL.

## 2025-04-18 NOTE — PLAN OF CARE
Goal Outcome Evaluation: Plan is for patient to return to [facility] when medically stable.

## 2025-04-18 NOTE — THERAPY TREATMENT NOTE
Subjective: Pt agreeable to therapeutic plan of care. Pt agreed to get into chair with some encouragement    Objective:     Precautions - falls, GB drain    Bed mobility - Mod-A  Transfers - Min-A and with rolling walker  Ambulation -  feet N/A or Not attempted.    Therapeutic Exercise - 10 Reps B LE AROM supported sitting / chair    Vitals: WNL on 1L O2    Pain:   Location: hurts all over  Intervention for pain: Repositioned, Increased Activity, and Therapeutic Presence    Education: Provided education on the importance of mobility in the acute care setting, Verbal/Tactile Cues, and Transfer Training    Assessment: Blas Mojica presents with functional mobility impairments which indicate the need for skilled intervention. Tolerating session today without incident. Pt req only 1 assist mostly for bed mobility/ transfers. Able to stand x 2mins and mip some before fatigued. Plans on acute rehab at [place].Will continue to follow and progress as tolerated.     Plan/Recommendations:   If medically appropriate, High Intensity Therapy recommended post-acute care. This is recommended as therapy feels the patient would require 5-6 days per week, 2-3 hours per day. At this time, inpatient rehabilitation (acute rehab) would be the first choice and SNF would be second. Pt requires no DME at discharge.     Pt desires Inpatient Rehabilitation placement at discharge. Pt cooperative; agreeable to therapeutic recommendations and plan of care.         Basic Mobility 6-click:  Rollin = Total, A lot = 2, A little = 3; 4 = None  Supine>Sit:   1 = Total, A lot = 2, A little = 3; 4 = None   Sit>Stand with arms:  1 = Total, A lot = 2, A little = 3; 4 = None  Bed>Chair:   1 = Total, A lot = 2, A little = 3; 4 = None  Ambulate in room:  1 = Total, A lot = 2, A little = 3; 4 = None  3-5 Steps with railin = Total, A lot = 2, A little = 3; 4 = None  Score: 13    Post-Tx Position: Up in Chair, Alarms activated, and Call light  and personal items within reach  PPE: gloves    Therapy Charges for Today       Code Description Service Date Service Provider Modifiers Qty    78456084137 HC PT NEUROMUSC RE EDUCATION EA 15 MIN 4/18/2025 Emerald Jay, PTA GP 1    35278673866 HC PT THERAPEUTIC ACT EA 15 MIN 4/18/2025 Emerald Jay, PTA GP 1    69563727019 HC PT THER PROC EA 15 MIN 4/18/2025 Emerald Jay, TRENT GP 1           PT Charges       Row Name 04/18/25 1136             Time Calculation    Start Time 1105  -      Stop Time 1135  -      Time Calculation (min) 30 min  -      PT Received On 04/18/25  -      PT - Next Appointment 04/20/25  -         Time Calculation- PT    Total Timed Code Minutes- PT 30 minute(s)  -                User Key  (r) = Recorded By, (t) = Taken By, (c) = Cosigned By      Initials Name Provider Type     Emerald Jay PTA Physical Therapist Assistant

## 2025-04-18 NOTE — PROGRESS NOTES
"      FOLLOW UP NOTE      Name: Blas Mojica ADMIT: 2025   : 1950  PCP: Sahil Read MD    MRN: [id number] LOS: 4 days   AGE/SEX: 74 y.o. male  ROOM: [facility]     Date of Service: 2025                           CHIEF COMPLIANTS / REASON FOR FOLLOW UP                Subjective:        Resting in bed.  Preparing to work with PT.  Urine output 2.8 L.     Review of Systems:       Negative except as above       OBJECTIVE                                                                        Exam:  /60 (BP Location: Right arm, Patient Position: Lying)   Pulse 87   Temp 98.5 °F (36.9 °C) (Oral)   Resp 19   Ht 177.8 cm (70\")   Wt 86.6 kg (191 lb)   SpO2 98%   BMI 27.41 kg/m²   Intake/Output last 3 shifts:  I/O last 3 completed shifts:  In: 4411 [P.O.:1220; I.V.:3191]  Out: 3800 [Urine:3300; Other:500]  Intake/Output this shift:  I/O this shift:  In: 2672 [P.O.:720; I.V.:1952]  Out: -     General Appearance: Resting flat in bed in no acute distress     Lungs:   Clear to auscultation bilaterally, respirations unlabored  Heart: RRR  Abdomen:  Soft, nontender percutaneous cholecystostomy tube note draining bilious fluid.  Extremities:   Trace dependent edema  Neurologic:   Alert and oriented    Scheduled Meds:clopidogrel, 75 mg, Oral, Daily  heparin (porcine), 5,000 Units, Subcutaneous, Q8H  insulin lispro, 2-7 Units, Subcutaneous, 4x Daily AC & at Bedtime  rosuvastatin, 10 mg, Oral, Nightly  sevelamer, 800 mg, Oral, TID With Meals  sodium chloride, 10 mL, Intravenous, Q12H      Continuous Infusions:     PRN Meds:  senna-docusate sodium **AND** polyethylene glycol **AND** bisacodyl **AND** bisacodyl    dextrose    dextrose    glucagon (human recombinant)    Lidocaine HCl gel    nitroglycerin    [COMPLETED] Insert Peripheral IV **AND** sodium chloride    sodium chloride    sodium chloride         Data Review:                                                                           Labs " reviewed        Imaging:                                                                           Radiology reviewed           ASSESSMENT:                                                                                Acute renal failure         BRIGIDO with peak serum creatinine 98 mg/dL with baseline creatinine lately around 1.4 to 1.7 mg/dL.  Etiology likely multifactorial with suspected ATN, possibly due to profound volume depletion, relative hypotension, cannot rule out obstructive uropathy.  Excess diuretics may be playing a role.  Noted reassuringly unremarkable urinalysis.  Mild metabolic acidosis  Mild azotemia  Mild hyponatremia  Hyperphosphatemia  CKD stage III      PLAN:                                                                            Creatinine continuing to show slow improvement with good urine output.  Hold IVF's and encourage p.o. intake.  Follow-up phosphorus level.  Agree with PT/OT.      Nydia Jara MD  [facility]  4/18/2025  11:51 EDT

## 2025-04-18 NOTE — PLAN OF CARE
Goal Outcome Evaluation:  Plan of Care Reviewed With: patient        Progress: improving  Outcome Evaluation: Pt resting in bed.  A/O x 4 able to make needs known.  Pt remains a falls risk with bed alarm in use.  Pt remains a Q 2 turn based on mouna scale but has somewhat bed mobility and periodoically refuses turns.  Pt had two episodes of Oxygen desating while sleeping but quickly returns WNL after waking him up.  Will continue with current orders and care plans

## 2025-04-18 NOTE — PLAN OF CARE
Assessment: Blas Mojica presents with functional mobility impairments which indicate the need for skilled intervention. Tolerating session today without incident. Pt req only 1 assist mostly for bed mobility/ transfers. Able to stand x 2mins and mip some before fatigued. Plans on acute rehab at [place].Will continue to follow and progress as tolerated.

## 2025-04-19 LAB
ANION GAP SERPL CALCULATED.3IONS-SCNC: 10.9 MMOL/L (ref 5–15)
BUN SERPL-MCNC: 52 MG/DL (ref 8–23)
BUN/CREAT SERPL: 11.5 (ref 7–25)
CALCIUM SPEC-SCNC: 7.4 MG/DL (ref 8.6–10.5)
CHLORIDE SERPL-SCNC: 100 MMOL/L (ref 98–107)
CO2 SERPL-SCNC: 27.1 MMOL/L (ref 22–29)
CREAT SERPL-MCNC: 4.54 MG/DL (ref 0.76–1.27)
EGFRCR SERPLBLD CKD-EPI 2021: 12.8 ML/MIN/1.73
GLUCOSE BLDC GLUCOMTR-MCNC: 115 MG/DL (ref 70–105)
GLUCOSE BLDC GLUCOMTR-MCNC: 163 MG/DL (ref 70–105)
GLUCOSE BLDC GLUCOMTR-MCNC: 188 MG/DL (ref 70–105)
GLUCOSE BLDC GLUCOMTR-MCNC: 203 MG/DL (ref 70–105)
GLUCOSE SERPL-MCNC: 175 MG/DL (ref 65–99)
PHOSPHATE SERPL-MCNC: 5.1 MG/DL (ref 2.5–4.5)
POTASSIUM SERPL-SCNC: 3.2 MMOL/L (ref 3.5–5.2)
SODIUM SERPL-SCNC: 138 MMOL/L (ref 136–145)

## 2025-04-19 PROCEDURE — 82948 REAGENT STRIP/BLOOD GLUCOSE: CPT

## 2025-04-19 PROCEDURE — 80048 BASIC METABOLIC PNL TOTAL CA: CPT | Performed by: STUDENT IN AN ORGANIZED HEALTH CARE EDUCATION/TRAINING PROGRAM

## 2025-04-19 PROCEDURE — 63710000001 INSULIN LISPRO (HUMAN) PER 5 UNITS: Performed by: STUDENT IN AN ORGANIZED HEALTH CARE EDUCATION/TRAINING PROGRAM

## 2025-04-19 PROCEDURE — 84100 ASSAY OF PHOSPHORUS: CPT | Performed by: STUDENT IN AN ORGANIZED HEALTH CARE EDUCATION/TRAINING PROGRAM

## 2025-04-19 PROCEDURE — 82948 REAGENT STRIP/BLOOD GLUCOSE: CPT | Performed by: STUDENT IN AN ORGANIZED HEALTH CARE EDUCATION/TRAINING PROGRAM

## 2025-04-19 PROCEDURE — 25010000002 HEPARIN (PORCINE) PER 1000 UNITS: Performed by: FAMILY MEDICINE

## 2025-04-19 RX ORDER — POTASSIUM CHLORIDE 1500 MG/1
40 TABLET, EXTENDED RELEASE ORAL ONCE
Status: COMPLETED | OUTPATIENT
Start: 2025-04-19 | End: 2025-04-19

## 2025-04-19 RX ADMIN — SEVELAMER CARBONATE 800 MG: 800 TABLET, FILM COATED ORAL at 17:28

## 2025-04-19 RX ADMIN — POTASSIUM CHLORIDE 40 MEQ: 1500 TABLET, EXTENDED RELEASE ORAL at 08:56

## 2025-04-19 RX ADMIN — SEVELAMER CARBONATE 800 MG: 800 TABLET, FILM COATED ORAL at 11:47

## 2025-04-19 RX ADMIN — HEPARIN SODIUM 5000 UNITS: 5000 INJECTION INTRAVENOUS; SUBCUTANEOUS at 21:09

## 2025-04-19 RX ADMIN — INSULIN LISPRO 2 UNITS: 100 INJECTION, SOLUTION INTRAVENOUS; SUBCUTANEOUS at 17:28

## 2025-04-19 RX ADMIN — CLOPIDOGREL BISULFATE 75 MG: 75 TABLET, FILM COATED ORAL at 08:56

## 2025-04-19 RX ADMIN — Medication 10 ML: at 20:18

## 2025-04-19 RX ADMIN — INSULIN LISPRO 2 UNITS: 100 INJECTION, SOLUTION INTRAVENOUS; SUBCUTANEOUS at 21:08

## 2025-04-19 RX ADMIN — ROSUVASTATIN CALCIUM 10 MG: 10 TABLET, FILM COATED ORAL at 20:16

## 2025-04-19 RX ADMIN — Medication 10 ML: at 08:59

## 2025-04-19 RX ADMIN — SEVELAMER CARBONATE 800 MG: 800 TABLET, FILM COATED ORAL at 08:56

## 2025-04-19 RX ADMIN — INSULIN LISPRO 3 UNITS: 100 INJECTION, SOLUTION INTRAVENOUS; SUBCUTANEOUS at 11:47

## 2025-04-19 RX ADMIN — HEPARIN SODIUM 5000 UNITS: 5000 INJECTION INTRAVENOUS; SUBCUTANEOUS at 17:28

## 2025-04-19 RX ADMIN — HEPARIN SODIUM 5000 UNITS: 5000 INJECTION INTRAVENOUS; SUBCUTANEOUS at 05:41

## 2025-04-19 NOTE — PROGRESS NOTES
[facility] MEDICINE SERVICE  DAILY PROGRESS NOTE    NAME: Blas Mojica  : 1950  MRN: [id number]      LOS: 5 days     PROVIDER OF SERVICE: Benny Rene MD    Chief Complaint: Acute renal failure    Subjective:     Interval History:  History taken from: patient    Patient doing well today, no nausea/vomiting         Review of Systems:   As above    Objective:     Vital Signs  Temp:  [97.9 °F (36.6 °C)-99 °F (37.2 °C)] 98.1 °F (36.7 °C)  Heart Rate:  [79-95] 83  Resp:  [16-21] 16  BP: ()/(53-67) 112/53  Flow (L/min) (Oxygen Therapy):  [1] 1   Body mass index is 27.41 kg/m².    Physical Exam  AOx3 NAD  RRR S1-S2 audible  Lung CTA  Abdomen soft nontender        Diagnostic Data    Results from last 7 days   Lab Units 25  0033 25  1151 04/15/25  0442   WBC 10*3/mm3  --   --  5.70   HEMOGLOBIN g/dL  --   --  9.3*   HEMATOCRIT %  --   --  29.2*   PLATELETS 10*3/mm3  --   --  127*   GLUCOSE mg/dL 175*   < > 102*   CREATININE mg/dL 4.54*   < > 7.74*   BUN mg/dL 52*   < > 72*   SODIUM mmol/L 138   < > 141   POTASSIUM mmol/L 3.2*   < > 4.4   AST (SGOT) U/L  --   --  22   ALT (SGPT) U/L  --   --  22   ALK PHOS U/L  --   --  95   BILIRUBIN mg/dL  --   --  0.4   ANION GAP mmol/L 10.9   < > 16.0*    < > = values in this interval not displayed.       No radiology results for the last day      I reviewed the patient's new clinical results.    Assessment/Plan:     Active and Resolved Problems  Active Hospital Problems    Diagnosis  POA    **Acute renal failure [N17.9]  Yes      Resolved Hospital Problems   No resolved problems to display.     Acute renal failure  Chronic kidney disease  Metabolic acidosis  Hyponatremia  Hyperphosphatemia     Nephrology following  Mulifactorial- AtN +/- vol depletion, ?obstructive uropathy   Improving   Hyponatremia resolved      VTE Prophylaxis:  Pharmacologic & mechanical VTE prophylaxis orders are present.           Disposition Planning:      Barriers to  Discharge:nephrology clearance, improvement in condition  Anticipated Date of Discharge: 1-2 days  Place of Discharge: [facility]             Time: 35 minutes     Code Status and Medical Interventions: CPR (Attempt to Resuscitate); Full Support   Ordered at: 04/14/25 2340     Code Status (Patient has no pulse and is not breathing):    CPR (Attempt to Resuscitate)     Medical Interventions (Patient has pulse or is breathing):    Full Support       Signature: Electronically signed by Benny Rene MD, 04/19/25, 12:04 EDT.  [facility] Hospitalist Team

## 2025-04-19 NOTE — PLAN OF CARE
Problem: Adult Inpatient Plan of Care  Goal: Absence of Hospital-Acquired Illness or Injury  Outcome: Progressing  Intervention: Identify and Manage Fall Risk  Recent Flowsheet Documentation  Taken 4/19/2025 1000 by Kyler Valladares RN  Safety Promotion/Fall Prevention: safety round/check completed  Intervention: Prevent Skin Injury  Recent Flowsheet Documentation  Taken 4/19/2025 0800 by Kyler Valladares RN  Body Position: supine  Intervention: Prevent and Manage VTE (Venous Thromboembolism) Risk  Recent Flowsheet Documentation  Taken 4/19/2025 0800 by Kyler Valladares RN  VTE Prevention/Management: patient refused intervention  Intervention: Prevent Infection  Recent Flowsheet Documentation  Taken 4/19/2025 1000 by Kyler Valladares RN  Infection Prevention:   hand hygiene promoted   equipment surfaces disinfected   single patient room provided     Problem: Adult Inpatient Plan of Care  Goal: Absence of Hospital-Acquired Illness or Injury  Intervention: Identify and Manage Fall Risk  Recent Flowsheet Documentation  Taken 4/19/2025 1000 by Kyler Valladares RN  Safety Promotion/Fall Prevention: safety round/check completed     Problem: Adult Inpatient Plan of Care  Goal: Absence of Hospital-Acquired Illness or Injury  Intervention: Prevent Skin Injury  Recent Flowsheet Documentation  Taken 4/19/2025 0800 by Kyler Valladares RN  Body Position: supine   Goal Outcome Evaluation:  Plan of Care Reviewed With: patient        Progress: improving

## 2025-04-19 NOTE — PLAN OF CARE
Problem: Adult Inpatient Plan of Care  Goal: Absence of Hospital-Acquired Illness or Injury  Outcome: Progressing  Intervention: Identify and Manage Fall Risk  Recent Flowsheet Documentation  Taken 4/19/2025 0600 by Eusebia Rosario RN  Safety Promotion/Fall Prevention: safety round/check completed  Taken 4/19/2025 0400 by Eusebia Rosario RN  Safety Promotion/Fall Prevention: safety round/check completed  Taken 4/19/2025 0200 by Eusebia Rosario RN  Safety Promotion/Fall Prevention: safety round/check completed  Taken 4/19/2025 0000 by Eusebia Rosario RN  Safety Promotion/Fall Prevention: safety round/check completed  Taken 4/18/2025 2200 by Eusebia Rosario RN  Safety Promotion/Fall Prevention: safety round/check completed  Taken 4/18/2025 2045 by Eusebia Rosario RN  Safety Promotion/Fall Prevention:   nonskid shoes/slippers when out of bed   safety round/check completed   clutter free environment maintained  Intervention: Prevent Infection  Recent Flowsheet Documentation  Taken 4/18/2025 2045 by Eusebia Rosario RN  Infection Prevention:   single patient room provided   hand hygiene promoted   Goal Outcome Evaluation:

## 2025-04-19 NOTE — PROGRESS NOTES
"      FOLLOW UP NOTE      Name: Blas Mojica ADMIT: 2025   : 1950  PCP: Sahil Read MD    MRN: [id number] LOS: 5 days   AGE/SEX: 74 y.o. male  ROOM: [facility]     Date of Service: 2025                           CHIEF COMPLIANTS / REASON FOR FOLLOW UP                Subjective:      Resting in bed.  No new complaints.  Slightly hypotensive with a BP michaelle of 99/58 mmHg.  Urine output 725 mL.     Review of Systems:       Negative except as above       OBJECTIVE                                                                        Exam:  BP 99/58 (BP Location: Right arm, Patient Position: Lying)   Pulse 95   Temp 98.5 °F (36.9 °C) (Oral)   Resp 21   Ht 177.8 cm (70\")   Wt 86.6 kg (191 lb)   SpO2 94%   BMI 27.41 kg/m²   Intake/Output last 3 shifts:  I/O last 3 completed shifts:  In: 4042 [P.O.:1140; I.V.:2902]  Out: 2825 [Urine:2300; Other:525]  Intake/Output this shift:  No intake/output data recorded.    General Appearance: Resting flat in bed in no acute distress     Lungs:   Clear to auscultation bilaterally, respirations unlabored  Heart: RRR  Abdomen:  Soft, nontender percutaneous cholecystostomy tube note draining bilious fluid.  Extremities:   Trace dependent edema  Neurologic:   Alert and oriented    Scheduled Meds:clopidogrel, 75 mg, Oral, Daily  heparin (porcine), 5,000 Units, Subcutaneous, Q8H  insulin lispro, 2-7 Units, Subcutaneous, 4x Daily AC & at Bedtime  rosuvastatin, 10 mg, Oral, Nightly  sevelamer, 800 mg, Oral, TID With Meals  sodium chloride, 10 mL, Intravenous, Q12H      Continuous Infusions:     PRN Meds:  senna-docusate sodium **AND** polyethylene glycol **AND** bisacodyl **AND** bisacodyl    dextrose    dextrose    glucagon (human recombinant)    Lidocaine HCl gel    nitroglycerin    [COMPLETED] Insert Peripheral IV **AND** sodium chloride    sodium chloride    sodium chloride         Data Review:                                                                    "        Labs reviewed        Imaging:                                                                           Radiology reviewed           ASSESSMENT:                                                                                Acute renal failure         BRIGIDO with peak serum creatinine 98 mg/dL with baseline creatinine lately around 1.4 to 1.7 mg/dL.  Etiology likely multifactorial with suspected ATN, possibly due to profound volume depletion, relative hypotension, cannot rule out obstructive uropathy.  Excess diuretics may be playing a role.  Noted reassuringly unremarkable urinalysis.  Mild metabolic acidosis  Mild azotemia  Mild hyponatremia  Hyperphosphatemia  CKD stage III      PLAN:                                                                            Creatinine continues to slowly improve.  Monitor mild hypotension: Start midodrine if needed for MAP over 65 mmHg.  Replace potassium.     Nydia Jara MD  [facility]  4/19/2025  07:47 EDT

## 2025-04-20 LAB
ANION GAP SERPL CALCULATED.3IONS-SCNC: 10.5 MMOL/L (ref 5–15)
BUN SERPL-MCNC: 51 MG/DL (ref 8–23)
BUN/CREAT SERPL: 12.1 (ref 7–25)
CALCIUM SPEC-SCNC: 8.7 MG/DL (ref 8.6–10.5)
CHLORIDE SERPL-SCNC: 104 MMOL/L (ref 98–107)
CO2 SERPL-SCNC: 27.5 MMOL/L (ref 22–29)
CREAT SERPL-MCNC: 4.21 MG/DL (ref 0.76–1.27)
DEPRECATED RDW RBC AUTO: 48.5 FL (ref 37–54)
EGFRCR SERPLBLD CKD-EPI 2021: 14 ML/MIN/1.73
ERYTHROCYTE [DISTWIDTH] IN BLOOD BY AUTOMATED COUNT: 14.2 % (ref 12.3–15.4)
GLUCOSE BLDC GLUCOMTR-MCNC: 110 MG/DL (ref 70–105)
GLUCOSE BLDC GLUCOMTR-MCNC: 154 MG/DL (ref 70–105)
GLUCOSE BLDC GLUCOMTR-MCNC: 194 MG/DL (ref 70–105)
GLUCOSE BLDC GLUCOMTR-MCNC: 198 MG/DL (ref 70–105)
GLUCOSE SERPL-MCNC: 146 MG/DL (ref 65–99)
HCT VFR BLD AUTO: 25.7 % (ref 37.5–51)
HGB BLD-MCNC: 8.2 G/DL (ref 13–17.7)
MCH RBC QN AUTO: 29.5 PG (ref 26.6–33)
MCHC RBC AUTO-ENTMCNC: 31.9 G/DL (ref 31.5–35.7)
MCV RBC AUTO: 92.4 FL (ref 79–97)
PHOSPHATE SERPL-MCNC: 4.3 MG/DL (ref 2.5–4.5)
PLATELET # BLD AUTO: 117 10*3/MM3 (ref 140–450)
PMV BLD AUTO: 10.3 FL (ref 6–12)
POTASSIUM SERPL-SCNC: 3.7 MMOL/L (ref 3.5–5.2)
RBC # BLD AUTO: 2.78 10*6/MM3 (ref 4.14–5.8)
SODIUM SERPL-SCNC: 142 MMOL/L (ref 136–145)
WBC NRBC COR # BLD AUTO: 4.23 10*3/MM3 (ref 3.4–10.8)

## 2025-04-20 PROCEDURE — 84100 ASSAY OF PHOSPHORUS: CPT | Performed by: NURSE PRACTITIONER

## 2025-04-20 PROCEDURE — 85027 COMPLETE CBC AUTOMATED: CPT | Performed by: INTERNAL MEDICINE

## 2025-04-20 PROCEDURE — 25010000002 HEPARIN (PORCINE) PER 1000 UNITS: Performed by: FAMILY MEDICINE

## 2025-04-20 PROCEDURE — 80053 COMPREHEN METABOLIC PANEL: CPT | Performed by: STUDENT IN AN ORGANIZED HEALTH CARE EDUCATION/TRAINING PROGRAM

## 2025-04-20 PROCEDURE — 84100 ASSAY OF PHOSPHORUS: CPT | Performed by: STUDENT IN AN ORGANIZED HEALTH CARE EDUCATION/TRAINING PROGRAM

## 2025-04-20 PROCEDURE — 85027 COMPLETE CBC AUTOMATED: CPT | Performed by: FAMILY MEDICINE

## 2025-04-20 PROCEDURE — 82948 REAGENT STRIP/BLOOD GLUCOSE: CPT | Performed by: STUDENT IN AN ORGANIZED HEALTH CARE EDUCATION/TRAINING PROGRAM

## 2025-04-20 PROCEDURE — 82948 REAGENT STRIP/BLOOD GLUCOSE: CPT

## 2025-04-20 PROCEDURE — 63710000001 INSULIN LISPRO (HUMAN) PER 5 UNITS: Performed by: STUDENT IN AN ORGANIZED HEALTH CARE EDUCATION/TRAINING PROGRAM

## 2025-04-20 RX ADMIN — SEVELAMER CARBONATE 800 MG: 800 TABLET, FILM COATED ORAL at 08:40

## 2025-04-20 RX ADMIN — Medication 10 ML: at 21:14

## 2025-04-20 RX ADMIN — Medication 10 ML: at 08:40

## 2025-04-20 RX ADMIN — SEVELAMER CARBONATE 800 MG: 800 TABLET, FILM COATED ORAL at 12:33

## 2025-04-20 RX ADMIN — SEVELAMER CARBONATE 800 MG: 800 TABLET, FILM COATED ORAL at 17:41

## 2025-04-20 RX ADMIN — INSULIN LISPRO 2 UNITS: 100 INJECTION, SOLUTION INTRAVENOUS; SUBCUTANEOUS at 17:41

## 2025-04-20 RX ADMIN — ROSUVASTATIN CALCIUM 10 MG: 10 TABLET, FILM COATED ORAL at 21:13

## 2025-04-20 RX ADMIN — INSULIN LISPRO 2 UNITS: 100 INJECTION, SOLUTION INTRAVENOUS; SUBCUTANEOUS at 12:33

## 2025-04-20 RX ADMIN — INSULIN LISPRO 2 UNITS: 100 INJECTION, SOLUTION INTRAVENOUS; SUBCUTANEOUS at 21:13

## 2025-04-20 RX ADMIN — CLOPIDOGREL BISULFATE 75 MG: 75 TABLET, FILM COATED ORAL at 08:39

## 2025-04-20 RX ADMIN — HEPARIN SODIUM 5000 UNITS: 5000 INJECTION INTRAVENOUS; SUBCUTANEOUS at 05:45

## 2025-04-20 NOTE — DISCHARGE SUMMARY
"[facility] Medicine Services  Discharge Summary    Date of Service: 2025  Patient Name: Blas Mojica  : 1950  MRN: [id number]    Date of Admission: 2025  Discharge Diagnosis: Acute renal failure  Date of Discharge: 2025  Primary Care Physician: Sahil Read MD      Presenting Problem:   Acute renal failure [N17.9]  Acute renal failure, unspecified acute renal failure type [N17.9]    Active and Resolved Hospital Problems:  Active Hospital Problems    Diagnosis POA    **Acute renal failure [N17.9] Yes      Resolved Hospital Problems   No resolved problems to display.         Hospital Course     HPI: HPI transferred from admission H&P    \"Blas Mojica is a 74 y.o. male with Past medical history of bacteremia in early 2025, was treated with Levaquin for 14 days, discharged to [facility] rehab and came back with abdominal pain found to have acute cholecystitis  status cholecystotomy drain currently in place, type 2 diabetes, hypertension, hyperlipidemia, CVA presented to [facility] from home on 2025 with generalized weakness and fatigue.     Patient currently awake, alert, oriented x3, conversational. Reports generalized fatigue and weakness, able to ambulate with walker but has been feeling worse recently, prompting hospital visit. Denies chest pain, shortness of breath, fever, chills, or dysuria. Has cholecystostomy tube in place draining mostly bilious fluid. Denies NSAID use, confirmed with wife. He takes diuretics daily as per reported by his wife over the phone, denies hx of CHF, unsure of the reason.     On presentation, vital stable, afebrile, CMP remarkable for sodium of 134, bicarb of 13.9, anion gap 19.1, creatinine of 8.03, previously creatinine 1's, consistent with BRIGIDO, AST ALT normal, WBC 5.5, hemoglobin 9.5, chest x-ray showing no acute findings.  UA negative for bacteria, CT abdomen of the pelvis showing mildly enlarged prostate, under " "distended bladder, 2 hyperattenuating left upper pole renal lesions appear similar to the prior measuring 1.5 cm, will further characterize per renal ultrasound on 2025, most likely representing hemorrhagic or proteinaceous cyst, no hydronephrosis noted.     Patient was given liter of normal saline bolus fluids, nephrology was consulted for acute renal failure, further recs to follow, admitted to medicine team further inpatient management.\"    Hospital Course:    Acute renal failure--Mulifactorial- AtN +/- vol depletion, ?obstructive uropathy   Chronic kidney disease  Metabolic acidosis  Hyponatremia  Hyperphosphatemia    Creatinine continues to slowly improve.  Remainder of renal recovery can be followed up as outpatient and deemed safe for dc by nephrologist  Hold diuretics until seen by nephrologist as outpatient   follow-up in CKD clinic in 1  weeks.    Hx of acute cholecystitis-- status cholecystotomy drain currently in place (placed approx. 3 weeks back)  Pt has appt with surgeon on May 5th as outpatient. Please follow up          DISCHARGE Follow Up Recommendations for labs and diagnostics:   Follow-up with nephrologist in 1week of discharge.        Day of Discharge     Vital Signs:  Temp:  [98 °F (36.7 °C)-98.3 °F (36.8 °C)] 98 °F (36.7 °C)  Heart Rate:  [82-88] 82  Resp:  [16-22] 19  BP: (119-135)/(60-75) 121/75    Expand All Collapse All            FOLLOW UP NOTE        Name: Blas Mojica ADMIT: 2025   : 1950  PCP: Sahil Read MD    MRN: [id number] LOS: 6 days   AGE/SEX: 74 y.o. male  ROOM: [facility]      Date of Service: 2025                             CHIEF COMPLIANTS / REASON FOR FOLLOW UP                     Subjective:       Resting in bed, doing well, eating better.  Urine output 2.5 L.     Review of Systems:        Negative except as above        OBJECTIVE                                                                         Exam:  /70 (BP Location: Right arm, " "Patient Position: Lying)   Pulse 82   Temp 98.2 °F (36.8 °C) (Oral)   Resp 16   Ht 177.8 cm (70\")   Wt 86.6 kg (191 lb)   SpO2 93%   BMI 27.41 kg/m²   Intake/Output last 3 shifts:  I/O last 3 completed shifts:  In: 240 [P.O.:240]  Out: 2500 [Urine:2300; Emesis/NG output:200]  Intake/Output this shift:  No intake/output data recorded.     General Appearance: Resting flat in bed in no acute distress     Lungs:   Clear to auscultation bilaterally, respirations unlabored  Heart: RRR  Abdomen:  Soft, nontender percutaneous cholecystostomy tube note draining bilious fluid.  Extremities:   Trace dependent edema  Neurologic:   Alert and oriented         Pertinent  and/or Most Recent Results     LAB RESULTS:      Lab 04/20/25  0228 04/15/25  0442 04/14/25  1924   WBC 4.23 5.70 5.50   HEMOGLOBIN 8.2* 9.3* 9.5*   HEMATOCRIT 25.7* 29.2* 29.6*   PLATELETS 117* 127* 137*   NEUTROS ABS  --  3.48 3.52   IMMATURE GRANS (ABS)  --  0.01 0.01   LYMPHS ABS  --  1.42 1.20   MONOS ABS  --  0.44 0.50   EOS ABS  --  0.33 0.25   MCV 92.4 92.1 91.9         Lab 04/20/25 0228 04/19/25  0033 04/17/25  2319 04/16/25  2357 04/16/25  1151 04/15/25  0442 04/14/25  1924 04/14/25  1924   SODIUM 142 138 141 141 138 141  --  134*   POTASSIUM 3.7 3.2* 3.5 3.7 3.7 4.4  --  4.6   CHLORIDE 104 100 100 103 102 106  --  102   CO2 27.5 27.1 26.4 23.4 23.0 19.0*  --  17.7*   ANION GAP 10.5 10.9 14.6 14.6 13.0 16.0*  --  14.3   BUN 51* 52* 58* 71* 66* 72*  --  74*   CREATININE 4.21* 4.54* 5.43* 6.37* 6.68* 7.74*  --  8.04*   EGFR 14.0* 12.8* 10.3* 8.5* 8.0* 6.7*  --  6.4*   GLUCOSE 146* 175* 215* 212* 200* 102*  --  175*   CALCIUM 8.7 7.4* 8.5* 8.2* 8.3* 8.8  --  8.9   MAGNESIUM  --   --   --   --   --  2.5*  --  2.6*   PHOSPHORUS 4.3 5.1* 5.7* 6.4* 6.4* 7.8*   < >  --    TSH  --   --   --   --   --   --   --  2.820    < > = values in this interval not displayed.         Lab 04/15/25  0442 04/14/25  1924   TOTAL PROTEIN 6.3 6.7   ALBUMIN 3.3* 3.5 "   GLOBULIN 3.0 3.2   ALT (SGPT) 22 26   AST (SGOT) 22 23   BILIRUBIN 0.4 0.5   ALK PHOS 95 100                     Brief Urine Lab Results  (Last result in the past 365 days)        Color   Clarity   Blood   Leuk Est   Nitrite   Protein   CREAT   Urine HCG        04/14/25 2014             56.0         04/14/25 2014 Yellow   Clear   Trace   Negative   Negative   Negative                 Microbiology Results (last 10 days)       Procedure Component Value - Date/Time    Eosinophil Smear - Urine, Urine, Clean Catch [[id number]]  (Normal) Collected: 04/16/25 0607    Lab Status: Final result Specimen: Urine, Clean Catch Updated: 04/16/25 0737     Eosinophil Smear 0 % EOS/100 Cells     COVID-19, FLU A/B, RSV PCR 1 HR TAT - Swab, Nasopharynx [[id number]]  (Normal) Collected: 04/15/25 0441    Lab Status: Final result Specimen: Swab from Nasopharynx Updated: 04/15/25 0529     COVID19 Not Detected     Influenza A PCR Not Detected     Influenza B PCR Not Detected     RSV, PCR Not Detected    Narrative:      Fact sheet for providers: https://www.fda.gov/media/550815/download    Fact sheet for patients: https://www.fda.gov/media/541199/download    Test performed by PCR.            US Renal Bilateral  Result Date: 4/15/2025  Impression: Impression: Bilateral anechoic renal cysts. Electronically Signed: Kristin Rowland MD  4/15/2025 3:36 PM EDT  Workstation ID: [id number]    XR Chest 1 View  Result Date: 4/14/2025  Impression: Impression: No acute findings. Electronically Signed: Murali Cervantes MD  4/14/2025 11:03 PM EDT  Workstation ID: [id number]    CT Abdomen Pelvis Without Contrast  Result Date: 4/14/2025  Impression: Impression: No acute findings in the abdomen/pelvis. Percutaneous cholecystostomy tube in place. Interval decreased distention of the gallbladder. Electronically Signed: Umang Hills  4/14/2025 8:21 PM EDT  Workstation ID: [id number]      Results for orders placed during the hospital encounter of 02/07/24    Duplex  Venous Lower Extremity - Left    Interpretation Summary    Chronic left lower extremity superficial thrombophlebitis noted in the small saphenous.    All other left sided veins appeared normal.      Results for orders placed during the hospital encounter of 02/07/24    Duplex Venous Lower Extremity - Left    Interpretation Summary    Chronic left lower extremity superficial thrombophlebitis noted in the small saphenous.    All other left sided veins appeared normal.      Results for orders placed during the hospital encounter of 02/25/25    Adult Transthoracic Echo Complete W/ Cont if Necessary Per Protocol    Interpretation Summary    Left ventricular systolic function is normal. Left ventricular ejection fraction appears to be 61 - 65%.    Left ventricular diastolic function is consistent with (grade I) impaired relaxation.    Estimated right ventricular systolic pressure from tricuspid regurgitation is normal (<35 mmHg).      Labs Pending at Discharge:  Pending Results       None            Procedures Performed           Consults:   Consults       Date and Time Order Name Status Description    4/14/2025  8:32 PM Nephrology (on -call MD unless specified) Completed     4/14/2025  8:32 PM Hospitalist (on-call MD unless specified)                Discharge Details        Discharge Medications        PAUSE taking these medications        Instructions Start Date   torsemide 20 MG tablet  Wait to take this until: April 24, 2025  Commonly known as: DEMADEX   20 mg, 2 Times Daily             Continue These Medications        Instructions Start Date   atorvastatin 40 MG tablet  Commonly known as: LIPITOR   80 mg, Daily      clopidogrel 75 MG tablet  Commonly known as: PLAVIX   75 mg, Daily      dextrose 40 % gel  Commonly known as: GLUTOSE   37.5 g, As Needed      glucagon 1 MG injection  Commonly known as: GLUCAGEN   1 mg, Once As Needed      Insulin Glargine-yfgn 100 UNIT/ML  Commonly known as: SEMGLEE-YFGN   30 Units,  "Nightly      insulin lispro 100 UNIT/ML injection  Commonly known as: HUMALOG/ADMELOG   2-7 Units, Subcutaneous, 4 Times Daily Before Meals & Nightly, Blood Glucose 150-199 mg/dL - 2 units Blood Glucose 200-249 mg/dL - 3 units Blood Glucose 250-299 mg/dL - 4 units Blood Glucose 300-349 mg/dL - 5 units Blood Glucose 350-400 mg/dL - 6 units Blood Glucose Greater Than 400 mg/dL - 7 units      insulin lispro 100 UNIT/ML injection  Commonly known as: HUMALOG/ADMELOG   8 Units, Subcutaneous, 3 Times Daily With Meals      midodrine 5 MG tablet  Commonly known as: PROAMATINE   5 mg, Every 6 Hours PRN      potassium chloride 20 MEQ CR tablet  Commonly known as: KLOR-CON M20   20 mEq, Daily               Allergies   Allergen Reactions    Contrast Dye (Echo Or Unknown Ct/Mr) Anaphylaxis     Not Lumason    Iodinated Contrast Media Shortness Of Breath    Iodine Shortness Of Breath    Aspirin Nausea And Vomiting    Lipitor [Atorvastatin] Unknown - High Severity     \"Muscle spasms\" 4/17/25    Prednisone Hives         Discharge Disposition:   Rehab Facility or Unit (DC - External)    Diet:  Hospital:  Diet Order   Procedures    Diet: Diabetic; Consistent Carbohydrate; Fluid Consistency: Thin (IDDSI 0)         Discharge Activity:         CODE STATUS:  Code Status and Medical Interventions: CPR (Attempt to Resuscitate); Full Support   Ordered at: 04/14/25 2340     Code Status (Patient has no pulse and is not breathing):    CPR (Attempt to Resuscitate)     Medical Interventions (Patient has pulse or is breathing):    Full Support         Future Appointments   Date Time Provider Department Center   4/28/2025  2:00 PM [facility] IR [facility]   5/6/2025  1:30 PM Abimael Oconnell MD [facility]           Time spent on Discharge including face to face service: 37 minutes    Signature: Electronically signed by Grey Rebolledo MD, 04/20/25, 15:52 EDT.  [facility] Hospitalist Team   "

## 2025-04-20 NOTE — PROGRESS NOTES
"      FOLLOW UP NOTE      Name: Blas Mojica ADMIT: 2025   : 1950  PCP: Sahil Read MD    MRN: [id number] LOS: 6 days   AGE/SEX: 74 y.o. male  ROOM: [facility]     Date of Service: 2025                           CHIEF COMPLIANTS / REASON FOR FOLLOW UP                Subjective:      Resting in bed, doing well, eating better.  Urine output 2.5 L.     Review of Systems:       Negative except as above       OBJECTIVE                                                                        Exam:  /70 (BP Location: Right arm, Patient Position: Lying)   Pulse 82   Temp 98.2 °F (36.8 °C) (Oral)   Resp 16   Ht 177.8 cm (70\")   Wt 86.6 kg (191 lb)   SpO2 93%   BMI 27.41 kg/m²   Intake/Output last 3 shifts:  I/O last 3 completed shifts:  In: 240 [P.O.:240]  Out: 2500 [Urine:2300; Emesis/NG output:200]  Intake/Output this shift:  No intake/output data recorded.    General Appearance: Resting flat in bed in no acute distress     Lungs:   Clear to auscultation bilaterally, respirations unlabored  Heart: RRR  Abdomen:  Soft, nontender percutaneous cholecystostomy tube note draining bilious fluid.  Extremities:   Trace dependent edema  Neurologic:   Alert and oriented    Scheduled Meds:clopidogrel, 75 mg, Oral, Daily  heparin (porcine), 5,000 Units, Subcutaneous, Q8H  insulin lispro, 2-7 Units, Subcutaneous, 4x Daily AC & at Bedtime  rosuvastatin, 10 mg, Oral, Nightly  sevelamer, 800 mg, Oral, TID With Meals  sodium chloride, 10 mL, Intravenous, Q12H      Continuous Infusions:     PRN Meds:  senna-docusate sodium **AND** polyethylene glycol **AND** bisacodyl **AND** bisacodyl    dextrose    dextrose    glucagon (human recombinant)    Lidocaine HCl gel    nitroglycerin    [COMPLETED] Insert Peripheral IV **AND** sodium chloride    sodium chloride    sodium chloride         Data Review:                                                                           Labs reviewed        Imaging:            "                                                                Radiology reviewed           ASSESSMENT:                                                                                Acute renal failure         BRIGIDO with peak serum creatinine 98 mg/dL with baseline creatinine lately around 1.4 to 1.7 mg/dL.  Etiology likely multifactorial with suspected ATN, possibly due to profound volume depletion, relative hypotension, cannot rule out obstructive uropathy.  Excess diuretics may be playing a role.  Noted reassuringly unremarkable urinalysis.  Mild metabolic acidosis  Mild azotemia  Mild hyponatremia  Hyperphosphatemia  CKD stage III      PLAN:                                                                            Creatinine continues to slowly improve.  Remainder of renal recovery can be followed up as outpatient.  Hold diuretics on discharge.  Check phosphorus today and DC Renvela if <5.0 mg/dL.  Will set up follow-up in CKD clinic in 1 to 2 weeks.  DC when okay with primary.     Nydia Jara MD  [facility]  4/20/2025  08:28 EDT

## 2025-04-20 NOTE — PLAN OF CARE
Problem: Adult Inpatient Plan of Care  Goal: Readiness for Transition of Care  Outcome: Progressing   Goal Outcome Evaluation:

## 2025-04-20 NOTE — NURSING NOTE
Marcela wife was upset when I was about to d/c patient. Requested to speak to MD. MD called wife at bedside. Pending d/c at this time.

## 2025-04-20 NOTE — PLAN OF CARE
Problem: Adult Inpatient Plan of Care  Goal: Absence of Hospital-Acquired Illness or Injury  Intervention: Prevent Skin Injury  Recent Flowsheet Documentation  Taken 4/20/2025 0800 by Kyler Valladares RN  Body Position: supine  Skin Protection: incontinence pads utilized     Problem: Adult Inpatient Plan of Care  Goal: Absence of Hospital-Acquired Illness or Injury  Intervention: Prevent Infection  Recent Flowsheet Documentation  Taken 4/20/2025 1400 by Kyler Valladares RN  Infection Prevention:   environmental surveillance performed   equipment surfaces disinfected   hand hygiene promoted   single patient room provided  Taken 4/20/2025 1000 by Kyler Valladares RN  Infection Prevention:   environmental surveillance performed   equipment surfaces disinfected   hand hygiene promoted   single patient room provided  Taken 4/20/2025 0800 by Kyler Valladares RN  Infection Prevention:   equipment surfaces disinfected   environmental surveillance performed   hand hygiene promoted   single patient room provided     Problem: Adult Inpatient Plan of Care  Goal: Plan of Care Review  Outcome: Progressing  Flowsheets (Taken 4/20/2025 1441)  Progress: improving  Plan of Care Reviewed With: patient   Goal Outcome Evaluation:  Plan of Care Reviewed With: patient        Progress: improving                                 LINDSEY  Acidosis Hypoglycemia on Tube Feeds New onset A fib Elevated troponin bilateral leg wounds

## 2025-04-21 VITALS
RESPIRATION RATE: 16 BRPM | TEMPERATURE: 98.3 F | BODY MASS INDEX: 27.35 KG/M2 | HEIGHT: 70 IN | OXYGEN SATURATION: 99 % | SYSTOLIC BLOOD PRESSURE: 133 MMHG | WEIGHT: 191 LBS | DIASTOLIC BLOOD PRESSURE: 74 MMHG | HEART RATE: 87 BPM

## 2025-04-21 LAB
ALBUMIN SERPL-MCNC: 1.9 G/DL (ref 3.5–5.2)
ALBUMIN/GLOB SERPL: 0.4 G/DL
ALP SERPL-CCNC: 103 U/L (ref 39–117)
ALT SERPL W P-5'-P-CCNC: 34 U/L (ref 1–41)
ANION GAP SERPL CALCULATED.3IONS-SCNC: 11.7 MMOL/L (ref 5–15)
AST SERPL-CCNC: 35 U/L (ref 1–40)
BILIRUB SERPL-MCNC: 0.4 MG/DL (ref 0–1.2)
BUN SERPL-MCNC: 45 MG/DL (ref 8–23)
BUN/CREAT SERPL: 12.2 (ref 7–25)
CALCIUM SPEC-SCNC: 8.8 MG/DL (ref 8.6–10.5)
CHLORIDE SERPL-SCNC: 103 MMOL/L (ref 98–107)
CO2 SERPL-SCNC: 26.3 MMOL/L (ref 22–29)
CREAT SERPL-MCNC: 3.7 MG/DL (ref 0.76–1.27)
DEPRECATED RDW RBC AUTO: 46.6 FL (ref 37–54)
EGFRCR SERPLBLD CKD-EPI 2021: 16.3 ML/MIN/1.73
ERYTHROCYTE [DISTWIDTH] IN BLOOD BY AUTOMATED COUNT: 14.1 % (ref 12.3–15.4)
GLOBULIN UR ELPH-MCNC: 4.5 GM/DL
GLUCOSE BLDC GLUCOMTR-MCNC: 104 MG/DL (ref 70–105)
GLUCOSE BLDC GLUCOMTR-MCNC: 173 MG/DL (ref 70–105)
GLUCOSE SERPL-MCNC: 105 MG/DL (ref 65–99)
HCT VFR BLD AUTO: 28.6 % (ref 37.5–51)
HGB BLD-MCNC: 9.1 G/DL (ref 13–17.7)
MCH RBC QN AUTO: 28.9 PG (ref 26.6–33)
MCHC RBC AUTO-ENTMCNC: 31.8 G/DL (ref 31.5–35.7)
MCV RBC AUTO: 90.8 FL (ref 79–97)
PHOSPHATE SERPL-MCNC: 4.1 MG/DL (ref 2.5–4.5)
PLATELET # BLD AUTO: 121 10*3/MM3 (ref 140–450)
PMV BLD AUTO: 10.2 FL (ref 6–12)
POTASSIUM SERPL-SCNC: 3.6 MMOL/L (ref 3.5–5.2)
PROT SERPL-MCNC: 6.4 G/DL (ref 6–8.5)
RBC # BLD AUTO: 3.15 10*6/MM3 (ref 4.14–5.8)
SODIUM SERPL-SCNC: 141 MMOL/L (ref 136–145)
WBC NRBC COR # BLD AUTO: 4.33 10*3/MM3 (ref 3.4–10.8)

## 2025-04-21 PROCEDURE — 63710000001 INSULIN LISPRO (HUMAN) PER 5 UNITS: Performed by: STUDENT IN AN ORGANIZED HEALTH CARE EDUCATION/TRAINING PROGRAM

## 2025-04-21 PROCEDURE — 82948 REAGENT STRIP/BLOOD GLUCOSE: CPT | Performed by: STUDENT IN AN ORGANIZED HEALTH CARE EDUCATION/TRAINING PROGRAM

## 2025-04-21 PROCEDURE — 25010000002 HEPARIN (PORCINE) PER 1000 UNITS: Performed by: FAMILY MEDICINE

## 2025-04-21 PROCEDURE — 99221 1ST HOSP IP/OBS SF/LOW 40: CPT | Performed by: STUDENT IN AN ORGANIZED HEALTH CARE EDUCATION/TRAINING PROGRAM

## 2025-04-21 RX ORDER — SEVELAMER CARBONATE 0.8 G/1
800 POWDER, FOR SUSPENSION ORAL
Status: DISCONTINUED | OUTPATIENT
Start: 2025-04-21 | End: 2025-04-21 | Stop reason: HOSPADM

## 2025-04-21 RX ADMIN — Medication 10 ML: at 09:23

## 2025-04-21 RX ADMIN — HEPARIN SODIUM 5000 UNITS: 5000 INJECTION INTRAVENOUS; SUBCUTANEOUS at 06:14

## 2025-04-21 RX ADMIN — CLOPIDOGREL BISULFATE 75 MG: 75 TABLET, FILM COATED ORAL at 09:14

## 2025-04-21 RX ADMIN — SEVELAMER CARBONATE 0.8 G: 800 POWDER, FOR SUSPENSION ORAL at 11:57

## 2025-04-21 RX ADMIN — INSULIN LISPRO 2 UNITS: 100 INJECTION, SOLUTION INTRAVENOUS; SUBCUTANEOUS at 11:57

## 2025-04-21 NOTE — PLAN OF CARE
Problem: Adult Inpatient Plan of Care  Goal: Plan of Care Review  Outcome: Progressing  Goal: Patient-Specific Goal (Individualized)  Outcome: Progressing  Goal: Absence of Hospital-Acquired Illness or Injury  Outcome: Progressing  Intervention: Prevent Skin Injury  Recent Flowsheet Documentation  Taken 4/20/2025 2021 by Blaise Flores RN  Body Position: position changed independently  Intervention: Prevent Infection  Recent Flowsheet Documentation  Taken 4/21/2025 0021 by Blaise Flores, RN  Infection Prevention:   single patient room provided   rest/sleep promoted   hand hygiene promoted  Taken 4/20/2025 2221 by Blaise Flores RN  Infection Prevention:   single patient room provided   rest/sleep promoted   hand hygiene promoted  Taken 4/20/2025 2021 by Blaise Flores, RN  Infection Prevention:   single patient room provided   rest/sleep promoted   hand hygiene promoted  Goal: Optimal Comfort and Wellbeing  Outcome: Progressing  Goal: Readiness for Transition of Care  Outcome: Progressing     Problem: Electrolyte Imbalance  Goal: Electrolyte Balance  Outcome: Progressing     Problem: Fluid Volume Excess  Goal: Fluid Balance  Outcome: Progressing     Problem: Fall Injury Risk  Goal: Absence of Fall and Fall-Related Injury  Outcome: Progressing     Problem: Skin Injury Risk Increased  Goal: Skin Health and Integrity  Outcome: Progressing   Goal Outcome Evaluation:

## 2025-04-21 NOTE — CASE MANAGEMENT/SOCIAL WORK
Continued Stay Note  [facility]     Patient Name: Blas Mojica  MRN: [id number]  Today's Date: 4/21/2025    Admit Date: 4/14/2025    Plan: Return to [facility]. No precert or PASRR required. STEPHANIE to transport   Discharge Plan       Row Name 04/21/25 1303       Plan    Plan Return to [facility]. No precert or PASRR required. STEPHANIE to transport    Patient/Family in Agreement with Plan yes    Plan Comments STEPHANIE notified CM that they could provide transportation via their  van at 1:30. Nursing staff notified. CM left IMM copy at bedside and notified patient of discharge plan to [facility] today.             Gayatri Trivedi RN    Office: [phone]

## 2025-04-21 NOTE — DISCHARGE SUMMARY
"[facility] Medicine Services  Discharge Summary    Date of Service: 2025  Patient Name: Blas Mojica  : 1950  MRN: [id number]    Date of Admission: 2025  Discharge Diagnosis: Acute renal failure  Date of Discharge: 2025  Primary Care Physician: Sahil Read MD      Presenting Problem:   Acute renal failure [N17.9]  Acute renal failure, unspecified acute renal failure type [N17.9]    Active and Resolved Hospital Problems:  Active Hospital Problems    Diagnosis POA    **Acute renal failure [N17.9] Yes      Resolved Hospital Problems   No resolved problems to display.         Hospital Course     HPI:    \"Blas Mojica is a 74 y.o. male with Past medical history of bacteremia in early 2025, was treated with Levaquin for 14 days, discharged to [facility]hab and came back with abdominal pain found to have acute cholecystitis  status cholecystotomy drain currently in place, type 2 diabetes, hypertension, hyperlipidemia, CVA presented to [facility] from home on 2025 with generalized weakness and fatigue.     Patient currently awake, alert, oriented x3, conversational. Reports generalized fatigue and weakness, able to ambulate with walker but has been feeling worse recently, prompting hospital visit. Denies chest pain, shortness of breath, fever, chills, or dysuria. Has cholecystostomy tube in place draining mostly bilious fluid. Denies NSAID use, confirmed with wife. He takes diuretics daily as per reported by his wife over the phone, denies hx of CHF, unsure of the reason.     On presentation, vital stable, afebrile, CMP remarkable for sodium of 134, bicarb of 13.9, anion gap 19.1, creatinine of 8.03, previously creatinine 1's, consistent with BRIGIDO, AST ALT normal, WBC 5.5, hemoglobin 9.5, chest x-ray showing no acute findings.  UA negative for bacteria, CT abdomen of the pelvis showing mildly enlarged prostate, under distended bladder, 2 hyperattenuating " "left upper pole renal lesions appear similar to the prior measuring 1.5 cm, will further characterize per renal ultrasound on 2/25/2025, most likely representing hemorrhagic or proteinaceous cyst, no hydronephrosis noted.     Patient was given liter of normal saline bolus fluids, nephrology was consulted for acute renal failure, further recs to follow, admitted to medicine team further inpatient management.\"\"    Hospital Course:  Acute renal failure likely due to ATN, patient was started on 1/2 normal saline with bicarb drip at 100.  Bladder scan showed no retention, CT of abdomen showed no hydronephrosis.  Diuretics was held for the time.  Nephrology was consulted.  Plan.  Creatinine continued to improve, IV fluids were DC'd.  Diuretics was continued and will be held at discharge.  Phosphorus improved to 4.1, Renvela was held at discharge    Surgery evaluated patient due to history of cholecystitis with postcholecystectomy drain in place.  Surgery recommended drain to remain in place, follow-up in outpatient.    Blood glucose initially elevated, started on SSI, as well as carb conscious diet.  Blood glucose continued to improve with glucose at discharge of 104.    Diuretic was held, patient remained on home antihypertensives.    Plan is for patient to return to [facility] for continued rehab        DISCHARGE Follow Up Recommendations for labs and diagnostics: n/a        Day of Discharge     Vital Signs:  Temp:  [98 °F (36.7 °C)-98.5 °F (36.9 °C)] 98.3 °F (36.8 °C)  Heart Rate:  [77-92] 87  Resp:  [16-21] 16  BP: (120-152)/(62-93) 133/74  Flow (L/min) (Oxygen Therapy):  [2] 2    Physical Exam:  General: No acute distress, alert and oriented, [place]  CV: RRR, no peripheral edema  Pulm: CTAB, no increased work of breathing  Abd: Soft, nontender, nondistended.  CHETAN drain noted to right upper quadrant  Skin: No rashes or lesions on exposed skin  Psych: Appropriate mood and affect      Pertinent  and/or Most Recent Results "     LAB RESULTS:      Lab 04/20/25  2334 04/20/25 0228 04/15/25  0442 04/14/25  1924   WBC 4.33 4.23 5.70 5.50   HEMOGLOBIN 9.1* 8.2* 9.3* 9.5*   HEMATOCRIT 28.6* 25.7* 29.2* 29.6*   PLATELETS 121* 117* 127* 137*   NEUTROS ABS  --   --  3.48 3.52   IMMATURE GRANS (ABS)  --   --  0.01 0.01   LYMPHS ABS  --   --  1.42 1.20   MONOS ABS  --   --  0.44 0.50   EOS ABS  --   --  0.33 0.25   MCV 90.8 92.4 92.1 91.9         Lab 04/20/25  2334 04/20/25 0228 04/19/25  0033 04/17/25  2319 04/16/25 2357 04/16/25  1151 04/15/25  0442 04/14/25  1924 04/14/25  1924   SODIUM 141 142 138 141 141 138 141  --  134*   POTASSIUM 3.6 3.7 3.2* 3.5 3.7 3.7 4.4  --  4.6   CHLORIDE 103 104 100 100 103 102 106  --  102   CO2 26.3 27.5 27.1 26.4 23.4 23.0 19.0*  --  17.7*   ANION GAP 11.7 10.5 10.9 14.6 14.6 13.0 16.0*  --  14.3   BUN 45* 51* 52* 58* 71* 66* 72*  --  74*   CREATININE 3.70* 4.21* 4.54* 5.43* 6.37* 6.68* 7.74*  --  8.04*   EGFR 16.3* 14.0* 12.8* 10.3* 8.5* 8.0* 6.7*  --  6.4*   GLUCOSE 105* 146* 175* 215* 212* 200* 102*  --  175*   CALCIUM 8.8 8.7 7.4* 8.5* 8.2* 8.3* 8.8  --  8.9   MAGNESIUM  --   --   --   --   --   --  2.5*  --  2.6*   PHOSPHORUS  --  4.3 5.1* 5.7* 6.4* 6.4* 7.8*   < >  --    TSH  --   --   --   --   --   --   --   --  2.820    < > = values in this interval not displayed.         Lab 04/20/25  2334 04/15/25  0442 04/14/25  1924   TOTAL PROTEIN 6.4 6.3 6.7   ALBUMIN 1.9* 3.3* 3.5   GLOBULIN 4.5 3.0 3.2   ALT (SGPT) 34 22 26   AST (SGOT) 35 22 23   BILIRUBIN 0.4 0.4 0.5   ALK PHOS 103 95 100                     Brief Urine Lab Results  (Last result in the past 365 days)        Color   Clarity   Blood   Leuk Est   Nitrite   Protein   CREAT   Urine HCG        04/14/25 2014             56.0         04/14/25 2014 Yellow   Clear   Trace   Negative   Negative   Negative                 Microbiology Results (last 10 days)       Procedure Component Value - Date/Time    Eosinophil Smear - Urine, Urine, Clean Catch  [[id number]]  (Normal) Collected: 04/16/25 0607    Lab Status: Final result Specimen: Urine, Clean Catch Updated: 04/16/25 0737     Eosinophil Smear 0 % EOS/100 Cells     COVID-19, FLU A/B, RSV PCR 1 HR TAT - Swab, Nasopharynx [[id number]]  (Normal) Collected: 04/15/25 0441    Lab Status: Final result Specimen: Swab from Nasopharynx Updated: 04/15/25 0529     COVID19 Not Detected     Influenza A PCR Not Detected     Influenza B PCR Not Detected     RSV, PCR Not Detected    Narrative:      Fact sheet for providers: https://www.fda.gov/media/766662/download    Fact sheet for patients: https://www.fda.gov/media/244183/download    Test performed by PCR.            US Renal Bilateral  Result Date: 4/15/2025  Impression: Impression: Bilateral anechoic renal cysts. Electronically Signed: Kristin Rowland MD  4/15/2025 3:36 PM EDT  Workstation ID: [id number]    XR Chest 1 View  Result Date: 4/14/2025  Impression: Impression: No acute findings. Electronically Signed: Murali Cervantes MD  4/14/2025 11:03 PM EDT  Workstation ID: [id number]    CT Abdomen Pelvis Without Contrast  Result Date: 4/14/2025  Impression: Impression: No acute findings in the abdomen/pelvis. Percutaneous cholecystostomy tube in place. Interval decreased distention of the gallbladder. Electronically Signed: Umang Hills  4/14/2025 8:21 PM EDT  Workstation ID: [id number]      Results for orders placed during the hospital encounter of 02/07/24    Duplex Venous Lower Extremity - Left    Interpretation Summary    Chronic left lower extremity superficial thrombophlebitis noted in the small saphenous.    All other left sided veins appeared normal.      Results for orders placed during the hospital encounter of 02/07/24    Duplex Venous Lower Extremity - Left    Interpretation Summary    Chronic left lower extremity superficial thrombophlebitis noted in the small saphenous.    All other left sided veins appeared normal.      Results for orders placed during the hospital  encounter of 02/25/25    Adult Transthoracic Echo Complete W/ Cont if Necessary Per Protocol    Interpretation Summary    Left ventricular systolic function is normal. Left ventricular ejection fraction appears to be 61 - 65%.    Left ventricular diastolic function is consistent with (grade I) impaired relaxation.    Estimated right ventricular systolic pressure from tricuspid regurgitation is normal (<35 mmHg).      Labs Pending at Discharge:  Pending Results       None            Procedures Performed           Consults:   Consults       Date and Time Order Name Status Description    4/20/2025  6:19 PM Inpatient General Surgery Consult      4/14/2025  8:32 PM Nephrology (on -call MD unless specified) Completed     4/14/2025  8:32 PM Hospitalist (on-call MD unless specified)                Discharge Details        Discharge Medications        Continue These Medications        Instructions Start Date   atorvastatin 40 MG tablet  Commonly known as: LIPITOR   80 mg, Daily      clopidogrel 75 MG tablet  Commonly known as: PLAVIX   75 mg, Daily      dextrose 40 % gel  Commonly known as: GLUTOSE   37.5 g, As Needed      glucagon 1 MG injection  Commonly known as: GLUCAGEN   1 mg, Once As Needed      Insulin Glargine-yfgn 100 UNIT/ML  Commonly known as: SEMGLEE-YFGN   30 Units, Nightly      insulin lispro 100 UNIT/ML injection  Commonly known as: HUMALOG/ADMELOG   2-7 Units, Subcutaneous, 4 Times Daily Before Meals & Nightly, Blood Glucose 150-199 mg/dL - 2 units Blood Glucose 200-249 mg/dL - 3 units Blood Glucose 250-299 mg/dL - 4 units Blood Glucose 300-349 mg/dL - 5 units Blood Glucose 350-400 mg/dL - 6 units Blood Glucose Greater Than 400 mg/dL - 7 units      insulin lispro 100 UNIT/ML injection  Commonly known as: HUMALOG/ADMELOG   8 Units, Subcutaneous, 3 Times Daily With Meals      midodrine 5 MG tablet  Commonly known as: PROAMATINE   5 mg, Every 6 Hours PRN      potassium chloride 20 MEQ CR tablet  Commonly known  "as: KLOR-CON M20   20 mEq, Daily             Stop These Medications      torsemide 20 MG tablet  Commonly known as: DEMADEX              Allergies   Allergen Reactions    Contrast Dye (Echo Or Unknown Ct/Mr) Anaphylaxis     Not Lumason    Iodinated Contrast Media Shortness Of Breath    Iodine Shortness Of Breath    Aspirin Nausea And Vomiting    Lipitor [Atorvastatin] Unknown - High Severity     \"Muscle spasms\" 4/17/25    Prednisone Hives         [facility]: [facility]  Rehab Facility or Unit (DC - External)    Diet:  Hospital:  Diet Order   Procedures    Diet: Diabetic; Consistent Carbohydrate; Fluid Consistency: Thin (IDDSI 0)         Discharge Activity:         CODE STATUS:  Code Status and Medical Interventions: CPR (Attempt to Resuscitate); Full Support   Ordered at: 04/14/25 2340     Code Status (Patient has no pulse and is not breathing):    CPR (Attempt to Resuscitate)     Medical Interventions (Patient has pulse or is breathing):    Full Support         Future Appointments   Date Time Provider Department Center   4/28/2025  2:00 PM [facility] IR 1 [facility] IR [facility]   5/6/2025  1:30 PM Abimael Oconnell MD [facility]           Time spent on Discharge including face to face service:  30 minutes    Signature: Electronically signed by PIPER Joshua, 04/21/25, 10:38 EDT.  [facility] Hospitalist Team  "

## 2025-04-21 NOTE — CONSULTS
General Surgery Consult Note  Requesting Provider:  Dr. Moffett (Hospitalist)    Reason for Consult:  cholecystostomy tube present     Chief Complaint:  none    History of Present Illness:  Blas Mojica is a 74 y.o. male who was admitted to [facility] on 4/14/25 for BRIGIDO. Pt was set to be discharged to rehab yesterday, but wife requested surgical evaluation of pt's cholecystostomy tube. Pt is known to my service for an admission on 3/12/25 for acute cholecystitis, which was confirmed by HIDA scan. H/o numerous comorbidities including CVA on plavix, CKD, and T2DM for which pt was deemed not an appropriate surgical candidate. IR was consulted and pt underwent cholecystostomy tube placement on 3/14/25. Pt was then advised to f/u with me in 6 weeks to discuss the next steps. Pt has an appt with me on 5/6/25.     Pt was seen at bedside on floor. No complaints. Antonella tube has bile present.     Past Medical History:   Diagnosis Date    Diabetes mellitus     Hyperlipidemia     Hypertension     Kidney stone     TIA (transient ischemic attack) 2016    Type 2 diabetes mellitus with stage 3 chronic kidney disease, with long-term current use of insulin 12/13/2021     Past Surgical History:   Procedure Laterality Date    ADENOIDECTOMY      ENDOSCOPY N/A 12/13/2023    Procedure: ESOPHAGOGASTRODUODENOSCOPY with removal of NJ tube from right nare;  Surgeon: Jaret Valadez MD;  Location: [facility];  Service: Gastroenterology;  Laterality: N/A;  esophagitis, gastric ulcer    EXTRACORPOREAL SHOCK WAVE LITHOTRIPSY (ESWL)      HERNIA REPAIR      KNEE ACL RECONSTRUCTION Left     TONSILLECTOMY       Family History   Problem Relation Age of Onset    No Known Problems Mother     No Known Problems Father      Social History     Socioeconomic History    Marital status:    Tobacco Use    Smoking status: Never     Passive exposure: Never    Smokeless tobacco: Never   Vaping Use    Vaping status: Never Used   Substance and  "Sexual Activity    Alcohol use: Never    Drug use: Never    Sexual activity: Defer       Medications:  Medications Prior to Admission   Medication Sig Dispense Refill Last Dose/Taking    atorvastatin (LIPITOR) 40 MG tablet Take 2 tablets by mouth Daily.   Taking    clopidogrel (PLAVIX) 75 MG tablet Take 1 tablet by mouth Daily.   Taking    dextrose (GLUTOSE) 40 % gel Take 37.5 g by mouth As Needed for Low Blood Sugar.   Taking As Needed    glucagon (GLUCAGEN) 1 MG injection Inject 1 mg under the skin into the appropriate area as directed 1 (One) Time As Needed.   Taking As Needed    Insulin Glargine-yfgn (SEMGLEE-YFGN) 100 UNIT/ML Inject 30 Units under the skin into the appropriate area as directed Every Night.   Taking    insulin lispro (HUMALOG/ADMELOG) 100 UNIT/ML injection Inject 2-7 Units under the skin into the appropriate area as directed 4 (Four) Times a Day Before Meals & at Bedtime for 30 days. Blood Glucose 150-199 mg/dL - 2 units Blood Glucose 200-249 mg/dL - 3 units Blood Glucose 250-299 mg/dL - 4 units Blood Glucose 300-349 mg/dL - 5 units Blood Glucose 350-400 mg/dL - 6 units Blood Glucose Greater Than 400 mg/dL - 7 units   Taking    insulin lispro (HUMALOG/ADMELOG) 100 UNIT/ML injection Inject 8 Units under the skin into the appropriate area as directed 3 (Three) Times a Day With Meals for 30 days.   Taking    midodrine (PROAMATINE) 5 MG tablet Take 1 tablet by mouth Every 6 (Six) Hours As Needed.   Taking As Needed    potassium chloride (KLOR-CON M20) 20 MEQ CR tablet Take 1 tablet by mouth Daily.   Taking    [Paused] torsemide (DEMADEX) 20 MG tablet Take 1 tablet by mouth 2 (Two) Times a Day.   Taking      Allergies   Allergen Reactions    Contrast Dye (Echo Or Unknown Ct/Mr) Anaphylaxis     Not Lumason    Iodinated Contrast Media Shortness Of Breath    Iodine Shortness Of Breath    Aspirin Nausea And Vomiting    Lipitor [Atorvastatin] Unknown - High Severity     \"Muscle spasms\" 4/17/25    " "Prednisone Hives       Review of Systems:  Negative in a 12 point ROS except for as above described.    Physical Examination:  Temp:  [98 °F (36.7 °C)-98.5 °F (36.9 °C)] 98.3 °F (36.8 °C)  Heart Rate:  [77-92] 87  Resp:  [16-21] 16  BP: (120-152)/(62-93) 133/74  I/O last 3 completed shifts:  In: 480 [P.O.:480]  Out: 2600 [Urine:2200; Emesis/NG output:400]    General: No acute distress, conversant; chronically ill-appearing   Abdomen: soft, NT, ND, dull to percussion; no guarding or rigidity; aguila tube present - bilious output     Labs:  WBC   Date Value Ref Range Status   04/20/2025 4.33 3.40 - 10.80 10*3/mm3 Final     Hemoglobin   Date Value Ref Range Status   04/20/2025 9.1 (L) 13.0 - 17.7 g/dL Final     Hematocrit   Date Value Ref Range Status   04/20/2025 28.6 (L) 37.5 - 51.0 % Final     MCV   Date Value Ref Range Status   04/20/2025 90.8 79.0 - 97.0 fL Final     Platelets   Date Value Ref Range Status   04/20/2025 121 (L) 140 - 450 10*3/mm3 Final     Lab Results   Component Value Date    GLUCOSE 105 (H) 04/20/2025    BUN 45 (H) 04/20/2025    CREATININE 3.70 (H) 04/20/2025     04/20/2025    K 3.6 04/20/2025     04/20/2025    CALCIUM 8.8 04/20/2025    PROTEINTOT 6.4 04/20/2025    ALBUMIN 1.9 (L) 04/20/2025    ALT 34 04/20/2025    AST 35 04/20/2025    ALKPHOS 103 04/20/2025    BILITOT 0.4 04/20/2025    GLOB 4.5 04/20/2025    AGRATIO 0.4 04/20/2025    BCR 12.2 04/20/2025    ANIONGAP 11.7 04/20/2025    EGFR 16.3 (L) 04/20/2025     Lab Results   Component Value Date    INR 1.22 (H) 03/14/2025    INR 1.28 (H) 03/13/2025    INR 0.95 12/12/2023    PROTIME 15.3 (H) 03/14/2025    PROTIME 16.0 (H) 03/13/2025    PROTIME 10.4 12/12/2023         No results found for: \"BLOODCX\"    Images:  US Renal Bilateral  Narrative: US RENAL BILATERAL    Date of Exam: 4/15/2025 2:12 PM EDT    Indication: Renal failure.    Comparison: No comparisons available.    Technique: Grayscale and color Doppler ultrasound evaluation of " the kidneys and urinary bladder was performed.    Findings:  RIGHT kidney measures 9.7 x 6.8 x 5.8 cm.  Kidney echogenicity, size, and vascularity appear within normal limits. There is no solid kidney mass.  No echogenic shadowing stone.  No hydronephrosis. There is a 1.4 cm anechoic cyst.    LEFT kidney measures 10.3 x 7.2 x 7.6 cm. Kidney echogenicity, size, and vascularity appear within normal limits. There is no solid kidney mass.  No echogenic shadowing stone.  No hydronephrosis. There are 2 anechoic cysts measuring up to 1.6 and 1.9 cm.    Limited visualization of the urinary bladder is unremarkable.  Impression: Impression:  Bilateral anechoic renal cysts.    Electronically Signed: Kristin Rowland MD    4/15/2025 3:36 PM EDT    Workstation ID: [id number]       I personally reviewed the available laboratory values and radiographic studies.      Assessment:  74 y.o. male, h/o numerous comorbidities and recent admission for cholecystitis, with cholecystostomy tube present. Tube is functional. No further intervention is required at this time.     Plan:  F/u with me on 5/6/25 for discussion of next steps, which include tube cholangiogram with either aguila tube removal v. interval cholecystectomy     General Surgery will sign off. Thank you for allowing me to participate in this patient's care. Please do not hesitate to contact me with any questions or concerns.     Abimael Oconnell MD   General Surgery  04/21/25   11:09 EDT     Much of this encounter note is an electronic transcription/translation of spoken language to printed text.  The electronic translation of spoken language may permit erroneous, or at times, nonsensical words or phrases to be inadvertently transcribed.  Although I have reviewed the note for such errors, some may still exist.

## 2025-04-22 NOTE — CASE MANAGEMENT/SOCIAL WORK
Case Management Discharge Note      Final Note: STEPHANIE Hector.         Selected Continued Care - Discharged on 4/21/2025 Admission date: 4/14/2025 - Discharge disposition: Rehab Facility or Unit (DC - External)      Destination Coordination complete.      Service Provider Services Address Phone Fax Patient Preferred    [facility] [address] [place] [address] [place] [address] [phone] --             Transportation Services  W/C Van: [facility] (STEPHANIE Granados)    Final Discharge Disposition Code: 62 - inpatient rehab facility

## 2025-04-28 ENCOUNTER — RESULTS FOLLOW-UP (OUTPATIENT)
Dept: SURGERY | Facility: HOSPITAL | Age: 75
End: 2025-04-28
Payer: MEDICARE

## 2025-04-28 ENCOUNTER — HOSPITAL ENCOUNTER (OUTPATIENT)
Dept: INTERVENTIONAL RADIOLOGY/VASCULAR | Facility: HOSPITAL | Age: 75
Discharge: HOME | End: 2025-04-28
Admitting: STUDENT IN AN ORGANIZED HEALTH CARE EDUCATION/TRAINING PROGRAM
Payer: MEDICARE

## 2025-04-28 VITALS — HEART RATE: 91 BPM | OXYGEN SATURATION: 97 % | SYSTOLIC BLOOD PRESSURE: 136 MMHG | DIASTOLIC BLOOD PRESSURE: 72 MMHG

## 2025-04-28 DIAGNOSIS — K81.0 ACUTE CHOLECYSTITIS: ICD-10-CM

## 2025-04-28 PROCEDURE — 25010000002 DIPHENHYDRAMINE PER 50 MG

## 2025-04-28 PROCEDURE — 76080 X-RAY EXAM OF FISTULA: CPT

## 2025-04-28 PROCEDURE — 49424 ASSESS CYST CONTRAST INJECT: CPT

## 2025-04-28 RX ORDER — DIPHENHYDRAMINE HYDROCHLORIDE 50 MG/ML
INJECTION, SOLUTION INTRAMUSCULAR; INTRAVENOUS AS NEEDED
Status: COMPLETED | OUTPATIENT
Start: 2025-04-28 | End: 2025-04-28

## 2025-04-28 RX ADMIN — DIPHENHYDRAMINE HYDROCHLORIDE 12.5 MG: 50 INJECTION INTRAMUSCULAR; INTRAVENOUS at 14:31

## 2025-04-29 NOTE — TELEPHONE ENCOUNTER
Called patient and left VM that results are in my chart and to call the office with any questions.

## 2025-04-30 NOTE — TELEPHONE ENCOUNTER
Called all numbers listed and left VM to return call to cancel appt if they don't have questions.

## 2025-05-19 ENCOUNTER — TELEPHONE (OUTPATIENT)
Dept: SURGERY | Facility: CLINIC | Age: 75
End: 2025-05-19
Payer: MEDICARE

## 2025-05-19 NOTE — TELEPHONE ENCOUNTER
Patient's wife called stating patient is reporting intermittent pain at drain site (drain has been removed). He rates it a 6 on the pain scale. No fever or n/v. Recommendations?   Dr. Oconnell wants the patient to follow up with pcp to work up.  Per patients wife states their PCP is booked out for at least 6 weeks. I offered UC. She is asking that you see him or if you can order a work-up for gallbladder.  Dr. Oconnell agrees with urgent care. They can do an ultrasound there and work up if not gallbladder  spoke to wife, she is agreeable to UC and is also going to send a message to pcp if they will order testing since he is booked out.

## 2025-05-21 ENCOUNTER — TRANSCRIBE ORDERS (OUTPATIENT)
Dept: ADMINISTRATIVE | Facility: HOSPITAL | Age: 75
End: 2025-05-21
Payer: MEDICARE

## 2025-05-21 DIAGNOSIS — R10.11 ABDOMINAL PAIN, RIGHT UPPER QUADRANT: Primary | ICD-10-CM

## 2025-05-22 ENCOUNTER — HOSPITAL ENCOUNTER (OUTPATIENT)
Dept: ULTRASOUND IMAGING | Facility: HOSPITAL | Age: 75
Discharge: HOME | End: 2025-05-22
Admitting: INTERNAL MEDICINE
Payer: MEDICARE

## 2025-05-22 DIAGNOSIS — R10.11 ABDOMINAL PAIN, RIGHT UPPER QUADRANT: ICD-10-CM

## 2025-05-22 PROCEDURE — 76705 ECHO EXAM OF ABDOMEN: CPT

## 2025-05-23 ENCOUNTER — TELEPHONE (OUTPATIENT)
Dept: SURGERY | Facility: CLINIC | Age: 75
End: 2025-05-23
Payer: MEDICARE

## 2025-05-23 NOTE — TELEPHONE ENCOUNTER
Patient's wife called stating patient's U/S has been completed and would like to know if you would like to proceed with surgery.     Per Dr. Oconnell:  No, his US is normal. No surgery needed. He needs to follow up with his PCP.    Patient informed.

## 2025-06-09 ENCOUNTER — TRANSCRIBE ORDERS (OUTPATIENT)
Dept: ADMINISTRATIVE | Facility: HOSPITAL | Age: 75
End: 2025-06-09
Payer: MEDICARE

## 2025-06-09 ENCOUNTER — LAB (OUTPATIENT)
Dept: LAB | Facility: HOSPITAL | Age: 75
End: 2025-06-09
Payer: MEDICARE

## 2025-06-09 DIAGNOSIS — N18.4 CHRONIC KIDNEY DISEASE, STAGE IV (SEVERE): Primary | ICD-10-CM

## 2025-06-09 DIAGNOSIS — N18.4 CHRONIC KIDNEY DISEASE, STAGE IV (SEVERE): ICD-10-CM

## 2025-06-09 LAB
ALBUMIN SERPL-MCNC: 4.2 G/DL (ref 3.5–5.2)
ALBUMIN/GLOB SERPL: 1.1 G/DL
ALP SERPL-CCNC: 184 U/L (ref 39–117)
ALT SERPL W P-5'-P-CCNC: 22 U/L (ref 1–41)
ANION GAP SERPL CALCULATED.3IONS-SCNC: 8.5 MMOL/L (ref 5–15)
AST SERPL-CCNC: 30 U/L (ref 1–40)
BASOPHILS # BLD AUTO: 0.02 10*3/MM3 (ref 0–0.2)
BASOPHILS NFR BLD AUTO: 0.2 % (ref 0–1.5)
BILIRUB SERPL-MCNC: 0.6 MG/DL (ref 0–1.2)
BUN SERPL-MCNC: 23.9 MG/DL (ref 8–23)
BUN/CREAT SERPL: 14.6 (ref 7–25)
CALCIUM SPEC-SCNC: 10.1 MG/DL (ref 8.6–10.5)
CHLORIDE SERPL-SCNC: 104 MMOL/L (ref 98–107)
CO2 SERPL-SCNC: 27.5 MMOL/L (ref 22–29)
CREAT SERPL-MCNC: 1.64 MG/DL (ref 0.76–1.27)
DEPRECATED RDW RBC AUTO: 45 FL (ref 37–54)
EGFRCR SERPLBLD CKD-EPI 2021: 43.4 ML/MIN/1.73
EOSINOPHIL # BLD AUTO: 0.14 10*3/MM3 (ref 0–0.4)
EOSINOPHIL NFR BLD AUTO: 1.7 % (ref 0.3–6.2)
ERYTHROCYTE [DISTWIDTH] IN BLOOD BY AUTOMATED COUNT: 13.2 % (ref 12.3–15.4)
FERRITIN SERPL-MCNC: 144 NG/ML (ref 30–400)
GLOBULIN UR ELPH-MCNC: 3.9 GM/DL
GLUCOSE SERPL-MCNC: 170 MG/DL (ref 65–99)
HCT VFR BLD AUTO: 37.3 % (ref 37.5–51)
HGB BLD-MCNC: 11.8 G/DL (ref 13–17.7)
IMM GRANULOCYTES # BLD AUTO: 0.08 10*3/MM3 (ref 0–0.05)
IMM GRANULOCYTES NFR BLD AUTO: 1 % (ref 0–0.5)
IRON 24H UR-MRATE: 57 MCG/DL (ref 59–158)
LYMPHOCYTES # BLD AUTO: 2.1 10*3/MM3 (ref 0.7–3.1)
LYMPHOCYTES NFR BLD AUTO: 25.2 % (ref 19.6–45.3)
MCH RBC QN AUTO: 29.4 PG (ref 26.6–33)
MCHC RBC AUTO-ENTMCNC: 31.6 G/DL (ref 31.5–35.7)
MCV RBC AUTO: 93 FL (ref 79–97)
MONOCYTES # BLD AUTO: 0.58 10*3/MM3 (ref 0.1–0.9)
MONOCYTES NFR BLD AUTO: 7 % (ref 5–12)
NEUTROPHILS NFR BLD AUTO: 5.41 10*3/MM3 (ref 1.7–7)
NEUTROPHILS NFR BLD AUTO: 64.9 % (ref 42.7–76)
NRBC BLD AUTO-RTO: 0 /100 WBC (ref 0–0.2)
PHOSPHATE SERPL-MCNC: 2.2 MG/DL (ref 2.5–4.5)
PLATELET # BLD AUTO: 158 10*3/MM3 (ref 140–450)
PMV BLD AUTO: 10.4 FL (ref 6–12)
POTASSIUM SERPL-SCNC: 5.1 MMOL/L (ref 3.5–5.2)
PROT SERPL-MCNC: 8.1 G/DL (ref 6–8.5)
PTH-INTACT SERPL-MCNC: 81.1 PG/ML (ref 15–65)
RBC # BLD AUTO: 4.01 10*6/MM3 (ref 4.14–5.8)
SODIUM SERPL-SCNC: 140 MMOL/L (ref 136–145)
WBC NRBC COR # BLD AUTO: 8.33 10*3/MM3 (ref 3.4–10.8)

## 2025-06-09 PROCEDURE — 80053 COMPREHEN METABOLIC PANEL: CPT

## 2025-06-09 PROCEDURE — 85025 COMPLETE CBC W/AUTO DIFF WBC: CPT

## 2025-06-09 PROCEDURE — 83540 ASSAY OF IRON: CPT

## 2025-06-09 PROCEDURE — 82728 ASSAY OF FERRITIN: CPT

## 2025-06-09 PROCEDURE — 82570 ASSAY OF URINE CREATININE: CPT

## 2025-06-09 PROCEDURE — 36415 COLL VENOUS BLD VENIPUNCTURE: CPT

## 2025-06-09 PROCEDURE — 84100 ASSAY OF PHOSPHORUS: CPT

## 2025-06-09 PROCEDURE — 83970 ASSAY OF PARATHORMONE: CPT

## 2025-06-09 PROCEDURE — 84156 ASSAY OF PROTEIN URINE: CPT

## 2025-06-10 LAB
CREAT UR-MCNC: 152 MG/DL
PROT ?TM UR-MCNC: 77.2 MG/DL
PROT/CREAT UR: 507.9 MG/G CREA (ref 0–200)

## 2025-06-18 ENCOUNTER — HOSPITAL ENCOUNTER (EMERGENCY)
Facility: HOSPITAL | Age: 75
Discharge: HOME | End: 2025-06-18
Attending: EMERGENCY MEDICINE
Payer: MEDICARE

## 2025-06-18 VITALS
SYSTOLIC BLOOD PRESSURE: 173 MMHG | OXYGEN SATURATION: 99 % | BODY MASS INDEX: 28.03 KG/M2 | DIASTOLIC BLOOD PRESSURE: 82 MMHG | HEART RATE: 83 BPM | HEIGHT: 70 IN | WEIGHT: 195.77 LBS | RESPIRATION RATE: 20 BRPM | TEMPERATURE: 98.3 F

## 2025-06-18 DIAGNOSIS — R51.9 FACIAL PAIN: Primary | ICD-10-CM

## 2025-06-18 PROCEDURE — 99282 EMERGENCY DEPT VISIT SF MDM: CPT

## 2025-06-18 RX ORDER — TRAMADOL HYDROCHLORIDE 50 MG/1
50 TABLET ORAL EVERY 6 HOURS PRN
Qty: 12 TABLET | Refills: 0 | Status: SHIPPED | OUTPATIENT
Start: 2025-06-18

## 2025-06-18 NOTE — ED PROVIDER NOTES
"Subjective   History of Present Illness  Patient is a 74-year-old male complaining of pain to the right side of his head and ear for the past 1 month.  He states he was on antibiotics without improvement.  He was sent down here by his family physician for possible temporal arteritis and biopsy.  Patient denies other complaints at this time.      Review of Systems    Past Medical History:   Diagnosis Date    Diabetes mellitus     Hyperlipidemia     Hypertension     Kidney stone     TIA (transient ischemic attack) 2016    Type 2 diabetes mellitus with stage 3 chronic kidney disease, with long-term current use of insulin 12/13/2021       Allergies   Allergen Reactions    Contrast Dye (Echo Or Unknown Ct/Mr) Anaphylaxis     Not Lumason    Iodinated Contrast Media Shortness Of Breath    Iodine Shortness Of Breath    Aspirin Nausea And Vomiting    Lipitor [Atorvastatin] Unknown - High Severity     \"Muscle spasms\" 4/17/25    Prednisone Hives       Past Surgical History:   Procedure Laterality Date    ADENOIDECTOMY      ENDOSCOPY N/A 12/13/2023    Procedure: ESOPHAGOGASTRODUODENOSCOPY with removal of NJ tube from right nare;  Surgeon: Jaret Valadez MD;  Location: [facility] ENDOSCOPY;  Service: Gastroenterology;  Laterality: N/A;  esophagitis, gastric ulcer    EXTRACORPOREAL SHOCK WAVE LITHOTRIPSY (ESWL)      HERNIA REPAIR      KNEE ACL RECONSTRUCTION Left     TONSILLECTOMY         Family History   Problem Relation Age of Onset    No Known Problems Mother     No Known Problems Father        Social History     Socioeconomic History    Marital status:    Tobacco Use    Smoking status: Never     Passive exposure: Never    Smokeless tobacco: Never   Vaping Use    Vaping status: Never Used   Substance and Sexual Activity    Alcohol use: Never    Drug use: Never    Sexual activity: Defer           Objective   Physical Exam  Neurologic exam is nonfocal.  TMs are clear.  Oropharynx comers.  Facial exam shows no point " tenderness.  Extraocular muscles are intact.  Neck is nontender.  Has no adenopathy JVD or bruits remainder of exam is unremarkable.  Procedures           ED Course                                                       Medical Decision Making  I did speak to the on-call vascular surgeon.  Patient be discharged follow-up with vascular in his office for further evaluation.    Risk  Prescription drug management.        Final diagnoses:   Facial pain       ED Disposition  ED Disposition       ED Disposition   Discharge    Condition   Stable    Comment   --               No follow-up provider specified.       Medication List        New Prescriptions      traMADol 50 MG tablet  Commonly known as: ULTRAM  Take 1 tablet by mouth Every 6 (Six) Hours As Needed for Severe Pain.               Where to Get Your Medications        These medications were sent to [facility] [id number] - [place], [place] - [address] AT [facility] - [phone]  - [phone] FX  [address] [facility] [place] [address]      Phone: [phone]   traMADol 50 MG tablet            Vazquez Willson MD  06/18/25 7794

## 2025-06-20 ENCOUNTER — HOSPITAL ENCOUNTER (EMERGENCY)
Facility: HOSPITAL | Age: 75
Discharge: HOME | End: 2025-06-20
Payer: MEDICARE

## 2025-06-20 ENCOUNTER — APPOINTMENT (OUTPATIENT)
Dept: MRI IMAGING | Facility: HOSPITAL | Age: 75
End: 2025-06-20
Payer: MEDICARE

## 2025-06-20 VITALS
BODY MASS INDEX: 28.03 KG/M2 | RESPIRATION RATE: 18 BRPM | SYSTOLIC BLOOD PRESSURE: 139 MMHG | HEIGHT: 70 IN | WEIGHT: 195.77 LBS | TEMPERATURE: 98.6 F | HEART RATE: 91 BPM | DIASTOLIC BLOOD PRESSURE: 81 MMHG | OXYGEN SATURATION: 96 %

## 2025-06-20 DIAGNOSIS — H66.3X1 CHRONIC SUPPURATIVE OTITIS MEDIA OF RIGHT EAR, UNSPECIFIED OTITIS MEDIA LOCATION: Primary | ICD-10-CM

## 2025-06-20 DIAGNOSIS — R42 DIZZY SPELLS: ICD-10-CM

## 2025-06-20 DIAGNOSIS — R51.9 NONINTRACTABLE HEADACHE, UNSPECIFIED CHRONICITY PATTERN, UNSPECIFIED HEADACHE TYPE: ICD-10-CM

## 2025-06-20 LAB
ALBUMIN SERPL-MCNC: 3.5 G/DL (ref 3.5–5.2)
ALBUMIN/GLOB SERPL: 1 G/DL
ALP SERPL-CCNC: 151 U/L (ref 39–117)
ALT SERPL W P-5'-P-CCNC: 15 U/L (ref 1–41)
ANION GAP SERPL CALCULATED.3IONS-SCNC: 7.9 MMOL/L (ref 5–15)
AST SERPL-CCNC: 22 U/L (ref 1–40)
BACTERIA UR QL AUTO: NORMAL /HPF
BASOPHILS # BLD AUTO: 0.02 10*3/MM3 (ref 0–0.2)
BASOPHILS NFR BLD AUTO: 0.2 % (ref 0–1.5)
BILIRUB SERPL-MCNC: 0.5 MG/DL (ref 0–1.2)
BILIRUB UR QL STRIP: NEGATIVE
BUN SERPL-MCNC: 14.8 MG/DL (ref 8–23)
BUN/CREAT SERPL: 11.3 (ref 7–25)
CALCIUM SPEC-SCNC: 9.3 MG/DL (ref 8.6–10.5)
CHLORIDE SERPL-SCNC: 106 MMOL/L (ref 98–107)
CLARITY UR: CLEAR
CO2 SERPL-SCNC: 26.1 MMOL/L (ref 22–29)
COLOR UR: YELLOW
CREAT SERPL-MCNC: 1.31 MG/DL (ref 0.76–1.27)
DEPRECATED RDW RBC AUTO: 43.2 FL (ref 37–54)
EGFRCR SERPLBLD CKD-EPI 2021: 56.8 ML/MIN/1.73
EOSINOPHIL # BLD AUTO: 0.13 10*3/MM3 (ref 0–0.4)
EOSINOPHIL NFR BLD AUTO: 1.6 % (ref 0.3–6.2)
ERYTHROCYTE [DISTWIDTH] IN BLOOD BY AUTOMATED COUNT: 12.8 % (ref 12.3–15.4)
ERYTHROCYTE [SEDIMENTATION RATE] IN BLOOD: 45 MM/HR (ref 0–20)
GEN 5 1HR TROPONIN T REFLEX: 41 NG/L
GLOBULIN UR ELPH-MCNC: 3.4 GM/DL
GLUCOSE SERPL-MCNC: 90 MG/DL (ref 65–99)
GLUCOSE UR STRIP-MCNC: NEGATIVE MG/DL
HCT VFR BLD AUTO: 34.8 % (ref 37.5–51)
HGB BLD-MCNC: 10.7 G/DL (ref 13–17.7)
HGB UR QL STRIP.AUTO: NEGATIVE
HOLD SPECIMEN: NORMAL
HYALINE CASTS UR QL AUTO: NORMAL /LPF
IMM GRANULOCYTES # BLD AUTO: 0.03 10*3/MM3 (ref 0–0.05)
IMM GRANULOCYTES NFR BLD AUTO: 0.4 % (ref 0–0.5)
KETONES UR QL STRIP: ABNORMAL
LEUKOCYTE ESTERASE UR QL STRIP.AUTO: ABNORMAL
LYMPHOCYTES # BLD AUTO: 1.01 10*3/MM3 (ref 0.7–3.1)
LYMPHOCYTES NFR BLD AUTO: 12.1 % (ref 19.6–45.3)
MAGNESIUM SERPL-MCNC: 2.1 MG/DL (ref 1.6–2.4)
MCH RBC QN AUTO: 28.5 PG (ref 26.6–33)
MCHC RBC AUTO-ENTMCNC: 30.7 G/DL (ref 31.5–35.7)
MCV RBC AUTO: 92.8 FL (ref 79–97)
MONOCYTES # BLD AUTO: 0.56 10*3/MM3 (ref 0.1–0.9)
MONOCYTES NFR BLD AUTO: 6.7 % (ref 5–12)
NEUTROPHILS NFR BLD AUTO: 6.57 10*3/MM3 (ref 1.7–7)
NEUTROPHILS NFR BLD AUTO: 79 % (ref 42.7–76)
NITRITE UR QL STRIP: NEGATIVE
NRBC BLD AUTO-RTO: 0 /100 WBC (ref 0–0.2)
NT-PROBNP SERPL-MCNC: 486 PG/ML (ref 0–1800)
PH UR STRIP.AUTO: <=5 [PH] (ref 5–8)
PHOSPHATE SERPL-MCNC: 2.2 MG/DL (ref 2.5–4.5)
PLATELET # BLD AUTO: 138 10*3/MM3 (ref 140–450)
PMV BLD AUTO: 10.4 FL (ref 6–12)
POTASSIUM SERPL-SCNC: 3.6 MMOL/L (ref 3.5–5.2)
PROT SERPL-MCNC: 6.9 G/DL (ref 6–8.5)
PROT UR QL STRIP: ABNORMAL
RBC # BLD AUTO: 3.75 10*6/MM3 (ref 4.14–5.8)
RBC # UR STRIP: NORMAL /HPF
REF LAB TEST METHOD: NORMAL
SODIUM SERPL-SCNC: 140 MMOL/L (ref 136–145)
SP GR UR STRIP: 1.02 (ref 1–1.03)
SQUAMOUS #/AREA URNS HPF: NORMAL /HPF
TROPONIN T % DELTA: -5
TROPONIN T NUMERIC DELTA: -2 NG/L
TROPONIN T SERPL HS-MCNC: 43 NG/L
TSH SERPL DL<=0.05 MIU/L-ACNC: 2.39 UIU/ML (ref 0.27–4.2)
UROBILINOGEN UR QL STRIP: ABNORMAL
WBC # UR STRIP: NORMAL /HPF
WBC NRBC COR # BLD AUTO: 8.32 10*3/MM3 (ref 3.4–10.8)
WHOLE BLOOD HOLD COAG: NORMAL

## 2025-06-20 PROCEDURE — 83735 ASSAY OF MAGNESIUM: CPT

## 2025-06-20 PROCEDURE — 85652 RBC SED RATE AUTOMATED: CPT

## 2025-06-20 PROCEDURE — 25010000002 DEXAMETHASONE PER 1 MG

## 2025-06-20 PROCEDURE — 80053 COMPREHEN METABOLIC PANEL: CPT

## 2025-06-20 PROCEDURE — 36415 COLL VENOUS BLD VENIPUNCTURE: CPT

## 2025-06-20 PROCEDURE — 84484 ASSAY OF TROPONIN QUANT: CPT

## 2025-06-20 PROCEDURE — 96372 THER/PROPH/DIAG INJ SC/IM: CPT

## 2025-06-20 PROCEDURE — 84443 ASSAY THYROID STIM HORMONE: CPT

## 2025-06-20 PROCEDURE — 70551 MRI BRAIN STEM W/O DYE: CPT

## 2025-06-20 PROCEDURE — 81001 URINALYSIS AUTO W/SCOPE: CPT

## 2025-06-20 PROCEDURE — 93005 ELECTROCARDIOGRAM TRACING: CPT

## 2025-06-20 PROCEDURE — 84100 ASSAY OF PHOSPHORUS: CPT

## 2025-06-20 PROCEDURE — 83880 ASSAY OF NATRIURETIC PEPTIDE: CPT

## 2025-06-20 PROCEDURE — 85025 COMPLETE CBC W/AUTO DIFF WBC: CPT

## 2025-06-20 PROCEDURE — 99284 EMERGENCY DEPT VISIT MOD MDM: CPT

## 2025-06-20 RX ORDER — HYDROCODONE BITARTRATE AND ACETAMINOPHEN 5; 325 MG/1; MG/1
1 TABLET ORAL EVERY 6 HOURS PRN
Qty: 6 TABLET | Refills: 0 | Status: SHIPPED | OUTPATIENT
Start: 2025-06-20 | End: 2025-06-26

## 2025-06-20 RX ORDER — DEXAMETHASONE SODIUM PHOSPHATE 4 MG/ML
10 INJECTION, SOLUTION INTRA-ARTICULAR; INTRALESIONAL; INTRAMUSCULAR; INTRAVENOUS; SOFT TISSUE ONCE
Status: COMPLETED | OUTPATIENT
Start: 2025-06-20 | End: 2025-06-20

## 2025-06-20 RX ORDER — LINEZOLID 600 MG/1
600 TABLET, FILM COATED ORAL 2 TIMES DAILY
Qty: 28 TABLET | Refills: 0 | Status: SHIPPED | OUTPATIENT
Start: 2025-06-20 | End: 2025-07-04

## 2025-06-20 RX ORDER — SODIUM CHLORIDE 0.9 % (FLUSH) 0.9 %
10 SYRINGE (ML) INJECTION AS NEEDED
Status: DISCONTINUED | OUTPATIENT
Start: 2025-06-20 | End: 2025-06-20 | Stop reason: HOSPADM

## 2025-06-20 RX ADMIN — DEXAMETHASONE SODIUM PHOSPHATE 10 MG: 4 INJECTION, SOLUTION INTRAMUSCULAR; INTRAVENOUS at 15:45

## 2025-06-20 NOTE — ED NOTES
Pt arrived to ED via EMS with shirt, pants, shoes, and medications with c/o right ear pain. Medications and shirt returned to wife at bedside.

## 2025-06-20 NOTE — DISCHARGE INSTRUCTIONS
You were seen today in the hospital for recurrent headaches with dizzy spells.  You have also been treated recently for an ear infection also known as otitis media.  Today we completed an MRI of your brain which showed that you had mastoid effusions on both sides, worse on the right side.  Your right ear also appears to still have a middle ear infection.  With the middle ear infection, your other symptoms of dizziness and headache.  The clinical diagnosis of chronic suppurative otitis media has been made.  Please take your antibiotic as directed until gone, and please take the medication for pain relief 1 tablet at bedtime.  You may contact ENT and ask for an emergency visit follow-up to see if they may be able to see you sooner than your appointment that is previously been scheduled.  Contact your primary care provider for an emergency visit follow-up as well, and use your emergency provider for all nonemergent medical concerns.  Return to the emergency department if current symptoms worsen or if patient experiences any chest pain, shortness of breath, nausea vomiting, or any other medical complaints or concerns.

## 2025-06-20 NOTE — ED PROVIDER NOTES
Subjective     Chief Complaint   Patient presents with    Dizziness     History of Present Illness  Chief complaint: Headache with dizziness    Context: Pt is 74-year-old male with a history of DM, hypertension, hyperlipidemia, and TIA, who presents to the emergency department with complaints of chronic headache with new onset of dizziness.  Patient was seen in this ED 2 days prior and was given a referral to vascular surgery.  Patient and his wife state that they contacted vascular but vascular stated that they did not have the appropriate referral.  They contacted the family doctor and the family doctor encouraged them to come back to the emergency department to be evaluated for temporal arteritis, for biopsy, and steroid treatment.  Patient states generalized headache for past 2 months getting progressively worse with tenderness behind right ear and under right eye.  Patient denies any visual changes.  Patient states dizziness began this morning and states he was too dizzy to stand up and walk to get out of bed.  Patient normally is independently ambulatory.  Patient denies any other complaints not listed above; denies chest pain, shortness of breath, abdominal pain.    PCP: Sahil Read MD    ECHO 3/2/2025:  ·  Left ventricular systolic function is normal. Left ventricular ejection fraction appears to be 61 - 65%.  ·  Left ventricular diastolic function is consistent with (grade I) impaired relaxation.  ·  Estimated right ventricular systolic pressure from tricuspid regurgitation is normal (<35 mmHg).          Review of Systems   Neurological:  Positive for dizziness.       Past Medical History:   Diagnosis Date    Diabetes mellitus     Hyperlipidemia     Hypertension     Kidney stone     TIA (transient ischemic attack) 2016    Type 2 diabetes mellitus with stage 3 chronic kidney disease, with long-term current use of insulin 12/13/2021       Allergies   Allergen Reactions    Contrast Dye (Echo Or Unknown  "Ct/Mr) Anaphylaxis     Not Lumason    Iodinated Contrast Media Shortness Of Breath    Iodine Shortness Of Breath    Aspirin Nausea And Vomiting    Lipitor [Atorvastatin] Unknown - High Severity     \"Muscle spasms\" 4/17/25    Prednisone Hives       Past Surgical History:   Procedure Laterality Date    ADENOIDECTOMY      ENDOSCOPY N/A 12/13/2023    Procedure: ESOPHAGOGASTRODUODENOSCOPY with removal of NJ tube from right nare;  Surgeon: Jaret Valadez MD;  Location: [facility] ENDOSCOPY;  Service: Gastroenterology;  Laterality: N/A;  esophagitis, gastric ulcer    EXTRACORPOREAL SHOCK WAVE LITHOTRIPSY (ESWL)      HERNIA REPAIR      KNEE ACL RECONSTRUCTION Left     TONSILLECTOMY         Family History   Problem Relation Age of Onset    No Known Problems Mother     No Known Problems Father        Social History     Socioeconomic History    Marital status:    Tobacco Use    Smoking status: Never     Passive exposure: Never    Smokeless tobacco: Never   Vaping Use    Vaping status: Never Used   Substance and Sexual Activity    Alcohol use: Never    Drug use: Never    Sexual activity: Defer           Objective   Physical Exam  Vitals and nursing note reviewed.   Constitutional:       General: He is not in acute distress.     Appearance: He is obese. He is ill-appearing. He is not toxic-appearing.   HENT:      Head: Normocephalic and atraumatic.      Comments: Tenderness to palpation in area of right mastoid and right parotid glands.     Right Ear: Decreased hearing noted.      Left Ear: Decreased hearing noted.      Nose: No congestion or rhinorrhea.      Mouth/Throat:      Mouth: Mucous membranes are moist.      Pharynx: Oropharynx is clear.   Eyes:      Extraocular Movements: Extraocular movements intact.      Pupils: Pupils are equal, round, and reactive to light.   Cardiovascular:      Rate and Rhythm: Normal rate. Rhythm irregular.      Pulses: Normal pulses.   Pulmonary:      Effort: Pulmonary effort is " "normal. No respiratory distress.      Breath sounds: No stridor. No wheezing, rhonchi or rales.   Abdominal:      General: Bowel sounds are normal.      Palpations: Abdomen is soft.      Tenderness: There is no abdominal tenderness.   Musculoskeletal:         General: No swelling or tenderness. Normal range of motion.      Cervical back: Normal range of motion. No rigidity or tenderness.      Right lower leg: No edema.      Left lower leg: No edema.   Skin:     General: Skin is warm and dry.      Capillary Refill: Capillary refill takes less than 2 seconds.      Coloration: Skin is pale. Skin is not jaundiced.   Neurological:      General: No focal deficit present.      Mental Status: He is alert. Mental status is at baseline.   Psychiatric:         Mood and Affect: Mood normal.         Behavior: Behavior normal.         Thought Content: Thought content normal.         Judgment: Judgment normal.         Procedures           ED Course  ED Course as of 06/21/25 1051   Fri Jun 20, 2025   1215 Sed rate reviewed.  Level of 43 today significantly decreased from level of 73 on 6/16/2025. [RS]   1242 Waiting for MRI. [RS]   1257 Troponin noted.  Appears to be chronically elevated.  Will reevaluate when 1 hour troponin results. [RS]   1411 MRI final results reviewed.  Diagnosis of bilateral mastoid effusions, worse on the right, discussed with patient. [RS]   1426 Pt and wife request observation for ENT consult. Explained to them that an observation stay may be an option but other Rx options at home have not been tried and they would likely give him relief. [RS]   1520 WBC, UA: 0-2 [RS]      ED Course User Index  [RS] Hardy Arellano, PAViralC      /81   Pulse 91   Temp 98.6 °F (37 °C) (Oral)   Resp 18   Ht 177.8 cm (70\")   Wt 88.8 kg (195 lb 12.3 oz)   SpO2 96%   BMI 28.09 kg/m²   Labs Reviewed   COMPREHENSIVE METABOLIC PANEL - Abnormal; Notable for the following components:       Result Value    Creatinine 1.31 " (*)     Alkaline Phosphatase 151 (*)     eGFR 56.8 (*)     All other components within normal limits    Narrative:     GFR Categories in Chronic Kidney Disease (CKD)              GFR Category          GFR (mL/min/1.73)    Interpretation  G1                    90 or greater        Normal or high (1)  G2                    60-89                Mild decrease (1)  G3a                   45-59                Mild to moderate decrease  G3b                   30-44                Moderate to severe decrease  G4                    15-29                Severe decrease  G5                    14 or less           Kidney failure    (1)In the absence of evidence of kidney disease, neither GFR category G1 or G2 fulfill the criteria for CKD.    eGFR calculation 2021 CKD-EPI creatinine equation, which does not include race as a factor   URINALYSIS W/ CULTURE IF INDICATED - Abnormal; Notable for the following components:    Ketones, UA Trace (*)     Protein,  mg/dL (2+) (*)     Leuk Esterase, UA Trace (*)     All other components within normal limits    Narrative:     In absence of clinical symptoms, the presence of pyuria, bacteria, and/or nitrites on the urinalysis result does not correlate with infection.   TROPONIN - Abnormal; Notable for the following components:    HS Troponin T 43 (*)     All other components within normal limits    Narrative:     High Sensitive Troponin T Reference Range:  <14.0 ng/L- Negative Female for AMI  <22.0 ng/L- Negative Male for AMI  >=14 - Abnormal Female indicating possible myocardial injury.  >=22 - Abnormal Male indicating possible myocardial injury.   Clinicians would have to utilize clinical acumen, EKG, Troponin, and serial changes to determine if it is an Acute Myocardial Infarction or myocardial injury due to an underlying chronic condition.        SEDIMENTATION RATE - Abnormal; Notable for the following components:    Sed Rate 45 (*)     All other components within normal limits    PHOSPHORUS - Abnormal; Notable for the following components:    Phosphorus 2.2 (*)     All other components within normal limits   CBC WITH AUTO DIFFERENTIAL - Abnormal; Notable for the following components:    RBC 3.75 (*)     Hemoglobin 10.7 (*)     Hematocrit 34.8 (*)     MCHC 30.7 (*)     Platelets 138 (*)     Neutrophil % 79.0 (*)     Lymphocyte % 12.1 (*)     All other components within normal limits   HIGH SENSITIVITIY TROPONIN T 1HR - Abnormal; Notable for the following components:    HS Troponin T 41 (*)     All other components within normal limits    Narrative:     High Sensitive Troponin T Reference Range:  <14.0 ng/L- Negative Female for AMI  <22.0 ng/L- Negative Male for AMI  >=14 - Abnormal Female indicating possible myocardial injury.  >=22 - Abnormal Male indicating possible myocardial injury.   Clinicians would have to utilize clinical acumen, EKG, Troponin, and serial changes to determine if it is an Acute Myocardial Infarction or myocardial injury due to an underlying chronic condition.        BNP (IN-HOUSE) - Normal    Narrative:     This assay is used as an aid in the diagnosis of individuals suspected of having heart failure. It can be used as an aid in the diagnosis of acute decompensated heart failure (ADHF) in patients presenting with signs and symptoms of ADHF to the emergency department (ED). In addition, NT-proBNP of <300 pg/mL indicates ADHF is not likely.    Age Range Result Interpretation  NT-proBNP Concentration (pg/mL:      <50             Positive            >450                   Gray                 300-450                    Negative             <300    50-75           Positive            >900                  Gray                300-900                  Negative            <300      >75             Positive            >1800                  Gray                300-1800                  Negative            <300   TSH RFX ON ABNORMAL TO FREE T4 - Normal   MAGNESIUM - Normal  "  URINALYSIS, MICROSCOPIC ONLY   CBC AND DIFFERENTIAL    Narrative:     The following orders were created for panel order CBC & Differential.  Procedure                               Abnormality         Status                     ---------                               -----------         ------                     CBC Auto Differential[[id number]]        Abnormal            Final result               Scan Slide[[id number]]                                                                    Please view results for these tests on the individual orders.   EXTRA TUBES    Narrative:     The following orders were created for panel order Extra Tubes.  Procedure                               Abnormality         Status                     ---------                               -----------         ------                     Gold Top - SST[[id number]]                                   Final result               Light Blue Top[[id number]]                                   Final result                 Please view results for these tests on the individual orders.   GOLD TOP - SST   LIGHT BLUE TOP     Medications   dexAMETHasone (DECADRON) injection 10 mg (10 mg Intramuscular Given 6/20/25 1545)     MRI Brain Without Contrast  Result Date: 6/20/2025  Impression: 1. No acute MRI findings. Specifically no findings to suggest a recent infarct or acute intracranial hemorrhage. 2. Sequelae of chronic microvascular ischemic change and global parenchymal volume loss. 3. Bilateral mastoid effusions, large on the right. Electronically Signed: Isaiah Wheatley MD  6/20/2025 1:38 PM EDT  Workstation ID: [id number]                                                     Medical Decision Making  /81   Pulse 91   Temp 98.6 °F (37 °C) (Oral)   Resp 18   Ht 177.8 cm (70\")   Wt 88.8 kg (195 lb 12.3 oz)   SpO2 96%   BMI 28.09 kg/m²      Chart review: Patient recent ED/UC visits reviewed.  Patient was seen at [facility] ED on 6/18/2025 for facial pain.  " Patient was seen and admitted on 414 for renal failure, on 312 for sepsis, and on 225 for weakness.    Patient is 74-year-old male who presents the emergency department complaining of ongoing facial pain and headache with new onset dizziness.  See full HPI with primary assessment and exam above.    Patient is placed into ED bed and gown for assessment and IV access is maintained for labs and medication if indicated.  Primary differentials for patient include but are not limited to temporal arteritis, chronic otitis media, mastoiditis, acoustic neuroma.    Comorbidities:  has a past medical history of Diabetes mellitus, Hyperlipidemia, Hypertension, Kidney stone, TIA (transient ischemic attack) (2016), and Type 2 diabetes mellitus with stage 3 chronic kidney disease, with long-term current use of insulin (12/13/2021).    Discussion with provider: Dr. Pito Tiwari    Radiology interpretation:  Imaging reviewed by ED Attending physician and myself and interpreted by radiologist.  MRI Brain Without Contrast  Result Date: 6/20/2025  Impression: 1. No acute MRI findings. Specifically no findings to suggest a recent infarct or acute intracranial hemorrhage. 2. Sequelae of chronic microvascular ischemic change and global parenchymal volume loss. 3. Bilateral mastoid effusions, large on the right. Electronically Signed: Isaiah Wheatley MD  6/20/2025 1:38 PM EDT  Workstation ID: [id number]    Lab interpretation:  Labs viewed by me significant for elevated sed rate at 45, chronically elevated troponin, and stable anemia.  CMP is negative for significant electrolyte dysfunction or kidney injury and BNP and TSH were within normal limits.    EKG interpreted by ED attending physician and myself with sinus rhythm with PVCs; rate 92, , QTc 457.  EKG compared to previous from 3/12/2025.  EKG at that time interpreted as sinus tachycardia; rate 108, , QTc 448.    All results were reviewed and plan of care discussed with  patient and wife at bedside.  Plan of care is to discharge home with new prescriptions for diagnosed chronic suppurative otitis media and to follow-up with primary care, ENT, and neurosurgery if desired.  Patient and wife verbalized understanding of and are amenable to this plan of care.    The following discharge instructions were given to the patient:  You were seen today in the hospital for recurrent headaches with dizzy spells.  You have also been treated recently for an ear infection also known as otitis media.  Today we completed an MRI of your brain which showed that you had mastoid effusions on both sides, worse on the right side.  Your right ear also appears to still have a middle ear infection.  With the middle ear infection, your other symptoms of dizziness and headache.  The clinical diagnosis of chronic suppurative otitis media has been made.  Please take your antibiotic as directed until gone, and please take the medication for pain relief 1 tablet at bedtime.  You may contact ENT and ask for an emergency visit follow-up to see if they may be able to see you sooner than your appointment that is previously been scheduled.  Contact your primary care provider for an emergency visit follow-up as well, and use your emergency provider for all nonemergent medical concerns.  Return to the emergency department if current symptoms worsen or if patient experiences any chest pain, shortness of breath, nausea vomiting, or any other medical complaints or concerns.    Appropriate PPE worn during exam.    I discussed findings with patient who voiced understanding of discharge instructions, signs and symptoms requiring return to ED; discharged improved and in stable condition with follow up for re-evaluation.  This document is intended for medical expert use only. Reading of this document by patients and/or patient's family without participating medical staff guidance may result in misinterpretation and unintended  morbidity.  Any interpretation of such data is the responsibility of the patient and/or family member responsible for the patient in concert with their primary or specialist providers, not to be left for sources of online searches such as iyzico, [employer] or similar queries. Relying on these approaches to knowledge may result in misinterpretation, misguided goals of care and even death should patients or family members try recommendations outside of the realm of professional medical care in a supervised inpatient environment.         Problems Addressed:  Chronic suppurative otitis media of right ear, unspecified otitis media location: complicated acute illness or injury  Dizzy spells: complicated acute illness or injury  Nonintractable headache, unspecified chronicity pattern, unspecified headache type: complicated acute illness or injury    Amount and/or Complexity of Data Reviewed  Labs: ordered. Decision-making details documented in ED Course.  Radiology: ordered.  ECG/medicine tests: ordered.    Risk  Prescription drug management.    -    Final diagnoses:   Chronic suppurative otitis media of right ear, unspecified otitis media location   Dizzy spells   Nonintractable headache, unspecified chronicity pattern, unspecified headache type       ED Disposition  ED Disposition       ED Disposition   Discharge    Condition   Stable    Comment   --               Sahil Read MD  [address] [id number]  [phone]          [facility]  [address] [facility] [address]  [phone]             Medication List        New Prescriptions      HYDROcodone-acetaminophen 5-325 MG per tablet  Commonly known as: NORCO  Take 1 tablet by mouth Every 6 (Six) Hours As Needed for Severe Pain (Take at bedtime) for up to 6 days.     linezolid 600 MG tablet  Commonly known as: ZYVOX  Take 1 tablet by mouth 2 (Two) Times a Day for 14 days.     naloxone 4 MG/0.1ML nasal spray  Commonly  known as: NARCAN  Call 911. Don't prime. [place] in 1 nostril for overdose. Repeat in 2-3 minutes in other nostril if no or minimal breathing/responsiveness.               Where to Get Your Medications        These medications were sent to [facility] [address] [place] [address]      Hours: Monday to Friday 7 AM to 7 PM Phone: [phone]   HYDROcodone-acetaminophen 5-325 MG per tablet  linezolid 600 MG tablet  naloxone 4 MG/0.1ML nasal spray            Hardy Arellano, PAViralC  06/21/25 1059

## 2025-06-21 LAB
QT INTERVAL: 370 MS
QTC INTERVAL: 457 MS

## 2025-06-27 PROBLEM — R51.9 ACUTE INTRACTABLE HEADACHE: Status: ACTIVE | Noted: 2025-06-27

## 2025-08-10 ENCOUNTER — HOSPITAL ENCOUNTER (INPATIENT)
Facility: HOSPITAL | Age: 75
LOS: 3 days | Discharge: NURSING, REHAB OR LONG-TERM CARE | DRG: 948 | End: 2025-08-15
Attending: EMERGENCY MEDICINE | Admitting: STUDENT IN AN ORGANIZED HEALTH CARE EDUCATION/TRAINING PROGRAM
Payer: MEDICARE

## 2025-08-10 DIAGNOSIS — H60.91 OTITIS EXTERNA OF RIGHT EAR, UNSPECIFIED CHRONICITY, UNSPECIFIED TYPE: ICD-10-CM

## 2025-08-10 DIAGNOSIS — H74.93 DISORDER OF BOTH MASTOIDS: ICD-10-CM

## 2025-08-10 DIAGNOSIS — R53.1 WEAKNESS: Primary | ICD-10-CM

## 2025-08-10 LAB — GLUCOSE BLDC GLUCOMTR-MCNC: 216 MG/DL (ref 70–105)

## 2025-08-10 PROCEDURE — 93005 ELECTROCARDIOGRAM TRACING: CPT | Performed by: EMERGENCY MEDICINE

## 2025-08-10 PROCEDURE — 93005 ELECTROCARDIOGRAM TRACING: CPT

## 2025-08-10 PROCEDURE — 99285 EMERGENCY DEPT VISIT HI MDM: CPT

## 2025-08-10 PROCEDURE — 82948 REAGENT STRIP/BLOOD GLUCOSE: CPT

## 2025-08-10 RX ORDER — SODIUM CHLORIDE 0.9 % (FLUSH) 0.9 %
10 SYRINGE (ML) INJECTION AS NEEDED
Status: DISCONTINUED | OUTPATIENT
Start: 2025-08-10 | End: 2025-08-15 | Stop reason: HOSPADM

## 2025-08-11 ENCOUNTER — TRANSCRIBE ORDERS (OUTPATIENT)
Dept: ADMINISTRATIVE | Facility: HOSPITAL | Age: 75
End: 2025-08-11
Payer: MEDICARE

## 2025-08-11 ENCOUNTER — APPOINTMENT (OUTPATIENT)
Dept: CT IMAGING | Facility: HOSPITAL | Age: 75
DRG: 948 | End: 2025-08-11
Payer: MEDICARE

## 2025-08-11 ENCOUNTER — APPOINTMENT (OUTPATIENT)
Dept: MRI IMAGING | Facility: HOSPITAL | Age: 75
DRG: 948 | End: 2025-08-11
Payer: MEDICARE

## 2025-08-11 DIAGNOSIS — H92.11 OTORRHEA OF RIGHT EAR: Primary | ICD-10-CM

## 2025-08-11 LAB
ALBUMIN SERPL-MCNC: 3.8 G/DL (ref 3.5–5.2)
ALBUMIN/GLOB SERPL: 1.1 G/DL
ALP SERPL-CCNC: 146 U/L (ref 39–117)
ALT SERPL W P-5'-P-CCNC: 15 U/L (ref 1–41)
ANION GAP SERPL CALCULATED.3IONS-SCNC: 9.3 MMOL/L (ref 5–15)
AST SERPL-CCNC: 25 U/L (ref 1–40)
BACTERIA UR QL AUTO: NORMAL /HPF
BASOPHILS # BLD AUTO: 0.02 10*3/MM3 (ref 0–0.2)
BASOPHILS NFR BLD AUTO: 0.3 % (ref 0–1.5)
BILIRUB SERPL-MCNC: 0.6 MG/DL (ref 0–1.2)
BILIRUB UR QL STRIP: NEGATIVE
BUN SERPL-MCNC: 22.1 MG/DL (ref 8–23)
BUN/CREAT SERPL: 14.9 (ref 7–25)
CALCIUM SPEC-SCNC: 9.4 MG/DL (ref 8.6–10.5)
CHLORIDE SERPL-SCNC: 104 MMOL/L (ref 98–107)
CLARITY UR: CLEAR
CO2 SERPL-SCNC: 27.7 MMOL/L (ref 22–29)
COLOR UR: YELLOW
CREAT SERPL-MCNC: 1.48 MG/DL (ref 0.76–1.27)
D-LACTATE SERPL-SCNC: 0.9 MMOL/L (ref 0.3–2)
DEPRECATED RDW RBC AUTO: 45.4 FL (ref 37–54)
EGFRCR SERPLBLD CKD-EPI 2021: 49 ML/MIN/1.73
EOSINOPHIL # BLD AUTO: 0.1 10*3/MM3 (ref 0–0.4)
EOSINOPHIL NFR BLD AUTO: 1.6 % (ref 0.3–6.2)
ERYTHROCYTE [DISTWIDTH] IN BLOOD BY AUTOMATED COUNT: 13.5 % (ref 12.3–15.4)
GLOBULIN UR ELPH-MCNC: 3.4 GM/DL
GLUCOSE BLDC GLUCOMTR-MCNC: 144 MG/DL (ref 70–105)
GLUCOSE BLDC GLUCOMTR-MCNC: 166 MG/DL (ref 70–105)
GLUCOSE BLDC GLUCOMTR-MCNC: 171 MG/DL (ref 70–105)
GLUCOSE SERPL-MCNC: 285 MG/DL (ref 65–99)
GLUCOSE UR STRIP-MCNC: ABNORMAL MG/DL
HBA1C MFR BLD: 7.56 % (ref 4.8–5.6)
HCT VFR BLD AUTO: 34.5 % (ref 37.5–51)
HGB BLD-MCNC: 10.6 G/DL (ref 13–17.7)
HGB UR QL STRIP.AUTO: NEGATIVE
HOLD SPECIMEN: NORMAL
HOLD SPECIMEN: NORMAL
HYALINE CASTS UR QL AUTO: NORMAL /LPF
IMM GRANULOCYTES # BLD AUTO: 0.01 10*3/MM3 (ref 0–0.05)
IMM GRANULOCYTES NFR BLD AUTO: 0.2 % (ref 0–0.5)
KETONES UR QL STRIP: ABNORMAL
LEUKOCYTE ESTERASE UR QL STRIP.AUTO: NEGATIVE
LYMPHOCYTES # BLD AUTO: 1.19 10*3/MM3 (ref 0.7–3.1)
LYMPHOCYTES NFR BLD AUTO: 19 % (ref 19.6–45.3)
MCH RBC QN AUTO: 28.2 PG (ref 26.6–33)
MCHC RBC AUTO-ENTMCNC: 30.7 G/DL (ref 31.5–35.7)
MCV RBC AUTO: 91.8 FL (ref 79–97)
MONOCYTES # BLD AUTO: 0.41 10*3/MM3 (ref 0.1–0.9)
MONOCYTES NFR BLD AUTO: 6.5 % (ref 5–12)
MRSA DNA SPEC QL NAA+PROBE: ABNORMAL
NEUTROPHILS NFR BLD AUTO: 4.54 10*3/MM3 (ref 1.7–7)
NEUTROPHILS NFR BLD AUTO: 72.4 % (ref 42.7–76)
NITRITE UR QL STRIP: NEGATIVE
NRBC BLD AUTO-RTO: 0 /100 WBC (ref 0–0.2)
PH UR STRIP.AUTO: 7 [PH] (ref 5–8)
PLATELET # BLD AUTO: 143 10*3/MM3 (ref 140–450)
PMV BLD AUTO: 10.6 FL (ref 6–12)
POTASSIUM SERPL-SCNC: 4 MMOL/L (ref 3.5–5.2)
PROT SERPL-MCNC: 7.2 G/DL (ref 6–8.5)
PROT UR QL STRIP: ABNORMAL
QT INTERVAL: 353 MS
QTC INTERVAL: 432 MS
RBC # BLD AUTO: 3.76 10*6/MM3 (ref 4.14–5.8)
RBC # UR STRIP: NORMAL /HPF
REF LAB TEST METHOD: NORMAL
SODIUM SERPL-SCNC: 141 MMOL/L (ref 136–145)
SP GR UR STRIP: 1.02 (ref 1–1.03)
SQUAMOUS #/AREA URNS HPF: NORMAL /HPF
UROBILINOGEN UR QL STRIP: ABNORMAL
WBC # UR STRIP: NORMAL /HPF
WBC NRBC COR # BLD AUTO: 6.27 10*3/MM3 (ref 3.4–10.8)
WHOLE BLOOD HOLD COAG: NORMAL
WHOLE BLOOD HOLD SPECIMEN: NORMAL

## 2025-08-11 PROCEDURE — 70551 MRI BRAIN STEM W/O DYE: CPT

## 2025-08-11 PROCEDURE — 87205 SMEAR GRAM STAIN: CPT | Performed by: NURSE PRACTITIONER

## 2025-08-11 PROCEDURE — 63710000001 INSULIN GLARGINE PER 5 UNITS: Performed by: PHYSICIAN ASSISTANT

## 2025-08-11 PROCEDURE — 63710000001 INSULIN LISPRO (HUMAN) PER 5 UNITS: Performed by: PHYSICIAN ASSISTANT

## 2025-08-11 PROCEDURE — 81001 URINALYSIS AUTO W/SCOPE: CPT

## 2025-08-11 PROCEDURE — 85025 COMPLETE CBC W/AUTO DIFF WBC: CPT

## 2025-08-11 PROCEDURE — G0378 HOSPITAL OBSERVATION PER HR: HCPCS

## 2025-08-11 PROCEDURE — 87070 CULTURE OTHR SPECIMN AEROBIC: CPT | Performed by: NURSE PRACTITIONER

## 2025-08-11 PROCEDURE — 97162 PT EVAL MOD COMPLEX 30 MIN: CPT | Performed by: PHYSICAL THERAPIST

## 2025-08-11 PROCEDURE — 83036 HEMOGLOBIN GLYCOSYLATED A1C: CPT | Performed by: NURSE PRACTITIONER

## 2025-08-11 PROCEDURE — 82948 REAGENT STRIP/BLOOD GLUCOSE: CPT

## 2025-08-11 PROCEDURE — 87040 BLOOD CULTURE FOR BACTERIA: CPT

## 2025-08-11 PROCEDURE — 25010000002 MORPHINE PER 10 MG

## 2025-08-11 PROCEDURE — 87186 SC STD MICRODIL/AGAR DIL: CPT | Performed by: NURSE PRACTITIONER

## 2025-08-11 PROCEDURE — 83605 ASSAY OF LACTIC ACID: CPT

## 2025-08-11 PROCEDURE — 70486 CT MAXILLOFACIAL W/O DYE: CPT

## 2025-08-11 PROCEDURE — 82948 REAGENT STRIP/BLOOD GLUCOSE: CPT | Performed by: PHYSICIAN ASSISTANT

## 2025-08-11 PROCEDURE — 87077 CULTURE AEROBIC IDENTIFY: CPT | Performed by: NURSE PRACTITIONER

## 2025-08-11 PROCEDURE — 87075 CULTR BACTERIA EXCEPT BLOOD: CPT | Performed by: NURSE PRACTITIONER

## 2025-08-11 PROCEDURE — 80053 COMPREHEN METABOLIC PANEL: CPT

## 2025-08-11 PROCEDURE — 25810000003 SODIUM CHLORIDE 0.9 % SOLUTION

## 2025-08-11 PROCEDURE — 25010000002 ONDANSETRON PER 1 MG

## 2025-08-11 PROCEDURE — 25010000002 CEFTAZIDIME 2 G RECONSTITUTED SOLUTION 1 EACH VIAL: Performed by: NURSE PRACTITIONER

## 2025-08-11 PROCEDURE — 87641 MR-STAPH DNA AMP PROBE: CPT | Performed by: NURSE PRACTITIONER

## 2025-08-11 RX ORDER — AMOXICILLIN 250 MG
2 CAPSULE ORAL 2 TIMES DAILY PRN
Status: DISCONTINUED | OUTPATIENT
Start: 2025-08-11 | End: 2025-08-15 | Stop reason: HOSPADM

## 2025-08-11 RX ORDER — SODIUM CHLORIDE 0.9 % (FLUSH) 0.9 %
10 SYRINGE (ML) INJECTION AS NEEDED
Status: DISCONTINUED | OUTPATIENT
Start: 2025-08-11 | End: 2025-08-11

## 2025-08-11 RX ORDER — SODIUM CHLORIDE 0.9 % (FLUSH) 0.9 %
10 SYRINGE (ML) INJECTION EVERY 12 HOURS SCHEDULED
Status: DISCONTINUED | OUTPATIENT
Start: 2025-08-11 | End: 2025-08-15 | Stop reason: HOSPADM

## 2025-08-11 RX ORDER — ALUMINA, MAGNESIA, AND SIMETHICONE 2400; 2400; 240 MG/30ML; MG/30ML; MG/30ML
15 SUSPENSION ORAL EVERY 6 HOURS PRN
Status: DISCONTINUED | OUTPATIENT
Start: 2025-08-11 | End: 2025-08-15 | Stop reason: HOSPADM

## 2025-08-11 RX ORDER — GLIPIZIDE 2.5 MG/1
2.5 TABLET, EXTENDED RELEASE ORAL TAKE AS DIRECTED
Status: ON HOLD | COMMUNITY
End: 2025-08-11

## 2025-08-11 RX ORDER — CIPROFLOXACIN AND DEXAMETHASONE 3; 1 MG/ML; MG/ML
4 SUSPENSION/ DROPS AURICULAR (OTIC) 2 TIMES DAILY
Status: DISCONTINUED | OUTPATIENT
Start: 2025-08-11 | End: 2025-08-12

## 2025-08-11 RX ORDER — ONDANSETRON 2 MG/ML
4 INJECTION INTRAMUSCULAR; INTRAVENOUS ONCE
Status: COMPLETED | OUTPATIENT
Start: 2025-08-11 | End: 2025-08-11

## 2025-08-11 RX ORDER — BISACODYL 5 MG/1
5 TABLET, DELAYED RELEASE ORAL DAILY PRN
Status: DISCONTINUED | OUTPATIENT
Start: 2025-08-11 | End: 2025-08-15 | Stop reason: HOSPADM

## 2025-08-11 RX ORDER — BISACODYL 10 MG
10 SUPPOSITORY, RECTAL RECTAL DAILY PRN
Status: DISCONTINUED | OUTPATIENT
Start: 2025-08-11 | End: 2025-08-15 | Stop reason: HOSPADM

## 2025-08-11 RX ORDER — GLIPIZIDE 2.5 MG/1
2.5 TABLET ORAL TAKE AS DIRECTED
COMMUNITY

## 2025-08-11 RX ORDER — IBUPROFEN 600 MG/1
1 TABLET ORAL
Status: DISCONTINUED | OUTPATIENT
Start: 2025-08-11 | End: 2025-08-15 | Stop reason: HOSPADM

## 2025-08-11 RX ORDER — ROSUVASTATIN CALCIUM 40 MG/1
40 TABLET, COATED ORAL DAILY
COMMUNITY

## 2025-08-11 RX ORDER — GLIPIZIDE 5 MG/1
2.5 TABLET ORAL 2 TIMES WEEKLY
Status: DISCONTINUED | OUTPATIENT
Start: 2025-08-11 | End: 2025-08-15 | Stop reason: HOSPADM

## 2025-08-11 RX ORDER — DEXTROSE MONOHYDRATE 25 G/50ML
25 INJECTION, SOLUTION INTRAVENOUS
Status: DISCONTINUED | OUTPATIENT
Start: 2025-08-11 | End: 2025-08-15 | Stop reason: HOSPADM

## 2025-08-11 RX ORDER — ONDANSETRON 2 MG/ML
4 INJECTION INTRAMUSCULAR; INTRAVENOUS EVERY 6 HOURS PRN
Status: DISCONTINUED | OUTPATIENT
Start: 2025-08-11 | End: 2025-08-15 | Stop reason: HOSPADM

## 2025-08-11 RX ORDER — SODIUM CHLORIDE 9 MG/ML
40 INJECTION, SOLUTION INTRAVENOUS AS NEEDED
Status: DISCONTINUED | OUTPATIENT
Start: 2025-08-11 | End: 2025-08-11

## 2025-08-11 RX ORDER — LISINOPRIL 40 MG/1
40 TABLET ORAL DAILY
COMMUNITY

## 2025-08-11 RX ORDER — SODIUM CHLORIDE 9 MG/ML
40 INJECTION, SOLUTION INTRAVENOUS AS NEEDED
Status: DISCONTINUED | OUTPATIENT
Start: 2025-08-11 | End: 2025-08-15 | Stop reason: HOSPADM

## 2025-08-11 RX ORDER — GLIPIZIDE 5 MG/1
2.5 TABLET ORAL TAKE AS DIRECTED
Status: ON HOLD | COMMUNITY
End: 2025-08-11

## 2025-08-11 RX ORDER — POLYETHYLENE GLYCOL 3350 17 G/17G
17 POWDER, FOR SOLUTION ORAL DAILY PRN
Status: DISCONTINUED | OUTPATIENT
Start: 2025-08-11 | End: 2025-08-15 | Stop reason: HOSPADM

## 2025-08-11 RX ORDER — LISINOPRIL 20 MG/1
40 TABLET ORAL DAILY
Status: DISCONTINUED | OUTPATIENT
Start: 2025-08-11 | End: 2025-08-15 | Stop reason: HOSPADM

## 2025-08-11 RX ORDER — CLOPIDOGREL BISULFATE 75 MG/1
75 TABLET ORAL DAILY
Status: DISCONTINUED | OUTPATIENT
Start: 2025-08-11 | End: 2025-08-15 | Stop reason: HOSPADM

## 2025-08-11 RX ORDER — INSULIN GLARGINE 100 [IU]/ML
40 INJECTION, SOLUTION SUBCUTANEOUS NIGHTLY
COMMUNITY

## 2025-08-11 RX ORDER — VANCOMYCIN 1.75 GRAM/500 ML IN 0.9 % SODIUM CHLORIDE INTRAVENOUS
20 ONCE
Status: COMPLETED | OUTPATIENT
Start: 2025-08-11 | End: 2025-08-11

## 2025-08-11 RX ORDER — SODIUM CHLORIDE 0.9 % (FLUSH) 0.9 %
10 SYRINGE (ML) INJECTION AS NEEDED
Status: DISCONTINUED | OUTPATIENT
Start: 2025-08-11 | End: 2025-08-15 | Stop reason: HOSPADM

## 2025-08-11 RX ORDER — INSULIN GLARGINE 100 [IU]/ML
30 INJECTION, SOLUTION SUBCUTANEOUS NIGHTLY
Status: DISCONTINUED | OUTPATIENT
Start: 2025-08-11 | End: 2025-08-15 | Stop reason: HOSPADM

## 2025-08-11 RX ORDER — INSULIN LISPRO 100 [IU]/ML
2-9 INJECTION, SOLUTION INTRAVENOUS; SUBCUTANEOUS
Status: DISCONTINUED | OUTPATIENT
Start: 2025-08-11 | End: 2025-08-15 | Stop reason: HOSPADM

## 2025-08-11 RX ORDER — NICOTINE POLACRILEX 4 MG
15 LOZENGE BUCCAL
Status: DISCONTINUED | OUTPATIENT
Start: 2025-08-11 | End: 2025-08-15 | Stop reason: HOSPADM

## 2025-08-11 RX ADMIN — Medication 10 ML: at 22:00

## 2025-08-11 RX ADMIN — Medication 1750 MG: at 21:00

## 2025-08-11 RX ADMIN — CLOPIDOGREL BISULFATE 75 MG: 75 TABLET, FILM COATED ORAL at 17:26

## 2025-08-11 RX ADMIN — INSULIN LISPRO 2 UNITS: 100 INJECTION, SOLUTION INTRAVENOUS; SUBCUTANEOUS at 17:26

## 2025-08-11 RX ADMIN — LISINOPRIL 40 MG: 20 TABLET ORAL at 17:26

## 2025-08-11 RX ADMIN — GLIPIZIDE 2.5 MG: 5 TABLET ORAL at 17:26

## 2025-08-11 RX ADMIN — Medication 10 ML: at 21:00

## 2025-08-11 RX ADMIN — Medication 10 ML: at 11:47

## 2025-08-11 RX ADMIN — ONDANSETRON 4 MG: 2 INJECTION, SOLUTION INTRAMUSCULAR; INTRAVENOUS at 02:16

## 2025-08-11 RX ADMIN — CIPROFLOXACIN AND DEXAMETHASONE 4 DROP: 3; 1 SUSPENSION/ DROPS AURICULAR (OTIC) at 04:50

## 2025-08-11 RX ADMIN — CIPROFLOXACIN AND DEXAMETHASONE 4 DROP: 3; 1 SUSPENSION/ DROPS AURICULAR (OTIC) at 21:00

## 2025-08-11 RX ADMIN — SODIUM CHLORIDE 1000 ML: 9 INJECTION, SOLUTION INTRAVENOUS at 04:50

## 2025-08-11 RX ADMIN — CEFTAZIDIME 2000 MG: 2 INJECTION, POWDER, FOR SOLUTION INTRAVENOUS at 22:00

## 2025-08-11 RX ADMIN — MORPHINE SULFATE 4 MG: 4 INJECTION, SOLUTION INTRAMUSCULAR; INTRAVENOUS at 02:16

## 2025-08-11 RX ADMIN — INSULIN GLARGINE 30 UNITS: 100 INJECTION, SOLUTION SUBCUTANEOUS at 22:35

## 2025-08-12 PROBLEM — Z22.322 MRSA (METHICILLIN RESISTANT STAPH AUREUS) CULTURE POSITIVE: Status: ACTIVE | Noted: 2025-08-12

## 2025-08-12 LAB
ANION GAP SERPL CALCULATED.3IONS-SCNC: 11.1 MMOL/L (ref 5–15)
BASOPHILS # BLD AUTO: 0.01 10*3/MM3 (ref 0–0.2)
BASOPHILS NFR BLD AUTO: 0.2 % (ref 0–1.5)
BUN SERPL-MCNC: 16.7 MG/DL (ref 8–23)
BUN/CREAT SERPL: 12.1 (ref 7–25)
CALCIUM SPEC-SCNC: 8.6 MG/DL (ref 8.6–10.5)
CHLORIDE SERPL-SCNC: 108 MMOL/L (ref 98–107)
CO2 SERPL-SCNC: 23.9 MMOL/L (ref 22–29)
CREAT SERPL-MCNC: 1.38 MG/DL (ref 0.76–1.27)
DEPRECATED RDW RBC AUTO: 45.3 FL (ref 37–54)
EGFRCR SERPLBLD CKD-EPI 2021: 53.3 ML/MIN/1.73
EOSINOPHIL # BLD AUTO: 0.15 10*3/MM3 (ref 0–0.4)
EOSINOPHIL NFR BLD AUTO: 3 % (ref 0.3–6.2)
ERYTHROCYTE [DISTWIDTH] IN BLOOD BY AUTOMATED COUNT: 13.3 % (ref 12.3–15.4)
GLUCOSE BLDC GLUCOMTR-MCNC: 128 MG/DL (ref 70–105)
GLUCOSE BLDC GLUCOMTR-MCNC: 131 MG/DL (ref 70–105)
GLUCOSE BLDC GLUCOMTR-MCNC: 212 MG/DL (ref 70–105)
GLUCOSE BLDC GLUCOMTR-MCNC: 77 MG/DL (ref 70–105)
GLUCOSE SERPL-MCNC: 91 MG/DL (ref 65–99)
HCT VFR BLD AUTO: 31.4 % (ref 37.5–51)
HGB BLD-MCNC: 9.5 G/DL (ref 13–17.7)
IMM GRANULOCYTES # BLD AUTO: 0.01 10*3/MM3 (ref 0–0.05)
IMM GRANULOCYTES NFR BLD AUTO: 0.2 % (ref 0–0.5)
LYMPHOCYTES # BLD AUTO: 1.31 10*3/MM3 (ref 0.7–3.1)
LYMPHOCYTES NFR BLD AUTO: 25.9 % (ref 19.6–45.3)
MCH RBC QN AUTO: 28 PG (ref 26.6–33)
MCHC RBC AUTO-ENTMCNC: 30.3 G/DL (ref 31.5–35.7)
MCV RBC AUTO: 92.6 FL (ref 79–97)
MONOCYTES # BLD AUTO: 0.34 10*3/MM3 (ref 0.1–0.9)
MONOCYTES NFR BLD AUTO: 6.7 % (ref 5–12)
NEUTROPHILS NFR BLD AUTO: 3.24 10*3/MM3 (ref 1.7–7)
NEUTROPHILS NFR BLD AUTO: 64 % (ref 42.7–76)
NRBC BLD AUTO-RTO: 0 /100 WBC (ref 0–0.2)
PLATELET # BLD AUTO: 127 10*3/MM3 (ref 140–450)
PMV BLD AUTO: 10.7 FL (ref 6–12)
POTASSIUM SERPL-SCNC: 3.7 MMOL/L (ref 3.5–5.2)
RBC # BLD AUTO: 3.39 10*6/MM3 (ref 4.14–5.8)
SODIUM SERPL-SCNC: 143 MMOL/L (ref 136–145)
VANCOMYCIN SERPL-MCNC: 16.8 MCG/ML (ref 5–40)
WBC NRBC COR # BLD AUTO: 5.06 10*3/MM3 (ref 3.4–10.8)

## 2025-08-12 PROCEDURE — 63710000001 INSULIN GLARGINE PER 5 UNITS: Performed by: PHYSICIAN ASSISTANT

## 2025-08-12 PROCEDURE — 25810000003 SODIUM CHLORIDE 0.9 % SOLUTION 250 ML FLEX CONT: Performed by: NURSE PRACTITIONER

## 2025-08-12 PROCEDURE — 82948 REAGENT STRIP/BLOOD GLUCOSE: CPT | Performed by: PHYSICIAN ASSISTANT

## 2025-08-12 PROCEDURE — 63710000001 INSULIN LISPRO (HUMAN) PER 5 UNITS: Performed by: PHYSICIAN ASSISTANT

## 2025-08-12 PROCEDURE — 82948 REAGENT STRIP/BLOOD GLUCOSE: CPT

## 2025-08-12 PROCEDURE — 80048 BASIC METABOLIC PNL TOTAL CA: CPT | Performed by: PHYSICIAN ASSISTANT

## 2025-08-12 PROCEDURE — 25010000002 VANCOMYCIN HCL 1.25 G RECONSTITUTED SOLUTION 1 EACH VIAL: Performed by: NURSE PRACTITIONER

## 2025-08-12 PROCEDURE — 97116 GAIT TRAINING THERAPY: CPT

## 2025-08-12 PROCEDURE — 25010000002 CEFTAZIDIME 2 G RECONSTITUTED SOLUTION 1 EACH VIAL: Performed by: NURSE PRACTITIONER

## 2025-08-12 PROCEDURE — 97530 THERAPEUTIC ACTIVITIES: CPT

## 2025-08-12 PROCEDURE — 85025 COMPLETE CBC W/AUTO DIFF WBC: CPT | Performed by: PHYSICIAN ASSISTANT

## 2025-08-12 PROCEDURE — 80202 ASSAY OF VANCOMYCIN: CPT | Performed by: NURSE PRACTITIONER

## 2025-08-12 PROCEDURE — 97112 NEUROMUSCULAR REEDUCATION: CPT

## 2025-08-12 RX ORDER — CIPROFLOXACIN AND DEXAMETHASONE 3; 1 MG/ML; MG/ML
4 SUSPENSION/ DROPS AURICULAR (OTIC) EVERY 12 HOURS
Status: DISCONTINUED | OUTPATIENT
Start: 2025-08-12 | End: 2025-08-14

## 2025-08-12 RX ADMIN — Medication 10 ML: at 17:25

## 2025-08-12 RX ADMIN — CIPROFLOXACIN AND DEXAMETHASONE 4 DROP: 3; 1 SUSPENSION/ DROPS AURICULAR (OTIC) at 21:27

## 2025-08-12 RX ADMIN — Medication 10 ML: at 16:00

## 2025-08-12 RX ADMIN — Medication 10 ML: at 10:57

## 2025-08-12 RX ADMIN — CEFTAZIDIME 2000 MG: 2 INJECTION, POWDER, FOR SOLUTION INTRAVENOUS at 21:27

## 2025-08-12 RX ADMIN — INSULIN LISPRO 4 UNITS: 100 INJECTION, SOLUTION INTRAVENOUS; SUBCUTANEOUS at 21:27

## 2025-08-12 RX ADMIN — CEFTAZIDIME 2000 MG: 2 INJECTION, POWDER, FOR SOLUTION INTRAVENOUS at 09:38

## 2025-08-12 RX ADMIN — CIPROFLOXACIN AND DEXAMETHASONE 4 DROP: 3; 1 SUSPENSION/ DROPS AURICULAR (OTIC) at 09:15

## 2025-08-12 RX ADMIN — VANCOMYCIN HYDROCHLORIDE 1250 MG: 1.25 INJECTION, POWDER, LYOPHILIZED, FOR SOLUTION INTRAVENOUS at 16:06

## 2025-08-12 RX ADMIN — CLOPIDOGREL BISULFATE 75 MG: 75 TABLET, FILM COATED ORAL at 09:15

## 2025-08-12 RX ADMIN — LISINOPRIL 40 MG: 20 TABLET ORAL at 09:15

## 2025-08-12 RX ADMIN — Medication 10 ML: at 09:15

## 2025-08-12 RX ADMIN — INSULIN GLARGINE 30 UNITS: 100 INJECTION, SOLUTION SUBCUTANEOUS at 21:27

## 2025-08-13 LAB
ANION GAP SERPL CALCULATED.3IONS-SCNC: 10.1 MMOL/L (ref 5–15)
BASOPHILS # BLD AUTO: 0.03 10*3/MM3 (ref 0–0.2)
BASOPHILS NFR BLD AUTO: 0.5 % (ref 0–1.5)
BUN SERPL-MCNC: 15.2 MG/DL (ref 8–23)
BUN/CREAT SERPL: 12.7 (ref 7–25)
CALCIUM SPEC-SCNC: 9.1 MG/DL (ref 8.6–10.5)
CHLORIDE SERPL-SCNC: 106 MMOL/L (ref 98–107)
CO2 SERPL-SCNC: 25.9 MMOL/L (ref 22–29)
CREAT SERPL-MCNC: 1.2 MG/DL (ref 0.76–1.27)
DEPRECATED RDW RBC AUTO: 43.3 FL (ref 37–54)
EGFRCR SERPLBLD CKD-EPI 2021: 63.1 ML/MIN/1.73
EOSINOPHIL # BLD AUTO: 0.18 10*3/MM3 (ref 0–0.4)
EOSINOPHIL NFR BLD AUTO: 2.7 % (ref 0.3–6.2)
ERYTHROCYTE [DISTWIDTH] IN BLOOD BY AUTOMATED COUNT: 13.2 % (ref 12.3–15.4)
GLUCOSE BLDC GLUCOMTR-MCNC: 135 MG/DL (ref 70–105)
GLUCOSE BLDC GLUCOMTR-MCNC: 138 MG/DL (ref 70–105)
GLUCOSE BLDC GLUCOMTR-MCNC: 177 MG/DL (ref 70–105)
GLUCOSE BLDC GLUCOMTR-MCNC: 219 MG/DL (ref 70–105)
GLUCOSE BLDC GLUCOMTR-MCNC: 88 MG/DL (ref 70–105)
GLUCOSE SERPL-MCNC: 57 MG/DL (ref 65–99)
HCT VFR BLD AUTO: 33 % (ref 37.5–51)
HGB BLD-MCNC: 10.2 G/DL (ref 13–17.7)
IMM GRANULOCYTES # BLD AUTO: 0.01 10*3/MM3 (ref 0–0.05)
IMM GRANULOCYTES NFR BLD AUTO: 0.2 % (ref 0–0.5)
LYMPHOCYTES # BLD AUTO: 2.25 10*3/MM3 (ref 0.7–3.1)
LYMPHOCYTES NFR BLD AUTO: 33.9 % (ref 19.6–45.3)
MCH RBC QN AUTO: 27.8 PG (ref 26.6–33)
MCHC RBC AUTO-ENTMCNC: 30.9 G/DL (ref 31.5–35.7)
MCV RBC AUTO: 89.9 FL (ref 79–97)
MONOCYTES # BLD AUTO: 0.49 10*3/MM3 (ref 0.1–0.9)
MONOCYTES NFR BLD AUTO: 7.4 % (ref 5–12)
NEUTROPHILS NFR BLD AUTO: 3.68 10*3/MM3 (ref 1.7–7)
NEUTROPHILS NFR BLD AUTO: 55.3 % (ref 42.7–76)
NRBC BLD AUTO-RTO: 0 /100 WBC (ref 0–0.2)
PLATELET # BLD AUTO: 136 10*3/MM3 (ref 140–450)
PMV BLD AUTO: 9.8 FL (ref 6–12)
POTASSIUM SERPL-SCNC: 3.2 MMOL/L (ref 3.5–5.2)
RBC # BLD AUTO: 3.67 10*6/MM3 (ref 4.14–5.8)
SODIUM SERPL-SCNC: 142 MMOL/L (ref 136–145)
WBC NRBC COR # BLD AUTO: 6.64 10*3/MM3 (ref 3.4–10.8)

## 2025-08-13 PROCEDURE — 25010000002 CEFTAZIDIME 2 G RECONSTITUTED SOLUTION 1 EACH VIAL: Performed by: FAMILY MEDICINE

## 2025-08-13 PROCEDURE — 80048 BASIC METABOLIC PNL TOTAL CA: CPT | Performed by: PHYSICIAN ASSISTANT

## 2025-08-13 PROCEDURE — 99232 SBSQ HOSP IP/OBS MODERATE 35: CPT | Performed by: FAMILY MEDICINE

## 2025-08-13 PROCEDURE — 63710000001 INSULIN LISPRO (HUMAN) PER 5 UNITS: Performed by: PHYSICIAN ASSISTANT

## 2025-08-13 PROCEDURE — 85025 COMPLETE CBC W/AUTO DIFF WBC: CPT | Performed by: PHYSICIAN ASSISTANT

## 2025-08-13 PROCEDURE — 97116 GAIT TRAINING THERAPY: CPT

## 2025-08-13 PROCEDURE — 63710000001 INSULIN GLARGINE PER 5 UNITS: Performed by: PHYSICIAN ASSISTANT

## 2025-08-13 PROCEDURE — 97530 THERAPEUTIC ACTIVITIES: CPT

## 2025-08-13 PROCEDURE — 25010000002 CEFTAZIDIME 2 G RECONSTITUTED SOLUTION 1 EACH VIAL: Performed by: NURSE PRACTITIONER

## 2025-08-13 PROCEDURE — 82948 REAGENT STRIP/BLOOD GLUCOSE: CPT

## 2025-08-13 PROCEDURE — 82948 REAGENT STRIP/BLOOD GLUCOSE: CPT | Performed by: PHYSICIAN ASSISTANT

## 2025-08-13 RX ORDER — POTASSIUM CHLORIDE 1500 MG/1
40 TABLET, EXTENDED RELEASE ORAL ONCE
Status: COMPLETED | OUTPATIENT
Start: 2025-08-13 | End: 2025-08-13

## 2025-08-13 RX ORDER — FLUCONAZOLE 100 MG/1
400 TABLET ORAL EVERY 24 HOURS
Status: DISCONTINUED | OUTPATIENT
Start: 2025-08-13 | End: 2025-08-15 | Stop reason: HOSPADM

## 2025-08-13 RX ADMIN — BISACODYL 5 MG: 5 TABLET, COATED ORAL at 09:22

## 2025-08-13 RX ADMIN — FLUCONAZOLE 400 MG: 100 TABLET ORAL at 12:43

## 2025-08-13 RX ADMIN — INSULIN LISPRO 2 UNITS: 100 INJECTION, SOLUTION INTRAVENOUS; SUBCUTANEOUS at 12:43

## 2025-08-13 RX ADMIN — INSULIN LISPRO 4 UNITS: 100 INJECTION, SOLUTION INTRAVENOUS; SUBCUTANEOUS at 21:47

## 2025-08-13 RX ADMIN — LISINOPRIL 40 MG: 20 TABLET ORAL at 09:23

## 2025-08-13 RX ADMIN — INSULIN GLARGINE 30 UNITS: 100 INJECTION, SOLUTION SUBCUTANEOUS at 21:47

## 2025-08-13 RX ADMIN — CLOPIDOGREL BISULFATE 75 MG: 75 TABLET, FILM COATED ORAL at 09:22

## 2025-08-13 RX ADMIN — Medication 10 ML: at 04:34

## 2025-08-13 RX ADMIN — Medication 10 ML: at 18:19

## 2025-08-13 RX ADMIN — Medication 10 ML: at 09:22

## 2025-08-13 RX ADMIN — SENNOSIDES, DOCUSATE SODIUM 2 TABLET: 50; 8.6 TABLET, FILM COATED ORAL at 09:22

## 2025-08-13 RX ADMIN — POLYETHYLENE GLYCOL 3350 17 G: 17 POWDER, FOR SOLUTION ORAL at 09:22

## 2025-08-13 RX ADMIN — CEFTAZIDIME 2000 MG: 2 INJECTION, POWDER, FOR SOLUTION INTRAVENOUS at 18:19

## 2025-08-13 RX ADMIN — CIPROFLOXACIN AND DEXAMETHASONE 4 DROP: 3; 1 SUSPENSION/ DROPS AURICULAR (OTIC) at 09:23

## 2025-08-13 RX ADMIN — CIPROFLOXACIN AND DEXAMETHASONE 4 DROP: 3; 1 SUSPENSION/ DROPS AURICULAR (OTIC) at 21:47

## 2025-08-13 RX ADMIN — CEFTAZIDIME 2000 MG: 2 INJECTION, POWDER, FOR SOLUTION INTRAVENOUS at 09:25

## 2025-08-13 RX ADMIN — POTASSIUM CHLORIDE 40 MEQ: 1500 TABLET, EXTENDED RELEASE ORAL at 16:18

## 2025-08-14 LAB
ANION GAP SERPL CALCULATED.3IONS-SCNC: 10.3 MMOL/L (ref 5–15)
BACTERIA FLD CULT: ABNORMAL
BACTERIA FLD CULT: ABNORMAL
BASOPHILS # BLD AUTO: 0.02 10*3/MM3 (ref 0–0.2)
BASOPHILS NFR BLD AUTO: 0.3 % (ref 0–1.5)
BUN SERPL-MCNC: 14.8 MG/DL (ref 8–23)
BUN/CREAT SERPL: 12.8 (ref 7–25)
CALCIUM SPEC-SCNC: 9.4 MG/DL (ref 8.6–10.5)
CHLORIDE SERPL-SCNC: 103 MMOL/L (ref 98–107)
CO2 SERPL-SCNC: 25.7 MMOL/L (ref 22–29)
CREAT SERPL-MCNC: 1.16 MG/DL (ref 0.76–1.27)
DEPRECATED RDW RBC AUTO: 44 FL (ref 37–54)
EGFRCR SERPLBLD CKD-EPI 2021: 65.7 ML/MIN/1.73
EOSINOPHIL # BLD AUTO: 0.21 10*3/MM3 (ref 0–0.4)
EOSINOPHIL NFR BLD AUTO: 3.2 % (ref 0.3–6.2)
ERYTHROCYTE [DISTWIDTH] IN BLOOD BY AUTOMATED COUNT: 13.5 % (ref 12.3–15.4)
GLUCOSE BLDC GLUCOMTR-MCNC: 150 MG/DL (ref 70–105)
GLUCOSE BLDC GLUCOMTR-MCNC: 204 MG/DL (ref 70–105)
GLUCOSE BLDC GLUCOMTR-MCNC: 210 MG/DL (ref 70–105)
GLUCOSE BLDC GLUCOMTR-MCNC: 279 MG/DL (ref 70–105)
GLUCOSE SERPL-MCNC: 153 MG/DL (ref 65–99)
GRAM STN SPEC: ABNORMAL
GRAM STN SPEC: ABNORMAL
HCT VFR BLD AUTO: 36.7 % (ref 37.5–51)
HGB BLD-MCNC: 11.4 G/DL (ref 13–17.7)
IMM GRANULOCYTES # BLD AUTO: 0.01 10*3/MM3 (ref 0–0.05)
IMM GRANULOCYTES NFR BLD AUTO: 0.2 % (ref 0–0.5)
LYMPHOCYTES # BLD AUTO: 1.1 10*3/MM3 (ref 0.7–3.1)
LYMPHOCYTES NFR BLD AUTO: 16.8 % (ref 19.6–45.3)
MCH RBC QN AUTO: 27.6 PG (ref 26.6–33)
MCHC RBC AUTO-ENTMCNC: 31.1 G/DL (ref 31.5–35.7)
MCV RBC AUTO: 88.9 FL (ref 79–97)
MONOCYTES # BLD AUTO: 0.46 10*3/MM3 (ref 0.1–0.9)
MONOCYTES NFR BLD AUTO: 7 % (ref 5–12)
NEUTROPHILS NFR BLD AUTO: 4.76 10*3/MM3 (ref 1.7–7)
NEUTROPHILS NFR BLD AUTO: 72.5 % (ref 42.7–76)
NRBC BLD AUTO-RTO: 0 /100 WBC (ref 0–0.2)
PLATELET # BLD AUTO: 145 10*3/MM3 (ref 140–450)
PMV BLD AUTO: 10.3 FL (ref 6–12)
POTASSIUM SERPL-SCNC: 3.6 MMOL/L (ref 3.5–5.2)
RBC # BLD AUTO: 4.13 10*6/MM3 (ref 4.14–5.8)
SODIUM SERPL-SCNC: 139 MMOL/L (ref 136–145)
WBC NRBC COR # BLD AUTO: 6.56 10*3/MM3 (ref 3.4–10.8)

## 2025-08-14 PROCEDURE — 63710000001 INSULIN LISPRO (HUMAN) PER 5 UNITS: Performed by: PHYSICIAN ASSISTANT

## 2025-08-14 PROCEDURE — 63710000001 INSULIN GLARGINE PER 5 UNITS: Performed by: PHYSICIAN ASSISTANT

## 2025-08-14 PROCEDURE — 97530 THERAPEUTIC ACTIVITIES: CPT

## 2025-08-14 PROCEDURE — 85025 COMPLETE CBC W/AUTO DIFF WBC: CPT | Performed by: PHYSICIAN ASSISTANT

## 2025-08-14 PROCEDURE — 80048 BASIC METABOLIC PNL TOTAL CA: CPT | Performed by: PHYSICIAN ASSISTANT

## 2025-08-14 PROCEDURE — 82948 REAGENT STRIP/BLOOD GLUCOSE: CPT | Performed by: PHYSICIAN ASSISTANT

## 2025-08-14 PROCEDURE — 82948 REAGENT STRIP/BLOOD GLUCOSE: CPT

## 2025-08-14 PROCEDURE — 25010000002 CEFTAZIDIME 2 G RECONSTITUTED SOLUTION 1 EACH VIAL: Performed by: FAMILY MEDICINE

## 2025-08-14 PROCEDURE — 99232 SBSQ HOSP IP/OBS MODERATE 35: CPT | Performed by: FAMILY MEDICINE

## 2025-08-14 RX ORDER — NEOMYCIN SULFATE, POLYMYXIN B SULFATE AND HYDROCORTISONE 10; 3.5; 1 MG/ML; MG/ML; [USP'U]/ML
4 SUSPENSION/ DROPS AURICULAR (OTIC) EVERY 6 HOURS SCHEDULED
Status: DISCONTINUED | OUTPATIENT
Start: 2025-08-14 | End: 2025-08-15 | Stop reason: HOSPADM

## 2025-08-14 RX ADMIN — FLUCONAZOLE 400 MG: 100 TABLET ORAL at 13:48

## 2025-08-14 RX ADMIN — CEFTAZIDIME 2000 MG: 2 INJECTION, POWDER, FOR SOLUTION INTRAVENOUS at 18:00

## 2025-08-14 RX ADMIN — INSULIN LISPRO 6 UNITS: 100 INJECTION, SOLUTION INTRAVENOUS; SUBCUTANEOUS at 21:52

## 2025-08-14 RX ADMIN — Medication 10 ML: at 21:53

## 2025-08-14 RX ADMIN — INSULIN GLARGINE 30 UNITS: 100 INJECTION, SOLUTION SUBCUTANEOUS at 21:52

## 2025-08-14 RX ADMIN — INSULIN LISPRO 4 UNITS: 100 INJECTION, SOLUTION INTRAVENOUS; SUBCUTANEOUS at 12:31

## 2025-08-14 RX ADMIN — NEOMYCIN SULFATE, POLYMYXIN B SULFATE AND HYDROCORTISONE 4 DROP: 10; 3.5; 1 SUSPENSION/ DROPS AURICULAR (OTIC) at 21:53

## 2025-08-14 RX ADMIN — CEFTAZIDIME 2000 MG: 2 INJECTION, POWDER, FOR SOLUTION INTRAVENOUS at 02:30

## 2025-08-14 RX ADMIN — CLOPIDOGREL BISULFATE 75 MG: 75 TABLET, FILM COATED ORAL at 08:57

## 2025-08-14 RX ADMIN — Medication 10 ML: at 08:57

## 2025-08-14 RX ADMIN — INSULIN LISPRO 2 UNITS: 100 INJECTION, SOLUTION INTRAVENOUS; SUBCUTANEOUS at 08:56

## 2025-08-14 RX ADMIN — CIPROFLOXACIN AND DEXAMETHASONE 4 DROP: 3; 1 SUSPENSION/ DROPS AURICULAR (OTIC) at 08:58

## 2025-08-14 RX ADMIN — INSULIN LISPRO 4 UNITS: 100 INJECTION, SOLUTION INTRAVENOUS; SUBCUTANEOUS at 18:00

## 2025-08-14 RX ADMIN — CEFTAZIDIME 2000 MG: 2 INJECTION, POWDER, FOR SOLUTION INTRAVENOUS at 11:35

## 2025-08-14 RX ADMIN — LISINOPRIL 40 MG: 20 TABLET ORAL at 08:57

## 2025-08-15 VITALS
SYSTOLIC BLOOD PRESSURE: 152 MMHG | HEART RATE: 76 BPM | WEIGHT: 180.78 LBS | HEIGHT: 70 IN | DIASTOLIC BLOOD PRESSURE: 83 MMHG | BODY MASS INDEX: 25.88 KG/M2 | RESPIRATION RATE: 20 BRPM | OXYGEN SATURATION: 96 % | TEMPERATURE: 97.7 F

## 2025-08-15 LAB
GLUCOSE BLDC GLUCOMTR-MCNC: 108 MG/DL (ref 70–105)
GLUCOSE BLDC GLUCOMTR-MCNC: 136 MG/DL (ref 70–105)

## 2025-08-15 PROCEDURE — 82948 REAGENT STRIP/BLOOD GLUCOSE: CPT | Performed by: PHYSICIAN ASSISTANT

## 2025-08-15 PROCEDURE — 25010000002 CEFTAZIDIME 2 G RECONSTITUTED SOLUTION 1 EACH VIAL: Performed by: FAMILY MEDICINE

## 2025-08-15 PROCEDURE — 99239 HOSP IP/OBS DSCHRG MGMT >30: CPT | Performed by: FAMILY MEDICINE

## 2025-08-15 PROCEDURE — 36410 VNPNXR 3YR/> PHY/QHP DX/THER: CPT

## 2025-08-15 PROCEDURE — C1751 CATH, INF, PER/CENT/MIDLINE: HCPCS

## 2025-08-15 PROCEDURE — 82948 REAGENT STRIP/BLOOD GLUCOSE: CPT

## 2025-08-15 RX ORDER — SODIUM CHLORIDE 0.9 % (FLUSH) 0.9 %
20 SYRINGE (ML) INJECTION AS NEEDED
Status: DISCONTINUED | OUTPATIENT
Start: 2025-08-15 | End: 2025-08-15 | Stop reason: HOSPADM

## 2025-08-15 RX ORDER — SODIUM CHLORIDE 0.9 % (FLUSH) 0.9 %
10 SYRINGE (ML) INJECTION AS NEEDED
Status: DISCONTINUED | OUTPATIENT
Start: 2025-08-15 | End: 2025-08-15 | Stop reason: HOSPADM

## 2025-08-15 RX ORDER — SODIUM CHLORIDE 0.9 % (FLUSH) 0.9 %
10 SYRINGE (ML) INJECTION EVERY 12 HOURS SCHEDULED
Status: DISCONTINUED | OUTPATIENT
Start: 2025-08-15 | End: 2025-08-15 | Stop reason: HOSPADM

## 2025-08-15 RX ORDER — FLUCONAZOLE 200 MG/1
400 TABLET ORAL EVERY 24 HOURS
Qty: 24 TABLET | Refills: 0 | Status: SHIPPED | OUTPATIENT
Start: 2025-08-15 | End: 2025-08-27

## 2025-08-15 RX ORDER — SODIUM CHLORIDE 9 MG/ML
40 INJECTION, SOLUTION INTRAVENOUS AS NEEDED
Status: DISCONTINUED | OUTPATIENT
Start: 2025-08-15 | End: 2025-08-15 | Stop reason: HOSPADM

## 2025-08-15 RX ORDER — LIDOCAINE HYDROCHLORIDE 10 MG/ML
20 INJECTION, SOLUTION INFILTRATION; PERINEURAL ONCE
Status: DISCONTINUED | OUTPATIENT
Start: 2025-08-15 | End: 2025-08-15 | Stop reason: HOSPADM

## 2025-08-15 RX ADMIN — Medication 10 ML: at 08:00

## 2025-08-15 RX ADMIN — NEOMYCIN SULFATE, POLYMYXIN B SULFATE AND HYDROCORTISONE 4 DROP: 10; 3.5; 1 SUSPENSION/ DROPS AURICULAR (OTIC) at 00:12

## 2025-08-15 RX ADMIN — GLIPIZIDE 2.5 MG: 5 TABLET ORAL at 07:15

## 2025-08-15 RX ADMIN — CEFTAZIDIME 2000 MG: 2 INJECTION, POWDER, FOR SOLUTION INTRAVENOUS at 02:13

## 2025-08-15 RX ADMIN — NEOMYCIN SULFATE, POLYMYXIN B SULFATE AND HYDROCORTISONE 4 DROP: 10; 3.5; 1 SUSPENSION/ DROPS AURICULAR (OTIC) at 05:48

## 2025-08-15 RX ADMIN — CLOPIDOGREL BISULFATE 75 MG: 75 TABLET, FILM COATED ORAL at 07:15

## 2025-08-15 RX ADMIN — LISINOPRIL 40 MG: 20 TABLET ORAL at 07:15

## 2025-08-15 RX ADMIN — CEFTAZIDIME 2000 MG: 2 INJECTION, POWDER, FOR SOLUTION INTRAVENOUS at 10:35

## 2025-08-16 LAB
BACTERIA SPEC AEROBE CULT: NORMAL
BACTERIA SPEC AEROBE CULT: NORMAL
BACTERIA SPEC ANAEROBE CULT: NORMAL

## (undated) DEVICE — BITEBLOCK ENDO W/STRAP 60F A/ LF DISP

## (undated) DEVICE — PK ENDO GI 50